# Patient Record
Sex: FEMALE | Race: ASIAN | NOT HISPANIC OR LATINO
[De-identification: names, ages, dates, MRNs, and addresses within clinical notes are randomized per-mention and may not be internally consistent; named-entity substitution may affect disease eponyms.]

---

## 2017-01-04 ENCOUNTER — APPOINTMENT (OUTPATIENT)
Age: 62
End: 2017-01-04

## 2017-01-04 ENCOUNTER — LABORATORY RESULT (OUTPATIENT)
Age: 62
End: 2017-01-04

## 2017-01-04 ENCOUNTER — OUTPATIENT (OUTPATIENT)
Dept: OUTPATIENT SERVICES | Facility: HOSPITAL | Age: 62
LOS: 1 days | End: 2017-01-04

## 2017-01-04 DIAGNOSIS — Z98.89 OTHER SPECIFIED POSTPROCEDURAL STATES: Chronic | ICD-10-CM

## 2017-01-04 DIAGNOSIS — K43.9 VENTRAL HERNIA WITHOUT OBSTRUCTION OR GANGRENE: Chronic | ICD-10-CM

## 2017-01-04 DIAGNOSIS — Z98.49 CATARACT EXTRACTION STATUS, UNSPECIFIED EYE: Chronic | ICD-10-CM

## 2017-01-04 LAB
INR BLD: 2.06 — HIGH (ref 0.87–1.18)
PROTHROM AB SERPL-ACNC: 23.7 SEC — HIGH (ref 10–13.1)

## 2017-01-05 DIAGNOSIS — Z79.01 LONG TERM (CURRENT) USE OF ANTICOAGULANTS: ICD-10-CM

## 2017-01-19 ENCOUNTER — APPOINTMENT (OUTPATIENT)
Dept: INTERNAL MEDICINE | Facility: HOSPITAL | Age: 62
End: 2017-01-19

## 2017-01-19 ENCOUNTER — OUTPATIENT (OUTPATIENT)
Dept: OUTPATIENT SERVICES | Facility: HOSPITAL | Age: 62
LOS: 1 days | End: 2017-01-19

## 2017-01-19 DIAGNOSIS — Z98.89 OTHER SPECIFIED POSTPROCEDURAL STATES: Chronic | ICD-10-CM

## 2017-01-19 DIAGNOSIS — K43.9 VENTRAL HERNIA WITHOUT OBSTRUCTION OR GANGRENE: Chronic | ICD-10-CM

## 2017-01-19 DIAGNOSIS — Z98.49 CATARACT EXTRACTION STATUS, UNSPECIFIED EYE: Chronic | ICD-10-CM

## 2017-01-20 LAB — INR PPP: 2.1 RATIO

## 2017-01-23 DIAGNOSIS — I48.91 UNSPECIFIED ATRIAL FIBRILLATION: ICD-10-CM

## 2017-01-23 DIAGNOSIS — Z00.00 ENCOUNTER FOR GENERAL ADULT MEDICAL EXAMINATION WITHOUT ABNORMAL FINDINGS: ICD-10-CM

## 2017-01-23 DIAGNOSIS — Z79.01 LONG TERM (CURRENT) USE OF ANTICOAGULANTS: ICD-10-CM

## 2017-02-06 ENCOUNTER — OUTPATIENT (OUTPATIENT)
Dept: OUTPATIENT SERVICES | Facility: HOSPITAL | Age: 62
LOS: 1 days | End: 2017-02-06

## 2017-02-06 ENCOUNTER — RESULT CHARGE (OUTPATIENT)
Age: 62
End: 2017-02-06

## 2017-02-06 ENCOUNTER — APPOINTMENT (OUTPATIENT)
Dept: INTERNAL MEDICINE | Facility: HOSPITAL | Age: 62
End: 2017-02-06

## 2017-02-06 DIAGNOSIS — Z98.49 CATARACT EXTRACTION STATUS, UNSPECIFIED EYE: Chronic | ICD-10-CM

## 2017-02-06 DIAGNOSIS — Z98.89 OTHER SPECIFIED POSTPROCEDURAL STATES: Chronic | ICD-10-CM

## 2017-02-06 DIAGNOSIS — I48.91 UNSPECIFIED ATRIAL FIBRILLATION: ICD-10-CM

## 2017-02-06 DIAGNOSIS — K43.9 VENTRAL HERNIA WITHOUT OBSTRUCTION OR GANGRENE: Chronic | ICD-10-CM

## 2017-02-06 LAB — INR PPP: 2.1 RATIO

## 2017-02-16 ENCOUNTER — APPOINTMENT (OUTPATIENT)
Dept: INTERNAL MEDICINE | Facility: HOSPITAL | Age: 62
End: 2017-02-16

## 2017-03-06 ENCOUNTER — OUTPATIENT (OUTPATIENT)
Dept: OUTPATIENT SERVICES | Facility: HOSPITAL | Age: 62
LOS: 1 days | End: 2017-03-06

## 2017-03-06 ENCOUNTER — APPOINTMENT (OUTPATIENT)
Dept: INTERNAL MEDICINE | Facility: HOSPITAL | Age: 62
End: 2017-03-06

## 2017-03-06 ENCOUNTER — RESULT CHARGE (OUTPATIENT)
Age: 62
End: 2017-03-06

## 2017-03-06 DIAGNOSIS — Z98.49 CATARACT EXTRACTION STATUS, UNSPECIFIED EYE: Chronic | ICD-10-CM

## 2017-03-06 DIAGNOSIS — K43.9 VENTRAL HERNIA WITHOUT OBSTRUCTION OR GANGRENE: Chronic | ICD-10-CM

## 2017-03-06 DIAGNOSIS — Z79.01 LONG TERM (CURRENT) USE OF ANTICOAGULANTS: ICD-10-CM

## 2017-03-06 DIAGNOSIS — Z98.89 OTHER SPECIFIED POSTPROCEDURAL STATES: Chronic | ICD-10-CM

## 2017-03-06 DIAGNOSIS — Z00.00 ENCOUNTER FOR GENERAL ADULT MEDICAL EXAMINATION WITHOUT ABNORMAL FINDINGS: ICD-10-CM

## 2017-03-07 LAB — INR PPP: 1.9 RATIO

## 2017-03-13 ENCOUNTER — APPOINTMENT (OUTPATIENT)
Dept: INTERNAL MEDICINE | Facility: HOSPITAL | Age: 62
End: 2017-03-13

## 2017-03-13 ENCOUNTER — OUTPATIENT (OUTPATIENT)
Dept: OUTPATIENT SERVICES | Facility: HOSPITAL | Age: 62
LOS: 1 days | End: 2017-03-13

## 2017-03-13 DIAGNOSIS — Z98.49 CATARACT EXTRACTION STATUS, UNSPECIFIED EYE: Chronic | ICD-10-CM

## 2017-03-13 DIAGNOSIS — K43.9 VENTRAL HERNIA WITHOUT OBSTRUCTION OR GANGRENE: Chronic | ICD-10-CM

## 2017-03-13 DIAGNOSIS — Z98.89 OTHER SPECIFIED POSTPROCEDURAL STATES: Chronic | ICD-10-CM

## 2017-03-15 DIAGNOSIS — I48.91 UNSPECIFIED ATRIAL FIBRILLATION: ICD-10-CM

## 2017-03-15 DIAGNOSIS — Z00.00 ENCOUNTER FOR GENERAL ADULT MEDICAL EXAMINATION WITHOUT ABNORMAL FINDINGS: ICD-10-CM

## 2017-03-15 DIAGNOSIS — Z79.01 LONG TERM (CURRENT) USE OF ANTICOAGULANTS: ICD-10-CM

## 2017-03-16 LAB — INR PPP: 3.7 RATIO

## 2017-03-27 ENCOUNTER — OUTPATIENT (OUTPATIENT)
Dept: OUTPATIENT SERVICES | Facility: HOSPITAL | Age: 62
LOS: 1 days | End: 2017-03-27

## 2017-03-27 ENCOUNTER — APPOINTMENT (OUTPATIENT)
Dept: INTERNAL MEDICINE | Facility: HOSPITAL | Age: 62
End: 2017-03-27

## 2017-03-27 DIAGNOSIS — K43.9 VENTRAL HERNIA WITHOUT OBSTRUCTION OR GANGRENE: Chronic | ICD-10-CM

## 2017-03-27 DIAGNOSIS — Z98.49 CATARACT EXTRACTION STATUS, UNSPECIFIED EYE: Chronic | ICD-10-CM

## 2017-03-27 DIAGNOSIS — Z79.01 LONG TERM (CURRENT) USE OF ANTICOAGULANTS: ICD-10-CM

## 2017-03-27 DIAGNOSIS — Z98.89 OTHER SPECIFIED POSTPROCEDURAL STATES: Chronic | ICD-10-CM

## 2017-03-27 LAB — INR PPP: 2.6 RATIO

## 2017-04-10 ENCOUNTER — APPOINTMENT (OUTPATIENT)
Dept: INTERNAL MEDICINE | Facility: HOSPITAL | Age: 62
End: 2017-04-10

## 2017-04-11 ENCOUNTER — OUTPATIENT (OUTPATIENT)
Dept: OUTPATIENT SERVICES | Facility: HOSPITAL | Age: 62
LOS: 1 days | End: 2017-04-11

## 2017-04-11 ENCOUNTER — APPOINTMENT (OUTPATIENT)
Dept: INTERNAL MEDICINE | Facility: HOSPITAL | Age: 62
End: 2017-04-11

## 2017-04-11 ENCOUNTER — RESULT CHARGE (OUTPATIENT)
Age: 62
End: 2017-04-11

## 2017-04-11 DIAGNOSIS — Z98.49 CATARACT EXTRACTION STATUS, UNSPECIFIED EYE: Chronic | ICD-10-CM

## 2017-04-11 DIAGNOSIS — K43.9 VENTRAL HERNIA WITHOUT OBSTRUCTION OR GANGRENE: Chronic | ICD-10-CM

## 2017-04-11 DIAGNOSIS — Z98.89 OTHER SPECIFIED POSTPROCEDURAL STATES: Chronic | ICD-10-CM

## 2017-04-11 LAB — INR PPP: 1.7 RATIO

## 2017-04-12 DIAGNOSIS — Z79.01 LONG TERM (CURRENT) USE OF ANTICOAGULANTS: ICD-10-CM

## 2017-04-12 DIAGNOSIS — Z00.00 ENCOUNTER FOR GENERAL ADULT MEDICAL EXAMINATION WITHOUT ABNORMAL FINDINGS: ICD-10-CM

## 2017-04-18 ENCOUNTER — OUTPATIENT (OUTPATIENT)
Dept: OUTPATIENT SERVICES | Facility: HOSPITAL | Age: 62
LOS: 1 days | End: 2017-04-18

## 2017-04-18 ENCOUNTER — APPOINTMENT (OUTPATIENT)
Dept: INTERNAL MEDICINE | Facility: HOSPITAL | Age: 62
End: 2017-04-18

## 2017-04-18 DIAGNOSIS — K43.9 VENTRAL HERNIA WITHOUT OBSTRUCTION OR GANGRENE: Chronic | ICD-10-CM

## 2017-04-18 DIAGNOSIS — Z98.89 OTHER SPECIFIED POSTPROCEDURAL STATES: Chronic | ICD-10-CM

## 2017-04-18 DIAGNOSIS — Z98.49 CATARACT EXTRACTION STATUS, UNSPECIFIED EYE: Chronic | ICD-10-CM

## 2017-04-18 LAB
INR PPP: 2.8 RATIO
QUALITY CONTROL: YES

## 2017-04-19 DIAGNOSIS — Z79.01 LONG TERM (CURRENT) USE OF ANTICOAGULANTS: ICD-10-CM

## 2017-04-19 DIAGNOSIS — Z00.00 ENCOUNTER FOR GENERAL ADULT MEDICAL EXAMINATION WITHOUT ABNORMAL FINDINGS: ICD-10-CM

## 2017-04-27 ENCOUNTER — OUTPATIENT (OUTPATIENT)
Dept: OUTPATIENT SERVICES | Facility: HOSPITAL | Age: 62
LOS: 1 days | End: 2017-04-27

## 2017-04-27 ENCOUNTER — APPOINTMENT (OUTPATIENT)
Dept: INTERNAL MEDICINE | Facility: HOSPITAL | Age: 62
End: 2017-04-27

## 2017-04-27 ENCOUNTER — LABORATORY RESULT (OUTPATIENT)
Age: 62
End: 2017-04-27

## 2017-04-27 VITALS — BODY MASS INDEX: 22.7 KG/M2 | HEIGHT: 62 IN | WEIGHT: 123.35 LBS

## 2017-04-27 VITALS — SYSTOLIC BLOOD PRESSURE: 120 MMHG | RESPIRATION RATE: 14 BRPM | HEART RATE: 64 BPM | DIASTOLIC BLOOD PRESSURE: 75 MMHG

## 2017-04-27 DIAGNOSIS — Z98.89 OTHER SPECIFIED POSTPROCEDURAL STATES: Chronic | ICD-10-CM

## 2017-04-27 DIAGNOSIS — K43.9 VENTRAL HERNIA WITHOUT OBSTRUCTION OR GANGRENE: Chronic | ICD-10-CM

## 2017-04-27 DIAGNOSIS — Z98.49 CATARACT EXTRACTION STATUS, UNSPECIFIED EYE: Chronic | ICD-10-CM

## 2017-04-27 LAB
CHOLEST SERPL-MCNC: 124 MG/DL — SIGNIFICANT CHANGE UP (ref 120–199)
HBA1C BLD-MCNC: 6.3 % — HIGH (ref 4–5.6)
HDLC SERPL-MCNC: 56 MG/DL — SIGNIFICANT CHANGE UP (ref 45–65)
LIPID PNL WITH DIRECT LDL SERPL: 61 MG/DL — SIGNIFICANT CHANGE UP
TRIGL SERPL-MCNC: 57 MG/DL — SIGNIFICANT CHANGE UP (ref 10–149)

## 2017-04-28 DIAGNOSIS — M25.562 PAIN IN LEFT KNEE: ICD-10-CM

## 2017-04-28 DIAGNOSIS — E11.9 TYPE 2 DIABETES MELLITUS WITHOUT COMPLICATIONS: ICD-10-CM

## 2017-04-28 DIAGNOSIS — Z79.01 LONG TERM (CURRENT) USE OF ANTICOAGULANTS: ICD-10-CM

## 2017-04-28 DIAGNOSIS — E78.5 HYPERLIPIDEMIA, UNSPECIFIED: ICD-10-CM

## 2017-04-28 DIAGNOSIS — I48.91 UNSPECIFIED ATRIAL FIBRILLATION: ICD-10-CM

## 2017-04-28 DIAGNOSIS — I63.9 CEREBRAL INFARCTION, UNSPECIFIED: ICD-10-CM

## 2017-04-28 DIAGNOSIS — Z00.00 ENCOUNTER FOR GENERAL ADULT MEDICAL EXAMINATION WITHOUT ABNORMAL FINDINGS: ICD-10-CM

## 2017-04-28 LAB
CREAT UR-MCNC: 15 MG/DL — SIGNIFICANT CHANGE UP
GLUCOSE BLDC GLUCOMTR-MCNC: 123
INR PPP: 2.9 RATIO
MICROALBUMIN UR-MCNC: < 0.3 MG/DL — SIGNIFICANT CHANGE UP

## 2017-05-03 ENCOUNTER — RX RENEWAL (OUTPATIENT)
Age: 62
End: 2017-05-03

## 2017-05-03 ENCOUNTER — OTHER (OUTPATIENT)
Age: 62
End: 2017-05-03

## 2017-05-04 ENCOUNTER — RX RENEWAL (OUTPATIENT)
Age: 62
End: 2017-05-04

## 2017-05-11 ENCOUNTER — APPOINTMENT (OUTPATIENT)
Dept: OPHTHALMOLOGY | Facility: CLINIC | Age: 62
End: 2017-05-11

## 2017-05-17 ENCOUNTER — APPOINTMENT (OUTPATIENT)
Dept: INTERNAL MEDICINE | Facility: HOSPITAL | Age: 62
End: 2017-05-17

## 2017-05-17 ENCOUNTER — OUTPATIENT (OUTPATIENT)
Dept: OUTPATIENT SERVICES | Facility: HOSPITAL | Age: 62
LOS: 1 days | End: 2017-05-17

## 2017-05-17 DIAGNOSIS — K43.9 VENTRAL HERNIA WITHOUT OBSTRUCTION OR GANGRENE: Chronic | ICD-10-CM

## 2017-05-17 DIAGNOSIS — Z98.89 OTHER SPECIFIED POSTPROCEDURAL STATES: Chronic | ICD-10-CM

## 2017-05-17 DIAGNOSIS — Z98.49 CATARACT EXTRACTION STATUS, UNSPECIFIED EYE: Chronic | ICD-10-CM

## 2017-05-17 DIAGNOSIS — Z79.01 LONG TERM (CURRENT) USE OF ANTICOAGULANTS: ICD-10-CM

## 2017-05-24 ENCOUNTER — APPOINTMENT (OUTPATIENT)
Dept: INTERNAL MEDICINE | Facility: HOSPITAL | Age: 62
End: 2017-05-24

## 2017-05-24 ENCOUNTER — RX RENEWAL (OUTPATIENT)
Age: 62
End: 2017-05-24

## 2017-05-24 ENCOUNTER — OUTPATIENT (OUTPATIENT)
Dept: OUTPATIENT SERVICES | Facility: HOSPITAL | Age: 62
LOS: 1 days | End: 2017-05-24

## 2017-05-24 ENCOUNTER — NON-APPOINTMENT (OUTPATIENT)
Age: 62
End: 2017-05-24

## 2017-05-24 ENCOUNTER — APPOINTMENT (OUTPATIENT)
Dept: CARDIOLOGY | Facility: CLINIC | Age: 62
End: 2017-05-24

## 2017-05-24 VITALS
WEIGHT: 123 LBS | DIASTOLIC BLOOD PRESSURE: 79 MMHG | SYSTOLIC BLOOD PRESSURE: 146 MMHG | HEART RATE: 68 BPM | BODY MASS INDEX: 22.5 KG/M2 | OXYGEN SATURATION: 99 %

## 2017-05-24 DIAGNOSIS — Z98.49 CATARACT EXTRACTION STATUS, UNSPECIFIED EYE: Chronic | ICD-10-CM

## 2017-05-24 DIAGNOSIS — Z00.00 ENCOUNTER FOR GENERAL ADULT MEDICAL EXAMINATION WITHOUT ABNORMAL FINDINGS: ICD-10-CM

## 2017-05-24 DIAGNOSIS — K43.9 VENTRAL HERNIA WITHOUT OBSTRUCTION OR GANGRENE: Chronic | ICD-10-CM

## 2017-05-24 DIAGNOSIS — Z79.01 LONG TERM (CURRENT) USE OF ANTICOAGULANTS: ICD-10-CM

## 2017-05-24 DIAGNOSIS — Z98.89 OTHER SPECIFIED POSTPROCEDURAL STATES: Chronic | ICD-10-CM

## 2017-05-24 LAB — INR PPP: 3.2 RATIO

## 2017-05-31 ENCOUNTER — OUTPATIENT (OUTPATIENT)
Dept: OUTPATIENT SERVICES | Facility: HOSPITAL | Age: 62
LOS: 1 days | End: 2017-05-31

## 2017-05-31 ENCOUNTER — APPOINTMENT (OUTPATIENT)
Dept: INTERNAL MEDICINE | Facility: HOSPITAL | Age: 62
End: 2017-05-31

## 2017-05-31 DIAGNOSIS — Z98.49 CATARACT EXTRACTION STATUS, UNSPECIFIED EYE: Chronic | ICD-10-CM

## 2017-05-31 DIAGNOSIS — Z98.89 OTHER SPECIFIED POSTPROCEDURAL STATES: Chronic | ICD-10-CM

## 2017-05-31 DIAGNOSIS — Z00.00 ENCOUNTER FOR GENERAL ADULT MEDICAL EXAMINATION WITHOUT ABNORMAL FINDINGS: ICD-10-CM

## 2017-05-31 DIAGNOSIS — Z79.01 LONG TERM (CURRENT) USE OF ANTICOAGULANTS: ICD-10-CM

## 2017-05-31 DIAGNOSIS — K43.9 VENTRAL HERNIA WITHOUT OBSTRUCTION OR GANGRENE: Chronic | ICD-10-CM

## 2017-05-31 LAB — INR PPP: 2.6 RATIO

## 2017-06-07 ENCOUNTER — APPOINTMENT (OUTPATIENT)
Dept: INTERNAL MEDICINE | Facility: HOSPITAL | Age: 62
End: 2017-06-07

## 2017-06-07 ENCOUNTER — RESULT CHARGE (OUTPATIENT)
Age: 62
End: 2017-06-07

## 2017-06-07 ENCOUNTER — OUTPATIENT (OUTPATIENT)
Dept: OUTPATIENT SERVICES | Facility: HOSPITAL | Age: 62
LOS: 1 days | End: 2017-06-07

## 2017-06-07 ENCOUNTER — OTHER (OUTPATIENT)
Age: 62
End: 2017-06-07

## 2017-06-07 DIAGNOSIS — Z98.49 CATARACT EXTRACTION STATUS, UNSPECIFIED EYE: Chronic | ICD-10-CM

## 2017-06-07 DIAGNOSIS — Z98.89 OTHER SPECIFIED POSTPROCEDURAL STATES: Chronic | ICD-10-CM

## 2017-06-07 DIAGNOSIS — K43.9 VENTRAL HERNIA WITHOUT OBSTRUCTION OR GANGRENE: Chronic | ICD-10-CM

## 2017-06-08 ENCOUNTER — OUTPATIENT (OUTPATIENT)
Dept: OUTPATIENT SERVICES | Facility: HOSPITAL | Age: 62
LOS: 1 days | End: 2017-06-08

## 2017-06-08 ENCOUNTER — APPOINTMENT (OUTPATIENT)
Dept: CV DIAGNOSTICS | Facility: HOSPITAL | Age: 62
End: 2017-06-08

## 2017-06-08 DIAGNOSIS — Z98.89 OTHER SPECIFIED POSTPROCEDURAL STATES: Chronic | ICD-10-CM

## 2017-06-08 DIAGNOSIS — I20.9 ANGINA PECTORIS, UNSPECIFIED: ICD-10-CM

## 2017-06-08 DIAGNOSIS — Z98.49 CATARACT EXTRACTION STATUS, UNSPECIFIED EYE: Chronic | ICD-10-CM

## 2017-06-08 DIAGNOSIS — Z79.01 LONG TERM (CURRENT) USE OF ANTICOAGULANTS: ICD-10-CM

## 2017-06-08 DIAGNOSIS — K43.9 VENTRAL HERNIA WITHOUT OBSTRUCTION OR GANGRENE: Chronic | ICD-10-CM

## 2017-06-08 DIAGNOSIS — I48.91 UNSPECIFIED ATRIAL FIBRILLATION: ICD-10-CM

## 2017-06-13 DIAGNOSIS — R94.31 ABNORMAL ELECTROCARDIOGRAM [ECG] [EKG]: ICD-10-CM

## 2017-06-19 ENCOUNTER — OUTPATIENT (OUTPATIENT)
Dept: OUTPATIENT SERVICES | Facility: HOSPITAL | Age: 62
LOS: 1 days | End: 2017-06-19

## 2017-06-19 ENCOUNTER — APPOINTMENT (OUTPATIENT)
Dept: INTERNAL MEDICINE | Facility: HOSPITAL | Age: 62
End: 2017-06-19

## 2017-06-19 DIAGNOSIS — K43.9 VENTRAL HERNIA WITHOUT OBSTRUCTION OR GANGRENE: Chronic | ICD-10-CM

## 2017-06-19 DIAGNOSIS — Z98.89 OTHER SPECIFIED POSTPROCEDURAL STATES: Chronic | ICD-10-CM

## 2017-06-19 DIAGNOSIS — Z98.49 CATARACT EXTRACTION STATUS, UNSPECIFIED EYE: Chronic | ICD-10-CM

## 2017-06-20 ENCOUNTER — RX RENEWAL (OUTPATIENT)
Age: 62
End: 2017-06-20

## 2017-06-20 DIAGNOSIS — Z79.01 LONG TERM (CURRENT) USE OF ANTICOAGULANTS: ICD-10-CM

## 2017-06-21 ENCOUNTER — NON-APPOINTMENT (OUTPATIENT)
Age: 62
End: 2017-06-21

## 2017-06-21 ENCOUNTER — APPOINTMENT (OUTPATIENT)
Dept: CARDIOLOGY | Facility: CLINIC | Age: 62
End: 2017-06-21

## 2017-06-21 VITALS
WEIGHT: 125 LBS | SYSTOLIC BLOOD PRESSURE: 138 MMHG | BODY MASS INDEX: 23 KG/M2 | HEART RATE: 66 BPM | DIASTOLIC BLOOD PRESSURE: 80 MMHG | HEIGHT: 62 IN | RESPIRATION RATE: 98 BRPM

## 2017-06-30 ENCOUNTER — OUTPATIENT (OUTPATIENT)
Dept: OUTPATIENT SERVICES | Facility: HOSPITAL | Age: 62
LOS: 1 days | Discharge: HOME | End: 2017-06-30
Payer: MEDICAID

## 2017-06-30 DIAGNOSIS — K43.9 VENTRAL HERNIA WITHOUT OBSTRUCTION OR GANGRENE: Chronic | ICD-10-CM

## 2017-06-30 DIAGNOSIS — I20.9 ANGINA PECTORIS, UNSPECIFIED: ICD-10-CM

## 2017-06-30 DIAGNOSIS — Z98.49 CATARACT EXTRACTION STATUS, UNSPECIFIED EYE: Chronic | ICD-10-CM

## 2017-06-30 DIAGNOSIS — Z98.89 OTHER SPECIFIED POSTPROCEDURAL STATES: Chronic | ICD-10-CM

## 2017-06-30 LAB
BASOPHILS # BLD AUTO: 0.03 K/UL — SIGNIFICANT CHANGE UP (ref 0–0.2)
BASOPHILS NFR BLD AUTO: 0.3 % — SIGNIFICANT CHANGE UP (ref 0–2)
BUN SERPL-MCNC: 11 MG/DL — SIGNIFICANT CHANGE UP (ref 7–23)
CALCIUM SERPL-MCNC: 9.7 MG/DL — SIGNIFICANT CHANGE UP (ref 8.4–10.5)
CHLORIDE SERPL-SCNC: 103 MMOL/L — SIGNIFICANT CHANGE UP (ref 98–107)
CO2 SERPL-SCNC: 25 MMOL/L — SIGNIFICANT CHANGE UP (ref 22–31)
CREAT SERPL-MCNC: 0.65 MG/DL — SIGNIFICANT CHANGE UP (ref 0.5–1.3)
EOSINOPHIL # BLD AUTO: 0.11 K/UL — SIGNIFICANT CHANGE UP (ref 0–0.5)
EOSINOPHIL NFR BLD AUTO: 1.2 % — SIGNIFICANT CHANGE UP (ref 0–6)
GLUCOSE SERPL-MCNC: 114 MG/DL — HIGH (ref 70–99)
HCT VFR BLD CALC: 32.6 % — LOW (ref 34.5–45)
HGB BLD-MCNC: 10.5 G/DL — LOW (ref 11.5–15.5)
IMM GRANULOCYTES # BLD AUTO: 0.03 # — SIGNIFICANT CHANGE UP
IMM GRANULOCYTES NFR BLD AUTO: 0.3 % — SIGNIFICANT CHANGE UP (ref 0–1.5)
INR BLD: 3.07 — HIGH (ref 0.88–1.17)
LYMPHOCYTES # BLD AUTO: 3.46 K/UL — HIGH (ref 1–3.3)
LYMPHOCYTES # BLD AUTO: 37.1 % — SIGNIFICANT CHANGE UP (ref 13–44)
MAGNESIUM SERPL-MCNC: 1.8 MG/DL — SIGNIFICANT CHANGE UP (ref 1.6–2.6)
MCHC RBC-ENTMCNC: 28.8 PG — SIGNIFICANT CHANGE UP (ref 27–34)
MCHC RBC-ENTMCNC: 32.2 % — SIGNIFICANT CHANGE UP (ref 32–36)
MCV RBC AUTO: 89.6 FL — SIGNIFICANT CHANGE UP (ref 80–100)
MONOCYTES # BLD AUTO: 0.67 K/UL — SIGNIFICANT CHANGE UP (ref 0–0.9)
MONOCYTES NFR BLD AUTO: 7.2 % — SIGNIFICANT CHANGE UP (ref 2–14)
NEUTROPHILS # BLD AUTO: 5.03 K/UL — SIGNIFICANT CHANGE UP (ref 1.8–7.4)
NEUTROPHILS NFR BLD AUTO: 53.9 % — SIGNIFICANT CHANGE UP (ref 43–77)
NRBC # FLD: 0 — SIGNIFICANT CHANGE UP
PLATELET # BLD AUTO: 251 K/UL — SIGNIFICANT CHANGE UP (ref 150–400)
PMV BLD: 10.5 FL — SIGNIFICANT CHANGE UP (ref 7–13)
POTASSIUM SERPL-MCNC: 4.5 MMOL/L — SIGNIFICANT CHANGE UP (ref 3.5–5.3)
POTASSIUM SERPL-SCNC: 4.5 MMOL/L — SIGNIFICANT CHANGE UP (ref 3.5–5.3)
PROTHROM AB SERPL-ACNC: 35.2 SEC — HIGH (ref 9.8–13.1)
RBC # BLD: 3.64 M/UL — LOW (ref 3.8–5.2)
RBC # FLD: 14.4 % — SIGNIFICANT CHANGE UP (ref 10.3–14.5)
SODIUM SERPL-SCNC: 140 MMOL/L — SIGNIFICANT CHANGE UP (ref 135–145)
WBC # BLD: 9.33 K/UL — SIGNIFICANT CHANGE UP (ref 3.8–10.5)
WBC # FLD AUTO: 9.33 K/UL — SIGNIFICANT CHANGE UP (ref 3.8–10.5)

## 2017-06-30 PROCEDURE — 93458 L HRT ARTERY/VENTRICLE ANGIO: CPT | Mod: 26

## 2017-06-30 PROCEDURE — 93010 ELECTROCARDIOGRAM REPORT: CPT

## 2017-06-30 NOTE — H&P CARDIOLOGY - HISTORY OF PRESENT ILLNESS
63 yo F HTN, DM, chol, moderate MS, afib, here for elective cath due to SOB and left jaw pain on exertion. Pt states sx occur on walking > 1 block. NO  SOB at rest PND, orthopnea, palpitations, diaphoresis, lightheadedness, dizziness, syncope, increased lower extremity edema, fever chills, malaise, myalgias, anorexia, weight changes ( loss or gain), night sweats, generalized fatigue abdominal pain, N/V/C/D BRBPR, melena, urinary symptoms, cough, and wheezing.

## 2017-06-30 NOTE — H&P CARDIOLOGY - PMH
Atrial fibrillation  h/o Cardioversion in 2013  DM (diabetes mellitus)    HLD (hyperlipidemia)    HTN (hypertension)    Mitral stenosis    TIA (transient ischemic attack)  2013- Rt facial droop which resolved after 2 hours

## 2017-07-06 ENCOUNTER — OUTPATIENT (OUTPATIENT)
Dept: OUTPATIENT SERVICES | Facility: HOSPITAL | Age: 62
LOS: 1 days | End: 2017-07-06

## 2017-07-06 ENCOUNTER — APPOINTMENT (OUTPATIENT)
Dept: INTERNAL MEDICINE | Facility: HOSPITAL | Age: 62
End: 2017-07-06

## 2017-07-06 DIAGNOSIS — K43.9 VENTRAL HERNIA WITHOUT OBSTRUCTION OR GANGRENE: Chronic | ICD-10-CM

## 2017-07-06 DIAGNOSIS — Z79.01 LONG TERM (CURRENT) USE OF ANTICOAGULANTS: ICD-10-CM

## 2017-07-06 DIAGNOSIS — Z98.49 CATARACT EXTRACTION STATUS, UNSPECIFIED EYE: Chronic | ICD-10-CM

## 2017-07-06 DIAGNOSIS — Z98.89 OTHER SPECIFIED POSTPROCEDURAL STATES: Chronic | ICD-10-CM

## 2017-07-10 ENCOUNTER — APPOINTMENT (OUTPATIENT)
Dept: INTERNAL MEDICINE | Facility: HOSPITAL | Age: 62
End: 2017-07-10

## 2017-07-10 DIAGNOSIS — Z00.00 ENCOUNTER FOR GENERAL ADULT MEDICAL EXAMINATION WITHOUT ABNORMAL FINDINGS: ICD-10-CM

## 2017-07-10 DIAGNOSIS — I48.91 UNSPECIFIED ATRIAL FIBRILLATION: ICD-10-CM

## 2017-07-12 ENCOUNTER — NON-APPOINTMENT (OUTPATIENT)
Age: 62
End: 2017-07-12

## 2017-07-12 ENCOUNTER — APPOINTMENT (OUTPATIENT)
Dept: CARDIOLOGY | Facility: CLINIC | Age: 62
End: 2017-07-12

## 2017-07-12 VITALS — HEIGHT: 62 IN | WEIGHT: 125 LBS | BODY MASS INDEX: 23 KG/M2

## 2017-07-12 VITALS
SYSTOLIC BLOOD PRESSURE: 108 MMHG | HEART RATE: 72 BPM | DIASTOLIC BLOOD PRESSURE: 69 MMHG | RESPIRATION RATE: 16 BRPM | OXYGEN SATURATION: 99 %

## 2017-07-12 RX ORDER — ISOSORBIDE MONONITRATE 30 MG/1
30 TABLET, EXTENDED RELEASE ORAL DAILY
Qty: 30 | Refills: 5 | Status: DISCONTINUED | COMMUNITY
Start: 2017-06-21 | End: 2017-07-12

## 2017-07-25 ENCOUNTER — APPOINTMENT (OUTPATIENT)
Dept: INTERNAL MEDICINE | Facility: HOSPITAL | Age: 62
End: 2017-07-25

## 2017-07-25 ENCOUNTER — OUTPATIENT (OUTPATIENT)
Dept: OUTPATIENT SERVICES | Facility: HOSPITAL | Age: 62
LOS: 1 days | End: 2017-07-25

## 2017-07-25 DIAGNOSIS — Z98.49 CATARACT EXTRACTION STATUS, UNSPECIFIED EYE: Chronic | ICD-10-CM

## 2017-07-25 DIAGNOSIS — Z98.89 OTHER SPECIFIED POSTPROCEDURAL STATES: Chronic | ICD-10-CM

## 2017-07-25 DIAGNOSIS — K43.9 VENTRAL HERNIA WITHOUT OBSTRUCTION OR GANGRENE: Chronic | ICD-10-CM

## 2017-07-26 DIAGNOSIS — Z79.01 LONG TERM (CURRENT) USE OF ANTICOAGULANTS: ICD-10-CM

## 2017-07-26 LAB — INR PPP: 4 RATIO

## 2017-08-01 ENCOUNTER — APPOINTMENT (OUTPATIENT)
Dept: INTERNAL MEDICINE | Facility: HOSPITAL | Age: 62
End: 2017-08-01

## 2017-08-01 ENCOUNTER — OUTPATIENT (OUTPATIENT)
Dept: OUTPATIENT SERVICES | Facility: HOSPITAL | Age: 62
LOS: 1 days | End: 2017-08-01

## 2017-08-01 DIAGNOSIS — K43.9 VENTRAL HERNIA WITHOUT OBSTRUCTION OR GANGRENE: Chronic | ICD-10-CM

## 2017-08-01 DIAGNOSIS — Z98.49 CATARACT EXTRACTION STATUS, UNSPECIFIED EYE: Chronic | ICD-10-CM

## 2017-08-01 DIAGNOSIS — Z98.89 OTHER SPECIFIED POSTPROCEDURAL STATES: Chronic | ICD-10-CM

## 2017-08-02 DIAGNOSIS — Z79.01 LONG TERM (CURRENT) USE OF ANTICOAGULANTS: ICD-10-CM

## 2017-08-02 LAB — INR PPP: 2.6 RATIO

## 2017-08-15 ENCOUNTER — APPOINTMENT (OUTPATIENT)
Dept: INTERNAL MEDICINE | Facility: HOSPITAL | Age: 62
End: 2017-08-15

## 2017-08-15 ENCOUNTER — OUTPATIENT (OUTPATIENT)
Dept: OUTPATIENT SERVICES | Facility: HOSPITAL | Age: 62
LOS: 1 days | End: 2017-08-15

## 2017-08-15 DIAGNOSIS — K43.9 VENTRAL HERNIA WITHOUT OBSTRUCTION OR GANGRENE: Chronic | ICD-10-CM

## 2017-08-15 DIAGNOSIS — Z00.00 ENCOUNTER FOR GENERAL ADULT MEDICAL EXAMINATION WITHOUT ABNORMAL FINDINGS: ICD-10-CM

## 2017-08-15 DIAGNOSIS — Z98.89 OTHER SPECIFIED POSTPROCEDURAL STATES: Chronic | ICD-10-CM

## 2017-08-15 DIAGNOSIS — Z98.49 CATARACT EXTRACTION STATUS, UNSPECIFIED EYE: Chronic | ICD-10-CM

## 2017-08-17 LAB — INR PPP: 2.3 RATIO

## 2017-08-29 ENCOUNTER — APPOINTMENT (OUTPATIENT)
Dept: INTERNAL MEDICINE | Facility: HOSPITAL | Age: 62
End: 2017-08-29

## 2017-08-29 ENCOUNTER — OUTPATIENT (OUTPATIENT)
Dept: OUTPATIENT SERVICES | Facility: HOSPITAL | Age: 62
LOS: 1 days | End: 2017-08-29

## 2017-08-29 ENCOUNTER — RESULT CHARGE (OUTPATIENT)
Age: 62
End: 2017-08-29

## 2017-08-29 ENCOUNTER — OTHER (OUTPATIENT)
Age: 62
End: 2017-08-29

## 2017-08-29 DIAGNOSIS — K43.9 VENTRAL HERNIA WITHOUT OBSTRUCTION OR GANGRENE: Chronic | ICD-10-CM

## 2017-08-29 DIAGNOSIS — Z98.89 OTHER SPECIFIED POSTPROCEDURAL STATES: Chronic | ICD-10-CM

## 2017-08-29 DIAGNOSIS — Z98.49 CATARACT EXTRACTION STATUS, UNSPECIFIED EYE: Chronic | ICD-10-CM

## 2017-08-30 DIAGNOSIS — Z79.01 LONG TERM (CURRENT) USE OF ANTICOAGULANTS: ICD-10-CM

## 2017-08-30 LAB — INR PPP: 2.6 RATIO

## 2017-09-19 ENCOUNTER — APPOINTMENT (OUTPATIENT)
Dept: INTERNAL MEDICINE | Facility: HOSPITAL | Age: 62
End: 2017-09-19

## 2017-09-19 ENCOUNTER — OUTPATIENT (OUTPATIENT)
Dept: OUTPATIENT SERVICES | Facility: HOSPITAL | Age: 62
LOS: 1 days | End: 2017-09-19

## 2017-09-19 DIAGNOSIS — Z98.89 OTHER SPECIFIED POSTPROCEDURAL STATES: Chronic | ICD-10-CM

## 2017-09-19 DIAGNOSIS — K43.9 VENTRAL HERNIA WITHOUT OBSTRUCTION OR GANGRENE: Chronic | ICD-10-CM

## 2017-09-19 DIAGNOSIS — Z79.01 LONG TERM (CURRENT) USE OF ANTICOAGULANTS: ICD-10-CM

## 2017-09-19 DIAGNOSIS — Z98.49 CATARACT EXTRACTION STATUS, UNSPECIFIED EYE: Chronic | ICD-10-CM

## 2017-09-19 LAB — INR PPP: 2.3 RATIO

## 2017-09-29 ENCOUNTER — APPOINTMENT (OUTPATIENT)
Dept: INTERNAL MEDICINE | Facility: HOSPITAL | Age: 62
End: 2017-09-29

## 2017-10-03 ENCOUNTER — OTHER (OUTPATIENT)
Age: 62
End: 2017-10-03

## 2017-10-03 ENCOUNTER — OUTPATIENT (OUTPATIENT)
Dept: OUTPATIENT SERVICES | Facility: HOSPITAL | Age: 62
LOS: 1 days | End: 2017-10-03

## 2017-10-03 ENCOUNTER — APPOINTMENT (OUTPATIENT)
Dept: INTERNAL MEDICINE | Facility: HOSPITAL | Age: 62
End: 2017-10-03

## 2017-10-03 DIAGNOSIS — Z98.89 OTHER SPECIFIED POSTPROCEDURAL STATES: Chronic | ICD-10-CM

## 2017-10-03 DIAGNOSIS — Z98.49 CATARACT EXTRACTION STATUS, UNSPECIFIED EYE: Chronic | ICD-10-CM

## 2017-10-03 DIAGNOSIS — Z00.00 ENCOUNTER FOR GENERAL ADULT MEDICAL EXAMINATION WITHOUT ABNORMAL FINDINGS: ICD-10-CM

## 2017-10-03 DIAGNOSIS — Z79.01 LONG TERM (CURRENT) USE OF ANTICOAGULANTS: ICD-10-CM

## 2017-10-03 DIAGNOSIS — K43.9 VENTRAL HERNIA WITHOUT OBSTRUCTION OR GANGRENE: Chronic | ICD-10-CM

## 2017-10-03 LAB — INR PPP: 2.3 RATIO

## 2017-10-20 ENCOUNTER — LABORATORY RESULT (OUTPATIENT)
Age: 62
End: 2017-10-20

## 2017-10-20 ENCOUNTER — APPOINTMENT (OUTPATIENT)
Dept: INTERNAL MEDICINE | Facility: HOSPITAL | Age: 62
End: 2017-10-20
Payer: MEDICAID

## 2017-10-20 ENCOUNTER — OUTPATIENT (OUTPATIENT)
Dept: OUTPATIENT SERVICES | Facility: HOSPITAL | Age: 62
LOS: 1 days | End: 2017-10-20

## 2017-10-20 VITALS — SYSTOLIC BLOOD PRESSURE: 121 MMHG | HEART RATE: 65 BPM | DIASTOLIC BLOOD PRESSURE: 101 MMHG

## 2017-10-20 VITALS — BODY MASS INDEX: 22.82 KG/M2 | WEIGHT: 124 LBS | HEIGHT: 62 IN

## 2017-10-20 DIAGNOSIS — Z98.49 CATARACT EXTRACTION STATUS, UNSPECIFIED EYE: Chronic | ICD-10-CM

## 2017-10-20 DIAGNOSIS — Z98.89 OTHER SPECIFIED POSTPROCEDURAL STATES: Chronic | ICD-10-CM

## 2017-10-20 DIAGNOSIS — Z23 ENCOUNTER FOR IMMUNIZATION: ICD-10-CM

## 2017-10-20 DIAGNOSIS — Z79.01 LONG TERM (CURRENT) USE OF ANTICOAGULANTS: ICD-10-CM

## 2017-10-20 DIAGNOSIS — Z00.00 ENCOUNTER FOR GENERAL ADULT MEDICAL EXAMINATION WITHOUT ABNORMAL FINDINGS: ICD-10-CM

## 2017-10-20 DIAGNOSIS — I63.9 CEREBRAL INFARCTION, UNSPECIFIED: ICD-10-CM

## 2017-10-20 DIAGNOSIS — K43.9 VENTRAL HERNIA WITHOUT OBSTRUCTION OR GANGRENE: Chronic | ICD-10-CM

## 2017-10-20 DIAGNOSIS — L29.3 ANOGENITAL PRURITUS, UNSPECIFIED: ICD-10-CM

## 2017-10-20 DIAGNOSIS — I48.91 UNSPECIFIED ATRIAL FIBRILLATION: ICD-10-CM

## 2017-10-20 DIAGNOSIS — I05.0 RHEUMATIC MITRAL STENOSIS: ICD-10-CM

## 2017-10-20 LAB
ALBUMIN SERPL ELPH-MCNC: 4.4 G/DL — SIGNIFICANT CHANGE UP (ref 3.3–5)
ALP SERPL-CCNC: 44 U/L — SIGNIFICANT CHANGE UP (ref 40–120)
ALT FLD-CCNC: 19 U/L — SIGNIFICANT CHANGE UP (ref 4–33)
AST SERPL-CCNC: 23 U/L — SIGNIFICANT CHANGE UP (ref 4–32)
BASOPHILS # BLD AUTO: 0.04 K/UL — SIGNIFICANT CHANGE UP (ref 0–0.2)
BASOPHILS NFR BLD AUTO: 0.4 % — SIGNIFICANT CHANGE UP (ref 0–2)
BILIRUB SERPL-MCNC: 0.9 MG/DL — SIGNIFICANT CHANGE UP (ref 0.2–1.2)
BUN SERPL-MCNC: 11 MG/DL — SIGNIFICANT CHANGE UP (ref 7–23)
CALCIUM SERPL-MCNC: 9.9 MG/DL — SIGNIFICANT CHANGE UP (ref 8.4–10.5)
CHLORIDE SERPL-SCNC: 104 MMOL/L — SIGNIFICANT CHANGE UP (ref 98–107)
CHOLEST SERPL-MCNC: 124 MG/DL — SIGNIFICANT CHANGE UP (ref 120–199)
CO2 SERPL-SCNC: 26 MMOL/L — SIGNIFICANT CHANGE UP (ref 22–31)
CREAT SERPL-MCNC: 0.67 MG/DL — SIGNIFICANT CHANGE UP (ref 0.5–1.3)
EOSINOPHIL # BLD AUTO: 0.2 K/UL — SIGNIFICANT CHANGE UP (ref 0–0.5)
EOSINOPHIL NFR BLD AUTO: 2.1 % — SIGNIFICANT CHANGE UP (ref 0–6)
GLUCOSE SERPL-MCNC: 79 MG/DL — SIGNIFICANT CHANGE UP (ref 70–99)
HBA1C BLD-MCNC: 6.3 % — HIGH (ref 4–5.6)
HCT VFR BLD CALC: 32.7 % — LOW (ref 34.5–45)
HDLC SERPL-MCNC: 56 MG/DL — SIGNIFICANT CHANGE UP (ref 45–65)
HGB BLD-MCNC: 10.5 G/DL — LOW (ref 11.5–15.5)
IMM GRANULOCYTES # BLD AUTO: 0.06 # — SIGNIFICANT CHANGE UP
IMM GRANULOCYTES NFR BLD AUTO: 0.6 % — SIGNIFICANT CHANGE UP (ref 0–1.5)
LIPID PNL WITH DIRECT LDL SERPL: 64 MG/DL — SIGNIFICANT CHANGE UP
LYMPHOCYTES # BLD AUTO: 3.38 K/UL — HIGH (ref 1–3.3)
LYMPHOCYTES # BLD AUTO: 34.7 % — SIGNIFICANT CHANGE UP (ref 13–44)
MCHC RBC-ENTMCNC: 28.8 PG — SIGNIFICANT CHANGE UP (ref 27–34)
MCHC RBC-ENTMCNC: 32.1 % — SIGNIFICANT CHANGE UP (ref 32–36)
MCV RBC AUTO: 89.8 FL — SIGNIFICANT CHANGE UP (ref 80–100)
MONOCYTES # BLD AUTO: 0.93 K/UL — HIGH (ref 0–0.9)
MONOCYTES NFR BLD AUTO: 9.5 % — SIGNIFICANT CHANGE UP (ref 2–14)
NEUTROPHILS # BLD AUTO: 5.13 K/UL — SIGNIFICANT CHANGE UP (ref 1.8–7.4)
NEUTROPHILS NFR BLD AUTO: 52.7 % — SIGNIFICANT CHANGE UP (ref 43–77)
NRBC # FLD: 0 — SIGNIFICANT CHANGE UP
PLATELET # BLD AUTO: 236 K/UL — SIGNIFICANT CHANGE UP (ref 150–400)
PMV BLD: 11.6 FL — SIGNIFICANT CHANGE UP (ref 7–13)
POTASSIUM SERPL-MCNC: 5.6 MMOL/L — HIGH (ref 3.5–5.3)
POTASSIUM SERPL-SCNC: 5.6 MMOL/L — HIGH (ref 3.5–5.3)
PROT SERPL-MCNC: 7.9 G/DL — SIGNIFICANT CHANGE UP (ref 6–8.3)
RBC # BLD: 3.64 M/UL — LOW (ref 3.8–5.2)
RBC # FLD: 15.8 % — HIGH (ref 10.3–14.5)
SODIUM SERPL-SCNC: 141 MMOL/L — SIGNIFICANT CHANGE UP (ref 135–145)
TRIGL SERPL-MCNC: 66 MG/DL — SIGNIFICANT CHANGE UP (ref 10–149)
WBC # BLD: 9.74 K/UL — SIGNIFICANT CHANGE UP (ref 3.8–10.5)
WBC # FLD AUTO: 9.74 K/UL — SIGNIFICANT CHANGE UP (ref 3.8–10.5)

## 2017-10-20 PROCEDURE — 99214 OFFICE O/P EST MOD 30 MIN: CPT | Mod: GC

## 2017-10-26 ENCOUNTER — APPOINTMENT (OUTPATIENT)
Dept: INTERNAL MEDICINE | Facility: HOSPITAL | Age: 62
End: 2017-10-26

## 2017-10-26 ENCOUNTER — LABORATORY RESULT (OUTPATIENT)
Age: 62
End: 2017-10-26

## 2017-10-26 ENCOUNTER — OUTPATIENT (OUTPATIENT)
Dept: OUTPATIENT SERVICES | Facility: HOSPITAL | Age: 62
LOS: 1 days | End: 2017-10-26

## 2017-10-26 DIAGNOSIS — K43.9 VENTRAL HERNIA WITHOUT OBSTRUCTION OR GANGRENE: Chronic | ICD-10-CM

## 2017-10-26 DIAGNOSIS — I48.91 UNSPECIFIED ATRIAL FIBRILLATION: ICD-10-CM

## 2017-10-26 DIAGNOSIS — I63.9 CEREBRAL INFARCTION, UNSPECIFIED: ICD-10-CM

## 2017-10-26 DIAGNOSIS — Z98.89 OTHER SPECIFIED POSTPROCEDURAL STATES: Chronic | ICD-10-CM

## 2017-10-26 DIAGNOSIS — Z79.01 LONG TERM (CURRENT) USE OF ANTICOAGULANTS: ICD-10-CM

## 2017-10-26 DIAGNOSIS — I05.0 RHEUMATIC MITRAL STENOSIS: ICD-10-CM

## 2017-10-26 DIAGNOSIS — Z98.49 CATARACT EXTRACTION STATUS, UNSPECIFIED EYE: Chronic | ICD-10-CM

## 2017-10-26 DIAGNOSIS — Z23 ENCOUNTER FOR IMMUNIZATION: ICD-10-CM

## 2017-10-26 DIAGNOSIS — Z29.3 ENCOUNTER FOR PROPHYLACTIC FLUORIDE ADMINISTRATION: ICD-10-CM

## 2017-10-26 DIAGNOSIS — L29.3 ANOGENITAL PRURITUS, UNSPECIFIED: ICD-10-CM

## 2017-10-26 LAB
BUN SERPL-MCNC: 14 MG/DL — SIGNIFICANT CHANGE UP (ref 7–23)
CALCIUM SERPL-MCNC: 9.4 MG/DL — SIGNIFICANT CHANGE UP (ref 8.4–10.5)
CHLORIDE SERPL-SCNC: 102 MMOL/L — SIGNIFICANT CHANGE UP (ref 98–107)
CO2 SERPL-SCNC: 25 MMOL/L — SIGNIFICANT CHANGE UP (ref 22–31)
CREAT SERPL-MCNC: 0.73 MG/DL — SIGNIFICANT CHANGE UP (ref 0.5–1.3)
FERRITIN SERPL-MCNC: 40.42 NG/ML — SIGNIFICANT CHANGE UP (ref 15–150)
GLUCOSE SERPL-MCNC: 105 MG/DL — HIGH (ref 70–99)
INR PPP: 2.2 RATIO
IRON SATN MFR SERPL: 280 UG/DL — SIGNIFICANT CHANGE UP (ref 140–530)
IRON SATN MFR SERPL: 41 UG/DL — SIGNIFICANT CHANGE UP (ref 30–160)
POTASSIUM SERPL-MCNC: 4.9 MMOL/L — SIGNIFICANT CHANGE UP (ref 3.5–5.3)
POTASSIUM SERPL-SCNC: 4.9 MMOL/L — SIGNIFICANT CHANGE UP (ref 3.5–5.3)
SODIUM SERPL-SCNC: 140 MMOL/L — SIGNIFICANT CHANGE UP (ref 135–145)
UIBC SERPL-MCNC: 239 UG/DL — SIGNIFICANT CHANGE UP (ref 110–370)

## 2017-10-27 LAB — TRANSFERRIN SERPL-MCNC: 282 MG/DL — SIGNIFICANT CHANGE UP (ref 200–360)

## 2017-11-06 ENCOUNTER — APPOINTMENT (OUTPATIENT)
Dept: INTERNAL MEDICINE | Facility: HOSPITAL | Age: 62
End: 2017-11-06

## 2017-11-10 ENCOUNTER — OUTPATIENT (OUTPATIENT)
Dept: OUTPATIENT SERVICES | Facility: HOSPITAL | Age: 62
LOS: 1 days | End: 2017-11-10

## 2017-11-10 ENCOUNTER — APPOINTMENT (OUTPATIENT)
Dept: INTERNAL MEDICINE | Facility: HOSPITAL | Age: 62
End: 2017-11-10

## 2017-11-10 DIAGNOSIS — Z98.89 OTHER SPECIFIED POSTPROCEDURAL STATES: Chronic | ICD-10-CM

## 2017-11-10 DIAGNOSIS — Z98.49 CATARACT EXTRACTION STATUS, UNSPECIFIED EYE: Chronic | ICD-10-CM

## 2017-11-10 DIAGNOSIS — K43.9 VENTRAL HERNIA WITHOUT OBSTRUCTION OR GANGRENE: Chronic | ICD-10-CM

## 2017-11-13 DIAGNOSIS — Z00.00 ENCOUNTER FOR GENERAL ADULT MEDICAL EXAMINATION WITHOUT ABNORMAL FINDINGS: ICD-10-CM

## 2017-11-13 DIAGNOSIS — Z79.01 LONG TERM (CURRENT) USE OF ANTICOAGULANTS: ICD-10-CM

## 2017-11-17 LAB — INR PPP: 2.9 RATIO

## 2017-11-24 ENCOUNTER — APPOINTMENT (OUTPATIENT)
Dept: INTERNAL MEDICINE | Facility: HOSPITAL | Age: 62
End: 2017-11-24

## 2017-12-01 ENCOUNTER — OUTPATIENT (OUTPATIENT)
Dept: OUTPATIENT SERVICES | Facility: HOSPITAL | Age: 62
LOS: 1 days | End: 2017-12-01

## 2017-12-01 ENCOUNTER — LABORATORY RESULT (OUTPATIENT)
Age: 62
End: 2017-12-01

## 2017-12-01 ENCOUNTER — APPOINTMENT (OUTPATIENT)
Dept: INTERNAL MEDICINE | Facility: HOSPITAL | Age: 62
End: 2017-12-01

## 2017-12-01 DIAGNOSIS — Z98.89 OTHER SPECIFIED POSTPROCEDURAL STATES: Chronic | ICD-10-CM

## 2017-12-01 DIAGNOSIS — Z98.49 CATARACT EXTRACTION STATUS, UNSPECIFIED EYE: Chronic | ICD-10-CM

## 2017-12-01 DIAGNOSIS — K43.9 VENTRAL HERNIA WITHOUT OBSTRUCTION OR GANGRENE: Chronic | ICD-10-CM

## 2017-12-01 LAB
INR BLD: 2.16 — HIGH (ref 0.88–1.17)
PROTHROM AB SERPL-ACNC: 24.6 SEC — HIGH (ref 9.8–13.1)

## 2017-12-04 ENCOUNTER — RX RENEWAL (OUTPATIENT)
Age: 62
End: 2017-12-04

## 2017-12-07 DIAGNOSIS — Z79.01 LONG TERM (CURRENT) USE OF ANTICOAGULANTS: ICD-10-CM

## 2018-01-18 ENCOUNTER — APPOINTMENT (OUTPATIENT)
Dept: INTERNAL MEDICINE | Facility: HOSPITAL | Age: 63
End: 2018-01-18

## 2018-01-18 ENCOUNTER — OUTPATIENT (OUTPATIENT)
Dept: OUTPATIENT SERVICES | Facility: HOSPITAL | Age: 63
LOS: 1 days | End: 2018-01-18

## 2018-01-18 DIAGNOSIS — K43.9 VENTRAL HERNIA WITHOUT OBSTRUCTION OR GANGRENE: Chronic | ICD-10-CM

## 2018-01-18 DIAGNOSIS — Z98.89 OTHER SPECIFIED POSTPROCEDURAL STATES: Chronic | ICD-10-CM

## 2018-01-18 DIAGNOSIS — Z98.49 CATARACT EXTRACTION STATUS, UNSPECIFIED EYE: Chronic | ICD-10-CM

## 2018-01-18 LAB — INR PPP: 1.8 RATIO

## 2018-01-19 DIAGNOSIS — Z79.01 LONG TERM (CURRENT) USE OF ANTICOAGULANTS: ICD-10-CM

## 2018-01-22 DIAGNOSIS — Z00.00 ENCOUNTER FOR GENERAL ADULT MEDICAL EXAMINATION WITHOUT ABNORMAL FINDINGS: ICD-10-CM

## 2018-01-24 ENCOUNTER — OUTPATIENT (OUTPATIENT)
Dept: OUTPATIENT SERVICES | Facility: HOSPITAL | Age: 63
LOS: 1 days | End: 2018-01-24

## 2018-01-24 ENCOUNTER — APPOINTMENT (OUTPATIENT)
Dept: INTERNAL MEDICINE | Facility: HOSPITAL | Age: 63
End: 2018-01-24

## 2018-01-24 DIAGNOSIS — Z98.89 OTHER SPECIFIED POSTPROCEDURAL STATES: Chronic | ICD-10-CM

## 2018-01-24 DIAGNOSIS — Z98.49 CATARACT EXTRACTION STATUS, UNSPECIFIED EYE: Chronic | ICD-10-CM

## 2018-01-24 DIAGNOSIS — K43.9 VENTRAL HERNIA WITHOUT OBSTRUCTION OR GANGRENE: Chronic | ICD-10-CM

## 2018-01-25 ENCOUNTER — APPOINTMENT (OUTPATIENT)
Dept: INTERNAL MEDICINE | Facility: HOSPITAL | Age: 63
End: 2018-01-25

## 2018-01-25 DIAGNOSIS — Z79.01 LONG TERM (CURRENT) USE OF ANTICOAGULANTS: ICD-10-CM

## 2018-01-30 DIAGNOSIS — Z00.00 ENCOUNTER FOR GENERAL ADULT MEDICAL EXAMINATION WITHOUT ABNORMAL FINDINGS: ICD-10-CM

## 2018-01-30 LAB — INR PPP: 2.9 RATIO

## 2018-02-02 ENCOUNTER — APPOINTMENT (OUTPATIENT)
Dept: INTERNAL MEDICINE | Facility: HOSPITAL | Age: 63
End: 2018-02-02
Payer: COMMERCIAL

## 2018-02-02 ENCOUNTER — LABORATORY RESULT (OUTPATIENT)
Age: 63
End: 2018-02-02

## 2018-02-02 ENCOUNTER — OUTPATIENT (OUTPATIENT)
Dept: OUTPATIENT SERVICES | Facility: HOSPITAL | Age: 63
LOS: 1 days | End: 2018-02-02

## 2018-02-02 VITALS — WEIGHT: 125 LBS | BODY MASS INDEX: 23 KG/M2 | HEIGHT: 62 IN

## 2018-02-02 DIAGNOSIS — K43.9 VENTRAL HERNIA WITHOUT OBSTRUCTION OR GANGRENE: Chronic | ICD-10-CM

## 2018-02-02 DIAGNOSIS — Z98.49 CATARACT EXTRACTION STATUS, UNSPECIFIED EYE: Chronic | ICD-10-CM

## 2018-02-02 DIAGNOSIS — Z98.89 OTHER SPECIFIED POSTPROCEDURAL STATES: Chronic | ICD-10-CM

## 2018-02-02 LAB
BUN SERPL-MCNC: 14 MG/DL — SIGNIFICANT CHANGE UP (ref 7–23)
CALCIUM SERPL-MCNC: 9.4 MG/DL — SIGNIFICANT CHANGE UP (ref 8.4–10.5)
CHLORIDE SERPL-SCNC: 102 MMOL/L — SIGNIFICANT CHANGE UP (ref 98–107)
CO2 SERPL-SCNC: 27 MMOL/L — SIGNIFICANT CHANGE UP (ref 22–31)
CREAT SERPL-MCNC: 0.74 MG/DL — SIGNIFICANT CHANGE UP (ref 0.5–1.3)
GLUCOSE SERPL-MCNC: 74 MG/DL — SIGNIFICANT CHANGE UP (ref 70–99)
HAPTOGLOB SERPL-MCNC: 193 MG/DL — SIGNIFICANT CHANGE UP (ref 34–200)
LDH SERPL L TO P-CCNC: 228 U/L — HIGH (ref 135–225)
POTASSIUM SERPL-MCNC: 4.9 MMOL/L — SIGNIFICANT CHANGE UP (ref 3.5–5.3)
POTASSIUM SERPL-SCNC: 4.9 MMOL/L — SIGNIFICANT CHANGE UP (ref 3.5–5.3)
RETICS #: 38 K/UL — SIGNIFICANT CHANGE UP (ref 25–125)
RETICS/RBC NFR: 1.1 % — SIGNIFICANT CHANGE UP (ref 0.5–2.5)
SODIUM SERPL-SCNC: 140 MMOL/L — SIGNIFICANT CHANGE UP (ref 135–145)

## 2018-02-02 PROCEDURE — 99214 OFFICE O/P EST MOD 30 MIN: CPT | Mod: GC

## 2018-02-02 RX ORDER — NYSTATIN 100000 [USP'U]/G
100000 CREAM TOPICAL TWICE DAILY
Qty: 1 | Refills: 2 | Status: DISCONTINUED | COMMUNITY
Start: 2017-10-20 | End: 2018-02-02

## 2018-02-02 RX ORDER — WARFARIN 2 MG/1
2 TABLET ORAL DAILY
Qty: 60 | Refills: 1 | Status: DISCONTINUED | COMMUNITY
Start: 2017-07-25 | End: 2018-02-02

## 2018-02-06 LAB — GLUCOSE BLDC GLUCOMTR-MCNC: 122

## 2018-02-07 DIAGNOSIS — E11.9 TYPE 2 DIABETES MELLITUS WITHOUT COMPLICATIONS: ICD-10-CM

## 2018-02-07 DIAGNOSIS — Z79.01 LONG TERM (CURRENT) USE OF ANTICOAGULANTS: ICD-10-CM

## 2018-02-07 DIAGNOSIS — I48.91 UNSPECIFIED ATRIAL FIBRILLATION: ICD-10-CM

## 2018-02-14 ENCOUNTER — APPOINTMENT (OUTPATIENT)
Dept: ORTHOPEDIC SURGERY | Facility: HOSPITAL | Age: 63
End: 2018-02-14

## 2018-02-20 ENCOUNTER — OTHER (OUTPATIENT)
Age: 63
End: 2018-02-20

## 2018-02-20 ENCOUNTER — OUTPATIENT (OUTPATIENT)
Dept: OUTPATIENT SERVICES | Facility: HOSPITAL | Age: 63
LOS: 1 days | End: 2018-02-20

## 2018-02-20 ENCOUNTER — APPOINTMENT (OUTPATIENT)
Dept: INTERNAL MEDICINE | Facility: HOSPITAL | Age: 63
End: 2018-02-20

## 2018-02-20 DIAGNOSIS — K43.9 VENTRAL HERNIA WITHOUT OBSTRUCTION OR GANGRENE: Chronic | ICD-10-CM

## 2018-02-20 DIAGNOSIS — Z98.49 CATARACT EXTRACTION STATUS, UNSPECIFIED EYE: Chronic | ICD-10-CM

## 2018-02-20 DIAGNOSIS — Z98.89 OTHER SPECIFIED POSTPROCEDURAL STATES: Chronic | ICD-10-CM

## 2018-02-21 DIAGNOSIS — Z79.01 LONG TERM (CURRENT) USE OF ANTICOAGULANTS: ICD-10-CM

## 2018-02-27 ENCOUNTER — FORM ENCOUNTER (OUTPATIENT)
Age: 63
End: 2018-02-27

## 2018-02-28 ENCOUNTER — APPOINTMENT (OUTPATIENT)
Dept: RADIOLOGY | Facility: HOSPITAL | Age: 63
End: 2018-02-28

## 2018-02-28 ENCOUNTER — APPOINTMENT (OUTPATIENT)
Dept: INTERNAL MEDICINE | Facility: HOSPITAL | Age: 63
End: 2018-02-28

## 2018-02-28 ENCOUNTER — OUTPATIENT (OUTPATIENT)
Dept: OUTPATIENT SERVICES | Facility: HOSPITAL | Age: 63
LOS: 1 days | End: 2018-02-28
Payer: MEDICAID

## 2018-02-28 ENCOUNTER — OUTPATIENT (OUTPATIENT)
Dept: OUTPATIENT SERVICES | Facility: HOSPITAL | Age: 63
LOS: 1 days | End: 2018-02-28

## 2018-02-28 ENCOUNTER — APPOINTMENT (OUTPATIENT)
Dept: ORTHOPEDIC SURGERY | Facility: HOSPITAL | Age: 63
End: 2018-02-28

## 2018-02-28 VITALS
DIASTOLIC BLOOD PRESSURE: 68 MMHG | HEART RATE: 78 BPM | BODY MASS INDEX: 22.76 KG/M2 | WEIGHT: 124.44 LBS | SYSTOLIC BLOOD PRESSURE: 125 MMHG

## 2018-02-28 DIAGNOSIS — Z98.49 CATARACT EXTRACTION STATUS, UNSPECIFIED EYE: Chronic | ICD-10-CM

## 2018-02-28 DIAGNOSIS — Z98.89 OTHER SPECIFIED POSTPROCEDURAL STATES: Chronic | ICD-10-CM

## 2018-02-28 DIAGNOSIS — K43.9 VENTRAL HERNIA WITHOUT OBSTRUCTION OR GANGRENE: Chronic | ICD-10-CM

## 2018-02-28 DIAGNOSIS — Z79.01 LONG TERM (CURRENT) USE OF ANTICOAGULANTS: ICD-10-CM

## 2018-02-28 PROCEDURE — 72110 X-RAY EXAM L-2 SPINE 4/>VWS: CPT | Mod: 26

## 2018-03-01 DIAGNOSIS — M54.5 LOW BACK PAIN: ICD-10-CM

## 2018-03-07 ENCOUNTER — APPOINTMENT (OUTPATIENT)
Dept: INTERNAL MEDICINE | Facility: HOSPITAL | Age: 63
End: 2018-03-07

## 2018-03-08 ENCOUNTER — OUTPATIENT (OUTPATIENT)
Dept: OUTPATIENT SERVICES | Facility: HOSPITAL | Age: 63
LOS: 1 days | End: 2018-03-08

## 2018-03-08 ENCOUNTER — RESULT CHARGE (OUTPATIENT)
Age: 63
End: 2018-03-08

## 2018-03-08 ENCOUNTER — APPOINTMENT (OUTPATIENT)
Dept: INTERNAL MEDICINE | Facility: HOSPITAL | Age: 63
End: 2018-03-08

## 2018-03-08 DIAGNOSIS — K43.9 VENTRAL HERNIA WITHOUT OBSTRUCTION OR GANGRENE: Chronic | ICD-10-CM

## 2018-03-08 DIAGNOSIS — Z98.89 OTHER SPECIFIED POSTPROCEDURAL STATES: Chronic | ICD-10-CM

## 2018-03-08 DIAGNOSIS — Z98.49 CATARACT EXTRACTION STATUS, UNSPECIFIED EYE: Chronic | ICD-10-CM

## 2018-03-10 ENCOUNTER — APPOINTMENT (OUTPATIENT)
Dept: MAMMOGRAPHY | Facility: IMAGING CENTER | Age: 63
End: 2018-03-10
Payer: MEDICAID

## 2018-03-10 ENCOUNTER — OUTPATIENT (OUTPATIENT)
Dept: OUTPATIENT SERVICES | Facility: HOSPITAL | Age: 63
LOS: 1 days | End: 2018-03-10
Payer: MEDICAID

## 2018-03-10 DIAGNOSIS — Z98.89 OTHER SPECIFIED POSTPROCEDURAL STATES: Chronic | ICD-10-CM

## 2018-03-10 DIAGNOSIS — Z00.8 ENCOUNTER FOR OTHER GENERAL EXAMINATION: ICD-10-CM

## 2018-03-10 DIAGNOSIS — Z98.49 CATARACT EXTRACTION STATUS, UNSPECIFIED EYE: Chronic | ICD-10-CM

## 2018-03-10 DIAGNOSIS — K43.9 VENTRAL HERNIA WITHOUT OBSTRUCTION OR GANGRENE: Chronic | ICD-10-CM

## 2018-03-10 PROCEDURE — 77067 SCR MAMMO BI INCL CAD: CPT | Mod: 26

## 2018-03-10 PROCEDURE — 77067 SCR MAMMO BI INCL CAD: CPT

## 2018-03-10 PROCEDURE — 77063 BREAST TOMOSYNTHESIS BI: CPT | Mod: 26

## 2018-03-10 PROCEDURE — 77063 BREAST TOMOSYNTHESIS BI: CPT

## 2018-03-12 DIAGNOSIS — I48.91 UNSPECIFIED ATRIAL FIBRILLATION: ICD-10-CM

## 2018-03-29 ENCOUNTER — OUTPATIENT (OUTPATIENT)
Dept: OUTPATIENT SERVICES | Facility: HOSPITAL | Age: 63
LOS: 1 days | End: 2018-03-29

## 2018-03-29 ENCOUNTER — APPOINTMENT (OUTPATIENT)
Dept: INTERNAL MEDICINE | Facility: HOSPITAL | Age: 63
End: 2018-03-29

## 2018-03-29 DIAGNOSIS — K43.9 VENTRAL HERNIA WITHOUT OBSTRUCTION OR GANGRENE: Chronic | ICD-10-CM

## 2018-03-29 DIAGNOSIS — Z98.49 CATARACT EXTRACTION STATUS, UNSPECIFIED EYE: Chronic | ICD-10-CM

## 2018-03-29 DIAGNOSIS — Z98.89 OTHER SPECIFIED POSTPROCEDURAL STATES: Chronic | ICD-10-CM

## 2018-04-03 DIAGNOSIS — Z79.01 LONG TERM (CURRENT) USE OF ANTICOAGULANTS: ICD-10-CM

## 2018-04-05 ENCOUNTER — OUTPATIENT (OUTPATIENT)
Dept: OUTPATIENT SERVICES | Facility: HOSPITAL | Age: 63
LOS: 1 days | End: 2018-04-05

## 2018-04-05 ENCOUNTER — APPOINTMENT (OUTPATIENT)
Dept: INTERNAL MEDICINE | Facility: HOSPITAL | Age: 63
End: 2018-04-05

## 2018-04-05 DIAGNOSIS — Z98.89 OTHER SPECIFIED POSTPROCEDURAL STATES: Chronic | ICD-10-CM

## 2018-04-05 DIAGNOSIS — Z98.49 CATARACT EXTRACTION STATUS, UNSPECIFIED EYE: Chronic | ICD-10-CM

## 2018-04-05 DIAGNOSIS — K43.9 VENTRAL HERNIA WITHOUT OBSTRUCTION OR GANGRENE: Chronic | ICD-10-CM

## 2018-04-05 LAB
INR PPP: 1.7 RATIO
INR PPP: 2.1 RATIO

## 2018-04-09 DIAGNOSIS — I48.91 UNSPECIFIED ATRIAL FIBRILLATION: ICD-10-CM

## 2018-04-11 ENCOUNTER — OUTPATIENT (OUTPATIENT)
Dept: OUTPATIENT SERVICES | Facility: HOSPITAL | Age: 63
LOS: 1 days | End: 2018-04-11

## 2018-04-11 ENCOUNTER — OTHER (OUTPATIENT)
Age: 63
End: 2018-04-11

## 2018-04-11 ENCOUNTER — APPOINTMENT (OUTPATIENT)
Dept: INTERNAL MEDICINE | Facility: HOSPITAL | Age: 63
End: 2018-04-11

## 2018-04-11 DIAGNOSIS — Z98.89 OTHER SPECIFIED POSTPROCEDURAL STATES: Chronic | ICD-10-CM

## 2018-04-11 DIAGNOSIS — K43.9 VENTRAL HERNIA WITHOUT OBSTRUCTION OR GANGRENE: Chronic | ICD-10-CM

## 2018-04-11 DIAGNOSIS — Z98.49 CATARACT EXTRACTION STATUS, UNSPECIFIED EYE: Chronic | ICD-10-CM

## 2018-04-12 DIAGNOSIS — Z79.01 LONG TERM (CURRENT) USE OF ANTICOAGULANTS: ICD-10-CM

## 2018-05-09 ENCOUNTER — OUTPATIENT (OUTPATIENT)
Dept: OUTPATIENT SERVICES | Facility: HOSPITAL | Age: 63
LOS: 1 days | End: 2018-05-09

## 2018-05-09 ENCOUNTER — APPOINTMENT (OUTPATIENT)
Dept: INTERNAL MEDICINE | Facility: HOSPITAL | Age: 63
End: 2018-05-09

## 2018-05-09 DIAGNOSIS — K43.9 VENTRAL HERNIA WITHOUT OBSTRUCTION OR GANGRENE: Chronic | ICD-10-CM

## 2018-05-09 DIAGNOSIS — Z98.49 CATARACT EXTRACTION STATUS, UNSPECIFIED EYE: Chronic | ICD-10-CM

## 2018-05-09 DIAGNOSIS — Z98.89 OTHER SPECIFIED POSTPROCEDURAL STATES: Chronic | ICD-10-CM

## 2018-05-11 LAB — INR PPP: 3 RATIO

## 2018-05-14 DIAGNOSIS — Z79.01 LONG TERM (CURRENT) USE OF ANTICOAGULANTS: ICD-10-CM

## 2018-05-14 DIAGNOSIS — I48.91 UNSPECIFIED ATRIAL FIBRILLATION: ICD-10-CM

## 2018-05-16 ENCOUNTER — APPOINTMENT (OUTPATIENT)
Dept: CARDIOLOGY | Facility: CLINIC | Age: 63
End: 2018-05-16
Payer: MEDICAID

## 2018-05-16 ENCOUNTER — LABORATORY RESULT (OUTPATIENT)
Age: 63
End: 2018-05-16

## 2018-05-16 ENCOUNTER — APPOINTMENT (OUTPATIENT)
Dept: INTERNAL MEDICINE | Facility: HOSPITAL | Age: 63
End: 2018-05-16

## 2018-05-16 ENCOUNTER — OUTPATIENT (OUTPATIENT)
Dept: OUTPATIENT SERVICES | Facility: HOSPITAL | Age: 63
LOS: 1 days | End: 2018-05-16

## 2018-05-16 VITALS
HEIGHT: 61 IN | TEMPERATURE: 98.3 F | BODY MASS INDEX: 23.03 KG/M2 | SYSTOLIC BLOOD PRESSURE: 123 MMHG | HEART RATE: 66 BPM | OXYGEN SATURATION: 97 % | RESPIRATION RATE: 16 BRPM | DIASTOLIC BLOOD PRESSURE: 77 MMHG | WEIGHT: 122 LBS

## 2018-05-16 DIAGNOSIS — Z98.89 OTHER SPECIFIED POSTPROCEDURAL STATES: Chronic | ICD-10-CM

## 2018-05-16 DIAGNOSIS — K43.9 VENTRAL HERNIA WITHOUT OBSTRUCTION OR GANGRENE: Chronic | ICD-10-CM

## 2018-05-16 DIAGNOSIS — Z98.49 CATARACT EXTRACTION STATUS, UNSPECIFIED EYE: Chronic | ICD-10-CM

## 2018-05-16 LAB
INR BLD: 4.72 — HIGH (ref 0.88–1.17)
PROTHROM AB SERPL-ACNC: 54.1 SEC — HIGH (ref 9.8–13.1)

## 2018-05-16 PROCEDURE — 93000 ELECTROCARDIOGRAM COMPLETE: CPT

## 2018-05-16 PROCEDURE — 99215 OFFICE O/P EST HI 40 MIN: CPT

## 2018-05-17 DIAGNOSIS — Z79.01 LONG TERM (CURRENT) USE OF ANTICOAGULANTS: ICD-10-CM

## 2018-05-17 LAB — INR PPP: 5 RATIO

## 2018-05-21 ENCOUNTER — APPOINTMENT (OUTPATIENT)
Dept: INTERNAL MEDICINE | Facility: HOSPITAL | Age: 63
End: 2018-05-21

## 2018-05-21 ENCOUNTER — OUTPATIENT (OUTPATIENT)
Dept: OUTPATIENT SERVICES | Facility: HOSPITAL | Age: 63
LOS: 1 days | End: 2018-05-21

## 2018-05-21 DIAGNOSIS — Z98.49 CATARACT EXTRACTION STATUS, UNSPECIFIED EYE: Chronic | ICD-10-CM

## 2018-05-21 DIAGNOSIS — Z98.89 OTHER SPECIFIED POSTPROCEDURAL STATES: Chronic | ICD-10-CM

## 2018-05-21 DIAGNOSIS — K43.9 VENTRAL HERNIA WITHOUT OBSTRUCTION OR GANGRENE: Chronic | ICD-10-CM

## 2018-05-22 LAB — INR PPP: 1.9 RATIO

## 2018-05-29 ENCOUNTER — OUTPATIENT (OUTPATIENT)
Dept: OUTPATIENT SERVICES | Facility: HOSPITAL | Age: 63
LOS: 1 days | End: 2018-05-29

## 2018-05-29 ENCOUNTER — RESULT CHARGE (OUTPATIENT)
Age: 63
End: 2018-05-29

## 2018-05-29 ENCOUNTER — OTHER (OUTPATIENT)
Age: 63
End: 2018-05-29

## 2018-05-29 ENCOUNTER — APPOINTMENT (OUTPATIENT)
Dept: INTERNAL MEDICINE | Facility: HOSPITAL | Age: 63
End: 2018-05-29

## 2018-05-29 DIAGNOSIS — Z98.89 OTHER SPECIFIED POSTPROCEDURAL STATES: Chronic | ICD-10-CM

## 2018-05-29 DIAGNOSIS — K43.9 VENTRAL HERNIA WITHOUT OBSTRUCTION OR GANGRENE: Chronic | ICD-10-CM

## 2018-05-29 DIAGNOSIS — Z98.49 CATARACT EXTRACTION STATUS, UNSPECIFIED EYE: Chronic | ICD-10-CM

## 2018-05-30 DIAGNOSIS — Z79.01 LONG TERM (CURRENT) USE OF ANTICOAGULANTS: ICD-10-CM

## 2018-05-31 DIAGNOSIS — Z79.01 LONG TERM (CURRENT) USE OF ANTICOAGULANTS: ICD-10-CM

## 2018-06-05 ENCOUNTER — OUTPATIENT (OUTPATIENT)
Dept: OUTPATIENT SERVICES | Facility: HOSPITAL | Age: 63
LOS: 1 days | End: 2018-06-05

## 2018-06-05 ENCOUNTER — APPOINTMENT (OUTPATIENT)
Dept: INTERNAL MEDICINE | Facility: HOSPITAL | Age: 63
End: 2018-06-05

## 2018-06-05 DIAGNOSIS — Z98.49 CATARACT EXTRACTION STATUS, UNSPECIFIED EYE: Chronic | ICD-10-CM

## 2018-06-05 DIAGNOSIS — Z98.89 OTHER SPECIFIED POSTPROCEDURAL STATES: Chronic | ICD-10-CM

## 2018-06-05 DIAGNOSIS — Z00.00 ENCOUNTER FOR GENERAL ADULT MEDICAL EXAMINATION WITHOUT ABNORMAL FINDINGS: ICD-10-CM

## 2018-06-05 DIAGNOSIS — K43.9 VENTRAL HERNIA WITHOUT OBSTRUCTION OR GANGRENE: Chronic | ICD-10-CM

## 2018-06-05 DIAGNOSIS — Z79.01 LONG TERM (CURRENT) USE OF ANTICOAGULANTS: ICD-10-CM

## 2018-06-05 LAB — INR PPP: 2.2 RATIO

## 2018-07-02 ENCOUNTER — APPOINTMENT (OUTPATIENT)
Dept: INTERNAL MEDICINE | Facility: HOSPITAL | Age: 63
End: 2018-07-02
Payer: MEDICAID

## 2018-07-02 ENCOUNTER — LABORATORY RESULT (OUTPATIENT)
Age: 63
End: 2018-07-02

## 2018-07-02 ENCOUNTER — OUTPATIENT (OUTPATIENT)
Dept: OUTPATIENT SERVICES | Facility: HOSPITAL | Age: 63
LOS: 1 days | End: 2018-07-02

## 2018-07-02 VITALS — BODY MASS INDEX: 24.17 KG/M2 | HEIGHT: 61 IN | WEIGHT: 128 LBS

## 2018-07-02 VITALS — DIASTOLIC BLOOD PRESSURE: 78 MMHG | SYSTOLIC BLOOD PRESSURE: 116 MMHG | HEART RATE: 69 BPM

## 2018-07-02 DIAGNOSIS — M25.562 PAIN IN LEFT KNEE: ICD-10-CM

## 2018-07-02 DIAGNOSIS — H93.11 TINNITUS, RIGHT EAR: ICD-10-CM

## 2018-07-02 DIAGNOSIS — M25.512 PAIN IN LEFT SHOULDER: ICD-10-CM

## 2018-07-02 DIAGNOSIS — M79.672 PAIN IN LEFT FOOT: ICD-10-CM

## 2018-07-02 DIAGNOSIS — H92.09 OTALGIA, UNSPECIFIED EAR: ICD-10-CM

## 2018-07-02 DIAGNOSIS — Z87.39 PERSONAL HISTORY OF OTHER DISEASES OF THE MUSCULOSKELETAL SYSTEM AND CONNECTIVE TISSUE: ICD-10-CM

## 2018-07-02 DIAGNOSIS — Z98.49 CATARACT EXTRACTION STATUS, UNSPECIFIED EYE: Chronic | ICD-10-CM

## 2018-07-02 DIAGNOSIS — Z86.73 PERSONAL HISTORY OF TRANSIENT ISCHEMIC ATTACK (TIA), AND CEREBRAL INFARCTION W/OUT RESIDUAL DEFICITS: ICD-10-CM

## 2018-07-02 DIAGNOSIS — K43.9 VENTRAL HERNIA WITHOUT OBSTRUCTION OR GANGRENE: Chronic | ICD-10-CM

## 2018-07-02 DIAGNOSIS — Z87.898 PERSONAL HISTORY OF OTHER SPECIFIED CONDITIONS: ICD-10-CM

## 2018-07-02 DIAGNOSIS — E11.9 TYPE 2 DIABETES MELLITUS WITHOUT COMPLICATIONS: ICD-10-CM

## 2018-07-02 DIAGNOSIS — Z98.89 OTHER SPECIFIED POSTPROCEDURAL STATES: Chronic | ICD-10-CM

## 2018-07-02 LAB — HBA1C BLD-MCNC: 6.3 % — HIGH (ref 4–5.6)

## 2018-07-02 PROCEDURE — 99213 OFFICE O/P EST LOW 20 MIN: CPT | Mod: GE

## 2018-07-03 LAB
GLUCOSE BLDC GLUCOMTR-MCNC: 142
INR PPP: 2.4 RATIO

## 2018-07-06 NOTE — HISTORY OF PRESENT ILLNESS
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 No complaints of residual deficit from CVA. Currently endorses chronic sharp lower back pain. No issues ambulating. She denies CP, SOB, abd pain, N/V, fevers/chills, or dysuria/increased urinary frequency, no rectal or urinary incontinence.\par \par

## 2018-07-06 NOTE — REVIEW OF SYSTEMS
[Back Pain] : back pain [Fever] : no fever [Chills] : no chills [Vision Problems] : no vision problems [Hearing Loss] : no hearing loss [Sore Throat] : no sore throat [Chest Pain] : no chest pain [Palpitations] : no palpitations [Abdominal Pain] : no abdominal pain [Constipation] : no constipation [Diarrhea] : diarrhea [Dysuria] : no dysuria [Itching] : no itching [Skin Rash] : no skin rash [Headache] : no headache [Suicidal] : not suicidal [Easy Bleeding] : no easy bleeding [Easy Bruising] : no easy bruising

## 2018-07-06 NOTE — PHYSICAL EXAM
[No Acute Distress] : no acute distress [Normal Sclera/Conjunctiva] : normal sclera/conjunctiva [Normal Outer Ear/Nose] : the outer ears and nose were normal in appearance [No JVD] : no jugular venous distention [No Lymphadenopathy] : no lymphadenopathy [No Respiratory Distress] : no respiratory distress  [No Murmur] : no murmur heard [No Edema] : there was no peripheral edema [Soft] : abdomen soft [Non Tender] : non-tender [No Joint Swelling] : no joint swelling [No Rash] : no rash [No Focal Deficits] : no focal deficits [Normal Insight/Judgement] : insight and judgment were intact [de-identified] : irregularly irregular [de-identified] : lower  to palpation

## 2018-07-06 NOTE — ASSESSMENT
[FreeTextEntry1] : 62 yo F w/ AFib 2/2 Mitral Stenosis c/b likely embolic CVA in Jun 2016, T2DM, HLD presenting for routine follow up with sacral back pain without focal neurologic deficits.  Pt does not speak English but is here with daughter and prefers that she translate.\par \par # Lower back pain\par - pt previously seen by ortho, reports that PT and tylenol have not worked to control pain\par - pt started on gabapentin 100 TID, told to start with 100 nightly for 3 days followed by 100 BID for 3 days before going to TID\par \par # AFib 2/2 MS \par - Pt. currently rate controlled, c/w Metoprolol 25 BID \par - pt follows with cardiology\par - pt taking coumadin 4mg daily (goal INR 2-3), will check INR and dose today\par - pt told to come back in 2 weeks to check INR given the initiation of a new med\par \par # CVA hx \par - No residual neurological deficits\par - c/w ASA, statin\par \par # Controlled T2DM \par - recheck A1c\par - c/w Metformin 1000 BID for now \par - pt told to keep FS log and bring to clinic\par \par # Anemia\par - pt previously noted to have anemia, will recheck Hg and if still low will start on Fe supplementation\par - LDH and haptoglobin not significant for homolysis/shearing in 2/18\par \par #HCM \par - CBC, A1C\par - Colonoscopy 2015 negative, next due in 10 years\par - Mammogram next due 3/19\par - All immunizations utd\par - Will need Dexa at age 67\par - RTC in 6 months and will call in for renewal of gabapentin script if it is working to control pain

## 2018-07-09 DIAGNOSIS — M54.42 LUMBAGO WITH SCIATICA, LEFT SIDE: ICD-10-CM

## 2018-07-09 DIAGNOSIS — I48.91 UNSPECIFIED ATRIAL FIBRILLATION: ICD-10-CM

## 2018-07-09 DIAGNOSIS — Z79.01 LONG TERM (CURRENT) USE OF ANTICOAGULANTS: ICD-10-CM

## 2018-07-19 ENCOUNTER — OUTPATIENT (OUTPATIENT)
Dept: OUTPATIENT SERVICES | Facility: HOSPITAL | Age: 63
LOS: 1 days | End: 2018-07-19

## 2018-07-19 ENCOUNTER — APPOINTMENT (OUTPATIENT)
Dept: INTERNAL MEDICINE | Facility: HOSPITAL | Age: 63
End: 2018-07-19

## 2018-07-19 DIAGNOSIS — Z98.49 CATARACT EXTRACTION STATUS, UNSPECIFIED EYE: Chronic | ICD-10-CM

## 2018-07-19 DIAGNOSIS — Z98.89 OTHER SPECIFIED POSTPROCEDURAL STATES: Chronic | ICD-10-CM

## 2018-07-19 DIAGNOSIS — Z79.01 LONG TERM (CURRENT) USE OF ANTICOAGULANTS: ICD-10-CM

## 2018-07-19 DIAGNOSIS — K43.9 VENTRAL HERNIA WITHOUT OBSTRUCTION OR GANGRENE: Chronic | ICD-10-CM

## 2018-07-24 LAB — INR PPP: 1.6 RATIO

## 2018-07-25 ENCOUNTER — APPOINTMENT (OUTPATIENT)
Dept: INTERNAL MEDICINE | Facility: HOSPITAL | Age: 63
End: 2018-07-25

## 2018-07-26 ENCOUNTER — APPOINTMENT (OUTPATIENT)
Dept: OPHTHALMOLOGY | Facility: CLINIC | Age: 63
End: 2018-07-26

## 2018-07-26 ENCOUNTER — OUTPATIENT (OUTPATIENT)
Dept: OUTPATIENT SERVICES | Facility: HOSPITAL | Age: 63
LOS: 1 days | End: 2018-07-26

## 2018-07-26 DIAGNOSIS — Z98.89 OTHER SPECIFIED POSTPROCEDURAL STATES: Chronic | ICD-10-CM

## 2018-07-26 DIAGNOSIS — Z98.49 CATARACT EXTRACTION STATUS, UNSPECIFIED EYE: Chronic | ICD-10-CM

## 2018-07-26 DIAGNOSIS — K43.9 VENTRAL HERNIA WITHOUT OBSTRUCTION OR GANGRENE: Chronic | ICD-10-CM

## 2018-07-27 ENCOUNTER — APPOINTMENT (OUTPATIENT)
Dept: INTERNAL MEDICINE | Facility: HOSPITAL | Age: 63
End: 2018-07-27

## 2018-07-27 ENCOUNTER — OTHER (OUTPATIENT)
Age: 63
End: 2018-07-27

## 2018-07-27 ENCOUNTER — OUTPATIENT (OUTPATIENT)
Dept: OUTPATIENT SERVICES | Facility: HOSPITAL | Age: 63
LOS: 1 days | End: 2018-07-27

## 2018-07-27 DIAGNOSIS — Z98.89 OTHER SPECIFIED POSTPROCEDURAL STATES: Chronic | ICD-10-CM

## 2018-07-27 DIAGNOSIS — K43.9 VENTRAL HERNIA WITHOUT OBSTRUCTION OR GANGRENE: Chronic | ICD-10-CM

## 2018-07-27 DIAGNOSIS — Z98.49 CATARACT EXTRACTION STATUS, UNSPECIFIED EYE: Chronic | ICD-10-CM

## 2018-07-30 DIAGNOSIS — Z79.01 LONG TERM (CURRENT) USE OF ANTICOAGULANTS: ICD-10-CM

## 2018-07-31 LAB — INR PPP: 2.8 RATIO

## 2018-08-16 ENCOUNTER — APPOINTMENT (OUTPATIENT)
Dept: INTERNAL MEDICINE | Facility: HOSPITAL | Age: 63
End: 2018-08-16

## 2018-08-16 ENCOUNTER — OUTPATIENT (OUTPATIENT)
Dept: OUTPATIENT SERVICES | Facility: HOSPITAL | Age: 63
LOS: 1 days | End: 2018-08-16

## 2018-08-16 DIAGNOSIS — Z98.89 OTHER SPECIFIED POSTPROCEDURAL STATES: Chronic | ICD-10-CM

## 2018-08-16 DIAGNOSIS — K43.9 VENTRAL HERNIA WITHOUT OBSTRUCTION OR GANGRENE: Chronic | ICD-10-CM

## 2018-08-16 DIAGNOSIS — Z98.49 CATARACT EXTRACTION STATUS, UNSPECIFIED EYE: Chronic | ICD-10-CM

## 2018-08-17 DIAGNOSIS — Z79.01 LONG TERM (CURRENT) USE OF ANTICOAGULANTS: ICD-10-CM

## 2018-08-17 LAB — INR PPP: 2.3 RATIO

## 2018-09-06 ENCOUNTER — APPOINTMENT (OUTPATIENT)
Dept: INTERNAL MEDICINE | Facility: HOSPITAL | Age: 63
End: 2018-09-06

## 2018-09-06 ENCOUNTER — OUTPATIENT (OUTPATIENT)
Dept: OUTPATIENT SERVICES | Facility: HOSPITAL | Age: 63
LOS: 1 days | End: 2018-09-06

## 2018-09-06 DIAGNOSIS — Z98.89 OTHER SPECIFIED POSTPROCEDURAL STATES: Chronic | ICD-10-CM

## 2018-09-06 DIAGNOSIS — K43.9 VENTRAL HERNIA WITHOUT OBSTRUCTION OR GANGRENE: Chronic | ICD-10-CM

## 2018-09-06 DIAGNOSIS — Z98.49 CATARACT EXTRACTION STATUS, UNSPECIFIED EYE: Chronic | ICD-10-CM

## 2018-09-06 DIAGNOSIS — Z79.01 LONG TERM (CURRENT) USE OF ANTICOAGULANTS: ICD-10-CM

## 2018-09-07 LAB — INR PPP: 2.2 RATIO

## 2018-09-19 ENCOUNTER — OTHER (OUTPATIENT)
Age: 63
End: 2018-09-19

## 2018-09-19 ENCOUNTER — OUTPATIENT (OUTPATIENT)
Dept: OUTPATIENT SERVICES | Facility: HOSPITAL | Age: 63
LOS: 1 days | End: 2018-09-19

## 2018-09-19 ENCOUNTER — APPOINTMENT (OUTPATIENT)
Dept: INTERNAL MEDICINE | Facility: HOSPITAL | Age: 63
End: 2018-09-19

## 2018-09-19 DIAGNOSIS — Z98.49 CATARACT EXTRACTION STATUS, UNSPECIFIED EYE: Chronic | ICD-10-CM

## 2018-09-19 DIAGNOSIS — Z98.89 OTHER SPECIFIED POSTPROCEDURAL STATES: Chronic | ICD-10-CM

## 2018-09-19 DIAGNOSIS — K43.9 VENTRAL HERNIA WITHOUT OBSTRUCTION OR GANGRENE: Chronic | ICD-10-CM

## 2018-09-20 LAB — INR PPP: 2.4 RATIO

## 2018-09-24 DIAGNOSIS — Z79.01 LONG TERM (CURRENT) USE OF ANTICOAGULANTS: ICD-10-CM

## 2018-10-18 ENCOUNTER — APPOINTMENT (OUTPATIENT)
Dept: INTERNAL MEDICINE | Facility: HOSPITAL | Age: 63
End: 2018-10-18

## 2018-10-18 ENCOUNTER — OUTPATIENT (OUTPATIENT)
Dept: OUTPATIENT SERVICES | Facility: HOSPITAL | Age: 63
LOS: 1 days | End: 2018-10-18

## 2018-10-18 ENCOUNTER — LABORATORY RESULT (OUTPATIENT)
Age: 63
End: 2018-10-18

## 2018-10-18 DIAGNOSIS — Z98.49 CATARACT EXTRACTION STATUS, UNSPECIFIED EYE: Chronic | ICD-10-CM

## 2018-10-18 DIAGNOSIS — K43.9 VENTRAL HERNIA WITHOUT OBSTRUCTION OR GANGRENE: Chronic | ICD-10-CM

## 2018-10-18 DIAGNOSIS — Z98.89 OTHER SPECIFIED POSTPROCEDURAL STATES: Chronic | ICD-10-CM

## 2018-10-18 LAB
INR BLD: 2.53 — HIGH (ref 0.88–1.17)
PROTHROM AB SERPL-ACNC: 28.6 SEC — HIGH (ref 9.8–13.1)

## 2018-10-19 DIAGNOSIS — Z79.01 LONG TERM (CURRENT) USE OF ANTICOAGULANTS: ICD-10-CM

## 2018-11-14 ENCOUNTER — APPOINTMENT (OUTPATIENT)
Dept: CARDIOLOGY | Facility: CLINIC | Age: 63
End: 2018-11-14

## 2018-11-16 DIAGNOSIS — E11.9 TYPE 2 DIABETES MELLITUS WITHOUT COMPLICATIONS: ICD-10-CM

## 2018-11-19 ENCOUNTER — LABORATORY RESULT (OUTPATIENT)
Age: 63
End: 2018-11-19

## 2018-11-19 ENCOUNTER — APPOINTMENT (OUTPATIENT)
Dept: INTERNAL MEDICINE | Facility: HOSPITAL | Age: 63
End: 2018-11-19

## 2018-11-19 ENCOUNTER — OUTPATIENT (OUTPATIENT)
Dept: OUTPATIENT SERVICES | Facility: HOSPITAL | Age: 63
LOS: 1 days | End: 2018-11-19

## 2018-11-19 DIAGNOSIS — Z98.89 OTHER SPECIFIED POSTPROCEDURAL STATES: Chronic | ICD-10-CM

## 2018-11-19 DIAGNOSIS — Z98.49 CATARACT EXTRACTION STATUS, UNSPECIFIED EYE: Chronic | ICD-10-CM

## 2018-11-19 DIAGNOSIS — K43.9 VENTRAL HERNIA WITHOUT OBSTRUCTION OR GANGRENE: Chronic | ICD-10-CM

## 2018-11-19 LAB
INR BLD: 2.64 — HIGH (ref 0.88–1.17)
PROTHROM AB SERPL-ACNC: 30.2 SEC — HIGH (ref 9.8–13.1)

## 2018-11-20 DIAGNOSIS — Z79.01 LONG TERM (CURRENT) USE OF ANTICOAGULANTS: ICD-10-CM

## 2018-12-05 ENCOUNTER — NON-APPOINTMENT (OUTPATIENT)
Age: 63
End: 2018-12-05

## 2018-12-05 ENCOUNTER — APPOINTMENT (OUTPATIENT)
Dept: CARDIOLOGY | Facility: CLINIC | Age: 63
End: 2018-12-05
Payer: MEDICAID

## 2018-12-05 VITALS
HEIGHT: 61 IN | BODY MASS INDEX: 23.6 KG/M2 | WEIGHT: 125 LBS | SYSTOLIC BLOOD PRESSURE: 133 MMHG | HEART RATE: 69 BPM | DIASTOLIC BLOOD PRESSURE: 78 MMHG | OXYGEN SATURATION: 99 %

## 2018-12-05 PROCEDURE — 93000 ELECTROCARDIOGRAM COMPLETE: CPT

## 2018-12-05 PROCEDURE — 99215 OFFICE O/P EST HI 40 MIN: CPT

## 2018-12-05 NOTE — HISTORY OF PRESENT ILLNESS
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

## 2018-12-05 NOTE — REVIEW OF SYSTEMS
[Fever] : no fever [Chills] : no chills [Blurry Vision] : no blurred vision [Eyeglasses] : not currently wearing eyeglasses [Earache] : no earache [Mouth Sores] : no mouth sores [Shortness Of Breath] : no shortness of breath [Chest Pain] : no chest pain [Palpitations] : no palpitations [Cough] : no cough [Abdominal Pain] : no abdominal pain [Heartburn] : no heartburn [Dysphagia] : no dysphagia [Dysuria] : no dysuria [Incontinence] : no incontinence [Joint Pain] : no joint pain [Skin: A Rash] : no rash: [Skin Lesions] : no skin lesions [see HPI] : see HPI [Confusion] : no confusion was observed [Excessive Thirst] : no polydipsia [Easy Bleeding] : no tendency for easy bleeding

## 2018-12-05 NOTE — DISCUSSION/SUMMARY
[FreeTextEntry1] : #1 atrial fibrillation: she has A. fib related to mitral stenosis.With prior history of thromboembolic event. Continue with anticoagulation warfarin. Rate control strategy with beta blockers.\par \par #2 mitral stenosis: Asymptomatic, continue with current medical therapy, intervention If she has symptoms\par \par #3: Mild luminal coronary artery disease Will continue with current medical therapy

## 2018-12-05 NOTE — PHYSICAL EXAM
[General Appearance - Well Developed] : well developed [Normal Conjunctiva] : the conjunctiva exhibited no abnormalities [Normal Oral Mucosa] : normal oral mucosa [No Oral Pallor] : no oral pallor [Normal Oropharynx] : normal oropharynx [Normal Jugular Venous A Waves Present] : normal jugular venous A waves present [Normal Jugular Venous V Waves Present] : normal jugular venous V waves present [] : no respiratory distress [Bowel Sounds] : normal bowel sounds [Nail Clubbing] : no clubbing of the fingernails [Cyanosis, Localized] : no localized cyanosis [FreeTextEntry1] : Right radial artery access site looks healthy and no hematoma bleeding. [Oriented To Time, Place, And Person] : oriented to person, place, and time

## 2018-12-26 ENCOUNTER — LABORATORY RESULT (OUTPATIENT)
Age: 63
End: 2018-12-26

## 2018-12-26 ENCOUNTER — OUTPATIENT (OUTPATIENT)
Dept: OUTPATIENT SERVICES | Facility: HOSPITAL | Age: 63
LOS: 1 days | End: 2018-12-26

## 2018-12-26 ENCOUNTER — APPOINTMENT (OUTPATIENT)
Dept: INTERNAL MEDICINE | Facility: HOSPITAL | Age: 63
End: 2018-12-26
Payer: MEDICAID

## 2018-12-26 VITALS — HEART RATE: 67 BPM | DIASTOLIC BLOOD PRESSURE: 75 MMHG | SYSTOLIC BLOOD PRESSURE: 129 MMHG

## 2018-12-26 VITALS — HEIGHT: 63 IN | BODY MASS INDEX: 23.21 KG/M2 | WEIGHT: 131 LBS

## 2018-12-26 DIAGNOSIS — R52 PAIN, UNSPECIFIED: ICD-10-CM

## 2018-12-26 DIAGNOSIS — K43.9 VENTRAL HERNIA WITHOUT OBSTRUCTION OR GANGRENE: Chronic | ICD-10-CM

## 2018-12-26 DIAGNOSIS — Z98.89 OTHER SPECIFIED POSTPROCEDURAL STATES: Chronic | ICD-10-CM

## 2018-12-26 DIAGNOSIS — Z98.49 CATARACT EXTRACTION STATUS, UNSPECIFIED EYE: Chronic | ICD-10-CM

## 2018-12-26 LAB
ALBUMIN SERPL ELPH-MCNC: 4.3 G/DL — SIGNIFICANT CHANGE UP (ref 3.3–5)
ALP SERPL-CCNC: 47 U/L — SIGNIFICANT CHANGE UP (ref 40–120)
ALT FLD-CCNC: 12 U/L — SIGNIFICANT CHANGE UP (ref 4–33)
AST SERPL-CCNC: 19 U/L — SIGNIFICANT CHANGE UP (ref 4–32)
BASOPHILS # BLD AUTO: 0.03 K/UL — SIGNIFICANT CHANGE UP (ref 0–0.2)
BASOPHILS NFR BLD AUTO: 0.4 % — SIGNIFICANT CHANGE UP (ref 0–2)
BILIRUB SERPL-MCNC: 0.5 MG/DL — SIGNIFICANT CHANGE UP (ref 0.2–1.2)
BUN SERPL-MCNC: 15 MG/DL — SIGNIFICANT CHANGE UP (ref 7–23)
CALCIUM SERPL-MCNC: 10.2 MG/DL — SIGNIFICANT CHANGE UP (ref 8.4–10.5)
CHLORIDE SERPL-SCNC: 103 MMOL/L — SIGNIFICANT CHANGE UP (ref 98–107)
CHOLEST SERPL-MCNC: 138 MG/DL — SIGNIFICANT CHANGE UP (ref 120–199)
CO2 SERPL-SCNC: 27 MMOL/L — SIGNIFICANT CHANGE UP (ref 22–31)
CREAT SERPL-MCNC: 0.69 MG/DL — SIGNIFICANT CHANGE UP (ref 0.5–1.3)
EOSINOPHIL # BLD AUTO: 0.22 K/UL — SIGNIFICANT CHANGE UP (ref 0–0.5)
EOSINOPHIL NFR BLD AUTO: 2.7 % — SIGNIFICANT CHANGE UP (ref 0–6)
GLUCOSE BLDC GLUCOMTR-MCNC: 104
GLUCOSE SERPL-MCNC: 80 MG/DL — SIGNIFICANT CHANGE UP (ref 70–99)
HBA1C BLD-MCNC: 6.4 % — HIGH (ref 4–5.6)
HCT VFR BLD CALC: 32.9 % — LOW (ref 34.5–45)
HDLC SERPL-MCNC: 49 MG/DL — SIGNIFICANT CHANGE UP (ref 45–65)
HGB BLD-MCNC: 10.2 G/DL — LOW (ref 11.5–15.5)
IMM GRANULOCYTES # BLD AUTO: 0.02 # — SIGNIFICANT CHANGE UP
IMM GRANULOCYTES NFR BLD AUTO: 0.2 % — SIGNIFICANT CHANGE UP (ref 0–1.5)
INR BLD: 2.37 — HIGH (ref 0.88–1.17)
LIPID PNL WITH DIRECT LDL SERPL: 81 MG/DL — SIGNIFICANT CHANGE UP
LYMPHOCYTES # BLD AUTO: 2.72 K/UL — SIGNIFICANT CHANGE UP (ref 1–3.3)
LYMPHOCYTES # BLD AUTO: 33.2 % — SIGNIFICANT CHANGE UP (ref 13–44)
MCHC RBC-ENTMCNC: 28.2 PG — SIGNIFICANT CHANGE UP (ref 27–34)
MCHC RBC-ENTMCNC: 31 % — LOW (ref 32–36)
MCV RBC AUTO: 90.9 FL — SIGNIFICANT CHANGE UP (ref 80–100)
MONOCYTES # BLD AUTO: 0.72 K/UL — SIGNIFICANT CHANGE UP (ref 0–0.9)
MONOCYTES NFR BLD AUTO: 8.8 % — SIGNIFICANT CHANGE UP (ref 2–14)
NEUTROPHILS # BLD AUTO: 4.49 K/UL — SIGNIFICANT CHANGE UP (ref 1.8–7.4)
NEUTROPHILS NFR BLD AUTO: 54.7 % — SIGNIFICANT CHANGE UP (ref 43–77)
NRBC # FLD: 0 — SIGNIFICANT CHANGE UP
PLATELET # BLD AUTO: 303 K/UL — SIGNIFICANT CHANGE UP (ref 150–400)
PMV BLD: 10.6 FL — SIGNIFICANT CHANGE UP (ref 7–13)
POTASSIUM SERPL-MCNC: 4.9 MMOL/L — SIGNIFICANT CHANGE UP (ref 3.5–5.3)
POTASSIUM SERPL-SCNC: 4.9 MMOL/L — SIGNIFICANT CHANGE UP (ref 3.5–5.3)
PROT SERPL-MCNC: 7.8 G/DL — SIGNIFICANT CHANGE UP (ref 6–8.3)
PROTHROM AB SERPL-ACNC: 27.7 SEC — HIGH (ref 9.8–13.1)
RBC # BLD: 3.62 M/UL — LOW (ref 3.8–5.2)
RBC # FLD: 15.6 % — HIGH (ref 10.3–14.5)
SODIUM SERPL-SCNC: 142 MMOL/L — SIGNIFICANT CHANGE UP (ref 135–145)
TRIGL SERPL-MCNC: 118 MG/DL — SIGNIFICANT CHANGE UP (ref 10–149)
WBC # BLD: 8.2 K/UL — SIGNIFICANT CHANGE UP (ref 3.8–10.5)
WBC # FLD AUTO: 8.2 K/UL — SIGNIFICANT CHANGE UP (ref 3.8–10.5)

## 2018-12-26 PROCEDURE — 99213 OFFICE O/P EST LOW 20 MIN: CPT | Mod: GE

## 2018-12-27 NOTE — REVIEW OF SYSTEMS
[Back Pain] : back pain [Itching] : Itching [Fever] : no fever [Chills] : no chills [Pain] : no pain [Sore Throat] : no sore throat [Chest Pain] : no chest pain [Palpitations] : no palpitations [Shortness Of Breath] : no shortness of breath [Cough] : no cough [Abdominal Pain] : no abdominal pain [Constipation] : no constipation [Diarrhea] : diarrhea [Dysuria] : no dysuria [Hematuria] : no hematuria [Joint Swelling] : no joint swelling [Muscle Pain] : no muscle pain [Headache] : no headache [Anxiety] : no anxiety [Easy Bleeding] : no easy bleeding [FreeTextEntry9] : mild back pain (much improved) [de-identified] : itching of L ear

## 2018-12-27 NOTE — END OF VISIT
[] : Resident [FreeTextEntry3] : 63F w/ AFib 2/2 Mitral Stenosis, T2DM, HLD, back pain who is presenting for routine follow up. Routine labs including INR check today. No medication changes. Follows w/ cards for Afib 2/2 mitral stenosis. Gyn referral today for annual visit.

## 2018-12-27 NOTE — HISTORY OF PRESENT ILLNESS
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 No complaints of residual deficit from CVA. Currently endorses improvement of her chronic lower back pain since gabapentin was started. No issues ambulating. She denies CP, SOB, abd pain, N/V, fevers/chills, or dysuria/increased urinary frequency, no rectal or urinary incontinence.  She checks FS 2-3 times a week and it is never lower than 90s or higher than the 150s.  Only complaint is external L ear pruritus which has recently developed.  \par \par Back pain much improved with gabapentin.

## 2018-12-27 NOTE — PHYSICAL EXAM
[No Acute Distress] : no acute distress [Well Developed] : well developed [Normal Sclera/Conjunctiva] : normal sclera/conjunctiva [EOMI] : extraocular movements intact [Normal Outer Ear/Nose] : the outer ears and nose were normal in appearance [No JVD] : no jugular venous distention [No Respiratory Distress] : no respiratory distress  [Normal Rate] : normal rate  [Normal S1, S2] : normal S1 and S2 [No Murmur] : no murmur heard [No Extremity Clubbing/Cyanosis] : no extremity clubbing/cyanosis [Soft] : abdomen soft [Non Tender] : non-tender [No CVA Tenderness] : no CVA  tenderness [No Joint Swelling] : no joint swelling [No Rash] : no rash [No Focal Deficits] : no focal deficits [Normal Insight/Judgement] : insight and judgment were intact [de-identified] : no signs of infection on ear exam [de-identified] : irreg irreg [de-identified] : mild lower back TTP

## 2018-12-31 DIAGNOSIS — E11.9 TYPE 2 DIABETES MELLITUS WITHOUT COMPLICATIONS: ICD-10-CM

## 2019-01-02 DIAGNOSIS — M54.42 LUMBAGO WITH SCIATICA, LEFT SIDE: ICD-10-CM

## 2019-01-02 DIAGNOSIS — Z79.01 LONG TERM (CURRENT) USE OF ANTICOAGULANTS: ICD-10-CM

## 2019-01-02 DIAGNOSIS — E78.5 HYPERLIPIDEMIA, UNSPECIFIED: ICD-10-CM

## 2019-01-02 DIAGNOSIS — I05.0 RHEUMATIC MITRAL STENOSIS: ICD-10-CM

## 2019-01-02 DIAGNOSIS — Z00.00 ENCOUNTER FOR GENERAL ADULT MEDICAL EXAMINATION WITHOUT ABNORMAL FINDINGS: ICD-10-CM

## 2019-01-02 DIAGNOSIS — I48.91 UNSPECIFIED ATRIAL FIBRILLATION: ICD-10-CM

## 2019-01-28 ENCOUNTER — LABORATORY RESULT (OUTPATIENT)
Age: 64
End: 2019-01-28

## 2019-01-28 ENCOUNTER — OTHER (OUTPATIENT)
Age: 64
End: 2019-01-28

## 2019-01-28 ENCOUNTER — APPOINTMENT (OUTPATIENT)
Dept: INTERNAL MEDICINE | Facility: HOSPITAL | Age: 64
End: 2019-01-28

## 2019-01-28 ENCOUNTER — OUTPATIENT (OUTPATIENT)
Dept: OUTPATIENT SERVICES | Facility: HOSPITAL | Age: 64
LOS: 1 days | End: 2019-01-28

## 2019-01-28 DIAGNOSIS — Z98.89 OTHER SPECIFIED POSTPROCEDURAL STATES: Chronic | ICD-10-CM

## 2019-01-28 DIAGNOSIS — K43.9 VENTRAL HERNIA WITHOUT OBSTRUCTION OR GANGRENE: Chronic | ICD-10-CM

## 2019-01-28 DIAGNOSIS — Z98.49 CATARACT EXTRACTION STATUS, UNSPECIFIED EYE: Chronic | ICD-10-CM

## 2019-01-28 LAB
INR BLD: 2.12 — HIGH (ref 0.88–1.17)
PROTHROM AB SERPL-ACNC: 24.7 SEC — HIGH (ref 9.8–13.1)

## 2019-01-29 DIAGNOSIS — Z79.01 LONG TERM (CURRENT) USE OF ANTICOAGULANTS: ICD-10-CM

## 2019-02-28 ENCOUNTER — OUTPATIENT (OUTPATIENT)
Dept: OUTPATIENT SERVICES | Facility: HOSPITAL | Age: 64
LOS: 1 days | End: 2019-02-28

## 2019-02-28 ENCOUNTER — APPOINTMENT (OUTPATIENT)
Dept: INTERNAL MEDICINE | Facility: HOSPITAL | Age: 64
End: 2019-02-28

## 2019-02-28 ENCOUNTER — LABORATORY RESULT (OUTPATIENT)
Age: 64
End: 2019-02-28

## 2019-02-28 DIAGNOSIS — Z98.89 OTHER SPECIFIED POSTPROCEDURAL STATES: Chronic | ICD-10-CM

## 2019-02-28 DIAGNOSIS — Z98.49 CATARACT EXTRACTION STATUS, UNSPECIFIED EYE: Chronic | ICD-10-CM

## 2019-02-28 DIAGNOSIS — K43.9 VENTRAL HERNIA WITHOUT OBSTRUCTION OR GANGRENE: Chronic | ICD-10-CM

## 2019-02-28 LAB
INR BLD: 2.04 — HIGH (ref 0.88–1.17)
PROTHROM AB SERPL-ACNC: 23.2 SEC — HIGH (ref 9.8–13.1)

## 2019-03-01 DIAGNOSIS — Z79.01 LONG TERM (CURRENT) USE OF ANTICOAGULANTS: ICD-10-CM

## 2019-03-19 ENCOUNTER — APPOINTMENT (OUTPATIENT)
Dept: INTERNAL MEDICINE | Facility: HOSPITAL | Age: 64
End: 2019-03-19

## 2019-03-19 ENCOUNTER — OUTPATIENT (OUTPATIENT)
Dept: OUTPATIENT SERVICES | Facility: HOSPITAL | Age: 64
LOS: 1 days | End: 2019-03-19

## 2019-03-19 ENCOUNTER — LABORATORY RESULT (OUTPATIENT)
Age: 64
End: 2019-03-19

## 2019-03-19 ENCOUNTER — RESULT REVIEW (OUTPATIENT)
Age: 64
End: 2019-03-19

## 2019-03-19 DIAGNOSIS — Z98.49 CATARACT EXTRACTION STATUS, UNSPECIFIED EYE: Chronic | ICD-10-CM

## 2019-03-19 DIAGNOSIS — Z98.89 OTHER SPECIFIED POSTPROCEDURAL STATES: Chronic | ICD-10-CM

## 2019-03-19 DIAGNOSIS — K43.9 VENTRAL HERNIA WITHOUT OBSTRUCTION OR GANGRENE: Chronic | ICD-10-CM

## 2019-03-19 LAB
INR BLD: 2.2 — HIGH (ref 0.88–1.17)
PROTHROM AB SERPL-ACNC: 25 SEC — HIGH (ref 9.8–13.1)

## 2019-03-20 DIAGNOSIS — Z79.01 LONG TERM (CURRENT) USE OF ANTICOAGULANTS: ICD-10-CM

## 2019-03-30 ENCOUNTER — OUTPATIENT (OUTPATIENT)
Dept: OUTPATIENT SERVICES | Facility: HOSPITAL | Age: 64
LOS: 1 days | End: 2019-03-30
Payer: MEDICAID

## 2019-03-30 ENCOUNTER — APPOINTMENT (OUTPATIENT)
Dept: MAMMOGRAPHY | Facility: IMAGING CENTER | Age: 64
End: 2019-03-30
Payer: MEDICAID

## 2019-03-30 DIAGNOSIS — Z00.8 ENCOUNTER FOR OTHER GENERAL EXAMINATION: ICD-10-CM

## 2019-03-30 DIAGNOSIS — Z98.49 CATARACT EXTRACTION STATUS, UNSPECIFIED EYE: Chronic | ICD-10-CM

## 2019-03-30 DIAGNOSIS — Z98.89 OTHER SPECIFIED POSTPROCEDURAL STATES: Chronic | ICD-10-CM

## 2019-03-30 DIAGNOSIS — K43.9 VENTRAL HERNIA WITHOUT OBSTRUCTION OR GANGRENE: Chronic | ICD-10-CM

## 2019-03-30 PROCEDURE — 77067 SCR MAMMO BI INCL CAD: CPT | Mod: 26

## 2019-03-30 PROCEDURE — 77063 BREAST TOMOSYNTHESIS BI: CPT

## 2019-03-30 PROCEDURE — 77063 BREAST TOMOSYNTHESIS BI: CPT | Mod: 26

## 2019-03-30 PROCEDURE — 77067 SCR MAMMO BI INCL CAD: CPT

## 2019-04-02 ENCOUNTER — OUTPATIENT (OUTPATIENT)
Dept: OUTPATIENT SERVICES | Facility: HOSPITAL | Age: 64
LOS: 1 days | End: 2019-04-02

## 2019-04-02 ENCOUNTER — APPOINTMENT (OUTPATIENT)
Dept: INTERNAL MEDICINE | Facility: HOSPITAL | Age: 64
End: 2019-04-02

## 2019-04-02 ENCOUNTER — LABORATORY RESULT (OUTPATIENT)
Age: 64
End: 2019-04-02

## 2019-04-02 DIAGNOSIS — Z98.49 CATARACT EXTRACTION STATUS, UNSPECIFIED EYE: Chronic | ICD-10-CM

## 2019-04-02 DIAGNOSIS — Z98.89 OTHER SPECIFIED POSTPROCEDURAL STATES: Chronic | ICD-10-CM

## 2019-04-02 DIAGNOSIS — K43.9 VENTRAL HERNIA WITHOUT OBSTRUCTION OR GANGRENE: Chronic | ICD-10-CM

## 2019-04-02 LAB
INR BLD: 3.02 — HIGH (ref 0.88–1.17)
PROTHROM AB SERPL-ACNC: 35.6 SEC — HIGH (ref 9.8–13.1)

## 2019-04-03 DIAGNOSIS — Z79.01 LONG TERM (CURRENT) USE OF ANTICOAGULANTS: ICD-10-CM

## 2019-04-09 ENCOUNTER — APPOINTMENT (OUTPATIENT)
Dept: INTERNAL MEDICINE | Facility: HOSPITAL | Age: 64
End: 2019-04-09

## 2019-04-09 ENCOUNTER — LABORATORY RESULT (OUTPATIENT)
Age: 64
End: 2019-04-09

## 2019-04-09 ENCOUNTER — OUTPATIENT (OUTPATIENT)
Dept: OUTPATIENT SERVICES | Facility: HOSPITAL | Age: 64
LOS: 1 days | End: 2019-04-09

## 2019-04-09 ENCOUNTER — OTHER (OUTPATIENT)
Age: 64
End: 2019-04-09

## 2019-04-09 DIAGNOSIS — Z98.89 OTHER SPECIFIED POSTPROCEDURAL STATES: Chronic | ICD-10-CM

## 2019-04-09 DIAGNOSIS — Z79.01 LONG TERM (CURRENT) USE OF ANTICOAGULANTS: ICD-10-CM

## 2019-04-09 DIAGNOSIS — Z98.49 CATARACT EXTRACTION STATUS, UNSPECIFIED EYE: Chronic | ICD-10-CM

## 2019-04-09 DIAGNOSIS — K43.9 VENTRAL HERNIA WITHOUT OBSTRUCTION OR GANGRENE: Chronic | ICD-10-CM

## 2019-04-09 LAB
INR BLD: 3 — HIGH (ref 0.88–1.17)
PROTHROM AB SERPL-ACNC: 34.5 SEC — HIGH (ref 9.8–13.1)

## 2019-04-17 ENCOUNTER — LABORATORY RESULT (OUTPATIENT)
Age: 64
End: 2019-04-17

## 2019-04-17 ENCOUNTER — OUTPATIENT (OUTPATIENT)
Dept: OUTPATIENT SERVICES | Facility: HOSPITAL | Age: 64
LOS: 1 days | End: 2019-04-17

## 2019-04-17 ENCOUNTER — APPOINTMENT (OUTPATIENT)
Dept: INTERNAL MEDICINE | Facility: HOSPITAL | Age: 64
End: 2019-04-17

## 2019-04-17 DIAGNOSIS — Z79.01 LONG TERM (CURRENT) USE OF ANTICOAGULANTS: ICD-10-CM

## 2019-04-17 DIAGNOSIS — K43.9 VENTRAL HERNIA WITHOUT OBSTRUCTION OR GANGRENE: Chronic | ICD-10-CM

## 2019-04-17 DIAGNOSIS — Z98.89 OTHER SPECIFIED POSTPROCEDURAL STATES: Chronic | ICD-10-CM

## 2019-04-17 DIAGNOSIS — Z98.49 CATARACT EXTRACTION STATUS, UNSPECIFIED EYE: Chronic | ICD-10-CM

## 2019-04-17 LAB
INR BLD: 2.22 — HIGH (ref 0.88–1.17)
PROTHROM AB SERPL-ACNC: 25.3 SEC — HIGH (ref 9.8–13.1)

## 2019-04-30 ENCOUNTER — OTHER (OUTPATIENT)
Age: 64
End: 2019-04-30

## 2019-04-30 ENCOUNTER — OUTPATIENT (OUTPATIENT)
Dept: OUTPATIENT SERVICES | Facility: HOSPITAL | Age: 64
LOS: 1 days | End: 2019-04-30

## 2019-04-30 ENCOUNTER — APPOINTMENT (OUTPATIENT)
Dept: INTERNAL MEDICINE | Facility: CLINIC | Age: 64
End: 2019-04-30

## 2019-04-30 DIAGNOSIS — K43.9 VENTRAL HERNIA WITHOUT OBSTRUCTION OR GANGRENE: Chronic | ICD-10-CM

## 2019-04-30 DIAGNOSIS — Z98.89 OTHER SPECIFIED POSTPROCEDURAL STATES: Chronic | ICD-10-CM

## 2019-04-30 DIAGNOSIS — Z98.49 CATARACT EXTRACTION STATUS, UNSPECIFIED EYE: Chronic | ICD-10-CM

## 2019-05-01 DIAGNOSIS — Z79.01 LONG TERM (CURRENT) USE OF ANTICOAGULANTS: ICD-10-CM

## 2019-05-01 LAB
INR PPP: 1.8 RATIO
QUALITY CONTROL: YES

## 2019-05-08 ENCOUNTER — APPOINTMENT (OUTPATIENT)
Dept: INTERNAL MEDICINE | Facility: CLINIC | Age: 64
End: 2019-05-08

## 2019-05-08 ENCOUNTER — RESULT CHARGE (OUTPATIENT)
Age: 64
End: 2019-05-08

## 2019-05-08 ENCOUNTER — OUTPATIENT (OUTPATIENT)
Dept: OUTPATIENT SERVICES | Facility: HOSPITAL | Age: 64
LOS: 1 days | End: 2019-05-08

## 2019-05-08 DIAGNOSIS — Z79.01 LONG TERM (CURRENT) USE OF ANTICOAGULANTS: ICD-10-CM

## 2019-05-08 DIAGNOSIS — K43.9 VENTRAL HERNIA WITHOUT OBSTRUCTION OR GANGRENE: Chronic | ICD-10-CM

## 2019-05-08 DIAGNOSIS — Z98.89 OTHER SPECIFIED POSTPROCEDURAL STATES: Chronic | ICD-10-CM

## 2019-05-08 DIAGNOSIS — Z98.49 CATARACT EXTRACTION STATUS, UNSPECIFIED EYE: Chronic | ICD-10-CM

## 2019-05-08 LAB
INR PPP: 2.8 RATIO
POCT-PROTHROMBIN TIME: 33.1 SECS

## 2019-05-15 ENCOUNTER — OUTPATIENT (OUTPATIENT)
Dept: OUTPATIENT SERVICES | Facility: HOSPITAL | Age: 64
LOS: 1 days | End: 2019-05-15

## 2019-05-15 ENCOUNTER — APPOINTMENT (OUTPATIENT)
Dept: INTERNAL MEDICINE | Facility: CLINIC | Age: 64
End: 2019-05-15

## 2019-05-15 DIAGNOSIS — K43.9 VENTRAL HERNIA WITHOUT OBSTRUCTION OR GANGRENE: Chronic | ICD-10-CM

## 2019-05-15 DIAGNOSIS — Z98.49 CATARACT EXTRACTION STATUS, UNSPECIFIED EYE: Chronic | ICD-10-CM

## 2019-05-15 DIAGNOSIS — Z98.89 OTHER SPECIFIED POSTPROCEDURAL STATES: Chronic | ICD-10-CM

## 2019-05-16 LAB
INR PPP: 3.9 RATIO
POCT-PROTHROMBIN TIME: 47.1 SECS

## 2019-05-17 DIAGNOSIS — Z79.01 LONG TERM (CURRENT) USE OF ANTICOAGULANTS: ICD-10-CM

## 2019-05-22 ENCOUNTER — APPOINTMENT (OUTPATIENT)
Dept: INTERNAL MEDICINE | Facility: CLINIC | Age: 64
End: 2019-05-22

## 2019-05-22 ENCOUNTER — OUTPATIENT (OUTPATIENT)
Dept: OUTPATIENT SERVICES | Facility: HOSPITAL | Age: 64
LOS: 1 days | End: 2019-05-22

## 2019-05-22 DIAGNOSIS — Z98.49 CATARACT EXTRACTION STATUS, UNSPECIFIED EYE: Chronic | ICD-10-CM

## 2019-05-22 DIAGNOSIS — Z79.01 LONG TERM (CURRENT) USE OF ANTICOAGULANTS: ICD-10-CM

## 2019-05-22 DIAGNOSIS — K43.9 VENTRAL HERNIA WITHOUT OBSTRUCTION OR GANGRENE: Chronic | ICD-10-CM

## 2019-05-22 DIAGNOSIS — Z98.89 OTHER SPECIFIED POSTPROCEDURAL STATES: Chronic | ICD-10-CM

## 2019-05-22 LAB
INR PPP: 2.2 RATIO
POCT-PROTHROMBIN TIME: 26.4 SECS
QUALITY CONTROL: YES

## 2019-06-05 ENCOUNTER — NON-APPOINTMENT (OUTPATIENT)
Age: 64
End: 2019-06-05

## 2019-06-05 ENCOUNTER — APPOINTMENT (OUTPATIENT)
Dept: INTERNAL MEDICINE | Facility: CLINIC | Age: 64
End: 2019-06-05

## 2019-06-05 ENCOUNTER — OUTPATIENT (OUTPATIENT)
Dept: OUTPATIENT SERVICES | Facility: HOSPITAL | Age: 64
LOS: 1 days | End: 2019-06-05

## 2019-06-05 ENCOUNTER — APPOINTMENT (OUTPATIENT)
Dept: CARDIOLOGY | Facility: CLINIC | Age: 64
End: 2019-06-05
Payer: MEDICAID

## 2019-06-05 VITALS
TEMPERATURE: 97.8 F | RESPIRATION RATE: 14 BRPM | OXYGEN SATURATION: 97 % | HEART RATE: 67 BPM | SYSTOLIC BLOOD PRESSURE: 120 MMHG | DIASTOLIC BLOOD PRESSURE: 75 MMHG | BODY MASS INDEX: 23.39 KG/M2 | WEIGHT: 132 LBS | HEIGHT: 63 IN

## 2019-06-05 DIAGNOSIS — Z98.89 OTHER SPECIFIED POSTPROCEDURAL STATES: Chronic | ICD-10-CM

## 2019-06-05 DIAGNOSIS — K43.9 VENTRAL HERNIA WITHOUT OBSTRUCTION OR GANGRENE: Chronic | ICD-10-CM

## 2019-06-05 DIAGNOSIS — Z98.49 CATARACT EXTRACTION STATUS, UNSPECIFIED EYE: Chronic | ICD-10-CM

## 2019-06-05 PROCEDURE — 99214 OFFICE O/P EST MOD 30 MIN: CPT

## 2019-06-05 PROCEDURE — 93000 ELECTROCARDIOGRAM COMPLETE: CPT

## 2019-06-05 NOTE — DISCUSSION/SUMMARY
[FreeTextEntry1] : #1 Paroxysmal atrial fibrillation: she has A. fib related to mitral stenosis.With prior history of thromboembolic event. Continue with anticoagulation warfarin. Rate control strategy with beta blockers.\par \par #2 mitral stenosis: Asymptomatic, continue with current medical therapy, intervention If she has symptoms\par \par #3: Mild luminal coronary artery disease Will continue with current medical therapy\par \par Will get TTE to assess for valve ds.

## 2019-06-05 NOTE — HISTORY OF PRESENT ILLNESS
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

## 2019-06-05 NOTE — REVIEW OF SYSTEMS
[Fever] : no fever [Chills] : no chills [Eyeglasses] : not currently wearing eyeglasses [Blurry Vision] : no blurred vision [Mouth Sores] : no mouth sores [Earache] : no earache [Shortness Of Breath] : no shortness of breath [Chest Pain] : no chest pain [Palpitations] : no palpitations [Cough] : no cough [Abdominal Pain] : no abdominal pain [Heartburn] : no heartburn [Dysphagia] : no dysphagia [Dysuria] : no dysuria [Incontinence] : no incontinence [Joint Pain] : no joint pain [see HPI] : see HPI [Skin: A Rash] : no rash: [Skin Lesions] : no skin lesions [Confusion] : no confusion was observed [Excessive Thirst] : no polydipsia [Easy Bleeding] : no tendency for easy bleeding

## 2019-06-05 NOTE — PHYSICAL EXAM
[General Appearance - Well Developed] : well developed [Normal Conjunctiva] : the conjunctiva exhibited no abnormalities [Normal Oral Mucosa] : normal oral mucosa [No Oral Pallor] : no oral pallor [Normal Oropharynx] : normal oropharynx [Normal Jugular Venous V Waves Present] : normal jugular venous V waves present [Normal Jugular Venous A Waves Present] : normal jugular venous A waves present [] : no respiratory distress [Bowel Sounds] : normal bowel sounds [Cyanosis, Localized] : no localized cyanosis [Nail Clubbing] : no clubbing of the fingernails [FreeTextEntry1] : Right radial artery access site looks healthy and no hematoma bleeding. [Oriented To Time, Place, And Person] : oriented to person, place, and time

## 2019-06-06 DIAGNOSIS — Z79.01 LONG TERM (CURRENT) USE OF ANTICOAGULANTS: ICD-10-CM

## 2019-06-06 LAB
INR PPP: 2.2 RATIO
QUALITY CONTROL: YES

## 2019-06-12 ENCOUNTER — APPOINTMENT (OUTPATIENT)
Dept: INTERNAL MEDICINE | Facility: CLINIC | Age: 64
End: 2019-06-12

## 2019-06-12 ENCOUNTER — OUTPATIENT (OUTPATIENT)
Dept: OUTPATIENT SERVICES | Facility: HOSPITAL | Age: 64
LOS: 1 days | End: 2019-06-12

## 2019-06-12 DIAGNOSIS — K43.9 VENTRAL HERNIA WITHOUT OBSTRUCTION OR GANGRENE: Chronic | ICD-10-CM

## 2019-06-12 DIAGNOSIS — Z98.89 OTHER SPECIFIED POSTPROCEDURAL STATES: Chronic | ICD-10-CM

## 2019-06-12 DIAGNOSIS — Z98.49 CATARACT EXTRACTION STATUS, UNSPECIFIED EYE: Chronic | ICD-10-CM

## 2019-06-12 LAB
INR PPP: 2.1 RATIO
QUALITY CONTROL: YES

## 2019-06-13 DIAGNOSIS — Z79.01 LONG TERM (CURRENT) USE OF ANTICOAGULANTS: ICD-10-CM

## 2019-06-16 ENCOUNTER — RESULT CHARGE (OUTPATIENT)
Age: 64
End: 2019-06-16

## 2019-06-17 ENCOUNTER — OUTPATIENT (OUTPATIENT)
Dept: OUTPATIENT SERVICES | Facility: HOSPITAL | Age: 64
LOS: 1 days | End: 2019-06-17

## 2019-06-17 ENCOUNTER — LABORATORY RESULT (OUTPATIENT)
Age: 64
End: 2019-06-17

## 2019-06-17 ENCOUNTER — APPOINTMENT (OUTPATIENT)
Dept: INTERNAL MEDICINE | Facility: CLINIC | Age: 64
End: 2019-06-17
Payer: MEDICAID

## 2019-06-17 VITALS
HEART RATE: 73 BPM | SYSTOLIC BLOOD PRESSURE: 124 MMHG | OXYGEN SATURATION: 98 % | HEIGHT: 63 IN | DIASTOLIC BLOOD PRESSURE: 86 MMHG

## 2019-06-17 DIAGNOSIS — K43.9 VENTRAL HERNIA WITHOUT OBSTRUCTION OR GANGRENE: Chronic | ICD-10-CM

## 2019-06-17 DIAGNOSIS — I48.91 UNSPECIFIED ATRIAL FIBRILLATION: ICD-10-CM

## 2019-06-17 DIAGNOSIS — Z98.89 OTHER SPECIFIED POSTPROCEDURAL STATES: Chronic | ICD-10-CM

## 2019-06-17 DIAGNOSIS — Z86.79 PERSONAL HISTORY OF OTHER DISEASES OF THE CIRCULATORY SYSTEM: ICD-10-CM

## 2019-06-17 DIAGNOSIS — Z79.01 LONG TERM (CURRENT) USE OF ANTICOAGULANTS: ICD-10-CM

## 2019-06-17 DIAGNOSIS — G57.62 LESION OF PLANTAR NERVE, LEFT LOWER LIMB: ICD-10-CM

## 2019-06-17 DIAGNOSIS — Z98.49 CATARACT EXTRACTION STATUS, UNSPECIFIED EYE: Chronic | ICD-10-CM

## 2019-06-17 DIAGNOSIS — E11.9 TYPE 2 DIABETES MELLITUS WITHOUT COMPLICATIONS: ICD-10-CM

## 2019-06-17 LAB — HBA1C BLD-MCNC: 6 % — HIGH (ref 4–5.6)

## 2019-06-17 PROCEDURE — 99214 OFFICE O/P EST MOD 30 MIN: CPT | Mod: GC

## 2019-06-18 LAB
CREAT UR-MCNC: 20 MG/DL — SIGNIFICANT CHANGE UP
GLUCOSE BLDC GLUCOMTR-MCNC: 171
MICROALBUMIN UR-MCNC: < 1.2 MG/DL — SIGNIFICANT CHANGE UP
MICROALBUMIN UR-MCNC: < 1.2 MG/DL — SIGNIFICANT CHANGE UP

## 2019-06-26 NOTE — ASSESSMENT
[FreeTextEntry1] : 64 year old woman with history of atrial fibrillation, CVA (06/2016), DM2, HLD and left-sided sciatica presents for semi-annual visit with persistent sciatica nerve pain. \par \par #Left-sided sciatica: uncontrolled\par - non-debilitating, no sensory or motor deficits\par - will refer to Orthopaedic surgery to discuss options\par - declined PT referral \par \par #Atrial fibrillation\par - currently in NSR\par - INR 2.1 on 06/12/19, repeat INR not performed this visit\par - cont current Coumadin dosing 4 mg daily except 6 mg on Thurs & Sun\par - return for repeat INR in 1 week \par \par #DM2: well controlled\par - A1c 6.0 today with occasional hypoglycemic events  \par - cont metformin 1000 BID unless hypoglycemic events increase, then consider diet-controlled \par - Ophtho referral given \par \par History of CVA (2016)\par - so residual neurological deficits\par - cont ASA, high-intensity statin\par \par #HCM \par - Colonoscopy 2015 negative, next due 2025\par - Mammogram BiRADs 1 in 03/2019; next due 2021 \par - Immunizations UTD\par - DEXA due 2021\par \par D/w Dr. Nicole \par RTC in 6-12 months\par \par P Jones PGY3\par [employer]

## 2019-06-26 NOTE — REVIEW OF SYSTEMS
[Muscle Weakness] : muscle weakness [Fever] : no fever [Chills] : no chills [Chest Pain] : no chest pain [Vision Problems] : no vision problems [Palpitations] : no palpitations [Dysuria] : no dysuria [Abdominal Pain] : no abdominal pain [Shortness Of Breath] : no shortness of breath [Muscle Pain] : no muscle pain [Joint Swelling] : no joint swelling [Skin Rash] : no skin rash [Easy Bleeding] : no easy bleeding [Depression] : no depression [Anxiety] : no anxiety [FreeTextEntry9] : Bilateral lower back pain w/ radiation down left leg  [Easy Bruising] : no easy bruising

## 2019-06-26 NOTE — HISTORY OF PRESENT ILLNESS
[FreeTextEntry1] : c [de-identified] : 64 year old woman with history of atrial fibrillation, CVA (06/2016), DM2, HLD and chronic LBP presents for semi-annual visit.\par \par Back pain is still bothersome to patient. It remains left-sided, radiates to left foot and is associated with subjective left leg weakness. Low dose gabapentin (100 mg TID) initiated in July 2018 no longer effective as was trial of physical therapy. Pain worsens upon standing from seated position - unchanged from the past. No back pain at night or urinary incontinence. Patient is requesting referral to Dr. Matthias Stout - an orthopaedic surgeon for further evaluation of spine pain. \par \par FSG range 80s to low 100s. Patient endorses occasional dizziness and sweats which resolve with eating immediately. No numbness/tingling involving hands and feet. No ophthalmology evaluation in the past. \par

## 2019-06-26 NOTE — PHYSICAL EXAM
[No Acute Distress] : no acute distress [Normal Sclera/Conjunctiva] : normal sclera/conjunctiva [Well Developed] : well developed [Well Nourished] : well nourished [No Respiratory Distress] : no respiratory distress  [PERRL] : pupils equal round and reactive to light [No JVD] : no jugular venous distention [Clear to Auscultation] : lungs were clear to auscultation bilaterally [Normal S1, S2] : normal S1 and S2 [No Murmur] : no murmur heard [No Edema] : there was no peripheral edema [Soft] : abdomen soft [No Masses] : no abdominal mass palpated [Normal Posterior Cervical Nodes] : no posterior cervical lymphadenopathy [Non Tender] : non-tender [Grossly Normal Strength/Tone] : grossly normal strength/tone [Normal Anterior Cervical Nodes] : no anterior cervical lymphadenopathy [No Rash] : no rash [Normal Affect] : the affect was normal [Normal Mood] : the mood was normal [Normal Gait] : normal gait [Normal Insight/Judgement] : insight and judgment were intact [de-identified] : Sinus rhythm  [de-identified] : Positive left straight leg raise test, strength in lower extremities is 5/5 bilaterally [FreeTextEntry1] : Deferred

## 2019-06-28 ENCOUNTER — OUTPATIENT (OUTPATIENT)
Dept: OUTPATIENT SERVICES | Facility: HOSPITAL | Age: 64
LOS: 1 days | End: 2019-06-28

## 2019-06-28 ENCOUNTER — APPOINTMENT (OUTPATIENT)
Dept: INTERNAL MEDICINE | Facility: CLINIC | Age: 64
End: 2019-06-28

## 2019-06-28 DIAGNOSIS — Z79.01 LONG TERM (CURRENT) USE OF ANTICOAGULANTS: ICD-10-CM

## 2019-06-28 DIAGNOSIS — Z98.89 OTHER SPECIFIED POSTPROCEDURAL STATES: Chronic | ICD-10-CM

## 2019-06-28 DIAGNOSIS — K43.9 VENTRAL HERNIA WITHOUT OBSTRUCTION OR GANGRENE: Chronic | ICD-10-CM

## 2019-06-28 DIAGNOSIS — Z98.49 CATARACT EXTRACTION STATUS, UNSPECIFIED EYE: Chronic | ICD-10-CM

## 2019-06-28 LAB
INR PPP: 2.5 RATIO
POCT-PROTHROMBIN TIME: 30 SECS
QUALITY CONTROL: YES

## 2019-07-11 ENCOUNTER — OUTPATIENT (OUTPATIENT)
Dept: OUTPATIENT SERVICES | Facility: HOSPITAL | Age: 64
LOS: 1 days | End: 2019-07-11

## 2019-07-11 ENCOUNTER — RESULT CHARGE (OUTPATIENT)
Age: 64
End: 2019-07-11

## 2019-07-11 ENCOUNTER — APPOINTMENT (OUTPATIENT)
Dept: INTERNAL MEDICINE | Facility: CLINIC | Age: 64
End: 2019-07-11

## 2019-07-11 DIAGNOSIS — Z98.89 OTHER SPECIFIED POSTPROCEDURAL STATES: Chronic | ICD-10-CM

## 2019-07-11 DIAGNOSIS — K43.9 VENTRAL HERNIA WITHOUT OBSTRUCTION OR GANGRENE: Chronic | ICD-10-CM

## 2019-07-11 DIAGNOSIS — Z98.49 CATARACT EXTRACTION STATUS, UNSPECIFIED EYE: Chronic | ICD-10-CM

## 2019-07-11 LAB
INR PPP: 2.4 RATIO
POCT-PROTHROMBIN TIME: 28.8 SECS
QUALITY CONTROL: YES

## 2019-07-12 DIAGNOSIS — Z79.01 LONG TERM (CURRENT) USE OF ANTICOAGULANTS: ICD-10-CM

## 2019-07-29 ENCOUNTER — OUTPATIENT (OUTPATIENT)
Dept: OUTPATIENT SERVICES | Facility: HOSPITAL | Age: 64
LOS: 1 days | End: 2019-07-29

## 2019-07-29 ENCOUNTER — APPOINTMENT (OUTPATIENT)
Dept: INTERNAL MEDICINE | Facility: CLINIC | Age: 64
End: 2019-07-29

## 2019-07-29 DIAGNOSIS — Z98.49 CATARACT EXTRACTION STATUS, UNSPECIFIED EYE: Chronic | ICD-10-CM

## 2019-07-29 DIAGNOSIS — Z98.89 OTHER SPECIFIED POSTPROCEDURAL STATES: Chronic | ICD-10-CM

## 2019-07-29 DIAGNOSIS — K43.9 VENTRAL HERNIA WITHOUT OBSTRUCTION OR GANGRENE: Chronic | ICD-10-CM

## 2019-07-30 LAB
INR PPP: 2.3 RATIO
POCT-PROTHROMBIN TIME: 27.6 SECS

## 2019-08-19 ENCOUNTER — APPOINTMENT (OUTPATIENT)
Dept: INTERNAL MEDICINE | Facility: CLINIC | Age: 64
End: 2019-08-19

## 2019-08-19 ENCOUNTER — OUTPATIENT (OUTPATIENT)
Dept: OUTPATIENT SERVICES | Facility: HOSPITAL | Age: 64
LOS: 1 days | End: 2019-08-19

## 2019-08-19 DIAGNOSIS — K43.9 VENTRAL HERNIA WITHOUT OBSTRUCTION OR GANGRENE: Chronic | ICD-10-CM

## 2019-08-19 DIAGNOSIS — I48.91 UNSPECIFIED ATRIAL FIBRILLATION: ICD-10-CM

## 2019-08-19 DIAGNOSIS — Z79.01 LONG TERM (CURRENT) USE OF ANTICOAGULANTS: ICD-10-CM

## 2019-08-19 DIAGNOSIS — Z98.89 OTHER SPECIFIED POSTPROCEDURAL STATES: Chronic | ICD-10-CM

## 2019-08-19 DIAGNOSIS — Z98.49 CATARACT EXTRACTION STATUS, UNSPECIFIED EYE: Chronic | ICD-10-CM

## 2019-08-19 LAB
INR PPP: 3.2 RATIO
POCT-PROTHROMBIN TIME: 39 SECS
QUALITY CONTROL: YES

## 2019-08-30 ENCOUNTER — OUTPATIENT (OUTPATIENT)
Dept: OUTPATIENT SERVICES | Facility: HOSPITAL | Age: 64
LOS: 1 days | End: 2019-08-30

## 2019-08-30 ENCOUNTER — APPOINTMENT (OUTPATIENT)
Dept: INTERNAL MEDICINE | Facility: CLINIC | Age: 64
End: 2019-08-30

## 2019-08-30 ENCOUNTER — OTHER (OUTPATIENT)
Age: 64
End: 2019-08-30

## 2019-08-30 DIAGNOSIS — K43.9 VENTRAL HERNIA WITHOUT OBSTRUCTION OR GANGRENE: Chronic | ICD-10-CM

## 2019-08-30 DIAGNOSIS — Z98.49 CATARACT EXTRACTION STATUS, UNSPECIFIED EYE: Chronic | ICD-10-CM

## 2019-08-30 DIAGNOSIS — Z98.89 OTHER SPECIFIED POSTPROCEDURAL STATES: Chronic | ICD-10-CM

## 2019-09-03 DIAGNOSIS — Z79.01 LONG TERM (CURRENT) USE OF ANTICOAGULANTS: ICD-10-CM

## 2019-09-06 LAB
INR PPP: 2.8 RATIO
POCT-PROTHROMBIN TIME: 33.4 SECS

## 2019-09-09 ENCOUNTER — APPOINTMENT (OUTPATIENT)
Dept: INTERNAL MEDICINE | Facility: CLINIC | Age: 64
End: 2019-09-09

## 2019-09-09 ENCOUNTER — OUTPATIENT (OUTPATIENT)
Dept: OUTPATIENT SERVICES | Facility: HOSPITAL | Age: 64
LOS: 1 days | End: 2019-09-09

## 2019-09-09 DIAGNOSIS — I48.91 UNSPECIFIED ATRIAL FIBRILLATION: ICD-10-CM

## 2019-09-09 DIAGNOSIS — K43.9 VENTRAL HERNIA WITHOUT OBSTRUCTION OR GANGRENE: Chronic | ICD-10-CM

## 2019-09-09 DIAGNOSIS — Z79.01 LONG TERM (CURRENT) USE OF ANTICOAGULANTS: ICD-10-CM

## 2019-09-09 DIAGNOSIS — Z98.49 CATARACT EXTRACTION STATUS, UNSPECIFIED EYE: Chronic | ICD-10-CM

## 2019-09-09 DIAGNOSIS — Z98.89 OTHER SPECIFIED POSTPROCEDURAL STATES: Chronic | ICD-10-CM

## 2019-09-09 LAB
INR PPP: 2.3 RATIO
POCT-PROTHROMBIN TIME: 27.9 SECS

## 2019-09-11 ENCOUNTER — APPOINTMENT (OUTPATIENT)
Dept: CARDIOLOGY | Facility: CLINIC | Age: 64
End: 2019-09-11
Payer: MEDICAID

## 2019-09-11 ENCOUNTER — NON-APPOINTMENT (OUTPATIENT)
Age: 64
End: 2019-09-11

## 2019-09-11 ENCOUNTER — APPOINTMENT (OUTPATIENT)
Dept: CV DIAGNOSITCS | Facility: HOSPITAL | Age: 64
End: 2019-09-11

## 2019-09-11 VITALS
HEIGHT: 63 IN | WEIGHT: 130 LBS | BODY MASS INDEX: 23.04 KG/M2 | HEART RATE: 104 BPM | OXYGEN SATURATION: 97 % | DIASTOLIC BLOOD PRESSURE: 73 MMHG | SYSTOLIC BLOOD PRESSURE: 123 MMHG

## 2019-09-11 PROCEDURE — 93000 ELECTROCARDIOGRAM COMPLETE: CPT

## 2019-09-11 PROCEDURE — 99215 OFFICE O/P EST HI 40 MIN: CPT

## 2019-09-11 NOTE — HISTORY OF PRESENT ILLNESS
[FreeTextEntry1] : 65 yo F with HTN, HLD, DM II, Moderate mitral stenosis, permanent atrial fibrillation, on anticoagulation with Coumadin She had stroke when her INR was subtherapeutic, recovered well.\par \par Mitral stenosis: over last several months she has been progressively been getting more SOB. 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 Rates 104 today\par \par Type 2DM : Continue with current therapy \par \par Mild luminal ds.: asymptomatic

## 2019-09-11 NOTE — DISCUSSION/SUMMARY
[FreeTextEntry1] : #1 Paroxysmal atrial fibrillation: she has A. fib related to mitral stenosis.With prior history of thromboembolic event. Continue with anticoagulation warfarin. \par \par #2 Severe mitral stenosis:  SHe is becoming more symptomatic, I will increase her Metoprolol to better rate conrol her and then if she is still symptomatic then consider BUBBA to plan for PMBV vs MVR\par \par #3: Mild luminal coronary artery disease Will continue with current medical therapy\par \par FU 2 week.s

## 2019-09-11 NOTE — REVIEW OF SYSTEMS
[Fever] : no fever [Chills] : no chills [Blurry Vision] : no blurred vision [Eyeglasses] : not currently wearing eyeglasses [Earache] : no earache [Mouth Sores] : no mouth sores [Shortness Of Breath] : shortness of breath [Chest Pain] : no chest pain [Palpitations] : no palpitations [Abdominal Pain] : no abdominal pain [Cough] : no cough [Heartburn] : no heartburn [Dysphagia] : no dysphagia [Dysuria] : no dysuria [Joint Pain] : no joint pain [Incontinence] : no incontinence [Skin: A Rash] : no rash: [see HPI] : see HPI [Skin Lesions] : no skin lesions [Excessive Thirst] : no polydipsia [Confusion] : no confusion was observed [Easy Bleeding] : no tendency for easy bleeding

## 2019-09-12 ENCOUNTER — INPATIENT (INPATIENT)
Facility: HOSPITAL | Age: 64
LOS: 5 days | Discharge: HOME | End: 2019-09-18
Attending: HOSPITALIST | Admitting: HOSPITALIST
Payer: MEDICAID

## 2019-09-12 VITALS
DIASTOLIC BLOOD PRESSURE: 78 MMHG | OXYGEN SATURATION: 98 % | HEART RATE: 110 BPM | TEMPERATURE: 99 F | RESPIRATION RATE: 20 BRPM | SYSTOLIC BLOOD PRESSURE: 116 MMHG

## 2019-09-12 DIAGNOSIS — K43.9 VENTRAL HERNIA WITHOUT OBSTRUCTION OR GANGRENE: Chronic | ICD-10-CM

## 2019-09-12 DIAGNOSIS — Z98.49 CATARACT EXTRACTION STATUS, UNSPECIFIED EYE: Chronic | ICD-10-CM

## 2019-09-12 DIAGNOSIS — Z98.89 OTHER SPECIFIED POSTPROCEDURAL STATES: Chronic | ICD-10-CM

## 2019-09-12 LAB
ALBUMIN SERPL ELPH-MCNC: 4.2 G/DL — SIGNIFICANT CHANGE UP (ref 3.3–5)
ALP SERPL-CCNC: 61 U/L — SIGNIFICANT CHANGE UP (ref 40–120)
ALT FLD-CCNC: 18 U/L — SIGNIFICANT CHANGE UP (ref 4–33)
ANION GAP SERPL CALC-SCNC: 13 MMO/L — SIGNIFICANT CHANGE UP (ref 7–14)
APTT BLD: 42.1 SEC — HIGH (ref 27.5–36.3)
AST SERPL-CCNC: 19 U/L — SIGNIFICANT CHANGE UP (ref 4–32)
BASOPHILS # BLD AUTO: 0.04 K/UL — SIGNIFICANT CHANGE UP (ref 0–0.2)
BASOPHILS NFR BLD AUTO: 0.4 % — SIGNIFICANT CHANGE UP (ref 0–2)
BILIRUB SERPL-MCNC: 0.7 MG/DL — SIGNIFICANT CHANGE UP (ref 0.2–1.2)
BUN SERPL-MCNC: 11 MG/DL — SIGNIFICANT CHANGE UP (ref 7–23)
CALCIUM SERPL-MCNC: 9.6 MG/DL — SIGNIFICANT CHANGE UP (ref 8.4–10.5)
CHLORIDE SERPL-SCNC: 105 MMOL/L — SIGNIFICANT CHANGE UP (ref 98–107)
CO2 SERPL-SCNC: 23 MMOL/L — SIGNIFICANT CHANGE UP (ref 22–31)
CREAT SERPL-MCNC: 0.69 MG/DL — SIGNIFICANT CHANGE UP (ref 0.5–1.3)
EOSINOPHIL # BLD AUTO: 0.16 K/UL — SIGNIFICANT CHANGE UP (ref 0–0.5)
EOSINOPHIL NFR BLD AUTO: 1.7 % — SIGNIFICANT CHANGE UP (ref 0–6)
GLUCOSE SERPL-MCNC: 117 MG/DL — HIGH (ref 70–99)
HCT VFR BLD CALC: 30.4 % — LOW (ref 34.5–45)
HGB BLD-MCNC: 9.3 G/DL — LOW (ref 11.5–15.5)
IMM GRANULOCYTES NFR BLD AUTO: 0.3 % — SIGNIFICANT CHANGE UP (ref 0–1.5)
INR BLD: 2.27 — HIGH (ref 0.88–1.17)
LYMPHOCYTES # BLD AUTO: 3.11 K/UL — SIGNIFICANT CHANGE UP (ref 1–3.3)
LYMPHOCYTES # BLD AUTO: 32.9 % — SIGNIFICANT CHANGE UP (ref 13–44)
MCHC RBC-ENTMCNC: 26.3 PG — LOW (ref 27–34)
MCHC RBC-ENTMCNC: 30.6 % — LOW (ref 32–36)
MCV RBC AUTO: 86.1 FL — SIGNIFICANT CHANGE UP (ref 80–100)
MONOCYTES # BLD AUTO: 0.61 K/UL — SIGNIFICANT CHANGE UP (ref 0–0.9)
MONOCYTES NFR BLD AUTO: 6.5 % — SIGNIFICANT CHANGE UP (ref 2–14)
NEUTROPHILS # BLD AUTO: 5.49 K/UL — SIGNIFICANT CHANGE UP (ref 1.8–7.4)
NEUTROPHILS NFR BLD AUTO: 58.2 % — SIGNIFICANT CHANGE UP (ref 43–77)
NRBC # FLD: 0 K/UL — SIGNIFICANT CHANGE UP (ref 0–0)
PLATELET # BLD AUTO: 269 K/UL — SIGNIFICANT CHANGE UP (ref 150–400)
PMV BLD: 10 FL — SIGNIFICANT CHANGE UP (ref 7–13)
POTASSIUM SERPL-MCNC: 4.8 MMOL/L — SIGNIFICANT CHANGE UP (ref 3.5–5.3)
POTASSIUM SERPL-SCNC: 4.8 MMOL/L — SIGNIFICANT CHANGE UP (ref 3.5–5.3)
PROT SERPL-MCNC: 7.7 G/DL — SIGNIFICANT CHANGE UP (ref 6–8.3)
PROTHROM AB SERPL-ACNC: 25.8 SEC — HIGH (ref 9.8–13.1)
RBC # BLD: 3.53 M/UL — LOW (ref 3.8–5.2)
RBC # FLD: 16.6 % — HIGH (ref 10.3–14.5)
SODIUM SERPL-SCNC: 141 MMOL/L — SIGNIFICANT CHANGE UP (ref 135–145)
TROPONIN T, HIGH SENSITIVITY: 15 NG/L — SIGNIFICANT CHANGE UP (ref ?–14)
TROPONIN T, HIGH SENSITIVITY: 16 NG/L — SIGNIFICANT CHANGE UP (ref ?–14)
WBC # BLD: 9.44 K/UL — SIGNIFICANT CHANGE UP (ref 3.8–10.5)
WBC # FLD AUTO: 9.44 K/UL — SIGNIFICANT CHANGE UP (ref 3.8–10.5)

## 2019-09-12 RX ORDER — METOPROLOL TARTRATE 50 MG
5 TABLET ORAL ONCE
Refills: 0 | Status: COMPLETED | OUTPATIENT
Start: 2019-09-12 | End: 2019-09-12

## 2019-09-12 RX ORDER — SODIUM CHLORIDE 9 MG/ML
1000 INJECTION INTRAMUSCULAR; INTRAVENOUS; SUBCUTANEOUS ONCE
Refills: 0 | Status: COMPLETED | OUTPATIENT
Start: 2019-09-12 | End: 2019-09-12

## 2019-09-12 RX ORDER — METOPROLOL TARTRATE 50 MG
50 TABLET ORAL ONCE
Refills: 0 | Status: COMPLETED | OUTPATIENT
Start: 2019-09-12 | End: 2019-09-12

## 2019-09-12 RX ADMIN — Medication 50 MILLIGRAM(S): at 20:21

## 2019-09-12 RX ADMIN — Medication 5 MILLIGRAM(S): at 20:21

## 2019-09-12 RX ADMIN — Medication 5 MILLIGRAM(S): at 21:59

## 2019-09-12 RX ADMIN — SODIUM CHLORIDE 1000 MILLILITER(S): 9 INJECTION INTRAMUSCULAR; INTRAVENOUS; SUBCUTANEOUS at 20:21

## 2019-09-12 NOTE — ED ADULT NURSE NOTE - OBJECTIVE STATEMENT
Pt presenting to room 1 AxOx4 ambulatory at baseline with c/o palpitations x1 day. PMH of cardiac stents, HTN, Afib on Warfarin, ASA. On arrival to ED pt's breathing is even and unlabored, pt satting well on RA. Pt denies CP, SOB, dizziness, lightheadedness, N/V, fevers, chills. Pt appears comfortable, is verbalizing improvement in symptoms since arriving to ED. VSS and as noted. IV established with 20g in RAC, labs drawn and sent, pt on cardiac monitor- Afib between . MD at bedside, will continue to monitor.

## 2019-09-12 NOTE — ED ADULT NURSE NOTE - NSIMPLEMENTINTERV_GEN_ALL_ED
Implemented All Universal Safety Interventions:  [place] to call system. Call bell, personal items and telephone within reach. Instruct patient to call for assistance. Room bathroom lighting operational. Non-slip footwear when patient is off stretcher. Physically safe environment: no spills, clutter or unnecessary equipment. Stretcher in lowest position, wheels locked, appropriate side rails in place.

## 2019-09-12 NOTE — ED ADULT TRIAGE NOTE - CHIEF COMPLAINT QUOTE
states" I am feeling palpitations and was seen by cardiology yesterday and was told that her valve is not working well and changed the medication doses. h/o aortic stenosis, A FIb on Coumadin" denies cP. HR between

## 2019-09-12 NOTE — CONSULT NOTE ADULT - SUBJECTIVE AND OBJECTIVE BOX
HISTORY OF PRESENT ILLNESS:    Outpatient Cardiologist: Dr Claudia Goldman    Patient is a 64y old  Female who presents with a chief complaint of palpitations   HPI: 64 year old female with PMH of HTN, TIA, DM, Moderate mitral stenosis, permanent atrial fibrillation, on anticoagulation with Coumadin presents to ED for evaluation of palpitations x1 day. Per patient, over last several months she has been progressively been getting more SOB. She was seen at cardiology yesterday who increased her Metoprolol dose from 25 mg BID to 50 mg BID as HR in clinic was 100s. She has been compliant since the change. She states she went for her usual walk this morning, but had to stop due to the palpitations. She had an echocardiogram recently and patient said that she may need a mitral valve replacement.   In ER, patient in Afib between . Patient received Metoprolol 5mg IVP x 2 and Metoprolol 50 mg PO x 1.     Allergies  No Known Allergies  	  MEDICATIONS  Aspir-Low 81 MG Oral Tablet Delayed Release; TAKE 1 TABLET DAILY  Atorvastatin Calcium 40 MG Oral Tablet; TAKE 1 TABLET BY MOUTH EVERY DAY AT BEDTIME  Gabapentin 100 MG Oral Capsule; TAKE 1 CAPSULE 3 TIMES DAILY  metformin HCl - 1000 MG Oral Tablet; TAKE 1 TABLET TWICE DAILY WITH MEALS  Metoprolol Tartrate 25 MG Oral Tablet; TAKE 1 TABLET TWICE DAILY  Multivitamin Adults Oral Tablet; TAKE 1 TABLET DAILY  Warfarin Sodium 4 MG Oral Tablet; Take as directed  Warfarin Sodium 6 MG Oral Tablet; PLease take coumadin 6mg Sunday and thursday  PAST MEDICAL & SURGICAL HISTORY:  TIA (transient ischemic attack): 2013- Rt facial droop which resolved after 2 hours  Mitral stenosis  Atrial fibrillation: h/o Cardioversion in 2013  DM (diabetes mellitus)  HLD (hyperlipidemia)  HTN (hypertension)  HTN (hypertension)  S/P laser cataract surgery, unspecified laterality: ???  S/P knee surgery  Ventral hernia    FAMILY HISTORY:  No pertinent family history in first degree relatives    SOCIAL HISTORY  Marital Status:   Occupation:   Lives with:     SUBSTANCE USE  Tobacco Usage:  ( ) never smoked   ( ) former smoker  ( ) current smoker; Packs per day:   Alcohol Usage: ( ) none  ( ) occasional ( ) 2-3 times a week ( ) daily; Last drink:   Recreational drugs ( ) None    REVIEW OF SYSTEMS:  CONSTITUTIONAL: No fevers, No chills, No fatigue, No weight gain  EYES: No vision changes   ENT: No congestion, No ear pain, No sore throat.  NECK: No pain, No stiffness  RESPIRATORY: + shortness of breath, No cough, No wheezing, No hemoptysis  CARDIOVASCULAR: No chest pain. + palpitations, No ANDERSON, No orthopnea, No paroxysmal nocturnal dyspnea, No pleuritic pain  GASTROINTESTINAL: No abdominal pain, No nausea, No vomiting, No hematemesis, No diarrhea No constipation. No melena  GENITOURINARY: No dysuria, No frequency, No incontinence, No hematuria  NEUROLOGICAL: No dizziness, No lightheadedness, No syncope, No LOC, No headache, No numbness or weakness  EXTREMITIES: No Edema, No joint pain, No joint swelling.  PSYCHIATRIC: No anxiety, No depression  SKIN: No diaphoresis. No itching, No rashes, No pressure ulcers  HEME/LYMPH: No easy bruising, or bleeding gums  ALLERY AND IMMUNOLOGIC: No hives or eczema    All other review of systems is negative unless indicated above.  VITAL SIGNS  T(C): 36.7 (09-12-19 @ 20:09), Max: 37.2 (09-12-19 @ 18:47)  HR: 101 (09-12-19 @ 21:08) (99 - 112)  BP: 125/98 (09-12-19 @ 21:08) (116/78 - 138/97)  RR: 18 (09-12-19 @ 21:08) (18 - 20)  SpO2: 97% (09-12-19 @ 21:08) (97% - 98%)  Wt(kg): --    PHYSICAL EXAM:  Appearance: NAD, no distress  HEENT:   Normal oral mucosa, PERRL, EOMI  Cardiovascular: + Tachycardia, + Irregular rate and rhythm, Normal S1 S2, No JVD, No murmurs  Respiratory: Lungs clear to auscultation. No rales, No rhonchi, No wheezing. No tenderness to palpation  Gastrointestinal:  Soft, Non-tender, + BS  Neurologic: Non-focal, A&Ox3  Skin: Warm and dry, No rashes, No ecchymosis, No cyanosis  Extremities: No clubbing, No cyanosis, No edema  Vascular: Peripheral pulses palpable 2+ bilaterally  Psychiatry: Mood & affect appropriate    LABORATORY VALUES	                      9.3    9.44  )-----------( 269      ( 12 Sep 2019 20:10 )             30.4   09-12  141  |  105  |  11  ----------------------------<  117<H>  4.8   |  23  |  0.69    Ca    9.6      12 Sep 2019 20:10  TPro  7.7  /  Alb  4.2  /  TBili  0.7  /  DBili  x   /  AST  19  /  ALT  18  /  AlkPhos  61  09-12  LIVER FUNCTIONS - ( 12 Sep 2019 20:10 )  Alb: 4.2 g/dL / Pro: 7.7 g/dL / ALK PHOS: 61 u/L / ALT: 18 u/L / AST: 19 u/L / GGT: x         Prothrombin Time, Plasma: 25.8 SEC (09-12 @ 20:10)    CARDIAC MARKERS:  Troponin T, High Sensitivity: 15 ng/L (09-12-19 @ 21:50)  Troponin T, High Sensitivity: 16 ng/L (09-12-19 @ 20:10)  Hemoglobin A1C, Whole Blood: 6.0 % (06-17 @ 16:42)  POCT Blood Glucose.: 167 mg/dL (12 Sep 2019 18:50)    TELEMETRY: 	    ECG:  	  RADIOLOGY:  OTHER: 	    PREVIOUS DIAGNOSTIC TESTING:    [ ] Echocardiogram:  [ ] Catheterization:  [ ] Stress Test:  	    ASSESSMENT AND PLAN      PLAN  	      # [id number] HISTORY OF PRESENT ILLNESS:    Outpatient Cardiologist: Dr Claudia Goldman    Patient is a 64y old  Female who presents with a chief complaint of palpitations   HPI: 64 year old female with PMH of HTN, TIA, DM, Moderate mitral stenosis, permanent atrial fibrillation, on anticoagulation with Coumadin presents to ED for evaluation of palpitations x1 day. Per patient, over last several months she has been progressively been getting more SOB. She was seen at cardiology yesterday who increased her Metoprolol dose from 25 mg BID to 50 mg BID as HR in clinic was 100s. She has been compliant since the change. She states she went for her usual walk this morning, but had to stop due to the palpitations. She had an echocardiogram recently and patient said that she may need a mitral valve replacement.   In ER, patient in Afib between . Patient received Metoprolol 5mg IVP x 2 and Metoprolol 50 mg PO x 1.     Allergies  No Known Allergies  	  MEDICATIONS  Aspir-Low 81 MG Oral Tablet Delayed Release; TAKE 1 TABLET DAILY  Atorvastatin Calcium 40 MG Oral Tablet; TAKE 1 TABLET BY MOUTH EVERY DAY AT BEDTIME  Gabapentin 100 MG Oral Capsule; TAKE 1 CAPSULE 3 TIMES DAILY  metformin HCl - 1000 MG Oral Tablet; TAKE 1 TABLET TWICE DAILY WITH MEALS  Metoprolol Tartrate 25 MG Oral Tablet; TAKE 1 TABLET TWICE DAILY  Multivitamin Adults Oral Tablet; TAKE 1 TABLET DAILY  Warfarin Sodium 4 MG Oral Tablet; Take as directed  Warfarin Sodium 6 MG Oral Tablet; PLease take coumadin 6mg Sunday and thursday  PAST MEDICAL & SURGICAL HISTORY:  TIA (transient ischemic attack): 2013- Rt facial droop which resolved after 2 hours  Mitral stenosis  Atrial fibrillation: h/o Cardioversion in 2013  DM (diabetes mellitus)  HLD (hyperlipidemia)  HTN (hypertension)  HTN (hypertension)  S/P laser cataract surgery, unspecified laterality: ???  S/P knee surgery  Ventral hernia    FAMILY HISTORY:  No pertinent family history in first degree relatives    SOCIAL HISTORY  Marital Status:   Occupation:   Lives with:     SUBSTANCE USE  Tobacco Usage:  ( ) never smoked   ( ) former smoker  ( ) current smoker; Packs per day:   Alcohol Usage: ( ) none  ( ) occasional ( ) 2-3 times a week ( ) daily; Last drink:   Recreational drugs ( ) None    REVIEW OF SYSTEMS:  CONSTITUTIONAL: No fevers, No chills, No fatigue, No weight gain  EYES: No vision changes   ENT: No congestion, No ear pain, No sore throat.  NECK: No pain, No stiffness  RESPIRATORY: + shortness of breath, No cough, No wheezing, No hemoptysis  CARDIOVASCULAR: No chest pain. + palpitations, No ANDERSON, No orthopnea, No paroxysmal nocturnal dyspnea, No pleuritic pain  GASTROINTESTINAL: No abdominal pain, No nausea, No vomiting, No hematemesis, No diarrhea No constipation. No melena  GENITOURINARY: No dysuria, No frequency, No incontinence, No hematuria  NEUROLOGICAL: No dizziness, No lightheadedness, No syncope, No LOC, No headache, No numbness or weakness  EXTREMITIES: No Edema, No joint pain, No joint swelling.  PSYCHIATRIC: No anxiety, No depression  SKIN: No diaphoresis. No itching, No rashes, No pressure ulcers  HEME/LYMPH: No easy bruising, or bleeding gums  ALLERY AND IMMUNOLOGIC: No hives or eczema    All other review of systems is negative unless indicated above.  VITAL SIGNS  T(C): 36.7 (09-12-19 @ 20:09), Max: 37.2 (09-12-19 @ 18:47)  HR: 101 (09-12-19 @ 21:08) (99 - 112)  BP: 125/98 (09-12-19 @ 21:08) (116/78 - 138/97)  RR: 18 (09-12-19 @ 21:08) (18 - 20)  SpO2: 97% (09-12-19 @ 21:08) (97% - 98%)  Wt(kg): --    PHYSICAL EXAM:  Appearance: NAD, no distress  HEENT:   Normal oral mucosa, PERRL, EOMI  Cardiovascular: + Tachycardia, + Irregular rate and rhythm, Normal S1 S2, No JVD, No murmurs  Respiratory: Lungs clear to auscultation. No rales, No rhonchi, No wheezing. No tenderness to palpation  Gastrointestinal:  Soft, Non-tender, + BS  Neurologic: Non-focal, A&Ox3  Skin: Warm and dry, No rashes, No ecchymosis, No cyanosis  Extremities: No clubbing, No cyanosis, No edema  Vascular: Peripheral pulses palpable 2+ bilaterally  Psychiatry: Mood & affect appropriate    LABORATORY VALUES	                      9.3    9.44  )-----------( 269      ( 12 Sep 2019 20:10 )             30.4   09-12  141  |  105  |  11  ----------------------------<  117<H>  4.8   |  23  |  0.69    Ca    9.6      12 Sep 2019 20:10  TPro  7.7  /  Alb  4.2  /  TBili  0.7  /  DBili  x   /  AST  19  /  ALT  18  /  AlkPhos  61  09-12  LIVER FUNCTIONS - ( 12 Sep 2019 20:10 )  Alb: 4.2 g/dL / Pro: 7.7 g/dL / ALK PHOS: 61 u/L / ALT: 18 u/L / AST: 19 u/L / GGT: x         Prothrombin Time, Plasma: 25.8 SEC (09-12 @ 20:10)    CARDIAC MARKERS:  Troponin T, High Sensitivity: 15 ng/L (09-12-19 @ 21:50)  Troponin T, High Sensitivity: 16 ng/L (09-12-19 @ 20:10)  Hemoglobin A1C, Whole Blood: 6.0 % (06-17 @ 16:42)  POCT Blood Glucose.: 167 mg/dL (12 Sep 2019 18:50)    TELEMETRY: 	    ECG:  	  RADIOLOGY:  OTHER: 	    PREVIOUS DIAGNOSTIC TESTING:    [ ] Echocardiogram: < from: Transthoracic Echocardiogram (09.11.19 @ 07:55) > Doming and thickening of the tips of the mitral valve consistent with rheumatic mitral valve disease. Mild mitral regurgitation.  Mean transmitral valve gradient equals 14 mm Hg, consistent with severe mitral stenosis. Normal left ventricular internal dimensions and wall thicknesses. Endocardium not well visualized; grossly normal left ventricular systolic function. The right ventricle is not well visualized; grossly normal right ventricular systolic function. Compared with echocardiogram of 6/3/2016, the mean transmitral gradient has increased. Consider BUBBA for further evaluation of the mitral valve, if clinically indicated.    [ ] Catheterization: < from: Cardiac Cath Lab - Adult (06.30.17 @ 15:44) > CORONARY VESSELS: The coronary circulation is rightdominant. LM:   --  LM: Normal. LAD:   --  LAD: The vessel was normal sized. Angiography showed minor luminal irregularities with no flow limiting lesions. CX:   --  Circumflex: The vessel was medium sized. Angiography showed no evidence of disease. --  OM1: Normal. RCA:   --  RCA: Normal. --  RPDA: Normal. --  RPLS: Normal.    [ ] Stress Test:  	    ASSESSMENT AND PLAN      PLAN  	      # [id number] HISTORY OF PRESENT ILLNESS:    Outpatient Cardiologist: Dr Claudia Goldman    Patient is a 64y old  Female who presents with a chief complaint of palpitations   HPI: 64 year old female with PMH of HTN, TIA, DM, Moderate mitral stenosis, permanent atrial fibrillation, on anticoagulation with Coumadin presents to ED for evaluation of palpitations for a while, more pronounced for few days. Per patient, over last several months she has been progressively been getting more SOB. She was seen at cardiology yesterday who increased her Metoprolol dose from 25 mg BID to 50 mg BID as HR in clinic was 100s. She has been compliant since the change. She states she went for her usual walk this morning, but had to stop due to the palpitations. She had an echocardiogram recently and patient said that she may need a mitral valve replacement.   In ER, patient in Afib between . Patient received Metoprolol 5mg IVP x 2 and Metoprolol 50 mg PO x 1.     Allergies  No Known Allergies  	  MEDICATIONS  Aspir-Low 81 MG Oral Tablet Delayed Release; TAKE 1 TABLET DAILY  Atorvastatin Calcium 40 MG Oral Tablet; TAKE 1 TABLET BY MOUTH EVERY DAY AT BEDTIME  Gabapentin 100 MG Oral Capsule; TAKE 1 CAPSULE 3 TIMES DAILY  metformin HCl - 1000 MG Oral Tablet; TAKE 1 TABLET TWICE DAILY WITH MEALS  Metoprolol Tartrate 25 MG Oral Tablet; TAKE 1 TABLET TWICE DAILY  Multivitamin Adults Oral Tablet; TAKE 1 TABLET DAILY  Warfarin Sodium 4 MG Oral Tablet; Take as directed  Warfarin Sodium 6 MG Oral Tablet; PLease take coumadin 6mg Sunday and thursday  PAST MEDICAL & SURGICAL HISTORY:  TIA (transient ischemic attack): 2013- Rt facial droop which resolved after 2 hours  Mitral stenosis  Atrial fibrillation: h/o Cardioversion in 2013  DM (diabetes mellitus)  HLD (hyperlipidemia)  HTN (hypertension)  HTN (hypertension)  S/P laser cataract surgery, unspecified laterality: ???  S/P knee surgery  Ventral hernia    FAMILY HISTORY:  No pertinent family history in first degree relatives    SOCIAL HISTORY  Marital Status:    Occupation: none   Lives with:     SUBSTANCE USE  Tobacco Usage:  (x ) never smoked   ( ) former smoker  ( ) current smoker; Packs per day:   Alcohol Usage: (x ) none  ( ) occasional ( ) 2-3 times a week ( ) daily; Last drink:   Recreational drugs (x ) None    REVIEW OF SYSTEMS:  CONSTITUTIONAL: No fevers, No chills, No fatigue, No weight gain  EYES: No vision changes   ENT: No congestion, No ear pain, No sore throat.  NECK: No pain, No stiffness  RESPIRATORY: + shortness of breath, No cough, No wheezing, No hemoptysis  CARDIOVASCULAR: No chest pain. + palpitations, No ANDERSON, No orthopnea, No paroxysmal nocturnal dyspnea, No pleuritic pain  GASTROINTESTINAL: No abdominal pain, No nausea, No vomiting, No hematemesis, No diarrhea No constipation. No melena  GENITOURINARY: No dysuria, No frequency, No incontinence, No hematuria  NEUROLOGICAL: No dizziness, No lightheadedness, No syncope, No LOC, No headache, No numbness or weakness  EXTREMITIES: No Edema, No joint pain, No joint swelling.  PSYCHIATRIC: No anxiety, No depression  SKIN: No diaphoresis. No itching, No rashes, No pressure ulcers  HEME/LYMPH: No easy bruising, or bleeding gums  ALLERY AND IMMUNOLOGIC: No hives or eczema    All other review of systems is negative unless indicated above.  VITAL SIGNS  T(C): 36.7 (09-12-19 @ 20:09), Max: 37.2 (09-12-19 @ 18:47)  HR: 101 (09-12-19 @ 21:08) (99 - 112)  BP: 125/98 (09-12-19 @ 21:08) (116/78 - 138/97)  RR: 18 (09-12-19 @ 21:08) (18 - 20)  SpO2: 97% (09-12-19 @ 21:08) (97% - 98%)  Wt(kg): --    PHYSICAL EXAM:  Appearance: NAD, no distress  HEENT:   Normal oral mucosa, PERRL, EOMI  Cardiovascular: + Tachycardia, + Irregular rate and rhythm, Normal S1 S2, No JVD, No murmurs  Respiratory: Lungs clear to auscultation. No rales, No rhonchi, No wheezing. No tenderness to palpation  Gastrointestinal:  Soft, Non-tender, + BS  Neurologic: Non-focal, A&Ox3  Skin: Warm and dry, No rashes, No ecchymosis, No cyanosis  Extremities: No clubbing, No cyanosis, No edema  Vascular: Peripheral pulses palpable 2+ bilaterally  Psychiatry: Mood & affect appropriate    LABORATORY VALUES	                      9.3    9.44  )-----------( 269      ( 12 Sep 2019 20:10 )             30.4   09-12  141  |  105  |  11  ----------------------------<  117<H>  4.8   |  23  |  0.69    Ca    9.6      12 Sep 2019 20:10  TPro  7.7  /  Alb  4.2  /  TBili  0.7  /  DBili  x   /  AST  19  /  ALT  18  /  AlkPhos  61  09-12  LIVER FUNCTIONS - ( 12 Sep 2019 20:10 )  Alb: 4.2 g/dL / Pro: 7.7 g/dL / ALK PHOS: 61 u/L / ALT: 18 u/L / AST: 19 u/L / GGT: x         Prothrombin Time, Plasma: 25.8 SEC (09-12 @ 20:10)    CARDIAC MARKERS:  Troponin T, High Sensitivity: 15 ng/L (09-12-19 @ 21:50)  Troponin T, High Sensitivity: 16 ng/L (09-12-19 @ 20:10)  Hemoglobin A1C, Whole Blood: 6.0 % (06-17 @ 16:42)  POCT Blood Glucose.: 167 mg/dL (12 Sep 2019 18:50)    TELEMETRY: 	    ECG:  	  RADIOLOGY:  OTHER: 	    PREVIOUS DIAGNOSTIC TESTING:    [ ] Echocardiogram: < from: Transthoracic Echocardiogram (09.11.19 @ 07:55) > Doming and thickening of the tips of the mitral valve consistent with rheumatic mitral valve disease. Mild mitral regurgitation.  Mean transmitral valve gradient equals 14 mm Hg, consistent with severe mitral stenosis. Normal left ventricular internal dimensions and wall thicknesses. Endocardium not well visualized; grossly normal left ventricular systolic function. The right ventricle is not well visualized; grossly normal right ventricular systolic function. Compared with echocardiogram of 6/3/2016, the mean transmitral gradient has increased. Consider BUBBA for further evaluation of the mitral valve, if clinically indicated.    [ ] Catheterization: < from: Cardiac Cath Lab - Adult (06.30.17 @ 15:44) > CORONARY VESSELS: The coronary circulation is rightdominant. LM:   --  LM: Normal. LAD:   --  LAD: The vessel was normal sized. Angiography showed minor luminal irregularities with no flow limiting lesions. CX:   --  Circumflex: The vessel was medium sized. Angiography showed no evidence of disease. --  OM1: Normal. RCA:   --  RCA: Normal. --  RPDA: Normal. --  RPLS: Normal.    [ ] Stress Test:  	    ASSESSMENT AND PLAN  64 year old female with PMH of HTN, TIA, DM, Moderate mitral stenosis, permanent atrial fibrillation, on anticoagulation with Coumadin presents to ED for evaluation of palpitations, found to be in in Afib between 80-120s.     PLAN  Patient received Metoprolol 5mg IVP x 2 and Metoprolol 50 mg PO x 1. 	  Has h/o Afib and is on coumadin at home.   Admit to Medicine service on telemetry. House cardiology will follow  Continuous tele monitoring  Rate control: C/w home dose of Metoprolol 50 PO BID  Patient on Coumadin at home. Dose Coumadin per INR.  Monitor lytes and replete as needed.  Consider BUBBA for evaluation of mitral valve repair or replacement     # [id number]

## 2019-09-12 NOTE — ED PROVIDER NOTE - ATTENDING CONTRIBUTION TO CARE
I have reviewed and discussed the medical student’s documentation and findings with the student. After personally examining the patient, my findings have been added to this documentation.   64 year old female with a pmhx of  HTN, a-fib, on AC, mitral stenosis, TIA, DM, presents to ED for evaluation of palpitations x1 day. Per patient, she has been feeling increased sob with walking for the past 6 months. She was seen at cardiology yesterday who increased her Metoprolol dose from 25 mg BID to 50 mg BID. She has been compliant since the change. She states she went for her usual walk this morning, but had to stop due to the palpitations. She had an echocardiogram recently and patient said that she may need a valve replacement, but does not remember which one. She denies any headache, dizziness, chest pain, sob, abd pain, or leg swelling. On exam: awake alert in no distress, HR 130s a flutter with variable block, otherwise VSS lungs, heart, pulses, abdomen, neuro, extremities, skin, all normal on exam. IMP: Increased palpitations now with a flutter with RVR. Plan: Labs IVF PO and IV metoprolol, will consult cardiology- known to Dr Claudia Goldman

## 2019-09-12 NOTE — ED PROVIDER NOTE - CLINICAL SUMMARY MEDICAL DECISION MAKING FREE TEXT BOX
64 year old female with a pmhx of HTN, DM, a-fib, TIA, presenting with palpitations x1 day. Patient had Metoprolol dose increased yesterday from 25 mg BID to 50 mg BID by cardiology. Will obtain CBC, CMP, trponin, coags. Pt tachycardic in ED, will treat with Lopressor                  IV. 64 year old female with a pmhx of HTN, DM, a-fib, TIA, presenting with palpitations x1 day. Noted to be in A flutter with variable bloch VR 130s to 150. Patient had Metoprolol dose increased yesterday from 25 mg BID to 50 mg BID by cardiology. Will obtain CBC, CMP, trponin, coags. Pt tachycardic in ED, will treat with Lopressor

## 2019-09-12 NOTE — ED PROVIDER NOTE - OBJECTIVE STATEMENT
64 year old female with a pmhx of  HTN, a-fib, TIA, DM, presents to ED for evaluation of palpitations x1 day. Per patient, she has been feeling increased sob with walking for the past 6 months. She was seen at cardiology yesterday who increased her Metoprolol dose from 25 mg BID to 50 mg BID. She has been compliant since the change. She states she went for her usual walk this morning, but had to stop due to the palpitations. She had an echocardiogram recently and patient said that she may need a valve replacement, but does not remember which one. She denies any headache, dizziness, chest pain, sob, abd pain, or leg swelling. 64 year old female with a pmhx of  HTN, a-fib, on AC, mitral stenosis, TIA, DM, presents to ED for evaluation of palpitations x1 day. Per patient, she has been feeling increased sob with walking for the past 6 months. She was seen at cardiology yesterday who increased her Metoprolol dose from 25 mg BID to 50 mg BID. She has been compliant since the change. She states she went for her usual walk this morning, but had to stop due to the palpitations. She had an echocardiogram recently and patient said that she may need a valve replacement, but does not remember which one. She denies any headache, dizziness, chest pain, sob, abd pain, or leg swelling.

## 2019-09-13 DIAGNOSIS — E78.5 HYPERLIPIDEMIA, UNSPECIFIED: ICD-10-CM

## 2019-09-13 DIAGNOSIS — D64.9 ANEMIA, UNSPECIFIED: ICD-10-CM

## 2019-09-13 DIAGNOSIS — Z29.9 ENCOUNTER FOR PROPHYLACTIC MEASURES, UNSPECIFIED: ICD-10-CM

## 2019-09-13 DIAGNOSIS — E11.65 TYPE 2 DIABETES MELLITUS WITH HYPERGLYCEMIA: ICD-10-CM

## 2019-09-13 DIAGNOSIS — I10 ESSENTIAL (PRIMARY) HYPERTENSION: ICD-10-CM

## 2019-09-13 DIAGNOSIS — I05.0 RHEUMATIC MITRAL STENOSIS: ICD-10-CM

## 2019-09-13 DIAGNOSIS — I48.92 UNSPECIFIED ATRIAL FLUTTER: ICD-10-CM

## 2019-09-13 LAB
ANION GAP SERPL CALC-SCNC: 13 MMO/L — SIGNIFICANT CHANGE UP (ref 7–14)
APPEARANCE UR: CLEAR — SIGNIFICANT CHANGE UP
APTT BLD: 40.1 SEC — HIGH (ref 27.5–36.3)
BILIRUB UR-MCNC: NEGATIVE — SIGNIFICANT CHANGE UP
BLOOD UR QL VISUAL: NEGATIVE — SIGNIFICANT CHANGE UP
BUN SERPL-MCNC: 10 MG/DL — SIGNIFICANT CHANGE UP (ref 7–23)
CALCIUM SERPL-MCNC: 9.2 MG/DL — SIGNIFICANT CHANGE UP (ref 8.4–10.5)
CHLORIDE SERPL-SCNC: 106 MMOL/L — SIGNIFICANT CHANGE UP (ref 98–107)
CHOLEST SERPL-MCNC: 108 MG/DL — LOW (ref 120–199)
CO2 SERPL-SCNC: 22 MMOL/L — SIGNIFICANT CHANGE UP (ref 22–31)
COLOR SPEC: SIGNIFICANT CHANGE UP
CREAT SERPL-MCNC: 0.6 MG/DL — SIGNIFICANT CHANGE UP (ref 0.5–1.3)
GLUCOSE BLDC GLUCOMTR-MCNC: 142 MG/DL — HIGH (ref 70–99)
GLUCOSE BLDC GLUCOMTR-MCNC: 147 MG/DL — HIGH (ref 70–99)
GLUCOSE SERPL-MCNC: 127 MG/DL — HIGH (ref 70–99)
GLUCOSE UR-MCNC: NEGATIVE — SIGNIFICANT CHANGE UP
HBA1C BLD-MCNC: 6.3 % — HIGH (ref 4–5.6)
HCT VFR BLD CALC: 30.4 % — LOW (ref 34.5–45)
HDLC SERPL-MCNC: 42 MG/DL — LOW (ref 45–65)
HGB BLD-MCNC: 9.1 G/DL — LOW (ref 11.5–15.5)
INR BLD: 2.43 — HIGH (ref 0.88–1.17)
KETONES UR-MCNC: NEGATIVE — SIGNIFICANT CHANGE UP
LEUKOCYTE ESTERASE UR-ACNC: NEGATIVE — SIGNIFICANT CHANGE UP
LIPID PNL WITH DIRECT LDL SERPL: 58 MG/DL — SIGNIFICANT CHANGE UP
MAGNESIUM SERPL-MCNC: 1.7 MG/DL — SIGNIFICANT CHANGE UP (ref 1.6–2.6)
MCHC RBC-ENTMCNC: 26.3 PG — LOW (ref 27–34)
MCHC RBC-ENTMCNC: 29.9 % — LOW (ref 32–36)
MCV RBC AUTO: 87.9 FL — SIGNIFICANT CHANGE UP (ref 80–100)
NITRITE UR-MCNC: NEGATIVE — SIGNIFICANT CHANGE UP
NRBC # FLD: 0 K/UL — SIGNIFICANT CHANGE UP (ref 0–0)
NT-PROBNP SERPL-SCNC: 4293 PG/ML — SIGNIFICANT CHANGE UP
PH UR: 6.5 — SIGNIFICANT CHANGE UP (ref 5–8)
PHOSPHATE SERPL-MCNC: 3.3 MG/DL — SIGNIFICANT CHANGE UP (ref 2.5–4.5)
PLATELET # BLD AUTO: 262 K/UL — SIGNIFICANT CHANGE UP (ref 150–400)
PMV BLD: 10.7 FL — SIGNIFICANT CHANGE UP (ref 7–13)
POTASSIUM SERPL-MCNC: 4.2 MMOL/L — SIGNIFICANT CHANGE UP (ref 3.5–5.3)
POTASSIUM SERPL-SCNC: 4.2 MMOL/L — SIGNIFICANT CHANGE UP (ref 3.5–5.3)
PROT UR-MCNC: NEGATIVE — SIGNIFICANT CHANGE UP
PROTHROM AB SERPL-ACNC: 28.5 SEC — HIGH (ref 9.8–13.1)
RBC # BLD: 3.46 M/UL — LOW (ref 3.8–5.2)
RBC # FLD: 16.7 % — HIGH (ref 10.3–14.5)
SODIUM SERPL-SCNC: 141 MMOL/L — SIGNIFICANT CHANGE UP (ref 135–145)
SP GR SPEC: 1.01 — SIGNIFICANT CHANGE UP (ref 1–1.04)
TRIGL SERPL-MCNC: 69 MG/DL — SIGNIFICANT CHANGE UP (ref 10–149)
TSH SERPL-MCNC: 1.11 UIU/ML — SIGNIFICANT CHANGE UP (ref 0.27–4.2)
UROBILINOGEN FLD QL: NORMAL — SIGNIFICANT CHANGE UP
WBC # BLD: 9.91 K/UL — SIGNIFICANT CHANGE UP (ref 3.8–10.5)
WBC # FLD AUTO: 9.91 K/UL — SIGNIFICANT CHANGE UP (ref 3.8–10.5)

## 2019-09-13 PROCEDURE — 71045 X-RAY EXAM CHEST 1 VIEW: CPT | Mod: 26

## 2019-09-13 PROCEDURE — 99223 1ST HOSP IP/OBS HIGH 75: CPT

## 2019-09-13 RX ORDER — DEXTROSE 50 % IN WATER 50 %
12.5 SYRINGE (ML) INTRAVENOUS ONCE
Refills: 0 | Status: DISCONTINUED | OUTPATIENT
Start: 2019-09-13 | End: 2019-09-18

## 2019-09-13 RX ORDER — WARFARIN SODIUM 2.5 MG/1
4 TABLET ORAL ONCE
Refills: 0 | Status: COMPLETED | OUTPATIENT
Start: 2019-09-13 | End: 2019-09-13

## 2019-09-13 RX ORDER — DEXTROSE 50 % IN WATER 50 %
25 SYRINGE (ML) INTRAVENOUS ONCE
Refills: 0 | Status: DISCONTINUED | OUTPATIENT
Start: 2019-09-13 | End: 2019-09-18

## 2019-09-13 RX ORDER — DEXTROSE 50 % IN WATER 50 %
15 SYRINGE (ML) INTRAVENOUS ONCE
Refills: 0 | Status: DISCONTINUED | OUTPATIENT
Start: 2019-09-13 | End: 2019-09-18

## 2019-09-13 RX ORDER — INSULIN LISPRO 100/ML
VIAL (ML) SUBCUTANEOUS AT BEDTIME
Refills: 0 | Status: DISCONTINUED | OUTPATIENT
Start: 2019-09-13 | End: 2019-09-18

## 2019-09-13 RX ORDER — INSULIN LISPRO 100/ML
VIAL (ML) SUBCUTANEOUS
Refills: 0 | Status: DISCONTINUED | OUTPATIENT
Start: 2019-09-13 | End: 2019-09-18

## 2019-09-13 RX ORDER — ATORVASTATIN CALCIUM 80 MG/1
40 TABLET, FILM COATED ORAL AT BEDTIME
Refills: 0 | Status: DISCONTINUED | OUTPATIENT
Start: 2019-09-13 | End: 2019-09-18

## 2019-09-13 RX ORDER — WARFARIN SODIUM 2.5 MG/1
1 TABLET ORAL
Qty: 0 | Refills: 0 | DISCHARGE

## 2019-09-13 RX ORDER — ASPIRIN/CALCIUM CARB/MAGNESIUM 324 MG
81 TABLET ORAL DAILY
Refills: 0 | Status: DISCONTINUED | OUTPATIENT
Start: 2019-09-13 | End: 2019-09-18

## 2019-09-13 RX ORDER — METOPROLOL TARTRATE 50 MG
50 TABLET ORAL
Refills: 0 | Status: DISCONTINUED | OUTPATIENT
Start: 2019-09-13 | End: 2019-09-16

## 2019-09-13 RX ORDER — GLUCAGON INJECTION, SOLUTION 0.5 MG/.1ML
1 INJECTION, SOLUTION SUBCUTANEOUS ONCE
Refills: 0 | Status: DISCONTINUED | OUTPATIENT
Start: 2019-09-13 | End: 2019-09-18

## 2019-09-13 RX ORDER — ROSUVASTATIN CALCIUM 5 MG/1
1 TABLET ORAL
Qty: 0 | Refills: 0 | DISCHARGE

## 2019-09-13 RX ORDER — SODIUM CHLORIDE 9 MG/ML
1000 INJECTION, SOLUTION INTRAVENOUS
Refills: 0 | Status: DISCONTINUED | OUTPATIENT
Start: 2019-09-13 | End: 2019-09-18

## 2019-09-13 RX ADMIN — Medication 50 MILLIGRAM(S): at 07:04

## 2019-09-13 RX ADMIN — Medication 81 MILLIGRAM(S): at 13:11

## 2019-09-13 RX ADMIN — Medication 1 TABLET(S): at 13:11

## 2019-09-13 RX ADMIN — Medication 50 MILLIGRAM(S): at 17:52

## 2019-09-13 RX ADMIN — WARFARIN SODIUM 4 MILLIGRAM(S): 2.5 TABLET ORAL at 17:52

## 2019-09-13 RX ADMIN — ATORVASTATIN CALCIUM 40 MILLIGRAM(S): 80 TABLET, FILM COATED ORAL at 21:59

## 2019-09-13 NOTE — CHART NOTE - NSCHARTNOTEFT_GEN_A_CORE
65 y/o F with Afib(on Coumadin), TIA, DM type II, HLD, HTN presented with the complaint of ANDERSON and palpitations. EKG Aflutter. TTE previously doming and thickening of the tips of the mitral valve consistent with rheumatic mitral valve disease. Mild mitral regurgitation.  Mean transmitral valve gradient equals 14mm Hg, consistent with severe mitral stenosis. case d/w cards fellow EP c/s. NPO for now.

## 2019-09-13 NOTE — H&P ADULT - NEGATIVE OPHTHALMOLOGIC SYMPTOMS
no diplopia/no blurred vision R/no discharge R/no blurred vision L/no discharge L/no lacrimation L/no lacrimation R/no photophobia

## 2019-09-13 NOTE — H&P ADULT - GASTROINTESTINAL DETAILS
bowel sounds normal/normal/no distention/no rebound tenderness/no rigidity/soft/nontender/no guarding

## 2019-09-13 NOTE — H&P ADULT - NSICDXPASTMEDICALHX_GEN_ALL_CORE_FT
PAST MEDICAL HISTORY:  Atrial fibrillation h/o Cardioversion in 2013 and on Coumadin    DM (diabetes mellitus) Type II    HLD (hyperlipidemia)     HTN (hypertension)     Mitral stenosis     TIA (transient ischemic attack) 2013- Rt facial droop which resolved after 2 hours

## 2019-09-13 NOTE — H&P ADULT - NEGATIVE GASTROINTESTINAL SYMPTOMS
no vomiting/no constipation/no hematochezia/no steatorrhea/no diarrhea/no nausea/no abdominal pain/no melena/no change in bowel habits

## 2019-09-13 NOTE — H&P ADULT - PROBLEM SELECTOR PLAN 2
H&H of 9.3/30.4 and baseline H&H of 9.8-10.2. Patient denied any over GI/ bleeding   Will monitor for now

## 2019-09-13 NOTE — CONSULT NOTE ADULT - ASSESSMENT
Patient speaks Nadine.   # [id number]  HPI: 64 year old female with PMH of HTN, CVA x2, diabetes T2, rheumatic heart disease, mitral stenosis, atrial fibrillation, on Coumadin who presents with symptomatic atrial flutter with variable block in the setting of severe mitral stenosis.   Plan:  Continue metoprolol 50mg bid           Continue Coumadin as per INR.  Keep INR < 4,0 for BUBBA on Monday.           BUBBA on Monday.  If no thrombus, will do DCCV and start Amiodarone load to help maintain sinus rhythm.            NPO after midnight on Sunday.

## 2019-09-13 NOTE — H&P ADULT - HISTORY OF PRESENT ILLNESS
63 y/o F with Afib(on Coumadin), TIA, DM type II, HLD, HTN presented with the complaint of ANDERSON and palpitations. As per the patient she has been having intermittent ANDERSON and SOB for the past 1 month but it worsened the past few days. Patient stated that she is able to walk multiple blocks but would have to stop multiple times to catch her breath. Patient stated that the past few days her SOB had worsened which prompted her son to bring her to the ER. Patient also endorsed of intermittent palpitations and was seen by her cardiologist 2 days ago and her Metoprolol dose was increased from 25mg to 50mg BID. Patient denied any CP, fevers, chills, N/V/D/C, abdominal pain, dysuria, melena, hematochezia, recent travel, sick contact, pleuritic or positional chest pain.     On ED admission EKG revealed Atrial flutter with variable AV block with PVC at a rate of 129 with QTc of 480, CE x 1: Trop: 16, CE x 2: Trop: 15, H&H: 9.3/30.4, Gluc: 114. In the ED patient was seen by house cardiology and their recommendations noted. Patient was given IV and PO Metoprolol for better rate control.

## 2019-09-13 NOTE — H&P ADULT - RS GEN PE MLT RESP DETAILS PC
no intercostal retractions/no rales/no wheezes/normal/airway patent/good air movement/respirations non-labored/no chest wall tenderness/clear to auscultation bilaterally/breath sounds equal/no rhonchi

## 2019-09-13 NOTE — H&P ADULT - PROBLEM SELECTOR PLAN 1
CHADS2-VASc Score of 5 and patient on Coumadin for anticoagulation. INR noted to be 2.27 and patient took Coumadin at home prior to coming to the ER. Patient s/p IV and PO Metoprolol in the ED with better rate control. Will check INR with morning labs    Will monitor on telemetry, serial EKG and Codie prn for any episodes of palpitations or SOB  HgbA1C, TSH, lipid profile, CBC, CMP in am   Patient had a TTE done 2 days ago which revealed severe MS. Consider BUBBA in am   House cardiology following and their recommendations noted   Continue with Metoprolol 50mg BID for better rate control  Consider EP consult in am

## 2019-09-13 NOTE — H&P ADULT - PROBLEM SELECTOR PLAN 3
TTE from 9/11 revealed "doming and thickening of the tips of the mitral valve  consistent with rheumatic mitral valve disease. Mild mitral regurgitation.  Mean transmitral valve gradient equals 14 mm Hg, consistent with severe mitral stenosis"  Consider BUBBA in am for better visualization of the MS for possible repair vs replacement

## 2019-09-13 NOTE — H&P ADULT - NSICDXPASTSURGICALHX_GEN_ALL_CORE_FT
PAST SURGICAL HISTORY:  S/P knee surgery     S/P laser cataract surgery, unspecified laterality ???    Ventral hernia

## 2019-09-13 NOTE — H&P ADULT - NEGATIVE NEUROLOGICAL SYMPTOMS
no transient paralysis/no weakness/no generalized seizures/no vertigo/no hemiparesis/no headache/no focal seizures/no confusion/no loss of sensation/no paresthesias/no difficulty walking/no loss of consciousness/no syncope/no tremors

## 2019-09-13 NOTE — H&P ADULT - NEGATIVE MUSCULOSKELETAL SYMPTOMS
no muscle cramps/no muscle weakness/no arthralgia/no arthritis/no joint swelling/no stiffness/no myalgia/no neck pain

## 2019-09-13 NOTE — H&P ADULT - NSHPLABSRESULTS_GEN_ALL_CORE
9.3    9.44  )-----------( 269      ( 12 Sep 2019 20:10 )             30.4     09-12    141  |  105  |  11  ----------------------------<  117<H>  4.8   |  23  |  0.69    Ca    9.6      12 Sep 2019 20:10    TPro  7.7  /  Alb  4.2  /  TBili  0.7  /  DBili  x   /  AST  19  /  ALT  18  /  AlkPhos  61  09-12    EKG: Atrial flutter with variable AV block with PVC at a rate of 129 with QTc of 480

## 2019-09-13 NOTE — H&P ADULT - ASSESSMENT
63 y/o F with Afib(on Coumadin), TIA, DM type II, HLD, HTN presented with the complaint of ANDERSON and palpitations. Admitted to telemetry for Atrial flutter with RVR     +Atrial flutter with RVR-s/p IV and PO Metoprolol in the ED

## 2019-09-13 NOTE — H&P ADULT - NEGATIVE ENMT SYMPTOMS
no vertigo/no nasal congestion/no tinnitus/no sinus symptoms/no nasal discharge/no hearing difficulty/no ear pain

## 2019-09-13 NOTE — CONSULT NOTE ADULT - SUBJECTIVE AND OBJECTIVE BOX
Patient speaks Nadine.   # [id number]  HPI: 64 year old female with PMH of HTN, CVA x2, diabetes T2, rheumatic heart disease,  mitral stenosis,  atrial fibrillation, on Coumadin who presents with worsening dyspnea on exertion and  palpitations for several days.  Patient complains of shortness of breath x several months but recently it has progressed. No chest pain or lightheadedness.  No syncope or near syncope.  She was seen by Dr. Goldman last week and after an echo was told the her mitral valve is severely stenosed and she should be evaluated for valve replacement.  Her metoprolol was increased from 25 mg BID to 50 mg BID for better rate control of the atrial fibrillation. She has been on anticoagulation for many years.  She is compliant with her medication. Her sons says she walks daily but the severity of the palpitations has forced her to quit. They are undecided about the valve replacement.   The EKG in the ED shows atrial flutter w/RVR 129bpm. She has received IV as well as po metoprolol as is currently better rate controlled.  She is less symptomatic.       PAST MEDICAL & SURGICAL HISTORY:  TIA (transient ischemic attack): 2013- Rt facial droop which resolved after 2 hours  Mitral stenosis  Atrial fibrillation: h/o Cardioversion in 2013 and on Coumadin  DM (diabetes mellitus): Type II  HLD (hyperlipidemia)  HTN (hypertension)  S/P laser cataract surgery, unspecified laterality: ???  S/P knee surgery  Ventral hernia      MEDICATIONS  (STANDING):  aspirin enteric coated 81 milliGRAM(s) Oral daily  atorvastatin 40 milliGRAM(s) Oral at bedtime  dextrose 5%. 1000 milliLiter(s) (50 mL/Hr) IV Continuous <Continuous>  dextrose 50% Injectable 12.5 Gram(s) IV Push once  dextrose 50% Injectable 25 Gram(s) IV Push once  dextrose 50% Injectable 25 Gram(s) IV Push once  insulin lispro (HumaLOG) corrective regimen sliding scale   SubCutaneous three times a day before meals  insulin lispro (HumaLOG) corrective regimen sliding scale   SubCutaneous at bedtime  metoprolol tartrate 50 milliGRAM(s) Oral two times a day  multivitamin 1 Tablet(s) Oral daily    MEDICATIONS  (PRN):  dextrose 40% Gel 15 Gram(s) Oral once PRN Blood Glucose LESS THAN 70 milliGRAM(s)/deciliter  glucagon  Injectable 1 milliGRAM(s) IntraMuscular once PRN Glucose LESS THAN 70 milligrams/deciliter      FAMILY HISTORY:  No pertinent family history in first degree relatives      SOCIAL HISTORY:  Lives with her family    CIGARETTES: never  DRUGS:  never  ALCOHOL: never    REVIEW OF SYSTEMS:    CONSTITUTIONAL: No fever, weight loss, chills, shakes, or fatigue  EYES: No eye pain, visual disturbances, or discharge  ENMT:  No difficulty hearing, tinnitus, vertigo; No sinus or throat pain  NECK: No pain or stiffness  BREASTS: No pain, masses, or nipple discharge  RESPIRATORY: No cough, wheezing, hemoptysis  CARDIOVASCULAR: No chest pain, dizziness, syncope, paroxysmal nocturnal dyspnea, orthopnea, or arm or leg swelling  +palpitations and dyspnea on exertion and now at rest  GASTROINTESTINAL: No abdominal  or epigastric pain, nausea, vomiting, hematemesis, diarrhea, constipation, melena or bright red blood.  GENITOURINARY: No dysuria, nocturia, hematuria, or urinary incontinence  NEUROLOGICAL: No headaches, memory loss, slurred speech, limb weakness, loss of strength, numbness, or tremors  SKIN: No itching, burning, rashes, or lesions   LYMPH NODES: No enlarged glands  ENDOCRINE: No heat or cold intolerance, or hair loss  MUSCULOSKELETAL: No joint pain or swelling, muscle, back, or extremity pain  PSYCHIATRIC: No depression, anxiety, or difficulty sleeping  HEME/LYMPH: No easy bruising or bleeding gums  ALLERGY AND IMMUNOLOGIC: No hives or rash.      Vital Signs Last 24 Hrs  T(C): 36.9 (13 Sep 2019 17:59), Max: 36.9 (13 Sep 2019 17:59)  T(F): 98.4 (13 Sep 2019 17:59), Max: 98.4 (13 Sep 2019 17:59)  HR: 87 (13 Sep 2019 17:59) (80 - 112)  BP: 148/98 (13 Sep 2019 17:59) (121/85 - 148/98)  BP(mean): --  RR: 18 (13 Sep 2019 17:59) (18 - 20)  SpO2: 98% (13 Sep 2019 17:59) (97% - 100%)    PHYSICAL EXAM:    GENERAL: In no apparent distress, well nourished, and hydrated.   HEAD:  Atraumatic, Normocephalic  EYES: EOMI, PERRLA, conjunctiva and sclera clear  ENMT: No tonsillar erythema, exudates, or enlargement; Moist mucous membranes, Good dentition,  NECK: Supple and normal thyroid.  No JVD or carotid bruit.   HEART: Irregular rhythm.  Regular rate. + murmur.  No gallops or rubs.  PULMONARY: Clear to auscultation and perfusion.  No rales, wheezing, or rhonchi bilaterally.  ABDOMEN: Soft, Nontender, Nondistended; Bowel sounds present  EXTREMITIES:  2+ Peripheral Pulses, No clubbing, cyanosis, or edema  LYMPH: No lymphadenopathy noted  NEUROLOGICAL: Grossly nonfocal          INTERPRETATION OF TELEMETRY: Atrial flutter rate controlled    ECG:  Atrial flutter with variable block.  Right axis.             LABS:                        9.1    9.91  )-----------( 262      ( 13 Sep 2019 07:00 )             30.4         141  |  106  |  10  ----------------------------<  127<H>  4.2   |  22  |  0.60    Ca    9.2      13 Sep 2019 07:00  Phos  3.3       Mg     1.7         TPro  7.7  /  Alb  4.2  /  TBili  0.7  /  DBili  x   /  AST  19  /  ALT  18  /  AlkPhos  61          PT/INR - ( 13 Sep 2019 07:00 )   PT: 28.5 SEC;   INR: 2.43          PTT - ( 13 Sep 2019 07:00 )  PTT:40.1 SEC  Urinalysis Basic - ( 13 Sep 2019 10:00 )    Color: LIGHT YELLOW / Appearance: CLEAR / S.012 / pH: 6.5  Gluc: NEGATIVE / Ketone: NEGATIVE  / Bili: NEGATIVE / Urobili: NORMAL   Blood: NEGATIVE / Protein: NEGATIVE / Nitrite: NEGATIVE   Leuk Esterase: NEGATIVE / RBC: x / WBC x   Sq Epi: x / Non Sq Epi: x / Bacteria: x      BNPSerum : 4293 pg/mL ( @ 07:00)      RADIOLOGY & ADDITIONAL STUDIES:  PREVIOUS DIAGNOSTIC TESTING:    EXAM:  XR CHEST PORTABLE ROUTINE 1V        PROCEDURE DATE:  Sep 13 2019     INTERPRETATION:  CLINICAL INFORMATION: Atrial flutter..    TECHNIQUE: Frontal radiograph of the chest.    COMPARISON: No comparison available.    FINDINGS:  Bilateral linear opacities with lower lung field predominance. Cardiac   mediastinal silhouette is unchanged. No acute osseous pathology..    IMPRESSION:   Unchanged Pulmonary edema..    PAVLOPAUL MISHYN M.D., RADIOLOGY RESIDENT  This document has been electronically signed.  RAMON DUNCAN M.D. ATTENDING RADIOLOGIST  This document has been electronically signed. Sep 13 2019  4:20PM          ECHO  FINDINGS:  Patient name: Russell Portillo  YOB: 1955   Age: 64 (F)   MR#: [id number]  Study Date: 2019  Location: O/PSonographer: Lauren Finkelstein, ASHTYN  Study quality: Technically Difficult  Referring Physician: Claudia Goldman MD  Blood Pressure: 108/61 mmHg  Height: 157 cm  Weight: 67 kg  BSA: 1.7 m2  ------------------------------------------------------------------------  PROCEDURE: Transthoracic echocardiogram with 2-D, M-Mode  and complete spectral and color flow Doppler.  INDICATION: Rheumatic mitral stenosis (I05.0), Unspecified  atrial fibrillation (I48.91)  ------------------------------------------------------------------------  DIMENSIONS:  Dimensions:     Normal Values:  LA:     3.9 cm    2.0 - 4.0 cm  Ao:     2.7 cm    2.0 - 3.8 cm  SEPTUM: 0.7 cm    0.6 - 1.2 cm  PWT:    0.6 cm    0.6 - 1.1 cm  LVIDd:  4.0 cm    3.0 - 5.6 cm  LVIDs:  2.7 cm    1.8 - 4.0 cm  Derived Variables:  LVMI: 42 g/m2  RWT: 0.30  Fractional short: 33 %  Ejection Fraction (Teicholtz): 61 %  ------------------------------------------------------------------------  OBSERVATIONS:  Mitral Valve: Doming and thickening of the tips of the  mitral valve consistent with rheumatic mitral valve  disease. Mild mitral regurgitation.  Mean transmitral valve  gradient equals 14 mm Hg, consistent with severe mitral  stenosis.  Aortic Root: Normal aortic root.  Aortic Valve: Calcified trileaflet aortic valve with normal  opening.  Left Atrium: Normal left atrium.  LA volume index = 27  cc/m2.  Left Ventricle: Endocardium not well visualized; grossly  normal left ventricular systolic function. Normal left  ventricular internal dimensions and wall thicknesses.  Right Heart: Normal right atrium. The right ventricle is  not well visualized; grossly normal right ventricular  systolic function. Normal tricuspid valve.  Minimal  tricuspid regurgitation. Normal pulmonic valve. Minimal  pulmonic regurgitation.  Pericardium/PleuraNormal pericardium with no pericardial  effusion.  ------------------------------------------------------------------------  CONCLUSIONS:  1. Doming and thickening of the tips of the mitral valve  consistent with rheumatic mitral valve disease. Mild mitral  regurgitation.  Mean transmitral valve gradient equals 14  mm Hg, consistent with severe mitral stenosis.  2. Normal left ventricular internal dimensions and wall  thicknesses.  3. Endocardium not well visualized; grossly normal left  ventricular systolic function.  4. The right ventricle is not well visualized; grossly  normal right ventricular systolic function.  *** Compared with echocardiogram of 6/3/2016, the mean  transmitral gradient has increased.  Consider BUBBA for further evaluation of the mitral valve, if  clinically indicated.  ------------------------------------------------------------------------  Confirmed on  2019 - 11:52:48 by Saeed Tilley M.D.  ------------------------------------------------------------------------          CATHETERIZATION  FINDINGS:  [facility]  [address] [place] [address]  [phone]  Cath Lab Report -- Comprehensive Report  Patient: RUSSELL PORTILLO  Study date: 2017  Account number: [id number]  MR number: [id number]  : 1955  Gender: Female  Race: [place]  Case Physician(s):  Claudia Goldman M.D.  Fellow:  Houston Manjarrez M.D.  Referring Physician:  Claudia Goldman M.D.  INDICATIONS: Stable angina - CCS2.  HISTORY: h/o CVA, AFIB ON COUMADIN. No history of previous myocardial  infarction. The patient has hypertension.  PROCEDURE:  --  Left heart catheterization.  --  Left coronary angiography.  --  Right coronary angiography.  TECHNIQUE: The risks and alternatives of the procedures and conscious sedation  were explained to the patient and informed consent was obtained. Cardiac  catheterization performed electively.  Local anesthetic given. Right radial artery access. Local anesthetic given. A 4  Fr. X 7cm Prelude Radial Kit was inserted in the vessel, utilizing the modified  Seldinger technique. Left heart catheterization. A 4 Fr JR 4.0 catheter was  utilized. After recording ascending aortic pressure, the catheter was advanced  across the aortic valve and left ventricular pressure was recorded. Left  coronary artery angiography. The vessel was injected utilizing a 4 Fr. JL 3.5  catheter and positioned in the vessel ostia under fluoroscopic guidance.  Angiography was performed in multiple projections using hand-injection of  contrast. Right coronary artery angiography. The vessel was injected utilizing  a 4 Fr. JL 3.5 catheter and positioned in the vessel ostia under fluoroscopic  guidance. Angiography was performed in multiple projections using  hand-injection of contrast. RADIATION EXPOSURE: 2 min.  CONTRAST GIVEN: 27 ml Optiray.  MEDICATIONS GIVEN: Fentanyl, 25 mcg, IV. 2% Lidocaine, 3 ml, subcutaneously.  Cardene, 400 mcg. 0.9% Normal saline, 40 ml, IV.  VENTRICLES: No left ventriculogram was performed.  CORONARY VESSELS: The coronary circulation is rightdominant.  LM:   --  LM: Normal.  LAD:   --  LAD: The vessel was normal sized. Angiography showed minor luminal  irregularities with no flow limiting lesions.  CX:   --  Circumflex: The vessel was medium sized. Angiography showed no  evidence of disease.  --  OM1: Normal.  RCA:   --  RCA: Normal.  --  RPDA: Normal.  --  RPLS: Normal.  COMPLICATIONS: No complications occurred during the cath lab visit.  DIAGNOSTIC IMPRESSIONS: There is mild irregularity of the coronary anatomy.  DIAGNOSTIC RECOMMENDATIONS: Aggressive risk factor modification  INTERVENTIONAL RECOMMENDATIONS: Aggressive risk factor modification  Prepared and signed by  Claudia Goldman M.D.  Signed 2017 15:17:59

## 2019-09-14 LAB
ANION GAP SERPL CALC-SCNC: 13 MMO/L — SIGNIFICANT CHANGE UP (ref 7–14)
BUN SERPL-MCNC: 10 MG/DL — SIGNIFICANT CHANGE UP (ref 7–23)
CALCIUM SERPL-MCNC: 9.2 MG/DL — SIGNIFICANT CHANGE UP (ref 8.4–10.5)
CHLORIDE SERPL-SCNC: 103 MMOL/L — SIGNIFICANT CHANGE UP (ref 98–107)
CO2 SERPL-SCNC: 24 MMOL/L — SIGNIFICANT CHANGE UP (ref 22–31)
CREAT SERPL-MCNC: 0.54 MG/DL — SIGNIFICANT CHANGE UP (ref 0.5–1.3)
GLUCOSE SERPL-MCNC: 127 MG/DL — HIGH (ref 70–99)
HCT VFR BLD CALC: 30.2 % — LOW (ref 34.5–45)
HGB BLD-MCNC: 9.5 G/DL — LOW (ref 11.5–15.5)
INR BLD: 3 — HIGH (ref 0.88–1.17)
MAGNESIUM SERPL-MCNC: 1.7 MG/DL — SIGNIFICANT CHANGE UP (ref 1.6–2.6)
MCHC RBC-ENTMCNC: 27 PG — SIGNIFICANT CHANGE UP (ref 27–34)
MCHC RBC-ENTMCNC: 31.5 % — LOW (ref 32–36)
MCV RBC AUTO: 85.8 FL — SIGNIFICANT CHANGE UP (ref 80–100)
NRBC # FLD: 0 K/UL — SIGNIFICANT CHANGE UP (ref 0–0)
PLATELET # BLD AUTO: 266 K/UL — SIGNIFICANT CHANGE UP (ref 150–400)
PMV BLD: 10.2 FL — SIGNIFICANT CHANGE UP (ref 7–13)
POTASSIUM SERPL-MCNC: 3.9 MMOL/L — SIGNIFICANT CHANGE UP (ref 3.5–5.3)
POTASSIUM SERPL-SCNC: 3.9 MMOL/L — SIGNIFICANT CHANGE UP (ref 3.5–5.3)
PROTHROM AB SERPL-ACNC: 35.4 SEC — HIGH (ref 9.8–13.1)
RBC # BLD: 3.52 M/UL — LOW (ref 3.8–5.2)
RBC # FLD: 16.6 % — HIGH (ref 10.3–14.5)
SODIUM SERPL-SCNC: 140 MMOL/L — SIGNIFICANT CHANGE UP (ref 135–145)
WBC # BLD: 8.6 K/UL — SIGNIFICANT CHANGE UP (ref 3.8–10.5)
WBC # FLD AUTO: 8.6 K/UL — SIGNIFICANT CHANGE UP (ref 3.8–10.5)

## 2019-09-14 PROCEDURE — 99233 SBSQ HOSP IP/OBS HIGH 50: CPT

## 2019-09-14 RX ADMIN — Medication 50 MILLIGRAM(S): at 18:16

## 2019-09-14 RX ADMIN — Medication 81 MILLIGRAM(S): at 11:15

## 2019-09-14 RX ADMIN — ATORVASTATIN CALCIUM 40 MILLIGRAM(S): 80 TABLET, FILM COATED ORAL at 21:57

## 2019-09-14 RX ADMIN — Medication 50 MILLIGRAM(S): at 05:06

## 2019-09-14 RX ADMIN — Medication 1: at 09:16

## 2019-09-14 RX ADMIN — Medication 1 TABLET(S): at 11:15

## 2019-09-14 NOTE — PROGRESS NOTE ADULT - ASSESSMENT
63 y/o F with Afib(on Coumadin), TIA, DM type II, HLD, HTN presented with the complaint of ANDERSON and palpitations. Admitted to telemetry for Atrial flutter with RVR

## 2019-09-15 LAB
ANION GAP SERPL CALC-SCNC: 12 MMO/L — SIGNIFICANT CHANGE UP (ref 7–14)
BACTERIA UR CULT: SIGNIFICANT CHANGE UP
BUN SERPL-MCNC: 10 MG/DL — SIGNIFICANT CHANGE UP (ref 7–23)
CALCIUM SERPL-MCNC: 8.9 MG/DL — SIGNIFICANT CHANGE UP (ref 8.4–10.5)
CHLORIDE SERPL-SCNC: 103 MMOL/L — SIGNIFICANT CHANGE UP (ref 98–107)
CO2 SERPL-SCNC: 25 MMOL/L — SIGNIFICANT CHANGE UP (ref 22–31)
CREAT SERPL-MCNC: 0.6 MG/DL — SIGNIFICANT CHANGE UP (ref 0.5–1.3)
GLUCOSE SERPL-MCNC: 121 MG/DL — HIGH (ref 70–99)
HCT VFR BLD CALC: 28.6 % — LOW (ref 34.5–45)
HGB BLD-MCNC: 8.9 G/DL — LOW (ref 11.5–15.5)
INR BLD: 2.62 — HIGH (ref 0.88–1.17)
MAGNESIUM SERPL-MCNC: 1.7 MG/DL — SIGNIFICANT CHANGE UP (ref 1.6–2.6)
MCHC RBC-ENTMCNC: 26.7 PG — LOW (ref 27–34)
MCHC RBC-ENTMCNC: 31.1 % — LOW (ref 32–36)
MCV RBC AUTO: 85.9 FL — SIGNIFICANT CHANGE UP (ref 80–100)
NRBC # FLD: 0 K/UL — SIGNIFICANT CHANGE UP (ref 0–0)
PHOSPHATE SERPL-MCNC: 3.6 MG/DL — SIGNIFICANT CHANGE UP (ref 2.5–4.5)
PLATELET # BLD AUTO: 247 K/UL — SIGNIFICANT CHANGE UP (ref 150–400)
PMV BLD: 10.3 FL — SIGNIFICANT CHANGE UP (ref 7–13)
POTASSIUM SERPL-MCNC: 3.6 MMOL/L — SIGNIFICANT CHANGE UP (ref 3.5–5.3)
POTASSIUM SERPL-SCNC: 3.6 MMOL/L — SIGNIFICANT CHANGE UP (ref 3.5–5.3)
PROTHROM AB SERPL-ACNC: 30.8 SEC — HIGH (ref 9.8–13.1)
RBC # BLD: 3.33 M/UL — LOW (ref 3.8–5.2)
RBC # FLD: 16.6 % — HIGH (ref 10.3–14.5)
SODIUM SERPL-SCNC: 140 MMOL/L — SIGNIFICANT CHANGE UP (ref 135–145)
SPECIMEN SOURCE: SIGNIFICANT CHANGE UP
WBC # BLD: 8.09 K/UL — SIGNIFICANT CHANGE UP (ref 3.8–10.5)
WBC # FLD AUTO: 8.09 K/UL — SIGNIFICANT CHANGE UP (ref 3.8–10.5)

## 2019-09-15 PROCEDURE — 99233 SBSQ HOSP IP/OBS HIGH 50: CPT

## 2019-09-15 RX ORDER — WARFARIN SODIUM 2.5 MG/1
3 TABLET ORAL ONCE
Refills: 0 | Status: COMPLETED | OUTPATIENT
Start: 2019-09-15 | End: 2019-09-15

## 2019-09-15 RX ADMIN — ATORVASTATIN CALCIUM 40 MILLIGRAM(S): 80 TABLET, FILM COATED ORAL at 22:19

## 2019-09-15 RX ADMIN — Medication 1 TABLET(S): at 17:10

## 2019-09-15 RX ADMIN — Medication 50 MILLIGRAM(S): at 17:10

## 2019-09-15 RX ADMIN — WARFARIN SODIUM 3 MILLIGRAM(S): 2.5 TABLET ORAL at 17:54

## 2019-09-15 RX ADMIN — Medication 81 MILLIGRAM(S): at 17:10

## 2019-09-15 RX ADMIN — Medication 50 MILLIGRAM(S): at 06:24

## 2019-09-15 NOTE — CHART NOTE - NSCHARTNOTEFT_GEN_A_CORE
64 year old female with PMH of HTN, CVA x2, diabetes T2, rheumatic heart disease, mitral stenosis, atrial fibrillation, on Coumadin who presents with symptomatic atrial flutter with variable block in the setting of severe mitral stenosis.     Plan:  - please keep NPO after MN, for possible BUBBA/ DCCV tomorrow AM

## 2019-09-15 NOTE — PROGRESS NOTE ADULT - ASSESSMENT
65 y/o F with Afib(on Coumadin), TIA, DM type II, HLD, HTN presented with the complaint of ANDERSON and palpitations. Admitted to telemetry for Atrial flutter with RVR

## 2019-09-16 LAB
ANION GAP SERPL CALC-SCNC: 13 MMO/L — SIGNIFICANT CHANGE UP (ref 7–14)
BUN SERPL-MCNC: 12 MG/DL — SIGNIFICANT CHANGE UP (ref 7–23)
CALCIUM SERPL-MCNC: 9.4 MG/DL — SIGNIFICANT CHANGE UP (ref 8.4–10.5)
CHLORIDE SERPL-SCNC: 103 MMOL/L — SIGNIFICANT CHANGE UP (ref 98–107)
CO2 SERPL-SCNC: 24 MMOL/L — SIGNIFICANT CHANGE UP (ref 22–31)
CREAT SERPL-MCNC: 0.63 MG/DL — SIGNIFICANT CHANGE UP (ref 0.5–1.3)
GLUCOSE SERPL-MCNC: 127 MG/DL — HIGH (ref 70–99)
HCT VFR BLD CALC: 32.1 % — LOW (ref 34.5–45)
HGB BLD-MCNC: 9.7 G/DL — LOW (ref 11.5–15.5)
INR BLD: 1.86 — HIGH (ref 0.88–1.17)
MAGNESIUM SERPL-MCNC: 1.9 MG/DL — SIGNIFICANT CHANGE UP (ref 1.6–2.6)
MCHC RBC-ENTMCNC: 26.4 PG — LOW (ref 27–34)
MCHC RBC-ENTMCNC: 30.2 % — LOW (ref 32–36)
MCV RBC AUTO: 87.2 FL — SIGNIFICANT CHANGE UP (ref 80–100)
NRBC # FLD: 0 K/UL — SIGNIFICANT CHANGE UP (ref 0–0)
PHOSPHATE SERPL-MCNC: 3.7 MG/DL — SIGNIFICANT CHANGE UP (ref 2.5–4.5)
PLATELET # BLD AUTO: 280 K/UL — SIGNIFICANT CHANGE UP (ref 150–400)
PMV BLD: 10.4 FL — SIGNIFICANT CHANGE UP (ref 7–13)
POTASSIUM SERPL-MCNC: 3.8 MMOL/L — SIGNIFICANT CHANGE UP (ref 3.5–5.3)
POTASSIUM SERPL-SCNC: 3.8 MMOL/L — SIGNIFICANT CHANGE UP (ref 3.5–5.3)
PROTHROM AB SERPL-ACNC: 21.1 SEC — HIGH (ref 9.8–13.1)
RBC # BLD: 3.68 M/UL — LOW (ref 3.8–5.2)
RBC # FLD: 16.3 % — HIGH (ref 10.3–14.5)
SODIUM SERPL-SCNC: 140 MMOL/L — SIGNIFICANT CHANGE UP (ref 135–145)
WBC # BLD: 10.75 K/UL — HIGH (ref 3.8–10.5)
WBC # FLD AUTO: 10.75 K/UL — HIGH (ref 3.8–10.5)

## 2019-09-16 PROCEDURE — 93325 DOPPLER ECHO COLOR FLOW MAPG: CPT | Mod: 26,GC

## 2019-09-16 PROCEDURE — 99232 SBSQ HOSP IP/OBS MODERATE 35: CPT

## 2019-09-16 PROCEDURE — 99233 SBSQ HOSP IP/OBS HIGH 50: CPT

## 2019-09-16 PROCEDURE — 93320 DOPPLER ECHO COMPLETE: CPT | Mod: 26,GC

## 2019-09-16 PROCEDURE — 93312 ECHO TRANSESOPHAGEAL: CPT | Mod: 26

## 2019-09-16 PROCEDURE — 76376 3D RENDER W/INTRP POSTPROCES: CPT | Mod: 26

## 2019-09-16 RX ORDER — WARFARIN SODIUM 2.5 MG/1
4 TABLET ORAL ONCE
Refills: 0 | Status: COMPLETED | OUTPATIENT
Start: 2019-09-16 | End: 2019-09-16

## 2019-09-16 RX ORDER — METOPROLOL TARTRATE 50 MG
75 TABLET ORAL
Refills: 0 | Status: DISCONTINUED | OUTPATIENT
Start: 2019-09-16 | End: 2019-09-18

## 2019-09-16 RX ORDER — ENOXAPARIN SODIUM 100 MG/ML
80 INJECTION SUBCUTANEOUS ONCE
Refills: 0 | Status: COMPLETED | OUTPATIENT
Start: 2019-09-16 | End: 2019-09-16

## 2019-09-16 RX ADMIN — Medication 81 MILLIGRAM(S): at 09:52

## 2019-09-16 RX ADMIN — Medication 50 MILLIGRAM(S): at 07:17

## 2019-09-16 RX ADMIN — ENOXAPARIN SODIUM 80 MILLIGRAM(S): 100 INJECTION SUBCUTANEOUS at 09:52

## 2019-09-16 RX ADMIN — WARFARIN SODIUM 4 MILLIGRAM(S): 2.5 TABLET ORAL at 17:28

## 2019-09-16 RX ADMIN — ATORVASTATIN CALCIUM 40 MILLIGRAM(S): 80 TABLET, FILM COATED ORAL at 22:29

## 2019-09-16 RX ADMIN — Medication 1 TABLET(S): at 09:53

## 2019-09-16 RX ADMIN — Medication 75 MILLIGRAM(S): at 17:28

## 2019-09-16 NOTE — PROGRESS NOTE ADULT - ASSESSMENT
64 year old female with PMH of HTN, TIA, DM, Moderate mitral stenosis, permanent atrial fibrillation, on anticoagulation with Coumadin presents to ED for evaluation of palpitations, found to be in in Afib between 80-120s.     PLAN    Rate controlled, now on home dose of Metoprolol 50 PO BID  Patient on Coumadin at home. Dose Coumadin per INR.  Monitor lytes and replete as needed.  BUBBA for evaluation of mitral valve repair or replacement   will not pursue dccv as afib likely 2/2 ms and LAE, and likely will return following dccv  will uptitrate bb as bp, hr allow, would increase lopressor to 75 bid  will cont to follow

## 2019-09-16 NOTE — PROGRESS NOTE ADULT - ASSESSMENT
Patient speaks Nadine. Son at bedside. Patient requested he interpret.  HPI: 64 year old female with PMH of HTN, CVA x2, diabetes T2, rheumatic heart disease, mitral stenosis, atrial fibrillation, on Coumadin who presents with symptomatic atrial flutter with variable block in the setting of severe mitral stenosis.   Plan:  Continue metoprolol 50mg bid           Coumadin for CLL1OC2-ENQv score 5. INR subtherapeutic today 1.86.  Given Therapeutic Lovenox x1 now.            Patient NPO for BUBBA today.  If no thrombus, will do DCCV and start Amiodarone load to help maintain sinus rhythm.   Risks, benefits and alternatives to the cardioversion have been discussed and the patient is consented.

## 2019-09-17 ENCOUNTER — TRANSCRIPTION ENCOUNTER (OUTPATIENT)
Age: 64
End: 2019-09-17

## 2019-09-17 LAB
ANION GAP SERPL CALC-SCNC: 13 MMO/L — SIGNIFICANT CHANGE UP (ref 7–14)
APTT BLD: 34.9 SEC — SIGNIFICANT CHANGE UP (ref 27.5–36.3)
BASOPHILS # BLD AUTO: 0.01 K/UL — SIGNIFICANT CHANGE UP (ref 0–0.2)
BASOPHILS NFR BLD AUTO: 0.1 % — SIGNIFICANT CHANGE UP (ref 0–2)
BUN SERPL-MCNC: 24 MG/DL — HIGH (ref 7–23)
CALCIUM SERPL-MCNC: 9.4 MG/DL — SIGNIFICANT CHANGE UP (ref 8.4–10.5)
CHLORIDE SERPL-SCNC: 101 MMOL/L — SIGNIFICANT CHANGE UP (ref 98–107)
CO2 SERPL-SCNC: 24 MMOL/L — SIGNIFICANT CHANGE UP (ref 22–31)
CREAT SERPL-MCNC: 0.91 MG/DL — SIGNIFICANT CHANGE UP (ref 0.5–1.3)
EOSINOPHIL # BLD AUTO: 0 K/UL — SIGNIFICANT CHANGE UP (ref 0–0.5)
EOSINOPHIL NFR BLD AUTO: 0 % — SIGNIFICANT CHANGE UP (ref 0–6)
GLUCOSE SERPL-MCNC: 316 MG/DL — HIGH (ref 70–99)
HCT VFR BLD CALC: 32.7 % — LOW (ref 34.5–45)
HGB BLD-MCNC: 9.8 G/DL — LOW (ref 11.5–15.5)
IMM GRANULOCYTES NFR BLD AUTO: 0.6 % — SIGNIFICANT CHANGE UP (ref 0–1.5)
INR BLD: 1.78 — HIGH (ref 0.88–1.17)
LYMPHOCYTES # BLD AUTO: 1.02 K/UL — SIGNIFICANT CHANGE UP (ref 1–3.3)
LYMPHOCYTES # BLD AUTO: 10.2 % — LOW (ref 13–44)
MAGNESIUM SERPL-MCNC: 1.9 MG/DL — SIGNIFICANT CHANGE UP (ref 1.6–2.6)
MCHC RBC-ENTMCNC: 26.6 PG — LOW (ref 27–34)
MCHC RBC-ENTMCNC: 30 % — LOW (ref 32–36)
MCV RBC AUTO: 88.6 FL — SIGNIFICANT CHANGE UP (ref 80–100)
MONOCYTES # BLD AUTO: 0.27 K/UL — SIGNIFICANT CHANGE UP (ref 0–0.9)
MONOCYTES NFR BLD AUTO: 2.7 % — SIGNIFICANT CHANGE UP (ref 2–14)
NEUTROPHILS # BLD AUTO: 8.65 K/UL — HIGH (ref 1.8–7.4)
NEUTROPHILS NFR BLD AUTO: 86.4 % — HIGH (ref 43–77)
NRBC # FLD: 0 K/UL — SIGNIFICANT CHANGE UP (ref 0–0)
PLATELET # BLD AUTO: 280 K/UL — SIGNIFICANT CHANGE UP (ref 150–400)
PMV BLD: 10.9 FL — SIGNIFICANT CHANGE UP (ref 7–13)
POTASSIUM SERPL-MCNC: 4.8 MMOL/L — SIGNIFICANT CHANGE UP (ref 3.5–5.3)
POTASSIUM SERPL-SCNC: 4.8 MMOL/L — SIGNIFICANT CHANGE UP (ref 3.5–5.3)
PROTHROM AB SERPL-ACNC: 20.7 SEC — HIGH (ref 9.8–13.1)
RBC # BLD: 3.69 M/UL — LOW (ref 3.8–5.2)
RBC # FLD: 16.6 % — HIGH (ref 10.3–14.5)
SODIUM SERPL-SCNC: 138 MMOL/L — SIGNIFICANT CHANGE UP (ref 135–145)
WBC # BLD: 10.01 K/UL — SIGNIFICANT CHANGE UP (ref 3.8–10.5)
WBC # FLD AUTO: 10.01 K/UL — SIGNIFICANT CHANGE UP (ref 3.8–10.5)

## 2019-09-17 PROCEDURE — 99233 SBSQ HOSP IP/OBS HIGH 50: CPT

## 2019-09-17 PROCEDURE — 99232 SBSQ HOSP IP/OBS MODERATE 35: CPT

## 2019-09-17 RX ORDER — ENOXAPARIN SODIUM 100 MG/ML
70 INJECTION SUBCUTANEOUS EVERY 12 HOURS
Refills: 0 | Status: DISCONTINUED | OUTPATIENT
Start: 2019-09-17 | End: 2019-09-18

## 2019-09-17 RX ORDER — WARFARIN SODIUM 2.5 MG/1
4 TABLET ORAL ONCE
Refills: 0 | Status: DISCONTINUED | OUTPATIENT
Start: 2019-09-17 | End: 2019-09-17

## 2019-09-17 RX ORDER — WARFARIN SODIUM 2.5 MG/1
2.5 TABLET ORAL ONCE
Refills: 0 | Status: COMPLETED | OUTPATIENT
Start: 2019-09-17 | End: 2019-09-17

## 2019-09-17 RX ORDER — ENOXAPARIN SODIUM 100 MG/ML
72 INJECTION SUBCUTANEOUS ONCE
Refills: 0 | Status: COMPLETED | OUTPATIENT
Start: 2019-09-17 | End: 2019-09-17

## 2019-09-17 RX ADMIN — Medication 75 MILLIGRAM(S): at 06:41

## 2019-09-17 RX ADMIN — Medication 2: at 12:54

## 2019-09-17 RX ADMIN — Medication 81 MILLIGRAM(S): at 11:50

## 2019-09-17 RX ADMIN — WARFARIN SODIUM 2.5 MILLIGRAM(S): 2.5 TABLET ORAL at 12:53

## 2019-09-17 RX ADMIN — Medication 1 TABLET(S): at 11:50

## 2019-09-17 RX ADMIN — Medication 75 MILLIGRAM(S): at 17:57

## 2019-09-17 RX ADMIN — ENOXAPARIN SODIUM 72 MILLIGRAM(S): 100 INJECTION SUBCUTANEOUS at 12:54

## 2019-09-17 RX ADMIN — Medication 2: at 17:57

## 2019-09-17 RX ADMIN — Medication 2: at 08:40

## 2019-09-17 RX ADMIN — WARFARIN SODIUM 2.5 MILLIGRAM(S): 2.5 TABLET ORAL at 17:57

## 2019-09-17 NOTE — DISCHARGE NOTE PROVIDER - NSDCCPCAREPLAN_GEN_ALL_CORE_FT
PRINCIPAL DISCHARGE DIAGNOSIS  Diagnosis: Atrial flutter with rapid ventricular response  Assessment and Plan of Treatment: continue metoprolol and coumadin, cannot cardiovert due to LA thrombus and severe mitral stenosis. Followup with EP      SECONDARY DISCHARGE DIAGNOSES  Diagnosis: Left atrial thrombus  Assessment and Plan of Treatment: conttinue coumadin, please followup with your PMD/cardiologist to check INR within 2-3 days. you would need repeat BUBBA in 6 weeks    Diagnosis: Mitral valve stenosis, unspecified etiology  Assessment and Plan of Treatment: severe mitral stenosis, followup with your outpatient cardiologist further treatment    Diagnosis: Type 2 diabetes mellitus with hyperglycemia, without long-term current use of insulin  Assessment and Plan of Treatment: Hypoglycemia management: please check your fingerstick every morning or if you are not feeling well. If your FS is >300mg/dl X3 or more readings please contact your PMD/Endocrinologist. If your FS is low <70mg/dl and/or you have symptoms of very low blood sugar FIRST drink1/2 cup of apple juice (or take 4 glucose tab/tube of glucose gel) and recheck FS in 15mins. Repeat these steps until blood sugar is above 100mg/dl, if NECESSARY. Then call your provider to discuss low blood sugar.   What to expend at followup appointment: please bring a log of your fingerstick and/or your glucometer to you appointment. Your blood sugar tracking will help your diabetes team determine the best plan    Diagnosis: Essential hypertension  Assessment and Plan of Treatment: Low sodium and fat diet, continue anti-hypertensive medications, and follow up with primary care physician.    Diagnosis: Anemia  Assessment and Plan of Treatment: Continue to take current medications as prescribed.  Follow up your routine physician's appointments.  If you notice any bleeding, please notify your doctor immediately or go to the nearest emergency room.    Diagnosis: HLD (hyperlipidemia)  Assessment and Plan of Treatment: Low fat diet, exercise daily and continue current medications. Follow up with primary care physician and cardiologist for management. PRINCIPAL DISCHARGE DIAGNOSIS  Diagnosis: Atrial flutter with rapid ventricular response  Assessment and Plan of Treatment: continue metoprolol and coumadin, cannot cardiovert due to LA thrombus and severe mitral stenosis. Followup with Dr Goldman 9/25 8AM, followup with Dr Puente in 2-3 weeks      SECONDARY DISCHARGE DIAGNOSES  Diagnosis: Left atrial thrombus  Assessment and Plan of Treatment: conttinue coumadin, please followup with your PMD/cardiologist to check INR within1 week. you would need repeat BUBBA in 6 weeks    Diagnosis: Mitral valve stenosis, unspecified etiology  Assessment and Plan of Treatment: severe mitral stenosis, followup with your outpatient cardiologist further treatment    Diagnosis: Type 2 diabetes mellitus with hyperglycemia, without long-term current use of insulin  Assessment and Plan of Treatment: Hypoglycemia management: please check your fingerstick every morning or if you are not feeling well. If your FS is >300mg/dl X3 or more readings please contact your PMD/Endocrinologist. If your FS is low <70mg/dl and/or you have symptoms of very low blood sugar FIRST drink1/2 cup of apple juice (or take 4 glucose tab/tube of glucose gel) and recheck FS in 15mins. Repeat these steps until blood sugar is above 100mg/dl, if NECESSARY. Then call your provider to discuss low blood sugar.   What to expend at followup appointment: please bring a log of your fingerstick and/or your glucometer to you appointment. Your blood sugar tracking will help your diabetes team determine the best plan    Diagnosis: Essential hypertension  Assessment and Plan of Treatment: Low sodium and fat diet, continue anti-hypertensive medications, and follow up with primary care physician.    Diagnosis: Anemia  Assessment and Plan of Treatment: Continue to take current medications as prescribed.  Follow up your routine physician's appointments.  If you notice any bleeding, please notify your doctor immediately or go to the nearest emergency room.    Diagnosis: HLD (hyperlipidemia)  Assessment and Plan of Treatment: Low fat diet, exercise daily and continue current medications. Follow up with primary care physician and cardiologist for management.

## 2019-09-17 NOTE — PROGRESS NOTE ADULT - ASSESSMENT
64 year old female with PMH of HTN, TIA, DM, Moderate mitral stenosis, permanent atrial fibrillation, on anticoagulation with Coumadin presents to ED for evaluation of palpitations, found to be in in Afib between 80-120s.     PLAN    Rate controlled, now on home dose of Metoprolol 75 PO BID  Patient on Coumadin at home. Dose Coumadin per INR. goal INR 3.0 given KRISTAN thombus.  for now on lovenox until INR therapeutic.  Monitor lytes and replete as needed.  BUBBA showing severe MS, outpt follow up with primary cardiology Dr Claudia Goldman for further eval and plan for intervention  would not d/c until INR at goal  will cont to follow

## 2019-09-17 NOTE — DISCHARGE NOTE PROVIDER - HOSPITAL COURSE
63 y/o F with Afib(on Coumadin), TIA, DM type II, HLD, HTN presented with the complaint of ANDERSON and palpitations. Admitted to telemetry for Atrial flutter with RVR         + Atrial flutter with RVR- currently rate controlled, Coumadin and metoprolol     + Severe MS    + LA thrombus lovenox to coumadin bridge         Hospital course:        Atrial flutter with RVR    -  severe mitral stenosis     - CHADS2-VASc Score of 5    - Coumadin for AC     - PO Metoprolol for rate control     - CXR: Unchanged Pulmonary edema    -s/p BUBBA yesterday showing LA thrombus, severe MS, PFO, cardioversion and amiodarone cancelled. BB titrate as needed would need repeat BUBBA in 6 weeks to f/u LA thrombus    -will bridge with Lovenox and give Coumadin 5 (2.5 + 2.5 mg today) as INR still subtherapeutic.     - House cardiology and EP following    - TSH normal         LA thrombus    - lovenox to coumadin bridging     - repeat BUBBA in 6 weeks to f/u LA thrombus        9/16 s/p BUBBA only - LA thrombus, severe MS, PFO    1. Doming and thickening of the tips of the mitral valve consistent with rheumatic mitral valve disease. Mild-moderate mitral regurgitation. Mean transmitral valve gradient equals 9 mm Hg, estimated mitral valve area equals 0.7 sqcm (by planimetry of 3D images), consistent with severe mitral stenosis.    2. Calcified trileaflet aortic valve with normal opening. Minimal aortic regurgitation.    3. Normal aortic root.  Mild non-mobile atherosclerotic plaque in the aortic arch and descending thoracic aorta.     4. Dilated left atrium.  Dense spontaneous echo contrast and thrombus were seen in the left atrial appendage.    5. Normal left ventricular systolic function. No segmental wall motion abnormalities.    6. Normal right ventricular size and function.    7. Color flow Doppler demonstrates evidence of a patent foramen ovale with left-to-right shunting.  No right-to-left interatrial shunting was seen with the intravenous injection of agitated saline contrast.        Mitral valve stenosis, unspecified etiology.       -TTE from 9/11 revealed "doming and thickening of the tips of the mitral valve consistent with rheumatic mitral valve disease. Mild mitral regurgitation.  Mean transmitral valve gradient equals 14 mm Hg, consistent with severe mitral stenosis"    -h/o rheumatic heart dz    -s/p BUBBA 9/16 with redemonstration of severe MS, ? candidate for repair or replacement followup with outpatient cardiologist        Essential hypertension.      - Continue metoprolol     DASH diet recommended.         HLD (hyperlipidemia).    - Continue with Lipitor daily         Type 2 diabetes mellitus with hyperglycemia, without long-term current use of insulin.     - On insulin sliding scale    - FS TID and at bedtime    - HgbA1C 6.3    - holding metformin inpatient. 63 y/o F with Afib(on Coumadin), TIA, DM type II, HLD, HTN presented with the complaint of ANDERSON and palpitations. Admitted to telemetry for Atrial flutter with RVR         + Atrial flutter with RVR- currently rate controlled, Coumadin and metoprolol     + Severe MS    + LA thrombus lovenox to coumadin bridge         Hospital course:        Atrial flutter with RVR    -  severe mitral stenosis     - CHADS2-VASc Score of 5    - Coumadin for AC     - PO Metoprolol for rate control     - CXR: Unchanged Pulmonary edema    -s/p BUBBA yesterday showing LA thrombus, severe MS, PFO, cardioversion and amiodarone cancelled. BB titrate as needed would need repeat BUBBA in 6 weeks to f/u LA thrombus    -Lovenox X1 dose today and discharge with coumadin 4.5mg, pt to followup in cardiology clinic with  Dr Goldman 9/25 8AM    - House cardiology and EP following    - TSH normal         LA thrombus    - lovenox to coumadin bridging     - repeat BUBBA in 6 weeks to f/u LA thrombus        9/16 s/p BUBBA only - LA thrombus, severe MS, PFO    1. Doming and thickening of the tips of the mitral valve consistent with rheumatic mitral valve disease. Mild-moderate mitral regurgitation. Mean transmitral valve gradient equals 9 mm Hg, estimated mitral valve area equals 0.7 sqcm (by planimetry of 3D images), consistent with severe mitral stenosis.    2. Calcified trileaflet aortic valve with normal opening. Minimal aortic regurgitation.    3. Normal aortic root.  Mild non-mobile atherosclerotic plaque in the aortic arch and descending thoracic aorta.     4. Dilated left atrium.  Dense spontaneous echo contrast and thrombus were seen in the left atrial appendage.    5. Normal left ventricular systolic function. No segmental wall motion abnormalities.    6. Normal right ventricular size and function.    7. Color flow Doppler demonstrates evidence of a patent foramen ovale with left-to-right shunting.  No right-to-left interatrial shunting was seen with the intravenous injection of agitated saline contrast.        Mitral valve stenosis, unspecified etiology.       -TTE from 9/11 revealed "doming and thickening of the tips of the mitral valve consistent with rheumatic mitral valve disease. Mild mitral regurgitation.  Mean transmitral valve gradient equals 14 mm Hg, consistent with severe mitral stenosis"    -h/o rheumatic heart dz    -s/p BUBBA 9/16 with redemonstration of severe MS, ? candidate for repair or replacement followup with outpatient cardiologist        Essential hypertension.      - Continue metoprolol     DASH diet recommended.         HLD (hyperlipidemia).    - Continue with Lipitor daily         Type 2 diabetes mellitus with hyperglycemia, without long-term current use of insulin.     - On insulin sliding scale    - FS TID and at bedtime    - HgbA1C 6.3    - holding metformin inpatient.        As per Dr Raman pt cleared for discharge on 9/18. Reviewed discharge medications with patient; All new medications requiring new prescription sent to pharmacy of patients choice. Reviewed need for prescription for previous home medication and new prescriptions sent if requested. Patient in agreement and understands.

## 2019-09-17 NOTE — DISCHARGE NOTE PROVIDER - CARE PROVIDER_API CALL
Fre[facility]ente)  Cardiac Electrophysiology; Cardiovascular Disease; Internal Medicine  [address] [place] [address], [address] [address]  [place] [address]  Phone: [phone]  Fax: [phone]  Follow Up Time:     Claudia Goldman)  Cardiology; Internal Medicine  [address] [place] [address], [address]  [place], [place] [id number]  Phone: [phone]  Fax: [phone]  Follow Up Time: Freedom Puente)  Cardiac Electrophysiology; Cardiovascular Disease; Internal Medicine  [address] [place] [address], [address] [address]  [place] [address]  Phone: [phone]  Fax: [phone]  Follow Up Time:     Claudia Goldman)  Cardiology; Internal Medicine  [address] [place] [address], [address]  [place], [place] [id number]  Phone: [phone]  Fax: [phone]  Follow Up Time:

## 2019-09-17 NOTE — PROGRESS NOTE ADULT - ASSESSMENT
Patient speaks Nadine. Son at bedside. Patient requested he interpret.  HPI: 64 year old female with PMH of HTN, CVA x2, diabetes T2, rheumatic heart disease, mitral stenosis, atrial fibrillation, on Coumadin who presents with symptomatic atrial flutter with variable block in the setting of severe mitral stenosis. Patient had BUBBA yesterday which showed an LA thrombus.  Cardioversion and initiation of Amiodarone cancelled. Heart rate trends 70-100bpmat rest.  Would encourage her to ambulate to get a better idea of overall rate.  Plan:  Continue metoprolol 50mg bid.  Can titrate for better rate control if necessary.            Coumadin for [id number] score 5. INR subtherapeutic today 1.78. Give therapeutic Lovenox until INR therapeutic. Patient speaks Nadine. Son at bedside. Patient requested he interpret.  HPI: 64 year old female with PMH of HTN, CVA x2, diabetes T2, rheumatic heart disease, mitral stenosis, atrial fibrillation, on Coumadin who presents with symptomatic atrial flutter with variable block in the setting of severe mitral stenosis. Patient had BUBBA yesterday which showed an LA thrombus.  Cardioversion and initiation of Amiodarone cancelled. Heart rate trends 70-100bpmat rest.  Would encourage her to ambulate to get a better idea of overall rate.  Plan:  Continue metoprolol 50mg bid.  Can titrate for better rate control if necessary.            Coumadin for [id number] score 5. INR subtherapeutic today 1.78. Give therapeutic Lovenox until INR 2.5-3.0.           Repeat BUBBA in 6 weeks to follow-up on LA thrombus.

## 2019-09-17 NOTE — DISCHARGE NOTE PROVIDER - NSDCFUSCHEDAPPT_GEN_ALL_CORE_FT
RUSSELL FORTUNE ; 09/23/2019 ; [facility] [place] [id number] [place] RUSSELL Bernal ; 09/25/2019 ; [facility] Cardio [id number]  RUSSELL FORTUNE ; 10/04/2019 ; [facility] [phone] RUSSELL FORTUNE ; 09/23/2019 ; NPP Intmed OP [id number] [place] RUSSELL Bernal ; 09/25/2019 ; [facility] Card[facility] [id number]  RUSSELL FORTUNE ; 10/04/2019 ; [facility] [phone]

## 2019-09-17 NOTE — DISCHARGE NOTE PROVIDER - CARE PROVIDERS DIRECT ADDRESSES
[email or web address]

## 2019-09-18 ENCOUNTER — TRANSCRIPTION ENCOUNTER (OUTPATIENT)
Age: 64
End: 2019-09-18

## 2019-09-18 VITALS
HEART RATE: 88 BPM | SYSTOLIC BLOOD PRESSURE: 123 MMHG | OXYGEN SATURATION: 100 % | RESPIRATION RATE: 18 BRPM | TEMPERATURE: 98 F | DIASTOLIC BLOOD PRESSURE: 88 MMHG

## 2019-09-18 LAB
ANION GAP SERPL CALC-SCNC: 9 MMO/L — SIGNIFICANT CHANGE UP (ref 7–14)
BASOPHILS # BLD AUTO: 0.02 K/UL — SIGNIFICANT CHANGE UP (ref 0–0.2)
BASOPHILS NFR BLD AUTO: 0.1 % — SIGNIFICANT CHANGE UP (ref 0–2)
BUN SERPL-MCNC: 20 MG/DL — SIGNIFICANT CHANGE UP (ref 7–23)
CALCIUM SERPL-MCNC: 9.1 MG/DL — SIGNIFICANT CHANGE UP (ref 8.4–10.5)
CHLORIDE SERPL-SCNC: 107 MMOL/L — SIGNIFICANT CHANGE UP (ref 98–107)
CO2 SERPL-SCNC: 24 MMOL/L — SIGNIFICANT CHANGE UP (ref 22–31)
CREAT SERPL-MCNC: 0.8 MG/DL — SIGNIFICANT CHANGE UP (ref 0.5–1.3)
EOSINOPHIL # BLD AUTO: 0.05 K/UL — SIGNIFICANT CHANGE UP (ref 0–0.5)
EOSINOPHIL NFR BLD AUTO: 0.3 % — SIGNIFICANT CHANGE UP (ref 0–6)
GLUCOSE SERPL-MCNC: 132 MG/DL — HIGH (ref 70–99)
HCT VFR BLD CALC: 31.6 % — LOW (ref 34.5–45)
HGB BLD-MCNC: 9.5 G/DL — LOW (ref 11.5–15.5)
IMM GRANULOCYTES NFR BLD AUTO: 0.7 % — SIGNIFICANT CHANGE UP (ref 0–1.5)
INR BLD: 2.58 — HIGH (ref 0.88–1.17)
LYMPHOCYTES # BLD AUTO: 23.8 % — SIGNIFICANT CHANGE UP (ref 13–44)
LYMPHOCYTES # BLD AUTO: 3.45 K/UL — HIGH (ref 1–3.3)
MAGNESIUM SERPL-MCNC: 1.9 MG/DL — SIGNIFICANT CHANGE UP (ref 1.6–2.6)
MCHC RBC-ENTMCNC: 26.5 PG — LOW (ref 27–34)
MCHC RBC-ENTMCNC: 30.1 % — LOW (ref 32–36)
MCV RBC AUTO: 88 FL — SIGNIFICANT CHANGE UP (ref 80–100)
MONOCYTES # BLD AUTO: 1.04 K/UL — HIGH (ref 0–0.9)
MONOCYTES NFR BLD AUTO: 7.2 % — SIGNIFICANT CHANGE UP (ref 2–14)
NEUTROPHILS # BLD AUTO: 9.83 K/UL — HIGH (ref 1.8–7.4)
NEUTROPHILS NFR BLD AUTO: 67.9 % — SIGNIFICANT CHANGE UP (ref 43–77)
NRBC # FLD: 0 K/UL — SIGNIFICANT CHANGE UP (ref 0–0)
PLATELET # BLD AUTO: 260 K/UL — SIGNIFICANT CHANGE UP (ref 150–400)
PMV BLD: 10.4 FL — SIGNIFICANT CHANGE UP (ref 7–13)
POTASSIUM SERPL-MCNC: 4.7 MMOL/L — SIGNIFICANT CHANGE UP (ref 3.5–5.3)
POTASSIUM SERPL-SCNC: 4.7 MMOL/L — SIGNIFICANT CHANGE UP (ref 3.5–5.3)
PROTHROM AB SERPL-ACNC: 29.5 SEC — HIGH (ref 9.8–13.1)
RBC # BLD: 3.59 M/UL — LOW (ref 3.8–5.2)
RBC # FLD: 16.9 % — HIGH (ref 10.3–14.5)
SODIUM SERPL-SCNC: 140 MMOL/L — SIGNIFICANT CHANGE UP (ref 135–145)
WBC # BLD: 14.49 K/UL — HIGH (ref 3.8–10.5)
WBC # FLD AUTO: 14.49 K/UL — HIGH (ref 3.8–10.5)

## 2019-09-18 PROCEDURE — 99239 HOSP IP/OBS DSCHRG MGMT >30: CPT

## 2019-09-18 PROCEDURE — 99232 SBSQ HOSP IP/OBS MODERATE 35: CPT

## 2019-09-18 RX ORDER — ENOXAPARIN SODIUM 100 MG/ML
70 INJECTION SUBCUTANEOUS EVERY 12 HOURS
Refills: 0 | Status: DISCONTINUED | OUTPATIENT
Start: 2019-09-18 | End: 2019-09-18

## 2019-09-18 RX ORDER — WARFARIN SODIUM 2.5 MG/1
1 TABLET ORAL
Qty: 0 | Refills: 0 | DISCHARGE

## 2019-09-18 RX ORDER — WARFARIN SODIUM 2.5 MG/1
0.5 TABLET ORAL
Qty: 7 | Refills: 0
Start: 2019-09-18 | End: 2019-10-01

## 2019-09-18 RX ORDER — WARFARIN SODIUM 2.5 MG/1
1 TABLET ORAL
Qty: 14 | Refills: 0
Start: 2019-09-18 | End: 2019-10-01

## 2019-09-18 RX ORDER — WARFARIN SODIUM 2.5 MG/1
4.5 TABLET ORAL ONCE
Refills: 0 | Status: COMPLETED | OUTPATIENT
Start: 2019-09-18 | End: 2019-09-18

## 2019-09-18 RX ORDER — METOPROLOL TARTRATE 50 MG
1 TABLET ORAL
Qty: 0 | Refills: 0 | DISCHARGE

## 2019-09-18 RX ADMIN — ENOXAPARIN SODIUM 70 MILLIGRAM(S): 100 INJECTION SUBCUTANEOUS at 17:56

## 2019-09-18 RX ADMIN — Medication 75 MILLIGRAM(S): at 17:56

## 2019-09-18 RX ADMIN — Medication 1 TABLET(S): at 11:16

## 2019-09-18 RX ADMIN — ENOXAPARIN SODIUM 70 MILLIGRAM(S): 100 INJECTION SUBCUTANEOUS at 00:00

## 2019-09-18 RX ADMIN — ATORVASTATIN CALCIUM 40 MILLIGRAM(S): 80 TABLET, FILM COATED ORAL at 00:00

## 2019-09-18 RX ADMIN — Medication 75 MILLIGRAM(S): at 05:17

## 2019-09-18 RX ADMIN — Medication 81 MILLIGRAM(S): at 11:16

## 2019-09-18 RX ADMIN — WARFARIN SODIUM 4.5 MILLIGRAM(S): 2.5 TABLET ORAL at 17:57

## 2019-09-18 NOTE — PROGRESS NOTE ADULT - ATTENDING COMMENTS
personally saw and examined patient, labs/vitals reviewed  agree with above assessment and plan  outpt follow up for further tx of severe ms  inr goal 3, if pt leaves please send with lovenox to complete coumadin bridge
D/w Cardiology - pt/family very much want to go home today  Will give PM dose of Lovenox and then d/c on Coumadin w/ f/u Dr. Goldman within 1 week     50 min spent on dc planning

## 2019-09-18 NOTE — PROGRESS NOTE ADULT - PROBLEM SELECTOR PLAN 4
Continue metoprolol   DASH diet recommended
Continue metoprolol   DASH diet recommended
Continue with antihypertensive medications   DASH diet recommended
Continue with antihypertensive medications   DASH diet recommended
Continue metoprolol   DASH diet recommended

## 2019-09-18 NOTE — PROGRESS NOTE ADULT - PROVIDER SPECIALTY LIST ADULT
Cardiology
Electrophysiology
Electrophysiology
Hospitalist
Hospitalist
Internal Medicine
Internal Medicine
Hospitalist

## 2019-09-18 NOTE — DISCHARGE NOTE NURSING/CASE MANAGEMENT/SOCIAL WORK - PATIENT PORTAL LINK FT
You can access the FollowMyHealth Patient Portal offered by [facility] by registering at the following website: [email or web address]. By joining [employer]’s FollowMyHealth portal, you will also be able to view your health information using other applications (apps) compatible with our system.

## 2019-09-18 NOTE — PROGRESS NOTE ADULT - ASSESSMENT
64 year old female with PMH of HTN, TIA, DM, Moderate mitral stenosis, permanent atrial fibrillation, on anticoagulation with Coumadin presents to ED for evaluation of palpitations, found to be in in Afib between 80-120s.     PLAN    Rate controlled, now on home dose of Metoprolol 75 PO BID  Patient on Coumadin at home. Dose Coumadin per INR. goal INR 3.0 given KRISTAN olivera.  INR this AM 2.58  Ideally patient should be at goal prior to discharge however, patient insistent on leaving   In that case would d/c with lovenox for additional bridging tonight.   BUBBA showing severe MS, outpt follow up with primary cardiology Dr Claudia Goldman for further eval and plan for intervention

## 2019-09-18 NOTE — PROGRESS NOTE ADULT - REASON FOR ADMISSION
ANDERSON and palpitations

## 2019-09-18 NOTE — PROGRESS NOTE ADULT - PROBLEM SELECTOR PROBLEM 1
Atrial flutter with rapid ventricular response

## 2019-09-18 NOTE — PROGRESS NOTE ADULT - PROBLEM SELECTOR PLAN 5
Continue with Lipitor daily   Lipid profile reviewed

## 2019-09-18 NOTE — PROGRESS NOTE ADULT - PROBLEM SELECTOR PLAN 6
On insulin sliding scale  FS TID and at bedtime  HgbA1C 6.3  holding metformin inpatient
On insulin sliding scale  FS TID and at bedtime  HgbA1C 6.3
On insulin sliding scale  FS TID and at bedtime  HgbA1C 6.3  holding metformin inpatient
On insulin sliding scale  FS TID and at bedtime  HgbA1C 6.3  holding metformin inpatient
On insulin sliding scale  FS TID at bedtime,   HgbA1C 6.3

## 2019-09-18 NOTE — PROGRESS NOTE ADULT - PROBLEM SELECTOR PLAN 1
-Rate controlled, however, irregular rhythm  -Continue Metoprolol  -EP recs appreciated; Plan for BUBBA on Monday. NPO after midnight Sunday  -CHADS2-VASc Score of 5  -Continue Coumadin for AC
-Rate controlled, however, irregular rhythm  -Continue Metoprolol  -EP recs appreciated; Plan for possible BUBBA/DCCV tomorrow. NPO after midnight  -CHADS2-VASc Score of 5  -Continue Coumadin for AC
-Rate controlled, however, irregular rhythm  -Continue Metoprolol 50 BID   -EP recs: plan for BUBBA/DCCV today   -CHADS2-VASc Score of 5  -Continue Coumadin for AC -> INR 1.86 today, received dose of Lovenox as per EP recs
-Rate controlled, however, irregular rhythm  -metoprolol increased to 75 po BID yesterday as per Cards  -s/p BUBBA yesterday showing LA thrombus, severe MS, PFO   -will bridge with Lovenox and give Coumadin 5 (2.5 + 2.5 mg today) as INR still subtherapeutic
-Rate controlled  -metoprolol increased to 75 po BID as per Cards  -s/p BUBBA 9/16 showing LA thrombus, severe MS, PFO   -bridging with Lovenox->Coumadin for goal INR ~ 3 as per Cards recs

## 2019-09-18 NOTE — PROGRESS NOTE ADULT - SUBJECTIVE AND OBJECTIVE BOX
24H hour events:   no complaints overnight  pt resting, denies palpitations and chest pain  MEDICATIONS:  aspirin enteric coated 81 milliGRAM(s) Oral daily  enoxaparin Injectable 70 milliGRAM(s) SubCutaneous every 12 hours  metoprolol tartrate 75 milliGRAM(s) Oral two times a day            atorvastatin 40 milliGRAM(s) Oral at bedtime  dextrose 40% Gel 15 Gram(s) Oral once PRN  dextrose 50% Injectable 12.5 Gram(s) IV Push once  dextrose 50% Injectable 25 Gram(s) IV Push once  dextrose 50% Injectable 25 Gram(s) IV Push once  glucagon  Injectable 1 milliGRAM(s) IntraMuscular once PRN  insulin lispro (HumaLOG) corrective regimen sliding scale   SubCutaneous three times a day before meals  insulin lispro (HumaLOG) corrective regimen sliding scale   SubCutaneous at bedtime    dextrose 5%. 1000 milliLiter(s) IV Continuous <Continuous>  multivitamin 1 Tablet(s) Oral daily          PHYSICAL EXAM:  T(C): 36.7 (09-17-19 @ 17:56), Max: 36.7 (09-17-19 @ 06:38)  HR: 98 (09-17-19 @ 17:56) (72 - 98)  BP: 114/72 (09-17-19 @ 17:56) (110/86 - 126/80)  RR: 18 (09-17-19 @ 17:56) (17 - 18)  SpO2: 100% (09-17-19 @ 17:56) (99% - 100%)  Wt(kg): --  I&O's Summary      Appearance: Normal	  HEENT:   Normal oral mucosa, PERRL, EOMI	  Lymphatic: No lymphadenopathy  Cardiovascular: Normal S1 S2, No JVD, No murmurs, No edema  Respiratory: Lungs clear to auscultation	  Psychiatry: A & O x 3, Mood & affect appropriate  Gastrointestinal:  Soft, Non-tender, + BS	  Skin: No rashes, No ecchymoses, No cyanosis	  Neurologic: Non-focal  Extremities: Normal range of motion, No clubbing, cyanosis       LABS:	 	    CBC Full  -  ( 17 Sep 2019 06:00 )  WBC Count : 10.01 K/uL  Hemoglobin : 9.8 g/dL  Hematocrit : 32.7 %  Platelet Count - Automated : 280 K/uL  Mean Cell Volume : 88.6 fL  Mean Cell Hemoglobin : 26.6 pg  Mean Cell Hemoglobin Concentration : 30.0 %  Auto Neutrophil # : 8.65 K/uL  Auto Lymphocyte # : 1.02 K/uL  Auto Monocyte # : 0.27 K/uL  Auto Eosinophil # : 0.00 K/uL  Auto Basophil # : 0.01 K/uL  Auto Neutrophil % : 86.4 %  Auto Lymphocyte % : 10.2 %  Auto Monocyte % : 2.7 %  Auto Eosinophil % : 0.0 %  Auto Basophil % : 0.1 %    09-17    138  |  101  |  24<H>  ----------------------------<  316<H>  4.8   |  24  |  0.91  09-16    140  |  103  |  12  ----------------------------<  127<H>  3.8   |  24  |  0.63    Ca    9.4      17 Sep 2019 06:00  Ca    9.4      16 Sep 2019 05:00  Phos  3.7     09-16  Mg     1.9     09-17  Mg     1.9     09-16        proBNP: Serum Pro-Brain Natriuretic Peptide: 4293 pg/mL (09-13-19 @ 07:00)    Lipid Profile:   HgA1c:   TSH:       CARDIAC MARKERS:            TELEMETRY: 	    ECG:  	  RADIOLOGY:  OTHER: 	    PREVIOUS DIAGNOSTIC TESTING:    [ ] Echocardiogram:  [ ]  Catheterization:  [ ] Stress Test:  	  	  ASSESSMENT/PLAN:
24H hour events:   pt resting, no complaints  no events overnight  denies cp, sob, palpitations  MEDICATIONS:  aspirin enteric coated 81 milliGRAM(s) Oral daily  metoprolol tartrate 50 milliGRAM(s) Oral two times a day            atorvastatin 40 milliGRAM(s) Oral at bedtime  dextrose 40% Gel 15 Gram(s) Oral once PRN  dextrose 50% Injectable 12.5 Gram(s) IV Push once  dextrose 50% Injectable 25 Gram(s) IV Push once  dextrose 50% Injectable 25 Gram(s) IV Push once  glucagon  Injectable 1 milliGRAM(s) IntraMuscular once PRN  insulin lispro (HumaLOG) corrective regimen sliding scale   SubCutaneous three times a day before meals  insulin lispro (HumaLOG) corrective regimen sliding scale   SubCutaneous at bedtime    dextrose 5%. 1000 milliLiter(s) IV Continuous <Continuous>  multivitamin 1 Tablet(s) Oral daily          PHYSICAL EXAM:  T(C): 36.8 (09-16-19 @ 06:18), Max: 36.8 (09-15-19 @ 16:58)  HR: 85 (09-15-19 @ 22:18) (81 - 85)  BP: 131/88 (09-16-19 @ 06:18) (127/87 - 131/88)  RR: 18 (09-16-19 @ 06:18) (18 - 18)  SpO2: 99% (09-16-19 @ 06:18) (99% - 100%)  Wt(kg): --  I&O's Summary    15 Sep 2019 07:01  -  16 Sep 2019 07:00  --------------------------------------------------------  IN: 520 mL / OUT: 0 mL / NET: 520 mL        Appearance: Normal	  HEENT:   Normal oral mucosa, PERRL, EOMI	  Lymphatic: No lymphadenopathy  Cardiovascular: Normal S1 S2, No JVD, No murmurs, No edema  Respiratory: Lungs grossly clear to auscultation	  Psychiatry: A & O x 3, Mood & affect appropriate  Gastrointestinal:  Soft, Non-tender, + BS	  Skin: No rashes, No ecchymoses, No cyanosis	  Neurologic: Non-focal  Extremities: Normal range of motion, No clubbing, cyanosis       LABS:	 	    CBC Full  -  ( 16 Sep 2019 05:00 )  WBC Count : 10.75 K/uL  Hemoglobin : 9.7 g/dL  Hematocrit : 32.1 %  Platelet Count - Automated : 280 K/uL  Mean Cell Volume : 87.2 fL  Mean Cell Hemoglobin : 26.4 pg  Mean Cell Hemoglobin Concentration : 30.2 %  Auto Neutrophil # : x  Auto Lymphocyte # : x  Auto Monocyte # : x  Auto Eosinophil # : x  Auto Basophil # : x  Auto Neutrophil % : x  Auto Lymphocyte % : x  Auto Monocyte % : x  Auto Eosinophil % : x  Auto Basophil % : x    09-16    140  |  103  |  12  ----------------------------<  127<H>  3.8   |  24  |  0.63  09-15    140  |  103  |  10  ----------------------------<  121<H>  3.6   |  25  |  0.60    Ca    9.4      16 Sep 2019 05:00  Ca    8.9      15 Sep 2019 07:00  Phos  3.7     09-16  Phos  3.6     09-15  Mg     1.9     09-16  Mg     1.7     09-15        proBNP: Serum Pro-Brain Natriuretic Peptide: 4293 pg/mL (09-13-19 @ 07:00)    Lipid Profile:   HgA1c:   TSH:       CARDIAC MARKERS:            TELEMETRY: 	    ECG:  	  RADIOLOGY:  OTHER: 	    PREVIOUS DIAGNOSTIC TESTING:    [ ] Echocardiogram:  [ ]  Catheterization:  [ ] Stress Test:  	  	  ASSESSMENT/PLAN:
Bertin Lagos M.D. Pager Number [phone]    Patient is a 64y old  Female who presents with a chief complaint of ANDERSON and palpitations (13 Sep 2019 19:44)      SUBJECTIVE / OVERNIGHT EVENTS:  Pt seen and examined at bedside. No acute events overnight.  Pt denies cp, palpitations, sob, abd pain, N/V, fever, chills.     ROS:  All other review of systems negative    Allergies    No Known Allergies    Intolerances        MEDICATIONS  (STANDING):  aspirin enteric coated 81 milliGRAM(s) Oral daily  atorvastatin 40 milliGRAM(s) Oral at bedtime  dextrose 5%. 1000 milliLiter(s) (50 mL/Hr) IV Continuous <Continuous>  dextrose 50% Injectable 12.5 Gram(s) IV Push once  dextrose 50% Injectable 25 Gram(s) IV Push once  dextrose 50% Injectable 25 Gram(s) IV Push once  insulin lispro (HumaLOG) corrective regimen sliding scale   SubCutaneous three times a day before meals  insulin lispro (HumaLOG) corrective regimen sliding scale   SubCutaneous at bedtime  metoprolol tartrate 50 milliGRAM(s) Oral two times a day  multivitamin 1 Tablet(s) Oral daily    MEDICATIONS  (PRN):  dextrose 40% Gel 15 Gram(s) Oral once PRN Blood Glucose LESS THAN 70 milliGRAM(s)/deciliter  glucagon  Injectable 1 milliGRAM(s) IntraMuscular once PRN Glucose LESS THAN 70 milligrams/deciliter      Vital Signs Last 24 Hrs  T(C): 36.7 (14 Sep 2019 13:35), Max: 36.9 (13 Sep 2019 17:59)  T(F): 98.1 (14 Sep 2019 13:35), Max: 98.4 (13 Sep 2019 17:59)  HR: 78 (14 Sep 2019 13:35) (78 - 94)  BP: 131/69 (14 Sep 2019 13:35) (111/71 - 148/98)  BP(mean): --  RR: 18 (14 Sep 2019 13:35) (17 - 18)  SpO2: 99% (14 Sep 2019 13:35) (98% - 100%)  CAPILLARY BLOOD GLUCOSE      POCT Blood Glucose.: 132 mg/dL (14 Sep 2019 12:39)  POCT Blood Glucose.: 182 mg/dL (14 Sep 2019 09:01)  POCT Blood Glucose.: 195 mg/dL (13 Sep 2019 22:06)  POCT Blood Glucose.: 92 mg/dL (13 Sep 2019 17:51)    I&O's Summary      PHYSICAL EXAM:  GENERAL: NAD, well-developed  HEAD:  Atraumatic, Normocephalic  EYES: EOMI, PERRLA, conjunctiva and sclera clear  NECK: Supple, No JVD  CHEST/LUNG: Clear to auscultation bilaterally; No wheeze  HEART: Irregular rate and rhythm; No murmurs, rubs, or gallops  ABDOMEN: Soft, Nontender, Nondistended; Bowel sounds present  EXTREMITIES:  2+ Peripheral Pulses, No clubbing, cyanosis, or edema  NEUROLOGY: AAOx3, non-focal  PSYCH: calm  SKIN: No rashes or lesions    LABS:                        9.5    8.60  )-----------( 266      ( 14 Sep 2019 07:00 )             30.2     09-14    140  |  103  |  10  ----------------------------<  127<H>  3.9   |  24  |  0.54    Ca    9.2      14 Sep 2019 07:00  Phos  3.3     09-13  Mg     1.7     -    TPro  7.7  /  Alb  4.2  /  TBili  0.7  /  DBili  x   /  AST  19  /  ALT  18  /  AlkPhos  61  09-12    PT/INR - ( 14 Sep 2019 07:00 )   PT: 35.4 SEC;   INR: 3.00          PTT - ( 13 Sep 2019 07:00 )  PTT:40.1 SEC      Urinalysis Basic - ( 13 Sep 2019 10:00 )    Color: LIGHT YELLOW / Appearance: CLEAR / S.012 / pH: 6.5  Gluc: NEGATIVE / Ketone: NEGATIVE  / Bili: NEGATIVE / Urobili: NORMAL   Blood: NEGATIVE / Protein: NEGATIVE / Nitrite: NEGATIVE   Leuk Esterase: NEGATIVE / RBC: x / WBC x   Sq Epi: x / Non Sq Epi: x / Bacteria: x        RADIOLOGY & ADDITIONAL TESTS:    Imaging Personally Reviewed:    Consultant(s) Notes Reviewed:      Care Discussed with Consultants/Other Providers:    Case Discussed with Family:    Goals of Care:
Bertin Lagos M.D. Pager Number [phone]    Patient is a 64y old  Female who presents with a chief complaint of ANDERSON and palpitations (14 Sep 2019 15:05)      SUBJECTIVE / OVERNIGHT EVENTS:  Pt seen and examined at bedside. No acute events overnight.  Pt denies cp, palpitations, sob, abd pain, N/V, fever, chills.    ROS:  All other review of systems negative    Allergies    No Known Allergies    Intolerances        MEDICATIONS  (STANDING):  aspirin enteric coated 81 milliGRAM(s) Oral daily  atorvastatin 40 milliGRAM(s) Oral at bedtime  dextrose 5%. 1000 milliLiter(s) (50 mL/Hr) IV Continuous <Continuous>  dextrose 50% Injectable 12.5 Gram(s) IV Push once  dextrose 50% Injectable 25 Gram(s) IV Push once  dextrose 50% Injectable 25 Gram(s) IV Push once  insulin lispro (HumaLOG) corrective regimen sliding scale   SubCutaneous three times a day before meals  insulin lispro (HumaLOG) corrective regimen sliding scale   SubCutaneous at bedtime  metoprolol tartrate 50 milliGRAM(s) Oral two times a day  multivitamin 1 Tablet(s) Oral daily    MEDICATIONS  (PRN):  dextrose 40% Gel 15 Gram(s) Oral once PRN Blood Glucose LESS THAN 70 milliGRAM(s)/deciliter  glucagon  Injectable 1 milliGRAM(s) IntraMuscular once PRN Glucose LESS THAN 70 milligrams/deciliter      Vital Signs Last 24 Hrs  T(C): 36.9 (15 Sep 2019 06:23), Max: 36.9 (15 Sep 2019 06:23)  T(F): 98.4 (15 Sep 2019 06:23), Max: 98.4 (15 Sep 2019 06:23)  HR: 67 (15 Sep 2019 06:23) (67 - 97)  BP: 138/85 (15 Sep 2019 06:23) (125/76 - 138/85)  BP(mean): --  RR: 18 (15 Sep 2019 06:23) (18 - 18)  SpO2: 97% (15 Sep 2019 06:23) (97% - 100%)  CAPILLARY BLOOD GLUCOSE      POCT Blood Glucose.: 142 mg/dL (15 Sep 2019 12:21)  POCT Blood Glucose.: 126 mg/dL (15 Sep 2019 08:37)  POCT Blood Glucose.: 197 mg/dL (14 Sep 2019 21:54)  POCT Blood Glucose.: 137 mg/dL (14 Sep 2019 17:27)    I&O's Summary      PHYSICAL EXAM:  GENERAL: NAD, well-developed  CHEST/LUNG: Clear to auscultation bilaterally; No wheeze  HEART: Irregular rate and rhythm; No murmurs, rubs, or gallops  ABDOMEN: Soft, Nontender, Nondistended; Bowel sounds present  EXTREMITIES:  2+ Peripheral Pulses, No clubbing, cyanosis, or edema  NEUROLOGY: AAOx3, non-focal  PSYCH: calm  SKIN: No rashes or lesions    LABS:                        8.9    8.09  )-----------( 247      ( 15 Sep 2019 07:00 )             28.6     09-15    140  |  103  |  10  ----------------------------<  121<H>  3.6   |  25  |  0.60    Ca    8.9      15 Sep 2019 07:00  Phos  3.6     09-15  Mg     1.7     09-15      PT/INR - ( 15 Sep 2019 07:00 )   PT: 30.8 SEC;   INR: 2.62                    RADIOLOGY & ADDITIONAL TESTS:    Imaging Personally Reviewed:    Consultant(s) Notes Reviewed:      Care Discussed with Consultants/Other Providers:    Case Discussed with Family:    Goals of Care:
Patient comfortable.  Denies palpitations, chest discomfort, shortness of breath or dizziness at rest.  BUBBA yesterday showed an LA thrombus so she did not undergo the cardioversion.     Vital Signs Last 24 Hrs  T(C): 36.7 (17 Sep 2019 06:38), Max: 36.7 (17 Sep 2019 06:38)  T(F): 98 (17 Sep 2019 06:38), Max: 98 (17 Sep 2019 06:38)  HR: 72 (17 Sep 2019 06:38) (72 - 97)  BP: 112/69 (17 Sep 2019 06:38) (110/86 - 126/82)  BP(mean): --  RR: 18 (17 Sep 2019 06:38) (18 - 18)  SpO2: 100% (17 Sep 2019 06:38) (97% - 100%)      EKG  Telemetry:  atrial fibrillation /rate controlled Resting heart rate 60- 100's.   MEDICATIONS  (STANDING):  aspirin enteric coated 81 milliGRAM(s) Oral daily  atorvastatin 40 milliGRAM(s) Oral at bedtime  dextrose 5%. 1000 milliLiter(s) (50 mL/Hr) IV Continuous <Continuous>  dextrose 50% Injectable 12.5 Gram(s) IV Push once  dextrose 50% Injectable 25 Gram(s) IV Push once  dextrose 50% Injectable 25 Gram(s) IV Push once  insulin lispro (HumaLOG) corrective regimen sliding scale   SubCutaneous three times a day before meals  insulin lispro (HumaLOG) corrective regimen sliding scale   SubCutaneous at bedtime  metoprolol tartrate 75 milliGRAM(s) Oral two times a day  multivitamin 1 Tablet(s) Oral daily  warfarin 2.5 milliGRAM(s) Oral once    MEDICATIONS  (PRN):  dextrose 40% Gel 15 Gram(s) Oral once PRN Blood Glucose LESS THAN 70 milliGRAM(s)/deciliter  glucagon  Injectable 1 milliGRAM(s) IntraMuscular once PRN Glucose LESS THAN 70 milligrams/deciliter          Physical exam:   Gen- comfortable in NAD  Resp- clear to auscultation.  No wheezing, rales or rhonchi  CV- S1 and S2 irregular irregular + murmur  ABD- obese, nontender + bowel sounds  EXT- no edema or calf tenderness.  Neuro- grossly nonfocal                            9.8    10.01 )-----------( 280      ( 17 Sep 2019 06:00 )             32.7     PT/INR - ( 17 Sep 2019 06:00 )   PT: 20.7 SEC;   INR: 1.78          PTT - ( 17 Sep 2019 06:00 )  PTT:34.9 SEC  09-17    138  |  101  |  24<H>  ----------------------------<  316<H>  4.8   |  24  |  0.91    Ca    9.4      17 Sep 2019 06:00  Phos  3.7     09-16  Mg     1.9     09-17
Patient is a 64y old  Female who presents with a chief complaint of ANDERSON and palpitations (16 Sep 2019 10:47)        SUBJECTIVE / OVERNIGHT EVENTS:    resting comfortably in bed  son at bedside provided translation  pt denies any cp, palpitations, lightheadedeness, dizziness     MEDICATIONS  (STANDING):  aspirin enteric coated 81 milliGRAM(s) Oral daily  atorvastatin 40 milliGRAM(s) Oral at bedtime  dextrose 5%. 1000 milliLiter(s) (50 mL/Hr) IV Continuous <Continuous>  dextrose 50% Injectable 12.5 Gram(s) IV Push once  dextrose 50% Injectable 25 Gram(s) IV Push once  dextrose 50% Injectable 25 Gram(s) IV Push once  insulin lispro (HumaLOG) corrective regimen sliding scale   SubCutaneous three times a day before meals  insulin lispro (HumaLOG) corrective regimen sliding scale   SubCutaneous at bedtime  metoprolol tartrate 50 milliGRAM(s) Oral two times a day  multivitamin 1 Tablet(s) Oral daily    MEDICATIONS  (PRN):  dextrose 40% Gel 15 Gram(s) Oral once PRN Blood Glucose LESS THAN 70 milliGRAM(s)/deciliter  glucagon  Injectable 1 milliGRAM(s) IntraMuscular once PRN Glucose LESS THAN 70 milligrams/deciliter      Vital Signs Last 24 Hrs  T(C): 36.8 (16 Sep 2019 06:18), Max: 36.8 (15 Sep 2019 14:55)  T(F): 98.2 (16 Sep 2019 06:18), Max: 98.2 (15 Sep 2019 14:55)  HR: 85 (15 Sep 2019 22:18) (78 - 85)  BP: 131/88 (16 Sep 2019 06:18) (127/87 - 135/89)  BP(mean): --  RR: 18 (16 Sep 2019 06:18) (18 - 18)  SpO2: 99% (16 Sep 2019 06:18) (98% - 100%)  CAPILLARY BLOOD GLUCOSE      POCT Blood Glucose.: 131 mg/dL (16 Sep 2019 13:32)  POCT Blood Glucose.: 129 mg/dL (16 Sep 2019 09:52)  POCT Blood Glucose.: 118 mg/dL (16 Sep 2019 03:27)  POCT Blood Glucose.: 148 mg/dL (15 Sep 2019 21:25)  POCT Blood Glucose.: 131 mg/dL (15 Sep 2019 17:31)    I&O's Summary    15 Sep 2019 07:01  -  16 Sep 2019 07:00  --------------------------------------------------------  IN: 520 mL / OUT: 0 mL / NET: 520 mL        PHYSICAL EXAM:  GENERAL: NAD, well-developed  CHEST/LUNG: Clear to auscultation bilaterally; No wheeze  HEART: Irregular rate and rhythm; No murmurs, rubs, or gallops  ABDOMEN: Soft, Nontender, Nondistended; Bowel sounds present  EXTREMITIES:  2+ Peripheral Pulses, No clubbing, cyanosis, or edema  NEUROLOGY: AAOx3, non-focal  PSYCH: calm  SKIN: No rashes or lesions      LABS:                        9.7    10.75 )-----------( 280      ( 16 Sep 2019 05:00 )             32.1     09-16    140  |  103  |  12  ----------------------------<  127<H>  3.8   |  24  |  0.63    Ca    9.4      16 Sep 2019 05:00  Phos  3.7     09-16  Mg     1.9     09-16      PT/INR - ( 16 Sep 2019 05:00 )   PT: 21.1 SEC;   INR: 1.86                    RADIOLOGY & ADDITIONAL TESTS:    Imaging Personally Reviewed:  Consultant(s) Notes Reviewed:    Care Discussed with Consultants/Other Providers:
Patient is a 64y old  Female who presents with a chief complaint of ANDERSON and palpitations (16 Sep 2019 14:58)        SUBJECTIVE / OVERNIGHT EVENTS:  no acute events o/n  pt had BUBBA yesterday afternoon  resting in bed comfortably  denies cp, sob, palpitations    Tele: afib, rate controlled       MEDICATIONS  (STANDING):  aspirin enteric coated 81 milliGRAM(s) Oral daily  atorvastatin 40 milliGRAM(s) Oral at bedtime  dextrose 5%. 1000 milliLiter(s) (50 mL/Hr) IV Continuous <Continuous>  dextrose 50% Injectable 12.5 Gram(s) IV Push once  dextrose 50% Injectable 25 Gram(s) IV Push once  dextrose 50% Injectable 25 Gram(s) IV Push once  insulin lispro (HumaLOG) corrective regimen sliding scale   SubCutaneous three times a day before meals  insulin lispro (HumaLOG) corrective regimen sliding scale   SubCutaneous at bedtime  metoprolol tartrate 75 milliGRAM(s) Oral two times a day  multivitamin 1 Tablet(s) Oral daily  warfarin 2.5 milliGRAM(s) Oral once    MEDICATIONS  (PRN):  dextrose 40% Gel 15 Gram(s) Oral once PRN Blood Glucose LESS THAN 70 milliGRAM(s)/deciliter  glucagon  Injectable 1 milliGRAM(s) IntraMuscular once PRN Glucose LESS THAN 70 milligrams/deciliter      Vital Signs Last 24 Hrs  T(C): 36.7 (17 Sep 2019 06:38), Max: 36.7 (17 Sep 2019 06:38)  T(F): 98 (17 Sep 2019 06:38), Max: 98 (17 Sep 2019 06:38)  HR: 72 (17 Sep 2019 06:38) (72 - 97)  BP: 112/69 (17 Sep 2019 06:38) (110/86 - 126/82)  BP(mean): --  RR: 18 (17 Sep 2019 06:38) (18 - 18)  SpO2: 100% (17 Sep 2019 06:38) (97% - 100%)  CAPILLARY BLOOD GLUCOSE      POCT Blood Glucose.: 207 mg/dL (17 Sep 2019 12:36)  POCT Blood Glucose.: 200 mg/dL (17 Sep 2019 08:53)  POCT Blood Glucose.: 203 mg/dL (17 Sep 2019 08:37)  POCT Blood Glucose.: 222 mg/dL (16 Sep 2019 21:33)  POCT Blood Glucose.: 148 mg/dL (16 Sep 2019 17:10)  POCT Blood Glucose.: 129 mg/dL (16 Sep 2019 15:41)  POCT Blood Glucose.: 131 mg/dL (16 Sep 2019 13:32)    I&O's Summary      PHYSICAL EXAM:  GENERAL: NAD, well-developed  CHEST/LUNG: Clear to auscultation bilaterally; No wheeze  HEART: Irregular rate and rhythm; No murmurs, rubs, or gallops  ABDOMEN: Soft, Nontender, Nondistended; Bowel sounds present  EXTREMITIES:  2+ Peripheral Pulses, No clubbing, cyanosis, or edema  NEUROLOGY: AAOx3, non-focal  PSYCH: calm  SKIN: No rashes or lesions    LABS:                        9.8    10.01 )-----------( 280      ( 17 Sep 2019 06:00 )             32.7     09-17    138  |  101  |  24<H>  ----------------------------<  316<H>  4.8   |  24  |  0.91    Ca    9.4      17 Sep 2019 06:00  Phos  3.7     09-16  Mg     1.9     09-17      PT/INR - ( 17 Sep 2019 06:00 )   PT: 20.7 SEC;   INR: 1.78          PTT - ( 17 Sep 2019 06:00 )  PTT:34.9 SEC          RADIOLOGY & ADDITIONAL TESTS:    Imaging Personally Reviewed:  Consultant(s) Notes Reviewed:    Care Discussed with Consultants/Other Providers:
Patient seen and examined at bedside.    Overnight Events:   No overnight events.     REVIEW OF SYSTEMS:  Constitutional:     [ ] negative [ ] fevers [ ] chills [ ] weight loss [ ] weight gain  HEENT:                  [ ] negative [ ] dry eyes [ ] eye irritation [ ] postnasal drip [ ] nasal congestion  CV:                         [ ] negative  [ ] chest pain [ ] orthopnea [ ] palpitations [ ] murmur  Resp:                     [ ] negative [ ] cough [ ] shortness of breath [ ] dyspnea [ ] wheezing [ ] sputum [ ]hemoptysis  GI:                          [ ] negative [ ] nausea [ ] vomiting [ ] diarrhea [ ] constipation [ ] abd pain [ ] dysphagia   :                        [ ] negative [ ] dysuria [ ] nocturia [ ] hematuria [ ] increased urinary frequency  Musculoskeletal: [ ] negative [ ] back pain [ ] myalgias [ ] arthralgias [ ] fracture  Skin:                       [ ] negative [ ] rash [ ] itch  Neurological:        [ ] negative [ ] headache [ ] dizziness [ ] syncope [ ] weakness [ ] numbness  Psychiatric:           [ ] negative [ ] anxiety [ ] depression  Endocrine:            [ ] negative [ ] diabetes [ ] thyroid problem  Heme/Lymph:      [ ] negative [ ] anemia [ ] bleeding problem  Allergic/Immune: [ ] negative [ ] itchy eyes [ ] nasal discharge [ ] hives [ ] angioedema    [ ] All other systems negative  [ ] Unable to assess ROS due to    Current Meds:  aspirin enteric coated 81 milliGRAM(s) Oral daily  atorvastatin 40 milliGRAM(s) Oral at bedtime  dextrose 40% Gel 15 Gram(s) Oral once PRN  dextrose 5%. 1000 milliLiter(s) IV Continuous <Continuous>  dextrose 50% Injectable 12.5 Gram(s) IV Push once  dextrose 50% Injectable 25 Gram(s) IV Push once  dextrose 50% Injectable 25 Gram(s) IV Push once  glucagon  Injectable 1 milliGRAM(s) IntraMuscular once PRN  insulin lispro (HumaLOG) corrective regimen sliding scale   SubCutaneous three times a day before meals  insulin lispro (HumaLOG) corrective regimen sliding scale   SubCutaneous at bedtime  metoprolol tartrate 75 milliGRAM(s) Oral two times a day  multivitamin 1 Tablet(s) Oral daily      PAST MEDICAL & SURGICAL HISTORY:  TIA (transient ischemic attack): 2013- Rt facial droop which resolved after 2 hours  Mitral stenosis  Atrial fibrillation: h/o Cardioversion in 2013 and on Coumadin  DM (diabetes mellitus): Type II  HLD (hyperlipidemia)  HTN (hypertension)  S/P laser cataract surgery, unspecified laterality: ???  S/P knee surgery  Ventral hernia      Vitals:  T(F): 97.6 (09-18), Max: 98.1 (09-17)  HR: 88 (09-18) (72 - 98)  BP: 128/85 (09-18) (114/72 - 128/85)  RR: 16 (09-18)  SpO2: 100% (09-18)  I&O's Summary      Physical Exam:  Appearance: No acute distress; well appearing  Eyes: PERRL, EOMI, pink conjunctiva  HENT: Normal oral mucosa  Cardiovascular: RRR, S1, S2, no murmurs, rubs, or gallops; no edema; no JVD  Respiratory: Clear to auscultation bilaterally  Gastrointestinal: soft, non-tender, non-distended with normal bowel sounds  Musculoskeletal: No clubbing; no joint deformity   Neurologic: Non-focal  Lymphatic: No lymphadenopathy  Psychiatry: AAOx3, mood & affect appropriate  Skin: No rashes, ecchymoses, or cyanosis                          9.5    14.49 )-----------( 260      ( 18 Sep 2019 06:30 )             31.6     09-18    140  |  107  |  20  ----------------------------<  132<H>  4.7   |  24  |  0.80    Ca    9.1      18 Sep 2019 06:30  Mg     1.9     09-18      PT/INR - ( 18 Sep 2019 06:30 )   PT: 29.5 SEC;   INR: 2.58          PTT - ( 17 Sep 2019 06:00 )  PTT:34.9 SEC      Serum Pro-Brain Natriuretic Peptide: 4293 pg/mL (09-13 @ 07:00)          New ECG(s): Personally reviewed    Echo:  < from: BUBBA w/o TTE (w/3D Echo) (09.16.19 @ 07:39) >  CONCLUSIONS:  1. Doming and thickening of the tips of the mitral valve  consistent with rheumatic mitral valve disease.  Mild-moderate mitral regurgitation. Mean transmitral valve  gradient equals 9 mm Hg, estimated mitral valve area equals  0.7 sqcm (by planimetry of 3D images), consistent with  severe mitral stenosis.  2. Calcified trileaflet aortic valve with normal opening.  Minimal aortic regurgitation.  3. Normal aortic root.  Mild non-mobile atherosclerotic  plaque in the aortic arch and descending thoracic aorta.  4. Dilated left atrium.  Dense spontaneous echo contrast  and thrombus were seen in the left atrial appendage.  5. Normal left ventricular systolic function.No segmental  wall motion abnormalities.  6. Normal right ventricular size and function.  7. Color flow Doppler demonstrates evidence of a patent  foramen ovale with left-to-right shunting.  No  right-to-left interatrial shunting was seen with the  intravenous injection of agitated saline contrast.    < end of copied text >    Stress Testing:     Cath:    Imaging:    Interpretation of Telemetry:
Patient sitting up in chair.  Feels well.  Currently no shortness of breath, chest discomfort, palpitations or lightheadedness.   NPO for BUBBA/DCCV today.    Vital Signs Last 24 Hrs  T(C): 36.8 (16 Sep 2019 06:18), Max: 36.8 (15 Sep 2019 14:55)  T(F): 98.2 (16 Sep 2019 06:18), Max: 98.2 (15 Sep 2019 14:55)  HR: 85 (15 Sep 2019 22:18) (78 - 85)  BP: 131/88 (16 Sep 2019 06:18) (127/87 - 135/89)  BP(mean): --  RR: 18 (16 Sep 2019 06:18) (18 - 18)  SpO2: 99% (16 Sep 2019 06:18) (98% - 100%)      Telemetry:  atrial flutter with variable block /rate controlled 70-80's.  MEDICATIONS  (STANDING):  aspirin enteric coated 81 milliGRAM(s) Oral daily  atorvastatin 40 milliGRAM(s) Oral at bedtime  dextrose 5%. 1000 milliLiter(s) (50 mL/Hr) IV Continuous <Continuous>  dextrose 50% Injectable 12.5 Gram(s) IV Push once  dextrose 50% Injectable 25 Gram(s) IV Push once  dextrose 50% Injectable 25 Gram(s) IV Push once  insulin lispro (HumaLOG) corrective regimen sliding scale   SubCutaneous three times a day before meals  insulin lispro (HumaLOG) corrective regimen sliding scale   SubCutaneous at bedtime  metoprolol tartrate 50 milliGRAM(s) Oral two times a day  multivitamin 1 Tablet(s) Oral daily    MEDICATIONS  (PRN):  dextrose 40% Gel 15 Gram(s) Oral once PRN Blood Glucose LESS THAN 70 milliGRAM(s)/deciliter  glucagon  Injectable 1 milliGRAM(s) IntraMuscular once PRN Glucose LESS THAN 70 milligrams/deciliter          Physical exam:   Gen- well developed well nourished NAD  Resp- clear to auscultation.  Decreased breath sounds at bases   CV- S1 and S2 RRR. + murmur.  No gallops or rubs  ABD- soft nontender + bowel sounds  EXT- no edema or calf tenderness  Neuro- grossly nonfocal                            9.7    10.75 )-----------( 280      ( 16 Sep 2019 05:00 )             32.1     PT/INR - ( 16 Sep 2019 05:00 )   PT: 21.1 SEC;   INR: 1.86            09-16    140  |  103  |  12  ----------------------------<  127<H>  3.8   |  24  |  0.63    Ca    9.4      16 Sep 2019 05:00  Phos  3.7     09-16  Mg     1.9     09-16
Patient is a 64y old  Female who presents with a chief complaint of ANDERSON and palpitations (18 Sep 2019 10:25)        SUBJECTIVE / OVERNIGHT EVENTS:    no acute events o/n  pt feels well, denies complaints  wants to go home       MEDICATIONS  (STANDING):  aspirin enteric coated 81 milliGRAM(s) Oral daily  atorvastatin 40 milliGRAM(s) Oral at bedtime  dextrose 5%. 1000 milliLiter(s) (50 mL/Hr) IV Continuous <Continuous>  dextrose 50% Injectable 12.5 Gram(s) IV Push once  dextrose 50% Injectable 25 Gram(s) IV Push once  dextrose 50% Injectable 25 Gram(s) IV Push once  enoxaparin Injectable 70 milliGRAM(s) SubCutaneous every 12 hours  insulin lispro (HumaLOG) corrective regimen sliding scale   SubCutaneous three times a day before meals  insulin lispro (HumaLOG) corrective regimen sliding scale   SubCutaneous at bedtime  metoprolol tartrate 75 milliGRAM(s) Oral two times a day  multivitamin 1 Tablet(s) Oral daily    MEDICATIONS  (PRN):  dextrose 40% Gel 15 Gram(s) Oral once PRN Blood Glucose LESS THAN 70 milliGRAM(s)/deciliter  glucagon  Injectable 1 milliGRAM(s) IntraMuscular once PRN Glucose LESS THAN 70 milligrams/deciliter      Vital Signs Last 24 Hrs  T(C): 36.8 (18 Sep 2019 13:20), Max: 36.8 (18 Sep 2019 13:20)  T(F): 98.3 (18 Sep 2019 13:20), Max: 98.3 (18 Sep 2019 13:20)  HR: 75 (18 Sep 2019 13:20) (72 - 98)  BP: 122/73 (18 Sep 2019 13:20) (114/72 - 128/85)  BP(mean): --  RR: 18 (18 Sep 2019 13:20) (16 - 18)  SpO2: 98% (18 Sep 2019 13:20) (98% - 100%)  CAPILLARY BLOOD GLUCOSE      POCT Blood Glucose.: 188 mg/dL (18 Sep 2019 12:56)  POCT Blood Glucose.: 120 mg/dL (18 Sep 2019 08:45)  POCT Blood Glucose.: 150 mg/dL (17 Sep 2019 23:28)  POCT Blood Glucose.: 223 mg/dL (17 Sep 2019 17:35)    I&O's Summary      PHYSICAL EXAM:  GENERAL: NAD, well-developed  CHEST/LUNG: Clear to auscultation bilaterally; No wheeze  HEART: Irregular rate and rhythm; No murmurs, rubs, or gallops  ABDOMEN: Soft, Nontender, Nondistended; Bowel sounds present  EXTREMITIES:  2+ Peripheral Pulses, No clubbing, cyanosis, or edema  NEUROLOGY: AAOx3, non-focal  PSYCH: calm  SKIN: No rashes or lesions    LABS:                        9.5    14.49 )-----------( 260      ( 18 Sep 2019 06:30 )             31.6     09-18    140  |  107  |  20  ----------------------------<  132<H>  4.7   |  24  |  0.80    Ca    9.1      18 Sep 2019 06:30  Mg     1.9     09-18      PT/INR - ( 18 Sep 2019 06:30 )   PT: 29.5 SEC;   INR: 2.58          PTT - ( 17 Sep 2019 06:00 )  PTT:34.9 SEC          RADIOLOGY & ADDITIONAL TESTS:    Imaging Personally Reviewed:  Consultant(s) Notes Reviewed:    Care Discussed with Consultants/Other Providers:

## 2019-09-18 NOTE — PROGRESS NOTE ADULT - PROBLEM SELECTOR PLAN 3
-TTE from 9/11 revealed "doming and thickening of the tips of the mitral valve  consistent with rheumatic mitral valve disease. Mild mitral regurgitation.  Mean transmitral valve gradient equals 14 mm Hg, consistent with severe mitral stenosis"  -BUBBA on Monday
-TTE from 9/11 revealed "doming and thickening of the tips of the mitral valve  consistent with rheumatic mitral valve disease. Mild mitral regurgitation.  Mean transmitral valve gradient equals 14 mm Hg, consistent with severe mitral stenosis"  -BUBBA/DCCV tomorrow
-TTE from 9/11 revealed "doming and thickening of the tips of the mitral valve  consistent with rheumatic mitral valve disease. Mild mitral regurgitation.  Mean transmitral valve gradient equals 14 mm Hg, consistent with severe mitral stenosis"  -h/o rheumatic heart dz  -pending BUBBA/DCCV
-TTE from 9/11 revealed "doming and thickening of the tips of the mitral valve  consistent with rheumatic mitral valve disease. Mild mitral regurgitation.  Mean transmitral valve gradient equals 14 mm Hg, consistent with severe mitral stenosis"  -h/o rheumatic heart dz  -s/p BUBBA 9/16 with redemonstration of severe MS, ? candidate for repair or replacement
-TTE from 9/11 revealed "doming and thickening of the tips of the mitral valve  consistent with rheumatic mitral valve disease. Mild mitral regurgitation.  Mean transmitral valve gradient equals 14 mm Hg, consistent with severe mitral stenosis"  -h/o rheumatic heart dz  -s/p BUBBA 9/16 with redemonstration of severe MS, ? candidate for repair or replacement-> will f/u Cards OP

## 2019-09-18 NOTE — CHART NOTE - NSCHARTNOTEFT_GEN_A_CORE
Family wants to take pt home today. Attending discussed with cardioogy, plan to give 1 dose of Lovenox tonight and discharge pt home with coumadin 4.5mg. Followup in clinic. Today INR of 2.5

## 2019-09-18 NOTE — PROGRESS NOTE ADULT - PROBLEM SELECTOR PROBLEM 3
Mitral valve stenosis, unspecified etiology

## 2019-09-20 ENCOUNTER — OUTPATIENT (OUTPATIENT)
Dept: OUTPATIENT SERVICES | Facility: HOSPITAL | Age: 64
LOS: 1 days | End: 2019-09-20

## 2019-09-20 ENCOUNTER — APPOINTMENT (OUTPATIENT)
Dept: INTERNAL MEDICINE | Facility: CLINIC | Age: 64
End: 2019-09-20
Payer: MEDICAID

## 2019-09-20 VITALS
DIASTOLIC BLOOD PRESSURE: 80 MMHG | SYSTOLIC BLOOD PRESSURE: 110 MMHG | OXYGEN SATURATION: 97 % | HEIGHT: 63 IN | WEIGHT: 130 LBS | BODY MASS INDEX: 23.04 KG/M2 | HEART RATE: 56 BPM

## 2019-09-20 DIAGNOSIS — Z98.89 OTHER SPECIFIED POSTPROCEDURAL STATES: Chronic | ICD-10-CM

## 2019-09-20 DIAGNOSIS — Z79.01 LONG TERM (CURRENT) USE OF ANTICOAGULANTS: ICD-10-CM

## 2019-09-20 DIAGNOSIS — I48.91 UNSPECIFIED ATRIAL FIBRILLATION: ICD-10-CM

## 2019-09-20 DIAGNOSIS — I51.3 INTRACARDIAC THROMBOSIS, NOT ELSEWHERE CLASSIFIED: ICD-10-CM

## 2019-09-20 DIAGNOSIS — Z98.49 CATARACT EXTRACTION STATUS, UNSPECIFIED EYE: Chronic | ICD-10-CM

## 2019-09-20 DIAGNOSIS — K43.9 VENTRAL HERNIA WITHOUT OBSTRUCTION OR GANGRENE: Chronic | ICD-10-CM

## 2019-09-20 DIAGNOSIS — E78.5 HYPERLIPIDEMIA, UNSPECIFIED: ICD-10-CM

## 2019-09-20 DIAGNOSIS — I05.0 RHEUMATIC MITRAL STENOSIS: ICD-10-CM

## 2019-09-20 DIAGNOSIS — E11.9 TYPE 2 DIABETES MELLITUS WITHOUT COMPLICATIONS: ICD-10-CM

## 2019-09-20 DIAGNOSIS — I63.319 CEREBRAL INFARCTION DUE TO THROMBOSIS OF UNSPECIFIED MIDDLE CEREBRAL ARTERY: ICD-10-CM

## 2019-09-20 LAB
GLUCOSE BLDC GLUCOMTR-MCNC: 180
INR PPP: 2.4 RATIO
POCT-PROTHROMBIN TIME: 29.2 SECS

## 2019-09-20 PROCEDURE — 99214 OFFICE O/P EST MOD 30 MIN: CPT

## 2019-09-20 RX ORDER — GABAPENTIN 100 MG/1
100 CAPSULE ORAL 3 TIMES DAILY
Qty: 90 | Refills: 3 | Status: DISCONTINUED | COMMUNITY
Start: 2018-07-02 | End: 2019-09-20

## 2019-09-20 RX ORDER — WARFARIN 6 MG/1
6 TABLET ORAL
Qty: 30 | Refills: 3 | Status: DISCONTINUED | COMMUNITY
Start: 2018-07-27 | End: 2019-09-20

## 2019-09-20 NOTE — HISTORY OF PRESENT ILLNESS
[Post-hospitalization from ___ Hospital] : Post-hospitalization from [unfilled] Hospital [Admitted on: ___] : The patient was admitted on [unfilled] [Discharged on ___] : discharged on [unfilled] [Discharge Summary] : discharge summary [Pertinent Labs] : pertinent labs [Discharge Med List] : discharge medication list [Radiology Findings] : radiology findings [Med Reconciliation] : medication reconciliation has been completed [FreeTextEntry2] : [employer] Nadine  # [id number]\par 64 year old woman with history of atrial fibrillation, controlled DM type 2, HLD, CVA who was admitted 9/13-9/18/19 for atrial flutter with RVR, found to have severe mitral stenosis from prior RHD, left atrial thrombus, PFO. She was rate controlled with metoprolol and continued on anticoagulation with increased INR goal 3-3.5. She was discharged on 4.5mg daily coumadin without lovenox; d/c INR was 2.5. \par Today, she is here with her daughter in law who also interprets for her and her son is on the phone in the visit. She reports she is feeling well, denies chest pain, shortness of breath, orthopnea, palpitations, dizziness, syncope. She has been taking all her medications at home without any issues. She has an INR follow up appt scheduled on Monday and cardiology follow up scheduled Weds to discuss the possibility of mitral valve replacement. Denies any bleeding or bruising. ANDERSON is resolved.

## 2019-09-20 NOTE — REVIEW OF SYSTEMS
[Chills] : no chills [Fever] : no fever [Recent Change In Weight] : ~T no recent weight change [Night Sweats] : no night sweats [Vision Problems] : no vision problems [Discharge] : no discharge [Nasal Discharge] : no nasal discharge [Palpitations] : no palpitations [Chest Pain] : no chest pain [Lower Ext Edema] : no lower extremity edema [Orthopnea] : no orthopnea [Leg Claudication] : no leg claudication [Paroysmal Nocturnal Dyspnea] : no paroysmal nocturnal dyspnea [Shortness Of Breath] : no shortness of breath [Cough] : no cough [Dyspnea on Exertion] : no dyspnea on exertion [Melena] : no melena [Abdominal Pain] : no abdominal pain [Dysuria] : no dysuria [Hematuria] : no hematuria [Muscle Pain] : no muscle pain [Joint Pain] : no joint pain [Skin Rash] : no skin rash [Headache] : no headache [Dizziness] : no dizziness [Easy Bleeding] : no easy bleeding [Confusion] : no confusion [Easy Bruising] : no easy bruising

## 2019-09-20 NOTE — PHYSICAL EXAM
[No Acute Distress] : no acute distress [Well Nourished] : well nourished [Well-Appearing] : well-appearing [Well Developed] : well developed [Normal Sclera/Conjunctiva] : normal sclera/conjunctiva [EOMI] : extraocular movements intact [Normal Outer Ear/Nose] : the outer ears and nose were normal in appearance [No Respiratory Distress] : no respiratory distress  [No JVD] : no jugular venous distention [Clear to Auscultation] : lungs were clear to auscultation bilaterally [No Accessory Muscle Use] : no accessory muscle use [No Edema] : there was no peripheral edema [Soft] : abdomen soft [No Extremity Clubbing/Cyanosis] : no extremity clubbing/cyanosis [Non Tender] : non-tender [Non-distended] : non-distended [Coordination Grossly Intact] : coordination grossly intact [No Rash] : no rash [Normal Affect] : the affect was normal [Normal Mood] : the mood was normal [de-identified] : irregularly irregular, no tachycardia [de-identified] : slow gait but normal stance

## 2019-09-22 ENCOUNTER — INPATIENT (INPATIENT)
Facility: HOSPITAL | Age: 64
LOS: 3 days | Discharge: NURSING, REHAB OR LONG-TERM CARE | DRG: 64 | End: 2019-09-26
Attending: SPECIALIST | Admitting: SPECIALIST
Payer: MEDICAID

## 2019-09-22 ENCOUNTER — TRANSCRIPTION ENCOUNTER (OUTPATIENT)
Age: 64
End: 2019-09-22

## 2019-09-22 VITALS
SYSTOLIC BLOOD PRESSURE: 114 MMHG | DIASTOLIC BLOOD PRESSURE: 72 MMHG | OXYGEN SATURATION: 98 % | HEART RATE: 108 BPM | TEMPERATURE: 98 F | RESPIRATION RATE: 18 BRPM

## 2019-09-22 DIAGNOSIS — K43.9 VENTRAL HERNIA WITHOUT OBSTRUCTION OR GANGRENE: Chronic | ICD-10-CM

## 2019-09-22 DIAGNOSIS — Z98.49 CATARACT EXTRACTION STATUS, UNSPECIFIED EYE: Chronic | ICD-10-CM

## 2019-09-22 DIAGNOSIS — I63.9 CEREBRAL INFARCTION, UNSPECIFIED: ICD-10-CM

## 2019-09-22 DIAGNOSIS — Z98.89 OTHER SPECIFIED POSTPROCEDURAL STATES: Chronic | ICD-10-CM

## 2019-09-22 LAB
ALBUMIN SERPL ELPH-MCNC: 4 G/DL — SIGNIFICANT CHANGE UP (ref 3.3–5)
ALP SERPL-CCNC: 55 U/L — SIGNIFICANT CHANGE UP (ref 40–120)
ALT FLD-CCNC: 21 U/L — SIGNIFICANT CHANGE UP (ref 10–45)
ANION GAP SERPL CALC-SCNC: 13 MMOL/L — SIGNIFICANT CHANGE UP (ref 5–17)
APPEARANCE UR: CLEAR — SIGNIFICANT CHANGE UP
APTT BLD: 44.7 SEC — HIGH (ref 27.5–36.3)
AST SERPL-CCNC: 15 U/L — SIGNIFICANT CHANGE UP (ref 10–40)
BASOPHILS # BLD AUTO: 0 K/UL — SIGNIFICANT CHANGE UP (ref 0–0.2)
BASOPHILS NFR BLD AUTO: 0.2 % — SIGNIFICANT CHANGE UP (ref 0–2)
BILIRUB SERPL-MCNC: 1 MG/DL — SIGNIFICANT CHANGE UP (ref 0.2–1.2)
BILIRUB UR-MCNC: NEGATIVE — SIGNIFICANT CHANGE UP
BLD GP AB SCN SERPL QL: NEGATIVE — SIGNIFICANT CHANGE UP
BUN SERPL-MCNC: 17 MG/DL — SIGNIFICANT CHANGE UP (ref 7–23)
CALCIUM SERPL-MCNC: 9.9 MG/DL — SIGNIFICANT CHANGE UP (ref 8.4–10.5)
CHLORIDE SERPL-SCNC: 101 MMOL/L — SIGNIFICANT CHANGE UP (ref 96–108)
CK MB BLD-MCNC: 4.1 % — HIGH (ref 0–3.5)
CK MB CFR SERPL CALC: 4.2 NG/ML — HIGH (ref 0–3.8)
CK SERPL-CCNC: 102 U/L — SIGNIFICANT CHANGE UP (ref 25–170)
CO2 SERPL-SCNC: 22 MMOL/L — SIGNIFICANT CHANGE UP (ref 22–31)
COLOR SPEC: SIGNIFICANT CHANGE UP
CREAT SERPL-MCNC: 0.67 MG/DL — SIGNIFICANT CHANGE UP (ref 0.5–1.3)
DIFF PNL FLD: NEGATIVE — SIGNIFICANT CHANGE UP
EOSINOPHIL # BLD AUTO: 0 K/UL — SIGNIFICANT CHANGE UP (ref 0–0.5)
EOSINOPHIL NFR BLD AUTO: 0.3 % — SIGNIFICANT CHANGE UP (ref 0–6)
GLUCOSE SERPL-MCNC: 158 MG/DL — HIGH (ref 70–99)
GLUCOSE UR QL: NEGATIVE — SIGNIFICANT CHANGE UP
HCT VFR BLD CALC: 35.2 % — SIGNIFICANT CHANGE UP (ref 34.5–45)
HGB BLD-MCNC: 10.7 G/DL — LOW (ref 11.5–15.5)
INR BLD: 3.2 RATIO — HIGH (ref 0.88–1.16)
KETONES UR-MCNC: NEGATIVE — SIGNIFICANT CHANGE UP
LEUKOCYTE ESTERASE UR-ACNC: NEGATIVE — SIGNIFICANT CHANGE UP
LYMPHOCYTES # BLD AUTO: 18.1 % — SIGNIFICANT CHANGE UP (ref 13–44)
LYMPHOCYTES # BLD AUTO: 2.4 K/UL — SIGNIFICANT CHANGE UP (ref 1–3.3)
MCHC RBC-ENTMCNC: 26.6 PG — LOW (ref 27–34)
MCHC RBC-ENTMCNC: 30.4 GM/DL — LOW (ref 32–36)
MCV RBC AUTO: 87.5 FL — SIGNIFICANT CHANGE UP (ref 80–100)
MONOCYTES # BLD AUTO: 0.7 K/UL — SIGNIFICANT CHANGE UP (ref 0–0.9)
MONOCYTES NFR BLD AUTO: 5 % — SIGNIFICANT CHANGE UP (ref 2–14)
NEUTROPHILS # BLD AUTO: 10.1 K/UL — HIGH (ref 1.8–7.4)
NEUTROPHILS NFR BLD AUTO: 76.4 % — SIGNIFICANT CHANGE UP (ref 43–77)
NITRITE UR-MCNC: NEGATIVE — SIGNIFICANT CHANGE UP
PH UR: 6.5 — SIGNIFICANT CHANGE UP (ref 5–8)
PLATELET # BLD AUTO: 319 K/UL — SIGNIFICANT CHANGE UP (ref 150–400)
POTASSIUM SERPL-MCNC: 4.4 MMOL/L — SIGNIFICANT CHANGE UP (ref 3.5–5.3)
POTASSIUM SERPL-SCNC: 4.4 MMOL/L — SIGNIFICANT CHANGE UP (ref 3.5–5.3)
PROT SERPL-MCNC: 7.5 G/DL — SIGNIFICANT CHANGE UP (ref 6–8.3)
PROT UR-MCNC: SIGNIFICANT CHANGE UP
PROTHROM AB SERPL-ACNC: 38.2 SEC — HIGH (ref 10–12.9)
RBC # BLD: 4.02 M/UL — SIGNIFICANT CHANGE UP (ref 3.8–5.2)
RBC # FLD: 15.5 % — HIGH (ref 10.3–14.5)
RH IG SCN BLD-IMP: POSITIVE — SIGNIFICANT CHANGE UP
SODIUM SERPL-SCNC: 136 MMOL/L — SIGNIFICANT CHANGE UP (ref 135–145)
SP GR SPEC: 1.05 — HIGH (ref 1.01–1.02)
TROPONIN T, HIGH SENSITIVITY RESULT: 8 NG/L — SIGNIFICANT CHANGE UP (ref 0–51)
UROBILINOGEN FLD QL: NEGATIVE — SIGNIFICANT CHANGE UP
WBC # BLD: 13.2 K/UL — HIGH (ref 3.8–10.5)
WBC # FLD AUTO: 13.2 K/UL — HIGH (ref 3.8–10.5)

## 2019-09-22 PROCEDURE — 70498 CT ANGIOGRAPHY NECK: CPT | Mod: 26

## 2019-09-22 PROCEDURE — 99291 CRITICAL CARE FIRST HOUR: CPT

## 2019-09-22 PROCEDURE — 93010 ELECTROCARDIOGRAM REPORT: CPT

## 2019-09-22 PROCEDURE — 71045 X-RAY EXAM CHEST 1 VIEW: CPT | Mod: 26

## 2019-09-22 PROCEDURE — 70496 CT ANGIOGRAPHY HEAD: CPT | Mod: 26

## 2019-09-22 PROCEDURE — 70450 CT HEAD/BRAIN W/O DYE: CPT | Mod: 26,59,76

## 2019-09-22 PROCEDURE — 0042T: CPT

## 2019-09-22 RX ORDER — ASPIRIN/CALCIUM CARB/MAGNESIUM 324 MG
300 TABLET ORAL DAILY
Refills: 0 | Status: DISCONTINUED | OUTPATIENT
Start: 2019-09-22 | End: 2019-09-22

## 2019-09-22 RX ORDER — DEXTROSE 50 % IN WATER 50 %
15 SYRINGE (ML) INTRAVENOUS ONCE
Refills: 0 | Status: DISCONTINUED | OUTPATIENT
Start: 2019-09-22 | End: 2019-09-26

## 2019-09-22 RX ORDER — ATORVASTATIN CALCIUM 80 MG/1
40 TABLET, FILM COATED ORAL AT BEDTIME
Refills: 0 | Status: DISCONTINUED | OUTPATIENT
Start: 2019-09-22 | End: 2019-09-26

## 2019-09-22 RX ORDER — DEXTROSE 50 % IN WATER 50 %
12.5 SYRINGE (ML) INTRAVENOUS ONCE
Refills: 0 | Status: DISCONTINUED | OUTPATIENT
Start: 2019-09-22 | End: 2019-09-26

## 2019-09-22 RX ORDER — INSULIN LISPRO 100/ML
VIAL (ML) SUBCUTANEOUS EVERY 6 HOURS
Refills: 0 | Status: DISCONTINUED | OUTPATIENT
Start: 2019-09-22 | End: 2019-09-24

## 2019-09-22 RX ORDER — DEXTROSE 50 % IN WATER 50 %
25 SYRINGE (ML) INTRAVENOUS ONCE
Refills: 0 | Status: DISCONTINUED | OUTPATIENT
Start: 2019-09-22 | End: 2019-09-26

## 2019-09-22 RX ORDER — GLUCAGON INJECTION, SOLUTION 0.5 MG/.1ML
1 INJECTION, SOLUTION SUBCUTANEOUS ONCE
Refills: 0 | Status: DISCONTINUED | OUTPATIENT
Start: 2019-09-22 | End: 2019-09-26

## 2019-09-22 RX ORDER — SODIUM CHLORIDE 9 MG/ML
1000 INJECTION, SOLUTION INTRAVENOUS
Refills: 0 | Status: DISCONTINUED | OUTPATIENT
Start: 2019-09-22 | End: 2019-09-26

## 2019-09-22 RX ORDER — SODIUM CHLORIDE 9 MG/ML
1000 INJECTION INTRAMUSCULAR; INTRAVENOUS; SUBCUTANEOUS
Refills: 0 | Status: DISCONTINUED | OUTPATIENT
Start: 2019-09-22 | End: 2019-09-23

## 2019-09-22 RX ADMIN — SODIUM CHLORIDE 75 MILLILITER(S): 9 INJECTION INTRAMUSCULAR; INTRAVENOUS; SUBCUTANEOUS at 21:00

## 2019-09-22 RX ADMIN — Medication 1 DROP(S): at 23:10

## 2019-09-22 NOTE — ED PROVIDER NOTE - ATTENDING CONTRIBUTION TO CARE
64 yof pmh DM, hld, htn, afib on coumadin, CVA w/o residual deficits brought by son for AMS. Last known normal 1:30a night before then this morning around 10a son found patient in bed not moving her right side and called EMS. Patient is aphasic and not able to provide history. Baseline is aox3 and ambulatory. Son denies any trauma. Reports compliance with all home meds    AP - right sided hemiplegia noted on exam. out of window for TPA. will get CT/CTA and stat neuro consult for possible endovascular intervention. labs. admit

## 2019-09-22 NOTE — ED PROVIDER NOTE - PMH
Atrial fibrillation  h/o Cardioversion in 2013 and on Coumadin  DM (diabetes mellitus)  Type II  HLD (hyperlipidemia)    HTN (hypertension)    Mitral stenosis    TIA (transient ischemic attack)  2013- Rt facial droop which resolved after 2 hours

## 2019-09-22 NOTE — H&P ADULT - ATTENDING COMMENTS
VASCULAR NEUROLOGY ATTENDING  The patient is seen and examined the history and imaging are reviewed. I agree with the resident note unless otherwise noted. In brief this is a 64 year old RH Nadine speaking female (Spoken via family and stroke PA in native language) multiple vascular risk factors, with recent hospitalization at [facility] for AF for potential cardioversion and intervention for severe mitral stenosis found to have KRISTAN thrombus maintained of coumadin with labile INR. Prior R MCA stroke in 2013 and 2016 with no residual deficits. LKW 2:00am 9/22 at a family gathering. Found by family at 9:00am 9/22 unable to speak or move her R side. NIHSS: 19. CT Head showed L MCA acute-subacute infarct. CTA head demonstrated L M2 occlusion. On exam non-verbal following mid line commands only. R side hemiplegia. L gaze preference. Impression: Presumed cardioembolism from AF and KRISTAN thrombus in the setting of labile INR. Will start heparin drip with risks and benefits explained at length to patients family. Repeat CT head. Cardiology eval. MRI brain PT/OT/SS.

## 2019-09-22 NOTE — H&P ADULT - NSHPLABSRESULTS_GEN_ALL_CORE
10.7   13.2  )-----------( 319      ( 22 Sep 2019 11:45 )             35.2     09-22    136  |  101  |  17  ----------------------------<  158<H>  4.4   |  22  |  0.67    Ca    9.9      22 Sep 2019 11:45    TPro  7.5  /  Alb  4.0  /  TBili  1.0  /  DBili  x   /  AST  15  /  ALT  21  /  AlkPhos  55  09-22    CAPILLARY BLOOD GLUCOSE      POCT Blood Glucose.: 140 mg/dL (22 Sep 2019 11:34)    PT/INR - ( 22 Sep 2019 11:45 )   PT: 38.2 sec;   INR: 3.20 ratio         PTT - ( 22 Sep 2019 11:45 )  PTT:44.7 sec    Vital Signs Last 24 Hrs  T(C): 36.7 (22 Sep 2019 11:30), Max: 36.7 (22 Sep 2019 11:18)  T(F): 98 (22 Sep 2019 11:30), Max: 98 (22 Sep 2019 11:18)  HR: 106 (22 Sep 2019 11:50) (97 - 108)  BP: 129/77 (22 Sep 2019 11:50) (109/74 - 129/77)  RR: 17 (22 Sep 2019 11:50) (17 - 20)  SpO2: 97% (22 Sep 2019 11:50) (97% - 100%)    CT Head 9/22/19    Acute to subacute infarct of left MCA territory without hemorrhagic   transformation.  Chronic right MCA infarct.        CTA Head and Neck 9/22/19  A left frontal perisylvian calcification versus cavernous malformation is   unchanged compared to the 2016 head CT study.    A short segment left middle cerebral artery M2 branch occlusion is noted.   The M1 segment is widely patent.    The right M1 M2 thrombus has resolved compared to the 2016 CT angiogram   study. No right MCA stenosis is present on the current study.    In addition to the acute-subacute left middle cerebral artery territory   infarct, an additional acute to subacute infarct involves the distal left   anterior cerebral artery territory at the level of the superior frontal   gyrus, with edema and swelling, without hemorrhagic transformation. 10.7   13.2  )-----------( 319      ( 22 Sep 2019 11:45 )             35.2     09-22    136  |  101  |  17  ----------------------------<  158<H>  4.4   |  22  |  0.67    Ca    9.9      22 Sep 2019 11:45    TPro  7.5  /  Alb  4.0  /  TBili  1.0  /  DBili  x   /  AST  15  /  ALT  21  /  AlkPhos  55  09-22    CAPILLARY BLOOD GLUCOSE      POCT Blood Glucose.: 140 mg/dL (22 Sep 2019 11:34)    PT/INR - ( 22 Sep 2019 11:45 )   PT: 38.2 sec;   INR: 3.20 ratio         PTT - ( 22 Sep 2019 11:45 )  PTT:44.7 sec    Vital Signs Last 24 Hrs  T(C): 36.7 (22 Sep 2019 11:30), Max: 36.7 (22 Sep 2019 11:18)  T(F): 98 (22 Sep 2019 11:30), Max: 98 (22 Sep 2019 11:18)  HR: 106 (22 Sep 2019 11:50) (97 - 108)  BP: 129/77 (22 Sep 2019 11:50) (109/74 - 129/77)  RR: 17 (22 Sep 2019 11:50) (17 - 20)  SpO2: 97% (22 Sep 2019 11:50) (97% - 100%)    CTH: Acute to subacute infarct of left MCA territory without hemorrhagic transformation.Chronic right MCA infarct.  CT angiography neck: No evidence for carotid or vertebral artery stenosis.  CT angiography brain: A short segment left middle cerebral artery M2 branch occlusion is noted. The M1 segment is widely patent.

## 2019-09-22 NOTE — ED ADULT NURSE REASSESSMENT NOTE - NS ED NURSE REASSESS COMMENT FT1
Pt returned from CT scan. Pt. remains on cardiac monitor, pulse ox. Family at bedside. Red socks applied. Bed in lowest position, side rails up. nonverbal indicators of pain not present.

## 2019-09-22 NOTE — ED PROVIDER NOTE - PHYSICAL EXAMINATION
GEN APPEARANCE: WDWN, alert non-toxic appearing and in NAD  HEAD: Atraumatic, normocephalic   EYES: PERRLa, EOMI, vision grossly intact.   EARS: Gross hearing intact.   NOSE: No nasal discharge, no external evidence of epistaxis.   CV: irregular irregular S1S2, no c/r/m/g. No cyanosis or pallor. Extremities warm, well perfused. Cap refill <2 seconds. No bruits.   LUNGS: CTAB. No wheezing. No rales. No rhonchi. No diminished breath sounds.   ABDOMEN: Soft, NTND. No guarding or rebound. No masses.   MSK: Spine appears normal, no spine point tenderness. No CVA ttp. No joint erythema or tenderness. Normal muscular development. Pelvis stable.  EXTREMITIES: No peripheral edema. No obvious joint or bony deformity.  NEURO: Alert, aphasic unable to move/sensation of RUE/RLE, L side moving spontaneously   SKIN: Normal color for race, warm, dry and intact. No evidence of rash.  PSYCH: aphasic

## 2019-09-22 NOTE — ED ADULT NURSE NOTE - INTERVENTIONS DEFINITIONS
Reinforce activity limits and safety measures with patient and family/[place] to call system/Call bell, personal items and telephone within reach/Monitor for mental status changes and reorient to person, place, and time/Stretcher in lowest position, wheels locked, appropriate side rails in place

## 2019-09-22 NOTE — DISCHARGE NOTE NURSING/CASE MANAGEMENT/SOCIAL WORK - NSDCPEPTSTRK_GEN_ALL_CORE
Stroke support groups for patients, families, and friends/Call 911 for stroke/Stroke warning signs and symptoms/Signs and symptoms of stroke/Need for follow up after discharge/Prescribed medications/Risk factors for stroke/Stroke education booklet

## 2019-09-22 NOTE — DISCHARGE NOTE NURSING/CASE MANAGEMENT/SOCIAL WORK - PATIENT PORTAL LINK FT
You can access the FollowMyHealth Patient Portal offered by [facility] by registering at the following website: [email or web address]. By joining [facility]’s FollowMyHealth portal, you will also be able to view your health information using other applications (apps) compatible with our system.

## 2019-09-22 NOTE — ED PROVIDER NOTE - OBJECTIVE STATEMENT
64F hx of afib on coumadin prior cva w/o residual deficits, HTN HLD DM presents with cc of inability to speak and new R side weakness, per family at beside last known normal 1-2am of early morning prior to presentation, pt was found unable to speak or move arm per family this morning. Pt is aphasic, unable to provide additional history.

## 2019-09-22 NOTE — ED ADULT NURSE NOTE - OBJECTIVE STATEMENT
63 yo F presents to ED awake by EMS accompanied by son from home c/o AMS. As per son, patient was last seen normal 1:30 am last night, states patient woke up this morning and was "unable to speak." Pt. arrives with R side hemiparesis and aphasia. L arm and leg weakness is noted but patient is able to move L arm/leg. Pupils equal round and reactive to light. Breathing unlabored on RA. Skin warm dry and of color appropriate for ethnicity. Large area of ecchymosis noted to under R breast area and to R lower abdomen. EKG completed, pt. placed on cardiac monitor. Fingerstick completed. See paper neuro flow sheet for additional vital signs and neuro assessment. Dysphagic screen as documented to flow sheet. Bed in lowest position, side rails up. Comfort and safety measures in place. Call bell within reach, family at bedside and educated on call bell use.

## 2019-09-22 NOTE — H&P ADULT - HISTORY OF PRESENT ILLNESS
64Y RH Nadine speaking female presented with inability to speak and weakness of her R side. LKW 2:00am 9/22 at a family gathering. History obtained from patients children at bedside, who interpreted for the patient. Patients sister heard her get up and ambulate to the bathroom at 5:00am 9/22, however when family members went to wake her up for temple this morning around 9:00am, they found her unable to speak or move her R side. NIHSS: 19. CT Head showed L MCA acute-subacute infarct. CTA head demonstrated L M2 occlusion. Patient is currently being evaluated for endovascular treatment. 64Y RH Nadine speaking female with PMH Afib on coumadin, prior R MCA stroke in 2013 and 2016 with no residual deficits, HTN, HLD, DM2, mitral stenosis due to rheumatic valvular disease who presented with inability to speak and weakness of her R side. LKW 2:00am 9/22 at a family gathering. History obtained from patients children at bedside, who interpreted for the patient. Patients sister heard her get up and ambulate to the bathroom at 5:00am 9/22, however when family members went to wake her up for temple this morning around 9:00am, they found her unable to speak or move her R side. NIHSS: 19. CT Head showed L MCA acute-subacute infarct. CTA head demonstrated L M2 occlusion. Patient was not a candidate for endovascular treatment given core infarct of 0 with penumbra of 7.

## 2019-09-22 NOTE — PATIENT PROFILE ADULT - NSPROMEDSPATCH_GEN_A_NUR
[facility] Progress Note for 2017 10:39 AM    Patient Name:JENIFFER THOMAS   Account #:[id number]  MRN:[id number]  Gender:Female  YOB: 2015 5:31 PM    Demographics    Date:2017 10:39:56 AM  Age:23 days  Post Conceptional Age:34 weeks 6 days  Weight:1.740kg as of 2017    Date/Time of Admission:2017 5:31:00 PM  Birth Date/Time:2015 5:31:00 PM  Gestational Age at Birth:31 weeks 4 days    Primary Care Physician:Dandre Liz MD    Current Medications:Duration:  1. Poly-Vi-Sol with Iron 1 mL Oral q 24h (750 unit-400 unit-10 mg/mL   drops(Oral))  (Until Discontinued)  Day 19  2. Vitamin A and D Diaper Rash 1 inch Top  q 3h PRN diaper changes (1 inch/1   application ointment(Top))  (Until Discontinued)  Day 12    PHYSICAL EXAMINATION    Respiratory Statusroom air    Growth Parameter(s)Weight: 1.740 kg   Length: 40.5 cm   HC: 27.0 cm    General:Bed/Temperature Support  stable in open crib;  Respiratory Support  room   air;  Head:fontanelle  normal, flat;  normocephalic -;  Nose:NG tube  yes;  Neck:general appearance  normal;  range of motion  normal;  Respiratory:respiratory effort  normal;  breath sounds  bilateral, clear;  Cardiac:rhythm  sinus rhythm;  murmur  no;  perfusion  normal;  pulses  normal;  Abdomen:abdomen  soft, nontender, round, bowel sounds present, organomegaly   absent;  Genitourinary:genitalia  , female;  Anus and Rectum:anus  patent;  Spine:sacral dimple  yes- base visible;  Skin:skin appearance  ;  Neuro:mental status  responsive;  muscle tone  normal;    NUTRITION    Actual Enteral:  Breast Milk + Similac HMF HP CL (24 saira): 30ml po q 3hr  Alternate nipple and gavage  Gavage Feeding Duration 30 min  If Breast Milk + Similac HMF HP CL (24 saira) not available, use Enfamil Premature   (20 saira)    Total Actual Enteral:215 vdt299 ml/kg/day98 saira/kg/day    Projected Enteral:  Breast Milk + Similac HMF HP CL (24 saira): 30ml po q 3hr  Alternate  nipple and gavage  Gavage Feeding Duration 30 min  If Breast Milk + Similac HMF HP CL (24 saira) not available, use Enfamil Premature   (20 saira)    Total Projected Enteral:240 lwb142 ml/kg/hhc312 saira/kg/day    Output:  Stool (#):7Stool (g):  Void (#):8    DIAGNOSES  1.  , gestational age 31 completed weeks (P07.34)  Onset:2017  Comments:  Gestational age based on [facility] [facility] examination and EDC.    Plans:  obtain car seat screen prior to discharge   [facility] per protocol     2. Slow feeding of  (P92.2)  Onset:2017  Comments:  Infant requiring gavage feeds due to prematurity.  Completed 3 of 3 feeds with   overall good effort.  Plans:   alternate nipple/gavage   follow with OT/PT     3. Other specified disturbances of temperature regulation of  (P81.8)  Onset:2017  Comments:  Admitted to isolette. Open crib .   Plans:   follow temperature in an open crib     4. Nutritional Support ()  Onset:2017  Medications:  1.Poly-Vi-Sol with Iron 1 mL Oral q 24h (750 unit-400 unit-10 mg/mL drops(Oral))    (Until Discontinued)  Weight: 1.51 kg started on 2017  Comments:  Feeding choice: Breast.  NPO at time of admission. Feeds begun . Tolerating   advancement. Surpassed birth weight by day of life 10.  Weight gain of 21 g/day   over previous week ending .  Plans:   Poly-Vi-Sol with Iron   24 saira/oz feeds     5. Encounter for examination of ears and hearing without abnormal findings   (Z01.10)  Onset:2017  Comments:  [place] hearing screening indicated.  Plans:   obtain a hearing screen before discharge     6. Encounter for immunization (Z23)  Onset:2017  Comments:  Recommended immunizations prior to discharge as indicated.  Not a candidate for   synagis, off of O2 within 24 hours of birth.  Plans:   complete immunizations on schedule     7. Encounter for screening for certain developmental disorders in childhood   (Z13.4)  Onset:2017  Comments:  Infant  at risk for long term neurologic sequelae secondary to low birth weight   and prematurity.  Plans:   follow in Neurodevelopmental Clinic at 4 months corrected age if BW 1000 - 1500   grams and infant develops neurological issues during hospitalization     8. Encounter for screening for other metabolic disorders - [place] Metabolic   Screening (Z13.228)  Onset:2017  Comments:  [place] metabolic screening indicated, obtained .  Abnormal amino acid   profile noted on  screen otherwise WNL. Screen repeated .  Plans:  follow  screens from     9. Encounter for screening for other nervous system disorders (Z13.858)  Onset:2017  Comments:  At risk for intraventricular hemorrhage secondary to prematurity. 10 day cranial   ultrasound normal.   Plans:   repeat cranial ultrasound at 7 weeks of age to evaluate for PVL     10. Encounter for examination of eyes and vision without abnormal findings   (Z01.00)  Onset:2017  Comments:  At risk for ROP since birth weight less than 1500 grams.  Plans:   obtain initial ophthalmologic examination at 4 weeks of chronological age (due   week of )    11. Diaper dermatitis (L22)  Onset:2017  Medications:  1.Vitamin A and D Diaper Rash 1 inch Top  q 3h PRN diaper changes (1 inch/1   application ointment(Top))  (Until Discontinued)  Weight: 1.48 kg started on   2017  Comments:  At risk due to prematurity.  Plans:  diaper cream as needed    CARE PLAN  1. Parental Interaction  Onset: 2017  Comments  [id number]) Mother updated at the bedside regarding plans to continue current   care.    Plans   continue family updates     2. Discharge Plans  Onset: 2017  Comments  The infant will be ready for discharge upon demonstration for at least 48 hours   each of the following: (1) physiologically mature and stable cardiorespiratory   function (2) sustained pattern of weight gain (3) maintenance of normal   thermoregulation in an open crib and  (4) competent feedings without   cardiorespiratory compromise.    Rounds made/plan of care discussed with Angus Rosario MD  .    Preparer:JANIS: FELICITY Omalley, APRN 2017 10:39 AM      Attending: JANIS: Angus Rosario MD 2017 12:21 PM   none

## 2019-09-22 NOTE — H&P ADULT - ASSESSMENT
Impression:    Plan: 64Y RH Nadine speaking female with PMH Afib on coumadin, prior R MCA stroke in 2013 and 2016 with no residual deficits, HTN, HLD, DM2, mitral stenosis due to rheumatic valvular disease who presented with inability to speak and weakness of her R side. LKW 2:00am 9/22 at a family gathering. Found by family at 9:00am 9/22 unable to speak or move her R side. NIHSS: 19. CT Head showed L MCA acute-subacute infarct. CTA head demonstrated L M2 occlusion. Patient was not a candidate for endovascular treatment given core infarct of 0 with penumbra of 7 with significant L MCA hypodensity on CT Head.     Impression:  L MCA territory infarct with L M2 occlusion likely due to cardioembolism in the setting on Atrial fibrillation    Plan:    Stroke acute management:   - Admit to stroke unit   - Frequent neuro-checks q1h   - Permissive HTN up to    - Baseline EKG and CXR   - Intravenous hydration with normal saline at 75cc per hour   - Bedside swallow evaluation   - Head of bed > 30 degrees for aspiration prevention and aspiration precautions ordered.    Stroke workup:  - repeat CT Head in AM 9/23   -MRI brain without contrast    Secondary prevention of stroke:   -INR  in AM, if INR <2 can start heparin if no oral access, eventually transition to coumadin. Consider adding ASA to     regimen.    -Atorvastatin 40 mg daily (long-term goal LDL < 70)   -Tight glucose control (long-term goal HgbA1c < 6%)   -Stroke education and counseling   -Rehabilitation: physical therapy, occupational therapy, speech therapy consults   -Consulted social work and case management for help with discharge    Patient was discussed with Dr. Libman, will be formally seen and evaluated in the AM by Dr. Garcia

## 2019-09-22 NOTE — ED ADULT NURSE REASSESSMENT NOTE - NS ED NURSE REASSESS COMMENT FT1
Pt. noted to have wet sheets. Pt. cleaned, turned and repositioned in stretcher. Comfort and safety measures in place. Family at bedside.

## 2019-09-22 NOTE — H&P ADULT - NSHPPHYSICALEXAM_GEN_ALL_CORE
General Exam:   General appearance: No acute distress    Cardiac:  Pulm:                 Neurological Exam:  Mental Status: Orientated to self, date and place.  Attention intact.  No dysarthria. Speech fluent.  Cranial Nerves:   PERRL, EOMI, VFF, no nystagmus.    CN V1-3 intact to light touch .  No facial asymmetry.  Hearing intact to finger rub bilaterally.  Tongue, uvula and palate midline.  Sternocleidomastoid and Trapezius intact bilaterally.    Motor:   Tone: normal.                  Strength:     [] Upper extremity                      Delt       Bicep    Tricep                                                  R         5/5 5/5 5/5 5/5                                               L          5/5 5/5 5/5 5/5  [] Lower extremity                       HF          KE          KF        DF         PF                                               R        5/5 5/5 5/5 5/5 5/5                                               L         5/5 5/5 5/5 5/5 5/5  Pronator drift: none                 Dysmetria: None to finger-nose-finger or heel-shin-heel  No truncal ataxia.    Tremor: No resting, postural or action tremor.  No myoclonus.    Sensation: intact to light touch, pinprick, vibration and proprioception    Gait: normal. General Exam:   General appearance: No acute distress                Neurological Exam:  Mental Status: awake but minimally alert. Tracks with eyes. Can follow some simple commands such as - close your eyes and squeeze hands but was unable to make a fist or show 2 fingers. She is globally aphasic and not able to formulate any speech sounds.   Cranial Nerves:   PERRL, EOMI, VFF, no nystagmus.    CN V1-3 intact to light touch. Moderate R nasolabial fold flattening. Hearing intact to finger rub bilaterally.  Tongue, uvula and palate midline.     Motor:   Tone: normal.                  Strength: no movement of RUE/RLE. L side moves spontaneously antigravity. No drift in LUE, LLE hit the bed <5 seconds     Dysmetria: unable to assess secondary to mental status    Tremor: No resting, postural or action tremor.  No myoclonus.    Sensation: grimaces to noxious stimuli on the R

## 2019-09-23 ENCOUNTER — APPOINTMENT (OUTPATIENT)
Dept: INTERNAL MEDICINE | Facility: CLINIC | Age: 64
End: 2019-09-23

## 2019-09-23 LAB
ANION GAP SERPL CALC-SCNC: 10 MMOL/L — SIGNIFICANT CHANGE UP (ref 5–17)
APTT BLD: 36.5 SEC — HIGH (ref 27.5–36.3)
APTT BLD: 37.8 SEC — HIGH (ref 27.5–36.3)
APTT BLD: 45.7 SEC — HIGH (ref 27.5–36.3)
APTT BLD: 60.9 SEC — HIGH (ref 27.5–36.3)
BUN SERPL-MCNC: 12 MG/DL — SIGNIFICANT CHANGE UP (ref 7–23)
CALCIUM SERPL-MCNC: 8.8 MG/DL — SIGNIFICANT CHANGE UP (ref 8.4–10.5)
CHLORIDE SERPL-SCNC: 107 MMOL/L — SIGNIFICANT CHANGE UP (ref 96–108)
CHOLEST SERPL-MCNC: 98 MG/DL — SIGNIFICANT CHANGE UP (ref 10–199)
CO2 SERPL-SCNC: 21 MMOL/L — LOW (ref 22–31)
CREAT SERPL-MCNC: 0.7 MG/DL — SIGNIFICANT CHANGE UP (ref 0.5–1.3)
GLUCOSE SERPL-MCNC: 112 MG/DL — HIGH (ref 70–99)
HBA1C BLD-MCNC: 6.7 % — HIGH (ref 4–5.6)
HCT VFR BLD CALC: 31.9 % — LOW (ref 34.5–45)
HDLC SERPL-MCNC: 39 MG/DL — LOW
HGB BLD-MCNC: 9.9 G/DL — LOW (ref 11.5–15.5)
INR BLD: 2.32 RATIO — HIGH (ref 0.88–1.16)
INR BLD: 2.43 RATIO — HIGH (ref 0.88–1.16)
LIPID PNL WITH DIRECT LDL SERPL: 46 MG/DL — SIGNIFICANT CHANGE UP
MCHC RBC-ENTMCNC: 27.3 PG — SIGNIFICANT CHANGE UP (ref 27–34)
MCHC RBC-ENTMCNC: 31 GM/DL — LOW (ref 32–36)
MCV RBC AUTO: 87.9 FL — SIGNIFICANT CHANGE UP (ref 80–100)
PLATELET # BLD AUTO: 252 K/UL — SIGNIFICANT CHANGE UP (ref 150–400)
POTASSIUM SERPL-MCNC: 4 MMOL/L — SIGNIFICANT CHANGE UP (ref 3.5–5.3)
POTASSIUM SERPL-SCNC: 4 MMOL/L — SIGNIFICANT CHANGE UP (ref 3.5–5.3)
PROTHROM AB SERPL-ACNC: 27.2 SEC — HIGH (ref 10–12.9)
PROTHROM AB SERPL-ACNC: 28.5 SEC — HIGH (ref 10–13.1)
RBC # BLD: 3.63 M/UL — LOW (ref 3.8–5.2)
RBC # FLD: 17.4 % — HIGH (ref 10.3–14.5)
SODIUM SERPL-SCNC: 138 MMOL/L — SIGNIFICANT CHANGE UP (ref 135–145)
TOTAL CHOLESTEROL/HDL RATIO MEASUREMENT: 2.5 RATIO — LOW (ref 3.3–7.1)
TRIGL SERPL-MCNC: 67 MG/DL — SIGNIFICANT CHANGE UP (ref 10–149)
WBC # BLD: 12.4 K/UL — HIGH (ref 3.8–10.5)
WBC # FLD AUTO: 12.4 K/UL — HIGH (ref 3.8–10.5)

## 2019-09-23 PROCEDURE — 70551 MRI BRAIN STEM W/O DYE: CPT | Mod: 26

## 2019-09-23 PROCEDURE — 99223 1ST HOSP IP/OBS HIGH 75: CPT

## 2019-09-23 PROCEDURE — 93306 TTE W/DOPPLER COMPLETE: CPT | Mod: 26

## 2019-09-23 PROCEDURE — 70450 CT HEAD/BRAIN W/O DYE: CPT | Mod: 26

## 2019-09-23 PROCEDURE — 99222 1ST HOSP IP/OBS MODERATE 55: CPT | Mod: GC

## 2019-09-23 RX ORDER — SODIUM CHLORIDE 9 MG/ML
1000 INJECTION INTRAMUSCULAR; INTRAVENOUS; SUBCUTANEOUS
Refills: 0 | Status: DISCONTINUED | OUTPATIENT
Start: 2019-09-23 | End: 2019-09-24

## 2019-09-23 RX ORDER — METOPROLOL TARTRATE 50 MG
5 TABLET ORAL ONCE
Refills: 0 | Status: DISCONTINUED | OUTPATIENT
Start: 2019-09-23 | End: 2019-09-26

## 2019-09-23 RX ORDER — SODIUM CHLORIDE 9 MG/ML
1000 INJECTION INTRAMUSCULAR; INTRAVENOUS; SUBCUTANEOUS ONCE
Refills: 0 | Status: COMPLETED | OUTPATIENT
Start: 2019-09-23 | End: 2019-09-23

## 2019-09-23 RX ORDER — SODIUM CHLORIDE 9 MG/ML
500 INJECTION INTRAMUSCULAR; INTRAVENOUS; SUBCUTANEOUS ONCE
Refills: 0 | Status: COMPLETED | OUTPATIENT
Start: 2019-09-23 | End: 2019-09-23

## 2019-09-23 RX ORDER — ACETAMINOPHEN 500 MG
650 TABLET ORAL ONCE
Refills: 0 | Status: DISCONTINUED | OUTPATIENT
Start: 2019-09-23 | End: 2019-09-24

## 2019-09-23 RX ORDER — HEPARIN SODIUM 5000 [USP'U]/ML
800 INJECTION INTRAVENOUS; SUBCUTANEOUS
Qty: 25000 | Refills: 0 | Status: DISCONTINUED | OUTPATIENT
Start: 2019-09-23 | End: 2019-09-24

## 2019-09-23 RX ORDER — ACETAMINOPHEN 500 MG
1000 TABLET ORAL ONCE
Refills: 0 | Status: COMPLETED | OUTPATIENT
Start: 2019-09-23 | End: 2019-09-23

## 2019-09-23 RX ORDER — HEPARIN SODIUM 5000 [USP'U]/ML
700 INJECTION INTRAVENOUS; SUBCUTANEOUS
Qty: 25000 | Refills: 0 | Status: DISCONTINUED | OUTPATIENT
Start: 2019-09-23 | End: 2019-09-23

## 2019-09-23 RX ADMIN — Medication 1 DROP(S): at 17:53

## 2019-09-23 RX ADMIN — Medication 1000 MILLIGRAM(S): at 01:21

## 2019-09-23 RX ADMIN — HEPARIN SODIUM 8 UNIT(S)/HR: 5000 INJECTION INTRAVENOUS; SUBCUTANEOUS at 23:30

## 2019-09-23 RX ADMIN — Medication 1 DROP(S): at 05:35

## 2019-09-23 RX ADMIN — Medication 1 DROP(S): at 23:01

## 2019-09-23 RX ADMIN — SODIUM CHLORIDE 2000 MILLILITER(S): 9 INJECTION INTRAMUSCULAR; INTRAVENOUS; SUBCUTANEOUS at 05:35

## 2019-09-23 RX ADMIN — Medication 400 MILLIGRAM(S): at 00:51

## 2019-09-23 RX ADMIN — SODIUM CHLORIDE 100 MILLILITER(S): 9 INJECTION INTRAMUSCULAR; INTRAVENOUS; SUBCUTANEOUS at 06:30

## 2019-09-23 RX ADMIN — SODIUM CHLORIDE 2000 MILLILITER(S): 9 INJECTION INTRAMUSCULAR; INTRAVENOUS; SUBCUTANEOUS at 04:34

## 2019-09-23 RX ADMIN — Medication 1 DROP(S): at 13:08

## 2019-09-23 RX ADMIN — SODIUM CHLORIDE 2000 MILLILITER(S): 9 INJECTION INTRAMUSCULAR; INTRAVENOUS; SUBCUTANEOUS at 04:08

## 2019-09-23 NOTE — PHYSICAL THERAPY INITIAL EVALUATION ADULT - IMPAIRED TRANSFERS: BED/CHAIR, REHAB EVAL
impaired motor control/impaired postural control/impaired balance/abnormal muscle tone/decreased strength

## 2019-09-23 NOTE — CONSULT NOTE ADULT - SUBJECTIVE AND OBJECTIVE BOX
63 yo RHD Nadine speaking female with PMH of Afib on coumadin, prior R MCA stroke in 2013 and  with no residual deficits, HTN, HLD, DM2, mitral stenosis due to rheumatic valvular disease who presented to [facility] on  with inability to speak and weakness of her R side. LKW 2:00am  at a family gathering. Patients sister heard her get up and ambulate to the bathroom at 5:00am on , however when family members went to wake her up for temple in the morning around 9:00am, they found her unable to speak or move her R side.   On arrival NIHSS was 19. CT Head showed L MCA acute-subacute infarct. CTA head demonstrated L M2 occlusion. Patient was not a candidate for endovascular treatment. Patient was admitted to the stroke unit for further management. Hospital course was complicated by hypotension for which she was started on IV fluids. Patient was started on IV heparin for secondary stroke prevention for plan to transition back to Coumadin.   History also obtained from patient's son at bedside, whom she lives with. Patient remains aphasic but able to follow some single 1 step commands    REVIEW OF SYSTEMS  Limited and unable to obtain due to aphasia and patient cognitive status  Constitutional - + fatigue  Respiratory - No cough,   Neurological - + loss of strength,     PAST MEDICAL & SURGICAL HISTORY  TIA (transient ischemic attack)  Mitral stenosis  Atrial fibrillation  DM (diabetes mellitus)  HLD (hyperlipidemia)  HTN (hypertension)  Mitral stenosis  Personal history of diabetes mellitus  S/P laser cataract surgery, unspecified laterality  S/P knee surgery  Ventral hernia  NO SIGNIFICANT PAST SURGICAL HISTORY      SOCIAL HISTORY  Smoking - Denied  EtOH - Denied   Drugs - Denied    PRIOR FUNCTIONAL HISTORY  Ambulation: independent w/o assistive device  ADLs: indepenent    Previous Home Equipment: denies    HOME ENVIRONMENT:  Type of Home: private house  Stairs: 5 outside  Living With: son and family  Caregiver's availability: yes    Special Needs or Precautions:  Weight bearing status: WBAT    FAMILY HISTORY   No pertinent family history in first degree relatives  No pertinent family history in first degree relatives    RECENT LABS/IMAGING  CBC Full  -  ( 23 Sep 2019 08:45 )  WBC Count : 12.40 K/uL  RBC Count : 3.63 M/uL  Hemoglobin : 9.9 g/dL  Hematocrit : 31.9 %  Platelet Count - Automated : 252 K/uL  Mean Cell Volume : 87.9 fl  Mean Cell Hemoglobin : 27.3 pg  Mean Cell Hemoglobin Concentration : 31.0 gm/dL  Auto Neutrophil # : x  Auto Lymphocyte # : x  Auto Monocyte # : x  Auto Eosinophil # : x  Auto Basophil # : x  Auto Neutrophil % : x  Auto Lymphocyte % : x  Auto Monocyte % : x  Auto Eosinophil % : x  Auto Basophil % : x        138  |  107  |  12  ----------------------------<  112<H>  4.0   |  21<L>  |  0.70    Ca    8.8      23 Sep 2019 06:32    TPro  7.5  /  Alb  4.0  /  TBili  1.0  /  DBili  x   /  AST  15  /  ALT  21  /  AlkPhos  55      Urinalysis Basic - ( 22 Sep 2019 22:06 )    Color: Light Yellow / Appearance: Clear / S.048 / pH: x  Gluc: x / Ketone: Negative  / Bili: Negative / Urobili: Negative   Blood: x / Protein: Trace / Nitrite: Negative   Leuk Esterase: Negative / RBC: x / WBC x   Sq Epi: x / Non Sq Epi: x / Bacteria: x    IMAGING  < from: CT Head No Cont (19 @ 13:44) >  Acute to subacute infarct of left MCA territory without hemorrhagic transformation.  Chronic right MCA infarct.    < from: CT Angio Brain/ Neck w/ IV Cont (19 @ 12:00) >  Head CT:  Acute to subacute infarct of left MCA territory without hemorrhagic transformation. Chronic right MCA infarct.   CT angiography neck:   No evidence for carotid or vertebral artery stenosis.  CT angiography brain: A short segment left middle cerebral artery M2 branch occlusion is noted. The M1 segment is widely patent.   In addition to the acute-subacute left middle cerebral artery territory infarct, an additional acute to subacute infarct involves the distal left anterior cerebral artery territory at the level of the superior frontal gyrus, with edema and swelling, without hemorrhagic transformation.     < from: MR Head No Cont (19 @ 14:29) >  Redemonstration of acute left KUSUM and MCA territory infarcts primarily involving the left frontal lobe. No yola hemorrhagic transformation. No midline shift or hydrocephalus.  Chronic right frontal and right cerebellar hemisphere infarcts are noted.    VITALS  T(C): 36.8 (19 @ 07:17), Max: 37.1 (19 @ 19:20)  HR: 98 (19 @ 12:00) (77 - 98)  BP: 120/69 (19 @ 12:00) (85/61 - 130/94)  RR: 15 (19 @ 12:00) (13 - 19)  SpO2: 100% (19 @ 12:00) (97% - 100%)  Wt(kg): --    ALLERGIES  No Known Allergies      MEDICATIONS   artificial  tears Solution 1 Drop(s) Both EYES four times a day  atorvastatin 40 milliGRAM(s) Oral at bedtime  dextrose 40% Gel 15 Gram(s) Oral once PRN  dextrose 5%. 1000 milliLiter(s) IV Continuous <Continuous>  dextrose 50% Injectable 12.5 Gram(s) IV Push once  dextrose 50% Injectable 25 Gram(s) IV Push once  dextrose 50% Injectable 25 Gram(s) IV Push once  glucagon  Injectable 1 milliGRAM(s) IntraMuscular once PRN  heparin  Infusion 700 Unit(s)/Hr IV Continuous <Continuous>  insulin lispro (HumaLOG) corrective regimen sliding scale   SubCutaneous every 6 hours  sodium chloride 0.9%. 1000 milliLiter(s) IV Continuous <Continuous>      ----------------------------------------------------------------------------------------  PHYSICAL EXAM  Constitutional - NAD, Comfortable, laying in bed, able to follow 1 step single commands, able to track eyes, expressive aphasoa  HEENT - NCAT, EOMI  Neck - Suppl  Chest - CTA bilaterally  Cardiovascular - irregular rate/rhythm, S1S2,   Abdomen - BS+, Soft, ND  Extremities - No C/C/E,   Neurologic Exam -                    Cognitive - awake and alert, expressive aphasia, unable to state name/place, follows simple 1 step commands most of the time     Communication - expressive aphasia     Motor -                     LEFT    UE - 3/5 antigravity, 3/5                     RIGHT UE - 0/5                    LEFT    LE - HF 3/5, KE 3/5,                    RIGHT LE - 0/5     Sensory - unavailable as patient aphasic     Reflexes - DTR Intact,      Coordination - unable to assess due to unable to follow command     OculoVestibular - No saccades, No nystagmus,       Balance - WNL Static  Psychiatric - Mood stable         CURRENT FUNCTIONAL STATUS  PT   Bed Mobility: Supine to Sit:     · Level of [place]	maximum assist (25% patients effort)	  · Physical Assist/Nonphysical Assist	2 person assist; nonverbal cues (demo/gestures)	    Bed Mobility Analysis:     · Bed Mobility Limitations	decreased ability to use arms for pushing/pulling; decreased ability to use legs for bridging/pushing; impaired ability to control trunk for mobility	  · Impairments Contributing to Impaired Bed Mobility	impaired balance; impaired motor control; abnormal muscle tone; impaired postural control; decreased strength	    Transfer: Bed to Chair:     Transfer Skill: Bed to Chair   · Level of [place]	maximum assist (25% patients effort)	  · Physical Assist/Nonphysical Assist	2 person assist; verbal cues; nonverbal cues (demo/gestures)	  · Weight-Bearing Restrictions	full weight-bearing	  · Assistive Device	handheld assist	    Bed/Chair Transfer Safety Analysis:     · Impairments Contributing to Impaired Transfers	impaired balance; impaired motor control; abnormal muscle tone; impaired postural control; decreased strength	  · Transfer Safety Concerns Noted	losing balance; decreased sequencing ability; stand pivot	    Transfer: Sit to Stand:     · Level of [place]	moderate assist (50% patients effort)	  · Physical Assist/Nonphysical Assist	2 person assist; verbal cues; nonverbal cues (demo/gestures)	  · Weight-Bearing Restrictions	full weight-bearing	  · Assistive Device	handheld assist	    Transfer: Stand to Sit:     · Level of [place]	moderate assist (50% patients effort)	  · Physical Assist/Nonphysical Assist	2 person assist; nonverbal cues (demo/gestures); verbal cues	  · Weight-Bearing Restrictions	full weight-bearing	  · Assistive Device	handheld assist	    Sit/Stand Transfer Safety Analysis:     · Transfer Safety Concerns Noted	losing balance	  · Impairments Contributing to Impaired Transfers	impaired balance; impaired motor control; abnormal muscle tone; impaired postural control; decreased strength	    Gait Skills:     · Level of [place]	unable to perform	    Balance Skills Assessment:     · Sitting Balance: Static	fair minus 	  · Sitting Balance: Dynamic	poor balance 	  · Sit-to-Stand Balance	poor balance 	  · Standing Balance: Static	poor balance 	  · Systems Impairment Contributing to Balance Disturbance	neuromuscular	  · Identified Impairments Contributing to Balance Disturbance	decreased strength; impaired postural control; impaired motor control; abnormal muscle tone	    Sensory Examination:    Sensory Examination:    Grossly Intact:   · Gross Sensory Examination	Unable to accurately assess d/t aphasia	    OT   Bed Mobility: Supine to Sit:     · Level of [place]	maximum assist (25% patients effort)	  · Physical Assist/Nonphysical Assist	2 person assist	    Bed Mobility Analysis:     · Impairments Contributing to Impaired Bed Mobility	impaired balance; cognition; abnormal muscle tone; decreased strength	    Transfer: Bed to Chair:    Bed to Chair Transfer:    Transfer Skill: Bed to Chair   · Level of [place]	maximum assist (25% patients effort); SPT, buckling noted.	  · Physical Assist/Nonphysical Assist	2 person assist	    Bed to Chair Safety Analysis:     · Impairments Contributing to Impaired Transfers	impaired balance; cognition; abnormal muscle tone; decreased strength	    Transfer: Sit to Stand:     · Level of [place]	moderate assist (50% patients effort)	  · Physical Assist/Nonphysical Assist	2 person assist	    Transfer: Stand to Sit:     · Level of [place]	moderate assist (50% patients effort)	  · Physical Assist/Nonphysical Assist	2 person assist	    Sit/Stand Transfer Safety Analysis:     · Impairments Contributing to Impaired Transfers	impaired balance; cognition; abnormal muscle tone; decreased strength	    Balance Skills Assessment:     · Sitting Balance: Static	fair minus	  · Sitting Balance: Dynamic	poor balance	  · Sit-to-Stand Balance	poor balance	  · Standing Balance: Static	poor balance	  · Standing Balance: Dynamic	poor minus	    Sensory Examination:     Light Touch Sensation:   · Left UE	unable to formally assess due to communication impairments	  · Right UE	unable to formally assess due to communication impairments	  · Left LE	unable to formally assess due to communication impairments	  · Right LE	unable to formally assess due to communication impairments	      Visual Assessment:    Visual Assessment:    Visual Assessment:   · Visual Acuity	not tested	  · Visual Tracking	normal	  · Visual Neglect	not observed	  · Visual Field Cuts	not observed; continued assessment required due to communication/cognitive impairments	  · Eye Muscle Balance	normal	  · Visual Scanning	WFL	  · Visual Depth Perception	not tested  continued assessment required due to communication/cognitive impairments	    Fine Motor Coordination:     Fine Motor Coordination:   · Right Hand Thumb/Finger Opposition Skills	not appropriate to test  0/5 MMT	  · Left Hand, Manipulation of Objects	normal performance	  · Right Hand, Manipulation of Objects	not appropriate to test  0/5 MMT	    Lower Body Dressing Training:     · Level of [place]	maximum assist (25% patients effort); sock management	    ----------------------------------------------------------------------------------------  ASSESSMENT/PLAN    64y Female with functional deficits after left MCA infarct    Left MCA infarct - permissive HTN, fluids for hypotension, heparin drip  DM2 - insulin  HLD - atorvatatin  Pain - Tylenol  DVT PPX - SCDs  Rehab -   Continue bedside therapy as well as OOB throughout the day with mobilization by staff to maintain cardiopulmonary function and prevention of secondary complications related to debility.     Recommend ACUTE inpatient rehabilitation for the functional deficits consisting of 3 hours of therapy/day & 24 hour RN/daily PMR physician for comorbid medical management. Patient will be able to tolerate 3 hours a day.    Will continue to follow for ongoing rehab needs and recommendations. . Functional progress will determine ongoing rehab dispo recommendations, which may change.

## 2019-09-23 NOTE — PROVIDER CONTACT NOTE (OTHER) - REASON
Chief complaint:   Chief Complaint   Patient presents with   • Diabetes     1 mo f/u       Vitals:  Visit Vitals  /72   Pulse 89   Temp 98.6 °F (37 °C) (Oral)   Resp 16   Ht 5' 10\" (1.778 m)   Wt 79.5 kg   SpO2 97%   BMI 25.15 kg/m²       HISTORY OF PRESENT ILLNESS     ARIANNA Cordero has multiple chronic medical problems including DM2, HTN, HLD, ASHGD, chronic anticoagulation, ischemic cerebrovascular disease -- all are stable and chronic and treated with medications.     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 (CMS/HCC) 11/17/2017     Priority: Low   • Coronary artery disease involving coronary bypass graft of native heart with unstable angina pectoris (CMS/[facility]) 11/17/2017     Priority: Low   • Cognitive impairment 10/26/2017     Priority: Low   • Paradoxical insomnia 10/26/2017     Priority: Low   • Long term (current) use of anticoagulants [Z79.01] 08/01/2017     Priority: Low   • Encounter for therapeutic drug monitoring [Z51.81] 08/01/2017     Priority: Low   • Acute deep vein thrombosis (DVT) of femoral vein of left lower extremity (CMS/[facility]) 07/11/2017     Priority: Low   • S/P  shunt 05/23/2017     Priority: Low   • Lesion of nose 02/23/2017     Priority: Low     2/23/2017 noted today while inpatient. Patient states it's been there for a couple of months, thinks that he scratches it. Need to rule out SSC. Rec outpatient derm bx.      • Vitamin D deficiency 02/23/2017     Priority: Low     Last level 30 on 2/21/17. Vitamin D increased to 5000 units daily. 
blood pressure reassessed, low blood pressure noted
low blood pressure
pt is grimacing, pt is globally aphasic
Needs recheck in 8-12 weeks.      • NPH (normal pressure hydrocephalus) 02/23/2017     Priority: Low   • Serum calcium elevated 02/20/2017     Priority: Low     PTHrp pending on discharge from [facility]. Needs to be followed up out patient. Still without good reason for hypercalcemia.       • Intermittent claudication (CMS/[facility]) 02/20/2017     Priority: Low   • Frequent falls 02/20/2017     Priority: Low   • Cerebral infarction (CMS/[facility])      Priority: Low   • Coronary artery disease      Priority: Low     Class: Chronic     CABG 3/11/15  Cath 8/13/2018: LIMA to LAD patent, SVG to OM1 and SVG to OM 2 patent. Diffuse small vessel CAD. Medical therapy    Earnest Fuller MD     • Essential (primary) hypertension      Priority: Low   • Hyperlipidemia      Priority: Low   • Diastolic dysfunction, left ventricle, Grade II 04/14/2016     Priority: Low   • Depression 09/05/2015     Priority: Low   • Diabetic peripheral neuropathy (CMS/[facility]) 09/05/2015     Priority: Low   • Hypotension 04/17/2015     Priority: Low   • Cholelithiasis      Priority: Low   • Fatty liver      Priority: Low   • Generalized weakness 04/06/2015     Priority: Low   • Respiratory distress following surgery 03/13/2015     Priority: Low   • Hypertension 03/10/2015     Priority: Low   • Type 2 diabetes mellitus with diabetic autonomic neuropathy, with long-term current use of insulin (CMS/[facility]) 03/10/2015     Priority: Low     Latus increased to 44 units nightly. Recommend increasing mealtime in future PRN.     • CAD (coronary artery disease) - s/p CABG 3/2015 (LIMA-LAD, VG-M1, VG-M2) 03/10/2015     Priority: Low   • Obesity 03/10/2015     Priority: Low   • Non-STEMI (non-ST elevated myocardial infarction) (CMS/[facility]) 03/10/2015     Priority: Low       PAST MEDICAL, FAMILY AND SOCIAL HISTORY     Medications:  Current Outpatient Medications   Medication   • atorvastatin (LIPITOR) 20 MG tablet   • Insulin Pen Needle (B-D U/F PEN NEEDLE) 31G X 5 MM Misc   • NOVOLOG 
FLEXPEN 100 UNIT/ML pen-injector   • LYRICA 200 MG capsule   • exenatide ER (BYDUREON) 2 MG pen-injector   • donepezil (ARICEPT) 23 MG Tab   • venlafaxine XR (EFFEXOR XR) 75 MG 24 hr capsule   • warfarin (COUMADIN) 4 MG tablet   • ondansetron (ZOFRAN ODT) 4 MG disintegrating tablet   • Insulin Detemir (LEVEMIR FLEXTOUCH SC)   • Insulin Aspart (NOVOLOG FLEXPEN SC)   • Cholecalciferol (VITAMIN D3) 5000 units capsule   • Multiple Vitamin (ONE-A-DAY MENS PO)   • Melatonin 5 MG Cap   • aspirin 81 MG tablet   • acetaminophen (TYLENOL) 500 MG tablet   • B-Complex Tab   • warfarin (COUMADIN) 4 MG tablet   • carvedilol (COREG) 6.25 MG tablet   • warfarin (COUMADIN) 4 MG tablet     No current facility-administered medications for this visit.        Allergies:  ALLERGIES:   Allergen Reactions   • Exenatide DIARRHEA   • Metformin DIARRHEA       Past Medical  History/Surgeries:  Past Medical History:   Diagnosis Date   • Acute renal failure (ARF) (CMS/[facility]) 04/06/2015   • Anticoagulated on warfarin    • Blood clot associated with vein wall inflammation 07/2017    DVT in legs   • BPH (benign prostatic hypertrophy)    • Cerebral infarction (CMS/[facility])    • Cholelithiasis    • Closed fracture of one rib of left side 01/25/2016   • Coronary artery disease 03/10/2015    CABG 3/11/15   • Dehydration 04/14/2015   • Depression 01/24/2017   • Diabetes mellitus (CMS/[facility])    • Diabetic peripheral neuropathy (CMS/[facility])    • Diverticulosis of colon 04/2018    per colonoscopy report   • Epistaxis 03/28/2015   • Essential (primary) hypertension    • Fatty liver    • Frequent falls    • Generalized weakness    • Hyperlipidemia    • Hypotension 04/17/2015   • Memory loss 06/2017   • Non-STEMI (non-ST elevated myocardial infarction) (CMS/HCC) 03/10/2015   • Normal pressure hydrocephalus 06/2017    s/p  shunt   • Respiratory distress     previously with anesthesia   • Snoring 02/01/2017   • Strain of chest wall 06/05/2015   • Tremor of right hand 
2015   • Wears dentures     upper denture, lower implant       Past Surgical History:   Procedure Laterality Date   • Brain surgery      Pt stated, \"water on the brain\"   • Cardiac catherization  03/10/2015    Cath only - surgical intervention needed. - Dr. Mustafa   • Cardiac surgery  2015    MR with endoscopic vein; Dr. Pedroza - LIMA-->LAD, SVG-->M1, SVG-->M2   • Cardiology - stress test  2018    Abnormal   • Cdl cath poss ptca  2018   • Colonoscopy  2015   • Colonoscopy w/ biopsies  2018    Diverticulosis - Dr. TAIWO Olguin   • Fl guided lumbar puncture  3/24/2017    with pre & post PT evaluation   • Hernia repair     • Ventriculoperitoneal shunt  2017    Right  shunt - Dr. Matute       Family History:  Family History   Problem Relation Age of Onset   • Cancer Mother         uterine, breast   • Heart disease Mother          in late 60's        Social History:  Social History     Tobacco Use   • Smoking status: Former Smoker     Packs/day: 2.00     Years: 3.00     Pack years: 6.00     Types: Cigarettes     Last attempt to quit: 1971     Years since quittin.6   • Smokeless tobacco: Never Used   Substance Use Topics   • Alcohol use: Yes     Alcohol/week: 0.0 - 1.2 oz     Comment: mixed drinks        REVIEW OF SYSTEMS     Review of Systems   Constitutional: Negative for fatigue, fever and unexpected weight change.   Respiratory: Negative for shortness of breath, wheezing and stridor.    Cardiovascular: Negative for chest pain, palpitations and leg swelling.   Neurological: Negative for dizziness, facial asymmetry and headaches.       PHYSICAL EXAM     Physical Exam   Constitutional: He is oriented to person, place, and time. He appears well-developed and well-nourished. No distress.   HENT:   Head: Normocephalic and atraumatic.   Neck: No tracheal deviation present. No thyromegaly present.   Cardiovascular: Normal rate, regular rhythm and normal heart sounds.   No 
low blood pressure
murmur heard.  Pulmonary/Chest: Effort normal and breath sounds normal. No respiratory distress.   Neurological: He is alert and oriented to person, place, and time.   Skin: Skin is warm and dry. He is not diaphoretic.   Psychiatric: He has a normal mood and affect. His behavior is normal.   Vitals reviewed.      ASSESSMENT/PLAN     Gil was seen today for diabetes.    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 (CMS/[facility]) - chronic and stable  -     INR - POINT OF CARE    Acute deep vein thrombosis (DVT) of femoral vein of left lower extremity (CMS/[facility])  -     INR - POINT OF CARE    Chronic anticoagulation   Continue current plan of care    Diabetic peripheral neuropathy (CMS/[facility]) - chronic and stable   Check glycohemoglbin   Continue current plan of care    Essential (primary) hypertension- chronic and stable   Check BMP   Continue current medications    Hypertensive heart disease with heart failure (CMS/[facility])  - chronic and stable   Continue current plan of care      Discussed decreasing simple carbohydrate intake and eating a low fat, low cholesterol diet. Exercise regularly (at least daily -- walking or other low-impact exercise such as swimming). Regular sleep patterns. Avoid second-hand cigarette smoke.    Depression Screening:    PHQ2/9:  Initial depression screening score:: 2          GDS:          Mood:  Normal    Interventions:  No intervention needed at this time.    Body mass index is 25.15 kg/m².    BMI ASSESSMENT/PLAN:  Patient BMI is within normal range.

## 2019-09-23 NOTE — CONSULT NOTE ADULT - SUBJECTIVE AND OBJECTIVE BOX
Cardiology Attending Consult Note      CHIEF COMPLAINT/REASON FOR CONSULT: AFIB, CVA  Date of Admission: 09-22-19    Patient aphasic, story obtained from her son.    HISTORY OF PRESENT ILLNESS:    64y.o. Female with DM2, HTN, Non-obstructive CAD, permanent AFIB on warfarin (s/p DCCV 2013), hx of CVA's (2013, 2016 without residual deficits)  Rheumatic valvular disease with Severe MS, recent admit 09/12/2019 - 09/18/2019 with progressive ANDERSON and found to be in AFL and planned for DCCV, however BUBBA on 09/16/2019 demonstrated a KRISTAN thrombus, and patient was discharged on warfarin, now admitted with new paralysis and aphasia, CT c/w L MCA acute/subacute infarct. Per her son, she's has been consistently taking her warfarin. No recent chest pain/SOB. No palpitations.     ALLERGIES:      MEDICATIONS:  heparin  Infusion 800 Unit(s)/Hr IV Continuous <Continuous>  metoprolol tartrate Injectable 5 milliGRAM(s) IV Push once            atorvastatin 40 milliGRAM(s) Oral at bedtime  dextrose 50% Injectable 12.5 Gram(s) IV Push once  dextrose 50% Injectable 25 Gram(s) IV Push once  dextrose 50% Injectable 25 Gram(s) IV Push once  insulin lispro (HumaLOG) corrective regimen sliding scale   SubCutaneous every 6 hours    artificial  tears Solution 1 Drop(s) Both EYES four times a day  dextrose 5%. 1000 milliLiter(s) IV Continuous <Continuous>  sodium chloride 0.9%. 1000 milliLiter(s) IV Continuous <Continuous>      PAST MEDICAL & SURGICAL HISTORY:  TIA (transient ischemic attack): 2013- Rt facial droop which resolved after 2 hours  Mitral stenosis  Atrial fibrillation: h/o Cardioversion in 2013 and on Coumadin  DM (diabetes mellitus): Type II  HLD (hyperlipidemia)  HTN (hypertension)  S/P laser cataract surgery, unspecified laterality: ???  S/P knee surgery  Ventral hernia      FAMILY HISTORY:  No pertinent family history in first degree relatives      SOCIAL HISTORY:    Never smoked, no ETOH, no IVDU    REVIEW OF SYSTEMS:    CONSTITUTIONAL: +weakness, No fevers or chills  EYES/ENT: No visual changes;  No vertigo or throat pain   NECK: No pain or stiffness  RESPIRATORY: No cough, wheezing, hemoptysis; No shortness of breath  CARDIOVASCULAR: No chest pain or palpitations  GASTROINTESTINAL: No abdominal or epigastric pain. No nausea, vomiting, or hematemesis; No diarrhea or constipation. No melena or hematochezia.  GENITOURINARY: No dysuria, frequency or hematuria  NEUROLOGICAL: +Paralysis, +aphasia   SKIN: No itching, burning, rashes, or lesions   All other review of systems is negative unless indicated above.    PHYSICAL EXAM:  T(C): 36.8 (09-23-19 @ 17:30), Max: 37.1 (09-22-19 @ 19:20)  HR: 110 (09-23-19 @ 16:00) (77 - 110)  BP: 117/79 (09-23-19 @ 16:00) (85/61 - 146/87)  RR: 15 (09-23-19 @ 16:00) (13 - 28)  SpO2: 99% (09-23-19 @ 16:00) (97% - 100%)  Wt(kg): --    Drug Dosing Weight  Height (cm): 160 (22 Sep 2019 19:20)  Weight (kg): 57.3 (22 Sep 2019 19:20)  BMI (kg/m2): 22.4 (22 Sep 2019 19:20)  BSA (m2): 1.59 (22 Sep 2019 19:20)    I&O's Summary    22 Sep 2019 07:01  -  23 Sep 2019 07:00  --------------------------------------------------------  IN: 2225 mL / OUT: 250 mL / NET: 1975 mL        Appearance: Normal	  HEENT:   Normal oral mucosa, PERRL, EOMI	  Lymphatic: No lymphadenopathy  Cardiovascular: Normal S1 S2, No JVD, 3/6 SORIN  Respiratory: Lungs clear to auscultation	  Psychiatry: Responds to voice with head turn, aphasic  Gastrointestinal:  Soft, Non-tender, + BS	  Skin: No rashes, No ecchymoses, No cyanosis	  Neurologic: aphasic, unable to move extremities  Extremities:  No clubbing, cyanosis or edema  Vascular: Peripheral pulses palpable 2+ bilaterally    LABS:	 	    CBC Full  -  ( 23 Sep 2019 08:45 )  WBC Count : 12.40 K/uL  Hemoglobin : 9.9 g/dL  Hematocrit : 31.9 %  Platelet Count - Automated : 252 K/uL  Mean Cell Volume : 87.9 fl  Mean Cell Hemoglobin : 27.3 pg  Mean Cell Hemoglobin Concentration : 31.0 gm/dL  Auto Neutrophil # : x  Auto Lymphocyte # : x  Auto Monocyte # : x  Auto Eosinophil # : x  Auto Basophil # : x  Auto Neutrophil % : x  Auto Lymphocyte % : x  Auto Monocyte % : x  Auto Eosinophil % : x  Auto Basophil % : x    09-23    138  |  107  |  12  ----------------------------<  112<H>  4.0   |  21<L>  |  0.70  09-22    136  |  101  |  17  ----------------------------<  158<H>  4.4   |  22  |  0.67    Ca    8.8      23 Sep 2019 06:32  Ca    9.9      22 Sep 2019 11:45    TPro  7.5  /  Alb  4.0  /  TBili  1.0  /  DBili  x   /  AST  15  /  ALT  21  /  AlkPhos  55  09-22    PT/INR - ( 23 Sep 2019 09:21 )   PT: 27.2 sec;   INR: 2.32 ratio         PTT - ( 23 Sep 2019 15:57 )  PTT:45.7 sec  LIVER FUNCTIONS - ( 22 Sep 2019 11:45 )  Alb: 4.0 g/dL / Pro: 7.5 g/dL / ALK PHOS: 55 U/L / ALT: 21 U/L / AST: 15 U/L / GGT: x           Hemoglobin A1C, Whole Blood: 6.7 % (09-23 @ 08:45)      EKG: AFL with variable block at 93BPM    Telemetry: AFIB/AFL at     CXR: 09/22/2019:  FINDINGS:  Bilateral linear opacities in the lower lung field. Pulmonary edema is   improved from chest x-ray 9/13/2019. The cardiac mediastinal silhouette   is unchanged.  No focal airspace consolidation, pleural effusion or pneumothorax.   Mediastinal and hilar contours are unremarkable.   Visualized bony thorax demonstrates no gross abnormality.     IMPRESSION:   No acute cardiopulmonary process.      BUBBA: 09/16/2019:  OBSERVATIONS:  Mitral Valve: Doming and thickening of the tips of the  mitral valve consistent with rheumatic mitral valve  disease. Mild-moderate mitral regurgitation. Mean  transmitral valve gradient equals 9 mm Hg, estimated mitral  valve area equals 0.7 sqcm (by planimetry of 3D images),  consistent with severe mitral stenosis.  Aortic Root: Normal aortic root.  Mild non-mobile  atherosclerotic plaque in the aortic arch and descending  thoracic aorta.  Aortic Valve: Calcified trileaflet aortic valve with normal  opening. Minimal aortic regurgitation.  Left Atrium: Dilated left atrium.  Dense spontaneous echo  contrast and thrombus were seen in the left atrial  appendage.  Left Ventricle: Normal left ventricular systolic function.  No segmental wall motion abnormalities.  Right Heart: Normal right atrium. Normal right ventricular  size and function. Normal tricuspid valve. Minimal  tricuspid regurgitation. Normal pulmonic valve. Mild  pulmonic regurgitation.  Pericardium/PleuraNormal pericardium with no pericardial  effusion.  Hemodynamic: Color flow Doppler demonstrates evidence of a  patent foramen ovale with left-to-right shunting.  No  right-to-left interatrial shunting was seen with the  intravenous injection of agitated saline contrast.  ------------------------------------------------------------------------  CONCLUSIONS:  1. Doming and thickening of the tips of the mitral valve  consistent with rheumatic mitral valve disease.  Mild-moderate mitral regurgitation. Mean transmitral valve  gradient equals 9 mm Hg, estimated mitral valve area equals  0.7 sqcm (by planimetry of 3D images), consistent with  severe mitral stenosis.  2. Calcified trileaflet aortic valve with normal opening.  Minimal aortic regurgitation.  3. Normal aortic root.  Mild non-mobile atherosclerotic  plaque in the aortic arch and descending thoracic aorta.  4. Dilated left atrium.  Dense spontaneous echo contrast  and thrombus were seen in the left atrial appendage.  5. Normal left ventricular systolic function. No segmental  wall motion abnormalities.  6. Normal right ventricular size and function.  7. Color flow Doppler demonstrates evidence of a patent  foramen ovale with left-to-right shunting.  No  right-to-left interatrial shunting was seen with the  intravenous injection of agitated saline contrast.      MR Brain 09/23/2019:  FINDINGS:      Redemonstration of acute left KUSUM and MCA territory infarcts primarily   involving the left frontal lobe. No yola hemorrhagic transformation.    Chronic right frontal and right cerebellar hemisphere infarcts are noted.    No midline shift, hydrocephalus, or effacement of the basal cisterns.   Intracranial flow voids are grossly preserved.    Paranasal sinuses and mastoid air cells are essentially clear. The   orbits, sellar and suprasellar structures, and craniocervical junction   are unremarkable.    IMPRESSION:    Redemonstration of acute left KUSUM and MCA territory infarcts primarily   involving the left frontal lobe. No yola hemorrhagic transformation. No   midline shift or hydrocephalus.    Chronic right frontal and right cerebellar hemisphere infarcts are noted.      A/P: 64y.o. Female with DM2, HTN, Non-obstructive CAD, persistent AFIB on warfarin (s/p DCCV 2013), hx of CVA's (2013, 2016 without residual deficits)  Rheumatic valvular disease with Severe MS, recent admit 09/12/2019 - 09/18/2019 with progressive ANDERSON and found to be in AFL and planned for DCCV, however BUBBA on 09/16/2019 demonstrated a KRISTAN thrombus, and patient was discharged on warfarin, now admitted with new paralysis and aphasia, CT c/w L MCA acute/subacute infarct.     1. AFIB/AFL with RVR, KRISTAN Thrombus - Patient with persistent AFIB/AFL despite prior cardioversion attempts, recent admit with AFL that was unable to be cardioverted due to the detection of a KRISTAN thrombus.  -No unable to tolerated PO and aphasic  -Cont Metoprolol 5 mg IV Q4-Q6. If patient able to swallow can transition back to PO metoprolol.  -Cont Heparin GTT for now as per neurology, with plan to resume warfarin if able to tolerative PO vs Lovenox if unable to safely swallow.  -Of note, she was therapeutic on warfarin on presentation both this admission and on her prior admission when the KRISTAN thrombus was detected. This stroke may represent an embolic event from the KRISTAN thrombus or plaque noted in aortic arch.  -KRISTAN thrombus on OAC (warfarin) may take up to 2-4 weeks to resolve. Will need repeat BUBBA in 3-4 weeks to evaluate for KRISTAN thrombus. If persistent KRISTAN thrombus despite consistent use of warfarin with a therapeutic INR, she may benefit from an alternative therapy, and at that point would consider a hematology consult.    2. Valvular dysfunction: Severe Mitral Stenosis, mild-mod MR - Plan for possible valve intervention pending recovery from acute CVA if consistent with overall goals of care, and once KRISTAN thrombus resolved.  -Appears well compensated at this time  -Cont rate control as above    3. CAD - Non-obstructive  -Cont Atorvastatin     4. HL - Cont Atorvastatin    Cardiology will sign off at this time. Please re-consult as needed.  Outpatient Cardiologist: Claudia Goldman.  	  Zackary Chavez MD  Cardiology Attending  [facility]  Cell: [phone]  (Cardiology Nocturnist cell number available 7 pm - 7 am every night; available daytime week days for follow-up only; daytime weekends covered by general cardiology consult service)

## 2019-09-23 NOTE — PROVIDER CONTACT NOTE (OTHER) - ASSESSMENT
low blood pressure.
blood pressure reassessed, low blood pressure noted
low blood pressure
pt is grimacing, pt is globally aphasic

## 2019-09-23 NOTE — OCCUPATIONAL THERAPY INITIAL EVALUATION ADULT - MANUAL MUSCLE TESTING RESULTS, REHAB EVAL
0/5 MMT RUE/RLE, able to hold LUE up against gravity without resistance, elbow LUE 4/5, weak gross grasp. 4/5 LLE.

## 2019-09-23 NOTE — OCCUPATIONAL THERAPY INITIAL EVALUATION ADULT - PERTINENT HX OF CURRENT PROBLEM, REHAB EVAL
63yo F prior R MCA stroke in 2013 and 2016 with no residual deficits, who presented with inability to speak and weakness of her R side. CT Head showed L MCA acute-subacute infarct. CTA head demonstrated L M2 occlusion.

## 2019-09-23 NOTE — PHYSICAL THERAPY INITIAL EVALUATION ADULT - GENERAL OBSERVATIONS, REHAB EVAL
Received in bed, +IVL, +cardiac monitoring Received in bed, +IVL, +external female cath, cardiac monitoring, son at bedside

## 2019-09-23 NOTE — OCCUPATIONAL THERAPY INITIAL EVALUATION ADULT - DIAGNOSIS, OT EVAL
pt presents with R sided hemiplegia, impaired balance, impaired communication and cognition - all of which impacts her ability to safely participate in ADLs and functional transfers

## 2019-09-23 NOTE — PHYSICAL THERAPY INITIAL EVALUATION ADULT - IMPAIRED TRANSFERS: SIT/STAND, REHAB EVAL
impaired postural control/decreased strength/abnormal muscle tone/impaired motor control/impaired balance

## 2019-09-23 NOTE — OCCUPATIONAL THERAPY INITIAL EVALUATION ADULT - NS ASR FOLLOW COMMAND OT EVAL
with cueing/repetition, increased time for processing - son translating for basic 1 step commands, continued assessment required./50% of the time

## 2019-09-23 NOTE — PROVIDER CONTACT NOTE (OTHER) - ACTION/TREATMENT ORDERED:
PA ordered, 500ml NS Bolus
PA ordered 500ml bolus NS
PA ordered additional bolus 500ml NS
PA will order pain medication

## 2019-09-23 NOTE — OCCUPATIONAL THERAPY INITIAL EVALUATION ADULT - LIVES WITH, PROFILE
other relative/per son: pt lives [place] house with 3 SANDER, pt was independent prior to admission, lives with a large family (10 people in the house), patient's DIL is primarly with her daily however did not provide any assistance.

## 2019-09-23 NOTE — PROVIDER CONTACT NOTE (OTHER) - BACKGROUND
pt admitted with L MCA infarct
pt admitted with Left MCA infarct

## 2019-09-23 NOTE — PROVIDER CONTACT NOTE (OTHER) - SITUATION
blood pressure reassessed, low blood pressure noted
low blood pressure
pt is grimacing, pt is globally aphasic
low blood pressure

## 2019-09-23 NOTE — PHYSICAL THERAPY INITIAL EVALUATION ADULT - PERTINENT HX OF CURRENT PROBLEM, REHAB EVAL
64F PMH Afib on coumadin, prior R MCA stroke in 2013 & 2016 (no residual deficits), HTN, HLD, DM2, mitral stenosis due to rheumatic valvular disease p/w inability to speak & weakness of her R side. LKW 2:00am 9/22. Pts sister heard her get up and ambulate to the bathroom at 5:00am 9/22, however when family members went to wake her up for temple this morning around 9:00am, they found her unable to speak or move her R side. NIHSS: 19.

## 2019-09-23 NOTE — PHYSICAL THERAPY INITIAL EVALUATION ADULT - BALANCE TRAINING, PT EVAL
GOAL: Pt will demonstrate improved static/dynamic balance to good in order to improve stability, decrease fall risk, and increase independence in ADLs within 4 weeks.

## 2019-09-23 NOTE — OCCUPATIONAL THERAPY INITIAL EVALUATION ADULT - RANGE OF MOTION EXAMINATION, LOWER EXTREMITY
Left LE Active Assistive ROM was WNL (within normal limits)/Right LE Passive ROM was WNL (within normal limits)

## 2019-09-23 NOTE — PHYSICAL THERAPY INITIAL EVALUATION ADULT - MANUAL MUSCLE TESTING RESULTS, REHAB EVAL
muscles of LUE & LLE functionally assessed to be fair to fair+; No active movement noted in RUE & RLE

## 2019-09-23 NOTE — CONSULT NOTE ADULT - ATTENDING COMMENTS
Seen and examined with resident. Agree with note.   Patient with gait dysfunction.  Patient will need acute rehabilitation when stable.  D/W patients son rehab options and importance of inpatient rehabilitation for recovery.

## 2019-09-23 NOTE — PHYSICAL THERAPY INITIAL EVALUATION ADULT - ADDITIONAL COMMENTS
DAVIDSON garcia Per son at bedside, pt lives in a private house with 3 steps to enter, +handrail; main floor set up. R handed Per son at bedside, pt lives in a private house with 3 steps to enter, +handrail; main floor set up. Nadine speaking only & R handed.    CHEST XRAY 9/22: No acute cardiopulmonary process. CT BRAIN 9/22: Acute to subacute infarct of left MCA territory without hemorrhagic  transformation. Chronic right MCA infarct.

## 2019-09-23 NOTE — SWALLOW BEDSIDE ASSESSMENT ADULT - COMMENTS
L MCA territory infarct with L M2 occlusion noted on Head CT. Chronic right MCA infarct. Admit to stroke unit w frequent neuro-checks q1h; Permissive HTN up to ; baseline EKG and CXR;  - Intravenous hydration with normal saline at 75cc per hour;  - Bedside swallow evaluation;  - Head of bed > 30 degrees for aspiration prevention and aspiration precautions ordered.; repeat CT Head in AM 9/23;  -MRI brain without contrast.   Bedside evaluations on previous admissions 2013 and 2016 noted left facial droop; recommended regular texture.

## 2019-09-23 NOTE — PHYSICAL THERAPY INITIAL EVALUATION ADULT - IMPAIRMENTS CONTRIBUTING IMPAIRED BED MOBILITY, REHAB EVAL
abnormal muscle tone/decreased strength/impaired balance/impaired postural control/impaired motor control

## 2019-09-23 NOTE — OCCUPATIONAL THERAPY INITIAL EVALUATION ADULT - PLANNED THERAPY INTERVENTIONS, OT EVAL
orthotic fitting/training/ROM/balance training/ADL retraining/bed mobility training/cognitive, visual perceptual/neuromuscular re-education/parent/caregiver training.../stretching/transfer training

## 2019-09-23 NOTE — OCCUPATIONAL THERAPY INITIAL EVALUATION ADULT - ORIENTATION, REHAB EVAL
person/responds to name, due to aphasia unable to formally assess orientation. Out of a field of 2 pt able to squeeze hand for correct year and month, continued assessment is required.

## 2019-09-23 NOTE — OCCUPATIONAL THERAPY INITIAL EVALUATION ADULT - RANGE OF MOTION EXAMINATION, UPPER EXTREMITY
LUE AROM to 90 degrees/Left UE Active Assistive ROM was WNL (within normal limits)/Right UE Passive ROM was WNL (within normal limits)

## 2019-09-24 LAB
ANION GAP SERPL CALC-SCNC: 15 MMOL/L — SIGNIFICANT CHANGE UP (ref 5–17)
APTT BLD: 73.7 SEC — HIGH (ref 27.5–36.3)
BUN SERPL-MCNC: 8 MG/DL — SIGNIFICANT CHANGE UP (ref 7–23)
CALCIUM SERPL-MCNC: 9.1 MG/DL — SIGNIFICANT CHANGE UP (ref 8.4–10.5)
CHLORIDE SERPL-SCNC: 106 MMOL/L — SIGNIFICANT CHANGE UP (ref 96–108)
CO2 SERPL-SCNC: 17 MMOL/L — LOW (ref 22–31)
CREAT SERPL-MCNC: 0.52 MG/DL — SIGNIFICANT CHANGE UP (ref 0.5–1.3)
GLUCOSE SERPL-MCNC: 119 MG/DL — HIGH (ref 70–99)
HCT VFR BLD CALC: 32.4 % — LOW (ref 34.5–45)
HGB BLD-MCNC: 10.1 G/DL — LOW (ref 11.5–15.5)
LACTATE SERPL-SCNC: 1 MMOL/L — SIGNIFICANT CHANGE UP (ref 0.7–2)
MCHC RBC-ENTMCNC: 27.2 PG — SIGNIFICANT CHANGE UP (ref 27–34)
MCHC RBC-ENTMCNC: 31 GM/DL — LOW (ref 32–36)
MCV RBC AUTO: 87.7 FL — SIGNIFICANT CHANGE UP (ref 80–100)
PLATELET # BLD AUTO: 264 K/UL — SIGNIFICANT CHANGE UP (ref 150–400)
POTASSIUM SERPL-MCNC: 4 MMOL/L — SIGNIFICANT CHANGE UP (ref 3.5–5.3)
POTASSIUM SERPL-SCNC: 4 MMOL/L — SIGNIFICANT CHANGE UP (ref 3.5–5.3)
PT 100%: 32.7 SEC — HIGH (ref 10–13.1)
PT 50/50: 13.5 SEC — SIGNIFICANT CHANGE UP (ref 10–15.2)
RAPID RVP RESULT: SIGNIFICANT CHANGE UP
RBC # BLD: 3.7 M/UL — LOW (ref 3.8–5.2)
RBC # FLD: 15.4 % — HIGH (ref 10.3–14.5)
SODIUM SERPL-SCNC: 138 MMOL/L — SIGNIFICANT CHANGE UP (ref 135–145)
WBC # BLD: 11.8 K/UL — HIGH (ref 3.8–10.5)
WBC # FLD AUTO: 11.8 K/UL — HIGH (ref 3.8–10.5)

## 2019-09-24 PROCEDURE — 71260 CT THORAX DX C+: CPT | Mod: 26

## 2019-09-24 PROCEDURE — 74177 CT ABD & PELVIS W/CONTRAST: CPT | Mod: 26

## 2019-09-24 RX ORDER — ASPIRIN/CALCIUM CARB/MAGNESIUM 324 MG
81 TABLET ORAL DAILY
Refills: 0 | Status: DISCONTINUED | OUTPATIENT
Start: 2019-09-24 | End: 2019-09-24

## 2019-09-24 RX ORDER — ASPIRIN/CALCIUM CARB/MAGNESIUM 324 MG
81 TABLET ORAL DAILY
Refills: 0 | Status: DISCONTINUED | OUTPATIENT
Start: 2019-09-24 | End: 2019-09-26

## 2019-09-24 RX ORDER — INSULIN LISPRO 100/ML
VIAL (ML) SUBCUTANEOUS
Refills: 0 | Status: DISCONTINUED | OUTPATIENT
Start: 2019-09-24 | End: 2019-09-24

## 2019-09-24 RX ORDER — HEPARIN SODIUM 5000 [USP'U]/ML
700 INJECTION INTRAVENOUS; SUBCUTANEOUS
Qty: 25000 | Refills: 0 | Status: DISCONTINUED | OUTPATIENT
Start: 2019-09-24 | End: 2019-09-25

## 2019-09-24 RX ORDER — WARFARIN SODIUM 2.5 MG/1
4 TABLET ORAL ONCE
Refills: 0 | Status: DISCONTINUED | OUTPATIENT
Start: 2019-09-24 | End: 2019-09-24

## 2019-09-24 RX ORDER — ACETAMINOPHEN 500 MG
650 TABLET ORAL EVERY 6 HOURS
Refills: 0 | Status: DISCONTINUED | OUTPATIENT
Start: 2019-09-24 | End: 2019-09-26

## 2019-09-24 RX ORDER — INSULIN LISPRO 100/ML
VIAL (ML) SUBCUTANEOUS
Refills: 0 | Status: DISCONTINUED | OUTPATIENT
Start: 2019-09-24 | End: 2019-09-26

## 2019-09-24 RX ADMIN — Medication 81 MILLIGRAM(S): at 11:58

## 2019-09-24 RX ADMIN — HEPARIN SODIUM 8 UNIT(S)/HR: 5000 INJECTION INTRAVENOUS; SUBCUTANEOUS at 06:18

## 2019-09-24 RX ADMIN — ATORVASTATIN CALCIUM 40 MILLIGRAM(S): 80 TABLET, FILM COATED ORAL at 23:15

## 2019-09-24 RX ADMIN — Medication 1 DROP(S): at 18:18

## 2019-09-24 RX ADMIN — Medication 1 DROP(S): at 06:18

## 2019-09-24 RX ADMIN — Medication 1 DROP(S): at 23:28

## 2019-09-24 RX ADMIN — Medication 1 DROP(S): at 11:57

## 2019-09-24 RX ADMIN — HEPARIN SODIUM 8 UNIT(S)/HR: 5000 INJECTION INTRAVENOUS; SUBCUTANEOUS at 11:56

## 2019-09-24 RX ADMIN — Medication 81 MILLIGRAM(S): at 23:15

## 2019-09-24 NOTE — DIETITIAN INITIAL EVALUATION ADULT. - PERTINENT MEDS FT
MEDICATIONS  (STANDING):  acetaminophen  Suppository .. 650 milliGRAM(s) Rectal once  artificial  tears Solution 1 Drop(s) Both EYES four times a day  atorvastatin 40 milliGRAM(s) Oral at bedtime  dextrose 5%. 1000 milliLiter(s) (50 mL/Hr) IV Continuous <Continuous>  dextrose 50% Injectable 12.5 Gram(s) IV Push once  dextrose 50% Injectable 25 Gram(s) IV Push once  dextrose 50% Injectable 25 Gram(s) IV Push once  heparin  Infusion 800 Unit(s)/Hr (8 mL/Hr) IV Continuous <Continuous>  insulin lispro (HumaLOG) corrective regimen sliding scale   SubCutaneous every 6 hours  metoprolol tartrate Injectable 5 milliGRAM(s) IV Push once  sodium chloride 0.9%. 1000 milliLiter(s) (100 mL/Hr) IV Continuous <Continuous>    MEDICATIONS  (PRN):  dextrose 40% Gel 15 Gram(s) Oral once PRN Blood Glucose LESS THAN 70 milliGRAM(s)/deciliter  glucagon  Injectable 1 milliGRAM(s) IntraMuscular once PRN Glucose LESS THAN 70 milligrams/deciliter

## 2019-09-24 NOTE — CONSULT NOTE ADULT - SUBJECTIVE AND OBJECTIVE BOX
64y.o. Female with DM2, HTN, Non-obstructive CAD, permanent AFIB on warfarin  for the last 5 years (s/p DCCV 2013), hx of CVA's (2013, 2016 without residual deficits)  Rheumatic valvular disease with Severe MS, recent admit 09/12/2019 - 09/18/2019 with progressive ANDERSON and found to be in AFL and planned for DCCV, however BUBBA on 09/16/2019 demonstrated a KRISTAN thrombus, and patient was discharged on warfarin, now admitted with new paralysis and aphasia, CT c/w L MCA acute/subacute infarct. Per her son, she's has been consistently taking her warfarin.  Also has been on ASA 81 mg.  No recent chest pain/SOB. No palpitations.   INR at admission was >2.   Currently on heparin gtt.      PAST MEDICAL & SURGICAL HISTORY:  TIA (transient ischemic attack): 2013- Rt facial droop which resolved after 2 hours  Mitral stenosis  Atrial fibrillation: h/o Cardioversion in 2013 and on Coumadin  DM (diabetes mellitus): Type II  HLD (hyperlipidemia)  HTN (hypertension)  S/P laser cataract surgery, unspecified laterality: ???  S/P knee surgery  Ventral hernia      FAMILY HISTORY:  No pertinent family history in first degree relatives      SOCIAL HISTORY:    Never smoked, no ETOH, no IVDU    FAMILY HISTORY:  no history of clotting  no history of cancer  has 2 sisters, 5 brothers    HealthMaintenance  Mammogram 2019  Colonoscopy 2013    acetaminophen    Suspension .. 650 milliGRAM(s) Oral every 6 hours PRN  artificial  tears Solution 1 Drop(s) Both EYES four times a day  aspirin  chewable 81 milliGRAM(s) Oral daily  atorvastatin 40 milliGRAM(s) Oral at bedtime  dextrose 40% Gel 15 Gram(s) Oral once PRN  dextrose 5%. 1000 milliLiter(s) IV Continuous <Continuous>  dextrose 50% Injectable 12.5 Gram(s) IV Push once  dextrose 50% Injectable 25 Gram(s) IV Push once  dextrose 50% Injectable 25 Gram(s) IV Push once  glucagon  Injectable 1 milliGRAM(s) IntraMuscular once PRN  insulin lispro (HumaLOG) corrective regimen sliding scale   SubCutaneous every 6 hours  metoprolol tartrate Injectable 5 milliGRAM(s) IV Push once  warfarin 4 milliGRAM(s) Oral once      No Known Allergies      ROS otherwise unobtainable    T(C): 37.2 (09-24-19 @ 07:21), Max: 37.2 (09-24-19 @ 07:21)  HR: 106 (09-24-19 @ 12:00) (82 - 117)  BP: 125/81 (09-24-19 @ 12:00) (105/69 - 146/87)  RR: 15 (09-24-19 @ 12:00) (15 - 28)  SpO2: 98% (09-24-19 @ 12:00) (97% - 100%)  PHYSICAL EXAM  Gen:  laying in bed, left gaze;  non-verbal  H:  anicteric, eomi  CV:  RRR, S1, S2  Lungs:  CTA b/l  Ab soft/nt/nd  Ext:  no edema  Neuro:  non-verbal, left gaze, right sided weakness    PT/INR - ( 23 Sep 2019 09:21 )   PT: 27.2 sec;   INR: 2.32 ratio         PTT - ( 24 Sep 2019 05:50 )  PTT:73.7 sec  Prothrombin Time and INR, Plasma in AM (09.12.19 @ 20:10)    Prothrombin Time, Plasma: 25.8 SEC    INR: 2.27:                           10.1   11.8  )-----------( 264      ( 24 Sep 2019 05:50 )             32.4                         9.9    12.40 )-----------( 252      ( 23 Sep 2019 08:45 )             31.9                         10.7   13.2  )-----------( 319      ( 22 Sep 2019 11:45 )             35.2   09-24    138  |  106  |  8   ----------------------------<  119<H>  4.0   |  17<L>  |  0.52    Ca    9.1      24 Sep 2019 05:50

## 2019-09-24 NOTE — SWALLOW BEDSIDE ASSESSMENT ADULT - SLP PERTINENT HISTORY OF CURRENT PROBLEM
64Y RH Nadine speaking female with PMH Afib on coumadin, prior R MCA stroke in 2013 and 2016 with no residual deficits, HTN, HLD, DM2, mitral stenosis due to rheumatic valvular disease who presented with inability to speak and weakness of her R side. LKW 2:00am 9/22 at a family gathering. History obtained from patients children at bedside, who interpreted for the patient. Patients sister heard her get up and ambulate to the bathroom at 5:00am 9/22, however when family members went to wake her up for temple this morning around 9:00am, they found her unable to speak or move her R side. NIHSS: 19. CT Head showed L MCA acute-subacute infarct. CTA head demonstrated L M2 occlusion. Patient was not a candidate for endovascular treatment given core infarct of 0 with penumbra of 7.
64Y RH Nadine speaking female with PMH Afib on coumadin, prior R MCA stroke in 2013 and 2016 with no residual deficits, HTN, HLD, DM2, mitral stenosis due to rheumatic valvular disease who presented with inability to speak and weakness of her R side. LKW 2:00am 9/22 at a family gathering. History obtained from patients children at bedside, who interpreted for the patient. Patients sister heard her get up and ambulate to the bathroom at 5:00am 9/22, however when family members went to wake her up for temple this morning around 9:00am, they found her unable to speak or move her R side. NIHSS: 19. CT Head showed L MCA acute-subacute infarct. CTA head demonstrated L M2 occlusion. Patient was not a candidate for endovascular treatment given core infarct of 0 with penumbra of 7.

## 2019-09-24 NOTE — SWALLOW BEDSIDE ASSESSMENT ADULT - ORAL PREPARATORY PHASE
Within functional limits Mildly prolonged, yet functional mastication Mildly prolonged, yet functional mastication/Within functional limits

## 2019-09-24 NOTE — SWALLOW BEDSIDE ASSESSMENT ADULT - SLP PRECAUTIONS/LIMITATIONS: VISION
impaired/cannot see medication labels or newsprint, but can see obstacles in path, and the surrounding layout; can count fingers at arm's length
Partially impaired: cannot see medication labels or newsprint, but can see obstacles in path, and the surrounding layout; can count fingers at arm's length

## 2019-09-24 NOTE — PROGRESS NOTE ADULT - ASSESSMENT
64 year old RH Nadine speaking female (Spoken via family and stroke PA in native language) with PMHx of PMH Afib on coumadin, prior R MCA stroke in 2013 and 2016 with no residual deficits, HTN, HLD, DM2, mitral stenosis due to rheumatic valvular disease with recent hospitalization at [facility] for AF for potential cardioversion and intervention for severe mitral stenosis found to have KRISTAN thrombus maintained of coumadin with labile INR. Prior R MCA stroke in 2013 and 2016 with no residual deficits. LKW 2:00am 9/22 at a family gathering. Found by family at 9:00am 9/22 unable to speak or move her R side. NIHSS: 19. CT Head showed L MCA acute-subacute infarct. CTA head demonstrated L M2 occlusion. Patient was not a candidate for endovascular treatment given core infarct of 0 with penumbra of 7.    Impression: Cerebral embolism with cerebral infarction due to cardioembolism in setting of atrial fibrillation     NEURO: Continue close monitoring for neurologic deterioration, permissive HTN to gradual normotension, LDL 46- continue on home statin, MRI Brain w/o contrast noted above. Physical therapy/OT recommended acute rehab.    ANTITHROMBOTIC THERAPY: Heparin gtt with goal PTT 60-80    PULMONARY: CXR no acute cardiopulmonary process, protecting airway, saturating well     CARDIOVASCULAR: TTE EF 65-70% consistent with severe mitral stenosis, mild aortic stenosis. mild aortic regurgitation, cardiac monitoring shows afib- rate controlled                             SBP goal: Permissive HTN to gradual normotension    GASTROINTESTINAL:  dysphagia screen failed at bedside. S&S pending.      Diet: NPO    RENAL: BUN/Cr ?, good urine output      Na Goal: Greater than 135     Brunner: No    HEMATOLOGY: H/H without acute change, Platelets 264     DVT ppx: Heparin gtt    ID: afebrile, mild leukocytosis     OTHER:     DISPOSITION: Acute rehab once stable and workup is complete    CORE MEASURES:        Admission NIHSS: 19     TPA: NO      LDL/HDL: 46/39     Depression Screen: unable to assess due to aphasia     Statin Therapy: YES     Dysphagia Screen: FAIL     Smoking NO      Afib YES     Stroke Education YES    Obtain screening lower extremity venous ultrasound in patients who meet 1 or more of the following criteria as patient is high risk for DVT/PE on admission:   [] History of DVT/PE  []Hypercoagulable states (Factor V Leiden, Cancer, OCP, etc. )  []Prolonged immobility (hemiplegia/hemiparesis/post operative or any other extended immobilization)  [] Transferred from outside facility (Rehab or Long term care)  [] Age </= to 50 64 year old RH Nadine speaking female (Spoken via family and stroke PA in native language) with PMHx of PMH Afib on coumadin, prior R MCA stroke in 2013 and 2016 with no residual deficits, HTN, HLD, DM2, mitral stenosis due to rheumatic valvular disease with recent hospitalization at [facility] for AF for potential cardioversion and intervention for severe mitral stenosis found to have KRISTAN thrombus maintained of coumadin with labile INR. Prior R MCA stroke in 2013 and 2016 with no residual deficits. LKW 2:00am 9/22 at a family gathering. Found by family at 9:00am 9/22 unable to speak or move her R side. NIHSS: 19. CT Head showed L MCA acute-subacute infarct. CTA head demonstrated L M2 occlusion. Patient was not a candidate for endovascular treatment given core infarct of 0 with penumbra of 7.    Impression: Cerebral embolism with cerebral infarction due to cardioembolism in setting of atrial fibrillation     NEURO: Continue close monitoring for neurologic deterioration, permissive HTN to gradual normotension, LDL 46- continue on home statin, MRI Brain w/o contrast noted above. Physical therapy/OT recommended acute rehab.    ANTITHROMBOTIC THERAPY: Heparin gtt with goal PTT 60-80    PULMONARY: CXR no acute cardiopulmonary process, protecting airway, saturating well     CARDIOVASCULAR: TTE EF 65-70% consistent with severe mitral stenosis, mild aortic stenosis. mild aortic regurgitation, cardiac monitoring shows afib- rate controlled. KRISTAN thrombus on OAC (warfarin) may take up to 2-4 weeks to resolve. Will need repeat BUBBA in 3-4 weeks to evaluate for KRISTAN thrombus.             SBP goal: Permissive HTN to gradual normotension    GASTROINTESTINAL:  dysphagia screen failed at bedside. S&S pending.      Diet: NPO    RENAL: BUN/Cr no acute changes, good urine output     Na Goal: Greater than 135     Brunner: No    HEMATOLOGY: H/H without acute change, Platelets 264     DVT ppx: Heparin gtt    ID: afebrile, mild leukocytosis     OTHER: Plan of care discussed with patient and son at bedside.     DISPOSITION: Acute rehab once stable and workup is complete    CORE MEASURES:        Admission NIHSS: 19     TPA: NO      LDL/HDL: 46/39     Depression Screen: unable to assess due to aphasia     Statin Therapy: YES     Dysphagia Screen: FAIL     Smoking NO      Afib YES     Stroke Education YES    Obtain screening lower extremity venous ultrasound in patients who meet 1 or more of the following criteria as patient is high risk for DVT/PE on admission:   [] History of DVT/PE  []Hypercoagulable states (Factor V Leiden, Cancer, OCP, etc. )  []Prolonged immobility (hemiplegia/hemiparesis/post operative or any other extended immobilization)  [] Transferred from outside facility (Rehab or Long term care)  [] Age </= to 50 64 year old RH Nadine speaking female (Spoken via family and stroke PA in native language) with PMHx of PMH Afib on coumadin, prior R MCA stroke in 2013 and 2016 with no residual deficits, HTN, HLD, DM2, mitral stenosis due to rheumatic valvular disease with recent hospitalization at [facility] for AF for potential cardioversion and intervention for severe mitral stenosis found to have KRISTAN thrombus maintained of coumadin with labile INR. Prior R MCA stroke in 2013 and 2016 with no residual deficits. LKW 2:00am 9/22 at a family gathering. Found by family at 9:00am 9/22 unable to speak or move her R side. NIHSS: 19. CT Head showed L MCA acute-subacute infarct. CTA head demonstrated L M2 occlusion. Patient was not a candidate for endovascular treatment given core infarct of 0 with penumbra of 7.    Impression: Cerebral embolism with cerebral infarction due to cardioembolism in setting of atrial fibrillation     NEURO: Neurologically without acute change. Continue close monitoring for neurologic deterioration, permissive HTN to gradual normotension, LDL 46- continue on home statin as LDL already at goal, MRI Brain w/o contrast noted above. Physical therapy/OT recommended acute rehab.    ANTITHROMBOTIC THERAPY: Heparin gtt with goal PTT 60-80. Started on Warfarin and aspirin therapy.     PULMONARY: CXR no acute cardiopulmonary process, protecting airway, saturating well     CARDIOVASCULAR: TTE EF 65-70% consistent with severe mitral stenosis, mild aortic stenosis. mild aortic regurgitation, cardiac monitoring shows afib- rate controlled. KRISTAN thrombus on OAC (warfarin) may take up to 2-4 weeks to resolve. Will need repeat BUBBA in 3-4 weeks to evaluate for KRISTAN thrombus.             SBP goal: Permissive HTN to gradual normotension    GASTROINTESTINAL:  dysphagia screen passed. Initially failed at bedside, seen by S&S who recommended soft diet     Diet: Soft     RENAL: BUN/Cr without acute changes, good urine output     Na Goal: Greater than 135     Brunner: No    HEMATOLOGY: H/H without acute change, Platelets 264. Consult Hematology pending for optimization for anticoagulations      DVT ppx: Coumadin     ID: afebrile, mild leukocytosis. CXR negative, UA negative, RVP pending     OTHER: Plan of care discussed with patient and son at bedside and all questions answered.     DISPOSITION: Acute rehab once stable and workup is complete    CORE MEASURES:        Admission NIHSS: 19     TPA: NO      LDL/HDL: 46/39     Depression Screen: unable to assess due to aphasia     Statin Therapy: YES     Dysphagia Screen: FAIL     Smoking NO      Afib YES     Stroke Education YES    Obtain screening lower extremity venous ultrasound in patients who meet 1 or more of the following criteria as patient is high risk for DVT/PE on admission:   [] History of DVT/PE  []Hypercoagulable states (Factor V Leiden, Cancer, OCP, etc. )  []Prolonged immobility (hemiplegia/hemiparesis/post operative or any other extended immobilization)  [] Transferred from outside facility (Rehab or Long term care)  [] Age </= to 50 64 year old RH Nadine speaking female (Spoken via family and stroke PA in native language) with PMHx of PMH Afib on coumadin, prior R MCA stroke in 2013 and 2016 with no residual deficits, HTN, HLD, DM2, mitral stenosis due to rheumatic valvular disease with recent hospitalization at [facility] for AF for potential cardioversion and intervention for severe mitral stenosis found to have KRISTAN thrombus maintained of coumadin with labile INR. Prior R MCA stroke in 2013 and 2016 with no residual deficits. LKW 2:00am 9/22 at a family gathering. Found by family at 9:00am 9/22 unable to speak or move her R side. NIHSS: 19. CT Head showed L MCA acute-subacute infarct. CTA head demonstrated L M2 occlusion. Patient was not a candidate for endovascular treatment given core infarct of 0 with penumbra of 7.    Impression: Cerebral embolism with cerebral infarction due to cardioembolism in setting of atrial fibrillation     NEURO: Neurologically without acute change. Continue close monitoring for neurologic deterioration, permissive HTN to gradual normotension, LDL 46- continue on home statin as LDL already at goal, MRI Brain w/o contrast noted above. Physical therapy/OT recommended acute rehab.    ANTITHROMBOTIC THERAPY: Heparin gtt with goal PTT 60-80 and ASA     PULMONARY: CXR no acute cardiopulmonary process, protecting airway, saturating well     CARDIOVASCULAR: TTE EF 65-70% consistent with severe mitral stenosis, mild aortic stenosis. mild aortic regurgitation, cardiac monitoring shows afib- rate controlled. KRISTAN thrombus on OAC (warfarin) may take up to 2-4 weeks to resolve. Will need repeat BUBBA in 3-4 weeks to evaluate for KRISTAN thrombus.             SBP goal: Permissive HTN to gradual normotension    GASTROINTESTINAL:  dysphagia screen passed. Initially failed at bedside, seen by S&S who recommended soft diet     Diet: Soft     RENAL: BUN/Cr without acute changes, good urine output     Na Goal: Greater than 135     Brunner: No    HEMATOLOGY: H/H without acute change, Platelets 264. Per Hematology consult given recurrent CVA despite ASA 81 and Warfarin at therapeutic level, recommend hypercoagulable work up with APLS mallory (LA, anticardiolipin ab, DRVVT, b2gp1 ab, 50/50 mixing study albeit PTT will not correct as she is on heparin;  can at least r/o possibility of lupus anticoagulant affecting PT), Factor V leiden, APC resistance, Prothrombin mutation.  Unable to accurately check AT3, protein C and protein S activity given current Heparin use and recent Warfarin use. CT Chest/Abd/Pelvis to r/o malignancy given recurrent infarcts. Per Hematology recommendation Warfarin discontinued and will continue with oral ASA and Heparin gtt. Hematology will follow      DVT ppx: Heparin    ID: afebrile, mild leukocytosis. CXR negative, UA negative, RVP pending     OTHER: Plan of care discussed with patient and son at bedside and all questions answered.     DISPOSITION: Acute rehab once stable and workup is complete    CORE MEASURES:        Admission NIHSS: 19     TPA: NO      LDL/HDL: 46/39     Depression Screen: unable to assess due to aphasia     Statin Therapy: YES     Dysphagia Screen: FAIL     Smoking NO      Afib YES     Stroke Education YES    Obtain screening lower extremity venous ultrasound in patients who meet 1 or more of the following criteria as patient is high risk for DVT/PE on admission:   [] History of DVT/PE  []Hypercoagulable states (Factor V Leiden, Cancer, OCP, etc. )  []Prolonged immobility (hemiplegia/hemiparesis/post operative or any other extended immobilization)  [] Transferred from outside facility (Rehab or Long term care)  [] Age </= to 50 64 year old RH Nadine speaking female (Spoken via family and stroke PA in native language) with PMHx of PMH Afib on coumadin, prior R MCA stroke in 2013 and 2016 with no residual deficits, HTN, HLD, DM2, mitral stenosis due to rheumatic valvular disease with recent hospitalization at [facility] for AF for potential cardioversion and intervention for severe mitral stenosis found to have KRISTAN thrombus maintained of coumadin with labile INR. Prior R MCA stroke in 2013 and 2016 with no residual deficits. LKW 2:00am 9/22 at a family gathering. Found by family at 9:00am 9/22 unable to speak or move her R side. NIHSS: 19. CT Head showed L MCA acute-subacute infarct. CTA head demonstrated L M2 occlusion. Patient was not a candidate for endovascular treatment given core infarct of 0 with penumbra of 7.    Impression: Cerebral embolism with cerebral infarction due to cardioembolism in setting of atrial fibrillation and KRISTAN thrombus     NEURO: Neurologically without acute change. Continue close monitoring for neurologic deterioration, permissive HTN to gradual normotension, LDL 46- continue on home statin as LDL already at goal, MRI Brain w/o contrast noted above. Physical therapy/OT recommended acute rehab.    ANTITHROMBOTIC THERAPY: Heparin gtt with goal PTT 60-80 and ASA     PULMONARY: CXR no acute cardiopulmonary process, protecting airway, saturating well     CARDIOVASCULAR: TTE EF 65-70% consistent with severe mitral stenosis, mild aortic stenosis. mild aortic regurgitation, cardiac monitoring shows afib- rate controlled. KRISTAN thrombus on OAC (warfarin) may take up to 2-4 weeks to resolve. Will need repeat BUBBA in 3-4 weeks to evaluate for KRISTAN thrombus.             SBP goal: Permissive HTN to gradual normotension    GASTROINTESTINAL:  dysphagia screen passed. Initially failed at bedside, seen by S&S who recommended soft diet     Diet: Soft     RENAL: BUN/Cr without acute changes, good urine output     Na Goal: Greater than 135     Brunner: No    HEMATOLOGY: H/H without acute change, Platelets 264. Per Hematology consult given recurrent CVA despite ASA 81 and Warfarin at therapeutic level, recommend hypercoagulable work up with APLS mallory (LA, anticardiolipin ab, DRVVT, b2gp1 ab, 50/50 mixing study albeit PTT will not correct as she is on heparin;  can at least r/o possibility of lupus anticoagulant affecting PT), Factor V leiden, APC resistance, Prothrombin mutation.  Unable to accurately check AT3, protein C and protein S activity given current Heparin use and recent Warfarin use. CT Chest/Abd/Pelvis to r/o malignancy given recurrent infarcts. Per Hematology recommendation Warfarin discontinued and will continue with oral ASA and Heparin gtt. Hematology will follow      DVT ppx: Heparin    ID: afebrile, mild leukocytosis. CXR negative, UA negative, RVP pending     OTHER: Plan of care discussed with patient and son at bedside and all questions answered.     DISPOSITION: Acute rehab once stable and workup is complete    CORE MEASURES:        Admission NIHSS: 19     TPA: NO      LDL/HDL: 46/39     Depression Screen: unable to assess due to aphasia     Statin Therapy: YES     Dysphagia Screen: FAIL     Smoking NO      Afib YES     Stroke Education YES    Obtain screening lower extremity venous ultrasound in patients who meet 1 or more of the following criteria as patient is high risk for DVT/PE on admission:   [] History of DVT/PE  []Hypercoagulable states (Factor V Leiden, Cancer, OCP, etc. )  []Prolonged immobility (hemiplegia/hemiparesis/post operative or any other extended immobilization)  [] Transferred from outside facility (Rehab or Long term care)  [] Age </= to 50

## 2019-09-24 NOTE — SWALLOW BEDSIDE ASSESSMENT ADULT - NS SPL SWALLOW CLINIC TRIAL FT
Unable to demonstrate oral motor movements; however, when presented with PO, spontaneous movements were WFL; consistent with oral apraxia.

## 2019-09-24 NOTE — SWALLOW BEDSIDE ASSESSMENT ADULT - SWALLOW EVAL: RECOMMENDED FEEDING/EATING TECHNIQUES
check mouth frequently for oral residue/pocketing/position upright (90 degrees)/crush meds or provide whole in applesauce/crush medication (when feasible)/maintain upright posture during/after eating for 30 mins/oral hygiene/small sips/bites

## 2019-09-24 NOTE — DIETITIAN INITIAL EVALUATION ADULT. - OTHER INFO
Pt seen for: Stroke Unit length of stay   Adm dx: CVA    GI issues: no N/V  Last BM: 9/23     Food allergies/Intolerances: NKFA   Vit/supplement PTA: multivitamin     Diet PTA: no restrictions, eats 3 meals daily, traditional [place] food (A1c 6.7)     Subjective/Objective information: pt aphasic, son at bedside provided information. Reports good appetite PTA, no wt changes over past 1 year. Pt checks BG once daily, usual range 120-140, son doesn't know previous A1c level.    Education: Type 2 DM Nutrition Therapy handout provided and reviewed.

## 2019-09-24 NOTE — SWALLOW BEDSIDE ASSESSMENT ADULT - MODE OF PRESENTATION
fed by clinician cup/spoon/fed by clinician/Cup for nectar straw/spoon/fed by clinician/self fed fed by clinician/spoon spoon/fed by clinician straw/spoon/fed by clinician

## 2019-09-24 NOTE — PROGRESS NOTE ADULT - SUBJECTIVE AND OBJECTIVE BOX
THE PATIENT WAS SEEN AND EXAMINED BY ME WITH THE HOUSESTAFF AND STROKE TEAM DURING MORNING ROUNDS.   HPI:  64 year old RH Nadine speaking female (Spoken via family and stroke PA in native language) with PMHx of PMH Afib on coumadin, prior R MCA stroke in 2013 and 2016 with no residual deficits, HTN, HLD, DM2, mitral stenosis due to rheumatic valvular disease with recent hospitalization at [facility] for AF for potential cardioversion and intervention for severe mitral stenosis found to have KRISTAN thrombus maintained of coumadin with labile INR. Prior R MCA stroke in 2013 and 2016 with no residual deficits. LKW 2:00am 9/22 at a family gathering. Found by family at 9:00am 9/22 unable to speak or move her R side. NIHSS: 19 on admission.    ROS: Otherwise negative.     SUBJECTIVE: No events overnight.  No new neurologic complaints.      acetaminophen  Suppository .. 650 milliGRAM(s) Rectal once  artificial  tears Solution 1 Drop(s) Both EYES four times a day  atorvastatin 40 milliGRAM(s) Oral at bedtime  dextrose 40% Gel 15 Gram(s) Oral once PRN  dextrose 5%. 1000 milliLiter(s) IV Continuous <Continuous>  dextrose 50% Injectable 12.5 Gram(s) IV Push once  dextrose 50% Injectable 25 Gram(s) IV Push once  dextrose 50% Injectable 25 Gram(s) IV Push once  glucagon  Injectable 1 milliGRAM(s) IntraMuscular once PRN  heparin  Infusion 800 Unit(s)/Hr IV Continuous <Continuous>  insulin lispro (HumaLOG) corrective regimen sliding scale   SubCutaneous every 6 hours  metoprolol tartrate Injectable 5 milliGRAM(s) IV Push once  sodium chloride 0.9%. 1000 milliLiter(s) IV Continuous <Continuous>    PHYSICAL EXAM:   Vital Signs Last 24 Hrs  T(C): 36.9 (23 Sep 2019 20:00), Max: 36.9 (23 Sep 2019 20:00)  T(F): 98.4 (23 Sep 2019 20:00), Max: 98.4 (23 Sep 2019 20:00)  HR: 93 (24 Sep 2019 06:00) (77 - 117)  BP: 120/72 (24 Sep 2019 06:00) (105/69 - 146/87)  BP(mean): 87 (24 Sep 2019 06:00) (82 - 105)  RR: 15 (24 Sep 2019 06:00) (13 - 28)  SpO2: 99% (24 Sep 2019 06:00) (97% - 100%)    General: No acute distress  HEENT: L gaze preference.  Abdomen: Soft, nontender, nondistended   Extremities: No edema    NEUROLOGICAL EXAM:  Mental status: Awake, alert, no verbal output, follows midline commands.  Cranial Nerves: No facial asymmetry, L gaze preference. no nystagmus, no dysarthria,  tongue midline  Motor exam: R side hemiplegia RUE/RLE, left side moves well against gravity  Sensation: Intact to light touch   Coordination/ Gait: No dysmetria, gait not tested    LABS:                        10.1   11.8  )-----------( 264      ( 24 Sep 2019 05:50 )             32.4    09-23    138  |  107  |  12  ----------------------------<  112<H>  4.0   |  21<L>  |  0.70    Ca    8.8      23 Sep 2019 06:32    TPro  7.5  /  Alb  4.0  /  TBili  1.0  /  DBili  x   /  AST  15  /  ALT  21  /  AlkPhos  55  09-22  PT/INR - ( 23 Sep 2019 09:21 )   PT: 27.2 sec;   INR: 2.32 ratio      PTT - ( 24 Sep 2019 05:50 )  PTT:73.7 sec  Hemoglobin A1C, Whole Blood: 6.7 % (09-23 @ 08:45)  Hemoglobin A1C, Whole Blood: 6.3 % (09-13 @ 07:00)    IMAGING: Reviewed by me.     MRI Head No Cont (09.23.19)  Redemonstration of acute left KUSUM and MCA territory infarcts primarily   involving the left frontal lobe. No yola hemorrhagic transformation. No   midline shift or hydrocephalus.  Chronic right frontal and right cerebellar hemisphere infarcts.    CT Head No Cont (09.23.19)  Redemonstration of evolving left MCA and KUSUM territory infarct without   hemorrhagic transformation, as well as remote right MCA infarct, volume   loss, and microvascular disease and ex vacuo enlargement of the right   lateral ventricle unchanged from prior.    CT angiography neck w/ IV Cont (09.22.19)  No evidence for carotid or vertebral artery stenosis.    CT angiography brain w/ IV Cont (09.22.19)  A short segment left middle cerebral artery M2 branch occlusion is noted.   The M1 segment is widely patent.     CT Head No Cont (09.22.19)  Acute to subacute infarct of left MCA territory without hemorrhagic   transformation. Chronic right MCA infarct.   Acute to subacute infarct involves the distal left   anterior cerebral artery territory at the level of the superior frontal   gyrus, with edema and swelling, without hemorrhagic transformation. THE PATIENT WAS SEEN AND EXAMINED BY ME WITH THE HOUSESTAFF AND STROKE TEAM DURING MORNING ROUNDS.     HPI:  64 year old RH Nadine speaking female (Spoken via family and stroke PA in native language) with PMHx of PMH Afib on coumadin, prior R MCA stroke in 2013 and 2016 with no residual deficits, HTN, HLD, DM2, mitral stenosis due to rheumatic valvular disease with recent hospitalization at [facility] for AF for potential cardioversion and intervention for severe mitral stenosis found to have KRISTAN thrombus maintained of coumadin with labile INR. Prior R MCA stroke in 2013 and 2016 with no residual deficits. LKW 2:00am 9/22 at a family gathering. Found by family at 9:00am 9/22 unable to speak or move her R side. NIHSS: 19 on admission.    ROS: Otherwise negative.     SUBJECTIVE: No events overnight.  No new neurologic complaints.      acetaminophen  Suppository .. 650 milliGRAM(s) Rectal once  artificial  tears Solution 1 Drop(s) Both EYES four times a day  atorvastatin 40 milliGRAM(s) Oral at bedtime  dextrose 40% Gel 15 Gram(s) Oral once PRN  dextrose 5%. 1000 milliLiter(s) IV Continuous <Continuous>  dextrose 50% Injectable 12.5 Gram(s) IV Push once  dextrose 50% Injectable 25 Gram(s) IV Push once  dextrose 50% Injectable 25 Gram(s) IV Push once  glucagon  Injectable 1 milliGRAM(s) IntraMuscular once PRN  heparin  Infusion 800 Unit(s)/Hr IV Continuous <Continuous>  insulin lispro (HumaLOG) corrective regimen sliding scale   SubCutaneous every 6 hours  metoprolol tartrate Injectable 5 milliGRAM(s) IV Push once  sodium chloride 0.9%. 1000 milliLiter(s) IV Continuous <Continuous>    PHYSICAL EXAM:   Vital Signs Last 24 Hrs  T(C): 36.9 (23 Sep 2019 20:00), Max: 36.9 (23 Sep 2019 20:00)  T(F): 98.4 (23 Sep 2019 20:00), Max: 98.4 (23 Sep 2019 20:00)  HR: 93 (24 Sep 2019 06:00) (77 - 117)  BP: 120/72 (24 Sep 2019 06:00) (105/69 - 146/87)  BP(mean): 87 (24 Sep 2019 06:00) (82 - 105)  RR: 15 (24 Sep 2019 06:00) (13 - 28)  SpO2: 99% (24 Sep 2019 06:00) (97% - 100%)    General: No acute distress  HEENT: L gaze preference, does not cross midline  Abdomen: Soft, nontender, nondistended   Extremities: No edema    NEUROLOGICAL EXAM:  Mental status: Awake, alert, no verbal output, follows midline commands.  Cranial Nerves: No facial asymmetry, L gaze preference. no nystagmus, no dysarthria,  tongue midline  Motor exam: R side hemiplegia RUE/RLE, left side moves well against gravity  Sensation: Intact to light touch   Coordination/ Gait: No dysmetria, gait not tested    LABS:                        10.1   11.8  )-----------( 264      ( 24 Sep 2019 05:50 )             32.4    09-23    138  |  107  |  12  ----------------------------<  112<H>  4.0   |  21<L>  |  0.70    Ca    8.8      23 Sep 2019 06:32    TPro  7.5  /  Alb  4.0  /  TBili  1.0  /  DBili  x   /  AST  15  /  ALT  21  /  AlkPhos  55  09-22  PT/INR - ( 23 Sep 2019 09:21 )   PT: 27.2 sec;   INR: 2.32 ratio      PTT - ( 24 Sep 2019 05:50 )  PTT:73.7 sec  Hemoglobin A1C, Whole Blood: 6.7 % (09-23 @ 08:45)  Hemoglobin A1C, Whole Blood: 6.3 % (09-13 @ 07:00)    IMAGING: Reviewed by me.     MRI Head No Cont (09.23.19)  Redemonstration of acute left KUSUM and MCA territory infarcts primarily   involving the left frontal lobe. No yola hemorrhagic transformation. No   midline shift or hydrocephalus.  Chronic right frontal and right cerebellar hemisphere infarcts.    CT Head No Cont (09.23.19)  Redemonstration of evolving left MCA and KUSUM territory infarct without   hemorrhagic transformation, as well as remote right MCA infarct, volume   loss, and microvascular disease and ex vacuo enlargement of the right   lateral ventricle unchanged from prior.    CT angiography neck w/ IV Cont (09.22.19)  No evidence for carotid or vertebral artery stenosis.    CT angiography brain w/ IV Cont (09.22.19)  A short segment left middle cerebral artery M2 branch occlusion is noted.   The M1 segment is widely patent.     CT Head No Cont (09.22.19)  Acute to subacute infarct of left MCA territory without hemorrhagic   transformation. Chronic right MCA infarct.   Acute to subacute infarct involves the distal left   anterior cerebral artery territory at the level of the superior frontal   gyrus, with edema and swelling, without hemorrhagic transformation. THE PATIENT WAS SEEN AND EXAMINED BY ME WITH THE HOUSESTAFF AND STROKE TEAM DURING MORNING ROUNDS.     HPI:  64 year old RH Nadine speaking female (Spoken via family and stroke PA in native language) with PMHx of PMH Afib on coumadin, prior R MCA stroke in 2013 and 2016 with no residual deficits, HTN, HLD, DM2, mitral stenosis due to rheumatic valvular disease with recent hospitalization at [facility] for AF for potential cardioversion and intervention for severe mitral stenosis found to have KRISTAN thrombus maintained of coumadin with labile INR. Prior R MCA stroke in 2013 and 2016 with no residual deficits. LKW 2:00am 9/22 at a family gathering. Found by family at 9:00am 9/22 unable to speak or move her R side. NIHSS: 19 on admission.    ROS: Otherwise negative.     SUBJECTIVE: No events overnight.  No new neurologic complaints.     acetaminophen Suspension 650 milliGRAM(s) ORAL every 6 hours, PRN  aspirin chewable 81 milliGRAM(s) Oral daily once a day  artificial  tears Solution 1 Drop(s) Both EYES four times a day  atorvastatin 40 milliGRAM(s) Oral at bedtime  dextrose 40% Gel 15 Gram(s) Oral once PRN  dextrose 5%. 1000 milliLiter(s) IV Continuous <Continuous>  dextrose 50% Injectable 12.5 Gram(s) IV Push once  dextrose 50% Injectable 25 Gram(s) IV Push once  dextrose 50% Injectable 25 Gram(s) IV Push once  glucagon  Injectable 1 milliGRAM(s) IntraMuscular once PRN  heparin  Infusion 800 Unit(s)/Hr IV Continuous <Continuous>  insulin lispro (HumaLOG) corrective regimen sliding scale   SubCutaneous every 6 hours  metoprolol tartrate Injectable 5 milliGRAM(s) IV Push once  sodium chloride 0.9%. 1000 milliLiter(s) IV Continuous <Continuous>  warfarin 4 milliGRAM(s) Oral     PHYSICAL EXAM:   Vital Signs Last 24 Hrs  T(C): 37.2 (24 Sep 2019 07:21), Max: 36.9 (23 Sep 2019 20:00)  T(F): 99 (24 Sep 2019 07:21), Max: 98.4 (23 Sep 2019 20:00)  HR: 99 (24 Sep 2019 10:00) (77 - 117)  BP: 132/95 (24 Sep 2019 10:00) (105/69 - 146/87)  BP(mean): 107 (24 Sep 2019 10:00) (82 - 105)  RR: 15 (24 Sep 2019 10:00) (13 - 28)  SpO2: 99% (24 Sep 2019 06:00) (97% - 100%)    General: No acute distress  HEENT: L gaze preference, does not cross midline  Abdomen: Soft, nontender, nondistended   Extremities: No edema    NEUROLOGICAL EXAM:  Mental status: Awake, alert, no verbal output, follows midline commands   Cranial Nerves: No facial asymmetry, L gaze preference. no nystagmus, no dysarthria, unable to assess tongue deviation  Motor exam: R side hemiplegia RUE/RLE 0/5, left side moves well against gravity  Sensation: unable to assess    Coordination/ Gait: unable to assess dysmetria, gait not tested    LABS:                        10.1   11.8  )-----------( 264      ( 24 Sep 2019 05:50 )             32.4    09-23    138  |  107  |  12  ----------------------------<  112<H>  4.0   |  21<L>  |  0.70    Ca    8.8      23 Sep 2019 06:32    TPro  7.5  /  Alb  4.0  /  TBili  1.0  /  DBili  x   /  AST  15  /  ALT  21  /  AlkPhos  55  09-22  PT/INR - ( 23 Sep 2019 09:21 )   PT: 27.2 sec;   INR: 2.32 ratio      PTT - ( 24 Sep 2019 05:50 )  PTT:73.7 sec  Hemoglobin A1C, Whole Blood: 6.7 % (09-23 @ 08:45)  Hemoglobin A1C, Whole Blood: 6.3 % (09-13 @ 07:00)    IMAGING: Reviewed by me.     MRI Head No Cont (09.23.19)  Redemonstration of acute left KUSUM and MCA territory infarcts primarily   involving the left frontal lobe. No yola hemorrhagic transformation. No   midline shift or hydrocephalus.  Chronic right frontal and right cerebellar hemisphere infarcts.    CT Head No Cont (09.23.19)  Redemonstration of evolving left MCA and KUSUM territory infarct without   hemorrhagic transformation, as well as remote right MCA infarct, volume   loss, and microvascular disease and ex vacuo enlargement of the right   lateral ventricle unchanged from prior.    CT angiography neck w/ IV Cont (09.22.19)  No evidence for carotid or vertebral artery stenosis.    CT angiography brain w/ IV Cont (09.22.19)  A short segment left middle cerebral artery M2 branch occlusion is noted.   The M1 segment is widely patent.     CT Head No Cont (09.22.19)  Acute to subacute infarct of left MCA territory without hemorrhagic   transformation. Chronic right MCA infarct.   Acute to subacute infarct involves the distal left   anterior cerebral artery territory at the level of the superior frontal   gyrus, with edema and swelling, without hemorrhagic transformation.

## 2019-09-24 NOTE — SWALLOW BEDSIDE ASSESSMENT ADULT - SLP GENERAL OBSERVATIONS
Patient positioned upright in bed. AA; non-verbal; attentive to clinician and responding 'yes' to simple questions by closing eyes. Son declined  phone. Son present for translation

## 2019-09-24 NOTE — CONSULT NOTE ADULT - ASSESSMENT
64y.o. Female with DM2, HTN, Non-obstructive CAD, permanent AFIB on warfarin  for the last 5 years (s/p DCCV 2013), hx of CVA's (2013, 2016 without residual deficits)  Rheumatic valvular disease with Severe MS, recent admit 09/12/2019 - 09/18/2019 with progressive ANDERSON and found to be in AFL and planned for DCCV, however BUBBA on 09/16/2019 demonstrated a KRISTAN thrombus, and patient was discharged on warfarin, now admitted with new paralysis and aphasia, CT c/w L MCA acute/subacute infarct. Per her son, she's has been consistently taking her warfarin.  Also has been on ASA 81 mg.  INR at admission therapeutic.    Currently on heparin gtt.    Pt with recurrent CVA despite therapeutic INR on Warfarin + ASA.  As per history, previous CVA in 2016 may have occurred while on Warfarin as well which perhaps makes this occurrence her 2nd Warfarin failure.    RECOMMEND:    -Given recurrent CVA despite ASA 81 and Warfarin at therapeutic level, recommend hypercoaguable work up with APLSALVATORE tejada (LA, anticardiolipin ab, DRVVT, b2gp1 ab, 50/50 mixing study albeit PTT will not correct as she is on heparin;  can at least r/o possibility of lupus anticoagulant affecting PT), Factor V leiden, APC resistance, Prothrombin mutation.  Unable to accurately check AT3, protein C and protein S activity given current Heparin use and recent Warfarin use.  -CT cap to r/o malignancy given recurrent infarcts.  -c/w Heparin gtt   -c/w ASA 81 mg PO daily (pt has been on ASA at home as well).  -would favor transition to Lovenox 1.5 mg/kg qday upon or prior to d/c given lack of clarity as to frequency of therapeutic level of INR while on Warfarin in addition to likely 2nd CVA while on AC.  -repeat echo as per cardiology after 2- 4 weeks to check on status of previously noted KRISTAN thrombus      d/w Stroke service  We will follow with you.    Tomás Torrez MD  Hematology/Oncology  Cell:  [phone]  Office Phone: [phone]  Office Fax:  [phone]  [facility] [place] [address]

## 2019-09-24 NOTE — PHARMACOTHERAPY INTERVENTION NOTE - COMMENTS
Patient currently aphasic. Spoke with patient's son who takes care of her pill box regarding warfarin use, dosing, side effects, monitoring, dietary restrictions, herbal supplementation avoidance.

## 2019-09-24 NOTE — SWALLOW BEDSIDE ASSESSMENT ADULT - SWALLOW EVAL: DIAGNOSIS
Attempted to see patient twice today for speech/swallow evaluation. Patient being seen by PT/OT this morning and now currently off unit at MRI. This service will follow up at earliest availability. d/w JACOBY Quintero
Patient presents with 1) mild oral dysphagia 2) oral apraxia 3) aphasia. Oral stage swallow is characterized by mildly prolonged/reduced yet functional bolus management of soft solids. There are no overt signs of laryngeal penetration/aspiration noted across all consistencies trialed. Cannot rule out silent aspiration on bedside exam. Would initiate po diet at this time with close monitoring of signs/symptoms of aspiration.

## 2019-09-24 NOTE — SWALLOW BEDSIDE ASSESSMENT ADULT - ADDITIONAL RECOMMENDATIONS
Patient currently non-verbal along with limited command following; signs of oral/verbal apraxia noted. Good visual attention/tracking; ID of familiar objects via eye gaze. Also able to affirm simple statements via purposeful eye closing. Formal speech/language evaluation to continue.

## 2019-09-24 NOTE — SWALLOW BEDSIDE ASSESSMENT ADULT - ASR SWALLOW ASPIRATION MONITOR
gurgly voice/cough/upper respiratory infection/throat clearing/fever/pneumonia/Monitor for s/s aspiration/laryngeal penetration. If noted:  D/C p.o. intake, provide non-oral nutrition/hydration/meds, and contact this service @ [phone]/change of breathing pattern

## 2019-09-24 NOTE — DIETITIAN INITIAL EVALUATION ADULT. - ENERGY NEEDS
Ht: 63"  Wt: 126  BMI: 22.4  kg/m2   IBW: 115 (+/-10%)     within IBW  Edema: none       Skin: no pressure injuries documented

## 2019-09-24 NOTE — SWALLOW BEDSIDE ASSESSMENT ADULT - COMMENTS
L MCA territory infarct with L M2 occlusion noted on Head CT. Chronic right MCA infarct. Admit to stroke unit w frequent neuro-checks q1h; Permissive HTN up to ; baseline EKG and CXR;  - Intravenous hydration with normal saline at 75cc per hour;  - Bedside swallow evaluation;  - Head of bed > 30 degrees for aspiration prevention and aspiration precautions ordered.; repeat CT Head in AM 9/23;  -MRI brain without contrast.   Bedside evaluations on previous admissions 2013 and 2016 noted left facial droop; recommended regular texture. L MCA territory infarct with L M2 occlusion noted on Head CT. Chronic right MCA infarct. Admit to stroke unit w frequent neuro-checks q1h; Permissive HTN up to ; baseline EKG and CXR;  - Intravenous hydration with normal saline at 75cc per hour;  - Bedside swallow evaluation;  - Head of bed > 30 degrees for aspiration prevention and aspiration precautions ordered.; repeat CT Head in AM 9/23;  -MRI brain without contrast.   Bedside evaluations on previous admissions 2013 and 2016 noted left facial droop; recommended regular texture. MRI 9/23: Redemonstration of acute left KUSUM and MCA territory infarcts primarily involving the left frontal lobe. No yola hemorrhagic transformation. No midline shift or hydrocephalus. Chronic right frontal and right cerebellar hemisphere infarcts are noted.

## 2019-09-24 NOTE — SWALLOW BEDSIDE ASSESSMENT ADULT - SWALLOW EVAL: FUNCTIONAL LEVEL AT TIME OF EVAL
AA+O to person; non-verbal; command following confounded by oral/verbal apraxia, possible limb apraxia however able to close eyes and ID objects with eye gaze in response to verbal directive

## 2019-09-24 NOTE — SWALLOW BEDSIDE ASSESSMENT ADULT - CONSISTENCIES ADMINISTERED
ice chip nectar thick/honey thick thin liquid puree thin puree thick/soft solid mech soft soft solid puree thick/puree thin mech soft/soft solid

## 2019-09-24 NOTE — SWALLOW BEDSIDE ASSESSMENT ADULT - SWALLOW EVAL: MANDIBULAR STRENGTH AND MOBILITY
Pt made appropriate jaw movements to chew soft solid Based on spontaneous movements, adequate strength, ROM, rotary chew pattern noted

## 2019-09-25 ENCOUNTER — APPOINTMENT (OUTPATIENT)
Dept: CARDIOLOGY | Facility: CLINIC | Age: 64
End: 2019-09-25

## 2019-09-25 LAB
ALBUMIN SERPL ELPH-MCNC: 3.4 G/DL — SIGNIFICANT CHANGE UP (ref 3.3–5)
ALP SERPL-CCNC: 52 U/L — SIGNIFICANT CHANGE UP (ref 40–120)
ALT FLD-CCNC: 17 U/L — SIGNIFICANT CHANGE UP (ref 10–45)
ANION GAP SERPL CALC-SCNC: 15 MMOL/L — SIGNIFICANT CHANGE UP (ref 5–17)
APPEARANCE UR: CLEAR — SIGNIFICANT CHANGE UP
APTT BLD: 74.2 SEC — HIGH (ref 27.5–36.3)
AST SERPL-CCNC: 25 U/L — SIGNIFICANT CHANGE UP (ref 10–40)
BILIRUB SERPL-MCNC: 1.2 MG/DL — SIGNIFICANT CHANGE UP (ref 0.2–1.2)
BILIRUB UR-MCNC: NEGATIVE — SIGNIFICANT CHANGE UP
BUN SERPL-MCNC: 10 MG/DL — SIGNIFICANT CHANGE UP (ref 7–23)
CALCIUM SERPL-MCNC: 8.9 MG/DL — SIGNIFICANT CHANGE UP (ref 8.4–10.5)
CHLORIDE SERPL-SCNC: 103 MMOL/L — SIGNIFICANT CHANGE UP (ref 96–108)
CO2 SERPL-SCNC: 21 MMOL/L — LOW (ref 22–31)
COLOR SPEC: COLORLESS — SIGNIFICANT CHANGE UP
CREAT SERPL-MCNC: 0.58 MG/DL — SIGNIFICANT CHANGE UP (ref 0.5–1.3)
DIFF PNL FLD: NEGATIVE — SIGNIFICANT CHANGE UP
DRVVT SCREEN TO CONFIRM RATIO: SIGNIFICANT CHANGE UP
DRVVT SCREEN TO CONFIRM RATIO: SIGNIFICANT CHANGE UP
GLUCOSE SERPL-MCNC: 156 MG/DL — HIGH (ref 70–99)
GLUCOSE UR QL: NEGATIVE — SIGNIFICANT CHANGE UP
HCT VFR BLD CALC: 32.9 % — LOW (ref 34.5–45)
HGB BLD-MCNC: 10.5 G/DL — LOW (ref 11.5–15.5)
KETONES UR-MCNC: NEGATIVE — SIGNIFICANT CHANGE UP
LA NT DPL PPP QL: 39.3 SEC — SIGNIFICANT CHANGE UP
LA NT DPL PPP QL: 42.6 SEC — SIGNIFICANT CHANGE UP
LEUKOCYTE ESTERASE UR-ACNC: NEGATIVE — SIGNIFICANT CHANGE UP
MCHC RBC-ENTMCNC: 27.7 PG — SIGNIFICANT CHANGE UP (ref 27–34)
MCHC RBC-ENTMCNC: 31.8 GM/DL — LOW (ref 32–36)
MCV RBC AUTO: 87.2 FL — SIGNIFICANT CHANGE UP (ref 80–100)
NITRITE UR-MCNC: NEGATIVE — SIGNIFICANT CHANGE UP
NORMALIZED SCT PPP-RTO: 0.84 RATIO — SIGNIFICANT CHANGE UP (ref 0–1.16)
NORMALIZED SCT PPP-RTO: SIGNIFICANT CHANGE UP
PH UR: 6.5 — SIGNIFICANT CHANGE UP (ref 5–8)
PLATELET # BLD AUTO: 304 K/UL — SIGNIFICANT CHANGE UP (ref 150–400)
POTASSIUM SERPL-MCNC: 3.1 MMOL/L — LOW (ref 3.5–5.3)
POTASSIUM SERPL-SCNC: 3.1 MMOL/L — LOW (ref 3.5–5.3)
PROT SERPL-MCNC: 6.8 G/DL — SIGNIFICANT CHANGE UP (ref 6–8.3)
PROT UR-MCNC: NEGATIVE — SIGNIFICANT CHANGE UP
RBC # BLD: 3.78 M/UL — LOW (ref 3.8–5.2)
RBC # FLD: 15.6 % — HIGH (ref 10.3–14.5)
SODIUM SERPL-SCNC: 139 MMOL/L — SIGNIFICANT CHANGE UP (ref 135–145)
SP GR SPEC: 1.01 — SIGNIFICANT CHANGE UP (ref 1.01–1.02)
UROBILINOGEN FLD QL: NEGATIVE — SIGNIFICANT CHANGE UP
WBC # BLD: 12.5 K/UL — HIGH (ref 3.8–10.5)
WBC # FLD AUTO: 12.5 K/UL — HIGH (ref 3.8–10.5)

## 2019-09-25 PROCEDURE — G0452: CPT | Mod: 26

## 2019-09-25 RX ORDER — METOPROLOL TARTRATE 50 MG
12.5 TABLET ORAL DAILY
Refills: 0 | Status: DISCONTINUED | OUTPATIENT
Start: 2019-09-25 | End: 2019-09-26

## 2019-09-25 RX ORDER — POTASSIUM CHLORIDE 20 MEQ
20 PACKET (EA) ORAL
Refills: 0 | Status: COMPLETED | OUTPATIENT
Start: 2019-09-25 | End: 2019-09-25

## 2019-09-25 RX ORDER — DIGOXIN 250 MCG
0.25 TABLET ORAL ONCE
Refills: 0 | Status: COMPLETED | OUTPATIENT
Start: 2019-09-25 | End: 2019-09-26

## 2019-09-25 RX ORDER — ENOXAPARIN SODIUM 100 MG/ML
90 INJECTION SUBCUTANEOUS DAILY
Refills: 0 | Status: DISCONTINUED | OUTPATIENT
Start: 2019-09-25 | End: 2019-09-26

## 2019-09-25 RX ORDER — DIGOXIN 250 MCG
0.25 TABLET ORAL ONCE
Refills: 0 | Status: COMPLETED | OUTPATIENT
Start: 2019-09-25 | End: 2019-09-25

## 2019-09-25 RX ORDER — SODIUM CHLORIDE 9 MG/ML
250 INJECTION INTRAMUSCULAR; INTRAVENOUS; SUBCUTANEOUS ONCE
Refills: 0 | Status: COMPLETED | OUTPATIENT
Start: 2019-09-25 | End: 2019-09-25

## 2019-09-25 RX ORDER — DIGOXIN 250 MCG
0.5 TABLET ORAL ONCE
Refills: 0 | Status: COMPLETED | OUTPATIENT
Start: 2019-09-25 | End: 2019-09-25

## 2019-09-25 RX ADMIN — Medication 20 MILLIEQUIVALENT(S): at 14:45

## 2019-09-25 RX ADMIN — Medication 1 DROP(S): at 17:13

## 2019-09-25 RX ADMIN — Medication 1 DROP(S): at 06:09

## 2019-09-25 RX ADMIN — Medication 20 MILLIEQUIVALENT(S): at 12:28

## 2019-09-25 RX ADMIN — ENOXAPARIN SODIUM 90 MILLIGRAM(S): 100 INJECTION SUBCUTANEOUS at 17:10

## 2019-09-25 RX ADMIN — Medication 1 DROP(S): at 10:52

## 2019-09-25 RX ADMIN — ATORVASTATIN CALCIUM 40 MILLIGRAM(S): 80 TABLET, FILM COATED ORAL at 21:47

## 2019-09-25 RX ADMIN — SODIUM CHLORIDE 500 MILLILITER(S): 9 INJECTION INTRAMUSCULAR; INTRAVENOUS; SUBCUTANEOUS at 02:30

## 2019-09-25 RX ADMIN — Medication 81 MILLIGRAM(S): at 10:52

## 2019-09-25 RX ADMIN — Medication 0.5 MILLIGRAM(S): at 17:54

## 2019-09-25 RX ADMIN — Medication 12.5 MILLIGRAM(S): at 10:51

## 2019-09-25 RX ADMIN — Medication 1: at 12:28

## 2019-09-25 NOTE — PROGRESS NOTE ADULT - ASSESSMENT
64y.o. Female with DM2, HTN, Non-obstructive CAD, permanent AFIB on warfarin  for the last 5 years (s/p DCCV 2013), hx of CVA's (2013, 2016 without residual deficits)  Rheumatic valvular disease with Severe MS, recent admit 09/12/2019 - 09/18/2019 with progressive ANDERSON and found to be in AFL and planned for DCCV, however BUBBA on 09/16/2019 demonstrated a KRISTAN thrombus, and patient was discharged on warfarin, now admitted with new paralysis and aphasia, CT c/w L MCA acute/subacute infarct. Per her son, she's has been consistently taking her warfarin.  Also has been on ASA 81 mg.  INR at admission therapeutic.    Currently on heparin gtt.    Pt with recurrent CVA despite therapeutic INR on Warfarin + ASA.  As per history, previous CVA in 2016 may have occurred while on Warfarin as well which perhaps makes this occurrence her 2nd Warfarin failure.    RECOMMEND:    -Given recurrent CVA despite ASA 81 and Warfarin at therapeutic level, recommend hypercoaguable work up with APLS mallory (LA, anticardiolipin ab, DRVVT, b2gp1 ab), Factor V leiden, APC resistance, Prothrombin mutation.  Unable to accurately check AT3, protein C and protein S activity given current Heparin use and recent Warfarin use.  -LA neg  -CT cap without occult ca but did not b/l wedge shaped renal enhancement, concerning for pyelo or infarction. Suspect infarct, consulted renal. Consider renal u/s, f/u urine studies  -c/w lovenox since pt had CVA while on coumadin  -c/w ASA 81 mg PO daily (pt has been on ASA at home as well).  -repeat echo as per cardiology after 2- 4 weeks to check on status of previously noted KRISTAN thrombus    Anemia -- mild, monitor for now    D/w pt's son in detail, d/w neuro team, total time spent 45 min, >50% spent in discussion, will follow, [phone]

## 2019-09-25 NOTE — CONSULT NOTE ADULT - SUBJECTIVE AND OBJECTIVE BOX
[facility]  [phone]  NEPHROLOGY      INITIAL CONSULT NOTE  --------------------------------------------------------------------------------  HPI:      64Y RH Nadine speaking female with PMH Afib on coumadin, prior R MCA stroke in 2013 and 2016 with no residual deficits, HTN, HLD, DM2, mitral stenosis due to rheumatic valvular disease who presented with inability to speak and weakness of her R side. unable to speak or move her R side. CT Head showed L MCA acute-subacute infarct. CTA head demonstrated L M2 occlusion. had ct abd which revealed renal wedge shaped lesion. renal consult called.       PAST HISTORY  --------------------------------------------------------------------------------  PAST MEDICAL & SURGICAL HISTORY:  TIA (transient ischemic attack): 2013- Rt facial droop which resolved after 2 hours  Mitral stenosis  Atrial fibrillation: h/o Cardioversion in 2013 and on Coumadin  DM (diabetes mellitus): Type II  HLD (hyperlipidemia)  HTN (hypertension)  S/P laser cataract surgery, unspecified laterality: ???  S/P knee surgery  Ventral hernia    FAMILY HISTORY:  pt unable     PAST SOCIAL HISTORY: non tobacco use     ALLERGIES & MEDICATIONS  --------------------------------------------------------------------------------  Allergies    No Known Allergies    Intolerances      Standing Inpatient Medications  aspirin  chewable 81 milliGRAM(s) Oral daily  atorvastatin 40 milliGRAM(s) Oral at bedtime  dextrose 5%. 1000 milliLiter(s) IV Continuous <Continuous>  dextrose 50% Injectable 12.5 Gram(s) IV Push once  dextrose 50% Injectable 25 Gram(s) IV Push once  dextrose 50% Injectable 25 Gram(s) IV Push once  digoxin  Injectable 0.25 milliGRAM(s) IV Push once  digoxin  Injectable 0.25 milliGRAM(s) IV Push once  enoxaparin Injectable 90 milliGRAM(s) SubCutaneous daily  insulin lispro (HumaLOG) corrective regimen sliding scale   SubCutaneous three times a day before meals  metoprolol succinate ER 12.5 milliGRAM(s) Oral daily  metoprolol tartrate Injectable 5 milliGRAM(s) IV Push once    PRN Inpatient Medications  acetaminophen    Suspension .. 650 milliGRAM(s) Oral every 6 hours PRN  dextrose 40% Gel 15 Gram(s) Oral once PRN  glucagon  Injectable 1 milliGRAM(s) IntraMuscular once PRN      REVIEW OF SYSTEMS  --------------------------------------------------------------------------------  non communicative     VITALS/PHYSICAL EXAM  --------------------------------------------------------------------------------  T(C): 37 (09-25-19 @ 19:43), Max: 37.2 (09-25-19 @ 15:21)  HR: 86 (09-25-19 @ 22:00) (86 - 134)  BP: 129/71 (09-25-19 @ 22:00) (85/56 - 130/98)  RR: 17 (09-25-19 @ 22:00) (16 - 23)  SpO2: 99% (09-25-19 @ 22:00) (97% - 100%)  Wt(kg): --        09-24-19 @ 07:01  -  09-25-19 @ 07:00  --------------------------------------------------------  IN: 72 mL / OUT: 590 mL / NET: -518 mL    09-25-19 @ 07:01  -  09-25-19 @ 23:03  --------------------------------------------------------  IN: 500 mL / OUT: 500 mL / NET: 0 mL      Physical Exam:  	Gen: Non toxic  appearing   	no jvd  	Pulm: decrease bs  no rales or ronchi or wheezing  	CV: RRR, S1S2; no rub  	Abd: +BS, soft, nontender/nondistended  	: No suprapubic tenderness  	UE: Warm, no cyanosis  no clubbing,  no edema  	LE: Warm, no cyanosis  no clubbing, no edema  	Neuro: non communicative       LABS/STUDIES  --------------------------------------------------------------------------------              10.5   12.5  >-----------<  304      [09-25-19 @ 06:22]              32.9     139  |  103  |  10  ----------------------------<  156      [09-25-19 @ 06:22]  3.1   |  21  |  0.58        Ca     8.9     [09-25-19 @ 06:22]    TPro  6.8  /  Alb  3.4  /  TBili  1.2  /  DBili  x   /  AST  25  /  ALT  17  /  AlkPhos  52  [09-25-19 @ 06:22]    PT/INR: PT 23.4 , INR 1.99       [09-25-19 @ 06:22]  PTT: 74.2       [09-25-19 @ 06:22]          [09-25-19 @ 06:22]    Creatinine Trend:  SCr 0.58 [09-25 @ 06:22]  SCr 0.52 [09-24 @ 05:50]  SCr 0.70 [09-23 @ 06:32]  SCr 0.67 [09-22 @ 11:45]  SCr 0.80 [09-18 @ 06:30]    Urinalysis - [09-25-19 @ 09:35]      Color Colorless / Appearance Clear / SG 1.012 / pH 6.5      Gluc Negative / Ketone Negative  / Bili Negative / Urobili Negative       Blood Negative / Protein Negative / Leuk Est Negative / Nitrite Negative      RBC  / WBC  / Hyaline  / Gran  / Sq Epi  / Non Sq Epi  / Bacteria       HbA1c 6.7      [09-23-19 @ 08:45]  TSH 1.11      [09-13-19 @ 07:00]  Lipid: chol 98, TG 67, HDL 39, LDL 46      [09-23-19 @ 09:24]    HCV 0.13, Nonreactive Hepatitis C AB  S/CO Ratio                        Interpretation  < 1.0                                     Non-Reactive  1.0 - 4.9                           Weakly-Reactive  > 5.0                                 Reactive  Non-Reactive: [place]erson with a non-reactive HCV antibody  result is considered uninfected.  No further action is  needed unless recent infection is suspected.  In these  cases, consider repeat testing later to detect  seroconversion..  Weakly-Reactive: HCV antibody test is abnormal, HCV RNA  Qualitative test will follow.  Reactive: HCV antibody test is abnormal, HCV RNA  Qualitative test will follow.  Note: HCV antibody testing is performed on the [employer] system.      [06-03-16 @ 07:26]

## 2019-09-25 NOTE — PROGRESS NOTE ADULT - SUBJECTIVE AND OBJECTIVE BOX
THE PATIENT WAS SEEN AND EXAMINED BY ME WITH THE HOUSESTAFF AND STROKE TEAM DURING MORNING ROUNDS.   HPI:  64 year old RH Nadine speaking female (Spoken via family and stroke PA in native language) with PMHx of PMH Afib on coumadin, prior R MCA stroke in 2013 and 2016 with no residual deficits, HTN, HLD, DM2, mitral stenosis due to rheumatic valvular disease with recent hospitalization at [facility] for AF for potential cardioversion and intervention for severe mitral stenosis found to have KRISTAN thrombus maintained of coumadin with labile INR. Prior R MCA stroke in 2013 and 2016 with no residual deficits. LKW 2:00am 9/22 at a family gathering. Found by family at 9:00am 9/22 unable to speak or move her R side. NIHSS: 19 on admission.    SUBJECTIVE: No events overnight.  No new neurologic complaints.    Translation by Stroke service PA.    acetaminophen    Suspension .. 650 milliGRAM(s) Oral every 6 hours PRN  artificial  tears Solution 1 Drop(s) Both EYES four times a day  aspirin  chewable 81 milliGRAM(s) Oral daily  atorvastatin 40 milliGRAM(s) Oral at bedtime  dextrose 40% Gel 15 Gram(s) Oral once PRN  dextrose 5%. 1000 milliLiter(s) IV Continuous <Continuous>  dextrose 50% Injectable 12.5 Gram(s) IV Push once  dextrose 50% Injectable 25 Gram(s) IV Push once  dextrose 50% Injectable 25 Gram(s) IV Push once  glucagon  Injectable 1 milliGRAM(s) IntraMuscular once PRN  heparin  Infusion 700 Unit(s)/Hr IV Continuous <Continuous>  insulin lispro (HumaLOG) corrective regimen sliding scale   SubCutaneous three times a day before meals  metoprolol tartrate Injectable 5 milliGRAM(s) IV Push once      PHYSICAL EXAM:   Vital Signs Last 24 Hrs  T(C): 36.8 (25 Sep 2019 08:13), Max: 37.3 (24 Sep 2019 19:27)  T(F): 98.2 (25 Sep 2019 08:13), Max: 99.1 (24 Sep 2019 19:27)  HR: 106 (25 Sep 2019 06:00) (92 - 126)  BP: 128/80 (25 Sep 2019 06:00) (85/56 - 146/90)  BP(mean): 93 (25 Sep 2019 06:00) (61 - 108)  RR: 20 (25 Sep 2019 06:00) (15 - 27)  SpO2: 99% (25 Sep 2019 06:00) (97% - 100%)    General: No acute distress  HEENT: L gaze preference, does not cross midline  Abdomen: Soft, nontender, nondistended   Extremities: No edema    NEUROLOGICAL EXAM:  Mental status: Awake, alert, no verbal output, follows midline commands   Cranial Nerves: No facial asymmetry, L gaze preference. no nystagmus, no dysarthria, unable to assess tongue deviation  Motor exam: R side hemiplegia RUE/RLE 0/5, left side moves well against gravity  Sensation: unable to assess    Coordination/ Gait: unable to assess dysmetria, gait not tested      LABS:                        10.5   12.5  )-----------( 304      ( 25 Sep 2019 06:22 )             32.9    09-25    139  |  103  |  10  ----------------------------<  156<H>  3.1<L>   |  21<L>  |  0.58    Ca    8.9      25 Sep 2019 06:22    TPro  6.8  /  Alb  3.4  /  TBili  1.2  /  DBili  x   /  AST  25  /  ALT  17  /  AlkPhos  52  09-25  PT/INR - ( 25 Sep 2019 06:22 )   PT: 23.4 sec;   INR: 1.99 ratio         PTT - ( 25 Sep 2019 06:22 )  PTT:74.2 sec  Hemoglobin A1C, Whole Blood: 6.7 % (09-23 @ 08:45)  Hemoglobin A1C, Whole Blood: 6.3 % (09-13 @ 07:00)      IMAGING: Reviewed by me.     MRI Head No Cont (09.23.19)  Redemonstration of acute left KUSUM and MCA territory infarcts primarily   involving the left frontal lobe. No yola hemorrhagic transformation. No   midline shift or hydrocephalus.  Chronic right frontal and right cerebellar hemisphere infarcts.    CT Head No Cont (09.23.19)  Redemonstration of evolving left MCA and KUSUM territory infarct without   hemorrhagic transformation, as well as remote right MCA infarct, volume   loss, and microvascular disease and ex vacuo enlargement of the right   lateral ventricle unchanged from prior.    CT angiography neck w/ IV Cont (09.22.19)  No evidence for carotid or vertebral artery stenosis.    CT angiography brain w/ IV Cont (09.22.19)  A short segment left middle cerebral artery M2 branch occlusion is noted.   The M1 segment is widely patent.     CT Head No Cont (09.22.19)  Acute to subacute infarct of left MCA territory without hemorrhagic   transformation. Chronic right MCA infarct.   Acute to subacute infarct involves the distal left   anterior cerebral artery territory at the level of the superior frontal   gyrus, with edema and swelling, without hemorrhagic transformation. THE PATIENT WAS SEEN AND EXAMINED BY ME WITH THE HOUSESTAFF AND STROKE TEAM DURING MORNING ROUNDS.   HPI:  64 year old RH Nadine speaking female (Spoken via family and stroke PA in native language) with PMHx of PMH Afib on coumadin, prior R MCA stroke in 2013 and 2016 with no residual deficits, HTN, HLD, DM2, mitral stenosis due to rheumatic valvular disease with recent hospitalization at [facility] for AF for potential cardioversion and intervention for severe mitral stenosis found to have KRISTAN thrombus maintained of coumadin with labile INR. Prior R MCA stroke in 2013 and 2016 with no residual deficits. LKW 2:00am 9/22 at a family gathering. Found by family at 9:00am 9/22 unable to speak or move her R side. NIHSS: 19 on admission.    SUBJECTIVE: No events overnight.  No new neurologic complaints.      Translation by Stroke service PA.    acetaminophen    Suspension .. 650 milliGRAM(s) Oral every 6 hours PRN  artificial  tears Solution 1 Drop(s) Both EYES four times a day  aspirin  chewable 81 milliGRAM(s) Oral daily  atorvastatin 40 milliGRAM(s) Oral at bedtime  dextrose 40% Gel 15 Gram(s) Oral once PRN  dextrose 5%. 1000 milliLiter(s) IV Continuous <Continuous>  dextrose 50% Injectable 12.5 Gram(s) IV Push once  dextrose 50% Injectable 25 Gram(s) IV Push once  dextrose 50% Injectable 25 Gram(s) IV Push once  glucagon  Injectable 1 milliGRAM(s) IntraMuscular once PRN  heparin  Infusion 700 Unit(s)/Hr IV Continuous <Continuous>  insulin lispro (HumaLOG) corrective regimen sliding scale   SubCutaneous three times a day before meals  metoprolol tartrate Injectable 5 milliGRAM(s) IV Push once  metoprolol succinate ER 12.5 milliGRAM(s) Oral daily       PHYSICAL EXAM:   Vital Signs Last 24 Hrs  T(C): 36.8 (25 Sep 2019 08:13), Max: 37.3 (24 Sep 2019 19:27)  T(F): 98.2 (25 Sep 2019 08:13), Max: 99.1 (24 Sep 2019 19:27)  HR: 106 (25 Sep 2019 06:00) (92 - 126)  BP: 128/80 (25 Sep 2019 06:00) (85/56 - 146/90)  BP(mean): 93 (25 Sep 2019 06:00) (61 - 108)  RR: 20 (25 Sep 2019 06:00) (15 - 27)  SpO2: 99% (25 Sep 2019 06:00) (97% - 100%)    General: No acute distress  HEENT: L gaze preference, does not cross midline  Abdomen: Soft, nontender, nondistended   Extremities: No edema    NEUROLOGICAL EXAM:  Mental status: Awake, alert, no verbal output, follows midline commands   Cranial Nerves: No facial asymmetry, L gaze preference. no nystagmus, unable to assess tongue deviation, unable to assess dysarthria   Motor exam: R side hemiplegia RUE/RLE 0/5, left side moves well against gravity  Sensation: unable to assess    Coordination/ Gait: unable to assess dysmetria, gait not tested      LABS:                        10.5   12.5  )-----------( 304      ( 25 Sep 2019 06:22 )             32.9    09-25    139  |  103  |  10  ----------------------------<  156<H>  3.1<L>   |  21<L>  |  0.58    Ca    8.9      25 Sep 2019 06:22    TPro  6.8  /  Alb  3.4  /  TBili  1.2  /  DBili  x   /  AST  25  /  ALT  17  /  AlkPhos  52  09-25  PT/INR - ( 25 Sep 2019 06:22 )   PT: 23.4 sec;   INR: 1.99 ratio         PTT - ( 25 Sep 2019 06:22 )  PTT:74.2 sec  Hemoglobin A1C, Whole Blood: 6.7 % (09-23 @ 08:45)  Hemoglobin A1C, Whole Blood: 6.3 % (09-13 @ 07:00)      IMAGING: Reviewed by me.     MRI Head No Cont (09.23.19)  Redemonstration of acute left KUSUM and MCA territory infarcts primarily   involving the left frontal lobe. No yola hemorrhagic transformation. No   midline shift or hydrocephalus.  Chronic right frontal and right cerebellar hemisphere infarcts.    CT Head No Cont (09.23.19)  Redemonstration of evolving left MCA and KUSUM territory infarct without   hemorrhagic transformation, as well as remote right MCA infarct, volume   loss, and microvascular disease and ex vacuo enlargement of the right   lateral ventricle unchanged from prior.    CT angiography neck w/ IV Cont (09.22.19)  No evidence for carotid or vertebral artery stenosis.    CT angiography brain w/ IV Cont (09.22.19)  A short segment left middle cerebral artery M2 branch occlusion is noted.   The M1 segment is widely patent.     CT Head No Cont (09.22.19)  Acute to subacute infarct of left MCA territory without hemorrhagic   transformation. Chronic right MCA infarct.   Acute to subacute infarct involves the distal left   anterior cerebral artery territory at the level of the superior frontal   gyrus, with edema and swelling, without hemorrhagic transformation.

## 2019-09-25 NOTE — PROGRESS NOTE ADULT - ASSESSMENT
64 year old RH Nadine speaking female (Spoken via family and stroke PA in native language) with PMHx of PMH Afib on coumadin, prior R MCA stroke in 2013 and 2016 with no residual deficits, HTN, HLD, DM2, mitral stenosis due to rheumatic valvular disease with recent hospitalization at [facility] for AF for potential cardioversion and intervention for severe mitral stenosis found to have KRISTAN thrombus maintained of coumadin with labile INR. Prior R MCA stroke in 2013 and 2016 with no residual deficits. LKW 2:00am 9/22 at a family gathering. Found by family at 9:00am 9/22 unable to speak or move her R side. NIHSS: 19. CT Head showed L MCA acute-subacute infarct. CTA head demonstrated L M2 occlusion. Patient was not a candidate for endovascular treatment given core infarct of 0 with penumbra of 7.    Impression: Cerebral embolism with cerebral infarction, left KUSUM/MCA likely due to cardioembolism in setting of atrial fibrillation and KRISTAN thrombus     NEURO: Neurologically without acute change. Continue close monitoring for neurologic deterioration, permissive HTN to gradual normotension, LDL 46- continue on home statin as LDL already at goal, MRI Brain w/o contrast noted above. Physical therapy/OT recommended acute rehab.    ANTITHROMBOTIC THERAPY: Heparin gtt with goal PTT 60-80 and ASA with plan to change to Lovenox for d/c. Close monitoring of INR to ensure within appropriate range.    PULMONARY: CXR no acute cardiopulmonary process, protecting airway, saturating well     CARDIOVASCULAR: TTE EF 65-70% consistent with severe mitral stenosis, mild aortic stenosis. mild aortic regurgitation, cardiac monitoring shows afib- rate controlled. KRISTAN thrombus on OAC (warfarin) may take up to 2-4 weeks to resolve. Will need repeat BUBBA in 3-4 weeks to evaluate for KRISTAN thrombus. Cardio consult appreciated possible valve intervention pending recovery and goals of care.             SBP goal: Permissive HTN to gradual normotension    GASTROINTESTINAL:  dysphagia screen passed. Initially failed at bedside, seen by S&S who recommended soft diet, tolerating well     Diet: Soft     RENAL: BUN/Cr without acute changes, good urine output     Na Goal: Greater than 135     Brunner: No    HEMATOLOGY: H/H without acute change, Platelets 304. Per Hematology consult given recurrent CVA despite ASA 81 and Warfarin at therapeutic level, recommend hypercoagulable work up with APLS mallory (LA, anticardiolipin ab, DRVVT, b2gp1 ab, 50/50 mixing study albeit PTT will not correct as she is on heparin;  can at least r/o possibility of lupus anticoagulant affecting PT), Factor V leiden, APC resistance, Prothrombin mutation.  Unable to accurately check AT3, protein C and protein S activity given current Heparin use and recent Warfarin use. CT Chest/Abd/Pelvis to r/o malignancy given recurrent infarcts. Per Hematology recommendation would favor transition to Lovenox 1.5mg/kg daily upon or prior to d/c.       DVT ppx: Heparin    ID: afebrile, mild leukocytosis. CXR negative, UA negative, RVP negative continue close monitoring. Encourage incentive spirometry and early mobility.     OTHER: Plan of care discussed with patient and son at bedside and all questions answered.     DISPOSITION: Acute rehab once stable and workup is complete    CORE MEASURES:        Admission NIHSS: 19     TPA: NO      LDL/HDL: 46/39     Depression Screen: unable to assess due to aphasia     Statin Therapy: YES     Dysphagia Screen: FAIL     Smoking NO      Afib YES     Stroke Education YES    Obtain screening lower extremity venous ultrasound in patients who meet 1 or more of the following criteria as patient is high risk for DVT/PE on admission:   [] History of DVT/PE  []Hypercoagulable states (Factor V Leiden, Cancer, OCP, etc. )  []Prolonged immobility (hemiplegia/hemiparesis/post operative or any other extended immobilization)  [] Transferred from outside facility (Rehab or Long term care)  [] Age </= to 50 64 year old RH Nadine speaking female (Spoken via family and stroke PA in native language) with PMHx of PMH Afib on coumadin, prior R MCA stroke in 2013 and 2016 with no residual deficits, HTN, HLD, DM2, mitral stenosis due to rheumatic valvular disease with recent hospitalization at [facility] for AF for potential cardioversion and intervention for severe mitral stenosis found to have KRISTAN thrombus maintained of coumadin with labile INR. Prior R MCA stroke in 2013 and 2016 with no residual deficits. LKW 2:00am 9/22 at a family gathering. Found by family at 9:00am 9/22 unable to speak or move her R side. NIHSS: 19. CT Head showed L MCA acute-subacute infarct. CTA head demonstrated L M2 occlusion. Patient was not a candidate for endovascular treatment given core infarct of 0 with penumbra of 7.    Impression: Cerebral embolism with cerebral infarction, left KUSUM/MCA likely due to cardioembolism in setting of atrial fibrillation and KRISTAN thrombus     NEURO: Neurologically without acute change. Continue close monitoring for neurologic deterioration, permissive HTN to gradual normotension, LDL 46- continue on home statin as LDL already at goal, MRI Brain w/o contrast noted above. Physical therapy/OT recommended acute rehab.    ANTITHROMBOTIC THERAPY: Heparin gtt with goal PTT 60-80 and ASA with plan to change to Lovenox for d/c. Close monitoring of INR to ensure within appropriate range.    PULMONARY: CXR no acute cardiopulmonary process, protecting airway, saturating well     CARDIOVASCULAR: TTE EF 65-70% consistent with severe mitral stenosis, mild aortic stenosis. mild aortic regurgitation, cardiac monitoring shows afib- rate controlled. KRISTAN thrombus on OAC (warfarin) may take up to 2-4 weeks to resolve. Will need repeat BUBBA in 3-4 weeks to evaluate for KRISTAN thrombus. Cardio consult appreciated possible valve intervention pending recovery and goals of care.             SBP goal: Permissive HTN to gradual normotension    GASTROINTESTINAL:  dysphagia screen passed. Initially failed at bedside, seen by S&S who recommended soft diet, tolerating well     Diet: Soft     RENAL: BUN/Cr without acute changes, good urine output, KCl for hypokalemia     Na Goal: Greater than 135     Brunner: No    HEMATOLOGY: H/H without acute change, Platelets 304. Per Hematology consult given recurrent CVA despite ASA 81 and Warfarin at therapeutic level, recommend hypercoagulable work up with APLS mallory (LA, anticardiolipin ab, DRVVT, b2gp1 ab, 50/50 mixing study albeit PTT will not correct as she is on heparin;  can at least r/o possibility of lupus anticoagulant affecting PT), Factor V leiden, APC resistance, Prothrombin mutation.  Unable to accurately check AT3, protein C and protein S activity given current Heparin use and recent Warfarin use. CT Chest/Abd/Pelvis to r/o malignancy given recurrent infarcts. Per Hematology recommendation would favor transition to Lovenox 1.5mg/kg daily upon or prior to d/c.       DVT ppx: Heparin    ID: afebrile, mild leukocytosis. CXR negative, UA negative, RVP negative continue close monitoring. Encourage incentive spirometry and early mobility.     OTHER: Plan of care discussed with patient and son at bedside and all questions answered.     DISPOSITION: Acute rehab once stable and workup is complete    CORE MEASURES:        Admission NIHSS: 19     TPA: NO      LDL/HDL: 46/39     Depression Screen: unable to assess due to aphasia     Statin Therapy: YES     Dysphagia Screen: FAIL     Smoking NO      Afib YES     Stroke Education YES    Obtain screening lower extremity venous ultrasound in patients who meet 1 or more of the following criteria as patient is high risk for DVT/PE on admission:   [] History of DVT/PE  []Hypercoagulable states (Factor V Leiden, Cancer, OCP, etc. )  []Prolonged immobility (hemiplegia/hemiparesis/post operative or any other extended immobilization)  [] Transferred from outside facility (Rehab or Long term care)  [] Age </= to 50 64 year old RH Nadine speaking female (Spoken via family and stroke PA in native language) with PMHx of PMH Afib on coumadin, prior R MCA stroke in 2013 and 2016 with no residual deficits, HTN, HLD, DM2, mitral stenosis due to rheumatic valvular disease with recent hospitalization at [facility] for AF for potential cardioversion and intervention for severe mitral stenosis found to have KRISTAN thrombus maintained of coumadin with labile INR. Prior R MCA stroke in 2013 and 2016 with no residual deficits. LKW 2:00am 9/22 at a family gathering. Found by family at 9:00am 9/22 unable to speak or move her R side. NIHSS: 19. CT Head showed L MCA acute-subacute infarct. CTA head demonstrated L M2 occlusion. Patient was not a candidate for endovascular treatment given core infarct of 0 with penumbra of 7.    Impression: Cerebral embolism with cerebral infarction, left KUSUM/MCA likely due to cardioembolism in setting of atrial fibrillation and KRISTAN thrombus     NEURO: Neurologically without acute change. Continue close monitoring for neurologic deterioration, permissive HTN to gradual normotension, LDL 46- continue on home statin as LDL already at goal, MRI Brain w/o contrast noted above. Physical therapy/OT recommended acute rehab.    ANTITHROMBOTIC THERAPY: Heparin gtt with goal PTT 60-80 and ASA with plan to change to Lovenox for d/c. Close monitoring of INR to ensure within appropriate range.    PULMONARY: CXR no acute cardiopulmonary process, protecting airway, saturating well     CARDIOVASCULAR: TTE EF 65-70% consistent with severe mitral stenosis, mild aortic stenosis. mild aortic regurgitation, cardiac monitoring shows afib- rate controlled. KRISTAN thrombus on OAC (warfarin) may take up to 2-4 weeks to resolve. Will need repeat BUBBA in 3-4 weeks to evaluate for KRISTAN thrombus. Cardio consult appreciated possible valve intervention pending recovery and goals of care.             SBP goal: Permissive HTN to gradual normotension    GASTROINTESTINAL:  dysphagia screen passed. Initially failed at bedside, seen by S&S who recommended soft diet, tolerating well     Diet: Soft     RENAL: BUN/Cr without acute changes, good urine output, KCl for hypokalemia     Na Goal: Greater than 135     Brunner: No    HEMATOLOGY: H/H without acute change, Platelets 304. Per Hematology consult given recurrent CVA despite ASA 81 and Warfarin at therapeutic level, recommend hypercoagulable work up with APLS mallory (LA, anticardiolipin ab, DRVVT, b2gp1 ab, 50/50 mixing study albeit PTT will not correct as she is on heparin;  can at least r/o possibility of lupus anticoagulant affecting PT), Factor V leiden, APC resistance, Prothrombin mutation.  Unable to accurately check AT3, protein C and protein S activity given current Heparin use and recent Warfarin use. CT Chest/Abd/Pelvis to r/o malignancy given recurrent infarcts. Per Hematology recommendation would favor transition to Lovenox 1.5mg/kg daily upon or prior to d/c.       DVT ppx: Heparin    ID: afebrile, mild leukocytosis. CXR negative, UA negative, RVP negative continue close monitoring. Encourage incentive spirometry and early mobility. CTA/P concerning for pyelonephritis vs. infarct. Will need further clarification asx at this time.    OTHER: Plan of care discussed with patient and son at bedside and all questions answered.     DISPOSITION: Acute rehab once stable and workup is complete    CORE MEASURES:        Admission NIHSS: 19     TPA: NO      LDL/HDL: 46/39     Depression Screen: unable to assess due to aphasia     Statin Therapy: YES     Dysphagia Screen: FAIL     Smoking NO      Afib YES     Stroke Education YES    Obtain screening lower extremity venous ultrasound in patients who meet 1 or more of the following criteria as patient is high risk for DVT/PE on admission:   [] History of DVT/PE  []Hypercoagulable states (Factor V Leiden, Cancer, OCP, etc. )  []Prolonged immobility (hemiplegia/hemiparesis/post operative or any other extended immobilization)  [] Transferred from outside facility (Rehab or Long term care)  [] Age </= to 50 64 year old RH Nadine speaking female (Spoken via family and stroke PA in native language) with PMHx of PMH Afib on coumadin, prior R MCA stroke in 2013 and 2016 with no residual deficits, HTN, HLD, DM2, mitral stenosis due to rheumatic valvular disease with recent hospitalization at [facility] for AF for potential cardioversion and intervention for severe mitral stenosis found to have KRISTAN thrombus maintained of coumadin with labile INR. Prior R MCA stroke in 2013 and 2016 with no residual deficits. LKW 2:00am 9/22 at a family gathering. Found by family at 9:00am 9/22 unable to speak or move her R side. NIHSS: 19. CT Head showed L MCA acute-subacute infarct. CTA head demonstrated L M2 occlusion. Patient was not a candidate for endovascular treatment given core infarct of 0 with penumbra of 7.    Impression: Cerebral embolism with cerebral infarction, left KUSUM/MCA likely due to cardioembolism in setting of atrial fibrillation and KRISTAN thrombus     NEURO: Neurologically without acute change. Continue close monitoring for neurologic deterioration, permissive HTN to gradual normotension, LDL 46- continue on home statin as LDL already at goal, MRI Brain w/o contrast noted above. Physical therapy/OT recommended acute rehab.    ANTITHROMBOTIC THERAPY: Heparin gtt with goal PTT 60-80 and ASA with plan to change to Lovenox for d/c. Close monitoring of INR to ensure within appropriate range.    PULMONARY: CXR no acute cardiopulmonary process, protecting airway, saturating well     CARDIOVASCULAR: TTE EF 65-70% consistent with severe mitral stenosis, mild aortic stenosis. mild aortic regurgitation, cardiac monitoring shows afib- rate controlled. KRISTAN thrombus on OAC (warfarin) may take up to 2-4 weeks to resolve. Will need repeat BUBBA in 3-4 weeks to evaluate for KRISTAN thrombus. Cardio consult appreciated possible valve intervention pending recovery and goals of care.             SBP goal: Permissive HTN to gradual normotension    GASTROINTESTINAL:  dysphagia screen passed. Initially failed at bedside, seen by S&S who recommended soft diet, tolerating well     Diet: Soft     RENAL: BUN/Cr without acute changes, good urine output, KCl for hypokalemia.      Na Goal: Greater than 135     Brunner: No    HEMATOLOGY: H/H without acute change, Platelets 304. Per Hematology consult given recurrent CVA despite ASA 81 and Warfarin at therapeutic level, recommend hypercoagulable work up with APLS mallory (LA, anticardiolipin ab, DRVVT, b2gp1 ab, 50/50 mixing study albeit PTT will not correct as she is on heparin;  can at least r/o possibility of lupus anticoagulant affecting PT), Factor V leiden, APC resistance, Prothrombin mutation.  Unable to accurately check AT3, protein C and protein S activity given current Heparin use and recent Warfarin use. CT Chest/Abd/Pelvis to r/o malignancy given recurrent infarcts. CT Chest/Abd/Pelvis 9/24 No findings to suggest intrathoracic or intra-abdominal malignancy. Per Hematology recommendation would favor transition to Lovenox 1.5mg/kg daily upon or prior to d/c.       DVT ppx: Heparin    ID: afebrile, mild leukocytosis. CXR negative, UA negative, RVP negative continue close monitoring. Encourage incentive spirometry and early mobility. CTA/P concerning for pyelonephritis vs. infarct. Pending UA Culture and LDH for further evaluation. Consulted Nephrology Dr. George.     OTHER: Plan of care discussed with patient and son at bedside and all questions answered.     DISPOSITION: Acute rehab once stable and workup is complete    CORE MEASURES:        Admission NIHSS: 19     TPA: NO      LDL/HDL: 46/39     Depression Screen: unable to assess due to aphasia     Statin Therapy: YES     Dysphagia Screen: FAIL     Smoking NO      Afib YES     Stroke Education YES    Obtain screening lower extremity venous ultrasound in patients who meet 1 or more of the following criteria as patient is high risk for DVT/PE on admission:   [] History of DVT/PE  []Hypercoagulable states (Factor V Leiden, Cancer, OCP, etc. )  []Prolonged immobility (hemiplegia/hemiparesis/post operative or any other extended immobilization)  [] Transferred from outside facility (Rehab or Long term care)  [] Age </= to 50 64 year old RH Nadine speaking female (Spoken via family and stroke PA in native language) with PMHx of PMH Afib on coumadin, prior R MCA stroke in 2013 and 2016 with no residual deficits, HTN, HLD, DM2, mitral stenosis due to rheumatic valvular disease with recent hospitalization at [facility] for AF for potential cardioversion and intervention for severe mitral stenosis found to have KRISTAN thrombus maintained of coumadin with labile INR. Prior R MCA stroke in 2013 and 2016 with no residual deficits. LKW 2:00am 9/22 at a family gathering. Found by family at 9:00am 9/22 unable to speak or move her R side. NIHSS: 19. CT Head showed L MCA acute-subacute infarct. CTA head demonstrated L M2 occlusion. Patient was not a candidate for endovascular treatment given core infarct of 0 with penumbra of 7.    Impression: Cerebral embolism with cerebral infarction, left KUSUM/MCA likely due to cardioembolism in setting of atrial fibrillation and KRISTAN thrombus     NEURO: Neurologically without acute change. Continue close monitoring for neurologic deterioration, permissive HTN to gradual normotension, LDL 46- continue on home statin as LDL already at goal, MRI Brain w/o contrast noted above. Physical therapy/OT recommended acute rehab.    ANTITHROMBOTIC THERAPY: Heparin gtt with goal PTT 60-80 and ASA with plan to change to Lovenox for d/c. Close monitoring of INR to ensure within appropriate range.    PULMONARY: CXR no acute cardiopulmonary process, protecting airway, saturating well     CARDIOVASCULAR: TTE EF 65-70% consistent with severe mitral stenosis, mild aortic stenosis. mild aortic regurgitation, cardiac monitoring shows afib- rate controlled. KRISTAN thrombus on OAC (warfarin) may take up to 2-4 weeks to resolve. Will need repeat BUBBA in 3-4 weeks to evaluate for KRISTAN thrombus. Cardio consult appreciated possible valve intervention pending recovery and goals of care.             SBP goal: Permissive HTN to gradual normotension    GASTROINTESTINAL:  dysphagia screen passed. Initially failed at bedside, seen by S&S who recommended soft diet, tolerating well     Diet: Soft     RENAL: BUN/Cr without acute changes, good urine output, KCl for hypokalemia.      Na Goal: Greater than 135     Brunner: No    HEMATOLOGY: H/H without acute change, Platelets 304. Per Hematology consult given recurrent CVA despite ASA 81 and Warfarin at therapeutic level, recommend hypercoagulable work up with APLS mallory (LA, anticardiolipin ab, DRVVT, b2gp1 ab, 50/50 mixing study albeit PTT will not correct as she is on heparin;  can at least r/o possibility of lupus anticoagulant affecting PT), Factor V leiden, APC resistance, Prothrombin mutation.  Unable to accurately check AT3, protein C and protein S activity given current Heparin use and recent Warfarin use. CT Chest/Abd/Pelvis to r/o malignancy given recurrent infarcts. CT Chest/Abd/Pelvis 9/24 No findings to suggest intrathoracic or intra-abdominal malignancy. Per Hematology recommendation would favor transition to Lovenox 1.5mg/kg daily upon or prior to d/c       DVT ppx: Heparin    ID: afebrile, mild leukocytosis. CXR negative, UA negative, RVP negative continue close monitoring. Encourage incentive spirometry and early mobility. CTA/P concerning for pyelonephritis vs. infarct. . Pending UA Culture and U/S Renal for further evaluation. Consulted Nephrology Dr. George and Infectious Disease team    OTHER: Plan of care discussed with patient and son at bedside and all questions answered.     DISPOSITION: Acute rehab once stable and workup is complete    CORE MEASURES:        Admission NIHSS: 19     TPA: NO      LDL/HDL: 46/39     Depression Screen: unable to assess due to aphasia     Statin Therapy: YES     Dysphagia Screen: FAIL     Smoking NO      Afib YES     Stroke Education YES    Obtain screening lower extremity venous ultrasound in patients who meet 1 or more of the following criteria as patient is high risk for DVT/PE on admission:   [] History of DVT/PE  []Hypercoagulable states (Factor V Leiden, Cancer, OCP, etc. )  []Prolonged immobility (hemiplegia/hemiparesis/post operative or any other extended immobilization)  [] Transferred from outside facility (Rehab or Long term care)  [] Age </= to 50 64 year old RH Nadine speaking female (Spoken via family and stroke PA in native language) with PMHx of PMH Afib on coumadin, prior R MCA stroke in 2013 and 2016 with no residual deficits, HTN, HLD, DM2, mitral stenosis due to rheumatic valvular disease with recent hospitalization at [facility] for AF for potential cardioversion and intervention for severe mitral stenosis found to have KRISTAN thrombus maintained of coumadin with labile INR. Prior R MCA stroke in 2013 and 2016 with no residual deficits. LKW 2:00am 9/22 at a family gathering. Found by family at 9:00am 9/22 unable to speak or move her R side. NIHSS: 19. CT Head showed L MCA acute-subacute infarct. CTA head demonstrated L M2 occlusion. Patient was not a candidate for endovascular treatment given core infarct of 0 with penumbra of 7.    Impression: Cerebral embolism with cerebral infarction, left KUSUM/MCA likely due to cardioembolism in setting of atrial fibrillation and KRISTAN thrombus     NEURO: Neurologically without acute change. Continue close monitoring for neurologic deterioration, permissive HTN to gradual normotension, LDL 46- continue on home statin as LDL already at goal, MRI Brain w/o contrast noted above. Physical therapy/OT recommended acute rehab.    ANTITHROMBOTIC THERAPY: Heparin gtt with goal PTT 60-80 and ASA with plan to change to Lovenox for d/c. Close monitoring of INR to ensure within appropriate range.    PULMONARY: CXR no acute cardiopulmonary process, protecting airway, saturating well     CARDIOVASCULAR: TTE EF 65-70% consistent with severe mitral stenosis, mild aortic stenosis. mild aortic regurgitation, cardiac monitoring shows afib- rate controlled. KRISTAN thrombus on OAC (warfarin) may take up to 2-4 weeks to resolve. Will need repeat BUBBA in 3-4 weeks to evaluate for KRISTAN thrombus. Cardio consult appreciated possible valve intervention pending recovery and goals of care. No response from cardiology, HR elevated, SBP <110     SBP goal: Permissive HTN to gradual normotension    GASTROINTESTINAL:  dysphagia screen passed. Initially failed at bedside, seen by S&S who recommended soft diet, tolerating well     Diet: Soft     RENAL: BUN/Cr without acute changes, good urine output, KCl for hypokalemia.      Na Goal: Greater than 135     Brunner: No    HEMATOLOGY: H/H without acute change, Platelets 304. Per Hematology consult given recurrent CVA despite ASA 81 and Warfarin at therapeutic level, recommend hypercoagulable work up with APLS mallory (LA, anticardiolipin ab, DRVVT, b2gp1 ab, 50/50 mixing study albeit PTT will not correct as she is on heparin;  can at least r/o possibility of lupus anticoagulant affecting PT), Factor V leiden, APC resistance, Prothrombin mutation.  Unable to accurately check AT3, protein C and protein S activity given current Heparin use and recent Warfarin use. CT Chest/Abd/Pelvis to r/o malignancy given recurrent infarcts. CT Chest/Abd/Pelvis 9/24 No findings to suggest intrathoracic or intra-abdominal malignancy. Per Hematology recommendation would favor transition to Lovenox 1.5mg/kg daily upon or prior to d/c       DVT ppx: Heparin    ID: afebrile, mild leukocytosis. CXR negative, UA negative, RVP negative continue close monitoring. Encourage incentive spirometry and early mobility. CTA/P concerning for pyelonephritis vs. infarct. . Pending UA Culture and U/S Renal for further evaluation. Consulted Nephrology Dr. George and Infectious Disease team    OTHER: Plan of care discussed with patient and son at bedside and all questions answered.     DISPOSITION: Acute rehab once stable and workup is complete    CORE MEASURES:        Admission NIHSS: 19     TPA: NO      LDL/HDL: 46/39     Depression Screen: unable to assess due to aphasia     Statin Therapy: YES     Dysphagia Screen: FAIL     Smoking NO      Afib YES     Stroke Education YES    Obtain screening lower extremity venous ultrasound in patients who meet 1 or more of the following criteria as patient is high risk for DVT/PE on admission:   [] History of DVT/PE  []Hypercoagulable states (Factor V Leiden, Cancer, OCP, etc. )  []Prolonged immobility (hemiplegia/hemiparesis/post operative or any other extended immobilization)  [] Transferred from outside facility (Rehab or Long term care)  [] Age </= to 50 64 year old RH Nadine speaking female (Spoken via family and stroke PA in native language) with PMHx of PMH Afib on coumadin, prior R MCA stroke in 2013 and 2016 with no residual deficits, HTN, HLD, DM2, mitral stenosis due to rheumatic valvular disease with recent hospitalization at [facility] for AF for potential cardioversion and intervention for severe mitral stenosis found to have KRISTAN thrombus maintained of coumadin with labile INR. Prior R MCA stroke in 2013 and 2016 with no residual deficits. LKW 2:00am 9/22 at a family gathering. Found by family at 9:00am 9/22 unable to speak or move her R side. NIHSS: 19. CT Head showed L MCA acute-subacute infarct. CTA head demonstrated L M2 occlusion. Patient was not a candidate for endovascular treatment given core infarct of 0 with penumbra of 7.    Impression: Cerebral embolism with cerebral infarction, left KUSUM/MCA likely due to cardioembolism in setting of atrial fibrillation and KRISTAN thrombus     NEURO: Neurologically without acute change. Continue close monitoring for neurologic deterioration, permissive HTN to gradual normotension, LDL 46- continue on home statin as LDL already at goal, MRI Brain w/o contrast noted above. Physical therapy/OT recommended acute rehab.    ANTITHROMBOTIC THERAPY: Heparin gtt with goal PTT 60-80 and ASA with plan to change to Lovenox for d/c. Close monitoring of INR to ensure within appropriate range.    PULMONARY: CXR no acute cardiopulmonary process, protecting airway, saturating well     CARDIOVASCULAR: TTE EF 65-70% consistent with severe mitral stenosis, mild aortic stenosis. mild aortic regurgitation, cardiac monitoring shows afib- rate controlled. KRISTAN thrombus on OAC (warfarin) may take up to 2-4 weeks to resolve. Will need repeat BUBBA in 3-4 weeks to evaluate for KRISTAN thrombus. Cardio consult appreciated possible valve intervention pending recovery and goals of care. Spoke with Cardiologist Dr. Pierre who recommended Digoxin 1 mg IV total titrated for rate control. Cardiology will continue to follow closely     SBP goal: Permissive HTN to gradual normotension    GASTROINTESTINAL:  dysphagia screen passed. Initially failed at bedside, seen by S&S who recommended soft diet, tolerating well     Diet: Soft     RENAL: BUN/Cr without acute changes, good urine output, KCl for hypokalemia.      Na Goal: Greater than 135     Brunner: No    HEMATOLOGY: H/H without acute change, Platelets 304. Per Hematology consult given recurrent CVA despite ASA 81 and Warfarin at therapeutic level, recommend hypercoagulable work up with APLS mallory (LA, anticardiolipin ab, DRVVT, b2gp1 ab, 50/50 mixing study albeit PTT will not correct as she is on heparin;  can at least r/o possibility of lupus anticoagulant affecting PT), Factor V leiden, APC resistance, Prothrombin mutation.  Unable to accurately check AT3, protein C and protein S activity given current Heparin use and recent Warfarin use. CT Chest/Abd/Pelvis to r/o malignancy given recurrent infarcts. CT Chest/Abd/Pelvis 9/24 No findings to suggest intrathoracic or intra-abdominal malignancy. Per Hematology recommendation would favor transition to Lovenox 1.5mg/kg daily upon or prior to d/c       DVT ppx: Heparin    ID: afebrile, mild leukocytosis. CXR negative, UA negative, RVP negative continue close monitoring. Encourage incentive spirometry and early mobility. CTA/P concerning for pyelonephritis vs. infarct. . Pending UA Culture and U/S Renal for further evaluation. Consulted Nephrology Dr. George and Infectious Disease team    OTHER: Plan of care discussed with patient and son at bedside and all questions answered.     DISPOSITION: Acute rehab once stable and workup is complete    CORE MEASURES:        Admission NIHSS: 19     TPA: NO      LDL/HDL: 46/39     Depression Screen: unable to assess due to aphasia     Statin Therapy: YES     Dysphagia Screen: FAIL     Smoking NO      Afib YES     Stroke Education YES    Obtain screening lower extremity venous ultrasound in patients who meet 1 or more of the following criteria as patient is high risk for DVT/PE on admission:   [] History of DVT/PE  []Hypercoagulable states (Factor V Leiden, Cancer, OCP, etc. )  []Prolonged immobility (hemiplegia/hemiparesis/post operative or any other extended immobilization)  [] Transferred from outside facility (Rehab or Long term care)  [] Age </= to 50 64 year old RH Nadine speaking female (Spoken via family and stroke PA in native language) with PMHx of PMH Afib on coumadin, prior R MCA stroke in 2013 and 2016 with no residual deficits, HTN, HLD, DM2, mitral stenosis due to rheumatic valvular disease with recent hospitalization at [facility] for AF for potential cardioversion and intervention for severe mitral stenosis found to have KRISTAN thrombus maintained of coumadin with labile INR. Prior R MCA stroke in 2013 and 2016 with no residual deficits. LKW 2:00am 9/22 at a family gathering. Found by family at 9:00am 9/22 unable to speak or move her R side. NIHSS: 19. CT Head showed L MCA acute-subacute infarct. CTA head demonstrated L M2 occlusion. Patient was not a candidate for endovascular treatment given core infarct of 0 with penumbra of 7.    Impression: Cerebral embolism with cerebral infarction, left KUSUM/MCA likely due to cardioembolism in setting of atrial fibrillation and KRISTAN thrombus     NEURO: Neurologically without acute change. Continue close monitoring for neurologic deterioration, permissive HTN to gradual normotension, LDL 46- continue on home statin as LDL already at goal, MRI Brain w/o contrast noted above. Physical therapy/OT recommended acute rehab.    ANTITHROMBOTIC THERAPY: Heparin gtt with goal PTT 60-80 and ASA with plan to change to Lovenox for d/c. Close monitoring of INR to ensure within appropriate range.    PULMONARY: CXR no acute cardiopulmonary process, protecting airway, saturating well     CARDIOVASCULAR: TTE EF 65-70% consistent with severe mitral stenosis, mild aortic stenosis. mild aortic regurgitation, cardiac monitoring shows afib- rate controlled. KRISTAN thrombus on OAC (warfarin) may take up to 2-4 weeks to resolve. Will need repeat BUBBA in 3-4 weeks to evaluate for KRISTAN thrombus. Cardio consult appreciated possible valve intervention pending recovery and goals of care. Spoke with Cardiologist Dr. Pierre who recommended Digoxin 1 mg IV total titrated for rate control. Cardiology will continue to follow closely     SBP goal: Permissive HTN to gradual normotension    GASTROINTESTINAL:  dysphagia screen passed. Initially failed at bedside, seen by S&S who recommended soft diet, tolerating well     Diet: Soft     RENAL: BUN/Cr without acute changes, good urine output, KCl for hypokalemia.      Na Goal: Greater than 135     Brunner: No    HEMATOLOGY: H/H without acute change, Platelets 304. Per Hematology consult given recurrent CVA despite ASA 81 and Warfarin at therapeutic level, recommend hypercoagulable work up with APLS mallory (LA, anticardiolipin ab, DRVVT, b2gp1 ab, 50/50 mixing study albeit PTT will not correct as she is on heparin;  can at least r/o possibility of lupus anticoagulant affecting PT), Factor V leiden, APC resistance, Prothrombin mutation.  Unable to accurately check AT3, protein C and protein S activity given current Heparin use and recent Warfarin use. CT Chest/Abd/Pelvis to r/o malignancy given recurrent infarcts. CT Chest/Abd/Pelvis 9/24 No findings to suggest intrathoracic or intra-abdominal malignancy. Per Hematology recommendation would favor transition to Lovenox 1.5mg/kg daily upon or prior to d/c       DVT ppx: Heparin    ID: afebrile, mild leukocytosis. CXR negative, UA negative, RVP negative continue close monitoring. Encourage incentive spirometry and early mobility. CTA/P concerning for pyelonephritis vs. infarct. . Pending UA Culture and U/S Renal for further evaluation. Consulted Nephrology Dr. George and Infectious Disease team. Nephrology stated to continue anticoagulants    OTHER: Plan of care discussed with patient and son at bedside and all questions answered.     DISPOSITION: Acute rehab once stable and workup is complete    CORE MEASURES:        Admission NIHSS: 19     TPA: NO      LDL/HDL: 46/39     Depression Screen: unable to assess due to aphasia     Statin Therapy: YES     Dysphagia Screen: FAIL     Smoking NO      Afib YES     Stroke Education YES    Obtain screening lower extremity venous ultrasound in patients who meet 1 or more of the following criteria as patient is high risk for DVT/PE on admission:   [] History of DVT/PE  []Hypercoagulable states (Factor V Leiden, Cancer, OCP, etc. )  []Prolonged immobility (hemiplegia/hemiparesis/post operative or any other extended immobilization)  [] Transferred from outside facility (Rehab or Long term care)  [] Age </= to 50

## 2019-09-25 NOTE — PROGRESS NOTE ADULT - SUBJECTIVE AND OBJECTIVE BOX
Pt seen, family at bedside    MEDICATIONS  (STANDING):  artificial  tears Solution 1 Drop(s) Both EYES four times a day  aspirin  chewable 81 milliGRAM(s) Oral daily  atorvastatin 40 milliGRAM(s) Oral at bedtime  dextrose 5%. 1000 milliLiter(s) (50 mL/Hr) IV Continuous <Continuous>  dextrose 50% Injectable 12.5 Gram(s) IV Push once  dextrose 50% Injectable 25 Gram(s) IV Push once  dextrose 50% Injectable 25 Gram(s) IV Push once  enoxaparin Injectable 90 milliGRAM(s) SubCutaneous daily  insulin lispro (HumaLOG) corrective regimen sliding scale   SubCutaneous three times a day before meals  metoprolol succinate ER 12.5 milliGRAM(s) Oral daily  metoprolol tartrate Injectable 5 milliGRAM(s) IV Push once    MEDICATIONS  (PRN):  acetaminophen    Suspension .. 650 milliGRAM(s) Oral every 6 hours PRN Temp greater or equal to 38C (100.4F), Mild Pain (1 - 3), Moderate Pain (4 - 6), Severe Pain (7 - 10)  dextrose 40% Gel 15 Gram(s) Oral once PRN Blood Glucose LESS THAN 70 milliGRAM(s)/deciliter  glucagon  Injectable 1 milliGRAM(s) IntraMuscular once PRN Glucose LESS THAN 70 milligrams/deciliter      ROS  UTO 2/2 mental status, participation    Vital Signs Last 24 Hrs  T(C): 37.2 (25 Sep 2019 15:21), Max: 37.3 (24 Sep 2019 19:27)  T(F): 99 (25 Sep 2019 15:21), Max: 99.1 (24 Sep 2019 19:27)  HR: 125 (25 Sep 2019 14:00) (92 - 130)  BP: 112/66 (25 Sep 2019 14:00) (85/56 - 146/90)  BP(mean): 78 (25 Sep 2019 14:00) (61 - 108)  RR: 19 (25 Sep 2019 14:00) (15 - 27)  SpO2: 98% (25 Sep 2019 14:00) (97% - 100%)    PE  NAD  awake   RRR  CTAB ant chest, limited effort  abd benign  no edema  remainder of exam limited 2/2 participation                          10.5   12.5  )-----------( 304      ( 25 Sep 2019 06:22 )             32.9       09-25    139  |  103  |  10  ----------------------------<  156<H>  3.1<L>   |  21<L>  |  0.58    Ca    8.9      25 Sep 2019 06:22    TPro  6.8  /  Alb  3.4  /  TBili  1.2  /  DBili  x   /  AST  25  /  ALT  17  /  AlkPhos  52  09-25

## 2019-09-25 NOTE — CONSULT NOTE ADULT - ASSESSMENT
64Y =female with PMH Afib on coumadin, prior R MCA stroke in 2013 and 2016 with no residual deficits, HTN, HLD, DM2, mitral stenosis due to rheumatic valvular disease  with  L MCA acute-subacute infarct. ct abd revealed renal wedge shaped lesion     1- renal infarct   2- a fib     given urinalysis non revealing for UTI with no leuk or urinary wbc and hx of a fib with mca cva as well as elevated LDH level; she likely has renal infarcts   anticoagulation   pt is > 72 hours for renal angiogram intervention etc. and her creatinine remains steady  cont with metoprolol for rate control   d/w son at bedside

## 2019-09-26 ENCOUNTER — INPATIENT (INPATIENT)
Facility: HOSPITAL | Age: 64
LOS: 14 days | Discharge: NURSING, REHAB OR LONG-TERM CARE | DRG: 949 | End: 2019-10-11
Attending: PSYCHIATRY & NEUROLOGY | Admitting: PSYCHIATRY & NEUROLOGY
Payer: MEDICAID

## 2019-09-26 ENCOUNTER — TRANSCRIPTION ENCOUNTER (OUTPATIENT)
Age: 64
End: 2019-09-26

## 2019-09-26 VITALS
SYSTOLIC BLOOD PRESSURE: 128 MMHG | OXYGEN SATURATION: 100 % | HEART RATE: 95 BPM | RESPIRATION RATE: 25 BRPM | DIASTOLIC BLOOD PRESSURE: 76 MMHG

## 2019-09-26 VITALS
DIASTOLIC BLOOD PRESSURE: 83 MMHG | RESPIRATION RATE: 14 BRPM | SYSTOLIC BLOOD PRESSURE: 120 MMHG | OXYGEN SATURATION: 97 % | HEART RATE: 99 BPM | TEMPERATURE: 99 F | WEIGHT: 113.76 LBS | HEIGHT: 62 IN

## 2019-09-26 DIAGNOSIS — K43.9 VENTRAL HERNIA WITHOUT OBSTRUCTION OR GANGRENE: Chronic | ICD-10-CM

## 2019-09-26 DIAGNOSIS — Z98.49 CATARACT EXTRACTION STATUS, UNSPECIFIED EYE: Chronic | ICD-10-CM

## 2019-09-26 DIAGNOSIS — I63.9 CEREBRAL INFARCTION, UNSPECIFIED: ICD-10-CM

## 2019-09-26 DIAGNOSIS — Z98.89 OTHER SPECIFIED POSTPROCEDURAL STATES: Chronic | ICD-10-CM

## 2019-09-26 LAB
ANION GAP SERPL CALC-SCNC: 13 MMOL/L — SIGNIFICANT CHANGE UP (ref 5–17)
BUN SERPL-MCNC: 16 MG/DL — SIGNIFICANT CHANGE UP (ref 7–23)
CALCIUM SERPL-MCNC: 8.9 MG/DL — SIGNIFICANT CHANGE UP (ref 8.4–10.5)
CHLORIDE SERPL-SCNC: 104 MMOL/L — SIGNIFICANT CHANGE UP (ref 96–108)
CO2 SERPL-SCNC: 21 MMOL/L — LOW (ref 22–31)
CREAT SERPL-MCNC: 0.69 MG/DL — SIGNIFICANT CHANGE UP (ref 0.5–1.3)
GLUCOSE BLDC GLUCOMTR-MCNC: 134 MG/DL — HIGH (ref 70–99)
GLUCOSE BLDC GLUCOMTR-MCNC: 197 MG/DL — HIGH (ref 70–99)
GLUCOSE SERPL-MCNC: 140 MG/DL — HIGH (ref 70–99)
HCT VFR BLD CALC: 34.2 % — LOW (ref 34.5–45)
HGB BLD-MCNC: 10.7 G/DL — LOW (ref 11.5–15.5)
INR BLD: 1.44 RATIO — HIGH (ref 0.88–1.16)
MCHC RBC-ENTMCNC: 27.5 PG — SIGNIFICANT CHANGE UP (ref 27–34)
MCHC RBC-ENTMCNC: 31.2 GM/DL — LOW (ref 32–36)
MCV RBC AUTO: 87.9 FL — SIGNIFICANT CHANGE UP (ref 80–100)
PLATELET # BLD AUTO: 292 K/UL — SIGNIFICANT CHANGE UP (ref 150–400)
POTASSIUM SERPL-MCNC: 4.3 MMOL/L — SIGNIFICANT CHANGE UP (ref 3.5–5.3)
POTASSIUM SERPL-SCNC: 4.3 MMOL/L — SIGNIFICANT CHANGE UP (ref 3.5–5.3)
PROTHROM AB SERPL-ACNC: 16.7 SEC — HIGH (ref 10–12.9)
RBC # BLD: 3.89 M/UL — SIGNIFICANT CHANGE UP (ref 3.8–5.2)
RBC # FLD: 15.8 % — HIGH (ref 10.3–14.5)
SODIUM SERPL-SCNC: 138 MMOL/L — SIGNIFICANT CHANGE UP (ref 135–145)
WBC # BLD: 12.2 K/UL — HIGH (ref 3.8–10.5)
WBC # FLD AUTO: 12.2 K/UL — HIGH (ref 3.8–10.5)

## 2019-09-26 PROCEDURE — 85027 COMPLETE CBC AUTOMATED: CPT

## 2019-09-26 PROCEDURE — 80053 COMPREHEN METABOLIC PANEL: CPT

## 2019-09-26 PROCEDURE — 84484 ASSAY OF TROPONIN QUANT: CPT

## 2019-09-26 PROCEDURE — 70450 CT HEAD/BRAIN W/O DYE: CPT

## 2019-09-26 PROCEDURE — 81241 F5 GENE: CPT

## 2019-09-26 PROCEDURE — 83615 LACTATE (LD) (LDH) ENZYME: CPT

## 2019-09-26 PROCEDURE — 85307 ASSAY ACTIVATED PROTEIN C: CPT

## 2019-09-26 PROCEDURE — 82553 CREATINE MB FRACTION: CPT

## 2019-09-26 PROCEDURE — 87798 DETECT AGENT NOS DNA AMP: CPT

## 2019-09-26 PROCEDURE — 70498 CT ANGIOGRAPHY NECK: CPT

## 2019-09-26 PROCEDURE — 87633 RESP VIRUS 12-25 TARGETS: CPT

## 2019-09-26 PROCEDURE — 71045 X-RAY EXAM CHEST 1 VIEW: CPT

## 2019-09-26 PROCEDURE — 83036 HEMOGLOBIN GLYCOSYLATED A1C: CPT

## 2019-09-26 PROCEDURE — 81003 URINALYSIS AUTO W/O SCOPE: CPT

## 2019-09-26 PROCEDURE — 87086 URINE CULTURE/COLONY COUNT: CPT

## 2019-09-26 PROCEDURE — 87186 SC STD MICRODIL/AGAR DIL: CPT

## 2019-09-26 PROCEDURE — 85611 PROTHROMBIN TEST: CPT

## 2019-09-26 PROCEDURE — 76770 US EXAM ABDO BACK WALL COMP: CPT

## 2019-09-26 PROCEDURE — 85613 RUSSELL VIPER VENOM DILUTED: CPT

## 2019-09-26 PROCEDURE — 81240 F2 GENE: CPT

## 2019-09-26 PROCEDURE — 82550 ASSAY OF CK (CPK): CPT

## 2019-09-26 PROCEDURE — 87486 CHLMYD PNEUM DNA AMP PROBE: CPT

## 2019-09-26 PROCEDURE — 93306 TTE W/DOPPLER COMPLETE: CPT

## 2019-09-26 PROCEDURE — 85610 PROTHROMBIN TIME: CPT

## 2019-09-26 PROCEDURE — 70551 MRI BRAIN STEM W/O DYE: CPT

## 2019-09-26 PROCEDURE — 85730 THROMBOPLASTIN TIME PARTIAL: CPT

## 2019-09-26 PROCEDURE — 87581 M.PNEUMON DNA AMP PROBE: CPT

## 2019-09-26 PROCEDURE — 83605 ASSAY OF LACTIC ACID: CPT

## 2019-09-26 PROCEDURE — 97167 OT EVAL HIGH COMPLEX 60 MIN: CPT

## 2019-09-26 PROCEDURE — 99223 1ST HOSP IP/OBS HIGH 75: CPT

## 2019-09-26 PROCEDURE — 86147 CARDIOLIPIN ANTIBODY EA IG: CPT

## 2019-09-26 PROCEDURE — 76770 US EXAM ABDO BACK WALL COMP: CPT | Mod: 26

## 2019-09-26 PROCEDURE — 70496 CT ANGIOGRAPHY HEAD: CPT

## 2019-09-26 PROCEDURE — 0042T: CPT

## 2019-09-26 PROCEDURE — 93005 ELECTROCARDIOGRAM TRACING: CPT

## 2019-09-26 PROCEDURE — 99285 EMERGENCY DEPT VISIT HI MDM: CPT | Mod: 25

## 2019-09-26 PROCEDURE — 86850 RBC ANTIBODY SCREEN: CPT

## 2019-09-26 PROCEDURE — 71260 CT THORAX DX C+: CPT

## 2019-09-26 PROCEDURE — 97162 PT EVAL MOD COMPLEX 30 MIN: CPT

## 2019-09-26 PROCEDURE — 82962 GLUCOSE BLOOD TEST: CPT

## 2019-09-26 PROCEDURE — 92610 EVALUATE SWALLOWING FUNCTION: CPT

## 2019-09-26 PROCEDURE — 86900 BLOOD TYPING SEROLOGIC ABO: CPT

## 2019-09-26 PROCEDURE — 80048 BASIC METABOLIC PNL TOTAL CA: CPT

## 2019-09-26 PROCEDURE — 80061 LIPID PANEL: CPT

## 2019-09-26 PROCEDURE — 86901 BLOOD TYPING SEROLOGIC RH(D): CPT

## 2019-09-26 PROCEDURE — 97760 ORTHOTIC MGMT&TRAING 1ST ENC: CPT

## 2019-09-26 PROCEDURE — 74177 CT ABD & PELVIS W/CONTRAST: CPT

## 2019-09-26 RX ORDER — METOPROLOL TARTRATE 50 MG
12.5 TABLET ORAL DAILY
Refills: 0 | Status: DISCONTINUED | OUTPATIENT
Start: 2019-09-26 | End: 2019-10-05

## 2019-09-26 RX ORDER — DEXTROSE 50 % IN WATER 50 %
25 SYRINGE (ML) INTRAVENOUS ONCE
Refills: 0 | Status: DISCONTINUED | OUTPATIENT
Start: 2019-09-26 | End: 2019-10-11

## 2019-09-26 RX ORDER — ACETAMINOPHEN 500 MG
650 TABLET ORAL ONCE
Refills: 0 | Status: COMPLETED | OUTPATIENT
Start: 2019-09-26 | End: 2019-09-26

## 2019-09-26 RX ORDER — DEXTROSE 50 % IN WATER 50 %
15 SYRINGE (ML) INTRAVENOUS ONCE
Refills: 0 | Status: DISCONTINUED | OUTPATIENT
Start: 2019-09-26 | End: 2019-10-11

## 2019-09-26 RX ORDER — ASPIRIN/CALCIUM CARB/MAGNESIUM 324 MG
81 TABLET ORAL DAILY
Refills: 0 | Status: DISCONTINUED | OUTPATIENT
Start: 2019-09-26 | End: 2019-10-11

## 2019-09-26 RX ORDER — WARFARIN SODIUM 2.5 MG/1
1 TABLET ORAL
Qty: 0 | Refills: 0 | DISCHARGE

## 2019-09-26 RX ORDER — ENOXAPARIN SODIUM 100 MG/ML
90 INJECTION SUBCUTANEOUS DAILY
Refills: 0 | Status: DISCONTINUED | OUTPATIENT
Start: 2019-09-26 | End: 2019-10-02

## 2019-09-26 RX ORDER — ENOXAPARIN SODIUM 100 MG/ML
90 INJECTION SUBCUTANEOUS
Qty: 0 | Refills: 0 | DISCHARGE
Start: 2019-09-26

## 2019-09-26 RX ORDER — DEXTROSE 50 % IN WATER 50 %
12.5 SYRINGE (ML) INTRAVENOUS ONCE
Refills: 0 | Status: DISCONTINUED | OUTPATIENT
Start: 2019-09-26 | End: 2019-10-11

## 2019-09-26 RX ORDER — GABAPENTIN 400 MG/1
1 CAPSULE ORAL
Qty: 0 | Refills: 0 | DISCHARGE

## 2019-09-26 RX ORDER — SODIUM CHLORIDE 9 MG/ML
250 INJECTION INTRAMUSCULAR; INTRAVENOUS; SUBCUTANEOUS ONCE
Refills: 0 | Status: COMPLETED | OUTPATIENT
Start: 2019-09-26 | End: 2019-09-26

## 2019-09-26 RX ORDER — GLUCAGON INJECTION, SOLUTION 0.5 MG/.1ML
1 INJECTION, SOLUTION SUBCUTANEOUS ONCE
Refills: 0 | Status: DISCONTINUED | OUTPATIENT
Start: 2019-09-26 | End: 2019-10-11

## 2019-09-26 RX ORDER — DOCUSATE SODIUM 100 MG
100 CAPSULE ORAL
Refills: 0 | Status: DISCONTINUED | OUTPATIENT
Start: 2019-09-26 | End: 2019-10-07

## 2019-09-26 RX ORDER — INSULIN LISPRO 100/ML
VIAL (ML) SUBCUTANEOUS
Refills: 0 | Status: DISCONTINUED | OUTPATIENT
Start: 2019-09-26 | End: 2019-10-11

## 2019-09-26 RX ORDER — SODIUM CHLORIDE 9 MG/ML
1000 INJECTION, SOLUTION INTRAVENOUS
Refills: 0 | Status: DISCONTINUED | OUTPATIENT
Start: 2019-09-26 | End: 2019-10-11

## 2019-09-26 RX ORDER — ATORVASTATIN CALCIUM 80 MG/1
40 TABLET, FILM COATED ORAL AT BEDTIME
Refills: 0 | Status: DISCONTINUED | OUTPATIENT
Start: 2019-09-26 | End: 2019-10-11

## 2019-09-26 RX ORDER — INSULIN LISPRO 100/ML
VIAL (ML) SUBCUTANEOUS AT BEDTIME
Refills: 0 | Status: DISCONTINUED | OUTPATIENT
Start: 2019-09-26 | End: 2019-10-11

## 2019-09-26 RX ADMIN — ATORVASTATIN CALCIUM 40 MILLIGRAM(S): 80 TABLET, FILM COATED ORAL at 21:10

## 2019-09-26 RX ADMIN — Medication 1: at 18:53

## 2019-09-26 RX ADMIN — Medication 0.25 MILLIGRAM(S): at 00:17

## 2019-09-26 RX ADMIN — Medication 81 MILLIGRAM(S): at 11:15

## 2019-09-26 RX ADMIN — SODIUM CHLORIDE 250 MILLILITER(S): 9 INJECTION INTRAMUSCULAR; INTRAVENOUS; SUBCUTANEOUS at 04:24

## 2019-09-26 RX ADMIN — Medication 650 MILLIGRAM(S): at 02:11

## 2019-09-26 RX ADMIN — Medication 650 MILLIGRAM(S): at 02:41

## 2019-09-26 RX ADMIN — Medication 2: at 12:41

## 2019-09-26 RX ADMIN — ENOXAPARIN SODIUM 90 MILLIGRAM(S): 100 INJECTION SUBCUTANEOUS at 11:14

## 2019-09-26 NOTE — PROGRESS NOTE ADULT - ASSESSMENT
Assessment and Plan:   · Assessment		  64y.o. Female with DM2, HTN, Non-obstructive CAD, permanent AFIB on warfarin  for the last 5 years (s/p DCCV 2013), hx of CVA's (2013, 2016 without residual deficits)  Rheumatic valvular disease with Severe MS, recent admit 09/12/2019 - 09/18/2019 with progressive ANDERSON and found to be in AFL and planned for DCCV, however BUBBA on 09/16/2019 demonstrated a KRISTAN thrombus, and patient was discharged on warfarin, now admitted with new paralysis and aphasia, CT c/w L MCA acute/subacute infarct. Per her son, she's has been consistently taking her warfarin.  Also has been on ASA 81 mg.  INR at admission therapeutic.    Currently on lovenox    Pt with recurrent CVA despite therapeutic INR on Warfarin + ASA.  As per history, previous CVA in 2016 may have occurred while on Warfarin as well which perhaps makes this occurrence her 2nd Warfarin failure.    RECOMMEND:    -Given recurrent CVA despite ASA 81 and Warfarin at therapeutic level, recommended hypercoaguable work up with APLS mallory (LA, anticardiolipin ab, DRVVT, b2gp1 ab), Factor V leiden, APC resistance, Prothrombin mutation.  Unable to accurately check AT3, protein C and protein S activity given current Heparin use and recent Warfarin use.- awaiting final results of testing (preliminary APLS screen negative)  -LA neg  -CT cap without occult ca but did not b/l wedge shaped renal enhancement, concerning for pyelo or infarction. Suspect infarct, consulted renal. Consider renal u/s, f/u urine studies  -c/w lovenox since pt had CVA while on coumadin  -c/w ASA 81 mg PO daily (pt has been on ASA at home as well).  -repeat echo as per cardiology after 2- 4 weeks to check on status of previously noted KRISTAN thrombus    Anemia -- mild, monitor for now    Thank you for the courtesy of this consultation and we will continue to follow.    Hal Mckay MD  [facility]  Cell: [phone]

## 2019-09-26 NOTE — PROGRESS NOTE ADULT - SUBJECTIVE AND OBJECTIVE BOX
THE PATIENT WAS SEEN AND EXAMINED BY ME WITH THE HOUSESTAFF AND STROKE TEAM DURING MORNING ROUNDS.   HPI:  64 year old RH Nadine speaking female (Spoken via family and stroke PA in native language) with PMHx of PMH Afib on coumadin, prior R MCA stroke in 2013 and 2016 with no residual deficits, HTN, HLD, DM2, mitral stenosis due to rheumatic valvular disease with recent hospitalization at [facility] for AF for potential cardioversion and intervention for severe mitral stenosis found to have KRISTAN thrombus maintained of coumadin with labile INR. Prior R MCA stroke in 2013 and 2016 with no residual deficits. LKW 2:00am 9/22 at a family gathering. Found by family at 9:00am 9/22 unable to speak or move her R side. NIHSS: 19 on admission. THE PATIENT WAS SEEN AND EXAMINED BY ME WITH THE HOUSESTAFF AND STROKE TEAM DURING MORNING ROUNDS.   HPI:  64 year old RH Nadine speaking female (Spoken via family and stroke PA in native language) with PMHx of PMH Afib on coumadin, prior R MCA stroke in 2013 and 2016 with no residual deficits, HTN, HLD, DM2, mitral stenosis due to rheumatic valvular disease with recent hospitalization at [facility] for AF for potential cardioversion and intervention for severe mitral stenosis found to have KRISTAN thrombus maintained of coumadin with labile INR. Prior R MCA stroke in 2013 and 2016 with no residual deficits. LKW 2:00am 9/22 at a family gathering. Found by family at 9:00am 9/22 unable to speak or move her R side. NIHSS: 19 on admission.    SUBJECTIVE: No events overnight.  No new neurologic complaints.      acetaminophen    Suspension .. 650 milliGRAM(s) Oral every 6 hours PRN  aspirin  chewable 81 milliGRAM(s) Oral daily  atorvastatin 40 milliGRAM(s) Oral at bedtime  dextrose 40% Gel 15 Gram(s) Oral once PRN  dextrose 5%. 1000 milliLiter(s) IV Continuous <Continuous>  dextrose 50% Injectable 12.5 Gram(s) IV Push once  dextrose 50% Injectable 25 Gram(s) IV Push once  dextrose 50% Injectable 25 Gram(s) IV Push once  enoxaparin Injectable 90 milliGRAM(s) SubCutaneous daily  glucagon  Injectable 1 milliGRAM(s) IntraMuscular once PRN  insulin lispro (HumaLOG) corrective regimen sliding scale   SubCutaneous three times a day before meals  metoprolol succinate ER 12.5 milliGRAM(s) Oral daily    PHYSICAL EXAM:   Vital Signs Last 24 Hrs  T(C): 36.6 (26 Sep 2019 07:33), Max: 37.2 (25 Sep 2019 15:21)  T(F): 97.9 (26 Sep 2019 07:33), Max: 99 (25 Sep 2019 15:21)  HR: 97 (26 Sep 2019 12:00) (62 - 134)  BP: 112/69 (26 Sep 2019 12:00) (88/58 - 137/82)  BP(mean): 84 (26 Sep 2019 12:00) (67 - 108)  RR: 21 (26 Sep 2019 12:00) (13 - 21)  SpO2: 100% (26 Sep 2019 12:00) (97% - 100%)    General: No acute distress  HEENT: L gaze preference, does not cross midline  Abdomen: Soft, nontender, nondistended   Extremities: No edema    NEUROLOGICAL EXAM:  Mental status: Awake, alert, said "both" when asked which arm was touched, follows midline commands   Cranial Nerves: No facial asymmetry, L gaze preference. no nystagmus, unable to assess tongue deviation, unable to assess dysarthria   Motor exam: R side hemiplegia RUE/RLE 0/5, left side moves well against gravity  Sensation: unable to assess    Coordination/ Gait: unable to assess dysmetria, gait not tested    LABS:                        10.7   12.2  )-----------( 292      ( 26 Sep 2019 06:02 )             34.2    09-26    138  |  104  |  16  ----------------------------<  140<H>  4.3   |  21<L>  |  0.69    Ca    8.9      26 Sep 2019 06:02    TPro  6.8  /  Alb  3.4  /  TBili  1.2  /  DBili  x   /  AST  25  /  ALT  17  /  AlkPhos  52  09-25  PT/INR - ( 26 Sep 2019 06:02 )   PT: 16.7 sec;   INR: 1.44 ratio         PTT - ( 25 Sep 2019 06:22 )  PTT:74.2 sec  Hemoglobin A1C, Whole Blood: 6.7 % (09-23 @ 08:45)  Hemoglobin A1C, Whole Blood: 6.3 % (09-13 @ 07:00)      IMAGING: Reviewed by me.     MRI Head No Cont (09.23.19)  Redemonstration of acute left KUSUM and MCA territory infarcts primarily   involving the left frontal lobe. No yola hemorrhagic transformation. No   midline shift or hydrocephalus.  Chronic right frontal and right cerebellar hemisphere infarcts.    CT Head No Cont (09.23.19)  Redemonstration of evolving left MCA and KUSUM territory infarct without   hemorrhagic transformation, as well as remote right MCA infarct, volume   loss, and microvascular disease and ex vacuo enlargement of the right   lateral ventricle unchanged from prior.    CT angiography neck w/ IV Cont (09.22.19)  No evidence for carotid or vertebral artery stenosis.    CT angiography brain w/ IV Cont (09.22.19)  A short segment left middle cerebral artery M2 branch occlusion is noted.   The M1 segment is widely patent.     CT Head No Cont (09.22.19)  Acute to subacute infarct of left MCA territory without hemorrhagic   transformation. Chronic right MCA infarct.   Acute to subacute infarct involves the distal left   anterior cerebral artery territory at the level of the superior frontal   gyrus, with edema and swelling, without hemorrhagic transformation.

## 2019-09-26 NOTE — DISCHARGE NOTE PROVIDER - PROVIDER TOKENS
PROVIDER:[TOKEN:[[id number]]],PROVIDER:[TOKEN:[[id number]],FOLLOWUP:[1 week]],PROVIDER:[TOKEN:[[id number]],FOLLOWUP:[2 weeks]]

## 2019-09-26 NOTE — PROGRESS NOTE ADULT - ASSESSMENT
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 Prior R MCA stroke in 2013 and 2016 with no residual deficits. LKW 2:00am 9/22 at a family gathering. Found by family at 9:00am 9/22 unable to speak or move her R side. NIHSS: 19. CT Head showed L MCA acute-subacute infarct. CTA head demonstrated L M2 occlusion. Patient was not a candidate for endovascular treatment given core infarct of 0 with penumbra of 7.    Impression: Cerebral embolism with cerebral infarction, left KUSUM/MCA likely due to cardioembolism in setting of atrial fibrillation and KRISTAN thrombus     NEURO: Neurologically without acute change. Continue close monitoring for neurologic deterioration, permissive HTN to gradual normotension, LDL 46- continue on home statin as LDL already at goal, MRI Brain w/o contrast noted above. Physical therapy/OT recommended acute rehab.    ANTITHROMBOTIC THERAPY: Full dose lovenox and ASA. Close monitoring of INR to ensure within appropriate range.    PULMONARY: CXR no acute cardiopulmonary process on 9/22, protecting airway, saturating well     CARDIOVASCULAR: TTE EF 65-70% consistent with severe mitral stenosis, mild aortic stenosis. mild aortic regurgitation, cardiac monitoring shows afib- rate controlled. KRISTAN thrombus on OAC (warfarin) may take up to 2-4 weeks to resolve. Will need repeat BUBBA in 3-4 weeks to evaluate for KRISTAN thrombus. Cardio consult appreciated possible valve intervention pending recovery and goals of care. Spoke with Cardiologist Dr. Pierre who recommended Digoxin 1 mg IV total titrated for rate control. Cardiology will continue to follow closely     SBP goal: Permissive HTN to gradual normotension    GASTROINTESTINAL:  dysphagia screen passed. Initially failed at bedside, seen by S&S who recommended soft diet, tolerating well     Diet: Soft     RENAL: BUN/Cr without acute changes, good urine output, KCl for hypokalemia.      Na Goal: Greater than 135     Brunner: No    HEMATOLOGY: H/H without acute change, Platelets 292. Per Hematology consult given recurrent CVA despite ASA 81 and Warfarin at therapeutic level, recommend hypercoagulable work up as an outpatient with hematology with APLS sreen (LA, anticardiolipin ab, DRVVT, b2gp1 ab, 50/50 mixing study albeit PTT will not correct as she is on heparin;  can at least r/o possibility of lupus anticoagulant affecting PT), Factor V leiden, APC resistance, Prothrombin mutation.  Unable to accurately check AT3, protein C and protein S activity given current Heparin use and recent Warfarin use. CT Chest/Abd/Pelvis to r/o malignancy given recurrent infarcts. CT Chest/Abd/Pelvis 9/24 No findings to suggest intrathoracic or intra-abdominal malignancy. Per Hematology recommended transitioning to Lovenox 1.5mg/kg daily for therapeutic anticoagulation.     DVT ppx: Heparin    ID: afebrile, mild leukocytosis. CXR negative, UA negative, RVP negative continue close monitoring. Encourage incentive spirometry and early mobility. CTA/P concerning for pyelonephritis vs. infarct. . Pending UA Culture. U/S Renal showed No hydronephrosis and a distended bladder, volume of 555 mL. Consulted Nephrology Dr. George and Infectious Disease team. Nephrology stated to continue anticoagulants    OTHER: Plan of care discussed with patient and son at bedside and all questions answered.     DISPOSITION: Medically cleared for Acute rehab today     CORE MEASURES:        Admission NIHSS: 19     TPA: NO      LDL/HDL: 46/39     Depression Screen: unable to assess due to aphasia     Statin Therapy: YES     Dysphagia Screen: FAIL     Smoking NO      Afib YES     Stroke Education YES    Obtain screening lower extremity venous ultrasound in patients who meet 1 or more of the following criteria as patient is high risk for DVT/PE on admission:   [] History of DVT/PE  []Hypercoagulable states (Factor V Leiden, Cancer, OCP, etc. )  []Prolonged immobility (hemiplegia/hemiparesis/post operative or any other extended immobilization)  [] Transferred from outside facility (Rehab or Long term care)  [] Age </= to 50 64 year old RH Nadine speaking female (Spoken via family and stroke PA in native language) with PMHx of PMH Afib on coumadin, prior R MCA stroke in 2013 and 2016 with no residual deficits, HTN, HLD, DM2, mitral stenosis due to rheumatic valvular disease with recent hospitalization at [facility] for AF for potential cardioversion and intervention for severe mitral stenosis found to have KRISTAN thrombus maintained of coumadin with labile INR. Prior R MCA stroke in 2013 and 2016 with no residual deficits. LKW 2:00am 9/22 at a family gathering. Found by family at 9:00am 9/22 unable to speak or move her R side. NIHSS: 19. CT Head showed L MCA acute-subacute infarct. CTA head demonstrated L M2 occlusion. Patient was not a candidate for endovascular treatment given core infarct of 0 with penumbra of 7.    Impression: Cerebral embolism with cerebral infarction, left KUSUM/MCA likely due to cardioembolism in setting of atrial fibrillation and KRISTAN thrombus     NEURO: Neurologically without acute change. Continue close monitoring for neurologic deterioration, permissive HTN to gradual normotension, LDL 46- continue on home statin as LDL already at goal, MRI Brain w/o contrast noted above. Physical therapy/OT recommended acute rehab.    ANTITHROMBOTIC THERAPY: Full dose lovenox for anticoagulation and ASA. Close monitoring of INR to ensure within appropriate range.    PULMONARY: CXR no acute cardiopulmonary process on 9/22, protecting airway, saturating well     CARDIOVASCULAR: TTE EF 65-70% consistent with severe mitral stenosis, mild aortic stenosis. mild aortic regurgitation, cardiac monitoring shows afib- rate controlled. KRISTAN thrombus on OAC (warfarin) may take up to 2-4 weeks to resolve. Will need repeat BUBBA in 3-4 weeks to evaluate for KRISTAN thrombus. Cardio consult appreciated possible valve intervention pending recovery and goals of care. Spoke with Cardiologist Dr. Pierre who recommended Digoxin 1 mg IV total titrated for rate control. Cardiology will continue to follow closely     SBP goal: Permissive HTN to gradual normotension    GASTROINTESTINAL:  dysphagia screen passed. Initially failed at bedside, seen by S&S who recommended soft diet, tolerating well     Diet: Soft     RENAL: BUN/Cr without acute changes, good urine output, KCl for hypokalemia.      Na Goal: Greater than 135     Brunner: No    HEMATOLOGY: H/H without acute change, Platelets 292. Per Hematology consult given recurrent CVA despite ASA 81 and Warfarin at therapeutic level, recommend hypercoagulable work up as an outpatient with hematology with APLS sreen (LA, anticardiolipin ab, DRVVT, b2gp1 ab, 50/50 mixing study albeit PTT will not correct as she is on heparin;  can at least r/o possibility of lupus anticoagulant affecting PT), Factor V leiden, APC resistance, Prothrombin mutation.  Unable to accurately check AT3, protein C and protein S activity given current Heparin use and recent Warfarin use. CT Chest/Abd/Pelvis to r/o malignancy given recurrent infarcts. CT Chest/Abd/Pelvis 9/24 No findings to suggest intrathoracic or intra-abdominal malignancy. Per Hematology recommended transitioning to Lovenox 1.5mg/kg daily for therapeutic anticoagulation.     DVT ppx: Heparin    ID: afebrile, mild leukocytosis. CXR negative, UA negative, RVP negative continue close monitoring. Encourage incentive spirometry and early mobility. CTA/P concerning for pyelonephritis vs. infarct. . Pending UA Culture. U/S Renal showed No hydronephrosis and a distended bladder, volume of 555 mL. Consulted Nephrology Dr. George and Infectious Disease team. Nephrology stated to continue anticoagulants    OTHER: Plan of care discussed with patient and son at bedside and all questions answered.     DISPOSITION: Medically cleared for Acute rehab today     CORE MEASURES:        Admission NIHSS: 19     TPA: NO      LDL/HDL: 46/39     Depression Screen: unable to assess due to aphasia     Statin Therapy: YES     Dysphagia Screen: FAIL     Smoking NO      Afib YES     Stroke Education YES    Obtain screening lower extremity venous ultrasound in patients who meet 1 or more of the following criteria as patient is high risk for DVT/PE on admission:   [] History of DVT/PE  []Hypercoagulable states (Factor V Leiden, Cancer, OCP, etc. )  []Prolonged immobility (hemiplegia/hemiparesis/post operative or any other extended immobilization)  [] Transferred from outside facility (Rehab or Long term care)  [] Age </= to 50

## 2019-09-26 NOTE — PHARMACOTHERAPY INTERVENTION NOTE - COMMENTS
Spoke with patient's son regarding enoxaparin use, indication, compliance, avoid miss doses, side effect, monitoring.

## 2019-09-26 NOTE — H&P ADULT - HISTORY OF PRESENT ILLNESS
64 Y RH Nadine speaking female with PMHx of Afib (on coumadin), prior R MCA stroke in 2013 and 2016 with no residual deficits, HTN, HLD, DM2, mitral stenosis due to rheumatic valvular disease with recent hospitalization at [facility] for AF for potential cardioversion and intervention for severe mitral stenosis found to have KRISTAN thrombus maintained of coumadin with labile INR. History was obatined from patients children at bedside. LKW 2:00am 9/22 at a family gathering. Found by family at 9:00am 9/22 unable to speak or move her R side. NIHSS: 19. CT Head showed L MCA acute-subacute infarct. CTA head demonstrated L M2 occlusion. Patient was not a candidate for endovascular treatment given core infarct of 0 with penumbra of 7. 65yo RH female, Nadine speaking, with PMHx of Afib (on coumadin), prior R MCA stroke in 2013 and 2016 with no residual deficits, HTN, HLD, DM2, mitral stenosis/rheumatic valvular disease with recent hospitalization (Sept 2019) at [facility] for AF for potential cardioversion (Hx of labile INR) and mitral valve intervention. At the time patient found to have KRISTAN thrombus on echo.  On 9/22 pt unable to speak or move her R side and brought to ED by family. CT Head 9/22 showed L MCA acute-subacute infarcts. CTA head demonstrated L M2 occlusion. Patient was not a candidate for endovascular treatment. INR therapeutic on Coumadin.  MRI Head follow up 9/23 shows redemonstration of acute left KUSUM and MCA territory infarcts primarily involving the left frontal lobe. No yola hemorrhagic transformation. No midline shift or hydrocephalus. Chronic right frontal and right cerebellar hemisphere infarcts.  TTE EF 65-70%, severe mitral stenosis, mild aortic stenosis. mild aortic regurgitation. Tele with afib- rate controlled. Placed on hep gtt with goal to transition to lovenox on d/c. As per cardiology, KRISTAN thrombus may take up to 2-4 weeks to resolve. Will need repeat BUBBA in 3-4 weeks. Possible valve intervention pending recovery and goals of care. CT Chest/Abd/Pelvis to r/o malignancy on 9/24. No findings to suggest intrathoracic or intra-abdominal malignancy. However, renal eval of possible renal infarct on CT obtained. Hematology in, Lovenox 1.5mg/kg daily and hypercoagulable workup.  Hospitalization additionally complicated by mild leukocytosis. CXR negative, UA negative, RVP negative-continue close monitoring.   Stable for d/c to [facility] on 9/26/19. 65yo RH female, Nadine speaking, with PMHx of Afib (on coumadin), prior R MCA stroke in 2013 and 2016 with no residual deficits, HTN, HLD, DM2, mitral stenosis/rheumatic valvular disease with recent hospitalization (Sept 2019) at [facility] for AF for potential cardioversion (Hx of labile INR) and mitral valve intervention. At the time patient found to have KRISTAN thrombus on echo.   On 9/22 pt unable to speak or move her R side and brought to ED by family. CT Head 9/22 showed L MCA acute-subacute infarcts. CTA head demonstrated L M2 occlusion. Patient was not a candidate for endovascular treatment. INR therapeutic on Coumadin, per family at bedside, patient was compliant with Coumadin.  MRI Head follow up 9/23 shows redemonstration of acute left KUSUM and MCA territory infarcts primarily involving the left frontal lobe. No yola hemorrhagic transformation. No midline shift or hydrocephalus. Chronic right frontal and right cerebellar hemisphere infarcts.  TTE EF 65-70%, severe mitral stenosis, mild aortic stenosis. mild aortic regurgitation. Tele with afib- rate controlled. Placed on hep gtt with goal to transition to lovenox on d/c. As per cardiology, KRISTAN thrombus may take up to 2-4 weeks to resolve. Will need repeat BUBBA in 3-4 weeks. Possible valve intervention pending recovery and goals of care. CT Chest/Abd/Pelvis to r/o malignancy on 9/24. No findings to suggest intrathoracic or intra-abdominal malignancy. However, renal eval of possible renal infarct on CT obtained. Hematology in, Lovenox 1.5mg/kg daily and hypercoagulable workup.  Hospitalization additionally complicated by mild leukocytosis. CXR negative, UA negative, RVP negative-continue close monitoring.   Stable for d/c to [facility] on 9/26/19.

## 2019-09-26 NOTE — SPEECH LANGUAGE PATHOLOGY EVALUATION - COMMENTS
L MCA territory infarct with L M2 occlusion noted on Head CT. Chronic right MCA infarct. Admit to stroke unit w frequent neuro-checks q1h; Permissive HTN up to ; baseline EKG and CXR;  - Intravenous hydration with normal saline at 75cc per hour;  - Bedside swallow evaluation;  - Head of bed > 30 degrees for aspiration prevention and aspiration precautions ordered.; repeat CT Head in AM 9/23;  -MRI brain without contrast.   Bedside evaluations on previous admissions 2013 and 2016 noted left facial droop; recommended regular texture. MRI 9/23: Redemonstration of acute left KUSUM and MCA territory infarcts primarily involving the left frontal lobe. No yola hemorrhagic transformation. No midline shift or hydrocephalus. Chronic right frontal and right cerebellar hemisphere infarcts are noted. Pt noted to laugh appropriately in response to a joke, evidence of comprehension of higher level language in context.

## 2019-09-26 NOTE — SPEECH LANGUAGE PATHOLOGY EVALUATION - YES/NO QUESTIONS: CONCRETE
unable to obtain reliable yes/no response unable to obtain reliable yes/no response via nonverbal means (e.g., head nod/shake, eye blink, hand raise) yes/unable to obtain reliable yes/no response via nonverbal means (e.g., head nod/shake, eye blink, hand raise) 2/5; increasing to 4/5 with repetition

## 2019-09-26 NOTE — PROGRESS NOTE ADULT - SUBJECTIVE AND OBJECTIVE BOX
pt seen/examined- comfortable, sxs slightly improved today      MEDICATIONS  (STANDING):  aspirin  chewable 81 milliGRAM(s) Oral daily  atorvastatin 40 milliGRAM(s) Oral at bedtime  dextrose 5%. 1000 milliLiter(s) (50 mL/Hr) IV Continuous <Continuous>  dextrose 50% Injectable 12.5 Gram(s) IV Push once  dextrose 50% Injectable 25 Gram(s) IV Push once  dextrose 50% Injectable 25 Gram(s) IV Push once  enoxaparin Injectable 90 milliGRAM(s) SubCutaneous daily  insulin lispro (HumaLOG) corrective regimen sliding scale   SubCutaneous three times a day before meals  metoprolol succinate ER 12.5 milliGRAM(s) Oral daily    MEDICATIONS  (PRN):  acetaminophen    Suspension .. 650 milliGRAM(s) Oral every 6 hours PRN Temp greater or equal to 38C (100.4F), Mild Pain (1 - 3), Moderate Pain (4 - 6), Severe Pain (7 - 10)  dextrose 40% Gel 15 Gram(s) Oral once PRN Blood Glucose LESS THAN 70 milliGRAM(s)/deciliter  glucagon  Injectable 1 milliGRAM(s) IntraMuscular once PRN Glucose LESS THAN 70 milligrams/deciliter      ROS  No fever, sweats, chills  No epistaxis, HA, sore throat  No CP, SOB, cough, sputum  No n/v/d, abd pain, melena, hematochezia  No edema  No rash  No anxiety  No back pain, joint pain  No bleeding, bruising  No dysuria, hematuria    Vital Signs Last 24 Hrs  T(C): 36.6 (26 Sep 2019 07:33), Max: 37.2 (25 Sep 2019 15:21)  T(F): 97.9 (26 Sep 2019 07:33), Max: 99 (25 Sep 2019 15:21)  HR: 97 (26 Sep 2019 12:00) (62 - 134)  BP: 112/69 (26 Sep 2019 12:00) (88/58 - 137/82)  BP(mean): 84 (26 Sep 2019 12:00) (67 - 108)  RR: 21 (26 Sep 2019 12:00) (13 - 21)  SpO2: 100% (26 Sep 2019 12:00) (97% - 100%)    PE  NAD  Awake, alert  Anicteric, MMM  RRR  CTAB  Abd soft, NT, ND  No c/c/e  No rash grossly  FROM                          10.7   12.2  )-----------( 292      ( 26 Sep 2019 06:02 )             34.2       09-26    138  |  104  |  16  ----------------------------<  140<H>  4.3   |  21<L>  |  0.69    Ca    8.9      26 Sep 2019 06:02    TPro  6.8  /  Alb  3.4  /  TBili  1.2  /  DBili  x   /  AST  25  /  ALT  17  /  AlkPhos  52  09-25

## 2019-09-26 NOTE — SPEECH LANGUAGE PATHOLOGY EVALUATION - SLP EYE BLINK
Inconsistent responses when primed to respond "no" with an eye blink Inconsistent responses when primed to respond "no" with an eye blink. Pt noted to squeeze SLPs hand in response to biographical yes/no questions. Attempted eye-blink, however, inconsistent

## 2019-09-26 NOTE — H&P ADULT - NSHPPHYSICALEXAM_GEN_ALL_CORE
Vital Signs  T(C): 36.6 (09-26-19 @ 07:33), Max: 37.2 (09-25-19 @ 15:21)  HR: 74 (09-26-19 @ 10:55) (62 - 134)  BP: 121/73 (09-26-19 @ 10:55) (88/58 - 137/82)  RR: 15 (09-26-19 @ 10:55) (13 - 21)  SpO2: 99% (09-26-19 @ 10:55) (97% - 99%)    Gen - NAD, Comfortable  HEENT - NCAT, EOMI, MMM  Neck - Supple, No limited ROM  Pulm - CTAB, No wheeze, No rhonchi, No crackles  Cardiovascular - RRR, S1S2, No m/r/g  Abdomen - Soft, NT/ND, +BS  Extremities - No C/C/E, No calf tenderness  Neuro-     Cognitive - AAOx3     Communication - Fluent, No dysarthria     Attention: Intact      Judgement: Good evidence of judgement     Memory: Recall 3 objects immediate and 3 min later         Cranial Nerves - CN 2-12 intact     Motor - No focal deficits                    LEFT    UE - ShAB 5/5, EF 5/5, EE 5/5, WE 5/5,  5/5                    RIGHT UE - ShAB 5/5, EF 5/5, EE 5/5, WE 5/5,  5/5                    LEFT    LE - HF 5/5, KE 5/5, DF 5/5, PF 5/5                    RIGHT LE - HF 5/5, KE 5/5, DF 5/5, PF 5/5        Sensory - Intact to LT     Reflexes - DTR Intact, No primitive reflexive     Coordination - FTN intact     Tone - normal  Psychiatric - Mood stable, Affect WNL  Skin:  all skin intact    Wounds: None Present Vital Signs  T(C): 36.6 (09-26-19 @ 07:33), Max: 37.2 (09-25-19 @ 15:21)  HR: 74 (09-26-19 @ 10:55) (62 - 134)  BP: 121/73 (09-26-19 @ 10:55) (88/58 - 137/82)  RR: 15 (09-26-19 @ 10:55) (13 - 21)  SpO2: 99% (09-26-19 @ 10:55) (97% - 99%)    Gen - NAD, Comfortable  HEENT - NCAT, EOMI, MMM  Neck - Supple, No limited ROM  Pulm - CTAB, No wheeze, No rhonchi, No crackles  Cardiovascular - irregularly irregular, S1S2, No m/r/g  Abdomen - Soft, NT/ND, +BS  Extremities - No C/C/E, No calf tenderness  Neuro-     Cognitive - alert, unable to assess orientation due to expressive aphasia. Patient responds to name and follows commands 50% of the time. Requires repetition and demonstration.      Communication - aphasia     Attention: Intact, easily distracted     Judgement: unable to assess     Memory: unable to assess        Cranial Nerves - CN 2-12 intact     Motor - No focal deficits                    LEFT    UE - ShAB 4/5, EF 5/5, EE 5/5, WE 5/5,  5/5                    RIGHT UE - ShAB 0/5, EF 0/5, EE 0/5, WE 0/5,  0/5                    LEFT    LE - HF 4-/5, KE 5/5, DF 5/5, PF 5/5                    RIGHT LE - HF 0/5, KE 0/5, DF 0/5, PF 0/5        Sensory - Intact to LT     Reflexes - DTR Intact, No primitive reflexive     Coordination - FTN intact on LUE, unable to assess with RUE     Tone - RUE and RLE flaccid  Psychiatric - Mood stable, Affect WNL  Skin:  all skin intact    Wounds: None Present

## 2019-09-26 NOTE — DISCHARGE NOTE PROVIDER - NSDCFUSCHEDAPPT_GEN_ALL_CORE_FT
RUSSELL FORTUNE ; 10/04/2019 ; [facility] 270-41 76wq RUSSELL FORTUNE ; 10/04/2019 ; [facility] 270-85 76db RUSSELL FORTUNE ; 10/04/2019 ; [facility] 270-42 76il RUSSELL FORTUNE ; 10/04/2019 ; [facility] 270-83 76rv

## 2019-09-26 NOTE — DISCHARGE NOTE PROVIDER - NSDCCPCAREPLAN_GEN_ALL_CORE_FT
PRINCIPAL DISCHARGE DIAGNOSIS  Diagnosis: Cerebrovascular accident (CVA), unspecified mechanism  Assessment and Plan of Treatment: Please follow up with neurologist in 1 week. The office will call you to schedule an appointment, if you do not hear from them please call [phone]. Continue taking medications as prescribed. If you were prescribed a statin for your cholesterol please make sure you have your liver enzymes checked with your primary care physician. Monitor your blood pressure. Reduce fat, cholesterol and salt in your diet. Increase intake of fruits and vegetables. Limit alcohol to minimum and do not smoke. You may be at risk for falling, make changes to your home to help you walk easier. Keep up to date on vaccinations.  If you experience any symptoms of facial drooping, slurred speech, arm or leg weakness, severe headache, vision changes or any worsening symptoms, notify provider immediately and return to ER.

## 2019-09-26 NOTE — SPEECH LANGUAGE PATHOLOGY EVALUATION - SLP PRECAUTIONS/LIMITATIONS: VISION
impaired/cannot see medication labels or newsprint, but can see obstacles in path, and the surrounding layout; can count fingers at arm's length

## 2019-09-26 NOTE — DISCHARGE NOTE PROVIDER - HOSPITAL COURSE
64 year old RH Nadine speaking female (Spoken via family and stroke PA in native language) with PMHx of PMH Afib on coumadin, prior R MCA stroke in 2013 and 2016 with no residual deficits, HTN, HLD, DM2, mitral stenosis due to rheumatic valvular disease with recent hospitalization at [facility] for AF for potential cardioversion and intervention for severe mitral stenosis found to have KRISTAN thrombus maintained of coumadin with labile INR. Prior R MCA stroke in 2013 and 2016 with no residual deficits. LKW 2:00am 9/22 at a family gathering. Found by family at 9:00am 9/22 unable to speak or move her R side. NIHSS: 19. CT Head showed L MCA acute-subacute infarct. CTA head demonstrated L M2 occlusion. Patient was not a candidate for endovascular treatment given core infarct of 0 with penumbra of 7.        MRI Head No Cont (09.23.19)    Redemonstration of acute left KUSUM and MCA territory infarcts primarily     involving the left frontal lobe. No yola hemorrhagic transformation. No     midline shift or hydrocephalus.    Chronic right frontal and right cerebellar hemisphere infarcts.        CT Head No Cont (09.23.19)    Redemonstration of evolving left MCA and KUSUM territory infarct without     hemorrhagic transformation, as well as remote right MCA infarct, volume     loss, and microvascular disease and ex vacuo enlargement of the right     lateral ventricle unchanged from prior.        CT angiography neck w/ IV Cont (09.22.19)    No evidence for carotid or vertebral artery stenosis.        CT angiography brain w/ IV Cont (09.22.19)    A short segment left middle cerebral artery M2 branch occlusion is noted.     The M1 segment is widely patent.         CT Head No Cont (09.22.19)    Acute to subacute infarct of left MCA territory without hemorrhagic     transformation. Chronic right MCA infarct.     Acute to subacute infarct involves the distal left     anterior cerebral artery territory at the level of the superior frontal     gyrus, with edema and swelling, without hemorrhagic transformation.         Impression: Cerebral embolism with cerebral infarction, left KUSUM/MCA likely due to cardioembolism in setting of atrial fibrillation and KRISTAN thrombus         Antithrombotic therapy: On therapeutic Lovenox. ASA added in setting of CAD.         TTE EF 65-70% consistent with severe mitral stenosis, mild aortic stenosis. mild aortic regurgitation. KRISTAN thrombus on OAC (warfarin) may take up to 2-4 weeks to resolve. Will need repeat BUBBA in 3-4 weeks to evaluate for KRISTAN thrombus. 64 year old RH Nadine speaking female (Spoken via family and stroke PA in native language) with PMHx of PMH Afib on coumadin, prior R MCA stroke in 2013 and 2016 with no residual deficits, HTN, HLD, DM2, mitral stenosis due to rheumatic valvular disease with recent hospitalization at [facility] for AF for potential cardioversion and intervention for severe mitral stenosis found to have KRISTAN thrombus maintained of coumadin with labile INR. Prior R MCA stroke in 2013 and 2016 with no residual deficits. LKW 2:00am 9/22 at a family gathering. Found by family at 9:00am 9/22 unable to speak or move her R side. NIHSS: 19. CT Head showed L MCA acute-subacute infarct. CTA head demonstrated L M2 occlusion. Patient was not a candidate for endovascular treatment given core infarct of 0 with penumbra of 7.        MRI Head No Cont (09.23.19)    Redemonstration of acute left KUSUM and MCA territory infarcts primarily     involving the left frontal lobe. No yola hemorrhagic transformation. No     midline shift or hydrocephalus.    Chronic right frontal and right cerebellar hemisphere infarcts.        CT Head No Cont (09.23.19)    Redemonstration of evolving left MCA and KUSUM territory infarct without     hemorrhagic transformation, as well as remote right MCA infarct, volume     loss, and microvascular disease and ex vacuo enlargement of the right     lateral ventricle unchanged from prior.        CT angiography neck w/ IV Cont (09.22.19)    No evidence for carotid or vertebral artery stenosis.        CT angiography brain w/ IV Cont (09.22.19)    A short segment left middle cerebral artery M2 branch occlusion is noted.     The M1 segment is widely patent.         CT Head No Cont (09.22.19)    Acute to subacute infarct of left MCA territory without hemorrhagic     transformation. Chronic right MCA infarct.     Acute to subacute infarct involves the distal left     anterior cerebral artery territory at the level of the superior frontal     gyrus, with edema and swelling, without hemorrhagic transformation.         Impression: Cerebral embolism with cerebral infarction, left KUSUM/MCA likely due to cardioembolism in setting of atrial fibrillation and KRISTAN thrombus         Antithrombotic therapy: On therapeutic Lovenox. ASA added in setting of CAD.         TTE EF 65-70% consistent with severe mitral stenosis, mild aortic stenosis. mild aortic regurgitation. KRISTAN thrombus on OAC (warfarin) may take up to 2-4 weeks to resolve. Will need repeat BUBBA in 3-4 weeks to evaluate for KRISTAN thrombus.         Hematology consulted given recurrent CVA despite ASA 81 and Warfarin at therapeutic level, recommend hypercoagulable work up with APLS sreen (LA, anticardiolipin ab, DRVVT, b2gp1 ab), Factor V leiden, APC resistance, Prothrombin mutation. Will followup as outpatient with hematology to review blood work. CT cap without occult ca but did not b/l wedge shaped renal enhancement deemed to be splenic infarcts. Renal consulted during hospitalization and recommend to continue on AC. 64 year old RH Nadine speaking female (Spoken via family and stroke PA in native language) with PMHx of PMH Afib on coumadin, prior R MCA stroke in 2013 and 2016 with no residual deficits, HTN, HLD, DM2, mitral stenosis due to rheumatic valvular disease with recent hospitalization at [facility] for AF for potential cardioversion and intervention for severe mitral stenosis found to have KRISTAN thrombus maintained of coumadin with labile INR. Prior R MCA stroke in 2013 and 2016 with no residual deficits. LKW 2:00am 9/22 at a family gathering. Found by family at 9:00am 9/22 unable to speak or move her R side. NIHSS: 19. CT Head showed L MCA acute-subacute infarct. CTA head demonstrated L M2 occlusion. Patient was not a candidate for endovascular treatment given core infarct of 0 with penumbra of 7.        MRI Head No Cont (09.23.19)    Redemonstration of acute left KUSUM and MCA territory infarcts primarily     involving the left frontal lobe. No yola hemorrhagic transformation. No     midline shift or hydrocephalus.    Chronic right frontal and right cerebellar hemisphere infarcts.        CT Head No Cont (09.23.19)    Redemonstration of evolving left MCA and KUSUM territory infarct without     hemorrhagic transformation, as well as remote right MCA infarct, volume     loss, and microvascular disease and ex vacuo enlargement of the right     lateral ventricle unchanged from prior.        CT angiography neck w/ IV Cont (09.22.19)    No evidence for carotid or vertebral artery stenosis.        CT angiography brain w/ IV Cont (09.22.19)    A short segment left middle cerebral artery M2 branch occlusion is noted.     The M1 segment is widely patent.         CT Head No Cont (09.22.19)    Acute to subacute infarct of left MCA territory without hemorrhagic     transformation. Chronic right MCA infarct.     Acute to subacute infarct involves the distal left     anterior cerebral artery territory at the level of the superior frontal     gyrus, with edema and swelling, without hemorrhagic transformation.         Impression: Cerebral embolism with cerebral infarction, left KUSUM/MCA likely due to cardioembolism in setting of atrial fibrillation and KRISTAN thrombus         Antithrombotic therapy: On therapeutic Lovenox. ASA added in setting of CAD.         TTE EF 65-70% consistent with severe mitral stenosis, mild aortic stenosis. mild aortic regurgitation. KRISTAN thrombus on OAC (warfarin) may take up to 2-4 weeks to resolve. Will need repeat BUBBA in 3-4 weeks to evaluate for KRISTAN thrombus. Add digoxin for heart rate control.        Hematology consulted given recurrent CVA despite ASA 81 and Warfarin at therapeutic level, recommend hypercoagulable work up with APLS sreen (LA, anticardiolipin ab, DRVVT, b2gp1 ab), Factor V leiden, APC resistance, Prothrombin mutation. Will followup as outpatient with hematology to review blood work. CT cap without occult ca but did not b/l wedge shaped renal enhancement deemed to be splenic infarcts. Renal consulted during hospitalization and recommend to continue on AC.        PT/OT recommended acute rehab. Patient stable for discharge to rehab with outpatient followup.         Translation provided by ANURAG Portillo. Son at bedside agreeable to plan. All questions and concerns addressed. He is aware of importance of close outpaitent followup.

## 2019-09-26 NOTE — H&P ADULT - NSHPREVIEWOFSYSTEMS_GEN_ALL_CORE
REVIEW OF SYSTEMS   Constitutional: No fever, No Chills, No fatigue  HEENT: No eye pain, No visual disturbances, No difficulty hearing, No Dysphagia   Pulm: No cough,  No shortness of breath  Cardio: No chest pain, No palpitations  GI:  No abdominal pain, No nausea, No vomiting, No diarrhea, No constipation, No incontinence, Last Bowel Movement on   : No dysuria, No frequency, No hematuria, No incontinence  Neuro: No headaches, No memory loss, No loss of strength, No numbness, No tremors  Skin: No itching, No rashes, No lesions   Endo: No temperature intolerance  MSK: No joint pain, No joint swelling, No muscle pain, No Neck or back pain  Psych:  No depression, No anxiety    Any Major Surgery within past 100 days? No / Yes     Two or more Falls within past one year?  No / Yes                   One Fall with injury past one year?           No / Yes REVIEW OF SYSTEMS   Constitutional: No fever, No Chills, No fatigue  HEENT: No eye pain, No visual disturbances, No difficulty hearing, No Dysphagia   Pulm: No cough,  No shortness of breath  Cardio: No chest pain, No palpitations  GI:  No abdominal pain, No nausea, No vomiting, No diarrhea, No constipation  : No dysuria, No frequency, No hematuria, No incontinence  Neuro: +headaches, No memory loss, +loss of strength, No numbness, No tremors  Skin: No itching, No rashes, No lesions   Endo: No temperature intolerance  MSK: No joint pain, No joint swelling, No muscle pain, No Neck or back pain  Psych:  No depression, No anxiety    Any Major Surgery within past 100 days? No   Two or more Falls within past one year?  No                    One Fall with injury past one year?           No

## 2019-09-26 NOTE — H&P ADULT - ATTENDING COMMENTS
RUSSELL FORTUNE is 63 yo RH female with multiple vascular tomás factors, including AFib and valvular heart disease , prior AC therapy failures (Coumadin) presents with acute embolic  L MCA / Left KUSUM infarct  with decreased functional mobility, Right neglect, apraxia,  dysphagia, aphasia, hemiplegia, gait instability and ADL impairments.      Patient examined, chart reviewed  Admit to BIU  Resume all medications  Admit labs  Aspiration and fall precautions  Medicine evaluation  Full dose AC/Lovenox

## 2019-09-26 NOTE — H&P ADULT - NSHPSOCIALHISTORY_GEN_ALL_CORE
SOCIAL HISTORY  Smoking - Denied  EtOH - Denied   Drugs - Denied  Occupation - not working     FUNCTIONAL HISTORY  Lives in a  with 2 sons, daughter in law, grandchildren and has 3 Steps to Enter + Hand Rails and main floor setup.  Prior Level of Function: Independent prior with Ambulation and ADLs.     CURRENT FUNCTIONAL STATUS  - Bed Mobility: max a  - Transfers: Bed to chair: maximum assist / Sit to stand: moderate assist / Stand to sit: moderate assist   - Gait: unable to perform  - ADLs: LE dress max a

## 2019-09-26 NOTE — SPEECH LANGUAGE PATHOLOGY EVALUATION - SLP DIAGNOSIS
Oral/verbal apraxia; aphasia Pt presents with evidence of non-fluent aphasia with accompany accompanying Oral/verbal apraxia of speech. Pt able to answer biographical yes/no questions via squeezing SLP's hand with 2/5, increasing to 4/5 given repetition. Attempted to utilize written yes/no AAC board in [facility], however, Pt unable to accurately use via eye-gaze. Difficulty attending to right visual field/crossing midline. Pt able to ID objects in a field of 2 with 100% accuracy and F:3 with 4/6 (67% accuracy) and unable to attend to items in a F:4 due to ?field cut. Pt made attempts to imitate oromotor postures for labial protrusion/retraction as well as lingual protrusion, however, inconsistent. Suggested to son, Guzman, giving patient items in a F:2 choices and placing items in left visual field (i.e. items on lunch tray).

## 2019-09-26 NOTE — SPEECH LANGUAGE PATHOLOGY EVALUATION - SLP GENERAL OBSERVATIONS
Pt awake in bed. Son at bedside. AA+O to person; non-verbal; command following confounded by oral/verbal apraxia, possible limb apraxia however able to close eyes and ID objects with eye gaze in response to verbal directive

## 2019-09-26 NOTE — H&P ADULT - ASSESSMENT
ASSESSMENT/PLAN  RUSSELL FORTUNE is a acute L MCA infarct, now with decreased functional mobility, dysphagia, aphasia, hemiplegia, gait instability and ADL impairments.    Rehab: Gait Instability, ADL impairments and Functional impairments: start Comprehensive Rehab Program of PT/OT/ST    Neuro/Psych: L MCA infarct   - Aspirin   - Lipitor     Pain: Tylenol PRN    Cardio: A. Fib, Heart Failure   - Left atrial thrombus: Continue Coumadin, f/u INR tomorrow     GI/Bowel: Colace, Senna, Miralax PRN    Diet: Diabetic     Renal/Bladder: Voiding independently, PVR    Endo: DM2   - continue sliding scale  - consider restarting home metformin if FS controlled.      Heme:    Precautions / PROPHYLAXIS:   - Falls, Cardiac, Sternal, Spinal, Seizure   - Ortho: Weight bearing status: WBAT   - Lungs: Aspiration, Incentive Spirometer   - Pressure injury/Skin: Turn Q2hrs while in bed, OOB to Chair, PT/OT    - DVT: Lovenox, SCDs, TEDs     MEDICAL PROGNOSIS: GOOD            REHAB POTENTIAL: GOOD             ESTIMATED DISPOSITION: HOME WITH HOME CARE            ELOS: 10-14 Days   EXPECTED THERAPY:     P.T. 1hr/day       O.T. 1hr/day      S.L.P. 1hr/day     P&O Unnecessary     EXP FREQUENCY: 5 days per 7 day period     PRESCREEN COMPARISON:   I have reviewed the prescreen information and I have found no relevant changes between the preadmission screening and my post admission evaluation     RATIONALE FOR INPATIENT ADMISSION - Patient demonstrates the following: (check all that apply)  [X] Medically appropriate for rehabilitation admission  [X] Has attainable rehab goals with an appropriate initial discharge plan  [X] Has rehabilitation potential (expected to make a significant improvement within a reasonable period of time)   [X] Requires close medical management by a rehab physician, rehab nursing care, Hospitalist and comprehensive interdisciplinary team (including PT, OT, & or SLP, Prosthetics and Orthotics) ASSESSMENT/PLAN  RUSSELL FORTUNE is a acute L MCA infarct, now with decreased functional mobility, dysphagia, aphasia, hemiplegia, gait instability and ADL impairments.    Rehab: Gait Instability, ADL impairments and Functional impairments: start Comprehensive Rehab Program of PT/OT/ST    Neuro/Psych: L MCA infarct   - Aspirin   - Lipitor   - f/u hypercoagulable work up per heme/onc at [facility] as pt now with 2nd CVA despite being therapeutic on Coumadin.     Pain: Tylenol PRN    Cardio: A. Fib, Heart Failure   - Left atrial thrombus: continue weight based lovenox 1.5mg/kg daily.   - Repeat echo in 2-4 weeks for KRISTAN thrombus per cardio  - metoprolol    GI/Bowel: Colace, Senna, Miralax PRN    Diet: Diabetic, dysphagia 3 soft, thin liquids    Renal/Bladder: Voiding independently, PVR    Endo: DM2   - continue sliding scale  - consider restarting home metformin if FS controlled.      Precautions / PROPHYLAXIS:   - Falls, Cardiac, Seizure   - Ortho: Weight bearing status: WBAT   - Lungs: Aspiration, Incentive Spirometer   - Pressure injury/Skin: Turn Q2hrs while in bed, OOB to Chair, PT/OT    - DVT: Lovenox, SCDs, TEDs     MEDICAL PROGNOSIS: GOOD            REHAB POTENTIAL: GOOD             ESTIMATED DISPOSITION: HOME WITH HOME CARE            ELOS: 10-14 Days   EXPECTED THERAPY:     P.T. 1hr/day       O.T. 1hr/day      S.L.P. 1hr/day     P&O Unnecessary     EXP FREQUENCY: 5 days per 7 day period     PRESCREEN COMPARISON:   I have reviewed the prescreen information and I have found no relevant changes between the preadmission screening and my post admission evaluation     RATIONALE FOR INPATIENT ADMISSION - Patient demonstrates the following: (check all that apply)  [X] Medically appropriate for rehabilitation admission  [X] Has attainable rehab goals with an appropriate initial discharge plan  [X] Has rehabilitation potential (expected to make a significant improvement within a reasonable period of time)   [X] Requires close medical management by a rehab physician, rehab nursing care, Hospitalist and comprehensive interdisciplinary team (including PT, OT, & or SLP, Prosthetics and Orthotics)

## 2019-09-26 NOTE — DISCHARGE NOTE PROVIDER - CARE PROVIDER_API CALL
Claudia Goldman)  Cardiology; Internal Medicine  [address] [address] [place] [address], [address]  [place], [place] [id number]  Phone: [phone]  Fax: [phone]  Follow Up Time:     Jared Garcia (DO)  Neurology; Vascular Neurology  [address] [facility] [place] [address]  [place] [address]  Phone: [phone]  Fax: [phone]  Follow Up Time: 1 week    Nicole Myrick)  Hematology; Internal Medicine; Medical Oncology  [address], [address]  [place] [address]  Phone: [phone]  Fax: [phone]  Follow Up Time: 2 weeks

## 2019-09-26 NOTE — H&P ADULT - NSHPLABSRESULTS_GEN_ALL_CORE
10.7   12.2  )-----------( 292      ( 26 Sep 2019 06:02 )             34.2   09-26    138  |  104  |  16  ----------------------------<  140<H>  4.3   |  21<L>  |  0.69    Ca    8.9      26 Sep 2019 06:02    TPro  6.8  /  Alb  3.4  /  TBili  1.2  /  DBili  x   /  AST  25  /  ALT  17  /  AlkPhos  52  09-25    US Kidney and Bladder (09.26.19 @ 10:30)  IMPRESSION:   No hydronephrosis.  Distended bladder, volume of 555 mL.    MRI IMPRESSION: (9/23)  Redemonstration of acute left KUSUM and MCA territory infarcts primarily   involving the left frontal lobe. No yola hemorrhagic transformation. No   midline shift or hydrocephalus.    CT head no contrast : (9/23)  Redemonstration of evolving left MCA and KUSUM territory infarct without   hemorrhagic transformation, as well as remote right MCA infarct, volume   loss, and microvascular disease and ex vacuo enlargement of the right   lateral ventricle unchanged from prior. Chronic right frontal and right cerebellar   hemisphere infarcts are noted.    CT angiography neck w/ IV Cont (09.22.19)  No evidence for carotid or vertebral artery stenosis.    CT angiography brain w/ IV Cont (09.22.19)  A short segment left middle cerebral artery M2 branch occlusion is noted.   The M1 segment is widely patent.     TTE EF 65-70% consistent with severe mitral stenosis, mild aortic stenosis.   mild aortic regurgitation. KRISTAN thrombus on OAC (warfarin) may take up to   2-4 weeks to resolve. Will need repeat BUBBA in 3-4 weeks to evaluate for KRISTAN thrombus.

## 2019-09-27 DIAGNOSIS — I63.9 CEREBRAL INFARCTION, UNSPECIFIED: ICD-10-CM

## 2019-09-27 DIAGNOSIS — I10 ESSENTIAL (PRIMARY) HYPERTENSION: ICD-10-CM

## 2019-09-27 DIAGNOSIS — E78.5 HYPERLIPIDEMIA, UNSPECIFIED: ICD-10-CM

## 2019-09-27 DIAGNOSIS — I51.3 INTRACARDIAC THROMBOSIS, NOT ELSEWHERE CLASSIFIED: ICD-10-CM

## 2019-09-27 DIAGNOSIS — E11.9 TYPE 2 DIABETES MELLITUS WITHOUT COMPLICATIONS: ICD-10-CM

## 2019-09-27 DIAGNOSIS — I48.91 UNSPECIFIED ATRIAL FIBRILLATION: ICD-10-CM

## 2019-09-27 DIAGNOSIS — I09.9 RHEUMATIC HEART DISEASE, UNSPECIFIED: ICD-10-CM

## 2019-09-27 DIAGNOSIS — D72.829 ELEVATED WHITE BLOOD CELL COUNT, UNSPECIFIED: ICD-10-CM

## 2019-09-27 LAB
-  AMIKACIN: SIGNIFICANT CHANGE UP
-  AMPICILLIN/SULBACTAM: SIGNIFICANT CHANGE UP
-  AMPICILLIN: SIGNIFICANT CHANGE UP
-  AMPICILLIN: SIGNIFICANT CHANGE UP
-  AZTREONAM: SIGNIFICANT CHANGE UP
-  CEFAZOLIN: SIGNIFICANT CHANGE UP
-  CEFEPIME: SIGNIFICANT CHANGE UP
-  CEFOXITIN: SIGNIFICANT CHANGE UP
-  CEFTRIAXONE: SIGNIFICANT CHANGE UP
-  CIPROFLOXACIN: SIGNIFICANT CHANGE UP
-  CIPROFLOXACIN: SIGNIFICANT CHANGE UP
-  GENTAMICIN: SIGNIFICANT CHANGE UP
-  IMIPENEM: SIGNIFICANT CHANGE UP
-  LEVOFLOXACIN: SIGNIFICANT CHANGE UP
-  LEVOFLOXACIN: SIGNIFICANT CHANGE UP
-  MEROPENEM: SIGNIFICANT CHANGE UP
-  NITROFURANTOIN: SIGNIFICANT CHANGE UP
-  NITROFURANTOIN: SIGNIFICANT CHANGE UP
-  PIPERACILLIN/TAZOBACTAM: SIGNIFICANT CHANGE UP
-  TETRACYCLINE: SIGNIFICANT CHANGE UP
-  TIGECYCLINE: SIGNIFICANT CHANGE UP
-  TOBRAMYCIN: SIGNIFICANT CHANGE UP
-  TRIMETHOPRIM/SULFAMETHOXAZOLE: SIGNIFICANT CHANGE UP
-  VANCOMYCIN: SIGNIFICANT CHANGE UP
ALBUMIN SERPL ELPH-MCNC: 2.8 G/DL — LOW (ref 3.3–5)
ALP SERPL-CCNC: 56 U/L — SIGNIFICANT CHANGE UP (ref 40–120)
ALT FLD-CCNC: 19 U/L — SIGNIFICANT CHANGE UP (ref 10–45)
ANION GAP SERPL CALC-SCNC: 8 MMOL/L — SIGNIFICANT CHANGE UP (ref 5–17)
APCR PPP: 3.1 RATIO — SIGNIFICANT CHANGE UP
AST SERPL-CCNC: 34 U/L — SIGNIFICANT CHANGE UP (ref 10–40)
BASOPHILS # BLD AUTO: 0.03 K/UL — SIGNIFICANT CHANGE UP (ref 0–0.2)
BASOPHILS NFR BLD AUTO: 0.2 % — SIGNIFICANT CHANGE UP (ref 0–2)
BILIRUB SERPL-MCNC: 1.1 MG/DL — SIGNIFICANT CHANGE UP (ref 0.2–1.2)
BUN SERPL-MCNC: 13 MG/DL — SIGNIFICANT CHANGE UP (ref 7–23)
CALCIUM SERPL-MCNC: 9.2 MG/DL — SIGNIFICANT CHANGE UP (ref 8.4–10.5)
CARDIOLIPIN AB SER-ACNC: NEGATIVE — SIGNIFICANT CHANGE UP
CHLORIDE SERPL-SCNC: 106 MMOL/L — SIGNIFICANT CHANGE UP (ref 96–108)
CO2 SERPL-SCNC: 26 MMOL/L — SIGNIFICANT CHANGE UP (ref 22–31)
CREAT SERPL-MCNC: 0.72 MG/DL — SIGNIFICANT CHANGE UP (ref 0.5–1.3)
CULTURE RESULTS: SIGNIFICANT CHANGE UP
EOSINOPHIL # BLD AUTO: 0.29 K/UL — SIGNIFICANT CHANGE UP (ref 0–0.5)
EOSINOPHIL NFR BLD AUTO: 2.4 % — SIGNIFICANT CHANGE UP (ref 0–6)
GLUCOSE BLDC GLUCOMTR-MCNC: 157 MG/DL — HIGH (ref 70–99)
GLUCOSE BLDC GLUCOMTR-MCNC: 159 MG/DL — HIGH (ref 70–99)
GLUCOSE BLDC GLUCOMTR-MCNC: 161 MG/DL — HIGH (ref 70–99)
GLUCOSE BLDC GLUCOMTR-MCNC: 240 MG/DL — HIGH (ref 70–99)
GLUCOSE SERPL-MCNC: 155 MG/DL — HIGH (ref 70–99)
HCT VFR BLD CALC: 33.3 % — LOW (ref 34.5–45)
HGB BLD-MCNC: 10.6 G/DL — LOW (ref 11.5–15.5)
IMM GRANULOCYTES NFR BLD AUTO: 0.6 % — SIGNIFICANT CHANGE UP (ref 0–1.5)
LYMPHOCYTES # BLD AUTO: 2.84 K/UL — SIGNIFICANT CHANGE UP (ref 1–3.3)
LYMPHOCYTES # BLD AUTO: 23.3 % — SIGNIFICANT CHANGE UP (ref 13–44)
MCHC RBC-ENTMCNC: 27.5 PG — SIGNIFICANT CHANGE UP (ref 27–34)
MCHC RBC-ENTMCNC: 31.8 GM/DL — LOW (ref 32–36)
MCV RBC AUTO: 86.5 FL — SIGNIFICANT CHANGE UP (ref 80–100)
METHOD TYPE: SIGNIFICANT CHANGE UP
METHOD TYPE: SIGNIFICANT CHANGE UP
MONOCYTES # BLD AUTO: 1.28 K/UL — HIGH (ref 0–0.9)
MONOCYTES NFR BLD AUTO: 10.5 % — SIGNIFICANT CHANGE UP (ref 2–14)
NEUTROPHILS # BLD AUTO: 7.66 K/UL — HIGH (ref 1.8–7.4)
NEUTROPHILS NFR BLD AUTO: 63 % — SIGNIFICANT CHANGE UP (ref 43–77)
NRBC # BLD: 0 /100 WBCS — SIGNIFICANT CHANGE UP (ref 0–0)
ORGANISM # SPEC MICROSCOPIC CNT: SIGNIFICANT CHANGE UP
PLATELET # BLD AUTO: 301 K/UL — SIGNIFICANT CHANGE UP (ref 150–400)
POTASSIUM SERPL-MCNC: 4.5 MMOL/L — SIGNIFICANT CHANGE UP (ref 3.5–5.3)
POTASSIUM SERPL-SCNC: 4.5 MMOL/L — SIGNIFICANT CHANGE UP (ref 3.5–5.3)
PROT SERPL-MCNC: 7 G/DL — SIGNIFICANT CHANGE UP (ref 6–8.3)
RBC # BLD: 3.85 M/UL — SIGNIFICANT CHANGE UP (ref 3.8–5.2)
RBC # FLD: 17 % — HIGH (ref 10.3–14.5)
SODIUM SERPL-SCNC: 140 MMOL/L — SIGNIFICANT CHANGE UP (ref 135–145)
SPECIMEN SOURCE: SIGNIFICANT CHANGE UP
WBC # BLD: 12.17 K/UL — HIGH (ref 3.8–10.5)
WBC # FLD AUTO: 12.17 K/UL — HIGH (ref 3.8–10.5)

## 2019-09-27 PROCEDURE — 99233 SBSQ HOSP IP/OBS HIGH 50: CPT

## 2019-09-27 PROCEDURE — 99223 1ST HOSP IP/OBS HIGH 75: CPT

## 2019-09-27 RX ORDER — ACETAMINOPHEN 500 MG
650 TABLET ORAL EVERY 6 HOURS
Refills: 0 | Status: DISCONTINUED | OUTPATIENT
Start: 2019-09-27 | End: 2019-10-11

## 2019-09-27 RX ORDER — PANTOPRAZOLE SODIUM 20 MG/1
40 TABLET, DELAYED RELEASE ORAL
Refills: 0 | Status: DISCONTINUED | OUTPATIENT
Start: 2019-09-27 | End: 2019-10-11

## 2019-09-27 RX ORDER — ESCITALOPRAM OXALATE 10 MG/1
5 TABLET, FILM COATED ORAL DAILY
Refills: 0 | Status: DISCONTINUED | OUTPATIENT
Start: 2019-09-27 | End: 2019-10-03

## 2019-09-27 RX ADMIN — Medication 1: at 17:21

## 2019-09-27 RX ADMIN — ENOXAPARIN SODIUM 90 MILLIGRAM(S): 100 INJECTION SUBCUTANEOUS at 12:09

## 2019-09-27 RX ADMIN — Medication 100 MILLIGRAM(S): at 17:19

## 2019-09-27 RX ADMIN — Medication 12.5 MILLIGRAM(S): at 05:36

## 2019-09-27 RX ADMIN — Medication 1: at 08:06

## 2019-09-27 RX ADMIN — Medication 2: at 12:07

## 2019-09-27 RX ADMIN — Medication 81 MILLIGRAM(S): at 12:09

## 2019-09-27 RX ADMIN — Medication 650 MILLIGRAM(S): at 14:31

## 2019-09-27 RX ADMIN — Medication 650 MILLIGRAM(S): at 15:32

## 2019-09-27 RX ADMIN — ATORVASTATIN CALCIUM 40 MILLIGRAM(S): 80 TABLET, FILM COATED ORAL at 22:28

## 2019-09-27 NOTE — CONSULT NOTE ADULT - SUBJECTIVE AND OBJECTIVE BOX
Patient is a 64y old  Female who presents with a chief complaint of s/p b/l CVAs  with deficits (26 Sep 2019 11:38)  HPI:  65yo RH female, Nadine speaking, with PMHx of Afib (on coumadin), prior R MCA stroke in  and  with no residual deficits, HTN, HLD, DM2, mitral stenosis/rheumatic valvular disease with recent hospitalization (2019) at [facility] for AF for potential cardioversion (Hx of labile INR) and mitral valve intervention. At the time patient found to have KRISTAN thrombus on echo.   On  pt unable to speak or move her R side and brought to ED by family. CT Head  showed L MCA acute-subacute infarcts. CTA head demonstrated L M2 occlusion. Patient was not a candidate for endovascular treatment. INR therapeutic on Coumadin, per family at bedside, patient was compliant with Coumadin.  MRI Head follow up  shows redemonstration of acute left KUSUM and MCA territory infarcts primarily involving the left frontal lobe. No yola hemorrhagic transformation. No midline shift or hydrocephalus. Chronic right frontal and right cerebellar hemisphere infarcts.  TTE EF 65-70%, severe mitral stenosis, mild aortic stenosis. mild aortic regurgitation. Tele with afib- rate controlled. Placed on hep gtt with goal to transition to lovenox on d/c. As per cardiology, KRISTAN thrombus may take up to 2-4 weeks to resolve. Will need repeat BUBBA in 3-4 weeks. Possible valve intervention pending recovery and goals of care. CT Chest/Abd/Pelvis to r/o malignancy on . No findings to suggest intrathoracic or intra-abdominal malignancy. However, renal eval of possible renal infarct on CT obtained. Hematology in, Lovenox 1.5mg/kg daily and hypercoagulable workup.  Hospitalization additionally complicated by mild leukocytosis. CXR negative, UA negative, RVP negative-continue close monitoring.   Stable for d/c to AR on 19. (26 Sep 2019 11:38)      Patient seen and examined at bedside.  Unable to assess ROS due to expressive aphasia   ALLERGIES:  No Known Allergies    MEDICATIONS  (STANDING):  aspirin  chewable 81 milliGRAM(s) Oral daily  atorvastatin 40 milliGRAM(s) Oral at bedtime  dextrose 5%. 1000 milliLiter(s) (50 mL/Hr) IV Continuous <Continuous>  dextrose 50% Injectable 12.5 Gram(s) IV Push once  dextrose 50% Injectable 25 Gram(s) IV Push once  dextrose 50% Injectable 25 Gram(s) IV Push once  docusate sodium 100 milliGRAM(s) Oral two times a day  enoxaparin Injectable 90 milliGRAM(s) SubCutaneous daily  insulin lispro (HumaLOG) corrective regimen sliding scale   SubCutaneous three times a day before meals  insulin lispro (HumaLOG) corrective regimen sliding scale   SubCutaneous at bedtime  metoprolol succinate ER 12.5 milliGRAM(s) Oral daily    MEDICATIONS  (PRN):  dextrose 40% Gel 15 Gram(s) Oral once PRN Blood Glucose LESS THAN 70 milliGRAM(s)/deciliter  glucagon  Injectable 1 milliGRAM(s) IntraMuscular once PRN Glucose LESS THAN 70 milligrams/deciliter      Vital Signs Last 24 Hrs  T(F): 98.4 (27 Sep 2019 07:44), Max: 99.1 (26 Sep 2019 15:06)  HR: 89 (27 Sep 2019 07:44) (71 - 99)  BP: 107/71 (27 Sep 2019 07:44) (107/71 - 129/67)  RR: 14 (27 Sep 2019 07:44) (14 - 25)  SpO2: 97% (27 Sep 2019 07:44) (97% - 100%)  I&O's Summary    27 Sep 2019 07:01  -  27 Sep 2019 11:34  --------------------------------------------------------  IN: 240 mL / OUT: 0 mL / NET: 240 mL        PHYSICAL EXAM:  General: NAD  ENT: MMM  Neck: Supple, No JVD  Lungs: Clear to auscultation bilaterally  Cardio: RRR, S1/S2, 2/6 murmur  Abdomen: Soft, Nontender, Nondistended; Bowel sounds present  Extremities: No cyanosis, No edema. right sided flaccid    LABS:                        10.6   12.17 )-----------( 301      ( 27 Sep 2019 06:02 )             33.3     09-    140  |  106  |  13  ----------------------------<  155  4.5   |  26  |  0.72    Ca    9.2      27 Sep 2019 06:02    TPro  7.0  /  Alb  2.8  /  TBili  1.1  /  DBili  x   /  AST  34  /  ALT  19  /  AlkPhos  56      eGFR if Non African American: 91 mL/min/1.73M2 (19 @ 06:02)  eGFR if African American: 105 mL/min/1.73M2 (19 @ 06:02)    PT/INR - ( 26 Sep 2019 06:02 )   PT: 16.7 sec;   INR: 1.44 ratio         PTT - ( 25 Sep 2019 06:22 )  PTT:74.2 sec  Lactate, Blood: 1.0 mmol/L ( @ 17:51)         Chol 98 mg/dL LDL 46 mg/dL HDL 39 mg/dL Trig 67 mg/dL              POCT Blood Glucose.: 161 mg/dL (27 Sep 2019 07:24)  POCT Blood Glucose.: 134 mg/dL (26 Sep 2019 21:07)  POCT Blood Glucose.: 197 mg/dL (26 Sep 2019 18:52)  POCT Blood Glucose.: 202 mg/dL (26 Sep 2019 12:33)     [id number] 6.7   VjqpuzvnfnO3U 6.3    Urinalysis Basic - ( 25 Sep 2019 09:35 )    Color: Colorless / Appearance: Clear / S.012 / pH: x  Gluc: x / Ketone: Negative  / Bili: Negative / Urobili: Negative   Blood: x / Protein: Negative / Nitrite: Negative   Leuk Esterase: Negative / RBC: x / WBC x   Sq Epi: x / Non Sq Epi: x / Bacteria: x        Culture - Urine (collected 25 Sep 2019 20:57)  Source: .Urine  Preliminary Report (26 Sep 2019 19:04):    50,000 - 99,000 CFU/mL Gram Negative Rods    10,000 - 49,000 CFU/mL Gram positive organisms        RADIOLOGY & ADDITIONAL TESTS:    Care Discussed with Consultants/Other Providers: Patient is a 64y old  Female who presents with a chief complaint of s/p b/l CVAs  with deficits (26 Sep 2019 11:38)  HPI:  65yo RH female, Nadine speaking, with PMHx of Afib (on coumadin), prior R MCA stroke in  and  with no residual deficits, HTN, HLD, DM2, mitral stenosis/rheumatic valvular disease with recent hospitalization (2019) at [facility] for AF for potential cardioversion (Hx of labile INR) and mitral valve intervention. At the time patient found to have KRISTAN thrombus on echo.   On  pt unable to speak or move her R side and brought to ED by family. CT Head  showed L MCA acute-subacute infarcts. CTA head demonstrated L M2 occlusion. Patient was not a candidate for endovascular treatment. INR therapeutic on Coumadin, per family at bedside, patient was compliant with Coumadin.  MRI Head follow up  shows redemonstration of acute left KUSUM and MCA territory infarcts primarily involving the left frontal lobe. No yola hemorrhagic transformation. No midline shift or hydrocephalus. Chronic right frontal and right cerebellar hemisphere infarcts.  TTE EF 65-70%, severe mitral stenosis, mild aortic stenosis. mild aortic regurgitation. Tele with afib- rate controlled. Placed on hep gtt with goal to transition to lovenox on d/c. As per cardiology, KRISTAN thrombus may take up to 2-4 weeks to resolve. Will need repeat BUBBA in 3-4 weeks. Possible valve intervention pending recovery and goals of care. CT Chest/Abd/Pelvis to r/o malignancy on . No findings to suggest intrathoracic or intra-abdominal malignancy. However, renal eval of possible renal infarct on CT obtained. Hematology in, Lovenox 1.5mg/kg daily and hypercoagulable workup.  Hospitalization additionally complicated by mild leukocytosis. CXR negative, UA negative, RVP negative-continue close monitoring.   Stable for d/c to AR on 19. (26 Sep 2019 11:38)      Patient seen and examined at bedside.  Unable to assess ROS due to expressive aphasia   ALLERGIES:  No Known Allergies    MEDICATIONS  (STANDING):  aspirin  chewable 81 milliGRAM(s) Oral daily  atorvastatin 40 milliGRAM(s) Oral at bedtime  dextrose 5%. 1000 milliLiter(s) (50 mL/Hr) IV Continuous <Continuous>  dextrose 50% Injectable 12.5 Gram(s) IV Push once  dextrose 50% Injectable 25 Gram(s) IV Push once  dextrose 50% Injectable 25 Gram(s) IV Push once  docusate sodium 100 milliGRAM(s) Oral two times a day  enoxaparin Injectable 90 milliGRAM(s) SubCutaneous daily  insulin lispro (HumaLOG) corrective regimen sliding scale   SubCutaneous three times a day before meals  insulin lispro (HumaLOG) corrective regimen sliding scale   SubCutaneous at bedtime  metoprolol succinate ER 12.5 milliGRAM(s) Oral daily    MEDICATIONS  (PRN):  dextrose 40% Gel 15 Gram(s) Oral once PRN Blood Glucose LESS THAN 70 milliGRAM(s)/deciliter  glucagon  Injectable 1 milliGRAM(s) IntraMuscular once PRN Glucose LESS THAN 70 milligrams/deciliter      Vital Signs Last 24 Hrs  T(F): 98.4 (27 Sep 2019 07:44), Max: 99.1 (26 Sep 2019 15:06)  HR: 89 (27 Sep 2019 07:44) (71 - 99)  BP: 107/71 (27 Sep 2019 07:44) (107/71 - 129/67)  RR: 14 (27 Sep 2019 07:44) (14 - 25)  SpO2: 97% (27 Sep 2019 07:44) (97% - 100%)  I&O's Summary    27 Sep 2019 07:01  -  27 Sep 2019 11:34  --------------------------------------------------------  IN: 240 mL / OUT: 0 mL / NET: 240 mL        PHYSICAL EXAM:  General: NAD  ENT: MMM  Neck: Supple, No JVD  Lungs: Clear to auscultation bilaterally  Cardio: irregular rate, S1/S2, 2/6 murmur  Abdomen: Soft, Nontender, Nondistended; Bowel sounds present  Extremities: No cyanosis, No edema. right sided flaccid    LABS:                        10.6   12.17 )-----------( 301      ( 27 Sep 2019 06:02 )             33.3     09-    140  |  106  |  13  ----------------------------<  155  4.5   |  26  |  0.72    Ca    9.2      27 Sep 2019 06:02    TPro  7.0  /  Alb  2.8  /  TBili  1.1  /  DBili  x   /  AST  34  /  ALT  19  /  AlkPhos  56      eGFR if Non African American: 91 mL/min/1.73M2 (19 @ 06:02)  eGFR if African American: 105 mL/min/1.73M2 (19 @ 06:02)    PT/INR - ( 26 Sep 2019 06:02 )   PT: 16.7 sec;   INR: 1.44 ratio         PTT - ( 25 Sep 2019 06:22 )  PTT:74.2 sec  Lactate, Blood: 1.0 mmol/L ( @ 17:51)         Chol 98 mg/dL LDL 46 mg/dL HDL 39 mg/dL Trig 67 mg/dL              POCT Blood Glucose.: 161 mg/dL (27 Sep 2019 07:24)  POCT Blood Glucose.: 134 mg/dL (26 Sep 2019 21:07)  POCT Blood Glucose.: 197 mg/dL (26 Sep 2019 18:52)  POCT Blood Glucose.: 202 mg/dL (26 Sep 2019 12:33)     FvthwdyhhpD7Y 6.7   ZhvtzumewfE1Y 6.3    Urinalysis Basic - ( 25 Sep 2019 09:35 )    Color: Colorless / Appearance: Clear / S.012 / pH: x  Gluc: x / Ketone: Negative  / Bili: Negative / Urobili: Negative   Blood: x / Protein: Negative / Nitrite: Negative   Leuk Esterase: Negative / RBC: x / WBC x   Sq Epi: x / Non Sq Epi: x / Bacteria: x        Culture - Urine (collected 25 Sep 2019 20:57)  Source: .Urine  Preliminary Report (26 Sep 2019 19:04):    50,000 - 99,000 CFU/mL Gram Negative Rods    10,000 - 49,000 CFU/mL Gram positive organisms        RADIOLOGY & ADDITIONAL TESTS:    Care Discussed with Consultants/Other Providers:

## 2019-09-27 NOTE — DIETITIAN INITIAL EVALUATION ADULT. - FACTORS AFF FOOD INTAKE
difficulty chewing/difficulty feeding self/Scientology/ethnic/cultural/personal food preferences/difficulty swallowing

## 2019-09-27 NOTE — CONSULT NOTE ADULT - PROBLEM SELECTOR RECOMMENDATION 9
L MCA infarct   - Aspirin   - Lipitor   - f/u hypercoagulable work up per heme/onc at [facility] as pt now with 2nd CVA despite being therapeutic on Coumadin.

## 2019-09-27 NOTE — ED PROVIDER NOTE - CLINICAL SUMMARY MEDICAL DECISION MAKING FREE TEXT BOX
20
Ros PGY3: 64F hx of afib on coumadin prior cva w/o residual deficits, HTN HLD DM presents with cc of inability to speak and new R side weakness, per family at beside last known normal 1-2am of early morning prior to presentation exam vss non toxic aphasic w R sided weakness c/f cva however out of window for tpa will get labs ct cta head/neck discuss with neuro, admit

## 2019-09-27 NOTE — PROGRESS NOTE ADULT - SUBJECTIVE AND OBJECTIVE BOX
CHIEF COMPLAINT: s/p CVA with aphasia, hemiplegia and neglect.       HISTORY OF PRESENT ILLNESS  65yo RH female, Nadine speaking, with PMHx of Afib (on coumadin), prior R MCA stroke in 2013 and 2016 with no residual deficits, HTN, HLD, DM2, mitral stenosis/rheumatic valvular disease with recent hospitalization (Sept 2019) at [facility] for AF for potential cardioversion (Hx of labile INR) and mitral valve intervention. At the time patient found to have KRISTAN thrombus on echo.   On 9/22 pt unable to speak or move her R side and brought to ED by family. CT Head 9/22 showed L MCA acute-subacute infarcts. CTA head demonstrated L M2 occlusion. Patient was not a candidate for endovascular treatment. INR therapeutic on Coumadin, per family at bedside, patient was compliant with Coumadin.  MRI Head follow up 9/23 shows re-demonstration of acute left KUSUM and MCA territory infarcts primarily involving the left frontal lobe. No yola hemorrhagic transformation. No midline shift or hydrocephalus. Chronic right frontal and right cerebellar hemisphere infarcts.  TTE EF 65-70%, severe mitral stenosis, mild aortic stenosis. Mild aortic regurgitation. Tele with Afib- rate controlled. Placed on hep gtt with goal to transition to Lovenox on d/c. As per cardiology, KRISTAN thrombus may take up to 2-4 weeks to resolve. Will need repeat BUBBA in 3-4 weeks. Possible valve intervention pending recovery and goals of care. CT Chest/Abd/Pelvis to r/o malignancy on 9/24. No findings to suggest intrathoracic or intra-abdominal malignancy. However, renal eval of possible renal infarct on CT obtained. Hematology in, Lovenox 1.5mg/kg daily and hypercoagulable workup.  Hospitalization additionally complicated by mild leukocytosis. CXR negative, UA negative, RVP negative-continue close monitoring.   Stable for d/c to AR on 9/26/19. (26 Sep 2019 11:38)      PAST MEDICAL & SURGICAL HISTORY:  TIA (transient ischemic attack): 2013- Rt facial droop which resolved after 2 hours  Mitral stenosis  Atrial fibrillation: h/o Cardioversion in 2013 and on Coumadin  DM (diabetes mellitus): Type II  HLD (hyperlipidemia)  HTN (hypertension)  S/P laser cataract surgery, unspecified laterality: ???  S/P knee surgery  Ventral hernia       REVIEW OF SYMPTOMS  [X] Constitutional WNL           [X] Resp WNL           [X] GI WNL                         [X]  WNL                    [X] Endo WNL                     [X] Skin WNL                 [X] MSK WNL                VITALS  Vital Signs Last 24 Hrs  T(C): 36.9 (27 Sep 2019 07:44), Max: 37.3 (26 Sep 2019 15:06)  T(F): 98.4 (27 Sep 2019 07:44), Max: 99.1 (26 Sep 2019 15:06)  HR: 89 (27 Sep 2019 07:44) (71 - 99)  BP: 107/71 (27 Sep 2019 07:44) (107/71 - 129/67)  BP(mean): 93 (26 Sep 2019 16:00) (86 - 93)  RR: 14 (27 Sep 2019 07:44) (14 - 25)  SpO2: 97% (27 Sep 2019 07:44) (97% - 100%)      PHYSICAL EXAM  Constitutional - NAD  HEENT - NCAT, EOMI  Neck - Supple, No limited ROM  Chest - CTA bilaterally, No wheeze, No rhonchi, No crackles  Cardiovascular - irregular, murmur  Abdomen - BS+, Soft, NTND  Extremities - No C/C/E, No calf tenderness   Skin-no rash  Wounds-na      Neurologic Exam -                   HIF  - Awake, Alert, AAO to self, place, date, year, situation      No aphasia, normal attention, normal concentration, no apraxia, agnosia     Cranial Nerves - CN 2-12 intact     Motor - No focal deficits                            Sensory - Intact to LT,PP,Temprature,JPS, vibration                      Cortical sensory modalities intact     Reflexes - DTR Intact     Toes are flexor     Coordination/dysmetria - FTN intact             Balance - WNL Static and dynamic     Psychiatric - Mood stable, Affect WNL         FUNCTIONAL PROGRESS  Gait -   ADLs -   Transfers -  Functional transfer -     RECENT LABS                        10.6   12.17 )-----------( 301      ( 27 Sep 2019 06:02 )             33.3     09-27    140  |  106  |  13  ----------------------------<  155<H>  4.5   |  26  |  0.72    Ca    9.2      27 Sep 2019 06:02    TPro  7.0  /  Alb  2.8<L>  /  TBili  1.1  /  DBili  x   /  AST  34  /  ALT  19  /  AlkPhos  56  09-27    PT/INR - ( 26 Sep 2019 06:02 )   PT: 16.7 sec;   INR: 1.44 ratio           LIVER FUNCTIONS - ( 27 Sep 2019 06:02 )  Alb: 2.8 g/dL / Pro: 7.0 g/dL / ALK PHOS: 56 U/L / ALT: 19 U/L / AST: 34 U/L / GGT: x             Direct LDL: 46 mg/dL (09-23-19 @ 09:24)  Direct LDL: 58 mg/dL (09-13-19 @ 07:00)    Hemoglobin A1C, Whole Blood: 6.7 % (09-23-19 @ 08:45)  Hemoglobin A1C, Whole Blood: 6.3 % (09-13-19 @ 07:00)              RADIOLOGY/OTHER RESULTS      CURRENT MEDICATIONS  MEDICATIONS  (STANDING):  aspirin  chewable 81 milliGRAM(s) Oral daily  atorvastatin 40 milliGRAM(s) Oral at bedtime  dextrose 5%. 1000 milliLiter(s) (50 mL/Hr) IV Continuous <Continuous>  dextrose 50% Injectable 12.5 Gram(s) IV Push once  dextrose 50% Injectable 25 Gram(s) IV Push once  dextrose 50% Injectable 25 Gram(s) IV Push once  docusate sodium 100 milliGRAM(s) Oral two times a day  enoxaparin Injectable 90 milliGRAM(s) SubCutaneous daily  insulin lispro (HumaLOG) corrective regimen sliding scale   SubCutaneous three times a day before meals  insulin lispro (HumaLOG) corrective regimen sliding scale   SubCutaneous at bedtime  metoprolol succinate ER 12.5 milliGRAM(s) Oral daily    MEDICATIONS  (PRN):  dextrose 40% Gel 15 Gram(s) Oral once PRN Blood Glucose LESS THAN 70 milliGRAM(s)/deciliter  glucagon  Injectable 1 milliGRAM(s) IntraMuscular once PRN Glucose LESS THAN 70 milligrams/deciliter      ASSESSMENT & PLAN          GI/Bowel Management - Dulcolax PRN, Fleet PRN   Management - Toilet Q2  Skin - Turn Q2  Pain - Tylenol PRN  DVT PPX - TEDs, SCDs  Diet -     Continue comprehensive acute rehab program consisting of 3hrs/day of OT/PT and SLP. CHIEF COMPLAINT: s/p CVA with aphasia, hemiplegia and neglect.       HISTORY OF PRESENT ILLNESS  65yo RH female, Nadine speaking, with PMHx of Afib (on coumadin), prior R MCA stroke in 2013 and 2016 with no residual deficits, HTN, HLD, DM2, mitral stenosis/rheumatic valvular disease with recent hospitalization (Sept 2019) at [facility] for AF for potential cardioversion (Hx of labile INR) and mitral valve intervention. At the time patient found to have KRISTAN thrombus on echo.   On 9/22 pt unable to speak or move her R side and brought to ED by family. CT Head 9/22 showed L MCA acute-subacute infarcts. CTA head demonstrated L M2 occlusion. Patient was not a candidate for endovascular treatment. INR therapeutic on Coumadin, per family at bedside, patient was compliant with Coumadin.  MRI Head follow up 9/23 shows re-demonstration of acute left UKSUM and MCA territory infarcts primarily involving the left frontal lobe. No yola hemorrhagic transformation. No midline shift or hydrocephalus. Chronic right frontal and right cerebellar hemisphere infarcts.  TTE EF 65-70%, severe mitral stenosis, mild aortic stenosis. Mild aortic regurgitation. Tele with Afib- rate controlled. Placed on hep gtt with goal to transition to Lovenox on d/c. As per cardiology, KRISTAN thrombus may take up to 2-4 weeks to resolve. Will need repeat BUBBA in 3-4 weeks. Possible valve intervention pending recovery and goals of care. CT Chest/Abd/Pelvis to r/o malignancy on 9/24. No findings to suggest intrathoracic or intra-abdominal malignancy. However, renal eval of possible renal infarct on CT obtained. Hematology in, Lovenox 1.5mg/kg daily and hypercoagulable workup.  Hospitalization additionally complicated by mild leukocytosis. CXR negative, UA negative, RVP negative-continue close monitoring.   Stable for d/c to AR on 9/26/19. (26 Sep 2019 11:38)      PAST MEDICAL & SURGICAL HISTORY:  TIA (transient ischemic attack): 2013- Rt facial droop which resolved after 2 hours  Mitral stenosis  Atrial fibrillation: h/o Cardioversion in 2013 and on Coumadin  DM (diabetes mellitus): Type II  HLD (hyperlipidemia)  HTN (hypertension)  S/P laser cataract surgery, unspecified laterality: ???  S/P knee surgery  Ventral hernia       REVIEW OF SYMPTOMS  [X] Constitutional WNL           [X] Resp WNL           [X] GI WNL                         [X]  WNL                    [X] Endo WNL                     [X] Skin WNL                 [X] MSK WNL                VITALS  Vital Signs Last 24 Hrs  T(C): 36.9 (27 Sep 2019 07:44), Max: 37.3 (26 Sep 2019 15:06)  T(F): 98.4 (27 Sep 2019 07:44), Max: 99.1 (26 Sep 2019 15:06)  HR: 89 (27 Sep 2019 07:44) (71 - 99)  BP: 107/71 (27 Sep 2019 07:44) (107/71 - 129/67)  BP(mean): 93 (26 Sep 2019 16:00) (86 - 93)  RR: 14 (27 Sep 2019 07:44) (14 - 25)  SpO2: 97% (27 Sep 2019 07:44) (97% - 100%)      PHYSICAL EXAM  Constitutional - NAD  HEENT - NCAT, EOMI  Neck - Supple, No limited ROM  Chest - CTA bilaterally, No wheeze, No rhonchi, No crackles  Cardiovascular - irregular, murmur  Abdomen - BS+, Soft, NTND  Extremities - No C/C/E, No calf tenderness   Skin-no rash  Wounds-na      Neurologic Exam -Awake, Alert, right neglect, aphasia, right hemiparesis.      Balance - impaired     Psychiatric - appears depressed         FUNCTIONAL PROGRESS  Gait - eval  ADLs - eval  Transfers - eval  Functional transfer - eval    RECENT LABS                        10.6   12.17 )-----------( 301      ( 27 Sep 2019 06:02 )             33.3     09-27    140  |  106  |  13  ----------------------------<  155<H>  4.5   |  26  |  0.72    Ca    9.2      27 Sep 2019 06:02    TPro  7.0  /  Alb  2.8<L>  /  TBili  1.1  /  DBili  x   /  AST  34  /  ALT  19  /  AlkPhos  56  09-27    PT/INR - ( 26 Sep 2019 06:02 )   PT: 16.7 sec;   INR: 1.44 ratio           LIVER FUNCTIONS - ( 27 Sep 2019 06:02 )  Alb: 2.8 g/dL / Pro: 7.0 g/dL / ALK PHOS: 56 U/L / ALT: 19 U/L / AST: 34 U/L / GGT: x             Direct LDL: 46 mg/dL (09-23-19 @ 09:24)  Direct LDL: 58 mg/dL (09-13-19 @ 07:00)    Hemoglobin A1C, Whole Blood: 6.7 % (09-23-19 @ 08:45)  Hemoglobin A1C, Whole Blood: 6.3 % (09-13-19 @ 07:00)              RADIOLOGY/OTHER RESULTS      CURRENT MEDICATIONS  MEDICATIONS  (STANDING):  aspirin  chewable 81 milliGRAM(s) Oral daily  atorvastatin 40 milliGRAM(s) Oral at bedtime  dextrose 5%. 1000 milliLiter(s) (50 mL/Hr) IV Continuous <Continuous>  dextrose 50% Injectable 12.5 Gram(s) IV Push once  dextrose 50% Injectable 25 Gram(s) IV Push once  dextrose 50% Injectable 25 Gram(s) IV Push once  docusate sodium 100 milliGRAM(s) Oral two times a day  enoxaparin Injectable 90 milliGRAM(s) SubCutaneous daily  insulin lispro (HumaLOG) corrective regimen sliding scale   SubCutaneous three times a day before meals  insulin lispro (HumaLOG) corrective regimen sliding scale   SubCutaneous at bedtime  metoprolol succinate ER 12.5 milliGRAM(s) Oral daily    MEDICATIONS  (PRN):  dextrose 40% Gel 15 Gram(s) Oral once PRN Blood Glucose LESS THAN 70 milliGRAM(s)/deciliter  glucagon  Injectable 1 milliGRAM(s) IntraMuscular once PRN Glucose LESS THAN 70 milligrams/deciliter      ASSESSMENT & PLAN          GI/Bowel Management - colace   Management - Toilet Q2  Skin - Turn Q2  Pain - Tylenol PRN  DVT PPX - Lovenox  Diet - reg with thins    Start comprehensive acute rehab program consisting of 3hrs/day of OT/PT and SLP.

## 2019-09-27 NOTE — DIETITIAN INITIAL EVALUATION ADULT. - OTHER INFO
PMH Afib (was on coumadin), prior R MCA stroke in 2013 and 2016 with no residual deficits, HTN, HLD, DM2, mitral stenosis/rheumatic valvular disease  is a acute L MCA infarct, now with decreased functional mobility, dysphagia, aphasia, hemiplegia, gait instability and ADL impairments. Pt currently on dysphagia 3 soft, thin liquids ( no pork, no beef ); noted w/ suboptimal PO intakes per flowsheets. Discussed PO intake w/ pt's family @ bedside- family has been bringing pt food of soft consistency from home. Obtained food preferences to promote improved PO intake. Pt's family reports UBW of 125-130 lbs. Weighed yesterday @ 113.7 lbs. Weighed 126# @ [facility] on 9/22. Reweighed today via bedscale @ 122.9 lbs. NFPE performed; noted w mild muscle loss from calves, quadriceps. Although pt has HTN, HLD, DM, diet restrictions may further limit diet choices/PO intake; therefore recommend continuing w/ current POC & monitoring POCT glucose & BP. PMH Afib (was on coumadin), prior R MCA stroke in 2013 and 2016 with no residual deficits, HTN, HLD, DM2, mitral stenosis/rheumatic valvular disease  is a acute L MCA infarct, now with decreased functional mobility, dysphagia, aphasia, hemiplegia, gait instability and ADL impairments. Pt currently on dysphagia 3 soft, thin liquids ( no pork, no beef ); noted w/ suboptimal PO intakes per flowsheets. Discussed PO intake w/ pt's family @ bedside- family has been bringing pt food of soft consistency from home. Obtained food preferences to promote improved PO intake. Pt's family reports UBW of 125-130 lbs. Weighed yesterday @ 113.7 lbs. Pt appears to weigh more than this; Weighed 126# @ [facility] on 9/22. Reweighed today via bedscale @ 122.9 lbs. NFPE performed; noted w mild muscle loss from calves, quadriceps. Pt was previously on DASH/TLC, cons CHO diet @ [facility]. Per discussion w/ pt's son, pt did not follow carb/salt restricted diet PTA- ate traditional [place] food based on diet recall. Recommend cons CHO diet d/t HbA1c 6.7. BP well controlled at this time; further diet restrictions may further decrease PO intake. Pt may benefit from oral supplement; recommend trialing Glucerna 240 ml PO BID (provides up to 440 kcal, 20 g protein). Skin intact of PU; no edema noted per flowsheets. No c/o n/v/d/c last BM 9/27.

## 2019-09-27 NOTE — CONSULT NOTE ADULT - PROBLEM SELECTOR RECOMMENDATION 2
Coumadin stopped by hematology due to ischemic event on therapeutic level of coumadin  Now on lovenox  Rate controlled on BB

## 2019-09-27 NOTE — CONSULT NOTE ADULT - PROBLEM SELECTOR RECOMMENDATION 8
Noted improvement since 9/18  Patient not symptomatic of UTI  Urine culture 49,000-99,000 gram negative 9/23-will hold off on treatment  Continue to monitor

## 2019-09-27 NOTE — CONSULT NOTE ADULT - PROBLEM SELECTOR RECOMMENDATION 7
KRISTAN thrombus   On therapeutic lovenox per hematology recommendation  Plan for repeat TTE 10/7 to assess thrombus KRISTAN thrombus   On therapeutic lovenox per hematology recommendation  Consider  repeat TTE 10/7 to assess thrombus

## 2019-09-27 NOTE — PROGRESS NOTE ADULT - ASSESSMENT
65 yo female s/p L MCA infarct, now with decreased functional mobility, dysphagia, aphasia, hemiplegia, gait instability and ADL impairments.    Rehab: Gait Instability, ADL impairments and Functional impairments: start Comprehensive Rehab Program of PT/OT/ST    # L MCA infarct   - Aspirin and Lipitor   - f/u hypercoagulable work up per heme/onc at [facility] as pt now with 2nd CVA despite being therapeutic on Coumadin.   - Lovenox Sq continue for CVA ppx  -Start Lexapro for motor recovery    # A. Fib  - Left atrial thrombus: continue weight based lovenox 1.5mg/kg daily.   - Repeat echo in 2-4 weeks for KRISTAN thrombus per cardio  - metoprolol continues    #Leukocytosis  -asymptomatic, follow labs closely. Request medicine evaluation. Will follow rec's.     Diet: Diabetic, dysphagia 3 soft, thin liquids. Advance to regular consistency.     Renal/Bladder: Voiding independently, PVR x1    #DM2   - continue sliding scale c Humalog. FS ACHS.      Precautions / PROPHYLAXIS:   - Falls, Cardiac, Seizure   - Ortho: Weight bearing status: WBAT   - Lungs: Aspiration, Incentive Spirometer   - Pressure injury/Skin: Turn Q2hrs while in bed, OOB to Chair, PT/OT    - DVT: Lovenox, SCDs, TEDs

## 2019-09-27 NOTE — CHART NOTE - NSCHARTNOTEFT_GEN_A_CORE
Upon Nutritional Assessment by the Registered Dietitian your patient was determined to meet criteria / has evidence of the following diagnosis/diagnoses:          [ ]  Mild Protein Calorie Malnutrition        [X]  Moderate Protein Calorie Malnutrition- in context of acute injury/illness (cerebral infarction)        [ ] Severe Protein Calorie Malnutrition        [ ] Unspecified Protein Calorie Malnutrition        [ ] Underweight / BMI <19        [ ] Morbid Obesity / BMI > 40      Findings as based on:  [X] Comprehensive nutrition assessment - noted w/ 6#/4.6% wt loss x 1 week/from reported UBW, suboptimal PO intakes <50% per flowsheets since admission (will continue to monitor)  [X] Nutrition Focused Physical Exam- mild muscle wasting from calves, quadriceps  [ ] Other:     Nutrition Plan/Recommendations:    1. Recommend consistent carbohydrate diet d/t DM, HbA1c 6.7 (H)  2. Encourage PO intake of foods & fluids  3. Trial of Glucerna 240 ml PO BID (provides up to 440 kcal, 20 g protein)  4. Monitor weight, PO intake, labs, skin, bowels and follow up x 5 days       PROVIDER Section:     By signing this assessment you are acknowledging and agree with the diagnosis/diagnoses assigned by the Registered Dietitian    Comments:

## 2019-09-27 NOTE — PROGRESS NOTE ADULT - ATTENDING COMMENTS
Uneventful night  Unchanged neurological exam  Son is at bedside, update given  Continue AC, GI ppx  Monitor WBC and H/H  Will repeat TTE as recommended to monitor LA clot, case discussed with Medicine  Full program Uneventful night  Unchanged neurological exam  Son is at bedside, update given  Continue AC, GI ppx  Monitor WBC and H/H  Appears depressed , will start Lexapro  Advance dist as per ST  Will repeat TTE as recommended to monitor LA clot, case discussed with Medicine  Full program

## 2019-09-27 NOTE — CONSULT NOTE ADULT - ASSESSMENT
ASSESSMENT/PLAN  RUSSELL FORTUNE [facility] Afib (was on coumadin), prior R MCA stroke in 2013 and 2016 with no residual deficits, HTN, HLD, DM2, mitral stenosis/rheumatic valvular disease  is a acute L MCA infarct, now with decreased functional mobility, dysphagia, aphasia, hemiplegia, gait instability and ADL impairments.    Rehab: Gait Instability, ADL impairments and Functional impairments: start Comprehensive Rehab Program of PT/OT/ST        Precautions / PROPHYLAXIS:   - Falls, Cardiac, Seizure   - Ortho: Weight bearing status: WBAT   - Lungs: Aspiration, Incentive Spirometer   - Pressure injury/Skin: Turn Q2hrs while in bed, OOB to Chair, PT/OT    - DVT: Lovenox, SCDs, TEDs

## 2019-09-27 NOTE — CONSULT NOTE ADULT - PROBLEM SELECTOR RECOMMENDATION 3
[id number]  No standing meds  Monitor over the next 24 hours and consider long acting agent if required

## 2019-09-28 LAB
BASOPHILS # BLD AUTO: 0.06 K/UL — SIGNIFICANT CHANGE UP (ref 0–0.2)
BASOPHILS NFR BLD AUTO: 0.4 % — SIGNIFICANT CHANGE UP (ref 0–2)
EOSINOPHIL # BLD AUTO: 0.43 K/UL — SIGNIFICANT CHANGE UP (ref 0–0.5)
EOSINOPHIL NFR BLD AUTO: 3 % — SIGNIFICANT CHANGE UP (ref 0–6)
GLUCOSE BLDC GLUCOMTR-MCNC: 145 MG/DL — HIGH (ref 70–99)
GLUCOSE BLDC GLUCOMTR-MCNC: 170 MG/DL — HIGH (ref 70–99)
GLUCOSE BLDC GLUCOMTR-MCNC: 171 MG/DL — HIGH (ref 70–99)
GLUCOSE BLDC GLUCOMTR-MCNC: 202 MG/DL — HIGH (ref 70–99)
HCT VFR BLD CALC: 33.1 % — LOW (ref 34.5–45)
HGB BLD-MCNC: 10.5 G/DL — LOW (ref 11.5–15.5)
IMM GRANULOCYTES NFR BLD AUTO: 0.5 % — SIGNIFICANT CHANGE UP (ref 0–1.5)
LYMPHOCYTES # BLD AUTO: 27.5 % — SIGNIFICANT CHANGE UP (ref 13–44)
LYMPHOCYTES # BLD AUTO: 3.89 K/UL — HIGH (ref 1–3.3)
MCHC RBC-ENTMCNC: 27.2 PG — SIGNIFICANT CHANGE UP (ref 27–34)
MCHC RBC-ENTMCNC: 31.7 GM/DL — LOW (ref 32–36)
MCV RBC AUTO: 85.8 FL — SIGNIFICANT CHANGE UP (ref 80–100)
MONOCYTES # BLD AUTO: 1.06 K/UL — HIGH (ref 0–0.9)
MONOCYTES NFR BLD AUTO: 7.5 % — SIGNIFICANT CHANGE UP (ref 2–14)
NEUTROPHILS # BLD AUTO: 8.61 K/UL — HIGH (ref 1.8–7.4)
NEUTROPHILS NFR BLD AUTO: 61.1 % — SIGNIFICANT CHANGE UP (ref 43–77)
NRBC # BLD: 0 /100 WBCS — SIGNIFICANT CHANGE UP (ref 0–0)
PLATELET # BLD AUTO: 320 K/UL — SIGNIFICANT CHANGE UP (ref 150–400)
RBC # BLD: 3.86 M/UL — SIGNIFICANT CHANGE UP (ref 3.8–5.2)
RBC # FLD: 17.5 % — HIGH (ref 10.3–14.5)
WBC # BLD: 13.98 K/UL — HIGH (ref 3.8–10.5)
WBC # FLD AUTO: 13.98 K/UL — HIGH (ref 3.8–10.5)

## 2019-09-28 PROCEDURE — 99232 SBSQ HOSP IP/OBS MODERATE 35: CPT

## 2019-09-28 PROCEDURE — 99233 SBSQ HOSP IP/OBS HIGH 50: CPT

## 2019-09-28 RX ADMIN — Medication 2: at 16:49

## 2019-09-28 RX ADMIN — Medication 81 MILLIGRAM(S): at 12:06

## 2019-09-28 RX ADMIN — Medication 100 MILLIGRAM(S): at 18:00

## 2019-09-28 RX ADMIN — ESCITALOPRAM OXALATE 5 MILLIGRAM(S): 10 TABLET, FILM COATED ORAL at 12:06

## 2019-09-28 RX ADMIN — Medication 100 MILLIGRAM(S): at 06:46

## 2019-09-28 RX ADMIN — ENOXAPARIN SODIUM 90 MILLIGRAM(S): 100 INJECTION SUBCUTANEOUS at 12:09

## 2019-09-28 RX ADMIN — Medication 1: at 07:44

## 2019-09-28 RX ADMIN — ATORVASTATIN CALCIUM 40 MILLIGRAM(S): 80 TABLET, FILM COATED ORAL at 21:05

## 2019-09-28 RX ADMIN — Medication 12.5 MILLIGRAM(S): at 06:45

## 2019-09-28 RX ADMIN — PANTOPRAZOLE SODIUM 40 MILLIGRAM(S): 20 TABLET, DELAYED RELEASE ORAL at 06:45

## 2019-09-28 RX ADMIN — Medication 1: at 12:12

## 2019-09-28 NOTE — PROGRESS NOTE ADULT - PROBLEM SELECTOR PLAN 8
Patient not symptomatic of UTI  Urine culture 49,000-99,000 E. coli  9/23-will hold off on treatment  Continue to monitor.  Etiology of leukocytosis is unclear, do not suspect active infection currently

## 2019-09-28 NOTE — PROGRESS NOTE ADULT - ASSESSMENT
ASSESSMENT/PLAN  RUSSELL FORTUNE [facility] Afib (was on coumadin), prior R MCA stroke in 2013 and 2016 with no residual deficits, HTN, HLD, DM2, mitral stenosis/rheumatic valvular disease  is a acute L MCA infarct, now with decreased functional mobility, dysphagia, aphasia, hemiplegia, gait instability and ADL impairments.    Rehab: Gait Instability, ADL impairments and Functional impairments: start Comprehensive Rehab Program of PT/OT/ST        Precautions / PROPHYLAXIS:   - Falls, Cardiac, Seizure   - Ortho: Weight bearing status: WBAT   - Lungs: Aspiration, Incentive Spirometer   - Pressure injury/Skin: Turn Q2hrs while in bed, OOB to Chair, PT/OT    - DVT: Lovenox, SCDs, TEDs

## 2019-09-28 NOTE — PROGRESS NOTE ADULT - SUBJECTIVE AND OBJECTIVE BOX
Patient is a 64y old  Female who presents with a chief complaint of s/p b/l CVAs  with deficits (28 Sep 2019 10:13)    HPI:  63yo RH female, Nadine speaking, with PMHx of Afib (on coumadin), prior R MCA stroke in 2013 and 2016 with no residual deficits, HTN, HLD, DM2, mitral stenosis/rheumatic valvular disease with recent hospitalization (Sept 2019) at [facility] for AF for potential cardioversion (Hx of labile INR) and mitral valve intervention. At the time patient found to have KRISTAN thrombus on echo.   On 9/22 pt unable to speak or move her R side and brought to ED by family. CT Head 9/22 showed L MCA acute-subacute infarcts. CTA head demonstrated L M2 occlusion. Patient was not a candidate for endovascular treatment. INR therapeutic on Coumadin, per family at bedside, patient was compliant with Coumadin.  MRI Head follow up 9/23 shows redemonstration of acute left KUSUM and MCA territory infarcts primarily involving the left frontal lobe. No yola hemorrhagic transformation. No midline shift or hydrocephalus. Chronic right frontal and right cerebellar hemisphere infarcts.  TTE EF 65-70%, severe mitral stenosis, mild aortic stenosis. mild aortic regurgitation. Tele with afib- rate controlled. Placed on hep gtt with goal to transition to lovenox on d/c. As per cardiology, KRISTAN thrombus may take up to 2-4 weeks to resolve. Will need repeat BUBBA in 3-4 weeks. Possible valve intervention pending recovery and goals of care. CT Chest/Abd/Pelvis to r/o malignancy on 9/24. No findings to suggest intrathoracic or intra-abdominal malignancy. However, renal eval of possible renal infarct on CT obtained. Hematology in, Lovenox 1.5mg/kg daily and hypercoagulable workup.  Hospitalization additionally complicated by mild leukocytosis. CXR negative, UA negative, RVP negative-continue close monitoring.   Stable for d/c to [facility] on 9/26/19. (26 Sep 2019 11:38)    Patient seen and examined at bedside.  Son reports his mother had a good night and does not seem to be having any pain.     ALLERGIES:  No Known Allergies    MEDICATIONS  (STANDING):  aspirin  chewable 81 milliGRAM(s) Oral daily  atorvastatin 40 milliGRAM(s) Oral at bedtime  dextrose 5%. 1000 milliLiter(s) (50 mL/Hr) IV Continuous <Continuous>  dextrose 50% Injectable 12.5 Gram(s) IV Push once  dextrose 50% Injectable 25 Gram(s) IV Push once  dextrose 50% Injectable 25 Gram(s) IV Push once  docusate sodium 100 milliGRAM(s) Oral two times a day  enoxaparin Injectable 90 milliGRAM(s) SubCutaneous daily  escitalopram 5 milliGRAM(s) Oral daily  insulin lispro (HumaLOG) corrective regimen sliding scale   SubCutaneous three times a day before meals  insulin lispro (HumaLOG) corrective regimen sliding scale   SubCutaneous at bedtime  metoprolol succinate ER 12.5 milliGRAM(s) Oral daily  pantoprazole    Tablet 40 milliGRAM(s) Oral before breakfast    MEDICATIONS  (PRN):  acetaminophen   Tablet .. 650 milliGRAM(s) Oral every 6 hours PRN Moderate Pain (4 - 6)  dextrose 40% Gel 15 Gram(s) Oral once PRN Blood Glucose LESS THAN 70 milliGRAM(s)/deciliter  glucagon  Injectable 1 milliGRAM(s) IntraMuscular once PRN Glucose LESS THAN 70 milligrams/deciliter      Vital Signs Last 24 Hrs  T(F): 98 (28 Sep 2019 08:43), Max: 98 (28 Sep 2019 08:43)  HR: 96 (28 Sep 2019 08:43) (80 - 96)  BP: 111/78 (28 Sep 2019 08:43) (107/73 - 120/80)  RR: 14 (28 Sep 2019 08:43) (14 - 15)  SpO2: 98% (28 Sep 2019 08:43) (98% - 98%)  I&O's Summary    27 Sep 2019 07:01  -  28 Sep 2019 07:00  --------------------------------------------------------  IN: 340 mL / OUT: 0 mL / NET: 340 mL        PHYSICAL EXAM:  General: NAD,   ENT: MMM  Neck: Supple, No JVD  Lungs: Clear to auscultation bilaterally  Cardio: RRR, S1/S2, 2/6 systolic murmur  Abdomen: Soft, Nontender, Nondistended; Bowel sounds present  Extremities: No cyanosis, No edema    LABS:                        10.5   13.98 )-----------( 320      ( 28 Sep 2019 06:00 )             33.1     09-27    140  |  106  |  13  ----------------------------<  155  4.5   |  26  |  0.72    Ca    9.2      27 Sep 2019 06:02    TPro  7.0  /  Alb  2.8  /  TBili  1.1  /  DBili  x   /  AST  34  /  ALT  19  /  AlkPhos  56  09-27    eGFR if Non African American: 91 mL/min/1.73M2 (09-27-19 @ 06:02)  eGFR if African American: 105 mL/min/1.73M2 (09-27-19 @ 06:02)    PT/INR - ( 26 Sep 2019 06:02 )   PT: 16.7 sec;   INR: 1.44 ratio                 09-23 Chol 98 mg/dL LDL 46 mg/dL HDL 39 mg/dL Trig 67 mg/dL              POCT Blood Glucose.: 171 mg/dL (28 Sep 2019 12:12)  POCT Blood Glucose.: 170 mg/dL (28 Sep 2019 07:43)  POCT Blood Glucose.: 159 mg/dL (27 Sep 2019 22:25)  POCT Blood Glucose.: 157 mg/dL (27 Sep 2019 17:18)    09-23 [id number] 6.7  09-13 [id number] 6.3        Culture - Urine (collected 25 Sep 2019 20:57)  Source: .Urine  Final Report (27 Sep 2019 23:20):    50,000 - 99,000 CFU/mL Escherichia coli    10,000 - 49,000 CFU/mL Enterococcus faecalis  Organism: Escherichia coli  Enterococcus faecalis (27 Sep 2019 23:20)  Organism: Enterococcus faecalis (27 Sep 2019 23:20)      -  Ampicillin: S <=2 Predicts results to ampicillin/sulbactam, amoxacillin-clavulanate and  piperacillin-tazobactam.      -  Ciprofloxacin: I 2      -  Levofloxacin: S 2      -  Nitrofurantoin: S <=32 Should not be used to treat pyelonephritis.      -  Tetra/Doxy: S <=4      -  Vancomycin: S 2      Method Type: CHRISSIE  Organism: Escherichia coli (27 Sep 2019 23:20)      -  Amikacin: S <=16      -  Ampicillin: S <=8 These ampicillin results predict results for amoxicillin      -  Ampicillin/Sulbactam: S <=8/4 Enterobacter, Citrobacter, and Serratia may develop resistance during prolonged therapy (3-4 days)      -  Aztreonam: S <=4      -  Cefazolin: S <=8 (MIC_CL_COM_ENTERIC_CEFAZU) For uncomplicated UTI with K. pneumoniae, E. coli, or P. mirablis: CHRISSIE <=16 is sensitive and CHRISSIE >=32 is resistant. This also predicts results for oral agents cefaclor, cefdinir, cefpodoxime, cefprozil, cefuroxime axetil, cephalexin and locarbef for uncomplicated UTI. Note that some isolates may be susceptible to these agents while testing resistant to cefazolin.      -  Cefepime: S <=4      -  Cefoxitin: S <=8      -  Ceftriaxone: S <=1 Enterobacter, Citrobacter, and Serratia may develop resistance during prolonged therapy      -  Ciprofloxacin: S <=1      -  Gentamicin: S <=4      -  Imipenem: S <=1      -  Levofloxacin: S <=2      -  Meropenem: S <=1      -  Nitrofurantoin: S <=32 Should not be used to treat pyelonephritis      -  Piperacillin/Tazobactam: S <=16      -  Tigecycline: S <=2      -  Tobramycin: S <=4      -  Trimethoprim/Sulfamethoxazole: S <=2/38      Method Type: CHRISSIE        RADIOLOGY & ADDITIONAL TESTS:    Care Discussed with Consultants/Other Providers:

## 2019-09-28 NOTE — PROGRESS NOTE ADULT - SUBJECTIVE AND OBJECTIVE BOX
64 F p/w left KUSUM and MCA infarcts    SUBJECTIVE  patient seen and examined, with family at bedside.  Patient denies complaint,  just returned from speech therapy.  Per family, right sided weakness and aphasia unchanged.  No acute events overnight.        REVIEW OF SYSTEMS  Constitutional - No fever, No weight loss, No fatigue  HEENT - No eye pain, No difficulty hearing, No tinnitus, No vertigo, No neck pain  Neurological - No headaches, No memory loss, + loss of strength, No numbness, No tremors  Skin - No itching, No rashes, No lesions   Musculoskeletal - No joint pain, No joint swelling, No muscle pain  Psychiatric - No depression, No anxiety    RECENT LABS/IMAGING  CBC Full  -  ( 28 Sep 2019 06:00 )  WBC Count : 13.98 K/uL  RBC Count : 3.86 M/uL  Hemoglobin : 10.5 g/dL  Hematocrit : 33.1 %  Platelet Count - Automated : 320 K/uL  Mean Cell Volume : 85.8 fl  Mean Cell Hemoglobin : 27.2 pg  Mean Cell Hemoglobin Concentration : 31.7 gm/dL  Auto Neutrophil # : 8.61 K/uL  Auto Lymphocyte # : 3.89 K/uL  Auto Monocyte # : 1.06 K/uL  Auto Eosinophil # : 0.43 K/uL  Auto Basophil # : 0.06 K/uL  Auto Neutrophil % : 61.1 %  Auto Lymphocyte % : 27.5 %  Auto Monocyte % : 7.5 %  Auto Eosinophil % : 3.0 %  Auto Basophil % : 0.4 %    09-27    140  |  106  |  13  ----------------------------<  155<H>  4.5   |  26  |  0.72    Ca    9.2      27 Sep 2019 06:02    TPro  7.0  /  Alb  2.8<L>  /  TBili  1.1  /  DBili  x   /  AST  34  /  ALT  19  /  AlkPhos  56  09-27        VITALS  T(C): 36.7 (09-28-19 @ 08:43), Max: 36.7 (09-28-19 @ 08:43)  HR: 96 (09-28-19 @ 08:43) (80 - 96)  BP: 111/78 (09-28-19 @ 08:43) (107/73 - 120/80)  RR: 14 (09-28-19 @ 08:43) (14 - 15)  SpO2: 98% (09-28-19 @ 08:43) (98% - 98%)  Wt(kg): --    MEDICATIONS   acetaminophen   Tablet .. 650 milliGRAM(s) every 6 hours PRN  aspirin  chewable 81 milliGRAM(s) daily  atorvastatin 40 milliGRAM(s) at bedtime  dextrose 40% Gel 15 Gram(s) once PRN  dextrose 5%. 1000 milliLiter(s) <Continuous>  dextrose 50% Injectable 12.5 Gram(s) once  dextrose 50% Injectable 25 Gram(s) once  dextrose 50% Injectable 25 Gram(s) once  docusate sodium 100 milliGRAM(s) two times a day  enoxaparin Injectable 90 milliGRAM(s) daily  escitalopram 5 milliGRAM(s) daily  glucagon  Injectable 1 milliGRAM(s) once PRN  insulin lispro (HumaLOG) corrective regimen sliding scale   three times a day before meals  insulin lispro (HumaLOG) corrective regimen sliding scale   at bedtime  metoprolol succinate ER 12.5 milliGRAM(s) daily  pantoprazole    Tablet 40 milliGRAM(s) before breakfast      --------------------------------------------------------------------  PHYSICAL EXAM  Constitutional - NAD  HEENT - NCAT, EOMI  Neck - Supple, No limited ROM  Chest - CTA bilaterally, No wheeze, No rhonchi, No crackles  Cardiovascular - irregular  Abdomen - BS+, Soft, NTND  Extremities - No C/C/E, No calf tenderness   Skin-no rashes  Neurologic Exam -Awake, Alert, right neglect, +aphasia, right hemiparesis, trace movement of right thumb.      Psychiatric - mood stable    ASSESSMENT & PLAN  65 yo female s/p L MCA infarct, now with decreased functional mobility, dysphagia, aphasia, hemiplegia, gait instability and ADL impairments.    continue OT/PT/ SLP  GI/Bowel Management - colace  Skin - Turn Q2  Pain - Tylenol PRN  DVT PPX - Lovenox for L atrial thrombus  Diet - regular solids with thin liquids  on lexapro  afib- on lovenox, metoprolol  precautions: falls, Cardiac, Seizure, aspiration

## 2019-09-29 LAB
ANION GAP SERPL CALC-SCNC: 10 MMOL/L — SIGNIFICANT CHANGE UP (ref 5–17)
BUN SERPL-MCNC: 20 MG/DL — SIGNIFICANT CHANGE UP (ref 7–23)
CALCIUM SERPL-MCNC: 9.3 MG/DL — SIGNIFICANT CHANGE UP (ref 8.4–10.5)
CHLORIDE SERPL-SCNC: 100 MMOL/L — SIGNIFICANT CHANGE UP (ref 96–108)
CO2 SERPL-SCNC: 24 MMOL/L — SIGNIFICANT CHANGE UP (ref 22–31)
CREAT SERPL-MCNC: 0.8 MG/DL — SIGNIFICANT CHANGE UP (ref 0.5–1.3)
GLUCOSE BLDC GLUCOMTR-MCNC: 164 MG/DL — HIGH (ref 70–99)
GLUCOSE BLDC GLUCOMTR-MCNC: 172 MG/DL — HIGH (ref 70–99)
GLUCOSE BLDC GLUCOMTR-MCNC: 173 MG/DL — HIGH (ref 70–99)
GLUCOSE BLDC GLUCOMTR-MCNC: 192 MG/DL — HIGH (ref 70–99)
GLUCOSE SERPL-MCNC: 236 MG/DL — HIGH (ref 70–99)
HCT VFR BLD CALC: 31.4 % — LOW (ref 34.5–45)
HGB BLD-MCNC: 10.2 G/DL — LOW (ref 11.5–15.5)
MCHC RBC-ENTMCNC: 27.9 PG — SIGNIFICANT CHANGE UP (ref 27–34)
MCHC RBC-ENTMCNC: 32.5 GM/DL — SIGNIFICANT CHANGE UP (ref 32–36)
MCV RBC AUTO: 86 FL — SIGNIFICANT CHANGE UP (ref 80–100)
NRBC # BLD: 0 /100 WBCS — SIGNIFICANT CHANGE UP (ref 0–0)
PLATELET # BLD AUTO: 313 K/UL — SIGNIFICANT CHANGE UP (ref 150–400)
POTASSIUM SERPL-MCNC: 4.1 MMOL/L — SIGNIFICANT CHANGE UP (ref 3.5–5.3)
POTASSIUM SERPL-SCNC: 4.1 MMOL/L — SIGNIFICANT CHANGE UP (ref 3.5–5.3)
RBC # BLD: 3.65 M/UL — LOW (ref 3.8–5.2)
RBC # FLD: 17.2 % — HIGH (ref 10.3–14.5)
SODIUM SERPL-SCNC: 134 MMOL/L — LOW (ref 135–145)
WBC # BLD: 12 K/UL — HIGH (ref 3.8–10.5)
WBC # FLD AUTO: 12 K/UL — HIGH (ref 3.8–10.5)

## 2019-09-29 PROCEDURE — 99232 SBSQ HOSP IP/OBS MODERATE 35: CPT

## 2019-09-29 RX ADMIN — ESCITALOPRAM OXALATE 5 MILLIGRAM(S): 10 TABLET, FILM COATED ORAL at 12:31

## 2019-09-29 RX ADMIN — ATORVASTATIN CALCIUM 40 MILLIGRAM(S): 80 TABLET, FILM COATED ORAL at 21:31

## 2019-09-29 RX ADMIN — PANTOPRAZOLE SODIUM 40 MILLIGRAM(S): 20 TABLET, DELAYED RELEASE ORAL at 06:34

## 2019-09-29 RX ADMIN — Medication 1: at 08:16

## 2019-09-29 RX ADMIN — Medication 1: at 12:31

## 2019-09-29 RX ADMIN — Medication 1: at 16:46

## 2019-09-29 RX ADMIN — ENOXAPARIN SODIUM 90 MILLIGRAM(S): 100 INJECTION SUBCUTANEOUS at 12:31

## 2019-09-29 RX ADMIN — Medication 12.5 MILLIGRAM(S): at 06:34

## 2019-09-29 RX ADMIN — Medication 81 MILLIGRAM(S): at 12:31

## 2019-09-29 NOTE — PROGRESS NOTE ADULT - SUBJECTIVE AND OBJECTIVE BOX
Patient is a 64y old  Female who presents with a chief complaint of s/p b/l CVAs  with deficits (29 Sep 2019 09:15)    Patient seen and examined at bedside. Son at bedside states that patient was straining for BM yesterday but did have a BM overnight. He also reports infrequent cough. Patient denies chest pain, shortness of breath.     ALLERGIES:  No Known Allergies    MEDICATIONS  (STANDING):  aspirin  chewable 81 milliGRAM(s) Oral daily  atorvastatin 40 milliGRAM(s) Oral at bedtime  dextrose 5%. 1000 milliLiter(s) (50 mL/Hr) IV Continuous <Continuous>  dextrose 50% Injectable 12.5 Gram(s) IV Push once  dextrose 50% Injectable 25 Gram(s) IV Push once  dextrose 50% Injectable 25 Gram(s) IV Push once  docusate sodium 100 milliGRAM(s) Oral two times a day  enoxaparin Injectable 90 milliGRAM(s) SubCutaneous daily  escitalopram 5 milliGRAM(s) Oral daily  insulin lispro (HumaLOG) corrective regimen sliding scale   SubCutaneous three times a day before meals  insulin lispro (HumaLOG) corrective regimen sliding scale   SubCutaneous at bedtime  metoprolol succinate ER 12.5 milliGRAM(s) Oral daily  pantoprazole    Tablet 40 milliGRAM(s) Oral before breakfast    MEDICATIONS  (PRN):  acetaminophen   Tablet .. 650 milliGRAM(s) Oral every 6 hours PRN Moderate Pain (4 - 6)  dextrose 40% Gel 15 Gram(s) Oral once PRN Blood Glucose LESS THAN 70 milliGRAM(s)/deciliter  glucagon  Injectable 1 milliGRAM(s) IntraMuscular once PRN Glucose LESS THAN 70 milligrams/deciliter    Vital Signs Last 24 Hrs  T(F): 98.1 (29 Sep 2019 08:22), Max: 98.1 (29 Sep 2019 08:22)  HR: 91 (29 Sep 2019 08:22) (80 - 101)  BP: 111/71 (29 Sep 2019 08:22) (111/71 - 125/78)  RR: 14 (29 Sep 2019 08:22) (14 - 14)  SpO2: 97% (29 Sep 2019 08:22) (97% - 98%)    PHYSICAL EXAM:  GENERAL: NAD, well-groomed, well-developed, aphasic   HEAD:  Atraumatic, Normocephalic  EYES: conjunctiva and sclera clear  ENMT: Moist mucous membranes  NECK: Supple, No JVD  CHEST/LUNG: Left lung sounds diminished, right lung clear to auscultation. Non-labored breathing  HEART: Rate irregular; S1/S2, + murmur  ABDOMEN: Soft, Nontender, Nondistended; Bowel sounds present  VASCULAR: Normal pulses, Normal capillary refill  EXTREMITIES: No calf tenderness, No cyanosis, No edema  SKIN: Warm, Intact  NERVOUS SYSTEM: Right hemiparesis, aphasia     LABS:                        10.5   13.98 )-----------( 320      ( 28 Sep 2019 06:00 )             33.1     09-27    140  |  106  |  13  ----------------------------<  155  4.5   |  26  |  0.72    Ca    9.2      27 Sep 2019 06:02    TPro  7.0  /  Alb  2.8  /  TBili  1.1  /  DBili  x   /  AST  34  /  ALT  19  /  AlkPhos  56  09-27    eGFR if Non African American: 91 mL/min/1.73M2 (09-27-19 @ 06:02)  eGFR if African American: 105 mL/min/1.73M2 (09-27-19 @ 06:02)            09-23 Chol 98 mg/dL LDL 46 mg/dL HDL 39 mg/dL Trig 67 mg/dL              POCT Blood Glucose.: 173 mg/dL (29 Sep 2019 08:14)  POCT Blood Glucose.: 145 mg/dL (28 Sep 2019 21:03)  POCT Blood Glucose.: 202 mg/dL (28 Sep 2019 16:46)  POCT Blood Glucose.: 171 mg/dL (28 Sep 2019 12:12)    09-23 [id number] 6.7  09-13 MhjhgvjghjY1V 6.3        Culture - Urine (collected 25 Sep 2019 20:57)  Source: .Urine  Final Report (27 Sep 2019 23:20):    50,000 - 99,000 CFU/mL Escherichia coli    10,000 - 49,000 CFU/mL Enterococcus faecalis  Organism: Escherichia coli  Enterococcus faecalis (27 Sep 2019 23:20)  Organism: Enterococcus faecalis (27 Sep 2019 23:20)      -  Ampicillin: S <=2 Predicts results to ampicillin/sulbactam, amoxacillin-clavulanate and  piperacillin-tazobactam.      -  Ciprofloxacin: I 2      -  Levofloxacin: S 2      -  Nitrofurantoin: S <=32 Should not be used to treat pyelonephritis.      -  Tetra/Doxy: S <=4      -  Vancomycin: S 2      Method Type: CHRISSIE  Organism: Escherichia coli (27 Sep 2019 23:20)      -  Amikacin: S <=16      -  Ampicillin: S <=8 These ampicillin results predict results for amoxicillin      -  Ampicillin/Sulbactam: S <=8/4 Enterobacter, Citrobacter, and Serratia may develop resistance during prolonged therapy (3-4 days)      -  Aztreonam: S <=4      -  Cefazolin: S <=8 ([id number]_[id number]) For uncomplicated UTI with K. pneumoniae, E. coli, or P. mirablis: CHRISSIE <=16 is sensitive and CHRISSIE >=32 is resistant. This also predicts results for oral agents cefaclor, cefdinir, cefpodoxime, cefprozil, cefuroxime axetil, cephalexin and locarbef for uncomplicated UTI. Note that some isolates may be susceptible to these agents while testing resistant to cefazolin.      -  Cefepime: S <=4      -  Cefoxitin: S <=8      -  Ceftriaxone: S <=1 Enterobacter, Citrobacter, and Serratia may develop resistance during prolonged therapy      -  Ciprofloxacin: S <=1      -  Gentamicin: S <=4      -  Imipenem: S <=1      -  Levofloxacin: S <=2      -  Meropenem: S <=1      -  Nitrofurantoin: S <=32 Should not be used to treat pyelonephritis      -  Piperacillin/Tazobactam: S <=16      -  Tigecycline: S <=2      -  Tobramycin: S <=4      -  Trimethoprim/Sulfamethoxazole: S <=2/38      Method Type: CHRISSIE        RADIOLOGY & ADDITIONAL TESTS: CXR pending 9/29    Care Discussed with Consultants/Other Providers: Dr Ortiz rehab team Patient is a 64y old  Female who presents with a chief complaint of s/p b/l CVAs  with deficits (29 Sep 2019 09:15)    Patient seen and examined at bedside. Son at bedside states that patient was straining for BM yesterday but did have a BM overnight. He also reports infrequent cough. Patient denies chest pain, shortness of breath.     ALLERGIES:  No Known Allergies    MEDICATIONS  (STANDING):  aspirin  chewable 81 milliGRAM(s) Oral daily  atorvastatin 40 milliGRAM(s) Oral at bedtime  dextrose 5%. 1000 milliLiter(s) (50 mL/Hr) IV Continuous <Continuous>  dextrose 50% Injectable 12.5 Gram(s) IV Push once  dextrose 50% Injectable 25 Gram(s) IV Push once  dextrose 50% Injectable 25 Gram(s) IV Push once  docusate sodium 100 milliGRAM(s) Oral two times a day  enoxaparin Injectable 90 milliGRAM(s) SubCutaneous daily  escitalopram 5 milliGRAM(s) Oral daily  insulin lispro (HumaLOG) corrective regimen sliding scale   SubCutaneous three times a day before meals  insulin lispro (HumaLOG) corrective regimen sliding scale   SubCutaneous at bedtime  metoprolol succinate ER 12.5 milliGRAM(s) Oral daily  pantoprazole    Tablet 40 milliGRAM(s) Oral before breakfast    MEDICATIONS  (PRN):  acetaminophen   Tablet .. 650 milliGRAM(s) Oral every 6 hours PRN Moderate Pain (4 - 6)  dextrose 40% Gel 15 Gram(s) Oral once PRN Blood Glucose LESS THAN 70 milliGRAM(s)/deciliter  glucagon  Injectable 1 milliGRAM(s) IntraMuscular once PRN Glucose LESS THAN 70 milligrams/deciliter    Vital Signs Last 24 Hrs  T(F): 98.1 (29 Sep 2019 08:22), Max: 98.1 (29 Sep 2019 08:22)  HR: 91 (29 Sep 2019 08:22) (80 - 101)  BP: 111/71 (29 Sep 2019 08:22) (111/71 - 125/78)  RR: 14 (29 Sep 2019 08:22) (14 - 14)  SpO2: 97% (29 Sep 2019 08:22) (97% - 98%)    PHYSICAL EXAM:  GENERAL: NAD, well-groomed, well-developed, aphasic   HEAD:  Atraumatic, Normocephalic  EYES: conjunctiva and sclera clear  ENMT: Moist mucous membranes  NECK: Supple, No JVD  CHEST/LUNG: Left lung sounds diminished, right lung clear to auscultation. Non-labored breathing  HEART: Rate irregular; S1/S2, + murmur  ABDOMEN: Soft, Nontender, Nondistended; Bowel sounds present  VASCULAR: Normal pulses, Normal capillary refill  EXTREMITIES: No calf tenderness, No cyanosis, No edema  SKIN: Warm, Intact  NERVOUS SYSTEM: Right hemiparesis, aphasia     LABS:                        10.5   13.98 )-----------( 320      ( 28 Sep 2019 06:00 )             33.1     09-27    140  |  106  |  13  ----------------------------<  155  4.5   |  26  |  0.72    Ca    9.2      27 Sep 2019 06:02    TPro  7.0  /  Alb  2.8  /  TBili  1.1  /  DBili  x   /  AST  34  /  ALT  19  /  AlkPhos  56  09-27    eGFR if Non African American: 91 mL/min/1.73M2 (09-27-19 @ 06:02)  eGFR if African American: 105 mL/min/1.73M2 (09-27-19 @ 06:02)            09-23 Chol 98 mg/dL LDL 46 mg/dL HDL 39 mg/dL Trig 67 mg/dL              POCT Blood Glucose.: 173 mg/dL (29 Sep 2019 08:14)  POCT Blood Glucose.: 145 mg/dL (28 Sep 2019 21:03)  POCT Blood Glucose.: 202 mg/dL (28 Sep 2019 16:46)  POCT Blood Glucose.: 171 mg/dL (28 Sep 2019 12:12)    09-23 [id number] 6.7  09-13 [id number] 6.3        Culture - Urine (collected 25 Sep 2019 20:57)  Source: .Urine  Final Report (27 Sep 2019 23:20):    50,000 - 99,000 CFU/mL Escherichia coli    10,000 - 49,000 CFU/mL Enterococcus faecalis  Organism: Escherichia coli  Enterococcus faecalis (27 Sep 2019 23:20)  Organism: Enterococcus faecalis (27 Sep 2019 23:20)      -  Ampicillin: S <=2 Predicts results to ampicillin/sulbactam, amoxacillin-clavulanate and  piperacillin-tazobactam.      -  Ciprofloxacin: I 2      -  Levofloxacin: S 2      -  Nitrofurantoin: S <=32 Should not be used to treat pyelonephritis.      -  Tetra/Doxy: S <=4      -  Vancomycin: S 2      Method Type: CHRISSIE  Organism: Escherichia coli (27 Sep 2019 23:20)      -  Amikacin: S <=16      -  Ampicillin: S <=8 These ampicillin results predict results for amoxicillin      -  Ampicillin/Sulbactam: S <=8/4 Enterobacter, Citrobacter, and Serratia may develop resistance during prolonged therapy (3-4 days)      -  Aztreonam: S <=4      -  Cefazolin: S <=8 (MIC_CL_COM_[email or web address]) For uncomplicated UTI with K. pneumoniae, E. coli, or P. mirablis: CHRISSIE <=16 is sensitive and CHRISSIE >=32 is resistant. This also predicts results for oral agents cefaclor, cefdinir, cefpodoxime, cefprozil, cefuroxime axetil, cephalexin and locarbef for uncomplicated UTI. Note that some isolates may be susceptible to these agents while testing resistant to cefazolin.      -  Cefepime: S <=4      -  Cefoxitin: S <=8      -  Ceftriaxone: S <=1 Enterobacter, Citrobacter, and Serratia may develop resistance during prolonged therapy      -  Ciprofloxacin: S <=1      -  Gentamicin: S <=4      -  Imipenem: S <=1      -  Levofloxacin: S <=2      -  Meropenem: S <=1      -  Nitrofurantoin: S <=32 Should not be used to treat pyelonephritis      -  Piperacillin/Tazobactam: S <=16      -  Tigecycline: S <=2      -  Tobramycin: S <=4      -  Trimethoprim/Sulfamethoxazole: S <=2/38      Method Type: CHRISSIE            Care Discussed with Consultants/Other Providers: Dr Diana silverab team

## 2019-09-29 NOTE — PROGRESS NOTE ADULT - PROBLEM SELECTOR PLAN 8
Patient not symptomatic of UTI  Urine culture 49,000-99,000 E. coli  9/23-will hold off on treatment  Continue to monitor.  Etiology of leukocytosis is unclear, do not suspect active infection currently  - WBC improved today 9/29 from yesterday Patient not symptomatic of UTI  Urine culture 49,000-99,000 E. coli  9/23-will hold off on treatment  Continue to monitor.  Etiology of leukocytosis is unclear, do not suspect active infection currently  - WBC improved today 9/29 - WBC is 12

## 2019-09-29 NOTE — PROGRESS NOTE ADULT - SUBJECTIVE AND OBJECTIVE BOX
SUBJECTIVE    No acute events overnight. Patient with son at bedside interpreting.  Patient denies complaint, reports she slept well, eating well. Patient denies urinary complaint.  WBC increased yesterday to 13.98, will recheck today however patient currently asymptomatic.  Denies chest pain or shortness of breath.   + aphasia, + R sided weakness.     REVIEW OF SYSTEMS  Constitutional - No fever, No weight loss, No fatigue  HEENT - No eye pain, No visual disturbances, No difficulty hearing, No tinnitus, No vertigo, No neck pain  Neurological - No headaches, No memory loss, + loss of strength, No numbness, No tremors  Skin - No itching, No rashes, No lesions   Musculoskeletal - No joint pain, No joint swelling, No muscle pain  Psychiatric - No depression, No anxiety    RECENT LABS/IMAGING  CBC Full  -  ( 28 Sep 2019 06:00 )  WBC Count : 13.98 K/uL  RBC Count : 3.86 M/uL  Hemoglobin : 10.5 g/dL  Hematocrit : 33.1 %  Platelet Count - Automated : 320 K/uL  Mean Cell Volume : 85.8 fl  Mean Cell Hemoglobin : 27.2 pg  Mean Cell Hemoglobin Concentration : 31.7 gm/dL  Auto Neutrophil # : 8.61 K/uL  Auto Lymphocyte # : 3.89 K/uL  Auto Monocyte # : 1.06 K/uL  Auto Eosinophil # : 0.43 K/uL  Auto Basophil # : 0.06 K/uL  Auto Neutrophil % : 61.1 %  Auto Lymphocyte % : 27.5 %  Auto Monocyte % : 7.5 %  Auto Eosinophil % : 3.0 %  Auto Basophil % : 0.4 %        VITALS  T(C): 36.7 (09-29-19 @ 08:22), Max: 36.7 (09-29-19 @ 08:22)  HR: 91 (09-29-19 @ 08:22) (80 - 101)  BP: 111/71 (09-29-19 @ 08:22) (111/71 - 125/78)  RR: 14 (09-29-19 @ 08:22) (14 - 14)  SpO2: 97% (09-29-19 @ 08:22) (97% - 98%)  Wt(kg): --    MEDICATIONS   acetaminophen   Tablet .. 650 milliGRAM(s) every 6 hours PRN  aspirin  chewable 81 milliGRAM(s) daily  atorvastatin 40 milliGRAM(s) at bedtime  dextrose 40% Gel 15 Gram(s) once PRN  dextrose 5%. 1000 milliLiter(s) <Continuous>  dextrose 50% Injectable 12.5 Gram(s) once  dextrose 50% Injectable 25 Gram(s) once  dextrose 50% Injectable 25 Gram(s) once  docusate sodium 100 milliGRAM(s) two times a day  enoxaparin Injectable 90 milliGRAM(s) daily  escitalopram 5 milliGRAM(s) daily  glucagon  Injectable 1 milliGRAM(s) once PRN  insulin lispro (HumaLOG) corrective regimen sliding scale   three times a day before meals  insulin lispro (HumaLOG) corrective regimen sliding scale   at bedtime  metoprolol succinate ER 12.5 milliGRAM(s) daily  pantoprazole    Tablet 40 milliGRAM(s) before breakfast      --------------------------------------------------------------------  PHYSICAL EXAM- unchanged from yesterday  Constitutional - NAD, laying in bed, appears comfortable  HEENT - NCAT, EOMI  Neck - Supple, No limited ROM  Chest - CTA bilaterally, No wheeze, No rhonchi, No crackles  Cardiovascular - irregular  Abdomen - BS+, Soft, NTND  Extremities - No C/C/E, No calf tenderness   Skin-no rashes  Neurologic Exam -Awake, Alert, right neglect, +aphasia, right hemiparesis, trace movement of right thumb.      Psychiatric - mood stable    ASSESSMENT & PLAN  63 yo female s/p L MCA infarct, now with decreased functional mobility, dysphagia, aphasia, hemiplegia, gait instability and ADL impairments.    continue OT/PT/ SLP  GI/Bowel Management - colace  Skin - Turn Q2  Pain - Tylenol PRN  DVT PPX - Lovenox for L atrial thrombus  Diet - regular solids with thin liquids  on lexapro  afib- on lovenox, metoprolol  leukocytosis; patient had wbc 13.98 yesterday, discussed with medicine NP,  will recheck labs today and send repeat ua, ucx, cxr however patient currently denies complaint.   Incentive spirometry  precautions: falls, Cardiac, Seizure, aspiration SUBJECTIVE    No acute events overnight. Patient with son at bedside interpreting.  Patient denies complaint, reports she slept well, eating well. Patient denies urinary complaint.  WBC increased yesterday to 13.98, will recheck today however patient currently asymptomatic.  Denies chest pain or shortness of breath.   + aphasia, + R sided weakness.     REVIEW OF SYSTEMS  Constitutional - No fever, No weight loss, No fatigue  HEENT - No eye pain, No visual disturbances, No difficulty hearing, No tinnitus, No vertigo, No neck pain  Neurological - No headaches, No memory loss, + loss of strength, No numbness, No tremors  Skin - No itching, No rashes, No lesions   Musculoskeletal - No joint pain, No joint swelling, No muscle pain  Psychiatric - No depression, No anxiety    RECENT LABS/IMAGING  CBC Full  -  ( 28 Sep 2019 06:00 )  WBC Count : 13.98 K/uL  RBC Count : 3.86 M/uL  Hemoglobin : 10.5 g/dL  Hematocrit : 33.1 %  Platelet Count - Automated : 320 K/uL  Mean Cell Volume : 85.8 fl  Mean Cell Hemoglobin : 27.2 pg  Mean Cell Hemoglobin Concentration : 31.7 gm/dL  Auto Neutrophil # : 8.61 K/uL  Auto Lymphocyte # : 3.89 K/uL  Auto Monocyte # : 1.06 K/uL  Auto Eosinophil # : 0.43 K/uL  Auto Basophil # : 0.06 K/uL  Auto Neutrophil % : 61.1 %  Auto Lymphocyte % : 27.5 %  Auto Monocyte % : 7.5 %  Auto Eosinophil % : 3.0 %  Auto Basophil % : 0.4 %        VITALS  T(C): 36.7 (09-29-19 @ 08:22), Max: 36.7 (09-29-19 @ 08:22)  HR: 91 (09-29-19 @ 08:22) (80 - 101)  BP: 111/71 (09-29-19 @ 08:22) (111/71 - 125/78)  RR: 14 (09-29-19 @ 08:22) (14 - 14)  SpO2: 97% (09-29-19 @ 08:22) (97% - 98%)  Wt(kg): --    MEDICATIONS   acetaminophen   Tablet .. 650 milliGRAM(s) every 6 hours PRN  aspirin  chewable 81 milliGRAM(s) daily  atorvastatin 40 milliGRAM(s) at bedtime  dextrose 40% Gel 15 Gram(s) once PRN  dextrose 5%. 1000 milliLiter(s) <Continuous>  dextrose 50% Injectable 12.5 Gram(s) once  dextrose 50% Injectable 25 Gram(s) once  dextrose 50% Injectable 25 Gram(s) once  docusate sodium 100 milliGRAM(s) two times a day  enoxaparin Injectable 90 milliGRAM(s) daily  escitalopram 5 milliGRAM(s) daily  glucagon  Injectable 1 milliGRAM(s) once PRN  insulin lispro (HumaLOG) corrective regimen sliding scale   three times a day before meals  insulin lispro (HumaLOG) corrective regimen sliding scale   at bedtime  metoprolol succinate ER 12.5 milliGRAM(s) daily  pantoprazole    Tablet 40 milliGRAM(s) before breakfast      --------------------------------------------------------------------  PHYSICAL EXAM- unchanged from yesterday  Constitutional - NAD, laying in bed, appears comfortable  HEENT - NCAT, EOMI  Neck - Supple, No limited ROM  Chest - CTA bilaterally, No wheeze, No rhonchi, No crackles  Cardiovascular - irregular  Abdomen - BS+, Soft, NTND  Extremities - No C/C/E, No calf tenderness   Skin-no rashes  Neurologic Exam -Awake, Alert, right neglect, +aphasia, right hemiparesis, trace movement of right thumb.      Psychiatric - mood stable    ASSESSMENT & PLAN  63 yo female s/p L MCA infarct, now with decreased functional mobility, dysphagia, aphasia, hemiplegia, gait instability and ADL impairments.    continue OT/PT/ SLP  GI/Bowel Management - colace  Skin - Turn Q2  Pain - Tylenol PRN  DVT PPX - Lovenox for L atrial thrombus  Diet - regular solids with thin liquids  on lexapro  afib- on lovenox, metoprolol  leukocytosis; patient had wbc 13.98 yesterday, repeat WBC 12.0 today, continue to monitor, repeat labs in am. patient asymptomatic.  precautions: falls, Cardiac, Seizure, aspiration

## 2019-09-29 NOTE — PROGRESS NOTE ADULT - ASSESSMENT
ASSESSMENT/PLAN  RUSSELL FORTUNE [facility] (was on coumadin), prior R MCA stroke in 2013 and 2016 with no residual deficits, HTN, HLD, DM2, mitral stenosis/rheumatic valvular disease is a acute L MCA infarct, now with decreased functional mobility, dysphagia, aphasia, hemiplegia, gait instability and ADL impairments.

## 2019-09-29 NOTE — PROGRESS NOTE ADULT - PROBLEM SELECTOR PLAN 1
- L MCA infarct   - continue aspirin   - continue atorvastatin  - Pt had CVA while therapeutic on coumadin- being worked up for hypercoagulability- c/w therapeutic dose of lovenox as per hem/onc at [facility] - L MCA infarct   - continue aspirin   - continue atorvastatin  - Pt had CVA while therapeutic on coumadin- being worked up for hypercoagulability- c/w therapeutic dose of lovenox as per hem/onc at [facility]  - incentive spirometer ordered 9/29

## 2019-09-30 LAB
ALBUMIN SERPL ELPH-MCNC: 2.9 G/DL — LOW (ref 3.3–5)
ALP SERPL-CCNC: 66 U/L — SIGNIFICANT CHANGE UP (ref 40–120)
ALT FLD-CCNC: 34 U/L — SIGNIFICANT CHANGE UP (ref 10–45)
ANION GAP SERPL CALC-SCNC: 9 MMOL/L — SIGNIFICANT CHANGE UP (ref 5–17)
AST SERPL-CCNC: 44 U/L — HIGH (ref 10–40)
BILIRUB SERPL-MCNC: 1.1 MG/DL — SIGNIFICANT CHANGE UP (ref 0.2–1.2)
BUN SERPL-MCNC: 20 MG/DL — SIGNIFICANT CHANGE UP (ref 7–23)
CALCIUM SERPL-MCNC: 9.5 MG/DL — SIGNIFICANT CHANGE UP (ref 8.4–10.5)
CHLORIDE SERPL-SCNC: 99 MMOL/L — SIGNIFICANT CHANGE UP (ref 96–108)
CO2 SERPL-SCNC: 25 MMOL/L — SIGNIFICANT CHANGE UP (ref 22–31)
CREAT SERPL-MCNC: 0.7 MG/DL — SIGNIFICANT CHANGE UP (ref 0.5–1.3)
DNA PLOIDY SPEC FC-IMP: SIGNIFICANT CHANGE UP
GLUCOSE BLDC GLUCOMTR-MCNC: 174 MG/DL — HIGH (ref 70–99)
GLUCOSE BLDC GLUCOMTR-MCNC: 179 MG/DL — HIGH (ref 70–99)
GLUCOSE BLDC GLUCOMTR-MCNC: 190 MG/DL — HIGH (ref 70–99)
GLUCOSE BLDC GLUCOMTR-MCNC: 207 MG/DL — HIGH (ref 70–99)
GLUCOSE SERPL-MCNC: 181 MG/DL — HIGH (ref 70–99)
HCT VFR BLD CALC: 34 % — LOW (ref 34.5–45)
HGB BLD-MCNC: 11 G/DL — LOW (ref 11.5–15.5)
MCHC RBC-ENTMCNC: 27.8 PG — SIGNIFICANT CHANGE UP (ref 27–34)
MCHC RBC-ENTMCNC: 32.4 GM/DL — SIGNIFICANT CHANGE UP (ref 32–36)
MCV RBC AUTO: 85.9 FL — SIGNIFICANT CHANGE UP (ref 80–100)
NRBC # BLD: 0 /100 WBCS — SIGNIFICANT CHANGE UP (ref 0–0)
PLATELET # BLD AUTO: 339 K/UL — SIGNIFICANT CHANGE UP (ref 150–400)
POTASSIUM SERPL-MCNC: 4.2 MMOL/L — SIGNIFICANT CHANGE UP (ref 3.5–5.3)
POTASSIUM SERPL-SCNC: 4.2 MMOL/L — SIGNIFICANT CHANGE UP (ref 3.5–5.3)
PROT SERPL-MCNC: 7.6 G/DL — SIGNIFICANT CHANGE UP (ref 6–8.3)
PTR INTERPRETATION: SIGNIFICANT CHANGE UP
RBC # BLD: 3.96 M/UL — SIGNIFICANT CHANGE UP (ref 3.8–5.2)
RBC # FLD: 16.7 % — HIGH (ref 10.3–14.5)
SODIUM SERPL-SCNC: 133 MMOL/L — LOW (ref 135–145)
WBC # BLD: 11.55 K/UL — HIGH (ref 3.8–10.5)
WBC # FLD AUTO: 11.55 K/UL — HIGH (ref 3.8–10.5)

## 2019-09-30 PROCEDURE — 99233 SBSQ HOSP IP/OBS HIGH 50: CPT

## 2019-09-30 RX ADMIN — ATORVASTATIN CALCIUM 40 MILLIGRAM(S): 80 TABLET, FILM COATED ORAL at 21:16

## 2019-09-30 RX ADMIN — ENOXAPARIN SODIUM 90 MILLIGRAM(S): 100 INJECTION SUBCUTANEOUS at 12:02

## 2019-09-30 RX ADMIN — Medication 81 MILLIGRAM(S): at 12:00

## 2019-09-30 RX ADMIN — PANTOPRAZOLE SODIUM 40 MILLIGRAM(S): 20 TABLET, DELAYED RELEASE ORAL at 06:01

## 2019-09-30 RX ADMIN — Medication 100 MILLIGRAM(S): at 06:01

## 2019-09-30 RX ADMIN — Medication 12.5 MILLIGRAM(S): at 06:00

## 2019-09-30 RX ADMIN — Medication 1: at 16:46

## 2019-09-30 RX ADMIN — Medication 2: at 11:58

## 2019-09-30 RX ADMIN — Medication 1: at 08:36

## 2019-09-30 NOTE — PROGRESS NOTE ADULT - PROBLEM SELECTOR PLAN 8
Patient remains asymptomatic  for UTI  Urine culture 49,000-99,000 E. coli  9/23-will hold off on treatment  Continue to monitor.  consider 5 day course if pt becomes febrile   Etiology of leukocytosis is unclear, do not suspect active infection currently  - WBC improved today 9/29 - WBC is 12

## 2019-09-30 NOTE — PROGRESS NOTE ADULT - ASSESSMENT
65 yo female s/p L MCA infarct, now with decreased functional mobility, dysphagia, aphasia, hemiplegia, gait instability and ADL impairments.    Rehab: Gait Instability, ADL impairments and Functional impairments: start Comprehensive Rehab Program of PT/OT/ST    # L MCA infarct   - Aspirin and Lipitor continues.   - f/u hypercoagulable work up per heme/onc at [facility] as pt now with 2nd CVA despite being therapeutic on Coumadin.   - Lovenox Sq continue for CVA ppx  -Start Lexapro for motor recovery    # A. Fib  - Left atrial thrombus: continue weight based lovenox 1.5mg/kg daily.   - Repeat echo for KRISTAN thrombus monitoring.  - metoprolol continues low dose. Rate controlled.     #Leukocytosis  -asymptomatic, follow labs closely. Medicine evaluation.     Diet: Diabetic, dysphagia 3 soft, thin liquids. Advance to regular consistency. Tolerating well.    Renal/Bladder: Voiding independently, PVR x1.     #DM2   - continue sliding scale c Humalog. FS ACHS.      Precautions / PROPHYLAXIS:   - Falls, Cardiac, Seizure   - Ortho: Weight bearing status: WBAT   - Lungs: Aspiration, Incentive Spirometer   - Pressure injury/Skin: Turn Q2hrs while in bed, OOB to Chair, PT/OT    - DVT: Lovenox, SCDs, TEDs

## 2019-09-30 NOTE — PROGRESS NOTE ADULT - SUBJECTIVE AND OBJECTIVE BOX
CHIEF COMPLAINT: Improving aphasia. Persistent neglect and right sided weakness.       HISTORY OF PRESENT ILLNESS  63yo RH female, Nadine speaking, with PMHx of Afib (on coumadin), prior R MCA stroke in 2013 and 2016 with no residual deficits, HTN, HLD, DM2, mitral stenosis/rheumatic valvular disease with recent hospitalization (Sept 2019) at [facility] for AF for potential cardioversion (Hx of labile INR) and mitral valve intervention. At the time patient found to have KRISTAN thrombus on echo.   On 9/22 pt unable to speak or move her R side and brought to ED by family. CT Head 9/22 showed L MCA acute-subacute infarcts. CTA head demonstrated L M2 occlusion. Patient was not a candidate for endovascular treatment. INR therapeutic on Coumadin, per family at bedside, patient was compliant with Coumadin.  MRI Head follow up 9/23 shows redemonstration of acute left KUSUM and MCA territory infarcts primarily involving the left frontal lobe. No yola hemorrhagic transformation. No midline shift or hydrocephalus. Chronic right frontal and right cerebellar hemisphere infarcts.  TTE EF 65-70%, severe mitral stenosis, mild aortic stenosis. mild aortic regurgitation. Tele with afib- rate controlled. Placed on hep gtt with goal to transition to lovenox on d/c. As per cardiology, KRISTAN thrombus may take up to 2-4 weeks to resolve. Will need repeat BUBBA in 3-4 weeks. Possible valve intervention pending recovery and goals of care. CT Chest/Abd/Pelvis to r/o malignancy on 9/24. No findings to suggest intrathoracic or intra-abdominal malignancy. However, renal eval of possible renal infarct on CT obtained. Hematology in, Lovenox 1.5mg/kg daily and hypercoagulable workup.  Hospitalization additionally complicated by mild leukocytosis. CXR negative, UA negative, RVP negative-continue close monitoring.   Stable for d/c to [facility] on 9/26/19. (26 Sep 2019 11:38)      PAST MEDICAL & SURGICAL HISTORY:  TIA (transient ischemic attack): 2013- Rt facial droop which resolved after 2 hours  Mitral stenosis  Atrial fibrillation: h/o Cardioversion in 2013 and on Coumadin  DM (diabetes mellitus): Type II  HLD (hyperlipidemia)  HTN (hypertension)  S/P laser cataract surgery, unspecified laterality: ???  S/P knee surgery  Ventral hernia-        REVIEW OF SYMPTOMS  [X] Constitutional WNL             [X] Resp WNL           [X] GI WNL                          [X]  WNL                   [X] Heme WNL                                [X] Skin WNL                 [X] MSK WNL                  VITALS  Vital Signs Last 24 Hrs  T(C): 36.3 (30 Sep 2019 07:50), Max: 36.4 (29 Sep 2019 21:27)  T(F): 97.4 (30 Sep 2019 07:50), Max: 97.5 (29 Sep 2019 21:27)  HR: 78 (30 Sep 2019 07:50) (78 - 87)  BP: 102/67 (30 Sep 2019 07:50) (102/67 - 121/78)  BP(mean): --  RR: 14 (30 Sep 2019 07:50) (14 - 14)  SpO2: 97% (30 Sep 2019 07:50) (97% - 98%)    PHYSICAL EXAM  Constitutional - NAD  HEENT - NCAT, EOMI  Neck - Supple, No limited ROM  Chest - CTA bilaterally, No wheeze, No rhonchi, No crackles  Cardiovascular - irregular, murmur  Abdomen - BS+, Soft, NTND  Extremities - No C/C/E, No calf tenderness   Skin-no rash  Wounds-na      Neurologic Exam -Awake, Alert, right neglect, aphasia, right hemiparesis.      Balance - impaired     Psychiatric - appears depressed    FUNCTIONAL PROGRESS  Gait - eval  ADLs - eval  Transfers - eval  Functional transfer - eval      RECENT LABS                        11.0   11.55 )-----------( 339      ( 30 Sep 2019 07:54 )             34.0     09-30    133<L>  |  99  |  20  ----------------------------<  181<H>  4.2   |  25  |  0.70    Ca    9.5      30 Sep 2019 07:54    TPro  7.6  /  Alb  2.9<L>  /  TBili  1.1  /  DBili  x   /  AST  44<H>  /  ALT  34  /  AlkPhos  66  09-30      LIVER FUNCTIONS - ( 30 Sep 2019 07:54 )  Alb: 2.9 g/dL / Pro: 7.6 g/dL / ALK PHOS: 66 U/L / ALT: 34 U/L / AST: 44 U/L / GGT: x             Direct LDL: 46 mg/dL (09-23-19 @ 09:24)  Direct LDL: 58 mg/dL (09-13-19 @ 07:00)    Hemoglobin A1C, Whole Blood: 6.7 % (09-23-19 @ 08:45)  Hemoglobin A1C, Whole Blood: 6.3 % (09-13-19 @ 07:00)              RADIOLOGY/OTHER RESULTS      CURRENT MEDICATIONS  MEDICATIONS  (STANDING):  aspirin  chewable 81 milliGRAM(s) Oral daily  atorvastatin 40 milliGRAM(s) Oral at bedtime  dextrose 5%. 1000 milliLiter(s) (50 mL/Hr) IV Continuous <Continuous>  dextrose 50% Injectable 12.5 Gram(s) IV Push once  dextrose 50% Injectable 25 Gram(s) IV Push once  dextrose 50% Injectable 25 Gram(s) IV Push once  docusate sodium 100 milliGRAM(s) Oral two times a day  enoxaparin Injectable 90 milliGRAM(s) SubCutaneous daily  escitalopram 5 milliGRAM(s) Oral daily  insulin lispro (HumaLOG) corrective regimen sliding scale   SubCutaneous three times a day before meals  insulin lispro (HumaLOG) corrective regimen sliding scale   SubCutaneous at bedtime  metoprolol succinate ER 12.5 milliGRAM(s) Oral daily  pantoprazole    Tablet 40 milliGRAM(s) Oral before breakfast    MEDICATIONS  (PRN):  acetaminophen   Tablet .. 650 milliGRAM(s) Oral every 6 hours PRN Moderate Pain (4 - 6)  dextrose 40% Gel 15 Gram(s) Oral once PRN Blood Glucose LESS THAN 70 milliGRAM(s)/deciliter  glucagon  Injectable 1 milliGRAM(s) IntraMuscular once PRN Glucose LESS THAN 70 milligrams/deciliter      ASSESSMENT & PLAN    GI/Bowel Management - colace   Management - Toilet Q2  Skin - Turn Q2  Pain - Tylenol PRN  DVT PPX - Lovenox  Diet - reg with thins    Continue comprehensive acute rehab program consisting of 3hrs/day of OT/PT and SLP. CHIEF COMPLAINT: Improving aphasia. Persistent neglect and right sided weakness.       HISTORY OF PRESENT ILLNESS  65yo RH female, Nadine speaking, with PMHx of Afib (on coumadin), prior R MCA stroke in 2013 and 2016 with no residual deficits, HTN, HLD, DM2, mitral stenosis/rheumatic valvular disease with recent hospitalization (Sept 2019) at [facility] for AF for potential cardioversion (Hx of labile INR) and mitral valve intervention. At the time patient found to have KRISTAN thrombus on echo.   On 9/22 pt unable to speak or move her R side and brought to ED by family. CT Head 9/22 showed L MCA acute-subacute infarcts. CTA head demonstrated L M2 occlusion. Patient was not a candidate for endovascular treatment. INR therapeutic on Coumadin, per family at bedside, patient was compliant with Coumadin.  MRI Head follow up 9/23 shows redemonstration of acute left KUSUM and MCA territory infarcts primarily involving the left frontal lobe. No yola hemorrhagic transformation. No midline shift or hydrocephalus. Chronic right frontal and right cerebellar hemisphere infarcts.  TTE EF 65-70%, severe mitral stenosis, mild aortic stenosis. mild aortic regurgitation. Tele with afib- rate controlled. Placed on hep gtt with goal to transition to lovenox on d/c. As per cardiology, KRISTAN thrombus may take up to 2-4 weeks to resolve. Will need repeat BUBBA in 3-4 weeks. Possible valve intervention pending recovery and goals of care. CT Chest/Abd/Pelvis to r/o malignancy on 9/24. No findings to suggest intrathoracic or intra-abdominal malignancy. However, renal eval of possible renal infarct on CT obtained. Hematology in, Lovenox 1.5mg/kg daily and hypercoagulable workup.  Hospitalization additionally complicated by mild leukocytosis. CXR negative, UA negative, RVP negative-continue close monitoring.   Stable for d/c to [facility] on 9/26/19. (26 Sep 2019 11:38)      PAST MEDICAL & SURGICAL HISTORY:  TIA (transient ischemic attack): 2013- Rt facial droop which resolved after 2 hours  Mitral stenosis  Atrial fibrillation: h/o Cardioversion in 2013 and on Coumadin  DM (diabetes mellitus): Type II  HLD (hyperlipidemia)  HTN (hypertension)  S/P laser cataract surgery, unspecified laterality: ???  S/P knee surgery  Ventral hernia-        REVIEW OF SYMPTOMS  [X] Constitutional WNL             [X] Resp WNL           [X] GI WNL                          [X]  WNL                   [X] Heme WNL                                [X] Skin WNL                 [X] MSK WNL                  VITALS  Vital Signs Last 24 Hrs  T(C): 36.3 (30 Sep 2019 07:50), Max: 36.4 (29 Sep 2019 21:27)  T(F): 97.4 (30 Sep 2019 07:50), Max: 97.5 (29 Sep 2019 21:27)  HR: 78 (30 Sep 2019 07:50) (78 - 87)  BP: 102/67 (30 Sep 2019 07:50) (102/67 - 121/78)  BP(mean): --  RR: 14 (30 Sep 2019 07:50) (14 - 14)  SpO2: 97% (30 Sep 2019 07:50) (97% - 98%)    PHYSICAL EXAM  Constitutional - NAD  HEENT - NCAT, EOMI  Neck - Supple, No limited ROM  Chest - CTA bilaterally, No wheeze, No rhonchi, No crackles  Cardiovascular - irregular, murmur  Abdomen - BS+, Soft, NTND  Extremities - No C/C/E, No calf tenderness   Skin-no rash  Wounds-na      Neurologic Exam -Awake, Alert, right neglect, aphasia, right hemiparesis.      Balance - impaired     Psychiatric - appears depressed    FUNCTIONAL PROGRESS  Gait - max A  ADLs - max A  Transfers - max A  Functional transfer - max A      RECENT LABS                        11.0   11.55 )-----------( 339      ( 30 Sep 2019 07:54 )             34.0     09-30    133<L>  |  99  |  20  ----------------------------<  181<H>  4.2   |  25  |  0.70    Ca    9.5      30 Sep 2019 07:54    TPro  7.6  /  Alb  2.9<L>  /  TBili  1.1  /  DBili  x   /  AST  44<H>  /  ALT  34  /  AlkPhos  66  09-30      LIVER FUNCTIONS - ( 30 Sep 2019 07:54 )  Alb: 2.9 g/dL / Pro: 7.6 g/dL / ALK PHOS: 66 U/L / ALT: 34 U/L / AST: 44 U/L / GGT: x             Direct LDL: 46 mg/dL (09-23-19 @ 09:24)  Direct LDL: 58 mg/dL (09-13-19 @ 07:00)    Hemoglobin A1C, Whole Blood: 6.7 % (09-23-19 @ 08:45)  Hemoglobin A1C, Whole Blood: 6.3 % (09-13-19 @ 07:00)              RADIOLOGY/OTHER RESULTS      CURRENT MEDICATIONS  MEDICATIONS  (STANDING):  aspirin  chewable 81 milliGRAM(s) Oral daily  atorvastatin 40 milliGRAM(s) Oral at bedtime  dextrose 5%. 1000 milliLiter(s) (50 mL/Hr) IV Continuous <Continuous>  dextrose 50% Injectable 12.5 Gram(s) IV Push once  dextrose 50% Injectable 25 Gram(s) IV Push once  dextrose 50% Injectable 25 Gram(s) IV Push once  docusate sodium 100 milliGRAM(s) Oral two times a day  enoxaparin Injectable 90 milliGRAM(s) SubCutaneous daily  escitalopram 5 milliGRAM(s) Oral daily  insulin lispro (HumaLOG) corrective regimen sliding scale   SubCutaneous three times a day before meals  insulin lispro (HumaLOG) corrective regimen sliding scale   SubCutaneous at bedtime  metoprolol succinate ER 12.5 milliGRAM(s) Oral daily  pantoprazole    Tablet 40 milliGRAM(s) Oral before breakfast    MEDICATIONS  (PRN):  acetaminophen   Tablet .. 650 milliGRAM(s) Oral every 6 hours PRN Moderate Pain (4 - 6)  dextrose 40% Gel 15 Gram(s) Oral once PRN Blood Glucose LESS THAN 70 milliGRAM(s)/deciliter  glucagon  Injectable 1 milliGRAM(s) IntraMuscular once PRN Glucose LESS THAN 70 milligrams/deciliter      ASSESSMENT & PLAN    GI/Bowel Management - colace   Management - Toilet Q2  Skin - Turn Q2  Pain - Tylenol PRN  DVT PPX - Lovenox  Diet - reg with thins    Continue comprehensive acute rehab program consisting of 3hrs/day of OT/PT and SLP. CHIEF COMPLAINT: Improving aphasia. Persistent neglect and right sided weakness.       HISTORY OF PRESENT ILLNESS  65yo RH female, Nadine speaking, with PMHx of Afib (on coumadin), prior R MCA stroke in 2013 and 2016 with no residual deficits, HTN, HLD, DM2, mitral stenosis/rheumatic valvular disease with recent hospitalization (Sept 2019) at [facility] for AF for potential cardioversion (Hx of labile INR) and mitral valve intervention. At the time patient found to have KRISTAN thrombus on echo.   On 9/22 pt unable to speak or move her R side and brought to ED by family. CT Head 9/22 showed L MCA acute-subacute infarcts. CTA head demonstrated L M2 occlusion. Patient was not a candidate for endovascular treatment. INR therapeutic on Coumadin, per family at bedside, patient was compliant with Coumadin.  MRI Head follow up 9/23 shows redemonstration of acute left KUSUM and MCA territory infarcts primarily involving the left frontal lobe. No yola hemorrhagic transformation. No midline shift or hydrocephalus. Chronic right frontal and right cerebellar hemisphere infarcts.  TTE EF 65-70%, severe mitral stenosis, mild aortic stenosis. mild aortic regurgitation. Tele with afib- rate controlled. Placed on hep gtt with goal to transition to lovenox on d/c. As per cardiology, KRISTAN thrombus may take up to 2-4 weeks to resolve. Will need repeat BUBBA in 3-4 weeks. Possible valve intervention pending recovery and goals of care. CT Chest/Abd/Pelvis to r/o malignancy on 9/24. No findings to suggest intrathoracic or intra-abdominal malignancy. However, renal eval of possible renal infarct on CT obtained. Hematology in, Lovenox 1.5mg/kg daily and hypercoagulable workup.  Hospitalization additionally complicated by mild leukocytosis. CXR negative, UA negative, RVP negative-continue close monitoring.   Stable for d/c to [facility] on 9/26/19. (26 Sep 2019 11:38)      PAST MEDICAL & SURGICAL HISTORY:  TIA (transient ischemic attack): 2013- Rt facial droop which resolved after 2 hours  Mitral stenosis  Atrial fibrillation: h/o Cardioversion in 2013 and on Coumadin  DM (diabetes mellitus): Type II  HLD (hyperlipidemia)  HTN (hypertension)  S/P laser cataract surgery, unspecified laterality: ???  S/P knee surgery  Ventral hernia-        REVIEW OF SYMPTOMS  [X] Constitutional WNL             [X] Resp WNL           [X] GI WNL                          [X]  WNL                   [X] Heme WNL                                [X] Skin WNL                 [X] MSK WNL                  VITALS  Vital Signs Last 24 Hrs  T(C): 36.3 (30 Sep 2019 07:50), Max: 36.4 (29 Sep 2019 21:27)  T(F): 97.4 (30 Sep 2019 07:50), Max: 97.5 (29 Sep 2019 21:27)  HR: 78 (30 Sep 2019 07:50) (78 - 87)  BP: 102/67 (30 Sep 2019 07:50) (102/67 - 121/78)  BP(mean): --  RR: 14 (30 Sep 2019 07:50) (14 - 14)  SpO2: 97% (30 Sep 2019 07:50) (97% - 98%)    PHYSICAL EXAM  Constitutional - NAD  HEENT - NCAT, EOMI  Neck - Supple, No limited ROM  Chest - CTA bilaterally, No wheeze, No rhonchi, No crackles  Cardiovascular - irregular, murmur  Abdomen - BS+, Soft, NTND  Extremities - No C/C/E, No calf tenderness   Skin-no rash  Wounds-na      Neurologic Exam -Awake, Alert, right neglect, aphasia, right hemiparesis.      Balance - impaired     Psychiatric - appears depressed    FUNCTIONAL PROGRESS  Gait - unable, max A to stand  ADLs - max A  Transfers - max A  Functional transfer - max A      RECENT LABS                        11.0   11.55 )-----------( 339      ( 30 Sep 2019 07:54 )             34.0     09-30    133<L>  |  99  |  20  ----------------------------<  181<H>  4.2   |  25  |  0.70    Ca    9.5      30 Sep 2019 07:54    TPro  7.6  /  Alb  2.9<L>  /  TBili  1.1  /  DBili  x   /  AST  44<H>  /  ALT  34  /  AlkPhos  66  09-30      LIVER FUNCTIONS - ( 30 Sep 2019 07:54 )  Alb: 2.9 g/dL / Pro: 7.6 g/dL / ALK PHOS: 66 U/L / ALT: 34 U/L / AST: 44 U/L / GGT: x             Direct LDL: 46 mg/dL (09-23-19 @ 09:24)  Direct LDL: 58 mg/dL (09-13-19 @ 07:00)    Hemoglobin A1C, Whole Blood: 6.7 % (09-23-19 @ 08:45)  Hemoglobin A1C, Whole Blood: 6.3 % (09-13-19 @ 07:00)              RADIOLOGY/OTHER RESULTS      CURRENT MEDICATIONS  MEDICATIONS  (STANDING):  aspirin  chewable 81 milliGRAM(s) Oral daily  atorvastatin 40 milliGRAM(s) Oral at bedtime  dextrose 5%. 1000 milliLiter(s) (50 mL/Hr) IV Continuous <Continuous>  dextrose 50% Injectable 12.5 Gram(s) IV Push once  dextrose 50% Injectable 25 Gram(s) IV Push once  dextrose 50% Injectable 25 Gram(s) IV Push once  docusate sodium 100 milliGRAM(s) Oral two times a day  enoxaparin Injectable 90 milliGRAM(s) SubCutaneous daily  escitalopram 5 milliGRAM(s) Oral daily  insulin lispro (HumaLOG) corrective regimen sliding scale   SubCutaneous three times a day before meals  insulin lispro (HumaLOG) corrective regimen sliding scale   SubCutaneous at bedtime  metoprolol succinate ER 12.5 milliGRAM(s) Oral daily  pantoprazole    Tablet 40 milliGRAM(s) Oral before breakfast    MEDICATIONS  (PRN):  acetaminophen   Tablet .. 650 milliGRAM(s) Oral every 6 hours PRN Moderate Pain (4 - 6)  dextrose 40% Gel 15 Gram(s) Oral once PRN Blood Glucose LESS THAN 70 milliGRAM(s)/deciliter  glucagon  Injectable 1 milliGRAM(s) IntraMuscular once PRN Glucose LESS THAN 70 milligrams/deciliter      ASSESSMENT & PLAN    GI/Bowel Management - colace   Management - Toilet Q2  Skin - Turn Q2  Pain - Tylenol PRN  DVT PPX - Lovenox  Diet - reg with thins    Continue comprehensive acute rehab program consisting of 3hrs/day of OT/PT and SLP.

## 2019-09-30 NOTE — PROGRESS NOTE ADULT - ATTENDING COMMENTS
No acute events over past weekend.  Appears more alert now with  increasing verbal output and comprehension  Unchanged right hemiparesis  Full dose AC/Lovenx, tolerates well. GI ppx, monitor H/H  Multidisciplinary team meeting today:  patient's functional goals and needs, functional and clinical  progress were discussed, barriers to discharge were identified. Anticipate discharge home with home care, 24/7 supervision for safety vs [facility] facility placement.   [facility] 2-3 weeks

## 2019-09-30 NOTE — PROGRESS NOTE ADULT - SUBJECTIVE AND OBJECTIVE BOX
Patient is a 64y old  Female who presents with a chief complaint of s/p b/l CVAs  with deficits .  Pt without complaints  Son at bedside as interporter , pt does not speak english       Patient seen and examined at bedside.  labs and test reviewed     ALLERGIES:  No Known Allergies    MEDICATIONS  (STANDING):  aspirin  chewable 81 milliGRAM(s) Oral daily  atorvastatin 40 milliGRAM(s) Oral at bedtime  dextrose 5%. 1000 milliLiter(s) (50 mL/Hr) IV Continuous <Continuous>  dextrose 50% Injectable 12.5 Gram(s) IV Push once  dextrose 50% Injectable 25 Gram(s) IV Push once  dextrose 50% Injectable 25 Gram(s) IV Push once  docusate sodium 100 milliGRAM(s) Oral two times a day  enoxaparin Injectable 90 milliGRAM(s) SubCutaneous daily  escitalopram 5 milliGRAM(s) Oral daily  insulin lispro (HumaLOG) corrective regimen sliding scale   SubCutaneous three times a day before meals  insulin lispro (HumaLOG) corrective regimen sliding scale   SubCutaneous at bedtime  metoprolol succinate ER 12.5 milliGRAM(s) Oral daily  pantoprazole    Tablet 40 milliGRAM(s) Oral before breakfast    MEDICATIONS  (PRN):  acetaminophen   Tablet .. 650 milliGRAM(s) Oral every 6 hours PRN Moderate Pain (4 - 6)  dextrose 40% Gel 15 Gram(s) Oral once PRN Blood Glucose LESS THAN 70 milliGRAM(s)/deciliter  glucagon  Injectable 1 milliGRAM(s) IntraMuscular once PRN Glucose LESS THAN 70 milligrams/deciliter    Vital Signs Last 24 Hrs  T(F): 97.4 (30 Sep 2019 07:50), Max: 97.5 (29 Sep 2019 21:27)  HR: 78 (30 Sep 2019 07:50) (78 - 87)  BP: 102/67 (30 Sep 2019 07:50) (102/67 - 121/78)  RR: 14 (30 Sep 2019 07:50) (14 - 14)  SpO2: 97% (30 Sep 2019 07:50) (97% - 98%)  I&O's Summary    PHYSICAL EXAM:  General: NAD, A/O x 3  ENT: MMM  Neck: Supple, No JVD  Lungs: Clear to auscultation bilaterally, Non labored breathing   Cardio: RRR, S1/S2, No murmurs  Abdomen: Soft, Nontender, Nondistended; Bowel sounds present  Extremities: No calf tenderness, No pitting edema    LABS:                        11.0   11.55 )-----------( 339      ( 30 Sep 2019 07:54 )             34.0     09-30    133  |  99  |  20  ----------------------------<  181  4.2   |  25  |  0.70    Ca    9.5      30 Sep 2019 07:54    TPro  7.6  /  Alb  2.9  /  TBili  1.1  /  DBili  x   /  AST  44  /  ALT  34  /  AlkPhos  66  09-30    eGFR if Non : 93 mL/min/1.73M2 (09-30-19 @ 07:54)  eGFR if : 107 mL/min/1.73M2 (09-30-19 @ 07:54)            09-23 Chol 98 mg/dL LDL 46 mg/dL HDL 39 mg/dL Trig 67 mg/dL              POCT Blood Glucose.: 179 mg/dL (30 Sep 2019 07:49)  POCT Blood Glucose.: 192 mg/dL (29 Sep 2019 21:30)  POCT Blood Glucose.: 172 mg/dL (29 Sep 2019 16:44)  POCT Blood Glucose.: 164 mg/dL (29 Sep 2019 12:27)    09-23 [id number] 6.7  09-13 DnothfwidsE4W 6.3        Culture - Urine (collected 25 Sep 2019 20:57)  Source: .Urine  Final Report (27 Sep 2019 23:20):    50,000 - 99,000 CFU/mL Escherichia coli    10,000 - 49,000 CFU/mL Enterococcus faecalis  Organism: Escherichia coli  Enterococcus faecalis (27 Sep 2019 23:20)  Organism: Enterococcus faecalis (27 Sep 2019 23:20)      -  Ampicillin: S <=2 Predicts results to ampicillin/sulbactam, amoxacillin-clavulanate and  piperacillin-tazobactam.      -  Ciprofloxacin: I 2      -  Levofloxacin: S 2      -  Nitrofurantoin: S <=32 Should not be used to treat pyelonephritis.      -  Tetra/Doxy: S <=4      -  Vancomycin: S 2      Method Type: CHRISSIE  Organism: Escherichia coli (27 Sep 2019 23:20)      -  Amikacin: S <=16      -  Ampicillin: S <=8 These ampicillin results predict results for amoxicillin      -  Ampicillin/Sulbactam: S <=8/4 Enterobacter, Citrobacter, and Serratia may develop resistance during prolonged therapy (3-4 days)      -  Aztreonam: S <=4      -  Cefazolin: S <=8 (MIC_CL_COM_ENTERIC_CEFAZU) For uncomplicated UTI with K. pneumoniae, E. coli, or P. mirablis: CHRISSIE <=16 is sensitive and CHRISSIE >=32 is resistant. This also predicts results for oral agents cefaclor, cefdinir, cefpodoxime, cefprozil, cefuroxime axetil, cephalexin and locarbef for uncomplicated UTI. Note that some isolates may be susceptible to these agents while testing resistant to cefazolin.      -  Cefepime: S <=4      -  Cefoxitin: S <=8      -  Ceftriaxone: S <=1 Enterobacter, Citrobacter, and Serratia may develop resistance during prolonged therapy      -  Ciprofloxacin: S <=1      -  Gentamicin: S <=4      -  Imipenem: S <=1      -  Levofloxacin: S <=2      -  Meropenem: S <=1      -  Nitrofurantoin: S <=32 Should not be used to treat pyelonephritis      -  Piperacillin/Tazobactam: S <=16      -  Tigecycline: S <=2      -  Tobramycin: S <=4      -  Trimethoprim/Sulfamethoxazole: S <=2/38      Method Type: CHRISSIE      RADIOLOGY & ADDITIONAL TESTS:    Care Discussed with Consultants/Other Providers: Patient is a 64y old  Female who presents with a chief complaint of s/p b/l CVAs  with deficits .  Pt without complaints  Son at bedside as interporter , pt does not speak english       Patient seen and examined at bedside.  labs and test reviewed     ALLERGIES:  No Known Allergies    MEDICATIONS  (STANDING):  aspirin  chewable 81 milliGRAM(s) Oral daily  atorvastatin 40 milliGRAM(s) Oral at bedtime  dextrose 5%. 1000 milliLiter(s) (50 mL/Hr) IV Continuous <Continuous>  dextrose 50% Injectable 12.5 Gram(s) IV Push once  dextrose 50% Injectable 25 Gram(s) IV Push once  dextrose 50% Injectable 25 Gram(s) IV Push once  docusate sodium 100 milliGRAM(s) Oral two times a day  enoxaparin Injectable 90 milliGRAM(s) SubCutaneous daily  escitalopram 5 milliGRAM(s) Oral daily  insulin lispro (HumaLOG) corrective regimen sliding scale   SubCutaneous three times a day before meals  insulin lispro (HumaLOG) corrective regimen sliding scale   SubCutaneous at bedtime  metoprolol succinate ER 12.5 milliGRAM(s) Oral daily  pantoprazole    Tablet 40 milliGRAM(s) Oral before breakfast    MEDICATIONS  (PRN):  acetaminophen   Tablet .. 650 milliGRAM(s) Oral every 6 hours PRN Moderate Pain (4 - 6)  dextrose 40% Gel 15 Gram(s) Oral once PRN Blood Glucose LESS THAN 70 milliGRAM(s)/deciliter  glucagon  Injectable 1 milliGRAM(s) IntraMuscular once PRN Glucose LESS THAN 70 milligrams/deciliter    Vital Signs Last 24 Hrs  T(F): 97.4 (30 Sep 2019 07:50), Max: 97.5 (29 Sep 2019 21:27)  HR: 78 (30 Sep 2019 07:50) (78 - 87)  BP: 102/67 (30 Sep 2019 07:50) (102/67 - 121/78)  RR: 14 (30 Sep 2019 07:50) (14 - 14)  SpO2: 97% (30 Sep 2019 07:50) (97% - 98%)  I&O's Summary    PHYSICAL EXAM:  General: NAD, A/O x 1  pt does not understand english  Pt son at bedside as interporter   ENT: MMM  Neck: Supple, No JVD  Lungs: Clear to auscultation bilaterally, Non labored breathing   Cardio: RRR, S1/S2, No murmurs  Abdomen: Soft, Nontender, Nondistended; Bowel sounds present  Extremities: No calf tenderness, No pitting edema right sided weakness     LABS:                        11.0   11.55 )-----------( 339      ( 30 Sep 2019 07:54 )             34.0     09-30    133  |  99  |  20  ----------------------------<  181  4.2   |  25  |  0.70    Ca    9.5      30 Sep 2019 07:54    TPro  7.6  /  Alb  2.9  /  TBili  1.1  /  DBili  x   /  AST  44  /  ALT  34  /  AlkPhos  66  09-30    eGFR if Non : 93 mL/min/1.73M2 (09-30-19 @ 07:54)  eGFR if : 107 mL/min/1.73M2 (09-30-19 @ 07:54)            09-23 Chol 98 mg/dL LDL 46 mg/dL HDL 39 mg/dL Trig 67 mg/dL              POCT Blood Glucose.: 179 mg/dL (30 Sep 2019 07:49)  POCT Blood Glucose.: 192 mg/dL (29 Sep 2019 21:30)  POCT Blood Glucose.: 172 mg/dL (29 Sep 2019 16:44)  POCT Blood Glucose.: 164 mg/dL (29 Sep 2019 12:27)    09-23 [id number] 6.7  09-13 CnntokqkihX9S 6.3        Culture - Urine (collected 25 Sep 2019 20:57)  Source: .Urine  Final Report (27 Sep 2019 23:20):    50,000 - 99,000 CFU/mL Escherichia coli    10,000 - 49,000 CFU/mL Enterococcus faecalis  Organism: Escherichia coli  Enterococcus faecalis (27 Sep 2019 23:20)  Organism: Enterococcus faecalis (27 Sep 2019 23:20)      -  Ampicillin: S <=2 Predicts results to ampicillin/sulbactam, amoxacillin-clavulanate and  piperacillin-tazobactam.      -  Ciprofloxacin: I 2      -  Levofloxacin: S 2      -  Nitrofurantoin: S <=32 Should not be used to treat pyelonephritis.      -  Tetra/Doxy: S <=4      -  Vancomycin: S 2      Method Type: CHRISSIE  Organism: Escherichia coli (27 Sep 2019 23:20)      -  Amikacin: S <=16      -  Ampicillin: S <=8 These ampicillin results predict results for amoxicillin      -  Ampicillin/Sulbactam: S <=8/4 Enterobacter, Citrobacter, and Serratia may develop resistance during prolonged therapy (3-4 days)      -  Aztreonam: S <=4      -  Cefazolin: S <=8 (MIC_CL_COM_ENTERIC_CEFAZU) For uncomplicated UTI with K. pneumoniae, E. coli, or P. mirablis: CHRISSIE <=16 is sensitive and CHRISSIE >=32 is resistant. This also predicts results for oral agents cefaclor, cefdinir, cefpodoxime, cefprozil, cefuroxime axetil, cephalexin and locarbef for uncomplicated UTI. Note that some isolates may be susceptible to these agents while testing resistant to cefazolin.      -  Cefepime: S <=4      -  Cefoxitin: S <=8      -  Ceftriaxone: S <=1 Enterobacter, Citrobacter, and Serratia may develop resistance during prolonged therapy      -  Ciprofloxacin: S <=1      -  Gentamicin: S <=4      -  Imipenem: S <=1      -  Levofloxacin: S <=2      -  Meropenem: S <=1      -  Nitrofurantoin: S <=32 Should not be used to treat pyelonephritis      -  Piperacillin/Tazobactam: S <=16      -  Tigecycline: S <=2      -  Tobramycin: S <=4      -  Trimethoprim/Sulfamethoxazole: S <=2/38      Method Type: CHRISSIE      RADIOLOGY & ADDITIONAL TESTS:    Care Discussed with Consultants/Other Providers:

## 2019-09-30 NOTE — PROGRESS NOTE ADULT - ASSESSMENT
63 y/o female PMH Afib (was on coumadin), prior R MCA stroke in 2013 and 2016 with no residual deficits, HTN, HLD, DM2, mitral stenosis/rheumatic valvular disease is a acute L MCA infarct, now with decreased functional mobility, dysphagia, aphasia, hemiplegia, gait instability and ADL impairments.

## 2019-09-30 NOTE — PROGRESS NOTE ADULT - PROBLEM SELECTOR PLAN 1
- L MCA infarct   - continue aspirin   - continue atorvastatin  - Pt had CVA while therapeutic on coumadin- being worked up for hypercoagulability- c/w therapeutic dose of lovenox as per hem/onc at [facility]

## 2019-10-01 LAB
GLUCOSE BLDC GLUCOMTR-MCNC: 156 MG/DL — HIGH (ref 70–99)
GLUCOSE BLDC GLUCOMTR-MCNC: 187 MG/DL — HIGH (ref 70–99)
GLUCOSE BLDC GLUCOMTR-MCNC: 193 MG/DL — HIGH (ref 70–99)
GLUCOSE BLDC GLUCOMTR-MCNC: 240 MG/DL — HIGH (ref 70–99)

## 2019-10-01 PROCEDURE — 99232 SBSQ HOSP IP/OBS MODERATE 35: CPT

## 2019-10-01 RX ADMIN — Medication 1: at 07:53

## 2019-10-01 RX ADMIN — ATORVASTATIN CALCIUM 40 MILLIGRAM(S): 80 TABLET, FILM COATED ORAL at 21:29

## 2019-10-01 RX ADMIN — Medication 81 MILLIGRAM(S): at 11:54

## 2019-10-01 RX ADMIN — PANTOPRAZOLE SODIUM 40 MILLIGRAM(S): 20 TABLET, DELAYED RELEASE ORAL at 05:55

## 2019-10-01 RX ADMIN — Medication 1: at 11:54

## 2019-10-01 RX ADMIN — ENOXAPARIN SODIUM 90 MILLIGRAM(S): 100 INJECTION SUBCUTANEOUS at 11:55

## 2019-10-01 RX ADMIN — ESCITALOPRAM OXALATE 5 MILLIGRAM(S): 10 TABLET, FILM COATED ORAL at 11:54

## 2019-10-01 RX ADMIN — Medication 2: at 16:33

## 2019-10-01 RX ADMIN — Medication 100 MILLIGRAM(S): at 05:55

## 2019-10-01 RX ADMIN — Medication 12.5 MILLIGRAM(S): at 06:56

## 2019-10-01 NOTE — PROGRESS NOTE ADULT - ASSESSMENT
63 yo female s/p L MCA infarct, now with decreased functional mobility, dysphagia, aphasia, hemiplegia, gait instability and ADL impairments.    Rehab: Gait Instability, ADL impairments and Functional impairments: start Comprehensive Rehab Program of PT/OT/ST    # L MCA infarct   - Aspirin and Lipitor continues.   - f/u hypercoagulable work up per heme/onc at [facility] as pt now with 2nd CVA despite being therapeutic on Coumadin.   - Lovenox Sq continue for CVA ppx at 90mg daily.   - Lexapro for motor recovery-indication discussed with son.     # A. Fib  - Left atrial thrombus: continue weight based Lovenox 1.5mg/kg daily.   - Repeat echo for KRISTAN thrombus monitoring.  - metoprolol continues low dose. Rate controlled.     #Leukocytosis  -asymptomatic, follow labs closely. Medicine evaluation.     Diet: Diabetic, dysphagia 3 soft, thin liquids. Advance to regular consistency. Tolerating well.    Renal/Bladder: Voiding independently.     #DM2   - continue sliding scale c Humalog. FS ACHS.      Precautions / PROPHYLAXIS:   - Falls, Cardiac, Seizure   - Ortho: Weight bearing status: WBAT   - Lungs: Aspiration, Incentive Spirometer   - Pressure injury/Skin: Turn Q2hrs while in bed, OOB to Chair, PT/OT    - DVT: Lovenox, SCDs, TEDs

## 2019-10-01 NOTE — CHART NOTE - NSCHARTNOTEFT_GEN_A_CORE
Nutrition Follow Up Note  Hospital Course   (Per Electronic Medical Record):     Source:   Family [X]   Medical Record [X]      Diet:   Consistent Carbohydrate Diet w/ Thin Liquids  Tolerates Diet Well  No Chewing/Swallowing Difficulties  No Recent Nausea, Vomiting, Diarrhea or Constipation   Consumes 100% of Meals (as Per Documentation)   Good PO Intake/Appetite (Per Family Memember)  Education Provided on Blood Glucose Management     Enteral/Parenteral Nutrition: N/A    Current Weight: 113.7lb on 9/26  Obtain New Weight  Obtain Weights Weekly     Pertinent Medications: MEDICATIONS  (STANDING):  aspirin  chewable 81 milliGRAM(s) Oral daily  atorvastatin 40 milliGRAM(s) Oral at bedtime  dextrose 5%. 1000 milliLiter(s) (50 mL/Hr) IV Continuous <Continuous>  dextrose 50% Injectable 12.5 Gram(s) IV Push once  dextrose 50% Injectable 25 Gram(s) IV Push once  dextrose 50% Injectable 25 Gram(s) IV Push once  docusate sodium 100 milliGRAM(s) Oral two times a day  enoxaparin Injectable 90 milliGRAM(s) SubCutaneous daily  escitalopram 5 milliGRAM(s) Oral daily  insulin lispro (HumaLOG) corrective regimen sliding scale   SubCutaneous three times a day before meals  insulin lispro (HumaLOG) corrective regimen sliding scale   SubCutaneous at bedtime  metoprolol succinate ER 12.5 milliGRAM(s) Oral daily  pantoprazole    Tablet 40 milliGRAM(s) Oral before breakfast    MEDICATIONS  (PRN):  acetaminophen   Tablet .. 650 milliGRAM(s) Oral every 6 hours PRN Moderate Pain (4 - 6)  dextrose 40% Gel 15 Gram(s) Oral once PRN Blood Glucose LESS THAN 70 milliGRAM(s)/deciliter  glucagon  Injectable 1 milliGRAM(s) IntraMuscular once PRN Glucose LESS THAN 70 milligrams/deciliter    Pertinent Labs:  09-30 Na133 mmol/L<L> Glu 181 mg/dL<H> K+ 4.2 mmol/L Cr  0.70 mg/dL BUN 20 mg/dL 09-30 Alb 2.9 g/dL<L> 09-23 [id number] 6.7 %<H> 09-23 Chol 98 mg/dL LDL 46 mg/dL HDL 39 mg/dL<L> Trig 67 mg/dL    POCT (over Last 2 Days) - Ranging from 145-207    Skin: No Pressure Ulcers     Edema: None Noted     Last Bowel Movement: on 9/28    Estimated Needs:   [X] No Change Since Previous Assessment    Previous Nutrition Diagnosis:   Moderate Malnutrition    Nutrition Diagnosis is [X] Ongoing - Supplement Order Pending Verification     New Nutrition Diagnosis: [X] Not Applicable    Interventions:   1. Education Provided on Blood Glucose Management   2. Recommend Continue Nutrition Plan of Care     Monitoring & Evaluation:   [X] Weights   [X] PO Intake   [X] Follow Up (Per Protocol)  [X] Tolerance to Diet Prescription   [X] Other: Labs & POCT    Registered Dietitian/Nutritionist Remains Available.  Iker Vasquez RDN    Pager # [phone]  Phone# [phone]

## 2019-10-01 NOTE — PROGRESS NOTE ADULT - SUBJECTIVE AND OBJECTIVE BOX
CHIEF COMPLAINT: Hemiparesis, aphasia, and functional deficits.       HISTORY OF PRESENT ILLNESS  63yo RH female, Nadine speaking, with PMHx of Afib (on coumadin), prior R MCA stroke in 2013 and 2016 with no residual deficits, HTN, HLD, DM2, mitral stenosis/rheumatic valvular disease with recent hospitalization (Sept 2019) at [facility] for AF for potential cardioversion (Hx of labile INR) and mitral valve intervention. At the time patient found to have KRISTAN thrombus on echo.   On 9/22 pt unable to speak or move her R side and brought to ED by family. CT Head 9/22 showed L MCA acute-subacute infarcts. CTA head demonstrated L M2 occlusion. Patient was not a candidate for endovascular treatment. INR therapeutic on Coumadin, per family at bedside, patient was compliant with Coumadin.  MRI Head follow up 9/23 shows redemonstration of acute left KUSUM and MCA territory infarcts primarily involving the left frontal lobe. No yola hemorrhagic transformation. No midline shift or hydrocephalus. Chronic right frontal and right cerebellar hemisphere infarcts.  TTE EF 65-70%, severe mitral stenosis, mild aortic stenosis. mild aortic regurgitation. Tele with afib- rate controlled. Placed on hep gtt with goal to transition to Lovenox on d/c. As per cardiology, KRISTAN thrombus may take up to 2-4 weeks to resolve. Will need repeat BUBBA in 3-4 weeks. Possible valve intervention pending recovery and goals of care. CT Chest/Abd/Pelvis to r/o malignancy on 9/24. No findings to suggest intrathoracic or intra-abdominal malignancy. However, renal eval of possible renal infarct on CT obtained. Hematology in, Lovenox 1.5mg/kg daily and hypercoagulable workup.  Hospitalization additionally complicated by mild leukocytosis. CXR negative, UA negative, RVP negative-continue close monitoring.   Stable for d/c to AR on 9/26/19. (26 Sep 2019 11:38)      PAST MEDICAL & SURGICAL HISTORY:  TIA (transient ischemic attack): 2013- Rt facial droop which resolved after 2 hours  Mitral stenosis  Atrial fibrillation: h/o Cardioversion in 2013 and on Coumadin  DM (diabetes mellitus): Type II  HLD (hyperlipidemia)  HTN (hypertension)  S/P laser cataract surgery, unspecified laterality: ???  S/P knee surgery  Ventral hernia       REVIEW OF SYMPTOMS  [X] Constitutional WNL          [X] Resp WNL           [X] GI WNL                          [X]  WNL                   [X] Heme WNL              [X] Endo WNL                     [X] Skin WNL                 [X] MSK WNL                  VITALS  Vital Signs Last 24 Hrs  T(C): 36.6 (01 Oct 2019 07:40), Max: 36.6 (30 Sep 2019 20:31)  T(F): 97.8 (01 Oct 2019 07:40), Max: 97.9 (30 Sep 2019 20:31)  HR: 84 (01 Oct 2019 07:40) (70 - 84)  BP: 98/67 (01 Oct 2019 07:40) (98/67 - 119/73)  BP(mean): --  RR: 14 (01 Oct 2019 07:40) (14 - 15)  SpO2: 97% (01 Oct 2019 07:40) (97% - 98%)    PHYSICAL EXAM  Constitutional - NAD  HEENT - NCAT, EOMI  Neck - Supple, No limited ROM  Chest - CTA bilaterally, No wheeze, No rhonchi, No crackles  Cardiovascular - irregular, murmur  Abdomen - BS+, Soft, NTND  Extremities - No C/C/E, No calf tenderness   Skin-no rash  Wounds-na      Neurologic Exam -Awake, Alert, right neglect, aphasia, right hemiparesis.      Balance - impaired     Psychiatric - appears depressed    FUNCTIONAL PROGRESS  Gait - unable, max A to stand  ADLs - max A  Transfers - max A  Functional transfer - max A    RECENT LABS                        11.0   11.55 )-----------( 339      ( 30 Sep 2019 07:54 )             34.0     09-30    133<L>  |  99  |  20  ----------------------------<  181<H>  4.2   |  25  |  0.70    Ca    9.5      30 Sep 2019 07:54    TPro  7.6  /  Alb  2.9<L>  /  TBili  1.1  /  DBili  x   /  AST  44<H>  /  ALT  34  /  AlkPhos  66  09-30      LIVER FUNCTIONS - ( 30 Sep 2019 07:54 )  Alb: 2.9 g/dL / Pro: 7.6 g/dL / ALK PHOS: 66 U/L / ALT: 34 U/L / AST: 44 U/L / GGT: x             Direct LDL: 46 mg/dL (09-23-19 @ 09:24)  Direct LDL: 58 mg/dL (09-13-19 @ 07:00)    Hemoglobin A1C, Whole Blood: 6.7 % (09-23-19 @ 08:45)  Hemoglobin A1C, Whole Blood: 6.3 % (09-13-19 @ 07:00)              RADIOLOGY/OTHER RESULTS      CURRENT MEDICATIONS  MEDICATIONS  (STANDING):  aspirin  chewable 81 milliGRAM(s) Oral daily  atorvastatin 40 milliGRAM(s) Oral at bedtime  dextrose 5%. 1000 milliLiter(s) (50 mL/Hr) IV Continuous <Continuous>  dextrose 50% Injectable 12.5 Gram(s) IV Push once  dextrose 50% Injectable 25 Gram(s) IV Push once  dextrose 50% Injectable 25 Gram(s) IV Push once  docusate sodium 100 milliGRAM(s) Oral two times a day  enoxaparin Injectable 90 milliGRAM(s) SubCutaneous daily  escitalopram 5 milliGRAM(s) Oral daily  insulin lispro (HumaLOG) corrective regimen sliding scale   SubCutaneous three times a day before meals  insulin lispro (HumaLOG) corrective regimen sliding scale   SubCutaneous at bedtime  metoprolol succinate ER 12.5 milliGRAM(s) Oral daily  pantoprazole    Tablet 40 milliGRAM(s) Oral before breakfast    MEDICATIONS  (PRN):  acetaminophen   Tablet .. 650 milliGRAM(s) Oral every 6 hours PRN Moderate Pain (4 - 6)  dextrose 40% Gel 15 Gram(s) Oral once PRN Blood Glucose LESS THAN 70 milliGRAM(s)/deciliter  glucagon  Injectable 1 milliGRAM(s) IntraMuscular once PRN Glucose LESS THAN 70 milligrams/deciliter      ASSESSMENT & PLAN      GI/Bowel Management - colace-BM today.    Management - Toilet Q2  Skin - Turn Q2  Pain - Tylenol PRN  DVT PPX - Lovenox 90mg daily.   Diet - reg with thins    Continue comprehensive acute rehab program consisting of 3hrs/day of OT/PT and SLP.

## 2019-10-02 LAB
ALBUMIN SERPL ELPH-MCNC: 2.9 G/DL — LOW (ref 3.3–5)
ALP SERPL-CCNC: 61 U/L — SIGNIFICANT CHANGE UP (ref 40–120)
ALT FLD-CCNC: 39 U/L — SIGNIFICANT CHANGE UP (ref 10–45)
ANION GAP SERPL CALC-SCNC: 10 MMOL/L — SIGNIFICANT CHANGE UP (ref 5–17)
AST SERPL-CCNC: 35 U/L — SIGNIFICANT CHANGE UP (ref 10–40)
BILIRUB SERPL-MCNC: 0.9 MG/DL — SIGNIFICANT CHANGE UP (ref 0.2–1.2)
BUN SERPL-MCNC: 17 MG/DL — SIGNIFICANT CHANGE UP (ref 7–23)
CALCIUM SERPL-MCNC: 9.3 MG/DL — SIGNIFICANT CHANGE UP (ref 8.4–10.5)
CHLORIDE SERPL-SCNC: 98 MMOL/L — SIGNIFICANT CHANGE UP (ref 96–108)
CO2 SERPL-SCNC: 25 MMOL/L — SIGNIFICANT CHANGE UP (ref 22–31)
CREAT SERPL-MCNC: 0.68 MG/DL — SIGNIFICANT CHANGE UP (ref 0.5–1.3)
GLUCOSE BLDC GLUCOMTR-MCNC: 160 MG/DL — HIGH (ref 70–99)
GLUCOSE BLDC GLUCOMTR-MCNC: 163 MG/DL — HIGH (ref 70–99)
GLUCOSE BLDC GLUCOMTR-MCNC: 176 MG/DL — HIGH (ref 70–99)
GLUCOSE BLDC GLUCOMTR-MCNC: 235 MG/DL — HIGH (ref 70–99)
GLUCOSE SERPL-MCNC: 187 MG/DL — HIGH (ref 70–99)
HCT VFR BLD CALC: 32.1 % — LOW (ref 34.5–45)
HGB BLD-MCNC: 10.4 G/DL — LOW (ref 11.5–15.5)
MCHC RBC-ENTMCNC: 27.7 PG — SIGNIFICANT CHANGE UP (ref 27–34)
MCHC RBC-ENTMCNC: 32.4 GM/DL — SIGNIFICANT CHANGE UP (ref 32–36)
MCV RBC AUTO: 85.6 FL — SIGNIFICANT CHANGE UP (ref 80–100)
NRBC # BLD: 0 /100 WBCS — SIGNIFICANT CHANGE UP (ref 0–0)
PLATELET # BLD AUTO: 343 K/UL — SIGNIFICANT CHANGE UP (ref 150–400)
POTASSIUM SERPL-MCNC: 3.8 MMOL/L — SIGNIFICANT CHANGE UP (ref 3.5–5.3)
POTASSIUM SERPL-SCNC: 3.8 MMOL/L — SIGNIFICANT CHANGE UP (ref 3.5–5.3)
PROT SERPL-MCNC: 7.4 G/DL — SIGNIFICANT CHANGE UP (ref 6–8.3)
RBC # BLD: 3.75 M/UL — LOW (ref 3.8–5.2)
RBC # FLD: 17.1 % — HIGH (ref 10.3–14.5)
SODIUM SERPL-SCNC: 133 MMOL/L — LOW (ref 135–145)
WBC # BLD: 10.35 K/UL — SIGNIFICANT CHANGE UP (ref 3.8–10.5)
WBC # FLD AUTO: 10.35 K/UL — SIGNIFICANT CHANGE UP (ref 3.8–10.5)

## 2019-10-02 PROCEDURE — 99232 SBSQ HOSP IP/OBS MODERATE 35: CPT

## 2019-10-02 RX ORDER — ENOXAPARIN SODIUM 100 MG/ML
80 INJECTION SUBCUTANEOUS DAILY
Refills: 0 | Status: DISCONTINUED | OUTPATIENT
Start: 2019-10-03 | End: 2019-10-11

## 2019-10-02 RX ADMIN — Medication 100 MILLIGRAM(S): at 06:18

## 2019-10-02 RX ADMIN — ATORVASTATIN CALCIUM 40 MILLIGRAM(S): 80 TABLET, FILM COATED ORAL at 21:03

## 2019-10-02 RX ADMIN — ESCITALOPRAM OXALATE 5 MILLIGRAM(S): 10 TABLET, FILM COATED ORAL at 11:50

## 2019-10-02 RX ADMIN — ENOXAPARIN SODIUM 90 MILLIGRAM(S): 100 INJECTION SUBCUTANEOUS at 11:49

## 2019-10-02 RX ADMIN — Medication 81 MILLIGRAM(S): at 11:50

## 2019-10-02 RX ADMIN — PANTOPRAZOLE SODIUM 40 MILLIGRAM(S): 20 TABLET, DELAYED RELEASE ORAL at 06:18

## 2019-10-02 RX ADMIN — Medication 12.5 MILLIGRAM(S): at 06:18

## 2019-10-02 RX ADMIN — Medication 1: at 07:59

## 2019-10-02 RX ADMIN — Medication 1: at 16:46

## 2019-10-02 RX ADMIN — Medication 2: at 11:56

## 2019-10-02 NOTE — PROGRESS NOTE ADULT - SUBJECTIVE AND OBJECTIVE BOX
CHIEF COMPLAINT: Improving aphasia. Persistent right sided weakness.       HISTORY OF PRESENT ILLNESS  65yo RH female, Nadine speaking, with PMHx of Afib (on coumadin), prior R MCA stroke in 2013 and 2016 with no residual deficits, HTN, HLD, DM2, mitral stenosis/rheumatic valvular disease with recent hospitalization (Sept 2019) at [facility] for AF for potential cardioversion (Hx of labile INR) and mitral valve intervention. At the time patient found to have KRISTAN thrombus on echo.   On 9/22 pt unable to speak or move her R side and brought to ED by family. CT Head 9/22 showed L MCA acute-subacute infarcts. CTA head demonstrated L M2 occlusion. Patient was not a candidate for endovascular treatment. INR therapeutic on Coumadin, per family at bedside, patient was compliant with Coumadin.  MRI Head follow up 9/23 shows redemonstration of acute left KUSUM and MCA territory infarcts primarily involving the left frontal lobe. No yola hemorrhagic transformation. No midline shift or hydrocephalus. Chronic right frontal and right cerebellar hemisphere infarcts.  TTE EF 65-70%, severe mitral stenosis, mild aortic stenosis. mild aortic regurgitation. Tele with afib- rate controlled. Placed on hep gtt with goal to transition to lovenox on d/c. As per cardiology, KRISTAN thrombus may take up to 2-4 weeks to resolve. Will need repeat BUBBA in 3-4 weeks. Possible valve intervention pending recovery and goals of care. CT Chest/Abd/Pelvis to r/o malignancy on 9/24. No findings to suggest intrathoracic or intra-abdominal malignancy. However, renal eval of possible renal infarct on CT obtained. Hematology in, Lovenox 1.5mg/kg daily and hypercoagulable workup.  Hospitalization additionally complicated by mild leukocytosis. CXR negative, UA negative, RVP negative-continue close monitoring.   Stable for d/c to [facility] on 9/26/19. (26 Sep 2019 11:38)      PAST MEDICAL & SURGICAL HISTORY:  TIA (transient ischemic attack): 2013- Rt facial droop which resolved after 2 hours  Mitral stenosis  Atrial fibrillation: h/o Cardioversion in 2013 and on Coumadin  DM (diabetes mellitus): Type II  HLD (hyperlipidemia)  HTN (hypertension)  S/P laser cataract surgery, unspecified laterality: ???  S/P knee surgery  Ventral hernia  x -        REVIEW OF SYMPTOMS  [X] Constitutional WNL           [X] Resp WNL           [X] GI WNL                          [X]  WNL                   [X] Heme WNL              [X] Endo WNL                     [X] Skin WNL                 [X] MSK WNL                  VITALS  Vital Signs Last 24 Hrs  T(C): 36.6 (02 Oct 2019 08:02), Max: 36.6 (01 Oct 2019 20:37)  T(F): 97.8 (02 Oct 2019 08:02), Max: 97.8 (01 Oct 2019 20:37)  HR: 83 (02 Oct 2019 08:02) (80 - 83)  BP: 107/74 (02 Oct 2019 08:02) (107/69 - 107/74)  BP(mean): --  RR: 14 (02 Oct 2019 08:02) (14 - 14)  SpO2: 98% (02 Oct 2019 08:02) (98% - 99%)     PHYSICAL EXAM  Constitutional - NAD  HEENT - NCAT, EOMI  Neck - Supple, No limited ROM  Chest - CTA bilaterally, No wheeze, No rhonchi, No crackles  Cardiovascular - irregular, murmur  Abdomen - BS+, Soft, NTND  Extremities - No C/C/E, No calf tenderness   Skin-no rash  Wounds-na      Neurologic Exam -Awake, Alert, right neglect, aphasia-but improved verbal output, able to say own and son's name, and greeting, right hemiparesis persists.      Balance - impaired     Psychiatric - appears depressed    FUNCTIONAL PROGRESS  Gait - 3steps max A, max A to stand  ADLs - max A  Transfers - max A  Functional transfer - max A      RECENT LABS                        10.4   10.35 )-----------( 343      ( 02 Oct 2019 07:45 )             32.1     10-02    133<L>  |  98  |  17  ----------------------------<  187<H>  3.8   |  25  |  0.68    Ca    9.3      02 Oct 2019 07:45    TPro  7.4  /  Alb  2.9<L>  /  TBili  0.9  /  DBili  x   /  AST  35  /  ALT  39  /  AlkPhos  61  10-02      LIVER FUNCTIONS - ( 02 Oct 2019 07:45 )  Alb: 2.9 g/dL / Pro: 7.4 g/dL / ALK PHOS: 61 U/L / ALT: 39 U/L / AST: 35 U/L / GGT: x             Direct LDL: 46 mg/dL (09-23-19 @ 09:24)  Direct LDL: 58 mg/dL (09-13-19 @ 07:00)    Hemoglobin A1C, Whole Blood: 6.7 % (09-23-19 @ 08:45)  Hemoglobin A1C, Whole Blood: 6.3 % (09-13-19 @ 07:00)              RADIOLOGY/OTHER RESULTS      CURRENT MEDICATIONS  MEDICATIONS  (STANDING):  aspirin  chewable 81 milliGRAM(s) Oral daily  atorvastatin 40 milliGRAM(s) Oral at bedtime  dextrose 5%. 1000 milliLiter(s) (50 mL/Hr) IV Continuous <Continuous>  dextrose 50% Injectable 12.5 Gram(s) IV Push once  dextrose 50% Injectable 25 Gram(s) IV Push once  dextrose 50% Injectable 25 Gram(s) IV Push once  docusate sodium 100 milliGRAM(s) Oral two times a day  enoxaparin Injectable 90 milliGRAM(s) SubCutaneous daily  escitalopram 5 milliGRAM(s) Oral daily  insulin lispro (HumaLOG) corrective regimen sliding scale   SubCutaneous three times a day before meals  insulin lispro (HumaLOG) corrective regimen sliding scale   SubCutaneous at bedtime  metoprolol succinate ER 12.5 milliGRAM(s) Oral daily  pantoprazole    Tablet 40 milliGRAM(s) Oral before breakfast    MEDICATIONS  (PRN):  acetaminophen   Tablet .. 650 milliGRAM(s) Oral every 6 hours PRN Moderate Pain (4 - 6)  dextrose 40% Gel 15 Gram(s) Oral once PRN Blood Glucose LESS THAN 70 milliGRAM(s)/deciliter  glucagon  Injectable 1 milliGRAM(s) IntraMuscular once PRN Glucose LESS THAN 70 milligrams/deciliter      ASSESSMENT & PLAN    GI/Bowel Management - colace-BM today.    Management - Toilet Q2  Skin - Turn Q2  Pain - Tylenol PRN  DVT PPX - Lovenox 90mg daily.   Diet - reg with thins    Continue comprehensive acute rehab program consisting of 3hrs/day of OT/PT and SLP. CHIEF COMPLAINT: Improving aphasia. Persistent right sided weakness.       HISTORY OF PRESENT ILLNESS  65yo RH female, Nadine speaking, with PMHx of Afib (on coumadin), prior R MCA stroke in 2013 and 2016 with no residual deficits, HTN, HLD, DM2, mitral stenosis/rheumatic valvular disease with recent hospitalization (Sept 2019) at [facility] for AF for potential cardioversion (Hx of labile INR) and mitral valve intervention. At the time patient found to have KRISTAN thrombus on echo.   On 9/22 pt unable to speak or move her R side and brought to ED by family. CT Head 9/22 showed L MCA acute-subacute infarcts. CTA head demonstrated L M2 occlusion. Patient was not a candidate for endovascular treatment. INR therapeutic on Coumadin, per family at bedside, patient was compliant with Coumadin.  MRI Head follow up 9/23 shows redemonstration of acute left KUSUM and MCA territory infarcts primarily involving the left frontal lobe. No yola hemorrhagic transformation. No midline shift or hydrocephalus. Chronic right frontal and right cerebellar hemisphere infarcts.  TTE EF 65-70%, severe mitral stenosis, mild aortic stenosis. mild aortic regurgitation. Tele with afib- rate controlled. Placed on hep gtt with goal to transition to lovenox on d/c. As per cardiology, KRISTAN thrombus may take up to 2-4 weeks to resolve. Will need repeat BUBBA in 3-4 weeks. Possible valve intervention pending recovery and goals of care. CT Chest/Abd/Pelvis to r/o malignancy on 9/24. No findings to suggest intrathoracic or intra-abdominal malignancy. However, renal eval of possible renal infarct on CT obtained. Hematology in, Lovenox 1.5mg/kg daily and hypercoagulable workup.  Hospitalization additionally complicated by mild leukocytosis. CXR negative, UA negative, RVP negative-continue close monitoring.   Stable for d/c to [facility] on 9/26/19. (26 Sep 2019 11:38)      PAST MEDICAL & SURGICAL HISTORY:  TIA (transient ischemic attack): 2013- Rt facial droop which resolved after 2 hours  Mitral stenosis  Atrial fibrillation: h/o Cardioversion in 2013 and on Coumadin  DM (diabetes mellitus): Type II  HLD (hyperlipidemia)  HTN (hypertension)  S/P laser cataract surgery, unspecified laterality: ???  S/P knee surgery  Ventral hernia         REVIEW OF SYMPTOMS  [X] Constitutional WNL           [X] Resp WNL           [X] GI WNL                          [X]  WNL                   [X] Heme WNL              [X] Endo WNL                     [X] Skin WNL                 [X] MSK WNL                  VITALS  Vital Signs Last 24 Hrs  T(C): 36.6 (02 Oct 2019 08:02), Max: 36.6 (01 Oct 2019 20:37)  T(F): 97.8 (02 Oct 2019 08:02), Max: 97.8 (01 Oct 2019 20:37)  HR: 83 (02 Oct 2019 08:02) (80 - 83)  BP: 107/74 (02 Oct 2019 08:02) (107/69 - 107/74)  BP(mean): --  RR: 14 (02 Oct 2019 08:02) (14 - 14)  SpO2: 98% (02 Oct 2019 08:02) (98% - 99%)     PHYSICAL EXAM  Constitutional - NAD  HEENT - NCAT, EOMI  Neck - Supple, No limited ROM  Chest - CTA bilaterally, No wheeze, No rhonchi, No crackles  Cardiovascular - irregular, murmur  Abdomen - BS+, Soft, NTND  Extremities - No C/C/E, No calf tenderness   Skin-no rash  Wounds-na      Neurologic Exam -Awake, Alert, right neglect, aphasia-but improved verbal output, able to say own and son's name, and greeting, right hemiparesis persists.      Balance - impaired     Psychiatric - appears depressed    FUNCTIONAL PROGRESS  Gait - 3steps max A, max A to stand  ADLs - max A  Transfers - max A  Functional transfer - max A      RECENT LABS                        10.4   10.35 )-----------( 343      ( 02 Oct 2019 07:45 )             32.1     10-02    133<L>  |  98  |  17  ----------------------------<  187<H>  3.8   |  25  |  0.68    Ca    9.3      02 Oct 2019 07:45    TPro  7.4  /  Alb  2.9<L>  /  TBili  0.9  /  DBili  x   /  AST  35  /  ALT  39  /  AlkPhos  61  10-02      LIVER FUNCTIONS - ( 02 Oct 2019 07:45 )  Alb: 2.9 g/dL / Pro: 7.4 g/dL / ALK PHOS: 61 U/L / ALT: 39 U/L / AST: 35 U/L / GGT: x             Direct LDL: 46 mg/dL (09-23-19 @ 09:24)  Direct LDL: 58 mg/dL (09-13-19 @ 07:00)    Hemoglobin A1C, Whole Blood: 6.7 % (09-23-19 @ 08:45)  Hemoglobin A1C, Whole Blood: 6.3 % (09-13-19 @ 07:00)              RADIOLOGY/OTHER RESULTS      CURRENT MEDICATIONS  MEDICATIONS  (STANDING):  aspirin  chewable 81 milliGRAM(s) Oral daily  atorvastatin 40 milliGRAM(s) Oral at bedtime  dextrose 5%. 1000 milliLiter(s) (50 mL/Hr) IV Continuous <Continuous>  dextrose 50% Injectable 12.5 Gram(s) IV Push once  dextrose 50% Injectable 25 Gram(s) IV Push once  dextrose 50% Injectable 25 Gram(s) IV Push once  docusate sodium 100 milliGRAM(s) Oral two times a day  enoxaparin Injectable 90 milliGRAM(s) SubCutaneous daily  escitalopram 5 milliGRAM(s) Oral daily  insulin lispro (HumaLOG) corrective regimen sliding scale   SubCutaneous three times a day before meals  insulin lispro (HumaLOG) corrective regimen sliding scale   SubCutaneous at bedtime  metoprolol succinate ER 12.5 milliGRAM(s) Oral daily  pantoprazole    Tablet 40 milliGRAM(s) Oral before breakfast    MEDICATIONS  (PRN):  acetaminophen   Tablet .. 650 milliGRAM(s) Oral every 6 hours PRN Moderate Pain (4 - 6)  dextrose 40% Gel 15 Gram(s) Oral once PRN Blood Glucose LESS THAN 70 milliGRAM(s)/deciliter  glucagon  Injectable 1 milliGRAM(s) IntraMuscular once PRN Glucose LESS THAN 70 milligrams/deciliter      ASSESSMENT & PLAN    GI/Bowel Management - colace-BM today.    Management - Toilet Q2  Skin - Turn Q2  Pain - Tylenol PRN  DVT PPX - Lovenox 90mg daily.   Diet - reg with thins    Continue comprehensive acute rehab program consisting of 3hrs/day of OT/PT and SLP.

## 2019-10-02 NOTE — PROGRESS NOTE ADULT - ASSESSMENT
65 yo female s/p L MCA infarct, now with decreased functional mobility, dysphagia, aphasia, hemiplegia, gait instability and ADL impairments.    Rehab: Gait Instability, ADL impairments and Functional impairments: Comprehensive Rehab Program of PT/OT/ST    # L MCA infarct   - Aspirin and Lipitor continues.   - f/u hypercoagulable work up per heme/onc at [facility] as pt now with 2nd CVA despite being therapeutic on Coumadin.   - Lovenox Sq continue for CVA ppx at 90mg daily.   - Lexapro for motor recovery-indication discussed with son.     # A. Fib  - Left atrial thrombus: continue weight based Lovenox 1.5mg/kg daily.   - Repeat echo for KRISTAN thrombus monitoring.  - metoprolol continues low dose. Rate controlled.     #Leukocytosis  -asymptomatic, follow labs closely. Medicine evaluation.     Diet: Diabetic, dysphagia 3 soft, thin liquids. Advance to regular consistency. Tolerating well.    Renal/Bladder: Voiding independently.     #DM2   - continue sliding scale c Humalog. FS ACHS.      Precautions / PROPHYLAXIS:   - Falls, Cardiac, Seizure   - Ortho: Weight bearing status: WBAT   - Lungs: Aspiration, Incentive Spirometer   - Pressure injury/Skin: Turn Q2hrs while in bed, OOB to Chair, PT/OT    - DVT: Lovenox, SCDs, TEDs 63 yo female s/p L MCA infarct, now with decreased functional mobility, dysphagia, aphasia, hemiplegia, gait instability and ADL impairments.    Rehab: Gait Instability, ADL impairments and Functional impairments: Comprehensive Rehab Program of PT/OT/ST    # L MCA infarct   - Aspirin and Lipitor   - f/u hypercoagulable work up per heme/onc at [facility] as pt now with 2nd CVA despite being therapeutic on Coumadin.   - Lovenox Sq continue for CVA ppx at 90mg daily.   - Lexapro for motor recovery-indication discussed with son.     # A. Fib  - Left atrial thrombus: continue weight based Lovenox 1.5mg/kg daily.   - Repeat echo for KRISTAN thrombus monitoring.  - metoprolol continues low dose. Rate controlled.     #Leukocytosis  -asymptomatic, follow labs closely. Medicine evaluation.     Diet: Diabetic, dysphagia 3 soft, thin liquids. Advance to regular consistency. Tolerating well.    Renal/Bladder: Voiding independently.     #DM2   - continue sliding scale c Humalog. FS ACHS.      Precautions / PROPHYLAXIS:   - Falls, Cardiac, Seizure   - Ortho: Weight bearing status: WBAT   - Lungs: Aspiration, Incentive Spirometer   - Pressure injury/Skin: Turn Q2hrs while in bed, OOB to Chair, PT/OT    - DVT: Lovenox, SCDs, TEDs

## 2019-10-03 DIAGNOSIS — E11.9 TYPE 2 DIABETES MELLITUS WITHOUT COMPLICATIONS: ICD-10-CM

## 2019-10-03 LAB
GLUCOSE BLDC GLUCOMTR-MCNC: 140 MG/DL — HIGH (ref 70–99)
GLUCOSE BLDC GLUCOMTR-MCNC: 142 MG/DL — HIGH (ref 70–99)
GLUCOSE BLDC GLUCOMTR-MCNC: 160 MG/DL — HIGH (ref 70–99)
GLUCOSE BLDC GLUCOMTR-MCNC: 206 MG/DL — HIGH (ref 70–99)

## 2019-10-03 PROCEDURE — 99232 SBSQ HOSP IP/OBS MODERATE 35: CPT

## 2019-10-03 RX ORDER — METFORMIN HYDROCHLORIDE 850 MG/1
500 TABLET ORAL DAILY
Refills: 0 | Status: DISCONTINUED | OUTPATIENT
Start: 2019-10-03 | End: 2019-10-10

## 2019-10-03 RX ADMIN — Medication 100 MILLIGRAM(S): at 18:27

## 2019-10-03 RX ADMIN — Medication 12.5 MILLIGRAM(S): at 06:20

## 2019-10-03 RX ADMIN — Medication 2: at 11:59

## 2019-10-03 RX ADMIN — Medication 100 MILLIGRAM(S): at 06:21

## 2019-10-03 RX ADMIN — Medication 81 MILLIGRAM(S): at 11:58

## 2019-10-03 RX ADMIN — ATORVASTATIN CALCIUM 40 MILLIGRAM(S): 80 TABLET, FILM COATED ORAL at 21:43

## 2019-10-03 RX ADMIN — ENOXAPARIN SODIUM 80 MILLIGRAM(S): 100 INJECTION SUBCUTANEOUS at 11:58

## 2019-10-03 RX ADMIN — PANTOPRAZOLE SODIUM 40 MILLIGRAM(S): 20 TABLET, DELAYED RELEASE ORAL at 06:21

## 2019-10-03 RX ADMIN — Medication 1: at 08:03

## 2019-10-03 NOTE — PROGRESS NOTE ADULT - PROBLEM SELECTOR PLAN 1
- L MCA infarct, on ASA, Statin, Rehab program  - Pt had CVA while therapeutic on coumadin- being worked up for hypercoagulability- c/w therapeutic dose of lovenox as per hem/onc at [facility]

## 2019-10-03 NOTE — PROGRESS NOTE ADULT - ATTENDING COMMENTS
Appears depressed and withdrawn, family is refusing SSRIs.  Neuropsychology evaluation.  Unchanged exam  Full program

## 2019-10-03 NOTE — PROGRESS NOTE ADULT - SUBJECTIVE AND OBJECTIVE BOX
Patient is a 64y old  Female who presents with a chief complaint of s/p b/l CVAs  with deficits (02 Oct 2019 09:54)  Patient seen and examined at bedside.  Pt. slept well; per nurse.  Family at bedside; pt aphasic.  Family requesting stopping Lexapro.    ALLERGIES:  No Known Allergies    MEDICATIONS  (STANDING):  aspirin  chewable 81 milliGRAM(s) Oral daily  atorvastatin 40 milliGRAM(s) Oral at bedtime  dextrose 5%. 1000 milliLiter(s) (50 mL/Hr) IV Continuous <Continuous>  dextrose 50% Injectable 12.5 Gram(s) IV Push once  dextrose 50% Injectable 25 Gram(s) IV Push once  dextrose 50% Injectable 25 Gram(s) IV Push once  docusate sodium 100 milliGRAM(s) Oral two times a day  enoxaparin Injectable 80 milliGRAM(s) SubCutaneous daily  escitalopram 5 milliGRAM(s) Oral daily  insulin lispro (HumaLOG) corrective regimen sliding scale   SubCutaneous three times a day before meals  insulin lispro (HumaLOG) corrective regimen sliding scale   SubCutaneous at bedtime  metoprolol succinate ER 12.5 milliGRAM(s) Oral daily  pantoprazole    Tablet 40 milliGRAM(s) Oral before breakfast    MEDICATIONS  (PRN):  acetaminophen   Tablet .. 650 milliGRAM(s) Oral every 6 hours PRN Moderate Pain (4 - 6)  dextrose 40% Gel 15 Gram(s) Oral once PRN Blood Glucose LESS THAN 70 milliGRAM(s)/deciliter  glucagon  Injectable 1 milliGRAM(s) IntraMuscular once PRN Glucose LESS THAN 70 milligrams/deciliter    Vital Signs Last 24 Hrs  T(F): 97.7 (03 Oct 2019 08:08), Max: 98 (02 Oct 2019 20:59)  HR: 86 (03 Oct 2019 08:08) (86 - 100)  BP: 127/86 (03 Oct 2019 08:08) (121/76 - 132/89)  RR: 14 (03 Oct 2019 08:08) (14 - 14)  SpO2: 95% (03 Oct 2019 08:08) (95% - 95%)  I&O's Summary    PHYSICAL EXAM:  General: NAD, alert and awake, aphasic.  ENT: MMM  Neck: Supple, No JVD  Lungs: breathing non-labored, Clear to auscultation bilaterally  Cardio: irreg, rate controlled  Abdomen: Soft, Nontender, Nondistended; Bowel sounds present  Extremities: No calf tenderness, No pitting edema  Neuro:  +aphasia, right sided weakness    LABS:                        10.4   10.35 )-----------( 343      ( 02 Oct 2019 07:45 )             32.1     10-02    133  |  98  |  17  ----------------------------<  187  3.8   |  25  |  0.68    Ca    9.3      02 Oct 2019 07:45    TPro  7.4  /  Alb  2.9  /  TBili  0.9  /  DBili  x   /  AST  35  /  ALT  39  /  AlkPhos  61  10-02    eGFR if : 108 mL/min/1.73M2 (10-02-19 @ 07:45)  eGFR if Non African American: 94 mL/min/1.73M2 (10-02-19 @ 07:45)            09-23 Chol 98 mg/dL LDL 46 mg/dL HDL 39 mg/dL Trig 67 mg/dL    POCT Blood Glucose.: 160 mg/dL (03 Oct 2019 08:01)  POCT Blood Glucose.: 160 mg/dL (02 Oct 2019 21:01)  POCT Blood Glucose.: 163 mg/dL (02 Oct 2019 16:46)  POCT Blood Glucose.: 235 mg/dL (02 Oct 2019 11:52)    09-23 [id number] 6.7  09-13 [id number] 6.3          RADIOLOGY & ADDITIONAL TESTS:    Care Discussed with Consultants/Other Providers:

## 2019-10-03 NOTE — PROGRESS NOTE ADULT - SUBJECTIVE AND OBJECTIVE BOX
CHIEF COMPLAINT: Dysphagia, aphasia, hemiparesis with improving neglect.       HISTORY OF PRESENT ILLNESS  65yo RH female, Nadine speaking, with PMHx of Afib (on coumadin), prior R MCA stroke in 2013 and 2016 with no residual deficits, HTN, HLD, DM2, mitral stenosis/rheumatic valvular disease with recent hospitalization (Sept 2019) at [facility] for AF for potential cardioversion (Hx of labile INR) and mitral valve intervention. At the time patient found to have KRISTAN thrombus on echo.   On 9/22 pt unable to speak or move her R side and brought to ED by family. CT Head 9/22 showed L MCA acute-subacute infarcts. CTA head demonstrated L M2 occlusion. Patient was not a candidate for endovascular treatment. INR therapeutic on Coumadin, per family at bedside, patient was compliant with Coumadin.  MRI Head follow up 9/23 shows redemonstration of acute left KUSUM and MCA territory infarcts primarily involving the left frontal lobe. No yola hemorrhagic transformation. No midline shift or hydrocephalus. Chronic right frontal and right cerebellar hemisphere infarcts.  TTE EF 65-70%, severe mitral stenosis, mild aortic stenosis. mild aortic regurgitation. Tele with afib- rate controlled. Placed on hep gtt with goal to transition to Lovenox on d/c. As per cardiology, KRISTAN thrombus may take up to 2-4 weeks to resolve. Will need repeat BUBBA in 3-4 weeks. Possible valve intervention pending recovery and goals of care. CT Chest/Abd/Pelvis to r/o malignancy on 9/24. No findings to suggest intrathoracic or intra-abdominal malignancy. However, renal eval of possible renal infarct on CT obtained. Hematology in, Lovenox 1.5mg/kg daily and hypercoagulable workup.  Hospitalization additionally complicated by mild leukocytosis. CXR negative, UA negative, RVP negative-continue close monitoring.   Stable for d/c to [facility] on 9/26/19. (26 Sep 2019 11:38)      PAST MEDICAL & SURGICAL HISTORY:  TIA (transient ischemic attack): 2013- Rt facial droop which resolved after 2 hours  Mitral stenosis  Atrial fibrillation: h/o Cardioversion in 2013 and on Coumadin  DM (diabetes mellitus): Type II  HLD (hyperlipidemia)  HTN (hypertension)  S/P laser cataract surgery, unspecified laterality: ???  S/P knee surgery  Ventral hernia          REVIEW OF SYMPTOMS  [X] Constitutional WNL            [X] Resp WNL           [X] GI WNL                          [X]  WNL                   [X] Heme WNL                                  [X] Skin WNL                 [X] MSK WNL                  VITALS  Vital Signs Last 24 Hrs  T(C): 36.5 (03 Oct 2019 08:08), Max: 36.7 (02 Oct 2019 20:59)  T(F): 97.7 (03 Oct 2019 08:08), Max: 98 (02 Oct 2019 20:59)  HR: 86 (03 Oct 2019 08:08) (86 - 100)  BP: 127/86 (03 Oct 2019 08:08) (121/76 - 132/89)  BP(mean): --  RR: 14 (03 Oct 2019 08:08) (14 - 14)  SpO2: 95% (03 Oct 2019 08:08) (95% - 95%)    PHYSICAL EXAM  Constitutional - NAD  HEENT - NCAT, EOMI  Neck - Supple, No limited ROM  Chest - CTA bilaterally, No wheeze, No rhonchi, No crackles  Cardiovascular - irregular, murmur  Abdomen - BS+, Soft, NTND  Extremities - No C/C/E, No calf tenderness   Skin-no rash  Wounds-na      Neurologic Exam -Awake, Alert, right neglect, aphasia-but improved verbal output, able to say own and son's name, and greeting, right hemiparesis persists.      Balance - impaired     Psychiatric - appears depressed    FUNCTIONAL PROGRESS  Gait - 3steps max A, max A to stand  ADLs - max A  Transfers - max A  Functional transfer - max A      RECENT LABS                        10.4   10.35 )-----------( 343      ( 02 Oct 2019 07:45 )             32.1     10-02    133<L>  |  98  |  17  ----------------------------<  187<H>  3.8   |  25  |  0.68    Ca    9.3      02 Oct 2019 07:45    TPro  7.4  /  Alb  2.9<L>  /  TBili  0.9  /  DBili  x   /  AST  35  /  ALT  39  /  AlkPhos  61  10-02      LIVER FUNCTIONS - ( 02 Oct 2019 07:45 )  Alb: 2.9 g/dL / Pro: 7.4 g/dL / ALK PHOS: 61 U/L / ALT: 39 U/L / AST: 35 U/L / GGT: x             Direct LDL: 46 mg/dL (09-23-19 @ 09:24)  Direct LDL: 58 mg/dL (09-13-19 @ 07:00)    Hemoglobin A1C, Whole Blood: 6.7 % (09-23-19 @ 08:45)  Hemoglobin A1C, Whole Blood: 6.3 % (09-13-19 @ 07:00)              RADIOLOGY/OTHER RESULTS      CURRENT MEDICATIONS  MEDICATIONS  (STANDING):  aspirin  chewable 81 milliGRAM(s) Oral daily  atorvastatin 40 milliGRAM(s) Oral at bedtime  dextrose 5%. 1000 milliLiter(s) (50 mL/Hr) IV Continuous <Continuous>  dextrose 50% Injectable 12.5 Gram(s) IV Push once  dextrose 50% Injectable 25 Gram(s) IV Push once  dextrose 50% Injectable 25 Gram(s) IV Push once  docusate sodium 100 milliGRAM(s) Oral two times a day  enoxaparin Injectable 80 milliGRAM(s) SubCutaneous daily  insulin lispro (HumaLOG) corrective regimen sliding scale   SubCutaneous three times a day before meals  insulin lispro (HumaLOG) corrective regimen sliding scale   SubCutaneous at bedtime  metoprolol succinate ER 12.5 milliGRAM(s) Oral daily  pantoprazole    Tablet 40 milliGRAM(s) Oral before breakfast    MEDICATIONS  (PRN):  acetaminophen   Tablet .. 650 milliGRAM(s) Oral every 6 hours PRN Moderate Pain (4 - 6)  dextrose 40% Gel 15 Gram(s) Oral once PRN Blood Glucose LESS THAN 70 milliGRAM(s)/deciliter  glucagon  Injectable 1 milliGRAM(s) IntraMuscular once PRN Glucose LESS THAN 70 milligrams/deciliter      ASSESSMENT & PLAN      GI/Bowel Management - colace.    Management - Toilet Q2  Skin - Turn Q2  Pain - Tylenol PRN  DVT PPX - Lovenox 90mg daily.   Diet - reg with thins    Continue comprehensive acute rehab program consisting of 3hrs/day of OT/PT and SLP.

## 2019-10-03 NOTE — PROGRESS NOTE ADULT - ASSESSMENT
65 y/o female PMH Afib (was on coumadin), prior R MCA stroke in 2013 and 2016 with no residual deficits, HTN, HLD, DM2, mitral stenosis/rheumatic valvular disease is a acute L MCA infarct, now with decreased functional mobility, dysphagia, aphasia, hemiplegia, gait instability and ADL impairments.

## 2019-10-03 NOTE — PROVIDER CONTACT NOTE (MEDICATION) - ACTION/TREATMENT ORDERED:
Give toprol 12.5mg as per MD order
As per GUSTAVO, Dr. Vazquez spoke to family during day and had agreed to give pt lexapro. Will pass on to day team the families wishes.
NP to followup with patient's son

## 2019-10-03 NOTE — PROGRESS NOTE ADULT - ASSESSMENT
65 yo female s/p L MCA infarct, now with decreased functional mobility, dysphagia, aphasia, hemiplegia, gait instability and ADL impairments.    Rehab: Gait Instability, ADL impairments and Functional impairments: Comprehensive Rehab Program of PT/OT/ST    # L MCA infarct   - Aspirin and Lipitor   - f/u hypercoagulable work up per heme/onc at [facility] as pt now with 2nd CVA despite being therapeutic on Coumadin.   - Lovenox Sq continue for CVA ppx at 90mg daily.   - Lexapro for motor recovery as per FLAME-discontinued as per son's request.     # A. Fib  - Left atrial thrombus: continue weight based Lovenox 1.5mg/kg daily.   - Repeat echo for KRISTAN thrombus monitoring.  - metoprolol continues low dose. Rate controlled.     #Leukocytosis  -asymptomatic, follow labs closely. Medicine evaluation.     Diet: Diabetic, dysphagia 3 soft, thin liquids. Advance to regular consistency. Tolerating well.    Renal/Bladder: Voiding independently.     #DM2   - continue sliding scale c Humalog. FS ACHS.      Precautions / PROPHYLAXIS:   - Falls, Cardiac, Seizure   - Ortho: Weight bearing status: WBAT   - Lungs: Aspiration, Incentive Spirometer   - Pressure injury/Skin: Turn Q2hrs while in bed, OOB to Chair, PT/OT    - DVT: Lovenox, SCDs, TEDs

## 2019-10-04 ENCOUNTER — APPOINTMENT (OUTPATIENT)
Dept: ELECTROPHYSIOLOGY | Facility: CLINIC | Age: 64
End: 2019-10-04

## 2019-10-04 LAB
ALBUMIN SERPL ELPH-MCNC: 2.6 G/DL — LOW (ref 3.3–5)
ALP SERPL-CCNC: 57 U/L — SIGNIFICANT CHANGE UP (ref 40–120)
ALT FLD-CCNC: 32 U/L — SIGNIFICANT CHANGE UP (ref 10–45)
ANION GAP SERPL CALC-SCNC: 10 MMOL/L — SIGNIFICANT CHANGE UP (ref 5–17)
AST SERPL-CCNC: 33 U/L — SIGNIFICANT CHANGE UP (ref 10–40)
BILIRUB SERPL-MCNC: 0.6 MG/DL — SIGNIFICANT CHANGE UP (ref 0.2–1.2)
BUN SERPL-MCNC: 21 MG/DL — SIGNIFICANT CHANGE UP (ref 7–23)
CALCIUM SERPL-MCNC: 9 MG/DL — SIGNIFICANT CHANGE UP (ref 8.4–10.5)
CHLORIDE SERPL-SCNC: 102 MMOL/L — SIGNIFICANT CHANGE UP (ref 96–108)
CO2 SERPL-SCNC: 24 MMOL/L — SIGNIFICANT CHANGE UP (ref 22–31)
CREAT SERPL-MCNC: 0.62 MG/DL — SIGNIFICANT CHANGE UP (ref 0.5–1.3)
GLUCOSE BLDC GLUCOMTR-MCNC: 123 MG/DL — HIGH (ref 70–99)
GLUCOSE BLDC GLUCOMTR-MCNC: 146 MG/DL — HIGH (ref 70–99)
GLUCOSE BLDC GLUCOMTR-MCNC: 196 MG/DL — HIGH (ref 70–99)
GLUCOSE BLDC GLUCOMTR-MCNC: 212 MG/DL — HIGH (ref 70–99)
GLUCOSE SERPL-MCNC: 136 MG/DL — HIGH (ref 70–99)
HCT VFR BLD CALC: 29.5 % — LOW (ref 34.5–45)
HGB BLD-MCNC: 9.3 G/DL — LOW (ref 11.5–15.5)
MCHC RBC-ENTMCNC: 26.7 PG — LOW (ref 27–34)
MCHC RBC-ENTMCNC: 31.5 GM/DL — LOW (ref 32–36)
MCV RBC AUTO: 84.8 FL — SIGNIFICANT CHANGE UP (ref 80–100)
NRBC # BLD: 0 /100 WBCS — SIGNIFICANT CHANGE UP (ref 0–0)
PLATELET # BLD AUTO: 344 K/UL — SIGNIFICANT CHANGE UP (ref 150–400)
POTASSIUM SERPL-MCNC: 3.9 MMOL/L — SIGNIFICANT CHANGE UP (ref 3.5–5.3)
POTASSIUM SERPL-SCNC: 3.9 MMOL/L — SIGNIFICANT CHANGE UP (ref 3.5–5.3)
PROT SERPL-MCNC: 6.7 G/DL — SIGNIFICANT CHANGE UP (ref 6–8.3)
RBC # BLD: 3.48 M/UL — LOW (ref 3.8–5.2)
RBC # FLD: 17 % — HIGH (ref 10.3–14.5)
SODIUM SERPL-SCNC: 136 MMOL/L — SIGNIFICANT CHANGE UP (ref 135–145)
WBC # BLD: 9.45 K/UL — SIGNIFICANT CHANGE UP (ref 3.8–10.5)
WBC # FLD AUTO: 9.45 K/UL — SIGNIFICANT CHANGE UP (ref 3.8–10.5)

## 2019-10-04 PROCEDURE — 99232 SBSQ HOSP IP/OBS MODERATE 35: CPT

## 2019-10-04 RX ADMIN — Medication 0: at 23:01

## 2019-10-04 RX ADMIN — ATORVASTATIN CALCIUM 40 MILLIGRAM(S): 80 TABLET, FILM COATED ORAL at 23:01

## 2019-10-04 RX ADMIN — Medication 100 MILLIGRAM(S): at 17:31

## 2019-10-04 RX ADMIN — METFORMIN HYDROCHLORIDE 500 MILLIGRAM(S): 850 TABLET ORAL at 12:10

## 2019-10-04 RX ADMIN — Medication 81 MILLIGRAM(S): at 12:10

## 2019-10-04 RX ADMIN — Medication 100 MILLIGRAM(S): at 05:47

## 2019-10-04 RX ADMIN — Medication 1: at 12:10

## 2019-10-04 RX ADMIN — Medication 12.5 MILLIGRAM(S): at 05:47

## 2019-10-04 RX ADMIN — ENOXAPARIN SODIUM 80 MILLIGRAM(S): 100 INJECTION SUBCUTANEOUS at 12:10

## 2019-10-04 RX ADMIN — PANTOPRAZOLE SODIUM 40 MILLIGRAM(S): 20 TABLET, DELAYED RELEASE ORAL at 05:47

## 2019-10-04 NOTE — PROGRESS NOTE ADULT - SUBJECTIVE AND OBJECTIVE BOX
CHIEF COMPLAINT: Dysphagia, aphasia and hemiparesis.       HISTORY OF PRESENT ILLNESS  65yo RH female, Nadine speaking, with PMHx of Afib (on coumadin), prior R MCA stroke in 2013 and 2016 with no residual deficits, HTN, HLD, DM2, mitral stenosis/rheumatic valvular disease with recent hospitalization (Sept 2019) at [facility] for AF for potential cardioversion (Hx of labile INR) and mitral valve intervention. At the time patient found to have KRISTAN thrombus on echo.   On 9/22 pt unable to speak or move her R side and brought to ED by family. CT Head 9/22 showed L MCA acute-subacute infarcts. CTA head demonstrated L M2 occlusion. Patient was not a candidate for endovascular treatment. INR therapeutic on Coumadin, per family at bedside, patient was compliant with Coumadin.  MRI Head follow up 9/23 shows redemonstration of acute left KUSUM and MCA territory infarcts primarily involving the left frontal lobe. No yola hemorrhagic transformation. No midline shift or hydrocephalus. Chronic right frontal and right cerebellar hemisphere infarcts.  TTE EF 65-70%, severe mitral stenosis, mild aortic stenosis. mild aortic regurgitation. Tele with afib- rate controlled. Placed on hep gtt with goal to transition to lovenox on d/c. As per cardiology, KRISTAN thrombus may take up to 2-4 weeks to resolve. Will need repeat BUBBA in 3-4 weeks. Possible valve intervention pending recovery and goals of care. CT Chest/Abd/Pelvis to r/o malignancy on 9/24. No findings to suggest intrathoracic or intra-abdominal malignancy. However, renal eval of possible renal infarct on CT obtained. Hematology in, Lovenox 1.5mg/kg daily and hypercoagulable workup.  Hospitalization additionally complicated by mild leukocytosis. CXR negative, UA negative, RVP negative-continue close monitoring.   Stable for d/c to [facility] on 9/26/19. (26 Sep 2019 11:38)      PAST MEDICAL & SURGICAL HISTORY:  TIA (transient ischemic attack): 2013- Rt facial droop which resolved after 2 hours  Mitral stenosis  Atrial fibrillation: h/o Cardioversion in 2013 and on Coumadin  DM (diabetes mellitus): Type II  HLD (hyperlipidemia)  HTN (hypertension)  S/P laser cataract surgery, unspecified laterality: ???  S/P knee surgery  Ventral hernia  x -        REVIEW OF SYMPTOMS  [X] Constitutional WNL           [X] Resp WNL           [X] GI WNL                          [X]  WNL                                       [X] Skin WNL                 [X] MSK WNL                VITALS  Vital Signs Last 24 Hrs  T(C): 36.5 (04 Oct 2019 09:01), Max: 36.9 (03 Oct 2019 21:41)  T(F): 97.7 (04 Oct 2019 09:01), Max: 98.5 (03 Oct 2019 21:41)  HR: 91 (04 Oct 2019 09:01) (82 - 91)  BP: 131/74 (04 Oct 2019 09:01) (111/75 - 131/74)  BP(mean): --  RR: 14 (04 Oct 2019 09:01) (14 - 15)  SpO2: 99% (04 Oct 2019 09:01) (99% - 100%)    PHYSICAL EXAM  Constitutional - NAD  HEENT - NCAT, EOMI  Neck - Supple, No limited ROM  Chest - CTA bilaterally, No wheeze, No rhonchi, No crackles  Cardiovascular - irregular, murmur  Abdomen - BS+, Soft, NTND  Extremities - No C/C/E, No calf tenderness   Skin-no rash  Wounds-na      Neurologic Exam -Awake, Alert, right neglect, aphasia-but improved verbal output, able to say own and son's name, and greeting, right hemiparesis persists.      Balance - impaired     Psychiatric - appears depressed    FUNCTIONAL PROGRESS  Gait - 3steps max A, max A to stand  ADLs - max A  Transfers - max A  Functional transfer - max A    RECENT LABS                        9.3    9.45  )-----------( 344      ( 04 Oct 2019 06:15 )             29.5     10-04    136  |  102  |  21  ----------------------------<  136<H>  3.9   |  24  |  0.62    Ca    9.0      04 Oct 2019 06:15    TPro  6.7  /  Alb  2.6<L>  /  TBili  0.6  /  DBili  x   /  AST  33  /  ALT  32  /  AlkPhos  57  10-04      LIVER FUNCTIONS - ( 04 Oct 2019 06:15 )  Alb: 2.6 g/dL / Pro: 6.7 g/dL / ALK PHOS: 57 U/L / ALT: 32 U/L / AST: 33 U/L / GGT: x             Direct LDL: 46 mg/dL (09-23-19 @ 09:24)  Direct LDL: 58 mg/dL (09-13-19 @ 07:00)    Hemoglobin A1C, Whole Blood: 6.7 % (09-23-19 @ 08:45)  Hemoglobin A1C, Whole Blood: 6.3 % (09-13-19 @ 07:00)              RADIOLOGY/OTHER RESULTS      CURRENT MEDICATIONS  MEDICATIONS  (STANDING):  aspirin  chewable 81 milliGRAM(s) Oral daily  atorvastatin 40 milliGRAM(s) Oral at bedtime  dextrose 5%. 1000 milliLiter(s) (50 mL/Hr) IV Continuous <Continuous>  dextrose 50% Injectable 12.5 Gram(s) IV Push once  dextrose 50% Injectable 25 Gram(s) IV Push once  dextrose 50% Injectable 25 Gram(s) IV Push once  docusate sodium 100 milliGRAM(s) Oral two times a day  enoxaparin Injectable 80 milliGRAM(s) SubCutaneous daily  insulin lispro (HumaLOG) corrective regimen sliding scale   SubCutaneous three times a day before meals  insulin lispro (HumaLOG) corrective regimen sliding scale   SubCutaneous at bedtime  metFORMIN 500 milliGRAM(s) Oral daily  metoprolol succinate ER 12.5 milliGRAM(s) Oral daily  pantoprazole    Tablet 40 milliGRAM(s) Oral before breakfast    MEDICATIONS  (PRN):  acetaminophen   Tablet .. 650 milliGRAM(s) Oral every 6 hours PRN Moderate Pain (4 - 6)  dextrose 40% Gel 15 Gram(s) Oral once PRN Blood Glucose LESS THAN 70 milliGRAM(s)/deciliter  glucagon  Injectable 1 milliGRAM(s) IntraMuscular once PRN Glucose LESS THAN 70 milligrams/deciliter      ASSESSMENT & PLAN    GI/Bowel Management - Dulcolax PRN, Fleet PRN   Management - Toilet Q2  Skin - Turn Q2  Pain - Tylenol PRN  DVT PPX - TEDs, SCDs  Diet -     Continue comprehensive acute rehab program consisting of 3hrs/day of OT/PT and SLP. CHIEF COMPLAINT: aphasic , not on acute distress, right sided weakness unchanged.      HISTORY OF PRESENT ILLNESS  65yo RH female, Nadine speaking, with PMHx of Afib (on coumadin), prior R MCA stroke in 2013 and 2016 with no residual deficits, HTN, HLD, DM2, mitral stenosis/rheumatic valvular disease with recent hospitalization (Sept 2019) at [facility] for AF for potential cardioversion (Hx of labile INR) and mitral valve intervention. At the time patient found to have KRISTAN thrombus on echo.   On 9/22 pt unable to speak or move her R side and brought to ED by family. CT Head 9/22 showed L MCA acute-subacute infarcts. CTA head demonstrated L M2 occlusion. Patient was not a candidate for endovascular treatment. INR therapeutic on Coumadin, per family at bedside, patient was compliant with Coumadin.  MRI Head follow up 9/23 shows redemonstration of acute left KUSUM and MCA territory infarcts primarily involving the left frontal lobe. No yola hemorrhagic transformation. No midline shift or hydrocephalus. Chronic right frontal and right cerebellar hemisphere infarcts.  TTE EF 65-70%, severe mitral stenosis, mild aortic stenosis. mild aortic regurgitation. Tele with afib- rate controlled. Placed on hep gtt with goal to transition to lovenox on d/c. As per cardiology, KRISTAN thrombus may take up to 2-4 weeks to resolve. Will need repeat BUBBA in 3-4 weeks. Possible valve intervention pending recovery and goals of care. CT Chest/Abd/Pelvis to r/o malignancy on 9/24. No findings to suggest intrathoracic or intra-abdominal malignancy. However, renal eval of possible renal infarct on CT obtained. Hematology in, Lovenox 1.5mg/kg daily and hypercoagulable workup.  Hospitalization additionally complicated by mild leukocytosis. CXR negative, UA negative, RVP negative-continue close monitoring.   Stable for d/c to [facility] on 9/26/19. (26 Sep 2019 11:38)      PAST MEDICAL & SURGICAL HISTORY:  TIA (transient ischemic attack): 2013- Rt facial droop which resolved after 2 hours  Mitral stenosis  Atrial fibrillation: h/o Cardioversion in 2013 and on Coumadin  DM (diabetes mellitus): Type II  HLD (hyperlipidemia)  HTN (hypertension)  S/P laser cataract surgery, unspecified laterality: ???  S/P knee surgery  Ventral hernia         REVIEW OF SYMPTOMS  [X] Constitutional WNL           [X] Resp WNL           [X] GI WNL                          [X]  WNL                                       [X] Skin WNL                 [X] MSK WNL                VITALS  Vital Signs Last 24 Hrs  T(C): 36.5 (04 Oct 2019 09:01), Max: 36.9 (03 Oct 2019 21:41)  T(F): 97.7 (04 Oct 2019 09:01), Max: 98.5 (03 Oct 2019 21:41)  HR: 91 (04 Oct 2019 09:01) (82 - 91)  BP: 131/74 (04 Oct 2019 09:01) (111/75 - 131/74)  BP(mean): --  RR: 14 (04 Oct 2019 09:01) (14 - 15)  SpO2: 99% (04 Oct 2019 09:01) (99% - 100%)    PHYSICAL EXAM  Constitutional - NAD  HEENT - NCAT, EOMI  Neck - Supple, No limited ROM  Chest - CTA bilaterally, No wheeze, No rhonchi, No crackles  Cardiovascular - irregular, murmur  Abdomen - BS+, Soft, NTND  Extremities - No C/C/E, No calf tenderness   Skin-no rash  Wounds-na      Neurologic Exam -Awake, Alert, right neglect, aphasia-but improved verbal output, able to say own and son's name, and greeting, right hemiparesis persists.      Balance - impaired     Psychiatric - appears depressed    FUNCTIONAL PROGRESS  Gait - 3steps max A, max A to stand  ADLs - max A  Transfers - max A  Functional transfer - max A    RECENT LABS                        9.3    9.45  )-----------( 344      ( 04 Oct 2019 06:15 )             29.5     10-04    136  |  102  |  21  ----------------------------<  136<H>  3.9   |  24  |  0.62    Ca    9.0      04 Oct 2019 06:15    TPro  6.7  /  Alb  2.6<L>  /  TBili  0.6  /  DBili  x   /  AST  33  /  ALT  32  /  AlkPhos  57  10-04      LIVER FUNCTIONS - ( 04 Oct 2019 06:15 )  Alb: 2.6 g/dL / Pro: 6.7 g/dL / ALK PHOS: 57 U/L / ALT: 32 U/L / AST: 33 U/L / GGT: x             Direct LDL: 46 mg/dL (09-23-19 @ 09:24)  Direct LDL: 58 mg/dL (09-13-19 @ 07:00)    Hemoglobin A1C, Whole Blood: 6.7 % (09-23-19 @ 08:45)  Hemoglobin A1C, Whole Blood: 6.3 % (09-13-19 @ 07:00)              RADIOLOGY/OTHER RESULTS      CURRENT MEDICATIONS  MEDICATIONS  (STANDING):  aspirin  chewable 81 milliGRAM(s) Oral daily  atorvastatin 40 milliGRAM(s) Oral at bedtime  dextrose 5%. 1000 milliLiter(s) (50 mL/Hr) IV Continuous <Continuous>  dextrose 50% Injectable 12.5 Gram(s) IV Push once  dextrose 50% Injectable 25 Gram(s) IV Push once  dextrose 50% Injectable 25 Gram(s) IV Push once  docusate sodium 100 milliGRAM(s) Oral two times a day  enoxaparin Injectable 80 milliGRAM(s) SubCutaneous daily  insulin lispro (HumaLOG) corrective regimen sliding scale   SubCutaneous three times a day before meals  insulin lispro (HumaLOG) corrective regimen sliding scale   SubCutaneous at bedtime  metFORMIN 500 milliGRAM(s) Oral daily  metoprolol succinate ER 12.5 milliGRAM(s) Oral daily  pantoprazole    Tablet 40 milliGRAM(s) Oral before breakfast    MEDICATIONS  (PRN):  acetaminophen   Tablet .. 650 milliGRAM(s) Oral every 6 hours PRN Moderate Pain (4 - 6)  dextrose 40% Gel 15 Gram(s) Oral once PRN Blood Glucose LESS THAN 70 milliGRAM(s)/deciliter  glucagon  Injectable 1 milliGRAM(s) IntraMuscular once PRN Glucose LESS THAN 70 milligrams/deciliter

## 2019-10-04 NOTE — PROGRESS NOTE ADULT - ASSESSMENT
63 yo female s/p L MCA infarct, now with decreased functional mobility, dysphagia, aphasia, hemiplegia, gait instability and ADL impairments.    Rehab: Gait Instability, ADL impairments and Functional impairments: Comprehensive Rehab Program of PT/OT/ST    # L MCA infarct   - Aspirin and Lipitor   - f/u hypercoagulable work up per heme/onc at [facility] as pt now with 2nd CVA despite being therapeutic on Coumadin.   - Lovenox Sq continue for CVA ppx at 80mg daily.   - Lexapro for motor recovery as per FLAME-discontinued as per son's request.     # A. Fib  - Left atrial thrombus: continue weight based Lovenox 1.5mg/kg daily.   - Repeat echo for KRISTAN thrombus monitoring.  - metoprolol continues low dose. Rate controlled.     #Leukocytosis  -asymptomatic, follow labs closely. Medicine evaluation.     Diet: Diabetic, dysphagia 3 soft, thin liquids. Advance to regular consistency. Tolerating well.    Renal/Bladder: Voiding independently.     #DM2   - continue sliding scale c Humalog. Start Metformin.      Precautions / PROPHYLAXIS:   - Falls, Cardiac, Seizure   - Ortho: Weight bearing status: WBAT   - Lungs: Aspiration, Incentive Spirometer   - Pressure injury/Skin: Turn Q2hrs while in bed, OOB to Chair, PT/OT    - DVT: Lovenox, SCDs, TEDs

## 2019-10-04 NOTE — PROGRESS NOTE ADULT - ATTENDING COMMENTS
Leukocytosis resolved  Tolerates full dose of AC/Lovenox well, will continue to monitor for potential toxicity.  Metformin started for DM  Stable exam   Full program

## 2019-10-04 NOTE — PROGRESS NOTE ADULT - PROBLEM SELECTOR PLAN 1
- L MCA infarct, continue ASA, Statin  - continue PT/OT, SLP   - Pt had CVA while therapeutic on coumadin- c/w therapeutic dose of Lovenox as per hem/onc at [facility]

## 2019-10-04 NOTE — PROGRESS NOTE ADULT - SUBJECTIVE AND OBJECTIVE BOX
Patient is a 64y old  Female who presents with a chief complaint of s/p b/l CVAs  with deficits (03 Oct 2019 11:18)  Patient seen and examined at bedside.  No events overnight.    ALLERGIES:  No Known Allergies    MEDICATIONS  (STANDING):  aspirin  chewable 81 milliGRAM(s) Oral daily  atorvastatin 40 milliGRAM(s) Oral at bedtime  dextrose 5%. 1000 milliLiter(s) (50 mL/Hr) IV Continuous <Continuous>  dextrose 50% Injectable 12.5 Gram(s) IV Push once  dextrose 50% Injectable 25 Gram(s) IV Push once  dextrose 50% Injectable 25 Gram(s) IV Push once  docusate sodium 100 milliGRAM(s) Oral two times a day  enoxaparin Injectable 80 milliGRAM(s) SubCutaneous daily  insulin lispro (HumaLOG) corrective regimen sliding scale   SubCutaneous three times a day before meals  insulin lispro (HumaLOG) corrective regimen sliding scale   SubCutaneous at bedtime  metFORMIN 500 milliGRAM(s) Oral daily  metoprolol succinate ER 12.5 milliGRAM(s) Oral daily  pantoprazole    Tablet 40 milliGRAM(s) Oral before breakfast    MEDICATIONS  (PRN):  acetaminophen   Tablet .. 650 milliGRAM(s) Oral every 6 hours PRN Moderate Pain (4 - 6)  dextrose 40% Gel 15 Gram(s) Oral once PRN Blood Glucose LESS THAN 70 milliGRAM(s)/deciliter  glucagon  Injectable 1 milliGRAM(s) IntraMuscular once PRN Glucose LESS THAN 70 milligrams/deciliter    Vital Signs Last 24 Hrs  T(F): 98.5 (03 Oct 2019 21:41), Max: 98.5 (03 Oct 2019 21:41)  HR: 90 (04 Oct 2019 05:49) (82 - 90)  BP: 111/75 (04 Oct 2019 05:49) (111/75 - 119/75)  RR: 15 (04 Oct 2019 05:49) (15 - 15)  SpO2: 100% (04 Oct 2019 05:49) (99% - 100%)  I&O's Summary    03 Oct 2019 07:01  -  04 Oct 2019 07:00  --------------------------------------------------------  IN: 480 mL / OUT: 0 mL / NET: 480 mL      PHYSICAL EXAM:  General: NAD, alert and awake.  ENT: MMM  Neck: Supple, No JVD  Lungs: breathing not labored; Clear to auscultation bilaterally  Cardio: irreg, S1/S2, No murmurs  Abdomen: Soft, Nontender, Nondistended; Bowel sounds present  Extremities: RLE with brace in place, No calf tenderness, No pitting edema  Neuro:  +aphasia, right sided weakness    LABS:                        9.3    9.45  )-----------( 344      ( 04 Oct 2019 06:15 )             29.5     10-04    136  |  102  |  21  ----------------------------<  136  3.9   |  24  |  0.62    Ca    9.0      04 Oct 2019 06:15    TPro  6.7  /  Alb  2.6  /  TBili  0.6  /  DBili  x   /  AST  33  /  ALT  32  /  AlkPhos  57  10-04    eGFR if Non African American: 97 mL/min/1.73M2 (10-04-19 @ 06:15)  eGFR if African American: 112 mL/min/1.73M2 (10-04-19 @ 06:15)            09-23 Chol 98 mg/dL LDL 46 mg/dL HDL 39 mg/dL Trig 67 mg/dL              POCT Blood Glucose.: 146 mg/dL (04 Oct 2019 07:46)  POCT Blood Glucose.: 142 mg/dL (03 Oct 2019 21:44)  POCT Blood Glucose.: 140 mg/dL (03 Oct 2019 17:09)  POCT Blood Glucose.: 206 mg/dL (03 Oct 2019 11:59)    09-23 [id number] 6.7  09-13 [id number] 6.3          RADIOLOGY & ADDITIONAL TESTS:    Care Discussed with Consultants/Other Providers:

## 2019-10-05 LAB
GLUCOSE BLDC GLUCOMTR-MCNC: 146 MG/DL — HIGH (ref 70–99)
GLUCOSE BLDC GLUCOMTR-MCNC: 148 MG/DL — HIGH (ref 70–99)
GLUCOSE BLDC GLUCOMTR-MCNC: 156 MG/DL — HIGH (ref 70–99)
GLUCOSE BLDC GLUCOMTR-MCNC: 157 MG/DL — HIGH (ref 70–99)

## 2019-10-05 PROCEDURE — 99232 SBSQ HOSP IP/OBS MODERATE 35: CPT

## 2019-10-05 RX ORDER — METOPROLOL TARTRATE 50 MG
12.5 TABLET ORAL DAILY
Refills: 0 | Status: DISCONTINUED | OUTPATIENT
Start: 2019-10-05 | End: 2019-10-11

## 2019-10-05 RX ADMIN — Medication 81 MILLIGRAM(S): at 11:43

## 2019-10-05 RX ADMIN — Medication 12.5 MILLIGRAM(S): at 06:00

## 2019-10-05 RX ADMIN — METFORMIN HYDROCHLORIDE 500 MILLIGRAM(S): 850 TABLET ORAL at 11:43

## 2019-10-05 RX ADMIN — Medication 0: at 22:18

## 2019-10-05 RX ADMIN — ENOXAPARIN SODIUM 80 MILLIGRAM(S): 100 INJECTION SUBCUTANEOUS at 11:43

## 2019-10-05 RX ADMIN — Medication 100 MILLIGRAM(S): at 19:38

## 2019-10-05 RX ADMIN — PANTOPRAZOLE SODIUM 40 MILLIGRAM(S): 20 TABLET, DELAYED RELEASE ORAL at 05:57

## 2019-10-05 RX ADMIN — Medication 100 MILLIGRAM(S): at 05:56

## 2019-10-05 RX ADMIN — ATORVASTATIN CALCIUM 40 MILLIGRAM(S): 80 TABLET, FILM COATED ORAL at 22:18

## 2019-10-05 RX ADMIN — Medication 1: at 07:50

## 2019-10-05 NOTE — PROGRESS NOTE ADULT - SUBJECTIVE AND OBJECTIVE BOX
No overnight events.  Reports no pain.   Family at bedside and had no concerns.   Removed right UE splint and encouraged to keep on.       REVIEW OF SYSTEMS  Constitutional - No fever,  No fatigue  Neurological - No headaches, +loss of strength  Musculoskeletal - No joint pain, No joint swelling, No muscle pain    VITALS  T(C): 36.4 (10-04-19 @ 22:30), Max: 36.5 (10-04-19 @ 09:01)  HR: 71 (10-05-19 @ 05:51) (71 - 91)  BP: 110/74 (10-05-19 @ 05:51) (103/71 - 131/74)  RR: 14 (10-04-19 @ 22:30) (14 - 14)  SpO2: 100% (10-04-19 @ 22:30) (99% - 100%)  Wt(kg): --       MEDICATIONS   acetaminophen   Tablet .. 650 milliGRAM(s) every 6 hours PRN  aspirin  chewable 81 milliGRAM(s) daily  atorvastatin 40 milliGRAM(s) at bedtime  dextrose 40% Gel 15 Gram(s) once PRN  dextrose 5%. 1000 milliLiter(s) <Continuous>  dextrose 50% Injectable 12.5 Gram(s) once  dextrose 50% Injectable 25 Gram(s) once  dextrose 50% Injectable 25 Gram(s) once  docusate sodium 100 milliGRAM(s) two times a day  enoxaparin Injectable 80 milliGRAM(s) daily  glucagon  Injectable 1 milliGRAM(s) once PRN  insulin lispro (HumaLOG) corrective regimen sliding scale   three times a day before meals  insulin lispro (HumaLOG) corrective regimen sliding scale   at bedtime  metFORMIN 500 milliGRAM(s) daily  metoprolol succinate ER 12.5 milliGRAM(s) daily  pantoprazole    Tablet 40 milliGRAM(s) before breakfast      RECENT LABS/IMAGING                        9.3    9.45  )-----------( 344      ( 04 Oct 2019 06:15 )             29.5     10-04    136  |  102  |  21  ----------------------------<  136<H>  3.9   |  24  |  0.62    Ca    9.0      04 Oct 2019 06:15    TPro  6.7  /  Alb  2.6<L>  /  TBili  0.6  /  DBili  x   /  AST  33  /  ALT  32  /  AlkPhos  57  10-04                POCT Blood Glucose.: 157 mg/dL (10-05-19 @ 07:50)  POCT Blood Glucose.: 212 mg/dL (10-04-19 @ 22:59)  POCT Blood Glucose.: 123 mg/dL (10-04-19 @ 16:53)  POCT Blood Glucose.: 196 mg/dL (10-04-19 @ 12:06)    ---------  PHYSICAL EXAM  Constitutional - NAD, Comfortable  Pulm - Breathing comfortably, No wheezing  Abd - Soft, NTND  Extremities - No edema, No calf tenderness  Neurologic Exam -                    Cognitive - Awake, Alert     Communication - Expressive deficits     Motor - Right spastic hemiparesis  Psychiatric - Mood WNL, Affect WNL    ASSESSMENT/PLAN  64y Female with functional deficits after acute CVA  CVA - Lovenox, ASA, Lipitor  AFIB/HTN - Metoprolol  DM2 - Metformin  Pain - Tylenol PRN  DVT PPX - Lovenox	    Continue 3hrs a day of comprehensive rehab program.

## 2019-10-06 LAB
GLUCOSE BLDC GLUCOMTR-MCNC: 152 MG/DL — HIGH (ref 70–99)
GLUCOSE BLDC GLUCOMTR-MCNC: 160 MG/DL — HIGH (ref 70–99)
GLUCOSE BLDC GLUCOMTR-MCNC: 169 MG/DL — HIGH (ref 70–99)
GLUCOSE BLDC GLUCOMTR-MCNC: 205 MG/DL — HIGH (ref 70–99)

## 2019-10-06 PROCEDURE — 99232 SBSQ HOSP IP/OBS MODERATE 35: CPT

## 2019-10-06 RX ORDER — SENNA PLUS 8.6 MG/1
2 TABLET ORAL AT BEDTIME
Refills: 0 | Status: DISCONTINUED | OUTPATIENT
Start: 2019-10-06 | End: 2019-10-07

## 2019-10-06 RX ORDER — MULTIVIT WITH MIN/MFOLATE/K2 340-15/3 G
1 POWDER (GRAM) ORAL ONCE
Refills: 0 | Status: COMPLETED | OUTPATIENT
Start: 2019-10-06 | End: 2019-10-06

## 2019-10-06 RX ADMIN — Medication 1: at 16:59

## 2019-10-06 RX ADMIN — Medication 100 MILLIGRAM(S): at 06:06

## 2019-10-06 RX ADMIN — Medication 81 MILLIGRAM(S): at 11:54

## 2019-10-06 RX ADMIN — Medication 1: at 07:43

## 2019-10-06 RX ADMIN — ENOXAPARIN SODIUM 80 MILLIGRAM(S): 100 INJECTION SUBCUTANEOUS at 11:54

## 2019-10-06 RX ADMIN — ATORVASTATIN CALCIUM 40 MILLIGRAM(S): 80 TABLET, FILM COATED ORAL at 22:30

## 2019-10-06 RX ADMIN — PANTOPRAZOLE SODIUM 40 MILLIGRAM(S): 20 TABLET, DELAYED RELEASE ORAL at 06:06

## 2019-10-06 RX ADMIN — METFORMIN HYDROCHLORIDE 500 MILLIGRAM(S): 850 TABLET ORAL at 11:54

## 2019-10-06 RX ADMIN — Medication 0: at 22:30

## 2019-10-06 RX ADMIN — Medication 1: at 11:57

## 2019-10-07 DIAGNOSIS — I48.91 UNSPECIFIED ATRIAL FIBRILLATION: ICD-10-CM

## 2019-10-07 DIAGNOSIS — R19.7 DIARRHEA, UNSPECIFIED: ICD-10-CM

## 2019-10-07 DIAGNOSIS — D72.829 ELEVATED WHITE BLOOD CELL COUNT, UNSPECIFIED: ICD-10-CM

## 2019-10-07 DIAGNOSIS — R47.01 APHASIA: ICD-10-CM

## 2019-10-07 DIAGNOSIS — R13.10 DYSPHAGIA, UNSPECIFIED: ICD-10-CM

## 2019-10-07 DIAGNOSIS — I63.412 CEREBRAL INFARCTION DUE TO EMBOLISM OF LEFT MIDDLE CEREBRAL ARTERY: ICD-10-CM

## 2019-10-07 LAB
ALBUMIN SERPL ELPH-MCNC: 2.6 G/DL — LOW (ref 3.3–5)
ALP SERPL-CCNC: 54 U/L — SIGNIFICANT CHANGE UP (ref 40–120)
ALT FLD-CCNC: 30 U/L — SIGNIFICANT CHANGE UP (ref 10–45)
ANION GAP SERPL CALC-SCNC: 10 MMOL/L — SIGNIFICANT CHANGE UP (ref 5–17)
APPEARANCE UR: CLEAR — SIGNIFICANT CHANGE UP
AST SERPL-CCNC: 24 U/L — SIGNIFICANT CHANGE UP (ref 10–40)
BACTERIA # UR AUTO: NEGATIVE /HPF — SIGNIFICANT CHANGE UP
BILIRUB SERPL-MCNC: 0.6 MG/DL — SIGNIFICANT CHANGE UP (ref 0.2–1.2)
BILIRUB UR-MCNC: NEGATIVE — SIGNIFICANT CHANGE UP
BUN SERPL-MCNC: 27 MG/DL — HIGH (ref 7–23)
CALCIUM SERPL-MCNC: 9.4 MG/DL — SIGNIFICANT CHANGE UP (ref 8.4–10.5)
CHLORIDE SERPL-SCNC: 103 MMOL/L — SIGNIFICANT CHANGE UP (ref 96–108)
CO2 SERPL-SCNC: 25 MMOL/L — SIGNIFICANT CHANGE UP (ref 22–31)
COLOR SPEC: YELLOW — SIGNIFICANT CHANGE UP
CREAT SERPL-MCNC: 0.77 MG/DL — SIGNIFICANT CHANGE UP (ref 0.5–1.3)
DIFF PNL FLD: NEGATIVE — SIGNIFICANT CHANGE UP
EPI CELLS # UR: SIGNIFICANT CHANGE UP
GLUCOSE BLDC GLUCOMTR-MCNC: 125 MG/DL — HIGH (ref 70–99)
GLUCOSE BLDC GLUCOMTR-MCNC: 150 MG/DL — HIGH (ref 70–99)
GLUCOSE BLDC GLUCOMTR-MCNC: 166 MG/DL — HIGH (ref 70–99)
GLUCOSE BLDC GLUCOMTR-MCNC: 259 MG/DL — HIGH (ref 70–99)
GLUCOSE SERPL-MCNC: 154 MG/DL — HIGH (ref 70–99)
GLUCOSE UR QL: NEGATIVE — SIGNIFICANT CHANGE UP
HCT VFR BLD CALC: 30 % — LOW (ref 34.5–45)
HGB BLD-MCNC: 9.6 G/DL — LOW (ref 11.5–15.5)
KETONES UR-MCNC: NEGATIVE — SIGNIFICANT CHANGE UP
LEUKOCYTE ESTERASE UR-ACNC: NEGATIVE — SIGNIFICANT CHANGE UP
MCHC RBC-ENTMCNC: 27 PG — SIGNIFICANT CHANGE UP (ref 27–34)
MCHC RBC-ENTMCNC: 32 GM/DL — SIGNIFICANT CHANGE UP (ref 32–36)
MCV RBC AUTO: 84.5 FL — SIGNIFICANT CHANGE UP (ref 80–100)
NITRITE UR-MCNC: NEGATIVE — SIGNIFICANT CHANGE UP
NRBC # BLD: 0 /100 WBCS — SIGNIFICANT CHANGE UP (ref 0–0)
PH UR: 6 — SIGNIFICANT CHANGE UP (ref 5–8)
PLATELET # BLD AUTO: 346 K/UL — SIGNIFICANT CHANGE UP (ref 150–400)
POTASSIUM SERPL-MCNC: 3.8 MMOL/L — SIGNIFICANT CHANGE UP (ref 3.5–5.3)
POTASSIUM SERPL-SCNC: 3.8 MMOL/L — SIGNIFICANT CHANGE UP (ref 3.5–5.3)
PROT SERPL-MCNC: 6.7 G/DL — SIGNIFICANT CHANGE UP (ref 6–8.3)
PROT UR-MCNC: NEGATIVE — SIGNIFICANT CHANGE UP
RBC # BLD: 3.55 M/UL — LOW (ref 3.8–5.2)
RBC # FLD: 17.2 % — HIGH (ref 10.3–14.5)
RBC CASTS # UR COMP ASSIST: NEGATIVE /HPF — SIGNIFICANT CHANGE UP (ref 0–4)
SODIUM SERPL-SCNC: 138 MMOL/L — SIGNIFICANT CHANGE UP (ref 135–145)
SP GR SPEC: 1.01 — SIGNIFICANT CHANGE UP (ref 1.01–1.02)
UROBILINOGEN FLD QL: NEGATIVE — SIGNIFICANT CHANGE UP
WBC # BLD: 10.98 K/UL — HIGH (ref 3.8–10.5)
WBC # FLD AUTO: 10.98 K/UL — HIGH (ref 3.8–10.5)
WBC UR QL: NEGATIVE /HPF — SIGNIFICANT CHANGE UP (ref 0–5)

## 2019-10-07 PROCEDURE — 99233 SBSQ HOSP IP/OBS HIGH 50: CPT

## 2019-10-07 RX ORDER — PHENAZOPYRIDINE HCL 100 MG
100 TABLET ORAL EVERY 8 HOURS
Refills: 0 | Status: DISCONTINUED | OUTPATIENT
Start: 2019-10-07 | End: 2019-10-07

## 2019-10-07 RX ADMIN — Medication 81 MILLIGRAM(S): at 11:25

## 2019-10-07 RX ADMIN — Medication 1: at 21:17

## 2019-10-07 RX ADMIN — Medication 100 MILLIGRAM(S): at 05:54

## 2019-10-07 RX ADMIN — Medication 12.5 MILLIGRAM(S): at 05:53

## 2019-10-07 RX ADMIN — PANTOPRAZOLE SODIUM 40 MILLIGRAM(S): 20 TABLET, DELAYED RELEASE ORAL at 05:53

## 2019-10-07 RX ADMIN — Medication 1: at 11:25

## 2019-10-07 RX ADMIN — ATORVASTATIN CALCIUM 40 MILLIGRAM(S): 80 TABLET, FILM COATED ORAL at 21:17

## 2019-10-07 RX ADMIN — METFORMIN HYDROCHLORIDE 500 MILLIGRAM(S): 850 TABLET ORAL at 11:25

## 2019-10-07 RX ADMIN — ENOXAPARIN SODIUM 80 MILLIGRAM(S): 100 INJECTION SUBCUTANEOUS at 11:25

## 2019-10-07 NOTE — PROGRESS NOTE ADULT - ASSESSMENT
65 yo female s/p L MCA infarct, now with decreased functional mobility, dysphagia, aphasia, hemiplegia, gait instability and ADL impairments.    Rehab: Gait Instability, ADL impairments and Functional impairments: Comprehensive Rehab Program of PT/OT/ST    # L MCA infarct   - Aspirin and Lipitor   - f/u hypercoagulable work up per heme/onc at [facility] as pt now with 2nd CVA despite being therapeutic on Coumadin.   - Lovenox Sq continue for CVA ppx at 80mg daily.   - Lexapro for motor recovery as per FLAME-discontinued as per son's request.     # A. Fib  - Left atrial thrombus: continue weight based Lovenox 1.5mg/kg daily.   - Repeat echo for KRISTAN thrombus monitoring.  - metoprolol continues low dose. Rate controlled. No hypotension.     #Leukocytosis  -reported dysuria over the weekend, resolved this am, UA neg, afebrile, follow labs closely. Medicine evaluation.     Diet: Diabetic, dysphagia 3 soft, thin liquids. Advance to regular consistency. Tolerating well.    Renal/Bladder: Voiding independently.     #DM2   - continue sliding scale c Humalog. On Metformin.      #Diarrhea  4 BMs yesterday, hold all laxatives today and observe. Denies any diarrhea prior/at home on Metformin.     Precautions / PROPHYLAXIS:   - Falls, Cardiac, Seizure   - Ortho: Weight bearing status: WBAT   - Lungs: Aspiration, Incentive Spirometer   - Pressure injury/Skin: Turn Q2hrs while in bed, OOB to Chair, PT/OT    - DVT: Lovenox, SCDs, TEDs

## 2019-10-07 NOTE — PROGRESS NOTE ADULT - ASSESSMENT
64 female with afib on coumadin, prior right mca stroke in 2013 and 2016 with no residual deficits, htn, hld, dm2, mitral stenosis/rheumatic valvular disease admitted to [facility] s/p left mca infarct now with decreased functional mobility, dysphagia, aphasia, hemiplegia, gait instability and adl impairments.

## 2019-10-07 NOTE — PROGRESS NOTE ADULT - ATTENDING COMMENTS
Diarrhaea this morning, laxatives discontinues, oral fluids enforced.  Stable neurological exam  Multidisciplinary team meeting today:  patient's functional goals and needs, functional and clinical  progress were discussed, barriers to discharge were identified. Anticipate [facility] facility placement.   [facility] 7-10 days  Spoke with patient's son, detailed update given, all questions answered, discharge plan discussed.

## 2019-10-07 NOTE — PROGRESS NOTE ADULT - SUBJECTIVE AND OBJECTIVE BOX
CHIEF COMPLAINT: Loose BMs yesterday.       HISTORY OF PRESENT ILLNESS  63yo RH female, Nadine speaking, with PMHx of Afib (on coumadin), prior R MCA stroke in  and  with no residual deficits, HTN, HLD, DM2, mitral stenosis/rheumatic valvular disease with recent hospitalization (2019) at [facility] for AF for potential cardioversion (Hx of labile INR) and mitral valve intervention. At the time patient found to have KRISTAN thrombus on echo.   On  pt unable to speak or move her R side and brought to ED by family. CT Head  showed L MCA acute-subacute infarcts. CTA head demonstrated L M2 occlusion. Patient was not a candidate for endovascular treatment. INR therapeutic on Coumadin, per family at bedside, patient was compliant with Coumadin.  MRI Head follow up  shows redemonstration of acute left KUSUM and MCA territory infarcts primarily involving the left frontal lobe. No yola hemorrhagic transformation. No midline shift or hydrocephalus. Chronic right frontal and right cerebellar hemisphere infarcts.  TTE EF 65-70%, severe mitral stenosis, mild aortic stenosis. mild aortic regurgitation. Tele with afib- rate controlled. Placed on hep gtt with goal to transition to lovenox on d/c. As per cardiology, KRISTAN thrombus may take up to 2-4 weeks to resolve. Will need repeat BUBBA in 3-4 weeks. Possible valve intervention pending recovery and goals of care. CT Chest/Abd/Pelvis to r/o malignancy on . No findings to suggest intrathoracic or intra-abdominal malignancy. However, renal eval of possible renal infarct on CT obtained. Hematology in, Lovenox 1.5mg/kg daily and hypercoagulable workup.  Hospitalization additionally complicated by mild leukocytosis. CXR negative, UA negative, RVP negative-continue close monitoring.   Stable for d/c to [facility] on 19. (26 Sep 2019 11:38)      PAST MEDICAL & SURGICAL HISTORY:  TIA (transient ischemic attack): 2013- Rt facial droop which resolved after 2 hours  Mitral stenosis  Atrial fibrillation: h/o Cardioversion in  and on Coumadin  DM (diabetes mellitus): Type II  HLD (hyperlipidemia)  HTN (hypertension)  S/P laser cataract surgery, unspecified laterality: ???  S/P knee surgery  Ventral hernia  x -        REVIEW OF SYMPTOMS  [X] Constitutional WNL             [X] Resp WNL              [X]  WNL                         [X] Skin WNL               VITALS  Vital Signs Last 24 Hrs  T(C): 36.5 (06 Oct 2019 22:17), Max: 36.6 (06 Oct 2019 16:36)  T(F): 97.7 (06 Oct 2019 22:17), Max: 97.8 (06 Oct 2019 16:36)  HR: 85 (07 Oct 2019 05:48) (85 - 98)  BP: 117/75 (07 Oct 2019 05:48) (109/73 - 118/81)  BP(mean): --  RR: 14 (06 Oct 2019 22:17) (14 - 14)  SpO2: 97% (06 Oct 2019 22:17) (97% - 98%)      PHYSICAL EXAM  Constitutional - NAD, Comfortable  HEENT - NCAT, EOMI  Neck - Supple, No limited ROM  Chest - CTA bilaterally, No wheeze, No rhonchi, No crackles  Cardiovascular - irregular, No murmurs  Abdomen - BS+, Soft, NTND  Extremities - No C/C/E, No calf tenderness   Skin-no rash  Wounds-na      Neurologic Exam - awake and alert, improved verbal output today, follows commands, some motor return at right hand. Mild tone.                     Balance - impaired     Psychiatric - Mood stable, Affect WNL         FUNCTIONAL PROGRESS  Gait - 25ft max A  ADLs - max A  Transfers - max A  Functional transfer - max A    RECENT LABS                        9.6    10.98 )-----------( 346      ( 07 Oct 2019 05:10 )             30.0     10-07    138  |  103  |  27<H>  ----------------------------<  154<H>  3.8   |  25  |  0.77    Ca    9.4      07 Oct 2019 05:10    TPro  6.7  /  Alb  2.6<L>  /  TBili  0.6  /  DBili  x   /  AST  24  /  ALT  30  /  AlkPhos  54  10-07      LIVER FUNCTIONS - ( 07 Oct 2019 05:10 )  Alb: 2.6 g/dL / Pro: 6.7 g/dL / ALK PHOS: 54 U/L / ALT: 30 U/L / AST: 24 U/L / GGT: x           Urinalysis Basic - ( 07 Oct 2019 00:16 )    Color: Yellow / Appearance: Clear / S.010 / pH: x  Gluc: x / Ketone: Negative  / Bili: Negative / Urobili: Negative   Blood: x / Protein: Negative / Nitrite: Negative   Leuk Esterase: Negative / RBC: Negative /HPF / WBC Negative /HPF   Sq Epi: x / Non Sq Epi: Neg.-Few / Bacteria: Negative /HPF      Direct LDL: 46 mg/dL (19 @ 09:24)  Direct LDL: 58 mg/dL (19 @ 07:00)    Hemoglobin A1C, Whole Blood: 6.7 % (19 @ 08:45)  Hemoglobin A1C, Whole Blood: 6.3 % (19 @ 07:00)              RADIOLOGY/OTHER RESULTS      CURRENT MEDICATIONS  MEDICATIONS  (STANDING):  aspirin  chewable 81 milliGRAM(s) Oral daily  atorvastatin 40 milliGRAM(s) Oral at bedtime  dextrose 5%. 1000 milliLiter(s) (50 mL/Hr) IV Continuous <Continuous>  dextrose 50% Injectable 12.5 Gram(s) IV Push once  dextrose 50% Injectable 25 Gram(s) IV Push once  dextrose 50% Injectable 25 Gram(s) IV Push once  enoxaparin Injectable 80 milliGRAM(s) SubCutaneous daily  insulin lispro (HumaLOG) corrective regimen sliding scale   SubCutaneous three times a day before meals  insulin lispro (HumaLOG) corrective regimen sliding scale   SubCutaneous at bedtime  metFORMIN 500 milliGRAM(s) Oral daily  metoprolol succinate ER 12.5 milliGRAM(s) Oral daily  pantoprazole    Tablet 40 milliGRAM(s) Oral before breakfast    MEDICATIONS  (PRN):  acetaminophen   Tablet .. 650 milliGRAM(s) Oral every 6 hours PRN Moderate Pain (4 - 6)  dextrose 40% Gel 15 Gram(s) Oral once PRN Blood Glucose LESS THAN 70 milliGRAM(s)/deciliter  glucagon  Injectable 1 milliGRAM(s) IntraMuscular once PRN Glucose LESS THAN 70 milligrams/deciliter      ASSESSMENT & PLAN          GI/Bowel Management - hold laxatives for loose BMs yesterday.    Management - Toilet Q2, denies dysuria this am.   Skin - Turn Q2  Pain - Tylenol PRN  DVT PPX - lovenox  Diet - diabetic    Continue comprehensive acute rehab program consisting of 3hrs/day of OT/PT and SLP.

## 2019-10-07 NOTE — PROGRESS NOTE ADULT - SUBJECTIVE AND OBJECTIVE BOX
Patient is a 64y old  Female who presents with a chief complaint of s/p b/l CVAs  with deficits (07 Oct 2019 08:39)  No acute events overnight          Patient seen and examined at bedside.    ALLERGIES:  No Known Allergies        Vital Signs Last 24 Hrs  T(F): 97.5 (07 Oct 2019 08:53), Max: 97.8 (06 Oct 2019 16:36)  HR: 88 (07 Oct 2019 08:53) (85 - 98)  BP: 106/64 (07 Oct 2019 08:53) (106/64 - 118/81)  RR: 14 (07 Oct 2019 08:53) (14 - 14)  SpO2: 99% (07 Oct 2019 08:53) (97% - 99%)  I&O's Summary    07 Oct 2019 07:01  -  07 Oct 2019 10:45  --------------------------------------------------------  IN: 240 mL / OUT: 0 mL / NET: 240 mL      MEDICATIONS:  acetaminophen   Tablet .. 650 milliGRAM(s) Oral every 6 hours PRN  aspirin  chewable 81 milliGRAM(s) Oral daily  atorvastatin 40 milliGRAM(s) Oral at bedtime  dextrose 40% Gel 15 Gram(s) Oral once PRN  dextrose 5%. 1000 milliLiter(s) IV Continuous <Continuous>  dextrose 50% Injectable 12.5 Gram(s) IV Push once  dextrose 50% Injectable 25 Gram(s) IV Push once  dextrose 50% Injectable 25 Gram(s) IV Push once  enoxaparin Injectable 80 milliGRAM(s) SubCutaneous daily  glucagon  Injectable 1 milliGRAM(s) IntraMuscular once PRN  insulin lispro (HumaLOG) corrective regimen sliding scale   SubCutaneous three times a day before meals  insulin lispro (HumaLOG) corrective regimen sliding scale   SubCutaneous at bedtime  metFORMIN 500 milliGRAM(s) Oral daily  metoprolol succinate ER 12.5 milliGRAM(s) Oral daily  pantoprazole    Tablet 40 milliGRAM(s) Oral before breakfast      PHYSICAL EXAM:  General: NAD, Alert  ENT: MMM  Neck: Supple, No JVD  Lungs: Clear to auscultation bilaterally, good air entry  Cardio: S1S2 irregular  Abdomen: Soft, Nontender  Extremities: No cyanosis, No edema    LABS:                        9.6    10.98 )-----------( 346      ( 07 Oct 2019 05:10 )             30.0     10-07    138  |  103  |  27  ----------------------------<  154  3.8   |  25  |  0.77    Ca    9.4      07 Oct 2019 05:10    TPro  6.7  /  Alb  2.6  /  TBili  0.6  /  DBili  x   /  AST  24  /  ALT  30  /  AlkPhos  54  10-07    eGFR if Non African American: 83 mL/min/1.73M2 (10-07-19 @ 05:10)  eGFR if : 96 mL/min/1.73M2 (10-07-19 @ 05:10)             Chol 98 mg/dL LDL 46 mg/dL HDL 39 mg/dL Trig 67 mg/dL              POCT Blood Glucose.: 150 mg/dL (07 Oct 2019 07:49)  POCT Blood Glucose.: 205 mg/dL (06 Oct 2019 22:28)  POCT Blood Glucose.: 169 mg/dL (06 Oct 2019 16:57)  POCT Blood Glucose.: 160 mg/dL (06 Oct 2019 11:56)     [id number] 6.7   [id number] 6.3    Urinalysis Basic - ( 07 Oct 2019 00:16 )    Color: Yellow / Appearance: Clear / S.010 / pH: x  Gluc: x / Ketone: Negative  / Bili: Negative / Urobili: Negative   Blood: x / Protein: Negative / Nitrite: Negative   Leuk Esterase: Negative / RBC: Negative /HPF / WBC Negative /HPF   Sq Epi: x / Non Sq Epi: Neg.-Few / Bacteria: Negative /HPF          RADIOLOGY & ADDITIONAL TESTS:    Care Discussed with Consultants/Other Providers:

## 2019-10-07 NOTE — PROGRESS NOTE ADULT - PROBLEM SELECTOR PLAN 1
continue comprehensive rehab pt/ot/slp  continue asa, lipitor and therapeutic lovenox  heme/onc following for hypercoagulable workup

## 2019-10-08 LAB
GLUCOSE BLDC GLUCOMTR-MCNC: 119 MG/DL — HIGH (ref 70–99)
GLUCOSE BLDC GLUCOMTR-MCNC: 151 MG/DL — HIGH (ref 70–99)
GLUCOSE BLDC GLUCOMTR-MCNC: 155 MG/DL — HIGH (ref 70–99)
GLUCOSE BLDC GLUCOMTR-MCNC: 207 MG/DL — HIGH (ref 70–99)

## 2019-10-08 PROCEDURE — 99232 SBSQ HOSP IP/OBS MODERATE 35: CPT

## 2019-10-08 PROCEDURE — 99233 SBSQ HOSP IP/OBS HIGH 50: CPT

## 2019-10-08 RX ORDER — ENOXAPARIN SODIUM 100 MG/ML
80 INJECTION SUBCUTANEOUS
Qty: 0 | Refills: 0 | DISCHARGE
Start: 2019-10-08

## 2019-10-08 RX ORDER — ASPIRIN/CALCIUM CARB/MAGNESIUM 324 MG
1 TABLET ORAL
Qty: 0 | Refills: 0 | DISCHARGE

## 2019-10-08 RX ORDER — METOPROLOL TARTRATE 50 MG
1 TABLET ORAL
Qty: 60 | Refills: 0

## 2019-10-08 RX ORDER — ATORVASTATIN CALCIUM 80 MG/1
1 TABLET, FILM COATED ORAL
Qty: 0 | Refills: 0 | DISCHARGE
Start: 2019-10-08

## 2019-10-08 RX ORDER — METOPROLOL TARTRATE 50 MG
12.5 TABLET ORAL
Qty: 0 | Refills: 0 | DISCHARGE
Start: 2019-10-08

## 2019-10-08 RX ORDER — DIGOXIN 250 MCG
1 TABLET ORAL
Qty: 0 | Refills: 0 | DISCHARGE

## 2019-10-08 RX ORDER — ATORVASTATIN CALCIUM 80 MG/1
1 TABLET, FILM COATED ORAL
Qty: 0 | Refills: 0 | DISCHARGE

## 2019-10-08 RX ORDER — METOPROLOL TARTRATE 50 MG
25 TABLET ORAL
Qty: 0 | Refills: 0 | DISCHARGE
Start: 2019-10-08

## 2019-10-08 RX ORDER — PANTOPRAZOLE SODIUM 20 MG/1
1 TABLET, DELAYED RELEASE ORAL
Qty: 0 | Refills: 0 | DISCHARGE
Start: 2019-10-08

## 2019-10-08 RX ORDER — ASPIRIN/CALCIUM CARB/MAGNESIUM 324 MG
1 TABLET ORAL
Qty: 0 | Refills: 0 | DISCHARGE
Start: 2019-10-08

## 2019-10-08 RX ORDER — ACETAMINOPHEN 500 MG
2 TABLET ORAL
Qty: 0 | Refills: 0 | DISCHARGE
Start: 2019-10-08

## 2019-10-08 RX ORDER — METFORMIN HYDROCHLORIDE 850 MG/1
1 TABLET ORAL
Qty: 0 | Refills: 0 | DISCHARGE

## 2019-10-08 RX ADMIN — Medication 1: at 07:50

## 2019-10-08 RX ADMIN — Medication 2: at 11:48

## 2019-10-08 RX ADMIN — ENOXAPARIN SODIUM 80 MILLIGRAM(S): 100 INJECTION SUBCUTANEOUS at 11:41

## 2019-10-08 RX ADMIN — Medication 81 MILLIGRAM(S): at 11:41

## 2019-10-08 RX ADMIN — Medication 12.5 MILLIGRAM(S): at 05:49

## 2019-10-08 RX ADMIN — ATORVASTATIN CALCIUM 40 MILLIGRAM(S): 80 TABLET, FILM COATED ORAL at 21:55

## 2019-10-08 RX ADMIN — METFORMIN HYDROCHLORIDE 500 MILLIGRAM(S): 850 TABLET ORAL at 11:41

## 2019-10-08 RX ADMIN — PANTOPRAZOLE SODIUM 40 MILLIGRAM(S): 20 TABLET, DELAYED RELEASE ORAL at 05:49

## 2019-10-08 NOTE — PROGRESS NOTE ADULT - ASSESSMENT
65 yo female s/p L MCA infarct, now with decreased functional mobility, dysphagia, aphasia, hemiplegia, gait instability and ADL impairments.    Rehab: Gait Instability, ADL impairments and Functional impairments: Comprehensive Rehab Program of PT/OT/ST    # L MCA infarct   - Aspirin and Lipitor   - f/u hypercoagulable work up per heme/onc at [facility] as pt now with 2nd CVA despite being therapeutic on Coumadin.   - Lovenox Sq continue for CVA ppx at 80mg daily.   - Lexapro for motor recovery as per FLAME-discontinued as per son's request.     # A. Fib  - Left atrial thrombus: continue weight based Lovenox 1.5mg/kg daily.   - Repeat echo for KRISTAN thrombus monitoring.  - metoprolol continues low dose. Rate controlled. No hypotension.     #Leukocytosis  -reported dysuria over the weekend, resolved now, UA neg, afebrile, follow labs closely. Medicine evaluation.     Diet: Diabetic, dysphagia 3 soft, thin liquids. Advance to regular consistency. Tolerating well.    Renal/Bladder: Voiding independently.     #DM2   - continue sliding scale c Humalog. On Metformin.      #Diarrhea  -no diarrhea overnight, hold laxatives.     Precautions / PROPHYLAXIS:   - Falls, Cardiac, Seizure   - Ortho: Weight bearing status: WBAT   - Lungs: Aspiration, Incentive Spirometer   - Pressure injury/Skin: Turn Q2hrs while in bed, OOB to Chair, PT/OT    - DVT: Lovenox, SCDs, TEDs

## 2019-10-08 NOTE — PROGRESS NOTE ADULT - ATTENDING COMMENTS
Patient medically and neurologically stable. Making progress towards rehab goals.   Continue rehab program. Discharge to [facility] in the morning.

## 2019-10-08 NOTE — PROGRESS NOTE ADULT - PROBLEM SELECTOR PLAN 1
continue comprehensive rehab pt/ot/slp  continue asa, lipitor and therapeutic lovenox ( off coumadin as per heme)  heme/onc following for hypercoagulable workup

## 2019-10-08 NOTE — CHART NOTE - NSCHARTNOTEFT_GEN_A_CORE
Nutrition Follow Up Note  Hospital Course   (Per Electronic Medical Record):     Source:   Family [X]   Medical Record [X]      Diet:   Consistent Carbohydrate Diet w/ Thin Liquids   Tolerates Diet Well  No Chewing/Swallowing Difficulties  No Recent Nausea, Vomiting, Diarrhea or Constipation  Consumes % of Meals (as Per Documentation)   Family States Good PO Intake/Appetite   on Glucerna 8oz PO BID (Provides 440kcal-20grams of Protein)  Declines Nutrition Supplementation (Per Son)  Recommend Discontinue Glucerna 8oz PO BID    Enteral/Parenteral Nutrition: N/A    Current Weight: 113.7lb on 9/26  Obtain New Weight  Obtain Weights Weekly     Pertinent Medications: MEDICATIONS  (STANDING):  aspirin  chewable 81 milliGRAM(s) Oral daily  atorvastatin 40 milliGRAM(s) Oral at bedtime  dextrose 5%. 1000 milliLiter(s) (50 mL/Hr) IV Continuous <Continuous>  dextrose 50% Injectable 12.5 Gram(s) IV Push once  dextrose 50% Injectable 25 Gram(s) IV Push once  dextrose 50% Injectable 25 Gram(s) IV Push once  enoxaparin Injectable 80 milliGRAM(s) SubCutaneous daily  insulin lispro (HumaLOG) corrective regimen sliding scale   SubCutaneous three times a day before meals  insulin lispro (HumaLOG) corrective regimen sliding scale   SubCutaneous at bedtime  metFORMIN 500 milliGRAM(s) Oral daily  metoprolol succinate ER 12.5 milliGRAM(s) Oral daily  pantoprazole    Tablet 40 milliGRAM(s) Oral before breakfast    MEDICATIONS  (PRN):  acetaminophen   Tablet .. 650 milliGRAM(s) Oral every 6 hours PRN Moderate Pain (4 - 6)  dextrose 40% Gel 15 Gram(s) Oral once PRN Blood Glucose LESS THAN 70 milliGRAM(s)/deciliter  glucagon  Injectable 1 milliGRAM(s) IntraMuscular once PRN Glucose LESS THAN 70 milligrams/deciliter      Pertinent Labs:  10-07 Na138 mmol/L Glu 154 mg/dL<H> K+ 3.8 mmol/L Cr  0.77 mg/dL BUN 27 mg/dL<H> 10-07 Alb 2.6 g/dL<L> 09-23 [id number] 6.7 %<H> 09-23 Chol 98 mg/dL LDL 46 mg/dL HDL 39 mg/dL<L> Trig 67 mg/dL    POCT (over Last 2 Days) - Ranging from 125-259    Skin: No Pressure Ulcers     Edema: None Noted     Last Bowel Movement: on 10/7    Estimated Needs:   [X] No Change Since Previous Assessment    Previous Nutrition Diagnosis:   Moderate Malnutrition     Nutrition Diagnosis is [X] Ongoing - Declines Nutrition Supplementation     New Nutrition Diagnosis: [X] Not Applicable    Interventions:   1. Recommend Continue Nutrition Plan of Care     Monitoring & Evaluation:   [X] Weights   [X] PO Intake   [X] Follow Up (Per Protocol)  [X] Tolerance to Diet Prescription   [X] Other: Labs & POCT    Registered Dietitian/Nutritionist Remains Available.  Iker Vasquez RDN    Pager # [phone]  Phone# [phone]

## 2019-10-08 NOTE — PROGRESS NOTE ADULT - SUBJECTIVE AND OBJECTIVE BOX
CHIEF COMPLAINT: Resolved diarrhea. NAD.       HISTORY OF PRESENT ILLNESS  65yo RH female, Nadine speaking, with PMHx of Afib (on coumadin), prior R MCA stroke in  and  with no residual deficits, HTN, HLD, DM2, mitral stenosis/rheumatic valvular disease with recent hospitalization (2019) at [facility] for AF for potential cardioversion (Hx of labile INR) and mitral valve intervention. At the time patient found to have KRISTAN thrombus on echo.   On  pt unable to speak or move her R side and brought to ED by family. CT Head  showed L MCA acute-subacute infarcts. CTA head demonstrated L M2 occlusion. Patient was not a candidate for endovascular treatment. INR therapeutic on Coumadin, per family at bedside, patient was compliant with Coumadin.  MRI Head follow up  shows redemonstration of acute left KUSUM and MCA territory infarcts primarily involving the left frontal lobe. No yola hemorrhagic transformation. No midline shift or hydrocephalus. Chronic right frontal and right cerebellar hemisphere infarcts.  TTE EF 65-70%, severe mitral stenosis, mild aortic stenosis. mild aortic regurgitation. Tele with afib- rate controlled. Placed on hep gtt with goal to transition to lovenox on d/c. As per cardiology, KRISTAN thrombus may take up to 2-4 weeks to resolve. Will need repeat BUBBA in 3-4 weeks. Possible valve intervention pending recovery and goals of care. CT Chest/Abd/Pelvis to r/o malignancy on . No findings to suggest intrathoracic or intra-abdominal malignancy. However, renal eval of possible renal infarct on CT obtained. Hematology in, Lovenox 1.5mg/kg daily and hypercoagulable workup.  Hospitalization additionally complicated by mild leukocytosis. CXR negative, UA negative, RVP negative-continue close monitoring.   Stable for d/c to AR on 19. (26 Sep 2019 11:38)      PAST MEDICAL & SURGICAL HISTORY:  TIA (transient ischemic attack): 2013- Rt facial droop which resolved after 2 hours  Mitral stenosis  Atrial fibrillation: h/o Cardioversion in  and on Coumadin  DM (diabetes mellitus): Type II  HLD (hyperlipidemia)  HTN (hypertension)  S/P laser cataract surgery, unspecified laterality: ???  S/P knee surgery  Ventral hernia-        REVIEW OF SYMPTOMS  [X] Constitutional WNL     [X] Cardio WNL            [X] Resp WNL           [X] GI WNL                          [X]  WNL                   [X] Heme WNL              [X] Endo WNL                     [X] Skin WNL                 [X] MSK WNL                VITALS  Vital Signs Last 24 Hrs  T(C): 36.7 (08 Oct 2019 08:34), Max: 36.7 (07 Oct 2019 20:30)  T(F): 98.1 (08 Oct 2019 08:34), Max: 98.1 (08 Oct 2019 08:34)  HR: 79 (08 Oct 2019 08:34) (79 - 99)  BP: 139/72 (08 Oct 2019 08:34) (116/69 - 139/72)  BP(mean): --  RR: 14 (08 Oct 2019 08:34) (14 - 14)  SpO2: 100% (08 Oct 2019 08:34) (98% - 100%)      PHYSICAL EXAM  Constitutional - NAD, Comfortable  HEENT - NCAT, EOMI  Neck - Supple, No limited ROM  Chest - CTA bilaterally, No wheeze, No rhonchi, No crackles  Cardiovascular - RRR, S1S2, No murmurs  Abdomen - BS+, Soft, NTND  Extremities - No C/C/E, No calf tenderness   Skin-no rash  Woumds-      Neurologic Exam -                   HIF  - Awake, Alert, AAO to self, place, date, year, situation      No aphasia, normal attention, normal concentration, no apraxia, agnosia     Cranial Nerves - CN 2-12 intact     Motor - No focal deficits                            Sensory - Intact to LT,PP,Temprature,JPS, vibration                      Cortical sensory modalities intact     Reflexes - DTR Intact     Toes are flexor     Coordination/dysmetria - FTN intact             Balance - WNL Static and dynamic     Psychiatric - Mood stable, Affect WNL         FUNCTIONAL PROGRESS  Gait -   ADLs -   Transfers -  Functional transfer -     RECENT LABS                        9.6    10.98 )-----------( 346      ( 07 Oct 2019 05:10 )             30.0     10-07    138  |  103  |  27<H>  ----------------------------<  154<H>  3.8   |  25  |  0.77    Ca    9.4      07 Oct 2019 05:10    TPro  6.7  /  Alb  2.6<L>  /  TBili  0.6  /  DBili  x   /  AST  24  /  ALT  30  /  AlkPhos  54  10-07      LIVER FUNCTIONS - ( 07 Oct 2019 05:10 )  Alb: 2.6 g/dL / Pro: 6.7 g/dL / ALK PHOS: 54 U/L / ALT: 30 U/L / AST: 24 U/L / GGT: x           Urinalysis Basic - ( 07 Oct 2019 00:16 )    Color: Yellow / Appearance: Clear / S.010 / pH: x  Gluc: x / Ketone: Negative  / Bili: Negative / Urobili: Negative   Blood: x / Protein: Negative / Nitrite: Negative   Leuk Esterase: Negative / RBC: Negative /HPF / WBC Negative /HPF   Sq Epi: x / Non Sq Epi: Neg.-Few / Bacteria: Negative /HPF      Direct LDL: 46 mg/dL (19 @ 09:24)  Direct LDL: 58 mg/dL (19 @ 07:00)    Hemoglobin A1C, Whole Blood: 6.7 % (19 @ 08:45)  Hemoglobin A1C, Whole Blood: 6.3 % (19 @ 07:00)              RADIOLOGY/OTHER RESULTS      CURRENT MEDICATIONS  MEDICATIONS  (STANDING):  aspirin  chewable 81 milliGRAM(s) Oral daily  atorvastatin 40 milliGRAM(s) Oral at bedtime  dextrose 5%. 1000 milliLiter(s) (50 mL/Hr) IV Continuous <Continuous>  dextrose 50% Injectable 12.5 Gram(s) IV Push once  dextrose 50% Injectable 25 Gram(s) IV Push once  dextrose 50% Injectable 25 Gram(s) IV Push once  enoxaparin Injectable 80 milliGRAM(s) SubCutaneous daily  insulin lispro (HumaLOG) corrective regimen sliding scale   SubCutaneous three times a day before meals  insulin lispro (HumaLOG) corrective regimen sliding scale   SubCutaneous at bedtime  metFORMIN 500 milliGRAM(s) Oral daily  metoprolol succinate ER 12.5 milliGRAM(s) Oral daily  pantoprazole    Tablet 40 milliGRAM(s) Oral before breakfast    MEDICATIONS  (PRN):  acetaminophen   Tablet .. 650 milliGRAM(s) Oral every 6 hours PRN Moderate Pain (4 - 6)  dextrose 40% Gel 15 Gram(s) Oral once PRN Blood Glucose LESS THAN 70 milliGRAM(s)/deciliter  glucagon  Injectable 1 milliGRAM(s) IntraMuscular once PRN Glucose LESS THAN 70 milligrams/deciliter      ASSESSMENT & PLAN          GI/Bowel Management - Dulcolax PRN, Fleet PRN   Management - Toilet Q2  Skin - Turn Q2  Pain - Tylenol PRN  DVT PPX - TEDs, SCDs  Diet -     Continue comprehensive acute rehab program consisting of 3hrs/day of OT/PT and SLP. CHIEF COMPLAINT: Resolved diarrhea. NAD.       HISTORY OF PRESENT ILLNESS  63yo RH female, Nadine speaking, with PMHx of Afib (on coumadin), prior R MCA stroke in  and  with no residual deficits, HTN, HLD, DM2, mitral stenosis/rheumatic valvular disease with recent hospitalization (2019) at [facility] for AF for potential cardioversion (Hx of labile INR) and mitral valve intervention. At the time patient found to have KRISTAN thrombus on echo.   On  pt unable to speak or move her R side and brought to ED by family. CT Head  showed L MCA acute-subacute infarcts. CTA head demonstrated L M2 occlusion. Patient was not a candidate for endovascular treatment. INR therapeutic on Coumadin, per family at bedside, patient was compliant with Coumadin.  MRI Head follow up  shows redemonstration of acute left KUSUM and MCA territory infarcts primarily involving the left frontal lobe. No yola hemorrhagic transformation. No midline shift or hydrocephalus. Chronic right frontal and right cerebellar hemisphere infarcts.  TTE EF 65-70%, severe mitral stenosis, mild aortic stenosis. mild aortic regurgitation. Tele with afib- rate controlled. Placed on hep gtt with goal to transition to lovenox on d/c. As per cardiology, KRISTAN thrombus may take up to 2-4 weeks to resolve. Will need repeat BUBBA in 3-4 weeks. Possible valve intervention pending recovery and goals of care. CT Chest/Abd/Pelvis to r/o malignancy on . No findings to suggest intrathoracic or intra-abdominal malignancy. However, renal eval of possible renal infarct on CT obtained. Hematology in, Lovenox 1.5mg/kg daily and hypercoagulable workup.  Hospitalization additionally complicated by mild leukocytosis. CXR negative, UA negative, RVP negative-continue close monitoring.   Stable for d/c to AR on 19. (26 Sep 2019 11:38)      PAST MEDICAL & SURGICAL HISTORY:  TIA (transient ischemic attack): 2013- Rt facial droop which resolved after 2 hours  Mitral stenosis  Atrial fibrillation: h/o Cardioversion in  and on Coumadin  DM (diabetes mellitus): Type II  HLD (hyperlipidemia)  HTN (hypertension)  S/P laser cataract surgery, unspecified laterality: ???  S/P knee surgery  Ventral hernia-        REVIEW OF SYMPTOMS  [X] Constitutional WNL     [X] Cardio WNL            [X] Resp WNL           [X] GI WNL                          [X]  WNL                   [X] Heme WNL              [X] Endo WNL                     [X] Skin WNL                 [X] MSK WNL                VITALS  Vital Signs Last 24 Hrs  T(C): 36.7 (08 Oct 2019 08:34), Max: 36.7 (07 Oct 2019 20:30)  T(F): 98.1 (08 Oct 2019 08:34), Max: 98.1 (08 Oct 2019 08:34)  HR: 79 (08 Oct 2019 08:34) (79 - 99)  BP: 139/72 (08 Oct 2019 08:34) (116/69 - 139/72)  BP(mean): --  RR: 14 (08 Oct 2019 08:34) (14 - 14)  SpO2: 100% (08 Oct 2019 08:34) (98% - 100%)    PHYSICAL EXAM  Constitutional - NAD, Comfortable  HEENT - NCAT, EOMI  Neck - Supple, No limited ROM  Chest - CTA bilaterally, No wheeze, No rhonchi, No crackles  Cardiovascular - irregular, No murmurs  Abdomen - BS+, Soft, NTND  Extremities - No C/C/E, No calf tenderness   Skin-no rash  Wounds-na      Neurologic Exam - awake and alert, improved verbal output today, follows commands, some motor return at right hand. Mild tone.                     Balance - impaired     Psychiatric - Mood stable, Affect WNL         FUNCTIONAL PROGRESS  Gait - 25ft max A  ADLs - max A  Transfers - max A  Functional transfer - max A      RECENT LABS                        9.6    10.98 )-----------( 346      ( 07 Oct 2019 05:10 )             30.0     10-07    138  |  103  |  27<H>  ----------------------------<  154<H>  3.8   |  25  |  0.77    Ca    9.4      07 Oct 2019 05:10    TPro  6.7  /  Alb  2.6<L>  /  TBili  0.6  /  DBili  x   /  AST  24  /  ALT  30  /  AlkPhos  54  10-07      LIVER FUNCTIONS - ( 07 Oct 2019 05:10 )  Alb: 2.6 g/dL / Pro: 6.7 g/dL / ALK PHOS: 54 U/L / ALT: 30 U/L / AST: 24 U/L / GGT: x           Urinalysis Basic - ( 07 Oct 2019 00:16 )    Color: Yellow / Appearance: Clear / S.010 / pH: x  Gluc: x / Ketone: Negative  / Bili: Negative / Urobili: Negative   Blood: x / Protein: Negative / Nitrite: Negative   Leuk Esterase: Negative / RBC: Negative /HPF / WBC Negative /HPF   Sq Epi: x / Non Sq Epi: Neg.-Few / Bacteria: Negative /HPF      Direct LDL: 46 mg/dL (19 @ 09:24)  Direct LDL: 58 mg/dL (19 @ 07:00)    Hemoglobin A1C, Whole Blood: 6.7 % (19 @ 08:45)  Hemoglobin A1C, Whole Blood: 6.3 % (19 @ 07:00)              RADIOLOGY/OTHER RESULTS      CURRENT MEDICATIONS  MEDICATIONS  (STANDING):  aspirin  chewable 81 milliGRAM(s) Oral daily  atorvastatin 40 milliGRAM(s) Oral at bedtime  dextrose 5%. 1000 milliLiter(s) (50 mL/Hr) IV Continuous <Continuous>  dextrose 50% Injectable 12.5 Gram(s) IV Push once  dextrose 50% Injectable 25 Gram(s) IV Push once  dextrose 50% Injectable 25 Gram(s) IV Push once  enoxaparin Injectable 80 milliGRAM(s) SubCutaneous daily  insulin lispro (HumaLOG) corrective regimen sliding scale   SubCutaneous three times a day before meals  insulin lispro (HumaLOG) corrective regimen sliding scale   SubCutaneous at bedtime  metFORMIN 500 milliGRAM(s) Oral daily  metoprolol succinate ER 12.5 milliGRAM(s) Oral daily  pantoprazole    Tablet 40 milliGRAM(s) Oral before breakfast    MEDICATIONS  (PRN):  acetaminophen   Tablet .. 650 milliGRAM(s) Oral every 6 hours PRN Moderate Pain (4 - 6)  dextrose 40% Gel 15 Gram(s) Oral once PRN Blood Glucose LESS THAN 70 milliGRAM(s)/deciliter  glucagon  Injectable 1 milliGRAM(s) IntraMuscular once PRN Glucose LESS THAN 70 milligrams/deciliter      ASSESSMENT & PLAN        GI/Bowel Management - hold laxatives. no loose BMs reported.     Management - Toilet Q2, denies dysuria this am.   Skin - Turn Q2  Pain - Tylenol PRN  DVT PPX - lovenox  Diet - diabetic    Continue comprehensive acute rehab program consisting of 3hrs/day of OT/PT and SLP.

## 2019-10-08 NOTE — PROGRESS NOTE ADULT - SUBJECTIVE AND OBJECTIVE BOX
Patient is a 64y old  Female who presents with a chief complaint of s/p b/l CVAs  with deficits (07 Oct 2019 10:45)  No acute events overnight          Patient seen and examined at bedside.    ALLERGIES:  No Known Allergies        Vital Signs Last 24 Hrs  T(F): 98.1 (08 Oct 2019 08:34), Max: 98.1 (08 Oct 2019 08:34)  HR: 79 (08 Oct 2019 08:34) (79 - 99)  BP: 139/72 (08 Oct 2019 08:34) (116/69 - 139/72)  RR: 14 (08 Oct 2019 08:34) (14 - 14)  SpO2: 100% (08 Oct 2019 08:34) (98% - 100%)  I&O's Summary    07 Oct 2019 07:01  -  08 Oct 2019 07:00  --------------------------------------------------------  IN: 360 mL / OUT: 0 mL / NET: 360 mL      MEDICATIONS:  acetaminophen   Tablet .. 650 milliGRAM(s) Oral every 6 hours PRN  aspirin  chewable 81 milliGRAM(s) Oral daily  atorvastatin 40 milliGRAM(s) Oral at bedtime  dextrose 40% Gel 15 Gram(s) Oral once PRN  dextrose 5%. 1000 milliLiter(s) IV Continuous <Continuous>  dextrose 50% Injectable 12.5 Gram(s) IV Push once  dextrose 50% Injectable 25 Gram(s) IV Push once  dextrose 50% Injectable 25 Gram(s) IV Push once  enoxaparin Injectable 80 milliGRAM(s) SubCutaneous daily  glucagon  Injectable 1 milliGRAM(s) IntraMuscular once PRN  insulin lispro (HumaLOG) corrective regimen sliding scale   SubCutaneous three times a day before meals  insulin lispro (HumaLOG) corrective regimen sliding scale   SubCutaneous at bedtime  metFORMIN 500 milliGRAM(s) Oral daily  metoprolol succinate ER 12.5 milliGRAM(s) Oral daily  pantoprazole    Tablet 40 milliGRAM(s) Oral before breakfast      PHYSICAL EXAM:  General: NAD, Alert  ENT: MMM  Neck: Supple, No JVD  Lungs: Clear to auscultation bilaterally, good air entry  Cardio: S1S2 irregular  Abdomen: Soft, Nontender  Extremities: No cyanosis, No edema    LABS:                        9.6    10.98 )-----------( 346      ( 07 Oct 2019 05:10 )             30.0     10-07    138  |  103  |  27  ----------------------------<  154  3.8   |  25  |  0.77    Ca    9.4      07 Oct 2019 05:10    TPro  6.7  /  Alb  2.6  /  TBili  0.6  /  DBili  x   /  AST  24  /  ALT  30  /  AlkPhos  54  10-07    eGFR if Non African American: 83 mL/min/1.73M2 (10-07-19 @ 05:10)  eGFR if : 96 mL/min/1.73M2 (10-07-19 @ 05:10)             Chol 98 mg/dL LDL 46 mg/dL HDL 39 mg/dL Trig 67 mg/dL              POCT Blood Glucose.: 155 mg/dL (08 Oct 2019 06:57)  POCT Blood Glucose.: 259 mg/dL (07 Oct 2019 21:15)  POCT Blood Glucose.: 125 mg/dL (07 Oct 2019 16:13)  POCT Blood Glucose.: 166 mg/dL (07 Oct 2019 11:22)     [id number] 6.7   JklacyehguP5N 6.3    Urinalysis Basic - ( 07 Oct 2019 00:16 )    Color: Yellow / Appearance: Clear / S.010 / pH: x  Gluc: x / Ketone: Negative  / Bili: Negative / Urobili: Negative   Blood: x / Protein: Negative / Nitrite: Negative   Leuk Esterase: Negative / RBC: Negative /HPF / WBC Negative /HPF   Sq Epi: x / Non Sq Epi: Neg.-Few / Bacteria: Negative /HPF          RADIOLOGY & ADDITIONAL TESTS:    Care Discussed with Consultants/Other Providers:

## 2019-10-09 LAB
ALBUMIN SERPL ELPH-MCNC: 2.8 G/DL — LOW (ref 3.3–5)
ALP SERPL-CCNC: 58 U/L — SIGNIFICANT CHANGE UP (ref 40–120)
ALT FLD-CCNC: 35 U/L — SIGNIFICANT CHANGE UP (ref 10–45)
ANION GAP SERPL CALC-SCNC: 9 MMOL/L — SIGNIFICANT CHANGE UP (ref 5–17)
AST SERPL-CCNC: 33 U/L — SIGNIFICANT CHANGE UP (ref 10–40)
BILIRUB SERPL-MCNC: 0.6 MG/DL — SIGNIFICANT CHANGE UP (ref 0.2–1.2)
BUN SERPL-MCNC: 19 MG/DL — SIGNIFICANT CHANGE UP (ref 7–23)
CALCIUM SERPL-MCNC: 9.6 MG/DL — SIGNIFICANT CHANGE UP (ref 8.4–10.5)
CHLORIDE SERPL-SCNC: 102 MMOL/L — SIGNIFICANT CHANGE UP (ref 96–108)
CO2 SERPL-SCNC: 25 MMOL/L — SIGNIFICANT CHANGE UP (ref 22–31)
CREAT SERPL-MCNC: 0.76 MG/DL — SIGNIFICANT CHANGE UP (ref 0.5–1.3)
GLUCOSE BLDC GLUCOMTR-MCNC: 123 MG/DL — HIGH (ref 70–99)
GLUCOSE BLDC GLUCOMTR-MCNC: 157 MG/DL — HIGH (ref 70–99)
GLUCOSE BLDC GLUCOMTR-MCNC: 160 MG/DL — HIGH (ref 70–99)
GLUCOSE BLDC GLUCOMTR-MCNC: 209 MG/DL — HIGH (ref 70–99)
GLUCOSE SERPL-MCNC: 130 MG/DL — HIGH (ref 70–99)
HCT VFR BLD CALC: 30.6 % — LOW (ref 34.5–45)
HGB BLD-MCNC: 9.6 G/DL — LOW (ref 11.5–15.5)
MCHC RBC-ENTMCNC: 26.9 PG — LOW (ref 27–34)
MCHC RBC-ENTMCNC: 31.4 GM/DL — LOW (ref 32–36)
MCV RBC AUTO: 85.7 FL — SIGNIFICANT CHANGE UP (ref 80–100)
NRBC # BLD: 0 /100 WBCS — SIGNIFICANT CHANGE UP (ref 0–0)
PLATELET # BLD AUTO: 344 K/UL — SIGNIFICANT CHANGE UP (ref 150–400)
POTASSIUM SERPL-MCNC: 3.8 MMOL/L — SIGNIFICANT CHANGE UP (ref 3.5–5.3)
POTASSIUM SERPL-SCNC: 3.8 MMOL/L — SIGNIFICANT CHANGE UP (ref 3.5–5.3)
PROT SERPL-MCNC: 6.9 G/DL — SIGNIFICANT CHANGE UP (ref 6–8.3)
RBC # BLD: 3.57 M/UL — LOW (ref 3.8–5.2)
RBC # FLD: 16.6 % — HIGH (ref 10.3–14.5)
SODIUM SERPL-SCNC: 136 MMOL/L — SIGNIFICANT CHANGE UP (ref 135–145)
WBC # BLD: 11.12 K/UL — HIGH (ref 3.8–10.5)
WBC # FLD AUTO: 11.12 K/UL — HIGH (ref 3.8–10.5)

## 2019-10-09 PROCEDURE — 99233 SBSQ HOSP IP/OBS HIGH 50: CPT

## 2019-10-09 PROCEDURE — 99232 SBSQ HOSP IP/OBS MODERATE 35: CPT | Mod: GC

## 2019-10-09 RX ADMIN — Medication 1: at 08:32

## 2019-10-09 RX ADMIN — PANTOPRAZOLE SODIUM 40 MILLIGRAM(S): 20 TABLET, DELAYED RELEASE ORAL at 06:00

## 2019-10-09 RX ADMIN — ENOXAPARIN SODIUM 80 MILLIGRAM(S): 100 INJECTION SUBCUTANEOUS at 12:03

## 2019-10-09 RX ADMIN — ATORVASTATIN CALCIUM 40 MILLIGRAM(S): 80 TABLET, FILM COATED ORAL at 21:22

## 2019-10-09 RX ADMIN — Medication 1: at 12:03

## 2019-10-09 RX ADMIN — METFORMIN HYDROCHLORIDE 500 MILLIGRAM(S): 850 TABLET ORAL at 12:03

## 2019-10-09 RX ADMIN — Medication 81 MILLIGRAM(S): at 12:03

## 2019-10-09 NOTE — PROGRESS NOTE ADULT - SUBJECTIVE AND OBJECTIVE BOX
CHIEF COMPLAINT: Aphasia, and right hemiparesis.       HISTORY OF PRESENT ILLNESS  63yo RH female, Nadine speaking, with PMHx of Afib (on coumadin), prior R MCA stroke in 2013 and 2016 with no residual deficits, HTN, HLD, DM2, mitral stenosis/rheumatic valvular disease with recent hospitalization (Sept 2019) at [facility] for AF for potential cardioversion (Hx of labile INR) and mitral valve intervention. At the time patient found to have KRISTAN thrombus on echo.   On 9/22 pt unable to speak or move her R side and brought to ED by family. CT Head 9/22 showed L MCA acute-subacute infarcts. CTA head demonstrated L M2 occlusion. Patient was not a candidate for endovascular treatment. INR therapeutic on Coumadin, per family at bedside, patient was compliant with Coumadin.  MRI Head follow up 9/23 shows redemonstration of acute left KUSUM and MCA territory infarcts primarily involving the left frontal lobe. No yola hemorrhagic transformation. No midline shift or hydrocephalus. Chronic right frontal and right cerebellar hemisphere infarcts.  TTE EF 65-70%, severe mitral stenosis, mild aortic stenosis. mild aortic regurgitation. Tele with afib- rate controlled. Placed on hep gtt with goal to transition to lovenox on d/c. As per cardiology, KRISTAN thrombus may take up to 2-4 weeks to resolve. Will need repeat BUBBA in 3-4 weeks. Possible valve intervention pending recovery and goals of care. CT Chest/Abd/Pelvis to r/o malignancy on 9/24. No findings to suggest intrathoracic or intra-abdominal malignancy. However, renal eval of possible renal infarct on CT obtained. Hematology in, Lovenox 1.5mg/kg daily and hypercoagulable workup.  Hospitalization additionally complicated by mild leukocytosis. CXR negative, UA negative, RVP negative-continue close monitoring.   Stable for d/c to [facility] on 9/26/19. (26 Sep 2019 11:38)      PAST MEDICAL & SURGICAL HISTORY:  TIA (transient ischemic attack): 2013- Rt facial droop which resolved after 2 hours  Mitral stenosis  Atrial fibrillation: h/o Cardioversion in 2013 and on Coumadin  DM (diabetes mellitus): Type II  HLD (hyperlipidemia)  HTN (hypertension)  S/P laser cataract surgery, unspecified laterality: ???  S/P knee surgery  Ventral hernia       REVIEW OF SYMPTOMS  [X] Constitutional WNL        [X] Resp WNL           [X] GI WNL                          [X]  WNL                  [X] Skin WNL                 [X] MSK WNL                  VITALS  Vital Signs Last 24 Hrs  T(C): 36.8 (09 Oct 2019 08:40), Max: 36.8 (09 Oct 2019 08:40)  T(F): 98.2 (09 Oct 2019 08:40), Max: 98.2 (09 Oct 2019 08:40)  HR: 78 (09 Oct 2019 08:40) (73 - 89)  BP: 112/75 (09 Oct 2019 08:40) (105/71 - 118/75)  BP(mean): --  RR: 14 (09 Oct 2019 08:40) (14 - 14)  SpO2: 100% (09 Oct 2019 08:40) (99% - 100%)    PHYSICAL EXAM  Constitutional - NAD, Comfortable  HEENT - NCAT, EOMI  Neck - Supple, No limited ROM  Chest - CTA bilaterally, No wheeze, No rhonchi, No crackles  Cardiovascular - irregular, No murmurs  Abdomen - BS+, Soft, NTND  Extremities - No C/C/E, No calf tenderness   Skin-no rash  Wounds-na      Neurologic Exam - awake and alert, improved verbal output today, follows commands, some motor return at right hand. Mild tone.                     Balance - impaired     Psychiatric - Mood stable, Affect WNL         FUNCTIONAL PROGRESS  Gait - 25ft max A  ADLs - max A  Transfers - max A  Functional transfer - max A    RECENT LABS                        9.6    11.12 )-----------( 344      ( 09 Oct 2019 05:16 )             30.6     10-09    136  |  102  |  19  ----------------------------<  130<H>  3.8   |  25  |  0.76    Ca    9.6      09 Oct 2019 05:16    TPro  6.9  /  Alb  2.8<L>  /  TBili  0.6  /  DBili  x   /  AST  33  /  ALT  35  /  AlkPhos  58  10-09      LIVER FUNCTIONS - ( 09 Oct 2019 05:16 )  Alb: 2.8 g/dL / Pro: 6.9 g/dL / ALK PHOS: 58 U/L / ALT: 35 U/L / AST: 33 U/L / GGT: x             Direct LDL: 46 mg/dL (09-23-19 @ 09:24)  Direct LDL: 58 mg/dL (09-13-19 @ 07:00)    Hemoglobin A1C, Whole Blood: 6.7 % (09-23-19 @ 08:45)  Hemoglobin A1C, Whole Blood: 6.3 % (09-13-19 @ 07:00)              RADIOLOGY/OTHER RESULTS      CURRENT MEDICATIONS  MEDICATIONS  (STANDING):  aspirin  chewable 81 milliGRAM(s) Oral daily  atorvastatin 40 milliGRAM(s) Oral at bedtime  dextrose 5%. 1000 milliLiter(s) (50 mL/Hr) IV Continuous <Continuous>  dextrose 50% Injectable 12.5 Gram(s) IV Push once  dextrose 50% Injectable 25 Gram(s) IV Push once  dextrose 50% Injectable 25 Gram(s) IV Push once  enoxaparin Injectable 80 milliGRAM(s) SubCutaneous daily  insulin lispro (HumaLOG) corrective regimen sliding scale   SubCutaneous three times a day before meals  insulin lispro (HumaLOG) corrective regimen sliding scale   SubCutaneous at bedtime  metFORMIN 500 milliGRAM(s) Oral daily  metoprolol succinate ER 12.5 milliGRAM(s) Oral daily  pantoprazole    Tablet 40 milliGRAM(s) Oral before breakfast    MEDICATIONS  (PRN):  acetaminophen   Tablet .. 650 milliGRAM(s) Oral every 6 hours PRN Moderate Pain (4 - 6)  dextrose 40% Gel 15 Gram(s) Oral once PRN Blood Glucose LESS THAN 70 milliGRAM(s)/deciliter  glucagon  Injectable 1 milliGRAM(s) IntraMuscular once PRN Glucose LESS THAN 70 milligrams/deciliter      ASSESSMENT & PLAN      GI/Bowel Management - hold laxatives. no loose BMs reported.     Management - Toilet Q2, denies dysuria this am.   Skin - Turn Q2  Pain - Tylenol PRN  DVT PPX - lovenox  Diet - diabetic    Continue comprehensive acute rehab program consisting of 3hrs/day of OT/PT and SLP.

## 2019-10-09 NOTE — PROGRESS NOTE ADULT - SUBJECTIVE AND OBJECTIVE BOX
Patient is a 64y old  Female who presents with a chief complaint of s/p b/l CVAs  with deficits (08 Oct 2019 09:56)  No acute events          Patient seen and examined at bedside.    ALLERGIES:  No Known Allergies        Vital Signs Last 24 Hrs  T(F): 98.1 (08 Oct 2019 20:13), Max: 98.1 (08 Oct 2019 20:13)  HR: 89 (09 Oct 2019 05:51) (73 - 89)  BP: 105/71 (09 Oct 2019 05:51) (105/71 - 118/75)  RR: 14 (08 Oct 2019 20:13) (14 - 14)  SpO2: 99% (08 Oct 2019 20:13) (99% - 99%)  I&O's Summary    08 Oct 2019 07:01  -  09 Oct 2019 07:00  --------------------------------------------------------  IN: 480 mL / OUT: 0 mL / NET: 480 mL      MEDICATIONS:  acetaminophen   Tablet .. 650 milliGRAM(s) Oral every 6 hours PRN  aspirin  chewable 81 milliGRAM(s) Oral daily  atorvastatin 40 milliGRAM(s) Oral at bedtime  dextrose 40% Gel 15 Gram(s) Oral once PRN  dextrose 5%. 1000 milliLiter(s) IV Continuous <Continuous>  dextrose 50% Injectable 12.5 Gram(s) IV Push once  dextrose 50% Injectable 25 Gram(s) IV Push once  dextrose 50% Injectable 25 Gram(s) IV Push once  enoxaparin Injectable 80 milliGRAM(s) SubCutaneous daily  glucagon  Injectable 1 milliGRAM(s) IntraMuscular once PRN  insulin lispro (HumaLOG) corrective regimen sliding scale   SubCutaneous three times a day before meals  insulin lispro (HumaLOG) corrective regimen sliding scale   SubCutaneous at bedtime  metFORMIN 500 milliGRAM(s) Oral daily  metoprolol succinate ER 12.5 milliGRAM(s) Oral daily  pantoprazole    Tablet 40 milliGRAM(s) Oral before breakfast      PHYSICAL EXAM:  General: NAD, comfortable  ENT: MMM  Neck: Supple, No JVD  Lungs: Clear to auscultation bilaterally, good air entry  Cardio: S1S2 irregular  Abdomen: Soft, Nontender  Extremities: No cyanosis, No edema    LABS:                        9.6    11.12 )-----------( 344      ( 09 Oct 2019 05:16 )             30.6     10-09    136  |  102  |  19  ----------------------------<  130  3.8   |  25  |  0.76    Ca    9.6      09 Oct 2019 05:16    TPro  6.9  /  Alb  2.8  /  TBili  0.6  /  DBili  x   /  AST  33  /  ALT  35  /  AlkPhos  58  10-09    eGFR if Non African American: 83 mL/min/1.73M2 (10-09-19 @ 05:16)  eGFR if African American: 97 mL/min/1.73M2 (10-09-19 @ 05:16)             Chol 98 mg/dL LDL 46 mg/dL HDL 39 mg/dL Trig 67 mg/dL              POCT Blood Glucose.: 157 mg/dL (09 Oct 2019 07:33)  POCT Blood Glucose.: 151 mg/dL (08 Oct 2019 21:53)  POCT Blood Glucose.: 119 mg/dL (08 Oct 2019 16:35)  POCT Blood Glucose.: 207 mg/dL (08 Oct 2019 11:39)     [id number] 6.7   [id number] 6.3    Urinalysis Basic - ( 07 Oct 2019 00:16 )    Color: Yellow / Appearance: Clear / S.010 / pH: x  Gluc: x / Ketone: Negative  / Bili: Negative / Urobili: Negative   Blood: x / Protein: Negative / Nitrite: Negative   Leuk Esterase: Negative / RBC: Negative /HPF / WBC Negative /HPF   Sq Epi: x / Non Sq Epi: Neg.-Few / Bacteria: Negative /HPF          RADIOLOGY & ADDITIONAL TESTS:    Care Discussed with Consultants/Other Providers:

## 2019-10-09 NOTE — PROGRESS NOTE ADULT - PROBLEM SELECTOR PLAN 1
continue comprehensive rehab pt/ot/slp  continue asa, lipitor and therapeutic lovenox ( off coumadin as per heme)  heme/onc for hypercoagulable workup

## 2019-10-09 NOTE — PROGRESS NOTE ADULT - ASSESSMENT
65 yo female s/p L MCA infarct, now with decreased functional mobility, dysphagia, aphasia, hemiplegia, gait instability and ADL impairments.    Rehab: Gait Instability, ADL impairments and Functional impairments: Comprehensive Rehab Program of PT/OT/ST    # L MCA infarct   - Aspirin and Lipitor   - f/u hypercoagulable work up per heme/onc at [facility] as pt now with 2nd CVA despite being therapeutic on Coumadin.   - Lovenox Sq continue for CVA ppx at 80mg daily.   - Lexapro for motor recovery as per FLAME-discontinued as per son's request.     # A. Fib  - Left atrial thrombus: continue weight based Lovenox 1.5mg/kg daily.   - Repeat echo for KRISTAN thrombus monitoring.  - metoprolol continues low dose. Rate controlled. No hypotension.     #Leukocytosis  -reported dysuria over the weekend, resolved now, UA neg, afebrile, follow labs closely. Medicine evaluation.     Diet: Diabetic, dysphagia 3 soft, thin liquids. Advance to regular consistency. Tolerating well.    Renal/Bladder: Voiding independently.     #DM2   - continue sliding scale c Humalog. On Metformin.      #Diarrhea  -no diarrhea overnight, hold laxatives.     Precautions / PROPHYLAXIS:   - Falls, Cardiac, Seizure   - Ortho: Weight bearing status: WBAT   - Lungs: Aspiration, Incentive Spirometer   - Pressure injury/Skin: Turn Q2hrs while in bed, OOB to Chair, PT/OT    - DVT: Lovenox, SCDs, TEDs

## 2019-10-09 NOTE — PROGRESS NOTE ADULT - ATTENDING COMMENTS
Patient seen along with NP, Maya Nunez.  Attending SLP session  Appears comfortable while seated in chair  Expressive and receptive aphasia, right hemiparesis.   Patient on Lovenox for secondary prophylaxis in setting of left atrial thrombus and CVA while on coumadin    2. D/C planning to [facility] on going

## 2019-10-10 LAB
GLUCOSE BLDC GLUCOMTR-MCNC: 139 MG/DL — HIGH (ref 70–99)
GLUCOSE BLDC GLUCOMTR-MCNC: 164 MG/DL — HIGH (ref 70–99)
GLUCOSE BLDC GLUCOMTR-MCNC: 166 MG/DL — HIGH (ref 70–99)
GLUCOSE BLDC GLUCOMTR-MCNC: 213 MG/DL — HIGH (ref 70–99)

## 2019-10-10 PROCEDURE — 99232 SBSQ HOSP IP/OBS MODERATE 35: CPT

## 2019-10-10 PROCEDURE — 99233 SBSQ HOSP IP/OBS HIGH 50: CPT

## 2019-10-10 RX ORDER — METFORMIN HYDROCHLORIDE 850 MG/1
500 TABLET ORAL
Refills: 0 | Status: DISCONTINUED | OUTPATIENT
Start: 2019-10-10 | End: 2019-10-11

## 2019-10-10 RX ORDER — SODIUM CHLORIDE 9 MG/ML
1000 INJECTION INTRAMUSCULAR; INTRAVENOUS; SUBCUTANEOUS
Refills: 0 | Status: DISCONTINUED | OUTPATIENT
Start: 2019-10-10 | End: 2019-10-10

## 2019-10-10 RX ADMIN — Medication 1: at 08:26

## 2019-10-10 RX ADMIN — ATORVASTATIN CALCIUM 40 MILLIGRAM(S): 80 TABLET, FILM COATED ORAL at 21:42

## 2019-10-10 RX ADMIN — METFORMIN HYDROCHLORIDE 500 MILLIGRAM(S): 850 TABLET ORAL at 11:28

## 2019-10-10 RX ADMIN — Medication 1: at 16:38

## 2019-10-10 RX ADMIN — Medication 81 MILLIGRAM(S): at 11:28

## 2019-10-10 RX ADMIN — Medication 12.5 MILLIGRAM(S): at 06:50

## 2019-10-10 RX ADMIN — METFORMIN HYDROCHLORIDE 500 MILLIGRAM(S): 850 TABLET ORAL at 17:27

## 2019-10-10 RX ADMIN — PANTOPRAZOLE SODIUM 40 MILLIGRAM(S): 20 TABLET, DELAYED RELEASE ORAL at 06:50

## 2019-10-10 RX ADMIN — ENOXAPARIN SODIUM 80 MILLIGRAM(S): 100 INJECTION SUBCUTANEOUS at 11:28

## 2019-10-10 RX ADMIN — Medication 2: at 11:28

## 2019-10-10 NOTE — PROGRESS NOTE ADULT - PROBLEM SELECTOR PLAN 2
- Coumadin stopped by hematology due to ischemic event on therapeutic level of coumadin  - Now on lovenox - continue lovenox  - rate control with toprol XL  - HR:  (78 - 101)
- Coumadin stopped by hematology due to ischemic event on therapeutic level of coumadin; now on lovenox  - rate control with toprol XL
- as above; Coumadin stopped by hematology due to ischemic event on therapeutic level of coumadin; now on lovenox  - rate control with toprol XL
Coumadin stopped by hematology due to ischemic event on therapeutic level of coumadin  Now on lovenox  Rate controlled on BB.
rate controlled,  with 12.5 mg  metoprolol er   Lovenox theraputic dose at 80 SQ daily for  2 weeks   (left atrial thrombus noted on echo)  Hematology following
rate controlled, continue therapeutic lovenox and metoprolol  left atrial thrombus noted on echo
- Coumadin stopped by hematology due to ischemic event on therapeutic level of coumadin  - Now on lovenox - continue lovenox  - continue rate control with toprol XL  - HR: 91 (29 Sep 2019 08:22) (80 - 101)

## 2019-10-10 NOTE — CHART NOTE - NSCHARTNOTEFT_GEN_A_CORE
pt had episode of hypotension to 98 systolic while in therapy today .  Ordered some IVF for suspected orthostatic hypotension and need for IVF.  Sone request not to put in IV if possible and just give her oral fluids.  Will hold off on IVF for now and encourage po .  will recheck labs in the am and follow BPs .  Will reevaluate in the am.

## 2019-10-10 NOTE — PROGRESS NOTE ADULT - PROBLEM SELECTOR PLAN 5
Continue atorvastatin
Continue atorvastatin
Continue atorvastatin  monitor labs
Continue statin
continue current regimen, metformin, correction insulin
continue current regimen, metformin, correction insulin, blood sugars appear acceptable
continue current regimen, metformin, correction insulin, blood sugars appear acceptable
[id number] daily , correction insulin, blood sugars at acceptable
Continue atorvastatin

## 2019-10-10 NOTE — PROGRESS NOTE ADULT - ASSESSMENT
63 yo female s/p L MCA infarct, now with decreased functional mobility, dysphagia, aphasia, hemiplegia, gait instability and ADL impairments.    Rehab: Gait Instability, ADL impairments and Functional impairments: Comprehensive Rehab Program of PT/OT/ST    # L MCA infarct   - Aspirin and Lipitor   - f/u hypercoagulable work up per heme/onc at [facility] as pt now with 2nd CVA despite being therapeutic on Coumadin.   - Lovenox Sq continue for CVA ppx at 80mg daily.   - Lexapro for motor recovery as per FLAME-discontinued as per son's request.     # A. Fib  - Left atrial thrombus: continue weight based Lovenox 1.5mg/kg daily.   - Repeat echo for KRISTAN thrombus monitoring.  - metoprolol continues low dose. Rate controlled.     #Leukocytosis  -reported dysuria over the weekend, resolved now, UA neg, afebrile, follow labs closely. Medicine evaluation.     Diet: Diabetic, dysphagia 3 soft, thin liquids. Advance to regular consistency. Tolerating well.    Renal/Bladder: Voiding independently.     #DM2   - continue sliding scale c Humalog. On Metformin.      #Diarrhea  -no diarrhea overnight, hold laxatives.     #Hypotension  SBP 94 in therapy, lightheadedness, no syncope. IVF as per medicine.     Precautions / PROPHYLAXIS:   - Falls, Cardiac, Seizure   - Ortho: Weight bearing status: WBAT   - Lungs: Aspiration, Incentive Spirometer   - Pressure injury/Skin: Turn Q2hrs while in bed, OOB to Chair, PT/OT    - DVT: Lovenox, SCDs, TEDs

## 2019-10-10 NOTE — PROGRESS NOTE ADULT - PROBLEM SELECTOR PROBLEM 7
Atrial thrombus
Leukocytosis, unspecified type
Atrial thrombus

## 2019-10-10 NOTE — PROGRESS NOTE ADULT - ASSESSMENT
64 female with afib on coumadin, prior right mca stroke in 2013 and 2016 with no residual deficits, htn, hld, dm2, mitral stenosis/rheumatic valvular disease admitted to [facility] s/p left mca infarct now with decreased functional mobility, dysphagia, aphasia, hemiplegia, gait instability and adl impairments. 64 female with afib on coumadin, prior right mca stroke in 2013 and 2016 with no residual deficits, htn, hld, dm2, mitral stenosis/rheumatic valvular disease admitted to [facility] s/p left mca infarct now with decreased functional mobility, dysphagia, aphasia, hemiplegia, gait instability and adl impairments.  Pt with hypotensive episode during therapy  SBP 98  would not recommand d/cing metoprolol as that has be controlling HR  would recommand IV hydration and monitor BP for now

## 2019-10-10 NOTE — PROGRESS NOTE ADULT - PROBLEM SELECTOR PROBLEM 4
Aphasia
Rheumatic heart disease

## 2019-10-10 NOTE — PROGRESS NOTE ADULT - PROBLEM SELECTOR PLAN 7
KRISTAN thrombus   On therapeutic lovenox per hematology recommendation  Consider  repeat TTE 2-3 weeks
KRISTAN thrombus   On therapeutic lovenox per hematology   repeat TTE 2-3 weeks
KRISTAN thrombus   On therapeutic lovenox per hematology recommendation  Consider  repeat TTE 2-3 weeks
no infectious etiology apparent, continue to monitor
persist  pt without current complaints  will and afebrile will  continue to monitor

## 2019-10-10 NOTE — PROGRESS NOTE ADULT - PROBLEM SELECTOR PROBLEM 1
CVA (cerebral vascular accident)
Cerebrovascular accident (CVA) due to embolism of left middle cerebral artery
CVA (cerebral vascular accident)

## 2019-10-10 NOTE — PROGRESS NOTE ADULT - PROBLEM SELECTOR PROBLEM 5
HLD (hyperlipidemia)
Type 2 diabetes mellitus without complication, unspecified whether long term insulin use
HLD (hyperlipidemia)

## 2019-10-10 NOTE — PROGRESS NOTE ADULT - PROBLEM SELECTOR PROBLEM 2
Atrial fibrillation
Atrial fibrillation, unspecified type
Atrial fibrillation

## 2019-10-10 NOTE — PROGRESS NOTE ADULT - PROBLEM SELECTOR PROBLEM 3
DM (diabetes mellitus)
DM (diabetes mellitus)
Dysphagia, unspecified type
Type 2 diabetes mellitus without complication, unspecified whether long term insulin use
Type 2 diabetes mellitus without complication, unspecified whether long term insulin use
DM (diabetes mellitus)

## 2019-10-10 NOTE — PROGRESS NOTE ADULT - PROBLEM SELECTOR PLAN 4
-Severe mitral stenosis and mild aortic stenosis on recent echo  -monitor for now
-Severe mitral stenosis and mild aortic stenosis on recent echo  -monitor for now
Severe mitral stenosis and mild aortic stenosis.
Severe mitral stenosis and mild aortic stenosis.  monitor for now
aphasia due to cva improving, continue working with speech therapy
aphasia secondary to cva, speech language pathology following
Severe mitral stenosis and mild aortic stenosis.

## 2019-10-10 NOTE — PROGRESS NOTE ADULT - PROBLEM SELECTOR PLAN 1
continue comprehensive rehab pt/ot/slp  continue asa, lipitor and therapeutic lovenox ( off coumadin as per heme)  heme/onc for hypercoagulable workup  pt with right sided weakness

## 2019-10-10 NOTE — PROGRESS NOTE ADULT - SUBJECTIVE AND OBJECTIVE BOX
Patient is a 64y old  Female who presents with a chief complaint of s/p b/l CVAs  with deficits.  Pt without complaints today.  Doing well. No events last pm.        Patient seen and examined at bedside.    ALLERGIES:  No Known Allergies    MEDICATIONS  (STANDING):  aspirin  chewable 81 milliGRAM(s) Oral daily  atorvastatin 40 milliGRAM(s) Oral at bedtime  dextrose 5%. 1000 milliLiter(s) (50 mL/Hr) IV Continuous <Continuous>  dextrose 50% Injectable 12.5 Gram(s) IV Push once  dextrose 50% Injectable 25 Gram(s) IV Push once  dextrose 50% Injectable 25 Gram(s) IV Push once  enoxaparin Injectable 80 milliGRAM(s) SubCutaneous daily  insulin lispro (HumaLOG) corrective regimen sliding scale   SubCutaneous three times a day before meals  insulin lispro (HumaLOG) corrective regimen sliding scale   SubCutaneous at bedtime  metFORMIN 500 milliGRAM(s) Oral daily  metoprolol succinate ER 12.5 milliGRAM(s) Oral daily  pantoprazole    Tablet 40 milliGRAM(s) Oral before breakfast    MEDICATIONS  (PRN):  acetaminophen   Tablet .. 650 milliGRAM(s) Oral every 6 hours PRN Moderate Pain (4 - 6)  dextrose 40% Gel 15 Gram(s) Oral once PRN Blood Glucose LESS THAN 70 milliGRAM(s)/deciliter  glucagon  Injectable 1 milliGRAM(s) IntraMuscular once PRN Glucose LESS THAN 70 milligrams/deciliter    Vital Signs Last 24 Hrs  T(F): 97.9 (09 Oct 2019 21:40), Max: 98.2 (09 Oct 2019 08:40)  HR: 88 (10 Oct 2019 06:52) (78 - 88)  BP: 122/88 (10 Oct 2019 06:52) (111/72 - 122/88)  RR: 14 (09 Oct 2019 21:40) (14 - 14)  SpO2: 99% (09 Oct 2019 21:40) (99% - 100%)  I&O's Summary    PHYSICAL EXAM:  General: NAD, A/O x 3  pt does not speak english as per son who is translater   ENT: MMM  Neck: Supple, No JVD  Lungs: Clear to auscultation bilaterally, Non labored breathing   Cardio: RRR, S1/S2, No murmurs  Abdomen: Soft, Nontender, Nondistended; Bowel sounds present  Extremities: No calf tenderness, No pitting edema right sided weakness       LABS:                        9.6    11.12 )-----------( 344      ( 09 Oct 2019 05:16 )             30.6     10-09    136  |  102  |  19  ----------------------------<  130  3.8   |  25  |  0.76    Ca    9.6      09 Oct 2019 05:16    TPro  6.9  /  Alb  2.8  /  TBili  0.6  /  DBili  x   /  AST  33  /  ALT  35  /  AlkPhos  58  10-09    eGFR if Non African American: 83 mL/min/1.73M2 (10-09-19 @ 05:16)  eGFR if African American: 97 mL/min/1.73M2 (10-09-19 @ 05:16)            09-23 Chol 98 mg/dL LDL 46 mg/dL HDL 39 mg/dL Trig 67 mg/dL              POCT Blood Glucose.: 209 mg/dL (09 Oct 2019 21:19)  POCT Blood Glucose.: 123 mg/dL (09 Oct 2019 16:58)  POCT Blood Glucose.: 160 mg/dL (09 Oct 2019 11:59)    09-23 [id number] 6.7  09-13 [id number] 6.3        RADIOLOGY & ADDITIONAL TESTS:    Care Discussed with Consultants/Other Providers:

## 2019-10-10 NOTE — PROGRESS NOTE ADULT - PROBLEM SELECTOR PROBLEM 6
Diarrhea, unspecified type
HTN (hypertension)

## 2019-10-10 NOTE — PROGRESS NOTE ADULT - ATTENDING COMMENTS
Hypotensive in therapy, mild lightheadedness, stable exam.  Medication regimen reviewed with Medicine, will follow recommendations.  Discharge plan discussed with SW, son and team. [facility] placement is recommended.  All questions answered to son's satisfaction.

## 2019-10-10 NOTE — PROGRESS NOTE ADULT - SUBJECTIVE AND OBJECTIVE BOX
CHIEF COMPLAINT: Lightheaded in therapy, low BP.       HISTORY OF PRESENT ILLNESS  65yo RH female, Nadine speaking, with PMHx of Afib (on coumadin), prior R MCA stroke in 2013 and 2016 with no residual deficits, HTN, HLD, DM2, mitral stenosis/rheumatic valvular disease with recent hospitalization (Sept 2019) at [facility] for AF for potential cardioversion (Hx of labile INR) and mitral valve intervention. At the time patient found to have KRISTAN thrombus on echo.   On 9/22 pt unable to speak or move her R side and brought to ED by family. CT Head 9/22 showed L MCA acute-subacute infarcts. CTA head demonstrated L M2 occlusion. Patient was not a candidate for endovascular treatment. INR therapeutic on Coumadin, per family at bedside, patient was compliant with Coumadin.  MRI Head follow up 9/23 shows redemonstration of acute left KUSUM and MCA territory infarcts primarily involving the left frontal lobe. No yola hemorrhagic transformation. No midline shift or hydrocephalus. Chronic right frontal and right cerebellar hemisphere infarcts.  TTE EF 65-70%, severe mitral stenosis, mild aortic stenosis. mild aortic regurgitation. Tele with afib- rate controlled. Placed on hep gtt with goal to transition to lovenox on d/c. As per cardiology, KRISTAN thrombus may take up to 2-4 weeks to resolve. Will need repeat BUBBA in 3-4 weeks. Possible valve intervention pending recovery and goals of care. CT Chest/Abd/Pelvis to r/o malignancy on 9/24. No findings to suggest intrathoracic or intra-abdominal malignancy. However, renal eval of possible renal infarct on CT obtained. Hematology in, Lovenox 1.5mg/kg daily and hypercoagulable workup.  Hospitalization additionally complicated by mild leukocytosis. CXR negative, UA negative, RVP negative-continue close monitoring.   Stable for d/c to [facility] on 9/26/19. (26 Sep 2019 11:38)      PAST MEDICAL & SURGICAL HISTORY:  TIA (transient ischemic attack): 2013- Rt facial droop which resolved after 2 hours  Mitral stenosis  Atrial fibrillation: h/o Cardioversion in 2013 and on Coumadin  DM (diabetes mellitus): Type II  HLD (hyperlipidemia)  HTN (hypertension)  S/P laser cataract surgery, unspecified laterality: ???  S/P knee surgery  Ventral hernia  x -        REVIEW OF SYMPTOMS  [X] Constitutional WNL       [X] Resp WNL           [X] GI WNL                          [X]  WNL                   [X] Heme WNL                             [X] Skin WNL                 [X] MSK WNL                VITALS  Vital Signs Last 24 Hrs  T(C): 36.4 (10 Oct 2019 07:30), Max: 36.6 (09 Oct 2019 21:40)  T(F): 97.5 (10 Oct 2019 07:30), Max: 97.9 (09 Oct 2019 21:40)  HR: 98 (10 Oct 2019 07:30) (80 - 98)  BP: 113/77 (10 Oct 2019 07:30) (111/72 - 122/88)  BP(mean): --  RR: 14 (10 Oct 2019 07:30) (14 - 14)  SpO2: 98% (10 Oct 2019 07:30) (98% - 99%)    PHYSICAL EXAM  Constitutional - NAD, Comfortable  HEENT - NCAT, EOMI  Neck - Supple, No limited ROM  Chest - CTA bilaterally, No wheeze, No rhonchi, No crackles  Cardiovascular - irregular, No murmurs  Abdomen - BS+, Soft, NTND  Extremities - No C/C/E, No calf tenderness   Skin-no rash  Wounds-na      Neurologic Exam - awake and alert, some verbal output, follows commands. Mild tone.                     Balance - impaired     Psychiatric - Mood stable, Affect WNL         FUNCTIONAL PROGRESS  Gait - 25ft max A/mod  ADLs - max A  Transfers - max A  Functional transfer - max A    RECENT LABS                        9.6    11.12 )-----------( 344      ( 09 Oct 2019 05:16 )             30.6     10-09    136  |  102  |  19  ----------------------------<  130<H>  3.8   |  25  |  0.76    Ca    9.6      09 Oct 2019 05:16    TPro  6.9  /  Alb  2.8<L>  /  TBili  0.6  /  DBili  x   /  AST  33  /  ALT  35  /  AlkPhos  58  10-09      LIVER FUNCTIONS - ( 09 Oct 2019 05:16 )  Alb: 2.8 g/dL / Pro: 6.9 g/dL / ALK PHOS: 58 U/L / ALT: 35 U/L / AST: 33 U/L / GGT: x             Direct LDL: 46 mg/dL (09-23-19 @ 09:24)  Direct LDL: 58 mg/dL (09-13-19 @ 07:00)    Hemoglobin A1C, Whole Blood: 6.7 % (09-23-19 @ 08:45)  Hemoglobin A1C, Whole Blood: 6.3 % (09-13-19 @ 07:00)              RADIOLOGY/OTHER RESULTS      CURRENT MEDICATIONS  MEDICATIONS  (STANDING):  aspirin  chewable 81 milliGRAM(s) Oral daily  atorvastatin 40 milliGRAM(s) Oral at bedtime  dextrose 5%. 1000 milliLiter(s) (50 mL/Hr) IV Continuous <Continuous>  dextrose 50% Injectable 12.5 Gram(s) IV Push once  dextrose 50% Injectable 25 Gram(s) IV Push once  dextrose 50% Injectable 25 Gram(s) IV Push once  enoxaparin Injectable 80 milliGRAM(s) SubCutaneous daily  insulin lispro (HumaLOG) corrective regimen sliding scale   SubCutaneous three times a day before meals  insulin lispro (HumaLOG) corrective regimen sliding scale   SubCutaneous at bedtime  metFORMIN 500 milliGRAM(s) Oral two times a day  metoprolol succinate ER 12.5 milliGRAM(s) Oral daily  pantoprazole    Tablet 40 milliGRAM(s) Oral before breakfast  sodium chloride 0.9%. 1000 milliLiter(s) (75 mL/Hr) IV Continuous <Continuous>    MEDICATIONS  (PRN):  acetaminophen   Tablet .. 650 milliGRAM(s) Oral every 6 hours PRN Moderate Pain (4 - 6)  dextrose 40% Gel 15 Gram(s) Oral once PRN Blood Glucose LESS THAN 70 milliGRAM(s)/deciliter  glucagon  Injectable 1 milliGRAM(s) IntraMuscular once PRN Glucose LESS THAN 70 milligrams/deciliter      ASSESSMENT & PLAN      GI/Bowel Management - hold laxatives. no loose BMs reported.     Management - Toilet Q2, denies dysuria this am.   Skin - Turn Q2  Pain - Tylenol PRN  DVT PPX - lovenox  Diet - diabetic    Continue comprehensive acute rehab program consisting of 3hrs/day of OT/PT and SLP.

## 2019-10-10 NOTE — PROGRESS NOTE ADULT - PROBLEM SELECTOR PLAN 3
- continue with ISS pre-meal and at bedtime  - bs 179, 192, 172, 164  continue to monitor BS
[id number], one reading over 180  No standing meds  Recommend not starting a long acting med currently
HgbA1C: 6.7, BS ranging from 160>235; continue ISS  (prior to admission pt was on Metformin only)
HgbA1C: 6.7, BS ranging from 206>146 over last 24 hrs.; continue ISS  (prior to admission pt was on Metformin only)  -Metformin restarted 10/3
dysphagia secondary to cva, tolerating consistant carb diet
dysphagia secondary to cva, tolerating current diet
- continue with ISS pre-meal and at bedtime  - FS range 145-202 over last 24 hrs

## 2019-10-11 ENCOUNTER — TRANSCRIPTION ENCOUNTER (OUTPATIENT)
Age: 64
End: 2019-10-11

## 2019-10-11 VITALS — SYSTOLIC BLOOD PRESSURE: 111 MMHG | HEART RATE: 66 BPM | DIASTOLIC BLOOD PRESSURE: 61 MMHG

## 2019-10-11 LAB
ALBUMIN SERPL ELPH-MCNC: 2.6 G/DL — LOW (ref 3.3–5)
ALP SERPL-CCNC: 58 U/L — SIGNIFICANT CHANGE UP (ref 40–120)
ALT FLD-CCNC: 35 U/L — SIGNIFICANT CHANGE UP (ref 10–45)
ANION GAP SERPL CALC-SCNC: 11 MMOL/L — SIGNIFICANT CHANGE UP (ref 5–17)
AST SERPL-CCNC: 25 U/L — SIGNIFICANT CHANGE UP (ref 10–40)
BILIRUB SERPL-MCNC: 0.5 MG/DL — SIGNIFICANT CHANGE UP (ref 0.2–1.2)
BUN SERPL-MCNC: 20 MG/DL — SIGNIFICANT CHANGE UP (ref 7–23)
CALCIUM SERPL-MCNC: 9.2 MG/DL — SIGNIFICANT CHANGE UP (ref 8.4–10.5)
CHLORIDE SERPL-SCNC: 103 MMOL/L — SIGNIFICANT CHANGE UP (ref 96–108)
CO2 SERPL-SCNC: 25 MMOL/L — SIGNIFICANT CHANGE UP (ref 22–31)
CREAT SERPL-MCNC: 0.81 MG/DL — SIGNIFICANT CHANGE UP (ref 0.5–1.3)
GLUCOSE BLDC GLUCOMTR-MCNC: 149 MG/DL — HIGH (ref 70–99)
GLUCOSE BLDC GLUCOMTR-MCNC: 177 MG/DL — HIGH (ref 70–99)
GLUCOSE BLDC GLUCOMTR-MCNC: 223 MG/DL — HIGH (ref 70–99)
GLUCOSE SERPL-MCNC: 117 MG/DL — HIGH (ref 70–99)
HCT VFR BLD CALC: 29.8 % — LOW (ref 34.5–45)
HGB BLD-MCNC: 9.4 G/DL — LOW (ref 11.5–15.5)
MCHC RBC-ENTMCNC: 27.1 PG — SIGNIFICANT CHANGE UP (ref 27–34)
MCHC RBC-ENTMCNC: 31.5 GM/DL — LOW (ref 32–36)
MCV RBC AUTO: 85.9 FL — SIGNIFICANT CHANGE UP (ref 80–100)
NRBC # BLD: 0 /100 WBCS — SIGNIFICANT CHANGE UP (ref 0–0)
PLATELET # BLD AUTO: 337 K/UL — SIGNIFICANT CHANGE UP (ref 150–400)
POTASSIUM SERPL-MCNC: 4.4 MMOL/L — SIGNIFICANT CHANGE UP (ref 3.5–5.3)
POTASSIUM SERPL-SCNC: 4.4 MMOL/L — SIGNIFICANT CHANGE UP (ref 3.5–5.3)
PROT SERPL-MCNC: 6.9 G/DL — SIGNIFICANT CHANGE UP (ref 6–8.3)
RBC # BLD: 3.47 M/UL — LOW (ref 3.8–5.2)
RBC # FLD: 17 % — HIGH (ref 10.3–14.5)
SODIUM SERPL-SCNC: 139 MMOL/L — SIGNIFICANT CHANGE UP (ref 135–145)
WBC # BLD: 11.8 K/UL — HIGH (ref 3.8–10.5)
WBC # FLD AUTO: 11.8 K/UL — HIGH (ref 3.8–10.5)

## 2019-10-11 PROCEDURE — 85027 COMPLETE CBC AUTOMATED: CPT

## 2019-10-11 PROCEDURE — 36415 COLL VENOUS BLD VENIPUNCTURE: CPT

## 2019-10-11 PROCEDURE — 70450 CT HEAD/BRAIN W/O DYE: CPT | Mod: 26

## 2019-10-11 PROCEDURE — 92507 TX SP LANG VOICE COMM INDIV: CPT

## 2019-10-11 PROCEDURE — 80048 BASIC METABOLIC PNL TOTAL CA: CPT

## 2019-10-11 PROCEDURE — 99239 HOSP IP/OBS DSCHRG MGMT >30: CPT

## 2019-10-11 PROCEDURE — 97167 OT EVAL HIGH COMPLEX 60 MIN: CPT

## 2019-10-11 PROCEDURE — 82962 GLUCOSE BLOOD TEST: CPT

## 2019-10-11 PROCEDURE — 92610 EVALUATE SWALLOWING FUNCTION: CPT

## 2019-10-11 PROCEDURE — 81001 URINALYSIS AUTO W/SCOPE: CPT

## 2019-10-11 PROCEDURE — 97112 NEUROMUSCULAR REEDUCATION: CPT

## 2019-10-11 PROCEDURE — 97163 PT EVAL HIGH COMPLEX 45 MIN: CPT

## 2019-10-11 PROCEDURE — 97530 THERAPEUTIC ACTIVITIES: CPT

## 2019-10-11 PROCEDURE — 80053 COMPREHEN METABOLIC PANEL: CPT

## 2019-10-11 PROCEDURE — 97116 GAIT TRAINING THERAPY: CPT

## 2019-10-11 PROCEDURE — 97110 THERAPEUTIC EXERCISES: CPT

## 2019-10-11 PROCEDURE — 97535 SELF CARE MNGMENT TRAINING: CPT

## 2019-10-11 PROCEDURE — 70450 CT HEAD/BRAIN W/O DYE: CPT

## 2019-10-11 PROCEDURE — 92523 SPEECH SOUND LANG COMPREHEN: CPT

## 2019-10-11 RX ORDER — METFORMIN HYDROCHLORIDE 850 MG/1
1 TABLET ORAL
Qty: 0 | Refills: 0 | DISCHARGE
Start: 2019-10-11

## 2019-10-11 RX ADMIN — Medication 1: at 11:44

## 2019-10-11 RX ADMIN — PANTOPRAZOLE SODIUM 40 MILLIGRAM(S): 20 TABLET, DELAYED RELEASE ORAL at 05:59

## 2019-10-11 RX ADMIN — ENOXAPARIN SODIUM 80 MILLIGRAM(S): 100 INJECTION SUBCUTANEOUS at 11:43

## 2019-10-11 RX ADMIN — Medication 81 MILLIGRAM(S): at 11:43

## 2019-10-11 RX ADMIN — Medication 12.5 MILLIGRAM(S): at 05:59

## 2019-10-11 RX ADMIN — METFORMIN HYDROCHLORIDE 500 MILLIGRAM(S): 850 TABLET ORAL at 05:59

## 2019-10-11 NOTE — DISCHARGE NOTE PROVIDER - NSDCACTIVITY_GEN_ALL_CORE
Do not make important decisions/Stairs allowed/Walking - Outdoors allowed/Showering allowed/Walking - Indoors allowed/No heavy lifting/straining/Sex allowed/Do not drive or operate machinery

## 2019-10-11 NOTE — PROGRESS NOTE ADULT - PROVIDER SPECIALTY LIST ADULT
Hospitalist
Neurology
Rehab Medicine
Rehab Medicine
Neurology
Rehab Medicine
Rehab Medicine
Neurology
Hospitalist

## 2019-10-11 NOTE — PROGRESS NOTE ADULT - REASON FOR ADMISSION
s/p b/l CVAs  with deficits

## 2019-10-11 NOTE — PROGRESS NOTE ADULT - ATTENDING COMMENTS
Short lived episode of blurry vision resolved spontaneously  ( dry eyes? diabetic retinopathy?)  Unchanged neurological exam, no new complaints, good spirits.  Improving findings on stat Head CT of the Head noted without acute changes.  Patient is being discharged to [facility] today.  Discharge instructions were discussed with family, all current medications were reviewed with son. Son was educated on importance of medication compliance,  continued  care with PMD and follow-up care with the specialists in the community. Safety and fall risk precautions  were discussed in detail, counseled on healthy life style modifications.  All questions were answered to their satisfaction.

## 2019-10-11 NOTE — DISCHARGE NOTE PROVIDER - NSDCCPCAREPLAN_GEN_ALL_CORE_FT
PRINCIPAL DISCHARGE DIAGNOSIS  Diagnosis: Cerebrovascular accident (CVA) due to embolism of left middle cerebral artery  Assessment and Plan of Treatment: Cerebrovascular accident (CVA) due to embolism of left middle cerebral artery-follow up with Neurology and Cardiology in 1 week. Continue Lovenox as anticoagulation      SECONDARY DISCHARGE DIAGNOSES  Diagnosis: Atrial fibrillation, unspecified type  Assessment and Plan of Treatment: Atrial fibrillation, unspecified type-continue Toprol and Lovenox, follow up with cardiology    Diagnosis: Atrial thrombus  Assessment and Plan of Treatment: Atrial thrombus-follow up with cardiology and BUBBA for thrombus    Diagnosis: Leukocytosis, unspecified type  Assessment and Plan of Treatment: Leukocytosis, unspecified type    Diagnosis: Type 2 diabetes mellitus without complication, unspecified whether long term insulin use  Assessment and Plan of Treatment: Type 2 diabetes mellitus without complication, unspecified whether long term insulin use

## 2019-10-11 NOTE — DISCHARGE NOTE PROVIDER - CARE PROVIDER_API CALL
Claudia Goldman)  Cardiology; Internal Medicine  [address] [address] [place] [address], [address]  [place], [place] [id number]  Phone: [phone]  Fax: [phone]  Follow Up Time:     Jared Garcia (DO)  Neurology; Vascular Neurology  [address] [facility] [address]  [place] [address]  Phone: [phone]  Fax: [phone]  Follow Up Time:     Nicole Myrick)  Hematology; Internal Medicine; Medical Oncology  [address], [address]  [place] [address]  Phone: [phone]  Fax: [phone]  Follow Up Time:

## 2019-10-11 NOTE — PROGRESS NOTE ADULT - ASSESSMENT
63 yo female s/p L MCA infarct, now with decreased functional mobility, dysphagia, aphasia, hemiplegia, gait instability and ADL impairments.    Rehab: Gait Instability, ADL impairments and Functional impairments: Comprehensive Rehab Program of PT/OT/ST completed, transfer to [facility] today    # L MCA infarct   - Aspirin and Lipitor   - f/u hypercoagulable work up per heme/onc at [facility] as pt now with 2nd CVA despite being therapeutic on Coumadin.   - Lovenox Sq continue for CVA ppx at 80mg daily.   - Lexapro for motor recovery as per FLAME-discontinued as per son's request.     #Blurry vision  -reports blurry vision in left eye/aphasic however. CT head 10/11 stat with no new CVA.     # A. Fib  - Left atrial thrombus: continue weight based Lovenox 1.5mg/kg daily.   - Repeat echo for KRISTAN thrombus monitoring.  - metoprolol continues low dose. Rate controlled.     #Leukocytosis  -reported dysuria over the weekend, resolved now, UA neg, afebrile, follow labs closely. Medicine in.      Diet: Diabetic, dysphagia 3 soft, thin liquids. Advance to regular consistency. Tolerating well.    Renal/Bladder: Voiding independently.     #DM2   - continue sliding scale c Humalog. On Metformin.      #Diarrhea  -no diarrhea overnight, hold laxatives.     #Hypotension  Resolved today.      Precautions / PROPHYLAXIS:   - Falls, Cardiac, Seizure   - Ortho: Weight bearing status: WBAT   - Lungs: Aspiration, Incentive Spirometer   - Pressure injury/Skin: Turn Q2hrs while in bed, OOB to Chair, PT/OT    - DVT: Lovenox, SCDs, TEDs

## 2019-10-11 NOTE — DISCHARGE NOTE PROVIDER - HOSPITAL COURSE
63yo RH female, Nadine speaking, with PMHx of Afib (on coumadin), prior R MCA stroke in 2013 and 2016 with no residual deficits, HTN, HLD, DM2, mitral stenosis/rheumatic valvular disease with recent hospitalization (Sept 2019) at [facility] for AF for potential cardioversion (Hx of labile INR) and mitral valve intervention. At the time patient found to have KRISTAN thrombus on echo.     On 9/22 pt unable to speak or move her R side and brought to ED by family. CT Head 9/22 showed L MCA acute-subacute infarcts. CTA head demonstrated L M2 occlusion. Patient was not a candidate for endovascular treatment. INR therapeutic on Coumadin, per family at bedside, patient was compliant with Coumadin.    MRI Head follow up 9/23 shows redemonstration of acute left KUSUM and MCA territory infarcts primarily involving the left frontal lobe. No yola hemorrhagic transformation. No midline shift or hydrocephalus. Chronic right frontal and right cerebellar hemisphere infarcts.    TTE EF 65-70%, severe mitral stenosis, mild aortic stenosis. mild aortic regurgitation. Tele with afib- rate controlled. Placed on hep gtt with goal to transition to lovenox on d/c. As per cardiology, KRISTAN thrombus may take up to 2-4 weeks to resolve. Will need repeat BUBBA in 3-4 weeks. Possible valve intervention pending recovery and goals of care. CT Chest/Abd/Pelvis to r/o malignancy on 9/24. No findings to suggest intrathoracic or intra-abdominal malignancy. However, renal eval of possible renal infarct on CT obtained. Hematology in, Lovenox 1.5mg/kg daily and hypercoagulable workup.  Hospitalization additionally complicated by mild leukocytosis. CXR negative, UA negative, RVP negative-continue close monitoring.     Stable for d/c to [facility] on 9/26/19.    Patient admitted to [facility] on 9/26/19, completed comprehensive PT/OT/SLP program. Performs activities with maximum assistance. While in rehab patient continued at Lovenox 80mg sq. Stable for d/c to [facility] on 10/11.

## 2019-10-11 NOTE — DISCHARGE NOTE NURSING/CASE MANAGEMENT/SOCIAL WORK - NSDCPEPTSTRK_GEN_ALL_CORE
Call 911 for stroke/Need for follow up after discharge/Stroke education booklet/Risk factors for stroke/Stroke support groups for patients, families, and friends/Stroke warning signs and symptoms/Signs and symptoms of stroke/Prescribed medications

## 2019-10-11 NOTE — PROGRESS NOTE ADULT - SUBJECTIVE AND OBJECTIVE BOX
CHIEF COMPLAINT: Blurry vision.       HISTORY OF PRESENT ILLNESS  63yo RH female, Nadine speaking, with PMHx of Afib (on coumadin), prior R MCA stroke in 2013 and 2016 with no residual deficits, HTN, HLD, DM2, mitral stenosis/rheumatic valvular disease with recent hospitalization (Sept 2019) at [facility] for AF for potential cardioversion (Hx of labile INR) and mitral valve intervention. At the time patient found to have KRISTAN thrombus on echo.   On 9/22 pt unable to speak or move her R side and brought to ED by family. CT Head 9/22 showed L MCA acute-subacute infarcts. CTA head demonstrated L M2 occlusion. Patient was not a candidate for endovascular treatment. INR therapeutic on Coumadin, per family at bedside, patient was compliant with Coumadin.  MRI Head follow up 9/23 shows redemonstration of acute left KUSUM and MCA territory infarcts primarily involving the left frontal lobe. No yola hemorrhagic transformation. No midline shift or hydrocephalus. Chronic right frontal and right cerebellar hemisphere infarcts.  TTE EF 65-70%, severe mitral stenosis, mild aortic stenosis. mild aortic regurgitation. Tele with afib- rate controlled. Placed on hep gtt with goal to transition to lovenox on d/c. As per cardiology, KRISTAN thrombus may take up to 2-4 weeks to resolve. Will need repeat BUBBA in 3-4 weeks. Possible valve intervention pending recovery and goals of care. CT Chest/Abd/Pelvis to r/o malignancy on 9/24. No findings to suggest intrathoracic or intra-abdominal malignancy. However, renal eval of possible renal infarct on CT obtained. Hematology in, Lovenox 1.5mg/kg daily and hypercoagulable workup.  Hospitalization additionally complicated by mild leukocytosis. CXR negative, UA negative, RVP negative-continue close monitoring.   Stable for d/c to [facility] on 9/26/19. (26 Sep 2019 11:38)      PAST MEDICAL & SURGICAL HISTORY:  TIA (transient ischemic attack): 2013- Rt facial droop which resolved after 2 hours  Mitral stenosis  Atrial fibrillation: h/o Cardioversion in 2013 and on Coumadin  DM (diabetes mellitus): Type II  HLD (hyperlipidemia)  HTN (hypertension)  S/P laser cataract surgery, unspecified laterality: ???  S/P knee surgery  Ventral hernia  x -        REVIEW OF SYMPTOMS  [X] Constitutional WNL           [X] Resp WNL           [X] GI WNL                          [X]  WNL                          [X] Skin WNL                 [X] MSK WNL                  VITALS  Vital Signs Last 24 Hrs  T(C): 36.7 (11 Oct 2019 09:00), Max: 36.9 (10 Oct 2019 21:44)  T(F): 98 (11 Oct 2019 09:00), Max: 98.5 (10 Oct 2019 21:44)  HR: 66 (11 Oct 2019 09:53) (66 - 100)  BP: 111/61 (11 Oct 2019 09:53) (101/70 - 117/79)  BP(mean): --  RR: 16 (11 Oct 2019 09:00) (14 - 16)  SpO2: 98% (11 Oct 2019 09:00) (98% - 99%)      PHYSICAL EXAM  Constitutional - NAD  HEENT - NCAT, EOMI  Neck - Supple, No limited ROM  Chest - CTA bilaterally, No wheeze, No rhonchi, No crackles  Cardiovascular - RRR, S1S2, No murmurs  Abdomen - BS+, Soft, NTND  Extremities - No C/C/E, No calf tenderness   Skin-no rash  Wounds-na      Neurologic Exam - no new weakness, no new numbness. Awake and alert.                    Balance - impaired     Psychiatric - appears depressed       FUNCTIONAL PROGRESS  Gait - 25ft max A/mod  ADLs - max A  Transfers - max A  Functional transfer - max A      RECENT LABS                        9.4    11.80 )-----------( 337      ( 11 Oct 2019 05:20 )             29.8     10-11    139  |  103  |  20  ----------------------------<  117<H>  4.4   |  25  |  0.81    Ca    9.2      11 Oct 2019 05:20    TPro  6.9  /  Alb  2.6<L>  /  TBili  0.5  /  DBili  x   /  AST  25  /  ALT  35  /  AlkPhos  58  10-11      LIVER FUNCTIONS - ( 11 Oct 2019 05:20 )  Alb: 2.6 g/dL / Pro: 6.9 g/dL / ALK PHOS: 58 U/L / ALT: 35 U/L / AST: 25 U/L / GGT: x             Direct LDL: 46 mg/dL (09-23-19 @ 09:24)  Direct LDL: 58 mg/dL (09-13-19 @ 07:00)    Hemoglobin A1C, Whole Blood: 6.7 % (09-23-19 @ 08:45)  Hemoglobin A1C, Whole Blood: 6.3 % (09-13-19 @ 07:00)              RADIOLOGY/OTHER RESULTS      CURRENT MEDICATIONS  MEDICATIONS  (STANDING):  aspirin  chewable 81 milliGRAM(s) Oral daily  atorvastatin 40 milliGRAM(s) Oral at bedtime  dextrose 5%. 1000 milliLiter(s) (50 mL/Hr) IV Continuous <Continuous>  dextrose 50% Injectable 12.5 Gram(s) IV Push once  dextrose 50% Injectable 25 Gram(s) IV Push once  dextrose 50% Injectable 25 Gram(s) IV Push once  enoxaparin Injectable 80 milliGRAM(s) SubCutaneous daily  insulin lispro (HumaLOG) corrective regimen sliding scale   SubCutaneous three times a day before meals  insulin lispro (HumaLOG) corrective regimen sliding scale   SubCutaneous at bedtime  metFORMIN 500 milliGRAM(s) Oral two times a day  metoprolol succinate ER 12.5 milliGRAM(s) Oral daily  pantoprazole    Tablet 40 milliGRAM(s) Oral before breakfast    MEDICATIONS  (PRN):  acetaminophen   Tablet .. 650 milliGRAM(s) Oral every 6 hours PRN Moderate Pain (4 - 6)  dextrose 40% Gel 15 Gram(s) Oral once PRN Blood Glucose LESS THAN 70 milliGRAM(s)/deciliter  glucagon  Injectable 1 milliGRAM(s) IntraMuscular once PRN Glucose LESS THAN 70 milligrams/deciliter      ASSESSMENT & PLAN    GI/Bowel Management - hold laxatives. no loose BMs reported.     Management - Toilet Q2, denies dysuria this am.   Skin - Turn Q2  Pain - Tylenol PRN  DVT PPX - lovenox  Diet - diabetic    Continue comprehensive acute rehab program consisting of 3hrs/day of OT/PT and SLP.

## 2019-10-11 NOTE — DISCHARGE NOTE PROVIDER - PROVIDER TOKENS
PROVIDER:[TOKEN:[[id number]]],PROVIDER:[TOKEN:[[id number]]],PROVIDER:[TOKEN:[[id number]]]

## 2019-10-11 NOTE — DISCHARGE NOTE NURSING/CASE MANAGEMENT/SOCIAL WORK - PATIENT PORTAL LINK FT
You can access the FollowMyHealth Patient Portal offered by [facility] by registering at the following website: [email or web address]. By joining [facility]’s FollowMyHealth portal, you will also be able to view your health information using other applications (apps) compatible with our system.

## 2019-10-14 ENCOUNTER — RESULT REVIEW (OUTPATIENT)
Age: 64
End: 2019-10-14

## 2019-10-14 NOTE — DISCUSSION/SUMMARY
[FreeTextEntry1] : 65yo RH female, Nadine speaking, with PMHx of Afib (on coumadin), prior R MCA stroke in 2013 and 2016 with no residual deficits, HTN, HLD, DM2, mitral stenosis/rheumatic valvular disease with recent hospitalization (Sept 2019) at [facility] for AF for potential cardioversion (Hx of labile INR) and mitral valve intervention. At the time patient found to have KRISTAN thrombus on echo. On 9/22 pt unable to speak or move her R side and brought to ED by family. CT Head 9/22 showed L MCA acute-subacute infarcts. CTA head demonstrated L M2\par occlusion. Patient was not a candidate for endovascular treatment. INR therapeutic on Coumadin, per family at bedside, patient was compliant with Coumadin. MRI Head follow up 9/23 shows redemonstration of acute left KUSUM and MCA territory infarcts primarily involving the left frontal lobe. No yola hemorrhagic transformation. No midline shift or hydrocephalus. Chronic right frontal and right cerebellar hemisphere infarcts.TTE EF 65-70%, severe mitral stenosis, mild aortic stenosis. mild aortic regurgitation. Tele with afib- rate controlled. Placed on hep gtt with goal to transition to lovenox on d/c. As per cardiology, KRISTAN thrombus may take up to\par 2-4 weeks to resolve. Will need repeat BUBBA in 3-4 weeks. Possible valve intervention pending recovery and goals of care. CT Chest/Abd/Pelvis to r/o malignancy on 9/24. No findings to suggest intrathoracic or intra-abdominal malignancy. However, renal eval of possible renal infarct on CT obtained. Hematology in, Lovenox 1.5mg/kg daily and hypercoagulable workup. Hospitalization additionally complicated by mild leukocytosis. CXR negative, UA negative, RVP negative-continue close monitoring. Stable for d/c to [facility] on 9/26/19. Patient admitted to [facility] on 9/26/19, completed comprehensive PT/OT/SLP. program. Performs activities with maximum assistance. While in rehab patient. continued at Lovenox 80mg sq. Stable for d/c to [facility] on 10/11.\par \par Patient currently in [facility]. Definitely needs to follow up in clinic. RTC in 1 month (likely will be out of [facility] by that time)\par \par PC, PGY3. \par

## 2019-11-08 ENCOUNTER — RX RENEWAL (OUTPATIENT)
Age: 64
End: 2019-11-08

## 2019-11-08 RX ORDER — METOPROLOL TARTRATE 75 MG/1
75 TABLET, FILM COATED ORAL
Qty: 180 | Refills: 1 | Status: DISCONTINUED | COMMUNITY
Start: 2019-09-20 | End: 2019-11-08

## 2019-11-19 ENCOUNTER — OUTPATIENT (OUTPATIENT)
Dept: OUTPATIENT SERVICES | Facility: HOSPITAL | Age: 64
LOS: 1 days | End: 2019-11-19

## 2019-11-19 ENCOUNTER — APPOINTMENT (OUTPATIENT)
Dept: INTERNAL MEDICINE | Facility: CLINIC | Age: 64
End: 2019-11-19
Payer: MEDICAID

## 2019-11-19 VITALS — DIASTOLIC BLOOD PRESSURE: 60 MMHG | HEART RATE: 120 BPM | SYSTOLIC BLOOD PRESSURE: 110 MMHG

## 2019-11-19 VITALS
RESPIRATION RATE: 15 BRPM | DIASTOLIC BLOOD PRESSURE: 76 MMHG | HEART RATE: 75 BPM | BODY MASS INDEX: 20.38 KG/M2 | SYSTOLIC BLOOD PRESSURE: 108 MMHG | HEIGHT: 63 IN | OXYGEN SATURATION: 97 % | WEIGHT: 115 LBS

## 2019-11-19 DIAGNOSIS — K43.9 VENTRAL HERNIA WITHOUT OBSTRUCTION OR GANGRENE: Chronic | ICD-10-CM

## 2019-11-19 DIAGNOSIS — Z98.49 CATARACT EXTRACTION STATUS, UNSPECIFIED EYE: Chronic | ICD-10-CM

## 2019-11-19 DIAGNOSIS — Z98.89 OTHER SPECIFIED POSTPROCEDURAL STATES: Chronic | ICD-10-CM

## 2019-11-19 LAB — GLUCOSE BLDC GLUCOMTR-MCNC: 279

## 2019-11-19 PROCEDURE — 99214 OFFICE O/P EST MOD 30 MIN: CPT | Mod: GC

## 2019-11-19 RX ORDER — WARFARIN 5 MG/1
5 TABLET ORAL DAILY
Qty: 90 | Refills: 1 | Status: DISCONTINUED | COMMUNITY
Start: 2019-09-20 | End: 2019-11-19

## 2019-11-19 NOTE — ASSESSMENT
[FreeTextEntry1] : 64 RH female, Nadine speaking, with PMHx of Afib (on coumadin), prior R MCA stroke in 2013 and 2016 with no residual deficits, HTN, HLD, DM2, mitral stenosis/rheumatic valvular disease, new LA thrombus (9/2019) with recent hospitalization in 10/2019 found to have acute L KUSUM and MCA territory infarcts with left frontal and right cerebellar hemisphere infarcts. \par \par #Afib with HR 120s\par BP stable, lung exam clear\par Patient did not take BB today\par Discussed with patient to take 1 full pill, instead of 1/2 and check BP later in the evening \par Discussed with patient to talk with cardiology to increase to 25 mg daily (patient's previous dosage prior to hospitalization) \par \par #CVA\par New CVA, repeat CTH after event shows stability, no hemorrhagic conversion\par Continue ASA 81 and statin 40 mg \par Thought to be 2/2 embolic given LA thrombus\par Repeat TTE referral given to patient for hopeful resolution of LA thrombus\par Has appt with cardiology tmrw 11/20 \par \par #Hypercoagulability\par Interestingly, patient was therapeutic on Coumadin with INR goal 2-3 at the time, however with new infarct as well as LA thrombus and possible b/l renal infarcts\par Heme/Onc consulted during admission, partial hypercoagulable sent \par Patient currently on Lovenox (therapeutic dosing) 80 mcg/daily \par Refer to heme/onc \par \par Case discussion with Dr. Avalos \par \par RTC in 5 weeks for CPE \par \par Sara Moreno, PGY3.

## 2019-11-19 NOTE — HISTORY OF PRESENT ILLNESS
[FreeTextEntry2] : 65yo RH female, Nadine speaking, with PMHx of Afib (on coumadin), prior R MCA stroke in 2013 and 2016 with no residual deficits, HTN, HLD, DM2, mitral stenosis/rheumatic valvular disease with recent hospitalization (Sept 2019) at [facility] for AF for potential cardioversion (Hx of labile INR) and mitral valve intervention. At the time patient found to have KRISTAN thrombus on echo. On 9/22 pt unable to speak or move her R side and brought to ED by family. CT Head 9/22 showed L MCA acute-subacute infarcts. CTA head demonstrated L M2\par occlusion. Patient was not a candidate for endovascular treatment. INR therapeutic on Coumadin, per family at bedside, patient was compliant with Coumadin. MRI Head follow up 9/23 shows redemonstration of acute left KUSUM and MCA territory infarcts primarily involving the left frontal lobe. No yola hemorrhagic transformation. No midline shift or hydrocephalus. Chronic right frontal and right cerebellar hemisphere infarcts.TTE EF 65-70%, severe mitral stenosis, mild aortic stenosis. mild aortic regurgitation. Tele with afib- rate controlled. Placed on hep gtt with goal to transition to lovenox on d/c. As per cardiology, KRISTAN thrombus may take up to\par 2-4 weeks to resolve. Will need repeat BUBBA in 3-4 weeks. Possible valve intervention pending recovery and goals of care. CT Chest/Abd/Pelvis to r/o malignancy on 9/24. No findings to suggest intrathoracic or intra-abdominal malignancy. However, renal eval of possible renal infarct on CT obtained. Hematology in, Lovenox 1.5mg/kg daily and hypercoagulable workup. Hospitalization additionally complicated by mild leukocytosis. CXR negative, UA negative, RVP negative-continue close monitoring. Stable for d/c to [facility] on 9/26/19. Patient admitted to [facility] on 9/26/19, completed comprehensive PT/OT/SLP. program. Performs activities with maximum assistance. While in rehab patient. continued at Lovenox 80mg sq. Stable for d/c to [facility] on 10/11.\par \par Interval Events: Patient accompanied by son who is providing translation at patient's request. Reports depressed mood, decreased appetite, inability to sleep, no magan in activities. Patient has lost 15 lbs in 2 months. She was d/paco from  [facility] 3 weeks prior. She has gained some functionality distally. She is hemiparalysis when discharged. Now has 2/5 at fingers and wrist, 0/5 proximally and 0/5 RLE and wheelchair bound. Changes in meds Metformin 500 BID and Metoprolol 12.5 ER. \par \par On exam, patient HR 120s, BP stable at 110/60. \par

## 2019-11-20 ENCOUNTER — NON-APPOINTMENT (OUTPATIENT)
Age: 64
End: 2019-11-20

## 2019-11-20 ENCOUNTER — APPOINTMENT (OUTPATIENT)
Dept: CARDIOLOGY | Facility: CLINIC | Age: 64
End: 2019-11-20
Payer: MEDICAID

## 2019-11-20 VITALS
OXYGEN SATURATION: 97 % | WEIGHT: 115 LBS | BODY MASS INDEX: 20.38 KG/M2 | DIASTOLIC BLOOD PRESSURE: 76 MMHG | SYSTOLIC BLOOD PRESSURE: 109 MMHG | HEART RATE: 98 BPM | HEIGHT: 63 IN | RESPIRATION RATE: 14 BRPM

## 2019-11-20 DIAGNOSIS — I48.91 UNSPECIFIED ATRIAL FIBRILLATION: ICD-10-CM

## 2019-11-20 DIAGNOSIS — Z79.01 LONG TERM (CURRENT) USE OF ANTICOAGULANTS: ICD-10-CM

## 2019-11-20 DIAGNOSIS — E11.9 TYPE 2 DIABETES MELLITUS WITHOUT COMPLICATIONS: ICD-10-CM

## 2019-11-20 DIAGNOSIS — F32.9 MAJOR DEPRESSIVE DISORDER, SINGLE EPISODE, UNSPECIFIED: ICD-10-CM

## 2019-11-20 DIAGNOSIS — F32.4 MAJOR DEPRESSIVE DISORDER, SINGLE EPISODE, IN PARTIAL REMISSION: ICD-10-CM

## 2019-11-20 DIAGNOSIS — I63.319 CEREBRAL INFARCTION DUE TO THROMBOSIS OF UNSPECIFIED MIDDLE CEREBRAL ARTERY: ICD-10-CM

## 2019-11-20 DIAGNOSIS — I51.3 INTRACARDIAC THROMBOSIS, NOT ELSEWHERE CLASSIFIED: ICD-10-CM

## 2019-11-20 PROCEDURE — 93000 ELECTROCARDIOGRAM COMPLETE: CPT

## 2019-11-20 PROCEDURE — 99213 OFFICE O/P EST LOW 20 MIN: CPT

## 2019-11-22 LAB — HBA1C MFR BLD HPLC: 7.1

## 2019-11-27 ENCOUNTER — APPOINTMENT (OUTPATIENT)
Dept: CARDIOLOGY | Facility: CLINIC | Age: 64
End: 2019-11-27
Payer: MEDICAID

## 2019-11-27 ENCOUNTER — NON-APPOINTMENT (OUTPATIENT)
Age: 64
End: 2019-11-27

## 2019-11-27 VITALS
RESPIRATION RATE: 14 BRPM | OXYGEN SATURATION: 97 % | SYSTOLIC BLOOD PRESSURE: 119 MMHG | DIASTOLIC BLOOD PRESSURE: 65 MMHG | BODY MASS INDEX: 20.38 KG/M2 | HEART RATE: 87 BPM | HEIGHT: 63 IN | WEIGHT: 115 LBS

## 2019-11-27 PROCEDURE — 93000 ELECTROCARDIOGRAM COMPLETE: CPT

## 2019-11-27 PROCEDURE — 99214 OFFICE O/P EST MOD 30 MIN: CPT

## 2019-12-05 ENCOUNTER — APPOINTMENT (OUTPATIENT)
Dept: CV DIAGNOSITCS | Facility: HOSPITAL | Age: 64
End: 2019-12-05

## 2019-12-06 NOTE — REASON FOR VISIT
[Follow-Up - From Hospitalization] : follow-up of a recent hospitalization for [FreeTextEntry1] : Mitral stenosis and Afib

## 2019-12-06 NOTE — HISTORY OF PRESENT ILLNESS
[FreeTextEntry1] : 64 yr F with Permanent Afib, Rheumatic MS.Patient had progression of her valvular heart ds and has been getting symptomatic but she CVA from KRISTAN thrombus and since then has been on Lovenox. She had Right side hemiplegia, her arm and speech is better but right leg is still weak. \par \par I had a detailed discussion with patient an family and they had been considering MVR.

## 2019-12-06 NOTE — DISCUSSION/SUMMARY
[FreeTextEntry1] : Considering progression of her MS and symptoms I am referring her to Dr Anguiano for MVR

## 2019-12-06 NOTE — PHYSICAL EXAM
[General Appearance - Well Developed] : well developed [Normal Conjunctiva] : the conjunctiva exhibited no abnormalities [Normal Oral Mucosa] : normal oral mucosa [Normal Jugular Venous V Waves Present] : normal jugular venous V waves present [] : no respiratory distress [Bowel Sounds] : normal bowel sounds [FreeTextEntry1] : irregularly irregular [Nail Clubbing] : no clubbing of the fingernails [Oriented To Time, Place, And Person] : oriented to person, place, and time

## 2019-12-06 NOTE — REVIEW OF SYSTEMS
[Fever] : no fever [Earache] : no earache [Blurry Vision] : no blurred vision [Shortness Of Breath] : shortness of breath [Chest Pain] : no chest pain [Cough] : no cough [Abdominal Pain] : no abdominal pain [Dysuria] : no dysuria [Joint Pain] : no joint pain [Skin: A Rash] : no rash: [see HPI] : see HPI [Confusion] : no confusion was observed [Anxiety] : anxiety [Easy Bleeding] : no tendency for easy bleeding

## 2019-12-08 NOTE — REVIEW OF SYSTEMS
[Blurry Vision] : no blurred vision [Earache] : no earache [Fever] : no fever [Chest Pain] : no chest pain [Cough] : no cough [Shortness Of Breath] : shortness of breath [Abdominal Pain] : no abdominal pain [Dysuria] : no dysuria [Skin: A Rash] : no rash: [see HPI] : see HPI [Joint Pain] : no joint pain [Confusion] : no confusion was observed [Easy Bleeding] : no tendency for easy bleeding [Anxiety] : anxiety

## 2019-12-08 NOTE — DISCUSSION/SUMMARY
[FreeTextEntry1] : Wiil add Digoxin for better rate control and Considering progression of her MS and symptoms I am referring her to Dr Anguiano for MVR

## 2019-12-08 NOTE — PHYSICAL EXAM
[General Appearance - Well Developed] : well developed [Normal Conjunctiva] : the conjunctiva exhibited no abnormalities [Normal Oral Mucosa] : normal oral mucosa [] : no respiratory distress [Normal Jugular Venous V Waves Present] : normal jugular venous V waves present [Bowel Sounds] : normal bowel sounds [FreeTextEntry1] : irregularly irregular [Oriented To Time, Place, And Person] : oriented to person, place, and time [Nail Clubbing] : no clubbing of the fingernails

## 2019-12-10 ENCOUNTER — APPOINTMENT (OUTPATIENT)
Dept: CARDIOTHORACIC SURGERY | Facility: CLINIC | Age: 64
End: 2019-12-10
Payer: SELF-PAY

## 2019-12-11 ENCOUNTER — APPOINTMENT (OUTPATIENT)
Dept: CARDIOTHORACIC SURGERY | Facility: CLINIC | Age: 64
End: 2019-12-11
Payer: SELF-PAY

## 2019-12-12 ENCOUNTER — APPOINTMENT (OUTPATIENT)
Dept: CARDIOTHORACIC SURGERY | Facility: CLINIC | Age: 64
End: 2019-12-12
Payer: SELF-PAY

## 2019-12-12 VITALS
SYSTOLIC BLOOD PRESSURE: 121 MMHG | DIASTOLIC BLOOD PRESSURE: 77 MMHG | WEIGHT: 115 LBS | RESPIRATION RATE: 13 BRPM | HEART RATE: 90 BPM | BODY MASS INDEX: 20.38 KG/M2 | HEIGHT: 63 IN | OXYGEN SATURATION: 98 % | TEMPERATURE: 97.9 F

## 2019-12-12 PROCEDURE — 99205 OFFICE O/P NEW HI 60 MIN: CPT

## 2019-12-12 NOTE — REVIEW OF SYSTEMS
[Feeling Tired] : feeling tired [Joint Pain] : joint pain [Limb Pain] : limb pain [Limb Weakness] : limb weakness [Difficulty Walking] : difficulty walking [Negative] : Endocrine [Chest Pain] : no chest pain [Lower Ext Edema] : no lower extremity edema [Dizziness] : no dizziness

## 2019-12-12 NOTE — HISTORY OF PRESENT ILLNESS
[FreeTextEntry1] : Manny is a 64 year old female with chronic A-fib, rheumatic MS, KRISTAN thrombus (Lovenox), Right (2013 and 2016)and recent left MCA CVA (right side hemiplegia, speech improved, right leg weak) Patient had progression of her valvular heart disease and has been getting symptomatic. She was referred for surgical consultation.

## 2019-12-12 NOTE — PHYSICAL EXAM
[General Appearance - Alert] : alert [Sclera] : the sclera and conjunctiva were normal [PERRL With Normal Accommodation] : pupils were equal in size, round, and reactive to light [General Appearance - In No Acute Distress] : in no acute distress [Outer Ear] : the ears and nose were normal in appearance [Oropharynx] : the oropharynx was normal [Extraocular Movements] : extraocular movements were intact [Respiration, Rhythm And Depth] : normal respiratory rhythm and effort [Jugular Venous Distention Increased] : there was no jugular-venous distention [] : no respiratory distress [Apical Impulse] : the apical impulse was normal [Irregularly Irregular] : the rhythm was irregularly irregular [Right Carotid Bruit] : no bruit heard over the right carotid [Examination Of The Chest] : the chest was normal in appearance [Chest Visual Inspection Thoracic Asymmetry] : no chest asymmetry [Left Carotid Bruit] : no bruit heard over the left carotid [Breast Appearance] : normal in appearance [Abdomen Soft] : soft [Bowel Sounds] : normal bowel sounds [No CVA Tenderness] : no ~M costovertebral angle tenderness [Cervical Lymph Nodes Enlarged Anterior Bilaterally] : anterior cervical [Cervical Lymph Nodes Enlarged Posterior Bilaterally] : posterior cervical [FreeTextEntry1] : Right Upper/Lower extremity weakness [Skin Color & Pigmentation] : normal skin color and pigmentation [Skin Turgor] : normal skin turgor [No Focal Deficits] : no focal deficits

## 2019-12-12 NOTE — CONSULT LETTER
[FreeTextEntry2] : Claudia Goldman MD \par [facility]\par Cardiology Department\par [address] [place] [address]\[place] [address] [FreeTextEntry3] : Kevin Anguiano MD\par  & \par \par Cardiovascular & Thoracic Surgery\par [facility] \par [address]\par [facility] [address]\par

## 2019-12-12 NOTE — ASSESSMENT
[FreeTextEntry1] : Manny is a 64 year old female with chronic A-fib, rheumatic MS, KRISTAN thrombus (Lovenox), CVA (right side hemiplegia, speech improved, right leg weak) Patient had progression of her valvular heart ds and has been getting symptomatic. She was referred for surgical consultation. \par \par Plan:\par 1) Formal Heme/Onc consult\par 2) Does not require a cardiac Catherization (previous 2017 with minimal disease)\par 3) Mitral valve replacement \par 4) She will require [facility] after discharge

## 2019-12-12 NOTE — DATA REVIEWED
[FreeTextEntry1] : Stress Test: 06/08/2017, no Ischemia \par \par Echo: 06/04/2016, 1. Mitral annular calcification and calcified mitral leaflets with doming and thickening of the tips of the mitral valve consistent with rheumatic mitral valve disease. Minimal mitral regurgitation. Mean transmitral valve gradient equals 9 mm Hg, consistent with moderate mitral stenosis. ()2. Normal left ventricular internal dimensions and wall thicknesses.3. Endocardium not well visualized; probable normal left-ventricular systolic function.4. Normal right ventricular size and function\par \par Cardiac Cath: 06/30/2017, Mild luminal coronary artery disease \par  \par

## 2019-12-27 ENCOUNTER — APPOINTMENT (OUTPATIENT)
Dept: INTERNAL MEDICINE | Facility: CLINIC | Age: 64
End: 2019-12-27

## 2020-01-13 NOTE — PLAN
[FreeTextEntry1] : 64 yo F w/ AFib 2/2 Mitral Stenosis c/b likely embolic CVA in Jun 2016, T2DM, HLD, back pain who is presenting for routine follow up.  Pt does not speak English but is here with daughter and prefers that she translate.\par \par # Lower back pain\par - pt previously seen by ortho, reports that PT and tylenol have not worked to control pain\par - pt started on gabapentin 100 TID with significant improvement\par \par # AFib 2/2 MS \par - Pt. currently rate controlled, c/w Metoprolol 25 BID \par - pt follows with cardiology\par - c/w coumadin (goal INR 2-3), will check INR and dose today\par - pt told to come back more frequently for INR checks\par \par # CVA hx \par - No residual neurological deficits\par - c/w ASA, statin\par \par # Controlled T2DM \par - recheck A1c\par - c/w Metformin 1000 BID for now \par - pt told to keep FS log and bring to clinic\par \par # Anemia\par - pt previously noted to have anemia, will recheck Hg and if still low will start on Fe supplementation\par - LDH and haptoglobin not significant for homolysis/shearing in 2/18\par \par #HCM \par - CBC, CMP, INR, lipids, A1C\par - Colonoscopy 2015 negative, next due in 10 years\par - Mammogram next due 3/19\par - All immunizations utd other than flu which pt will get today\par - Will need Dexa at age 67\par - RTC in 6 months Estimated Blood Loss (Cc): minimal

## 2020-01-29 ENCOUNTER — APPOINTMENT (OUTPATIENT)
Dept: CARDIOLOGY | Facility: CLINIC | Age: 65
End: 2020-01-29

## 2020-03-04 ENCOUNTER — EMERGENCY (EMERGENCY)
Facility: HOSPITAL | Age: 65
LOS: 1 days | Discharge: HOME | End: 2020-03-04
Attending: EMERGENCY MEDICINE | Admitting: EMERGENCY MEDICINE
Payer: MEDICAID

## 2020-03-04 VITALS
RESPIRATION RATE: 16 BRPM | SYSTOLIC BLOOD PRESSURE: 134 MMHG | TEMPERATURE: 98 F | OXYGEN SATURATION: 100 % | HEART RATE: 93 BPM | DIASTOLIC BLOOD PRESSURE: 69 MMHG

## 2020-03-04 VITALS
RESPIRATION RATE: 16 BRPM | DIASTOLIC BLOOD PRESSURE: 67 MMHG | HEART RATE: 80 BPM | SYSTOLIC BLOOD PRESSURE: 127 MMHG | OXYGEN SATURATION: 98 % | TEMPERATURE: 98 F

## 2020-03-04 DIAGNOSIS — Z98.49 CATARACT EXTRACTION STATUS, UNSPECIFIED EYE: Chronic | ICD-10-CM

## 2020-03-04 DIAGNOSIS — K43.9 VENTRAL HERNIA WITHOUT OBSTRUCTION OR GANGRENE: Chronic | ICD-10-CM

## 2020-03-04 DIAGNOSIS — Z98.89 OTHER SPECIFIED POSTPROCEDURAL STATES: Chronic | ICD-10-CM

## 2020-03-04 LAB
ALBUMIN SERPL ELPH-MCNC: 3.9 G/DL — SIGNIFICANT CHANGE UP (ref 3.3–5)
ALP SERPL-CCNC: 54 U/L — SIGNIFICANT CHANGE UP (ref 40–120)
ALT FLD-CCNC: 19 U/L — SIGNIFICANT CHANGE UP (ref 4–33)
ANION GAP SERPL CALC-SCNC: 14 MMO/L — SIGNIFICANT CHANGE UP (ref 7–14)
APPEARANCE UR: CLEAR — SIGNIFICANT CHANGE UP
AST SERPL-CCNC: 21 U/L — SIGNIFICANT CHANGE UP (ref 4–32)
BACTERIA # UR AUTO: HIGH
BASOPHILS # BLD AUTO: 0.03 K/UL — SIGNIFICANT CHANGE UP (ref 0–0.2)
BASOPHILS NFR BLD AUTO: 0.3 % — SIGNIFICANT CHANGE UP (ref 0–2)
BILIRUB SERPL-MCNC: 0.4 MG/DL — SIGNIFICANT CHANGE UP (ref 0.2–1.2)
BILIRUB UR-MCNC: NEGATIVE — SIGNIFICANT CHANGE UP
BLOOD UR QL VISUAL: NEGATIVE — SIGNIFICANT CHANGE UP
BUN SERPL-MCNC: 14 MG/DL — SIGNIFICANT CHANGE UP (ref 7–23)
CALCIUM SERPL-MCNC: 9.9 MG/DL — SIGNIFICANT CHANGE UP (ref 8.4–10.5)
CHLORIDE SERPL-SCNC: 102 MMOL/L — SIGNIFICANT CHANGE UP (ref 98–107)
CO2 SERPL-SCNC: 22 MMOL/L — SIGNIFICANT CHANGE UP (ref 22–31)
COLOR SPEC: SIGNIFICANT CHANGE UP
CREAT SERPL-MCNC: 0.68 MG/DL — SIGNIFICANT CHANGE UP (ref 0.5–1.3)
EOSINOPHIL # BLD AUTO: 0.15 K/UL — SIGNIFICANT CHANGE UP (ref 0–0.5)
EOSINOPHIL NFR BLD AUTO: 1.3 % — SIGNIFICANT CHANGE UP (ref 0–6)
GLUCOSE SERPL-MCNC: 139 MG/DL — HIGH (ref 70–99)
GLUCOSE UR-MCNC: NEGATIVE — SIGNIFICANT CHANGE UP
HCT VFR BLD CALC: 33.7 % — LOW (ref 34.5–45)
HGB BLD-MCNC: 10.8 G/DL — LOW (ref 11.5–15.5)
IMM GRANULOCYTES NFR BLD AUTO: 0.4 % — SIGNIFICANT CHANGE UP (ref 0–1.5)
KETONES UR-MCNC: NEGATIVE — SIGNIFICANT CHANGE UP
LEUKOCYTE ESTERASE UR-ACNC: SIGNIFICANT CHANGE UP
LYMPHOCYTES # BLD AUTO: 37.3 % — SIGNIFICANT CHANGE UP (ref 13–44)
LYMPHOCYTES # BLD AUTO: 4.18 K/UL — HIGH (ref 1–3.3)
MCHC RBC-ENTMCNC: 27.6 PG — SIGNIFICANT CHANGE UP (ref 27–34)
MCHC RBC-ENTMCNC: 32 % — SIGNIFICANT CHANGE UP (ref 32–36)
MCV RBC AUTO: 86 FL — SIGNIFICANT CHANGE UP (ref 80–100)
MONOCYTES # BLD AUTO: 0.78 K/UL — SIGNIFICANT CHANGE UP (ref 0–0.9)
MONOCYTES NFR BLD AUTO: 7 % — SIGNIFICANT CHANGE UP (ref 2–14)
NEUTROPHILS # BLD AUTO: 6.03 K/UL — SIGNIFICANT CHANGE UP (ref 1.8–7.4)
NEUTROPHILS NFR BLD AUTO: 53.7 % — SIGNIFICANT CHANGE UP (ref 43–77)
NITRITE UR-MCNC: POSITIVE — HIGH
NRBC # FLD: 0 K/UL — SIGNIFICANT CHANGE UP (ref 0–0)
PH UR: 6.5 — SIGNIFICANT CHANGE UP (ref 5–8)
PLATELET # BLD AUTO: 300 K/UL — SIGNIFICANT CHANGE UP (ref 150–400)
PMV BLD: 10.2 FL — SIGNIFICANT CHANGE UP (ref 7–13)
POTASSIUM SERPL-MCNC: 4.9 MMOL/L — SIGNIFICANT CHANGE UP (ref 3.5–5.3)
POTASSIUM SERPL-SCNC: 4.9 MMOL/L — SIGNIFICANT CHANGE UP (ref 3.5–5.3)
PROT SERPL-MCNC: 7.6 G/DL — SIGNIFICANT CHANGE UP (ref 6–8.3)
PROT UR-MCNC: NEGATIVE — SIGNIFICANT CHANGE UP
RBC # BLD: 3.92 M/UL — SIGNIFICANT CHANGE UP (ref 3.8–5.2)
RBC # FLD: 17.9 % — HIGH (ref 10.3–14.5)
RBC CASTS # UR COMP ASSIST: SIGNIFICANT CHANGE UP (ref 0–?)
SODIUM SERPL-SCNC: 138 MMOL/L — SIGNIFICANT CHANGE UP (ref 135–145)
SP GR SPEC: 1.01 — SIGNIFICANT CHANGE UP (ref 1–1.04)
SQUAMOUS # UR AUTO: SIGNIFICANT CHANGE UP
TROPONIN T, HIGH SENSITIVITY: 22 NG/L — SIGNIFICANT CHANGE UP (ref ?–14)
TROPONIN T, HIGH SENSITIVITY: 23 NG/L — SIGNIFICANT CHANGE UP (ref ?–14)
UROBILINOGEN FLD QL: NORMAL — SIGNIFICANT CHANGE UP
WBC # BLD: 11.21 K/UL — HIGH (ref 3.8–10.5)
WBC # FLD AUTO: 11.21 K/UL — HIGH (ref 3.8–10.5)
WBC UR QL: HIGH (ref 0–?)

## 2020-03-04 PROCEDURE — 71045 X-RAY EXAM CHEST 1 VIEW: CPT | Mod: 26

## 2020-03-04 PROCEDURE — 99284 EMERGENCY DEPT VISIT MOD MDM: CPT

## 2020-03-04 PROCEDURE — 74177 CT ABD & PELVIS W/CONTRAST: CPT | Mod: 26

## 2020-03-04 RX ORDER — CEFPODOXIME PROXETIL 100 MG
1 TABLET ORAL
Qty: 20 | Refills: 0
Start: 2020-03-04 | End: 2020-03-13

## 2020-03-04 RX ORDER — CEPHALEXIN 500 MG
500 CAPSULE ORAL ONCE
Refills: 0 | Status: COMPLETED | OUTPATIENT
Start: 2020-03-04 | End: 2020-03-04

## 2020-03-04 RX ADMIN — Medication 500 MILLIGRAM(S): at 22:02

## 2020-03-04 NOTE — ED PROVIDER NOTE - ATTENDING CONTRIBUTION TO CARE
64F h/o Afib, CVA, HTN, HLD, DM, mitral stenosis/rheumatic valvular disease, LA thrombus on lovenox presents with L flank pain associated w SOB. Symptoms lasted 5min and have now resolved. No CP. No dizziness. No fever. No dysuria or hematuria. No n/v/d. Here with family who report she is at her baseline. On exam well appearing, nad, mmm, rrr, lungs CTA b/l, abd LLQ ttp, no rebound or guarding, 2+ pulses, no edema, no rash, alert, speech clear, R hemiplegia (baseline). UA positive for UTI, given abx. CT abd with question early pyelo. Will d/c home.

## 2020-03-04 NOTE — ED PROVIDER NOTE - CLINICAL SUMMARY MEDICAL DECISION MAKING FREE TEXT BOX
63 y/o female c/o 5 minutes of left sided abdominal pain  -r/o cardiac/intraabdominal pathology  -labs ct ap  -pain control  -ekg cxr

## 2020-03-04 NOTE — ED PROVIDER NOTE - PATIENT PORTAL LINK FT
You can access the FollowMyHealth Patient Portal offered by [facility] by registering at the following website: [email or web address]. By joining [employer]’s FollowMyHealth portal, you will also be able to view your health information using other applications (apps) compatible with our system.

## 2020-03-04 NOTE — ED ADULT NURSE NOTE - NSIMPLEMENTINTERV_GEN_ALL_ED
Implemented All Fall Risk Interventions:  [place] to call system. Call bell, personal items and telephone within reach. Instruct patient to call for assistance. Room bathroom lighting operational. Non-slip footwear when patient is off stretcher. Physically safe environment: no spills, clutter or unnecessary equipment. Stretcher in lowest position, wheels locked, appropriate side rails in place. Provide visual cue, wrist band, yellow gown, etc. Monitor gait and stability. Monitor for mental status changes and reorient to person, place, and time. Review medications for side effects contributing to fall risk. Reinforce activity limits and safety measures with patient and family.

## 2020-03-04 NOTE — ED PROVIDER NOTE - OBJECTIVE STATEMENT
65 y/o female hx DM HTN HLD CVA presents to ER c/o abdominal pain x 1 day. Pt. states at 1030 this morning at rest developed left sided mid abdominal pain that lasted roughly 5-10 min and was asscociated with shortness of breath and then resolved. Pt. called her son and a few hours later he quynh patient to er for evaluation. Pt. has been asymptomatic since the event resolved at 1030 am. denies any pain currently. denies fever chills nausea vomit weakness dizziness.

## 2020-03-04 NOTE — ED PROVIDER NOTE - NSFOLLOWUPINSTRUCTIONS_ED_ALL_ED_FT
Follow-up with your Primary Care Physician within 24-48 hours.  Please return to the Emergency Department immediately for any new, worsening or concerning symptoms.    Copy of your lab work, imaging and/or other testing performed in the ER is attached along with your discharge paperwork.  Please take this to your Primary Care Physician for further discussion, evaluation and comparison with your prior blood work results.     An antibiotic prescription has been sent to your pharmacy.  Please use as per package directions.    Can use 1000mg Tylenol every 6 hours for pain - as needed.  This is an over-the-counter medications - please respect the warnings on the label.

## 2020-03-04 NOTE — ED ADULT NURSE NOTE - OBJECTIVE STATEMENT
Pt a&ox3 non verbal and non ambulatory 2/2 CVA PMH AFib on heparin presenting from home for 1 episode of left sided chest pain with associated sob at 10am lasting for 5 min. Pt states pain has since subsided. Pt placed on monitor irregular HR noted. Pt breathing even and unlabored. Skin intact. Will monitor.

## 2020-05-08 ENCOUNTER — APPOINTMENT (OUTPATIENT)
Dept: INTERNAL MEDICINE | Facility: CLINIC | Age: 65
End: 2020-05-08

## 2020-05-12 RX ORDER — METOPROLOL SUCCINATE 25 MG/1
25 TABLET, EXTENDED RELEASE ORAL DAILY
Qty: 90 | Refills: 2 | Status: COMPLETED | COMMUNITY
Start: 2019-11-08 | End: 2020-05-16

## 2020-07-17 NOTE — PROVIDER CONTACT NOTE (MEDICATION) - SITUATION
oriented to person, place and time , normal sensation , short and long term memory intact
Pt with sbp 107/73, hr , no parameter for toprol 12.5mg
Pts family refusing lexapro (due each day at 1700)
patient on lexapro

## 2020-07-30 ENCOUNTER — RX RENEWAL (OUTPATIENT)
Age: 65
End: 2020-07-30

## 2020-09-29 ENCOUNTER — EMERGENCY (EMERGENCY)
Facility: HOSPITAL | Age: 65
LOS: 1 days | Discharge: HOME | End: 2020-09-29
Attending: EMERGENCY MEDICINE | Admitting: EMERGENCY MEDICINE
Payer: MEDICAID

## 2020-09-29 VITALS
TEMPERATURE: 98 F | DIASTOLIC BLOOD PRESSURE: 67 MMHG | HEART RATE: 70 BPM | OXYGEN SATURATION: 100 % | RESPIRATION RATE: 14 BRPM | SYSTOLIC BLOOD PRESSURE: 113 MMHG

## 2020-09-29 VITALS
DIASTOLIC BLOOD PRESSURE: 67 MMHG | RESPIRATION RATE: 16 BRPM | HEIGHT: 62 IN | TEMPERATURE: 97 F | SYSTOLIC BLOOD PRESSURE: 124 MMHG | OXYGEN SATURATION: 100 % | HEART RATE: 78 BPM

## 2020-09-29 DIAGNOSIS — Z98.89 OTHER SPECIFIED POSTPROCEDURAL STATES: Chronic | ICD-10-CM

## 2020-09-29 DIAGNOSIS — Z98.49 CATARACT EXTRACTION STATUS, UNSPECIFIED EYE: Chronic | ICD-10-CM

## 2020-09-29 DIAGNOSIS — K43.9 VENTRAL HERNIA WITHOUT OBSTRUCTION OR GANGRENE: Chronic | ICD-10-CM

## 2020-09-29 LAB
ALBUMIN SERPL ELPH-MCNC: 4.4 G/DL — SIGNIFICANT CHANGE UP (ref 3.3–5)
ALP SERPL-CCNC: 51 U/L — SIGNIFICANT CHANGE UP (ref 40–120)
ALT FLD-CCNC: 14 U/L — SIGNIFICANT CHANGE UP (ref 4–33)
ANION GAP SERPL CALC-SCNC: 14 MMO/L — SIGNIFICANT CHANGE UP (ref 7–14)
APPEARANCE UR: CLEAR — SIGNIFICANT CHANGE UP
APTT BLD: 41.8 SEC — HIGH (ref 27–36.3)
AST SERPL-CCNC: 12 U/L — SIGNIFICANT CHANGE UP (ref 4–32)
BACTERIA # UR AUTO: HIGH
BASE EXCESS BLDV CALC-SCNC: 2.3 MMOL/L — SIGNIFICANT CHANGE UP
BASOPHILS # BLD AUTO: 0.05 K/UL — SIGNIFICANT CHANGE UP (ref 0–0.2)
BASOPHILS NFR BLD AUTO: 0.4 % — SIGNIFICANT CHANGE UP (ref 0–2)
BILIRUB SERPL-MCNC: 0.4 MG/DL — SIGNIFICANT CHANGE UP (ref 0.2–1.2)
BILIRUB UR-MCNC: NEGATIVE — SIGNIFICANT CHANGE UP
BLD GP AB SCN SERPL QL: NEGATIVE — SIGNIFICANT CHANGE UP
BLOOD GAS VENOUS - CREATININE: 0.73 MG/DL — SIGNIFICANT CHANGE UP (ref 0.5–1.3)
BLOOD GAS VENOUS - FIO2: 21 — SIGNIFICANT CHANGE UP
BLOOD UR QL VISUAL: NEGATIVE — SIGNIFICANT CHANGE UP
BUN SERPL-MCNC: 18 MG/DL — SIGNIFICANT CHANGE UP (ref 7–23)
CALCIUM SERPL-MCNC: 10.3 MG/DL — SIGNIFICANT CHANGE UP (ref 8.4–10.5)
CHLORIDE BLDV-SCNC: 104 MMOL/L — SIGNIFICANT CHANGE UP (ref 96–108)
CHLORIDE SERPL-SCNC: 102 MMOL/L — SIGNIFICANT CHANGE UP (ref 98–107)
CO2 SERPL-SCNC: 24 MMOL/L — SIGNIFICANT CHANGE UP (ref 22–31)
COLOR SPEC: COLORLESS — SIGNIFICANT CHANGE UP
CREAT SERPL-MCNC: 0.63 MG/DL — SIGNIFICANT CHANGE UP (ref 0.5–1.3)
EOSINOPHIL # BLD AUTO: 0.17 K/UL — SIGNIFICANT CHANGE UP (ref 0–0.5)
EOSINOPHIL NFR BLD AUTO: 1.5 % — SIGNIFICANT CHANGE UP (ref 0–6)
GAS PNL BLDV: 143 MMOL/L — SIGNIFICANT CHANGE UP (ref 136–146)
GLUCOSE BLDV-MCNC: 129 MG/DL — HIGH (ref 70–99)
GLUCOSE SERPL-MCNC: 128 MG/DL — HIGH (ref 70–99)
GLUCOSE UR-MCNC: NEGATIVE — SIGNIFICANT CHANGE UP
HCO3 BLDV-SCNC: 25 MMOL/L — SIGNIFICANT CHANGE UP (ref 20–27)
HCT VFR BLD CALC: 35.6 % — SIGNIFICANT CHANGE UP (ref 34.5–45)
HCT VFR BLDV CALC: 34.8 % — SIGNIFICANT CHANGE UP (ref 34.5–45)
HGB BLD-MCNC: 10.7 G/DL — LOW (ref 11.5–15.5)
HGB BLDV-MCNC: 11.3 G/DL — LOW (ref 11.5–15.5)
HYALINE CASTS # UR AUTO: NEGATIVE — SIGNIFICANT CHANGE UP
IMM GRANULOCYTES NFR BLD AUTO: 0.3 % — SIGNIFICANT CHANGE UP (ref 0–1.5)
INR BLD: 1.08 — SIGNIFICANT CHANGE UP (ref 0.88–1.16)
KETONES UR-MCNC: NEGATIVE — SIGNIFICANT CHANGE UP
LACTATE BLDV-MCNC: 2.2 MMOL/L — HIGH (ref 0.5–2)
LEUKOCYTE ESTERASE UR-ACNC: SIGNIFICANT CHANGE UP
LYMPHOCYTES # BLD AUTO: 37 % — SIGNIFICANT CHANGE UP (ref 13–44)
LYMPHOCYTES # BLD AUTO: 4.34 K/UL — HIGH (ref 1–3.3)
MCHC RBC-ENTMCNC: 28.1 PG — SIGNIFICANT CHANGE UP (ref 27–34)
MCHC RBC-ENTMCNC: 30.1 % — LOW (ref 32–36)
MCV RBC AUTO: 93.4 FL — SIGNIFICANT CHANGE UP (ref 80–100)
MONOCYTES # BLD AUTO: 0.71 K/UL — SIGNIFICANT CHANGE UP (ref 0–0.9)
MONOCYTES NFR BLD AUTO: 6.1 % — SIGNIFICANT CHANGE UP (ref 2–14)
NEUTROPHILS # BLD AUTO: 6.42 K/UL — SIGNIFICANT CHANGE UP (ref 1.8–7.4)
NEUTROPHILS NFR BLD AUTO: 54.7 % — SIGNIFICANT CHANGE UP (ref 43–77)
NITRITE UR-MCNC: POSITIVE — HIGH
NRBC # FLD: 0 K/UL — SIGNIFICANT CHANGE UP (ref 0–0)
PCO2 BLDV: 48 MMHG — SIGNIFICANT CHANGE UP (ref 41–51)
PH BLDV: 7.37 PH — SIGNIFICANT CHANGE UP (ref 7.32–7.43)
PH UR: 6.5 — SIGNIFICANT CHANGE UP (ref 5–8)
PLATELET # BLD AUTO: 299 K/UL — SIGNIFICANT CHANGE UP (ref 150–400)
PMV BLD: 10.7 FL — SIGNIFICANT CHANGE UP (ref 7–13)
PO2 BLDV: < 24 MMHG — LOW (ref 35–40)
POTASSIUM BLDV-SCNC: 4.2 MMOL/L — SIGNIFICANT CHANGE UP (ref 3.4–4.5)
POTASSIUM SERPL-MCNC: 4.5 MMOL/L — SIGNIFICANT CHANGE UP (ref 3.5–5.3)
POTASSIUM SERPL-SCNC: 4.5 MMOL/L — SIGNIFICANT CHANGE UP (ref 3.5–5.3)
PROT SERPL-MCNC: 7.7 G/DL — SIGNIFICANT CHANGE UP (ref 6–8.3)
PROT UR-MCNC: NEGATIVE — SIGNIFICANT CHANGE UP
PROTHROM AB SERPL-ACNC: 12.4 SEC — SIGNIFICANT CHANGE UP (ref 10.6–13.6)
RBC # BLD: 3.81 M/UL — SIGNIFICANT CHANGE UP (ref 3.8–5.2)
RBC # FLD: 14.6 % — HIGH (ref 10.3–14.5)
RBC CASTS # UR COMP ASSIST: HIGH (ref 0–?)
RH IG SCN BLD-IMP: POSITIVE — SIGNIFICANT CHANGE UP
SAO2 % BLDV: 31 % — LOW (ref 60–85)
SARS-COV-2 RNA SPEC QL NAA+PROBE: SIGNIFICANT CHANGE UP
SODIUM SERPL-SCNC: 140 MMOL/L — SIGNIFICANT CHANGE UP (ref 135–145)
SP GR SPEC: 1.01 — SIGNIFICANT CHANGE UP (ref 1–1.04)
SQUAMOUS # UR AUTO: SIGNIFICANT CHANGE UP
TROPONIN T, HIGH SENSITIVITY: 19 NG/L — SIGNIFICANT CHANGE UP (ref ?–14)
TROPONIN T, HIGH SENSITIVITY: 21 NG/L — SIGNIFICANT CHANGE UP (ref ?–14)
UROBILINOGEN FLD QL: NORMAL — SIGNIFICANT CHANGE UP
WBC # BLD: 11.72 K/UL — HIGH (ref 3.8–10.5)
WBC # FLD AUTO: 11.72 K/UL — HIGH (ref 3.8–10.5)
WBC UR QL: HIGH (ref 0–?)

## 2020-09-29 PROCEDURE — 71045 X-RAY EXAM CHEST 1 VIEW: CPT | Mod: 26

## 2020-09-29 PROCEDURE — 93010 ELECTROCARDIOGRAM REPORT: CPT

## 2020-09-29 PROCEDURE — 70450 CT HEAD/BRAIN W/O DYE: CPT | Mod: 26

## 2020-09-29 PROCEDURE — 99284 EMERGENCY DEPT VISIT MOD MDM: CPT | Mod: 25

## 2020-09-29 RX ORDER — CEFPODOXIME PROXETIL 100 MG
1 TABLET ORAL
Qty: 20 | Refills: 0
Start: 2020-09-29 | End: 2020-10-08

## 2020-09-29 RX ORDER — CEFTRIAXONE 500 MG/1
1000 INJECTION, POWDER, FOR SOLUTION INTRAMUSCULAR; INTRAVENOUS ONCE
Refills: 0 | Status: COMPLETED | OUTPATIENT
Start: 2020-09-29 | End: 2020-09-29

## 2020-09-29 RX ADMIN — CEFTRIAXONE 100 MILLIGRAM(S): 500 INJECTION, POWDER, FOR SOLUTION INTRAMUSCULAR; INTRAVENOUS at 03:06

## 2020-09-29 NOTE — ED PROVIDER NOTE - PATIENT PORTAL LINK FT
You can access the FollowMyHealth Patient Portal offered by [facility] by registering at the following website: [email or web address]. By joining [employer]’s FollowMyHealth portal, you will also be able to view your health information using other applications (apps) compatible with our system.

## 2020-09-29 NOTE — ED ADULT TRIAGE NOTE - CHIEF COMPLAINT QUOTE
Pt arrives as notification for r/o stroke.  Pt family states pt is altered from baseline which is normally A&Ox3.  Caregiver reports right side facial droop and slurring of speech.  Pt has pre-existing right leg weakness from 2 prior strokes.  EARNESTINE called to triage for Stroke eval.  NO Code Stroke as per EARNESTINE..   fs= 127 in triage.  Pt taken to TR A following stroke eval.

## 2020-09-29 NOTE — ED PROVIDER NOTE - CARE PLAN
Principal Discharge DX:	Urinary tract infection without hematuria, site unspecified  Secondary Diagnosis:	Confusion state

## 2020-09-29 NOTE — ED PROVIDER NOTE - PMH
5 Atrial fibrillation  h/o Cardioversion in 2013 and on Coumadin  DM (diabetes mellitus)  Type II  HLD (hyperlipidemia)    HTN (hypertension)    Mitral stenosis    TIA (transient ischemic attack)  2013- Rt facial droop which resolved after 2 hours

## 2020-09-29 NOTE — ED PROVIDER NOTE - PHYSICAL EXAMINATION
Physical Exam:  Gen: NAD, AOx3, non-toxic appearing  Head: normal appearing  HEENT: normal conjunctiva, oral mucosa moist, right facial droop chronic as per son   Lung:  no respiratory distress, speaking in full sentences, clear to ascultation bilaterally     CV: regular rate and rhythm   Abd: soft, ND, NT  MSK: no visible deformities,   Neuro: No new focal deficits. ue 5/5 bilaterally, le 5/5 lle, 0/5 rle chronic and unchanged   Skin: Warm  Psych: normal affect  ~Vince Martin D.O. -Resident

## 2020-09-29 NOTE — ED PROVIDER NOTE - PROGRESS NOTE DETAILS
patient remains asymptomatic, normal ms, discussed with son, will drive patient home and Follow up pmd, abx sent to pharmacy for uti. culture pending. Patient feels well, tolerating PO. Discussed lab and radiology findings with patient. Patient feels comfortable going home. Gave home care and follow up instructions. Discussed which symptoms to look out for and when to return to the ED for further evaluation. Patient given opportunity to ask questions about their medical condition and had all questions answered.

## 2020-09-29 NOTE — ED ADULT NURSE NOTE - OBJECTIVE STATEMENT
Break Coverage - patient received in Trauma A by EMS with son for "AMS".  Son translating for patient as preferred, primary patient language is Nadine.  Per son - patient around 11:30pm tonight was acutely altered, weak, confused - not speaking appropriately to him.  At baseline patient is aaox3, ambulatory with assistance, history prior CVA and RLE weakness, TIA, AFib, HTN, HLD, DM on metformin, lovenox injections. FS in Triage 127.  Patient is awake, alert, per son at present returned to baseline mental status, aaox3, answering questions appropriately and without facial droop, slurred speech or new weakness.  IV placed x 2 to L & R arm #20g, labs collected/sent as ordered.  ED MD Cannon at bedside for evaluation, plan of care ongoing.  Fall & Safety measures in place. Break Coverage - patient received in Trauma A by EMS with son for "AMS".  Son translating for patient as preferred, primary patient language is Nadine.  Per son - patient around 11:30pm tonight was acutely altered, weak, confused - not speaking appropriately to him, grabbing head and c/o headache.  At baseline patient is aaox3, bedbound/wheelchair bound, history prior CVA and R sided weakness, TIA, AFib, HTN, HLD, DM on metformin, lovenox injections. FS in Triage 127.  Patient is awake, alert, per son returned to baseline mental status, aaox3, answering questions appropriately and without facial droop, slurred speech or new weakness. Reports mild headache but states "I'm ok" IV placed x 2 to L & R arm #20g, labs collected/sent as ordered.  ED MD Cannon at bedside for evaluation, plan of care ongoing.  Fall & Safety measures in place.    Son at bedside: Yasir Goldman [phone]  other Son: Nura Goldman [phone]

## 2020-09-29 NOTE — ED PROVIDER NOTE - NSFOLLOWUPINSTRUCTIONS_ED_ALL_ED_FT
Urinary Tract Infection    A urinary tract infection (UTI) is an infection of any part of the urinary tract, which includes the kidneys, ureters, bladder, and urethra. Risk factors include ignoring your need to urinate, wiping back to front if female, being an uncircumcised male, and having diabetes or a weak immune system. Symptoms include frequent urination, pain or burning with urination, foul smelling urine, cloudy urine, pain in the lower abdomen, blood in the urine, and fever. If you were prescribed an antibiotic medicine, take it as told by your health care provider. Do not stop taking the antibiotic even if you start to feel better.    SEEK IMMEDIATE MEDICAL CARE IF YOU HAVE ANY OF THE FOLLOWING SYMPTOMS: severe back or abdominal pain, fever, inability to keep fluids or medicine down, dizziness/lightheadedness, or a change in mental status.    1. TAKE ALL MEDICATIONS AS DIRECTED.    2. FOR PAIN OR FEVER YOU CAN TAKE IBUPROFEN (MOTRIN, ADVIL) OR ACETAMINOPHEN (TYLENOL) AS NEEDED, AS DIRECTED ON PACKAGING.  3. FOLLOW UP WITH YOUR PRIMARY DOCTOR WITHIN 5 DAYS AS DIRECTED.  4. IF YOU HAD LABS OR IMAGING DONE, YOU WERE GIVEN COPIES OF ALL LABS AND/OR IMAGING RESULTS FROM YOUR ER VISIT--PLEASE TAKE THEM WITH YOU TO YOUR FOLLOW UP APPOINTMENTS.  5. RETURN TO THE ER FOR ANY WORSENING SYMPTOMS OR CONCERNS.

## 2020-09-29 NOTE — ED ADULT NURSE REASSESSMENT NOTE - NS ED NURSE REASSESS COMMENT FT1
Received report from Ramila DOZIER covering break. Patient appears comfortable at this time, son at bedside for collateral and translation. Breathing equal and unlabored, assisted to bedpan, labs drawn and sent. Awaiting CT scan, will continue to monitor.

## 2020-09-29 NOTE — ED ADULT NURSE NOTE - INTERVENTIONS DEFINITIONS
[place] to call system/Instruct patient to call for assistance/Stretcher in lowest position, wheels locked, appropriate side rails in place/Monitor gait and stability/Review medications for side effects contributing to fall risk/Reinforce activity limits and safety measures with patient and family/Non-slip footwear when patient is off stretcher/Physically safe environment: no spills, clutter or unnecessary equipment/Monitor for mental status changes and reorient to person, place, and time

## 2020-09-29 NOTE — ED PROVIDER NOTE - ATTENDING CONTRIBUTION TO CARE
81 y/o F with h/o HTN, DM, previous stroke with residual RLE weakness, AF on lovenox here with confusion.  Per son providing hx at bedside, pt was in her usual state of health today.  Around 11:30pm she called from her bed and was noted to be holding her head in apparent pain and confused.  Per son. pt was speaking, but they were unable to understand what she was saying.  Normally pt is A&Ox3.  Pt is now back to baseline.  No recent illness, fever, fall or injury, cp, sob, abd pain, n/v/d.  Well appearing elderly female, lying comfortably in stretcher, awake and alert, nontoxic.  AF/VSS.  NCAT EOMI PERRL.  Neck supple.  Lungs cta bl.  Cards nl S1/S2, RRR, no MRG.  Abd soft ntnd.  A&Ox3, responding to questions appropriately in primary language, follows commands.  CN II-XII intact, sensation grossly intact, no pronator drift.  Strength 0/5 in RLE, but otherwise full in bilateral UE and LLE.  Gait exam deferred.  Plan for labs, ekg, ua, cxr, ct head - consider tia vs metabolic vs infectious vs cardiac etiology.

## 2020-09-29 NOTE — ED PROVIDER NOTE - CLINICAL SUMMARY MEDICAL DECISION MAKING FREE TEXT BOX
66yo f kimberly speaking, preferred to use son at bedside for xlation, pmhx  DM, HTN, HLD, CVA w/ residual r sided deficits, pw son for confusion for 30 min, now baseline ms, not confused. will eval for possible metabolic vs less likely infectious etiology of ams, unlikely major stroke or neurologic process, no longer co headache. will get ct head, labs, urine, likely dc home with opntnt Follow up pending w/u

## 2020-09-29 NOTE — ED PROVIDER NOTE - NS ED ROS FT

## 2020-09-29 NOTE — ED PROVIDER NOTE - OBJECTIVE STATEMENT
66yo f kimberly speaking, preferred to use son at bedside for xlation, pmhx  DM, HTN, HLD, CVA w/ residual r sided deficits, pw son for confusion for 30 min. son reports tonight lnk at 11pm at 1130 had brief period of confusion, disorientation and complaining of ha. son called ems to bring to ed for further eval. denies worsening functional status or weakness. Denies recent trauma, fevers, chills, headache, dizziness, nausea, vomiting, dysuria, freq, hematuria, diarrhea, constipation, chest pain, shortness of breath, cough. now at baseline mental status aox3 with regular strength as per son.

## 2020-09-30 RX ORDER — CEPHALEXIN 500 MG
1 CAPSULE ORAL
Qty: 14 | Refills: 0
Start: 2020-09-30 | End: 2020-10-06

## 2020-09-30 NOTE — ED POST DISCHARGE NOTE - REASON FOR FOLLOW-UP
Other Patient's daughter called Cefpodoxime not covered by patient's insurance. Switched Abx to Keflex 500mg PO bid X 7 days explained to patient's daughter S/R profile not published UCX >100.000 CFU/ml no org listed yet prelim results. Discussed with daughter might need switch Abx when final results come out.

## 2020-10-02 ENCOUNTER — INPATIENT (INPATIENT)
Facility: HOSPITAL | Age: 65
LOS: 2 days | Discharge: HOME | End: 2020-10-05
Attending: INTERNAL MEDICINE | Admitting: INTERNAL MEDICINE
Payer: MEDICAID

## 2020-10-02 VITALS
TEMPERATURE: 97 F | HEIGHT: 62 IN | SYSTOLIC BLOOD PRESSURE: 120 MMHG | HEART RATE: 78 BPM | OXYGEN SATURATION: 100 % | RESPIRATION RATE: 16 BRPM | DIASTOLIC BLOOD PRESSURE: 68 MMHG

## 2020-10-02 DIAGNOSIS — K43.9 VENTRAL HERNIA WITHOUT OBSTRUCTION OR GANGRENE: Chronic | ICD-10-CM

## 2020-10-02 DIAGNOSIS — Z98.49 CATARACT EXTRACTION STATUS, UNSPECIFIED EYE: Chronic | ICD-10-CM

## 2020-10-02 DIAGNOSIS — Z98.89 OTHER SPECIFIED POSTPROCEDURAL STATES: Chronic | ICD-10-CM

## 2020-10-02 DIAGNOSIS — N39.0 URINARY TRACT INFECTION, SITE NOT SPECIFIED: ICD-10-CM

## 2020-10-02 LAB
ALBUMIN SERPL ELPH-MCNC: 4.7 G/DL — SIGNIFICANT CHANGE UP (ref 3.3–5)
ALP SERPL-CCNC: 58 U/L — SIGNIFICANT CHANGE UP (ref 40–120)
ALT FLD-CCNC: 18 U/L — SIGNIFICANT CHANGE UP (ref 4–33)
ANION GAP SERPL CALC-SCNC: 14 MMO/L — SIGNIFICANT CHANGE UP (ref 7–14)
AST SERPL-CCNC: 22 U/L — SIGNIFICANT CHANGE UP (ref 4–32)
BASOPHILS # BLD AUTO: 0.04 K/UL — SIGNIFICANT CHANGE UP (ref 0–0.2)
BASOPHILS NFR BLD AUTO: 0.3 % — SIGNIFICANT CHANGE UP (ref 0–2)
BILIRUB SERPL-MCNC: 0.8 MG/DL — SIGNIFICANT CHANGE UP (ref 0.2–1.2)
BUN SERPL-MCNC: 16 MG/DL — SIGNIFICANT CHANGE UP (ref 7–23)
CALCIUM SERPL-MCNC: 10.5 MG/DL — SIGNIFICANT CHANGE UP (ref 8.4–10.5)
CHLORIDE SERPL-SCNC: 100 MMOL/L — SIGNIFICANT CHANGE UP (ref 98–107)
CO2 SERPL-SCNC: 23 MMOL/L — SIGNIFICANT CHANGE UP (ref 22–31)
CREAT SERPL-MCNC: 0.64 MG/DL — SIGNIFICANT CHANGE UP (ref 0.5–1.3)
EOSINOPHIL # BLD AUTO: 0.12 K/UL — SIGNIFICANT CHANGE UP (ref 0–0.5)
EOSINOPHIL NFR BLD AUTO: 1 % — SIGNIFICANT CHANGE UP (ref 0–6)
GLUCOSE SERPL-MCNC: 111 MG/DL — HIGH (ref 70–99)
HCT VFR BLD CALC: 34.9 % — SIGNIFICANT CHANGE UP (ref 34.5–45)
HGB BLD-MCNC: 10.8 G/DL — LOW (ref 11.5–15.5)
IMM GRANULOCYTES NFR BLD AUTO: 0.5 % — SIGNIFICANT CHANGE UP (ref 0–1.5)
LYMPHOCYTES # BLD AUTO: 36.1 % — SIGNIFICANT CHANGE UP (ref 13–44)
LYMPHOCYTES # BLD AUTO: 4.36 K/UL — HIGH (ref 1–3.3)
MCHC RBC-ENTMCNC: 28.5 PG — SIGNIFICANT CHANGE UP (ref 27–34)
MCHC RBC-ENTMCNC: 30.9 % — LOW (ref 32–36)
MCV RBC AUTO: 92.1 FL — SIGNIFICANT CHANGE UP (ref 80–100)
MONOCYTES # BLD AUTO: 0.76 K/UL — SIGNIFICANT CHANGE UP (ref 0–0.9)
MONOCYTES NFR BLD AUTO: 6.3 % — SIGNIFICANT CHANGE UP (ref 2–14)
NEUTROPHILS # BLD AUTO: 6.73 K/UL — SIGNIFICANT CHANGE UP (ref 1.8–7.4)
NEUTROPHILS NFR BLD AUTO: 55.8 % — SIGNIFICANT CHANGE UP (ref 43–77)
NRBC # FLD: 0 K/UL — SIGNIFICANT CHANGE UP (ref 0–0)
PLATELET # BLD AUTO: 315 K/UL — SIGNIFICANT CHANGE UP (ref 150–400)
PMV BLD: 10.7 FL — SIGNIFICANT CHANGE UP (ref 7–13)
POTASSIUM SERPL-MCNC: 4.4 MMOL/L — SIGNIFICANT CHANGE UP (ref 3.5–5.3)
POTASSIUM SERPL-SCNC: 4.4 MMOL/L — SIGNIFICANT CHANGE UP (ref 3.5–5.3)
PROT SERPL-MCNC: 7.9 G/DL — SIGNIFICANT CHANGE UP (ref 6–8.3)
RBC # BLD: 3.79 M/UL — LOW (ref 3.8–5.2)
RBC # FLD: 14.6 % — HIGH (ref 10.3–14.5)
SODIUM SERPL-SCNC: 137 MMOL/L — SIGNIFICANT CHANGE UP (ref 135–145)
WBC # BLD: 12.07 K/UL — HIGH (ref 3.8–10.5)
WBC # FLD AUTO: 12.07 K/UL — HIGH (ref 3.8–10.5)

## 2020-10-02 PROCEDURE — 99285 EMERGENCY DEPT VISIT HI MDM: CPT

## 2020-10-02 RX ORDER — MEROPENEM 1 G/30ML
1000 INJECTION INTRAVENOUS ONCE
Refills: 0 | Status: COMPLETED | OUTPATIENT
Start: 2020-10-02 | End: 2020-10-02

## 2020-10-02 RX ADMIN — MEROPENEM 100 MILLIGRAM(S): 1 INJECTION INTRAVENOUS at 23:21

## 2020-10-02 NOTE — ED ADULT NURSE NOTE - OBJECTIVE STATEMENT
66yo female received in intake. pt Nadine speaking, A&OX3, states using cane/walker at home, as per family, pt received a call back today to return to [facility] for antibiotic due to positive urine culture. pt denies pain and discomfort at present. hx of cva residual weakness noted to right upper and lower extremities, no slurred speech or facial droopiness noted. respiration even and non-labored. in nad. 20g iv placed in lac, lab drawn and sent. covid swab sent. md goff in process.

## 2020-10-02 NOTE — ED PROVIDER NOTE - OBJECTIVE STATEMENT
64 yo F with PMH of DM, HTN, HLD, A-fib on Coumadin, CVA w/ right side residual deficits, presents to ER for callback +UTI w/ ESBL. Pt reports still having burning on urination. As per daughter-in-law on the phone, pt has foul smelling urine and has been vomiting from eating too much at night time. Denies any fever, chills, dizziness, chest pain, sob, abdominal pain, or any other complaints.     Nadine translation: [id number]

## 2020-10-02 NOTE — ED ADULT NURSE NOTE - NSIMPLEMENTINTERV_GEN_ALL_ED
Implemented All Fall Risk Interventions:  [place] to call system. Call bell, personal items and telephone within reach. Instruct patient to call for assistance. Room bathroom lighting operational. Non-slip footwear when patient is off stretcher. Physically safe environment: no spills, clutter or unnecessary equipment. Stretcher in lowest position, wheels locked, appropriate side rails in place. Provide visual cue, wrist band, yellow gown, etc. Monitor gait and stability. Monitor for mental status changes and reorient to person, place, and time. Review medications for side effects contributing to fall risk. Reinforce activity limits and safety measures with patient and family. Implemented All Fall with Harm Risk Interventions:  [place] to call system. Call bell, personal items and telephone within reach. Instruct patient to call for assistance. Room bathroom lighting operational. Non-slip footwear when patient is off stretcher. Physically safe environment: no spills, clutter or unnecessary equipment. Stretcher in lowest position, wheels locked, appropriate side rails in place. Provide visual cue, wrist band, yellow gown, etc. Monitor gait and stability. Monitor for mental status changes and reorient to person, place, and time. Review medications for side effects contributing to fall risk. Reinforce activity limits and safety measures with patient and family. Provide visual clues: red socks.

## 2020-10-02 NOTE — ED ADULT TRIAGE NOTE - CHIEF COMPLAINT QUOTE
As per son, a call was received this morning from [facility] to have pt return for IV antibiotics for her UTI dx on 9/29/20. Pt denies any complaints. As per son, a call was received this morning from [facility] to have pt return for IV antibiotics for her UTI dx on 9/29/20. Pt denies any complaints. Hx old CVA with right sided residual weakness,

## 2020-10-02 NOTE — ED PROVIDER NOTE - ATTENDING CONTRIBUTION TO CARE
I performed a face to face evaluation of this patient and obtained a history and performed a full exam.  I agree with the history, physical exam and plan of the PA.    Brief HPI:  66 yo F with PMH of DM, HTN, HLD, A-fib on Coumadin, CVA w/ right side residual deficits, presents to ER for callback +UTI w/ ESBL.    Vitals:   Reviewed    Exam:    GEN:  Non-toxic appearing, non-distressed, speaking full sentences, non-diaphoretic, AAOx3  HEENT:  NCAT, neck supple, EOMI, PERRLA, sclera anicteric, no conjunctival pallor or injection, no stridor, normal voice, no tonsillar exudate, uvula midline, tympanic membranes and external auditory canals normal appearing bilaterally   CV:  regular rhythm and rate, s1/s2 audible, no murmurs, rubs or gallops, peripheral pulses 2+ and symmetric  PULM:  non-labored respirations, lungs clear to auscultation bilaterally, no wheezes, crackles or rales  ABD:  non distended, non-tender, no rebound, no guarding, negative Bui's sign, bowel sounds normal, no cvat  MSK:  no gross deformity, non-tender extremities and joints, range of motion grossly normal appearing, no extremity edema, extremities warm and well perfused   NEURO:  AAOx3, CN II-XII intact, motor 5/5 in upper and lower extremities bilaterally, sensation grossly intact in extremities and trunk, finger to nose testing wnl, no nystagmus, negative Romberg, no pronator drift, no gait deficit  SKIN:  warm, dry, no rash or vesicles     A/P:  66 yo F with PMH of DM, HTN, HLD, A-fib on Coumadin, CVA w/ right side residual deficits, presents to ER for callback +UTI w/ ESBL.  VSS.  Will admit for iv abx.

## 2020-10-02 NOTE — ED ADULT NURSE NOTE - CHIEF COMPLAINT QUOTE
As per son, a call was received this morning from [facility] to have pt return for IV antibiotics for her UTI dx on 9/29/20. Pt denies any complaints. Hx old CVA with right sided residual weakness,

## 2020-10-02 NOTE — ED PROVIDER NOTE - CARE PLAN
no
Principal Discharge DX:	UTI due to extended-spectrum beta lactamase (ESBL) producing Escherichia coli

## 2020-10-02 NOTE — ED PROVIDER NOTE - CLINICAL SUMMARY MEDICAL DECISION MAKING FREE TEXT BOX
66 yo F with PMH of DM, HTN, HLD, A-fib on Coumadin, CVA w/ right side residual deficits, presents to ER for callback +UTI w/ ESBL. well appearing female p/w dysuria, +UTI on 9/29, culture shows Ecoli w/ ESBL. Plan: labs, Ua/Uc, IV abx for UTI, and admit.

## 2020-10-02 NOTE — ED PROVIDER NOTE - PROGRESS NOTE DETAILS
ANURAG DIEGO: spoke with hospitalist, accepted pt for admission. Pt admited for UTI due to ESBL. MAR text paged.

## 2020-10-03 DIAGNOSIS — I48.91 UNSPECIFIED ATRIAL FIBRILLATION: ICD-10-CM

## 2020-10-03 DIAGNOSIS — I10 ESSENTIAL (PRIMARY) HYPERTENSION: ICD-10-CM

## 2020-10-03 DIAGNOSIS — E11.9 TYPE 2 DIABETES MELLITUS WITHOUT COMPLICATIONS: ICD-10-CM

## 2020-10-03 DIAGNOSIS — E78.5 HYPERLIPIDEMIA, UNSPECIFIED: ICD-10-CM

## 2020-10-03 DIAGNOSIS — A49.9 BACTERIAL INFECTION, UNSPECIFIED: ICD-10-CM

## 2020-10-03 DIAGNOSIS — Z29.9 ENCOUNTER FOR PROPHYLACTIC MEASURES, UNSPECIFIED: ICD-10-CM

## 2020-10-03 LAB
ANION GAP SERPL CALC-SCNC: 15 MMO/L — HIGH (ref 7–14)
APPEARANCE UR: CLEAR — SIGNIFICANT CHANGE UP
BACTERIA # UR AUTO: NEGATIVE — SIGNIFICANT CHANGE UP
BASOPHILS # BLD AUTO: 0.03 K/UL — SIGNIFICANT CHANGE UP (ref 0–0.2)
BASOPHILS NFR BLD AUTO: 0.3 % — SIGNIFICANT CHANGE UP (ref 0–2)
BILIRUB UR-MCNC: NEGATIVE — SIGNIFICANT CHANGE UP
BLOOD UR QL VISUAL: NEGATIVE — SIGNIFICANT CHANGE UP
BUN SERPL-MCNC: 14 MG/DL — SIGNIFICANT CHANGE UP (ref 7–23)
CALCIUM SERPL-MCNC: 10.3 MG/DL — SIGNIFICANT CHANGE UP (ref 8.4–10.5)
CHLORIDE SERPL-SCNC: 101 MMOL/L — SIGNIFICANT CHANGE UP (ref 98–107)
CO2 SERPL-SCNC: 24 MMOL/L — SIGNIFICANT CHANGE UP (ref 22–31)
COLOR SPEC: COLORLESS — SIGNIFICANT CHANGE UP
CREAT SERPL-MCNC: 0.61 MG/DL — SIGNIFICANT CHANGE UP (ref 0.5–1.3)
CULTURE RESULTS: SIGNIFICANT CHANGE UP
DIGOXIN SERPL-MCNC: 1.1 NG/ML — SIGNIFICANT CHANGE UP (ref 0.8–2)
EOSINOPHIL # BLD AUTO: 0.14 K/UL — SIGNIFICANT CHANGE UP (ref 0–0.5)
EOSINOPHIL NFR BLD AUTO: 1.3 % — SIGNIFICANT CHANGE UP (ref 0–6)
GLUCOSE BLDC GLUCOMTR-MCNC: 124 MG/DL — HIGH (ref 70–99)
GLUCOSE BLDC GLUCOMTR-MCNC: 129 MG/DL — HIGH (ref 70–99)
GLUCOSE BLDC GLUCOMTR-MCNC: 182 MG/DL — HIGH (ref 70–99)
GLUCOSE BLDC GLUCOMTR-MCNC: 228 MG/DL — HIGH (ref 70–99)
GLUCOSE SERPL-MCNC: 118 MG/DL — HIGH (ref 70–99)
GLUCOSE UR-MCNC: NEGATIVE — SIGNIFICANT CHANGE UP
HCT VFR BLD CALC: 35.9 % — SIGNIFICANT CHANGE UP (ref 34.5–45)
HGB BLD-MCNC: 11.2 G/DL — LOW (ref 11.5–15.5)
HYALINE CASTS # UR AUTO: NEGATIVE — SIGNIFICANT CHANGE UP
IMM GRANULOCYTES NFR BLD AUTO: 0.3 % — SIGNIFICANT CHANGE UP (ref 0–1.5)
KETONES UR-MCNC: NEGATIVE — SIGNIFICANT CHANGE UP
LEUKOCYTE ESTERASE UR-ACNC: SIGNIFICANT CHANGE UP
LYMPHOCYTES # BLD AUTO: 39.1 % — SIGNIFICANT CHANGE UP (ref 13–44)
LYMPHOCYTES # BLD AUTO: 4.28 K/UL — HIGH (ref 1–3.3)
MAGNESIUM SERPL-MCNC: 1.9 MG/DL — SIGNIFICANT CHANGE UP (ref 1.6–2.6)
MCHC RBC-ENTMCNC: 28.7 PG — SIGNIFICANT CHANGE UP (ref 27–34)
MCHC RBC-ENTMCNC: 31.2 % — LOW (ref 32–36)
MCV RBC AUTO: 92.1 FL — SIGNIFICANT CHANGE UP (ref 80–100)
MONOCYTES # BLD AUTO: 0.75 K/UL — SIGNIFICANT CHANGE UP (ref 0–0.9)
MONOCYTES NFR BLD AUTO: 6.9 % — SIGNIFICANT CHANGE UP (ref 2–14)
NEUTROPHILS # BLD AUTO: 5.71 K/UL — SIGNIFICANT CHANGE UP (ref 1.8–7.4)
NEUTROPHILS NFR BLD AUTO: 52.1 % — SIGNIFICANT CHANGE UP (ref 43–77)
NITRITE UR-MCNC: NEGATIVE — SIGNIFICANT CHANGE UP
NRBC # FLD: 0 K/UL — SIGNIFICANT CHANGE UP (ref 0–0)
PH UR: 6.5 — SIGNIFICANT CHANGE UP (ref 5–8)
PHOSPHATE SERPL-MCNC: 4 MG/DL — SIGNIFICANT CHANGE UP (ref 2.5–4.5)
PLATELET # BLD AUTO: 318 K/UL — SIGNIFICANT CHANGE UP (ref 150–400)
PMV BLD: 10.8 FL — SIGNIFICANT CHANGE UP (ref 7–13)
POTASSIUM SERPL-MCNC: 4.1 MMOL/L — SIGNIFICANT CHANGE UP (ref 3.5–5.3)
POTASSIUM SERPL-SCNC: 4.1 MMOL/L — SIGNIFICANT CHANGE UP (ref 3.5–5.3)
PROT UR-MCNC: NEGATIVE — SIGNIFICANT CHANGE UP
RBC # BLD: 3.9 M/UL — SIGNIFICANT CHANGE UP (ref 3.8–5.2)
RBC # FLD: 14.7 % — HIGH (ref 10.3–14.5)
RBC CASTS # UR COMP ASSIST: SIGNIFICANT CHANGE UP (ref 0–?)
SARS-COV-2 RNA SPEC QL NAA+PROBE: SIGNIFICANT CHANGE UP
SODIUM SERPL-SCNC: 140 MMOL/L — SIGNIFICANT CHANGE UP (ref 135–145)
SP GR SPEC: 1.01 — SIGNIFICANT CHANGE UP (ref 1–1.04)
SPECIMEN SOURCE: SIGNIFICANT CHANGE UP
SQUAMOUS # UR AUTO: SIGNIFICANT CHANGE UP
UROBILINOGEN FLD QL: NORMAL — SIGNIFICANT CHANGE UP
WBC # BLD: 10.94 K/UL — HIGH (ref 3.8–10.5)
WBC # FLD AUTO: 10.94 K/UL — HIGH (ref 3.8–10.5)
WBC UR QL: SIGNIFICANT CHANGE UP (ref 0–?)

## 2020-10-03 PROCEDURE — 12345: CPT | Mod: NC

## 2020-10-03 PROCEDURE — 99223 1ST HOSP IP/OBS HIGH 75: CPT | Mod: GC

## 2020-10-03 RX ORDER — INSULIN LISPRO 100/ML
VIAL (ML) SUBCUTANEOUS AT BEDTIME
Refills: 0 | Status: DISCONTINUED | OUTPATIENT
Start: 2020-10-03 | End: 2020-10-05

## 2020-10-03 RX ORDER — INSULIN LISPRO 100/ML
VIAL (ML) SUBCUTANEOUS
Refills: 0 | Status: DISCONTINUED | OUTPATIENT
Start: 2020-10-03 | End: 2020-10-05

## 2020-10-03 RX ORDER — DEXTROSE 50 % IN WATER 50 %
12.5 SYRINGE (ML) INTRAVENOUS ONCE
Refills: 0 | Status: DISCONTINUED | OUTPATIENT
Start: 2020-10-03 | End: 2020-10-05

## 2020-10-03 RX ORDER — MEROPENEM 1 G/30ML
500 INJECTION INTRAVENOUS EVERY 8 HOURS
Refills: 0 | Status: DISCONTINUED | OUTPATIENT
Start: 2020-10-03 | End: 2020-10-03

## 2020-10-03 RX ORDER — GABAPENTIN 400 MG/1
100 CAPSULE ORAL THREE TIMES A DAY
Refills: 0 | Status: DISCONTINUED | OUTPATIENT
Start: 2020-10-03 | End: 2020-10-03

## 2020-10-03 RX ORDER — DEXTROSE 50 % IN WATER 50 %
25 SYRINGE (ML) INTRAVENOUS ONCE
Refills: 0 | Status: DISCONTINUED | OUTPATIENT
Start: 2020-10-03 | End: 2020-10-05

## 2020-10-03 RX ORDER — INFLUENZA VIRUS VACCINE 15; 15; 15; 15 UG/.5ML; UG/.5ML; UG/.5ML; UG/.5ML
0.5 SUSPENSION INTRAMUSCULAR ONCE
Refills: 0 | Status: DISCONTINUED | OUTPATIENT
Start: 2020-10-03 | End: 2020-10-05

## 2020-10-03 RX ORDER — DEXTROSE 50 % IN WATER 50 %
15 SYRINGE (ML) INTRAVENOUS ONCE
Refills: 0 | Status: DISCONTINUED | OUTPATIENT
Start: 2020-10-03 | End: 2020-10-05

## 2020-10-03 RX ORDER — METOPROLOL TARTRATE 50 MG
25 TABLET ORAL DAILY
Refills: 0 | Status: DISCONTINUED | OUTPATIENT
Start: 2020-10-03 | End: 2020-10-05

## 2020-10-03 RX ORDER — MIRTAZAPINE 45 MG/1
7.5 TABLET, ORALLY DISINTEGRATING ORAL AT BEDTIME
Refills: 0 | Status: DISCONTINUED | OUTPATIENT
Start: 2020-10-03 | End: 2020-10-05

## 2020-10-03 RX ORDER — ATORVASTATIN CALCIUM 80 MG/1
40 TABLET, FILM COATED ORAL AT BEDTIME
Refills: 0 | Status: DISCONTINUED | OUTPATIENT
Start: 2020-10-03 | End: 2020-10-05

## 2020-10-03 RX ORDER — ERTAPENEM SODIUM 1 G/1
1000 INJECTION, POWDER, LYOPHILIZED, FOR SOLUTION INTRAMUSCULAR; INTRAVENOUS EVERY 24 HOURS
Refills: 0 | Status: DISCONTINUED | OUTPATIENT
Start: 2020-10-03 | End: 2020-10-05

## 2020-10-03 RX ORDER — DIGOXIN 250 MCG
0.12 TABLET ORAL DAILY
Refills: 0 | Status: DISCONTINUED | OUTPATIENT
Start: 2020-10-03 | End: 2020-10-05

## 2020-10-03 RX ORDER — ENOXAPARIN SODIUM 100 MG/ML
80 INJECTION SUBCUTANEOUS DAILY
Refills: 0 | Status: DISCONTINUED | OUTPATIENT
Start: 2020-10-03 | End: 2020-10-05

## 2020-10-03 RX ORDER — ASPIRIN/CALCIUM CARB/MAGNESIUM 324 MG
81 TABLET ORAL DAILY
Refills: 0 | Status: DISCONTINUED | OUTPATIENT
Start: 2020-10-03 | End: 2020-10-05

## 2020-10-03 RX ORDER — PANTOPRAZOLE SODIUM 20 MG/1
40 TABLET, DELAYED RELEASE ORAL
Refills: 0 | Status: DISCONTINUED | OUTPATIENT
Start: 2020-10-03 | End: 2020-10-05

## 2020-10-03 RX ORDER — GABAPENTIN 400 MG/1
100 CAPSULE ORAL
Refills: 0 | Status: DISCONTINUED | OUTPATIENT
Start: 2020-10-03 | End: 2020-10-05

## 2020-10-03 RX ORDER — SODIUM CHLORIDE 9 MG/ML
1000 INJECTION, SOLUTION INTRAVENOUS
Refills: 0 | Status: DISCONTINUED | OUTPATIENT
Start: 2020-10-03 | End: 2020-10-05

## 2020-10-03 RX ORDER — GLUCAGON INJECTION, SOLUTION 0.5 MG/.1ML
1 INJECTION, SOLUTION SUBCUTANEOUS ONCE
Refills: 0 | Status: DISCONTINUED | OUTPATIENT
Start: 2020-10-03 | End: 2020-10-05

## 2020-10-03 RX ADMIN — ATORVASTATIN CALCIUM 40 MILLIGRAM(S): 80 TABLET, FILM COATED ORAL at 21:37

## 2020-10-03 RX ADMIN — MIRTAZAPINE 7.5 MILLIGRAM(S): 45 TABLET, ORALLY DISINTEGRATING ORAL at 21:37

## 2020-10-03 RX ADMIN — Medication 1: at 09:20

## 2020-10-03 RX ADMIN — Medication 25 MILLIGRAM(S): at 09:20

## 2020-10-03 RX ADMIN — GABAPENTIN 100 MILLIGRAM(S): 400 CAPSULE ORAL at 09:20

## 2020-10-03 RX ADMIN — Medication 0.12 MILLIGRAM(S): at 09:20

## 2020-10-03 RX ADMIN — PANTOPRAZOLE SODIUM 40 MILLIGRAM(S): 20 TABLET, DELAYED RELEASE ORAL at 09:20

## 2020-10-03 RX ADMIN — Medication 81 MILLIGRAM(S): at 12:38

## 2020-10-03 RX ADMIN — ENOXAPARIN SODIUM 80 MILLIGRAM(S): 100 INJECTION SUBCUTANEOUS at 12:38

## 2020-10-03 RX ADMIN — ERTAPENEM SODIUM 120 MILLIGRAM(S): 1 INJECTION, POWDER, LYOPHILIZED, FOR SOLUTION INTRAMUSCULAR; INTRAVENOUS at 17:54

## 2020-10-03 NOTE — H&P ADULT - PROBLEM SELECTOR PLAN 6
lovenox lovenox    Diet: dysphagia 3 soft w/ think liquids last admission, will continue for now until can clarify diet in AM with family

## 2020-10-03 NOTE — PATIENT PROFILE ADULT - NSPROGENBLOODRESTRICT_GEN_A_NUR
Pharmacy requesting 90 day supply for amlodipine. Patient last seen 10/2018. Approved 90 day fill.    Thank you,  Celeste Stoll, PharmD  Pharmacy Resident  11/13/2018  8:16 AM    
none

## 2020-10-03 NOTE — PROGRESS NOTE ADULT - SUBJECTIVE AND OBJECTIVE BOX
PROGRESS NOTE:   Authored by Narda Lagos MD  Pager: [facility] [phone]; [facility] [phone]    Patient is a 65y old  Female who presents with a chief complaint of burning sensation with urination (03 Oct 2020 01:19)      SUBJECTIVE / OVERNIGHT EVENTS:  This AM, states she is doing well. Denies any dysuria or hematuria. States she has some increased urinary frequency. Denies CP, SOB, subjective f/c, n/v.    MEDICATIONS  (STANDING):  aspirin  chewable 81 milliGRAM(s) Oral daily  atorvastatin 40 milliGRAM(s) Oral at bedtime  dextrose 5%. 1000 milliLiter(s) (50 mL/Hr) IV Continuous <Continuous>  dextrose 50% Injectable 12.5 Gram(s) IV Push once  dextrose 50% Injectable 25 Gram(s) IV Push once  dextrose 50% Injectable 25 Gram(s) IV Push once  digoxin     Tablet 0.125 milliGRAM(s) Oral daily  enoxaparin Injectable 80 milliGRAM(s) SubCutaneous daily  ertapenem  IVPB 1000 milliGRAM(s) IV Intermittent every 24 hours  gabapentin 100 milliGRAM(s) Oral three times a day  insulin lispro (HumaLOG) corrective regimen sliding scale   SubCutaneous three times a day before meals  insulin lispro (HumaLOG) corrective regimen sliding scale   SubCutaneous at bedtime  metoprolol succinate ER 25 milliGRAM(s) Oral daily  mirtazapine 7.5 milliGRAM(s) Oral at bedtime  pantoprazole    Tablet 40 milliGRAM(s) Oral before breakfast    MEDICATIONS  (PRN):  dextrose 40% Gel 15 Gram(s) Oral once PRN Blood Glucose LESS THAN 70 milliGRAM(s)/deciliter  glucagon  Injectable 1 milliGRAM(s) IntraMuscular once PRN Glucose LESS THAN 70 milligrams/deciliter      CAPILLARY BLOOD GLUCOSE        I&O's Summary      PHYSICAL EXAM:  Vital Signs Last 24 Hrs  T(C): 36.7 (03 Oct 2020 06:52), Max: 36.9 (02 Oct 2020 23:21)  T(F): 98.1 (03 Oct 2020 06:52), Max: 98.4 (02 Oct 2020 23:21)  HR: 80 (03 Oct 2020 06:52) (78 - 87)  BP: 137/69 (03 Oct 2020 06:52) (116/68 - 139/78)  BP(mean): --  RR: 19 (03 Oct 2020 06:52) (15 - 19)  SpO2: 99% (03 Oct 2020 06:52) (99% - 100%)    GENERAL: No acute distress, well-developed  HEAD:  Atraumatic, Normocephalic  EYES: EOMI, PERRLA, conjunctiva and sclera clear  NECK: Supple  CHEST/LUNG: CTAB; No wheezes, rales, or rhonchi  HEART: Regular rate and rhythm; No murmurs, rubs, or gallops  ABDOMEN: Soft, non-tender, non-distended; normal bowel sounds, no organomegaly. No suprapubic tenderness or CVA tenderness.   EXTREMITIES:  2+ peripheral pulses b/l, No clubbing, cyanosis, or edema  NEUROLOGY: A&O x 3, no focal deficits  SKIN: No rashes or lesions    LABS:                        11.2   10.94 )-----------( 318      ( 03 Oct 2020 05:30 )             35.9     10-03    140  |  101  |  14  ----------------------------<  118<H>  4.1   |  24  |  0.61    Ca    10.3      03 Oct 2020 05:30  Phos  4.0     10-03  Mg     1.9     10-03    TPro  7.9  /  Alb  4.7  /  TBili  0.8  /  DBili  x   /  AST  22  /  ALT  18  /  AlkPhos  58  10-02          Urinalysis Basic - ( 02 Oct 2020 23:59 )    Color: COLORLESS / Appearance: CLEAR / S.006 / pH: 6.5  Gluc: NEGATIVE / Ketone: NEGATIVE  / Bili: NEGATIVE / Urobili: NORMAL   Blood: NEGATIVE / Protein: NEGATIVE / Nitrite: NEGATIVE   Leuk Esterase: TRACE / RBC: 0-2 / WBC 3-5   Sq Epi: OCC / Non Sq Epi: x / Bacteria: NEGATIVE          RADIOLOGY & ADDITIONAL TESTS:  Results Reviewed:   Imaging Personally Reviewed:  Electrocardiogram Personally Reviewed:    COORDINATION OF CARE:  Care Discussed with Consultants/Other Providers [Y/N]:  Prior or Outpatient Records Reviewed [Y/N]:   PROGRESS NOTE:   Authored by Narda Lagos MD  Pager: [facility] [phone]; [facility] [phone]    Patient is a 65y old  Female who presents with a chief complaint of burning sensation with urination (03 Oct 2020 01:19)      SUBJECTIVE / OVERNIGHT EVENTS:  This AM, states she is doing well. Denies any dysuria or hematuria. States she has some increased urinary frequency. Denies CP, SOB, subjective f/c, n/v.    MEDICATIONS  (STANDING):  aspirin  chewable 81 milliGRAM(s) Oral daily  atorvastatin 40 milliGRAM(s) Oral at bedtime  dextrose 5%. 1000 milliLiter(s) (50 mL/Hr) IV Continuous <Continuous>  dextrose 50% Injectable 12.5 Gram(s) IV Push once  dextrose 50% Injectable 25 Gram(s) IV Push once  dextrose 50% Injectable 25 Gram(s) IV Push once  digoxin     Tablet 0.125 milliGRAM(s) Oral daily  enoxaparin Injectable 80 milliGRAM(s) SubCutaneous daily  ertapenem  IVPB 1000 milliGRAM(s) IV Intermittent every 24 hours  gabapentin 100 milliGRAM(s) Oral three times a day  insulin lispro (HumaLOG) corrective regimen sliding scale   SubCutaneous three times a day before meals  insulin lispro (HumaLOG) corrective regimen sliding scale   SubCutaneous at bedtime  metoprolol succinate ER 25 milliGRAM(s) Oral daily  mirtazapine 7.5 milliGRAM(s) Oral at bedtime  pantoprazole    Tablet 40 milliGRAM(s) Oral before breakfast    MEDICATIONS  (PRN):  dextrose 40% Gel 15 Gram(s) Oral once PRN Blood Glucose LESS THAN 70 milliGRAM(s)/deciliter  glucagon  Injectable 1 milliGRAM(s) IntraMuscular once PRN Glucose LESS THAN 70 milligrams/deciliter      CAPILLARY BLOOD GLUCOSE        I&O's Summary      PHYSICAL EXAM:  Vital Signs Last 24 Hrs  T(C): 36.7 (03 Oct 2020 06:52), Max: 36.9 (02 Oct 2020 23:21)  T(F): 98.1 (03 Oct 2020 06:52), Max: 98.4 (02 Oct 2020 23:21)  HR: 80 (03 Oct 2020 06:52) (78 - 87)  BP: 137/69 (03 Oct 2020 06:52) (116/68 - 139/78)  BP(mean): --  RR: 19 (03 Oct 2020 06:52) (15 - 19)  SpO2: 99% (03 Oct 2020 06:52) (99% - 100%)    GENERAL: No acute distress, well-developed  HEAD:  Atraumatic, Normocephalic  EYES: EOMI, PERRLA, conjunctiva and sclera clear  NECK: Supple  CHEST/LUNG: CTAB; No wheezes, rales, or rhonchi  HEART: Regular rate and rhythm; No murmurs, rubs, or gallops  ABDOMEN: Soft, non-tender, non-distended; normal bowel sounds, no organomegaly. No suprapubic tenderness or CVA tenderness.   EXTREMITIES:  2+ peripheral pulses b/l, No clubbing, cyanosis, or edema  NEUROLOGY: A&O x 3, right sided weakness.   SKIN: No rashes or lesions    LABS:                        11.2   10.94 )-----------( 318      ( 03 Oct 2020 05:30 )             35.9     10-03    140  |  101  |  14  ----------------------------<  118<H>  4.1   |  24  |  0.61    Ca    10.3      03 Oct 2020 05:30  Phos  4.0     10-03  Mg     1.9     10-03    TPro  7.9  /  Alb  4.7  /  TBili  0.8  /  DBili  x   /  AST  22  /  ALT  18  /  AlkPhos  58  10-02          Urinalysis Basic - ( 02 Oct 2020 23:59 )    Color: COLORLESS / Appearance: CLEAR / S.006 / pH: 6.5  Gluc: NEGATIVE / Ketone: NEGATIVE  / Bili: NEGATIVE / Urobili: NORMAL   Blood: NEGATIVE / Protein: NEGATIVE / Nitrite: NEGATIVE   Leuk Esterase: TRACE / RBC: 0-2 / WBC 3-5   Sq Epi: OCC / Non Sq Epi: x / Bacteria: NEGATIVE           PROGRESS NOTE:   Authored by Narda Lagos MD  Pager: [facility] [phone]; [facility] [phone]    Patient is a 65y old  Female who presents with a chief complaint of burning sensation with urination (03 Oct 2020 01:19)      SUBJECTIVE / OVERNIGHT EVENTS:  This AM, states she is doing well. Denies any dysuria or hematuria. States she has some increased urinary frequency. Denies CP, SOB, subjective f/c, n/v.    MEDICATIONS  (STANDING):  aspirin  chewable 81 milliGRAM(s) Oral daily  atorvastatin 40 milliGRAM(s) Oral at bedtime  dextrose 5%. 1000 milliLiter(s) (50 mL/Hr) IV Continuous <Continuous>  dextrose 50% Injectable 12.5 Gram(s) IV Push once  dextrose 50% Injectable 25 Gram(s) IV Push once  dextrose 50% Injectable 25 Gram(s) IV Push once  digoxin     Tablet 0.125 milliGRAM(s) Oral daily  enoxaparin Injectable 80 milliGRAM(s) SubCutaneous daily  ertapenem  IVPB 1000 milliGRAM(s) IV Intermittent every 24 hours  gabapentin 100 milliGRAM(s) Oral three times a day  insulin lispro (HumaLOG) corrective regimen sliding scale   SubCutaneous three times a day before meals  insulin lispro (HumaLOG) corrective regimen sliding scale   SubCutaneous at bedtime  metoprolol succinate ER 25 milliGRAM(s) Oral daily  mirtazapine 7.5 milliGRAM(s) Oral at bedtime  pantoprazole    Tablet 40 milliGRAM(s) Oral before breakfast    MEDICATIONS  (PRN):  dextrose 40% Gel 15 Gram(s) Oral once PRN Blood Glucose LESS THAN 70 milliGRAM(s)/deciliter  glucagon  Injectable 1 milliGRAM(s) IntraMuscular once PRN Glucose LESS THAN 70 milligrams/deciliter      CAPILLARY BLOOD GLUCOSE        I&O's Summary      PHYSICAL EXAM:  Vital Signs Last 24 Hrs  T(C): 36.7 (03 Oct 2020 06:52), Max: 36.9 (02 Oct 2020 23:21)  T(F): 98.1 (03 Oct 2020 06:52), Max: 98.4 (02 Oct 2020 23:21)  HR: 80 (03 Oct 2020 06:52) (78 - 87)  BP: 137/69 (03 Oct 2020 06:52) (116/68 - 139/78)  BP(mean): --  RR: 19 (03 Oct 2020 06:52) (15 - 19)  SpO2: 99% (03 Oct 2020 06:52) (99% - 100%)    GENERAL: No acute distress, well-developed  HEAD:  Atraumatic, Normocephalic  EYES: EOMI, PERRLA, conjunctiva and sclera clear  NECK: Supple  CHEST/LUNG: CTAB; No wheezes, rales, or rhonchi  HEART: Regular rate and irregular rhythm; No murmurs, rubs, or gallops  ABDOMEN: Soft, non-tender, non-distended; normal bowel sounds, no organomegaly. No suprapubic tenderness or CVA tenderness.   EXTREMITIES:  2+ peripheral pulses b/l, No clubbing, cyanosis, or edema  NEUROLOGY: A&O x 3, right sided weakness.   SKIN: No rashes or lesions    LABS:                        11.2   10.94 )-----------( 318      ( 03 Oct 2020 05:30 )             35.9     10-03    140  |  101  |  14  ----------------------------<  118<H>  4.1   |  24  |  0.61    Ca    10.3      03 Oct 2020 05:30  Phos  4.0     10-03  Mg     1.9     10-03    TPro  7.9  /  Alb  4.7  /  TBili  0.8  /  DBili  x   /  AST  22  /  ALT  18  /  AlkPhos  58  10-02          Urinalysis Basic - ( 02 Oct 2020 23:59 )    Color: COLORLESS / Appearance: CLEAR / S.006 / pH: 6.5  Gluc: NEGATIVE / Ketone: NEGATIVE  / Bili: NEGATIVE / Urobili: NORMAL   Blood: NEGATIVE / Protein: NEGATIVE / Nitrite: NEGATIVE   Leuk Esterase: TRACE / RBC: 0-2 / WBC 3-5   Sq Epi: OCC / Non Sq Epi: x / Bacteria: NEGATIVE

## 2020-10-03 NOTE — ED ADULT NURSE REASSESSMENT NOTE - GENERAL PATIENT STATE
comfortable appearance/cooperative
Respirations even and unlabored/comfortable appearance
comfortable appearance/respirations even and unlabored

## 2020-10-03 NOTE — H&P ADULT - HISTORY OF PRESENT ILLNESS
64 yo F with pmh of DM, HTN, HLD, Afib on Coumadin, CVA with R sided residual deficits presented to ER for UTI with ESBL.   Patient started having burning sensation with urination and increased urinary frequency 15 days ago. She went to ER couple days ago and was diagnosed with UTI and discharged with antibiotics. The culture came back positive for ESBL so patient was asked to come back to hospital. She still having dysuria with increased frequency. Patient denied nausea, vomiting, fever, chills, diarrhea, cough, SOB, abdominal pain, chest pain, hematuria, melena, recent travel and sick contacts. 66 yo F with pmh of DM, HTN, HLD, Afib on Lovenox, CVA with R sided residual deficits presented to ER for UTI with ESBL.   Patient started having burning sensation with urination and increased urinary frequency 15 days ago. She went to ER couple days ago and was diagnosed with UTI and discharged with antibiotics. The culture came back positive for ESBL so patient was asked to come back to hospital. She still having dysuria with increased frequency. Patient denied nausea, vomiting, fever, chills, diarrhea, cough, SOB, abdominal pain, chest pain, hematuria, melena, recent travel and sick contacts. 64 yo F with pmh of DM, HTN, HLD, Afib on Lovenox, Left atrial thrombus, CVA with R sided residual deficits presented to ER for UTI with ESBL.   Patient started having burning sensation with urination and increased urinary frequency 15 days ago. She went to ER couple days ago and was diagnosed with UTI and discharged with antibiotics. The culture came back positive for ESBL so patient was asked to come back to hospital. She still having dysuria with increased frequency. Patient denied nausea, vomiting, fever, chills, diarrhea, cough, SOB, abdominal pain, chest pain, hematuria, melena, recent travel and sick contacts.

## 2020-10-03 NOTE — H&P ADULT - NSHPLABSRESULTS_GEN_ALL_CORE
(10-02 @ 21:00)                      10.8  12.07 )-----------( 315                 34.9    Neutrophils = 6.73 (55.8%)  Lymphocytes = 4.36 (36.1%)  Eosinophils = 0.12 (1.0%)  Basophils = 0.04 (0.3%)  Monocytes = 0.76 (6.3%)  Bands = --%    10-02    137  |  100  |  16  ----------------------------<  111<H>  4.4   |  23  |  0.64    Ca    10.5      02 Oct 2020 21:00    TPro  7.9  /  Alb  4.7  /  TBili  0.8  /  DBili  x   /  AST  22  /  ALT  18  /  AlkPhos  58  1002          RVP:          Tox:         Urinalysis Basic - ( 02 Oct 2020 23:59 )    Color: COLORLESS / Appearance: CLEAR / S.006 / pH: 6.5  Gluc: NEGATIVE / Ketone: NEGATIVE  / Bili: NEGATIVE / Urobili: NORMAL   Blood: NEGATIVE / Protein: NEGATIVE / Nitrite: NEGATIVE   Leuk Esterase: TRACE / RBC: 0-2 / WBC 3-5   Sq Epi: OCC / Non Sq Epi: x / Bacteria: NEGATIVE      CT head:    No CT evidence of acute intracranial hemorrhage, mass effect or acute territorial infarct.  Chronic infarcts in multiple vascularterritories as discussed above.      COVID neg

## 2020-10-03 NOTE — PROGRESS NOTE ADULT - PROBLEM SELECTOR PLAN 1
Urine culture grew ESBL. Patient with urinary frequency  -Ertepenem 1g daily Urine culture grew ESBL. Patient with urinary frequency  -Ertapenem 1g daily for 7 day course

## 2020-10-03 NOTE — H&P ADULT - NSHPREVIEWOFSYSTEMS_GEN_ALL_CORE
REVIEW OF SYSTEMS:    CONSTITUTIONAL: No weakness, fevers or chills  EYES/ENT: No visual changes;  No vertigo or throat pain   NECK: No pain or stiffness  RESPIRATORY: No cough, wheezing, hemoptysis; No shortness of breath  CARDIOVASCULAR: No chest pain or palpitations  GASTROINTESTINAL: No abdominal or epigastric pain. No nausea, vomiting, or hematemesis; No diarrhea or constipation. No melena or hematochezia.  GENITOURINARY: + dysuria, frequency. No hematuria  NEUROLOGICAL: No numbness or weakness  SKIN: No itching, burning, rashes, or lesions   PSYCH: No Depression, no anxiety  All other review of systems is negative unless indicated above.

## 2020-10-03 NOTE — PROGRESS NOTE ADULT - ASSESSMENT
64 yo F with pmh of DM, HTN, HLD, Afib on Lovenox, CVA with R sided residual deficits p/w ESBL ecoli UTI

## 2020-10-03 NOTE — H&P ADULT - ATTENDING COMMENTS
Pt seen and examined at bedside. [facility]  [phone]. Pt denies acute complaints including fevers, chills, abdominal pain, flank pain, n/v, dysuria or increased frequency. Urine cultures growing ESBL ecoli UTI. Treat with ertapenem. ID c/s in AM. Continue with lovenox for left atrial thrombus and afib

## 2020-10-03 NOTE — PROGRESS NOTE ADULT - ATTENDING COMMENTS
pt seen and examined at 145p- felt okay at that time  unclear why pt on Lovenox for anticoagulation- will have to review allscripts notes

## 2020-10-03 NOTE — H&P ADULT - NSHPPHYSICALEXAM_GEN_ALL_CORE
T(C): 36.7 (10-03-20 @ 01:13), Max: 36.9 (10-02-20 @ 23:21)  HR: 87 (10-03-20 @ 01:13) (78 - 87)  BP: 116/68 (10-03-20 @ 01:13) (116/68 - 139/78)  RR: 18 (10-03-20 @ 01:13) (15 - 18)  SpO2: 100% (10-03-20 @ 01:13) (100% - 100%)  Wt(kg): --  GENERAL: NAD, well-developed  HEAD:  Atraumatic, Normocephalic  EYES: EOMI, PERRLA, conjunctiva and sclera clear  NECK: Supple, No JVD  CHEST/LUNG: Clear to auscultation bilaterally; No wheeze  HEART: Regular rate and rhythm; No murmurs, rubs, or gallops  ABDOMEN: Soft, Nontender, Nondistended; Bowel sounds present  EXTREMITIES:  2+ Peripheral Pulses, No clubbing, cyanosis, or edema  PSYCH: AAOx3  NEUROLOGY: non-focal  SKIN: No rashes or lesions T(C): 36.7 (10-03-20 @ 01:13), Max: 36.9 (10-02-20 @ 23:21)  HR: 87 (10-03-20 @ 01:13) (78 - 87)  BP: 116/68 (10-03-20 @ 01:13) (116/68 - 139/78)  RR: 18 (10-03-20 @ 01:13) (15 - 18)  SpO2: 100% (10-03-20 @ 01:13) (100% - 100%)  Wt(kg): --  GENERAL: NAD, well-developed  HEAD:  Atraumatic, Normocephalic  EYES: EOMI, PERRLA, conjunctiva and sclera clear  NECK: Supple, No JVD  CHEST/LUNG: Clear to auscultation bilaterally; No wheeze  HEART: Regular rate and rhythm; No murmurs, rubs, or gallops  ABDOMEN: Soft, Nontender, Nondistended; Bowel sounds present  EXTREMITIES:  2+ Peripheral Pulses, No clubbing, cyanosis, or edema  PSYCH: AAOx3  NEUROLOGY: right sided weakness, at baseline otherwise non focal. Pill rolling tremor  SKIN: No rashes or lesions

## 2020-10-03 NOTE — H&P ADULT - ASSESSMENT
66 yo F with pmh of DM, HTN, HLD, Afib on Coumadin, CVA with R sided residual deficits is being monitored for ESBL. 64 yo F with pmh of DM, HTN, HLD, Afib on Lovenox, CVA with R sided residual deficits is being monitored for ESBL. 66 yo F with pmh of DM, HTN, HLD, Afib on Lovenox, CVA with R sided residual deficits p/w ESBL ecoli UTI

## 2020-10-03 NOTE — ED ADULT NURSE REASSESSMENT NOTE - COMFORT CARE
warm blanket provided/assisted to bedpan/patient unable to void at this time. stretcher in lowest locked position, side rails up as per protocol
patient unable to void at this time. stretcher in lowest position, wheels locked, side rails up as per protocol/assisted to bedpan
assisted to bedpan/Urine sample obtained and sent to lab

## 2020-10-03 NOTE — H&P ADULT - PROBLEM SELECTOR PLAN 1
Urine culture grew ESBL. Symptoms have not resolved.   -Meropenem 1g q8hr Urine culture grew ESBL. Symptoms have not resolved.   -Ertepenem 1g daily Urine culture grew ESBL. Symptoms have not resolved.   -Ertepenem 1g daily  -ID consult in AM

## 2020-10-03 NOTE — PROGRESS NOTE ADULT - PROBLEM SELECTOR PLAN 3
-continue [id number] qd  -rate control with digoxin and metoprolol (home medications)  - dig level wnl - continue Lovenox 80 qd for anticoagulation  -rate control with digoxin and metoprolol (home medications)  - dig level wnl

## 2020-10-03 NOTE — PROGRESS NOTE ADULT - PROBLEM SELECTOR PLAN 6
DVT ppx: lovenox  Diet: dysphagia 3 soft w/ think liquids last admission, will continue for now until can clarify diet in AM with family DVT ppx: lovenox  Diet: DASH/CC

## 2020-10-04 ENCOUNTER — TRANSCRIPTION ENCOUNTER (OUTPATIENT)
Age: 65
End: 2020-10-04

## 2020-10-04 LAB
ANION GAP SERPL CALC-SCNC: 11 MMO/L — SIGNIFICANT CHANGE UP (ref 7–14)
BUN SERPL-MCNC: 17 MG/DL — SIGNIFICANT CHANGE UP (ref 7–23)
CALCIUM SERPL-MCNC: 9.6 MG/DL — SIGNIFICANT CHANGE UP (ref 8.4–10.5)
CHLORIDE SERPL-SCNC: 104 MMOL/L — SIGNIFICANT CHANGE UP (ref 98–107)
CO2 SERPL-SCNC: 24 MMOL/L — SIGNIFICANT CHANGE UP (ref 22–31)
CREAT SERPL-MCNC: 0.66 MG/DL — SIGNIFICANT CHANGE UP (ref 0.5–1.3)
GLUCOSE BLDC GLUCOMTR-MCNC: 120 MG/DL — HIGH (ref 70–99)
GLUCOSE BLDC GLUCOMTR-MCNC: 130 MG/DL — HIGH (ref 70–99)
GLUCOSE BLDC GLUCOMTR-MCNC: 144 MG/DL — HIGH (ref 70–99)
GLUCOSE BLDC GLUCOMTR-MCNC: 153 MG/DL — HIGH (ref 70–99)
GLUCOSE SERPL-MCNC: 120 MG/DL — HIGH (ref 70–99)
HBA1C BLD-MCNC: 6.6 % — HIGH (ref 4–5.6)
HCT VFR BLD CALC: 33.4 % — LOW (ref 34.5–45)
HGB BLD-MCNC: 10.6 G/DL — LOW (ref 11.5–15.5)
MAGNESIUM SERPL-MCNC: 1.8 MG/DL — SIGNIFICANT CHANGE UP (ref 1.6–2.6)
MCHC RBC-ENTMCNC: 28.3 PG — SIGNIFICANT CHANGE UP (ref 27–34)
MCHC RBC-ENTMCNC: 31.7 % — LOW (ref 32–36)
MCV RBC AUTO: 89.3 FL — SIGNIFICANT CHANGE UP (ref 80–100)
NRBC # FLD: 0 K/UL — SIGNIFICANT CHANGE UP (ref 0–0)
PHOSPHATE SERPL-MCNC: 4 MG/DL — SIGNIFICANT CHANGE UP (ref 2.5–4.5)
PLATELET # BLD AUTO: 288 K/UL — SIGNIFICANT CHANGE UP (ref 150–400)
PMV BLD: 10.2 FL — SIGNIFICANT CHANGE UP (ref 7–13)
POTASSIUM SERPL-MCNC: 4 MMOL/L — SIGNIFICANT CHANGE UP (ref 3.5–5.3)
POTASSIUM SERPL-SCNC: 4 MMOL/L — SIGNIFICANT CHANGE UP (ref 3.5–5.3)
RBC # BLD: 3.74 M/UL — LOW (ref 3.8–5.2)
RBC # FLD: 14.6 % — HIGH (ref 10.3–14.5)
SARS-COV-2 IGG SERPL QL IA: NEGATIVE — SIGNIFICANT CHANGE UP
SARS-COV-2 IGM SERPL IA-ACNC: <0.1 INDEX — SIGNIFICANT CHANGE UP
SODIUM SERPL-SCNC: 139 MMOL/L — SIGNIFICANT CHANGE UP (ref 135–145)
WBC # BLD: 8.51 K/UL — SIGNIFICANT CHANGE UP (ref 3.8–10.5)
WBC # FLD AUTO: 8.51 K/UL — SIGNIFICANT CHANGE UP (ref 3.8–10.5)

## 2020-10-04 PROCEDURE — 99233 SBSQ HOSP IP/OBS HIGH 50: CPT

## 2020-10-04 RX ADMIN — Medication 0.12 MILLIGRAM(S): at 05:56

## 2020-10-04 RX ADMIN — GABAPENTIN 100 MILLIGRAM(S): 400 CAPSULE ORAL at 06:37

## 2020-10-04 RX ADMIN — Medication 1: at 12:52

## 2020-10-04 RX ADMIN — ENOXAPARIN SODIUM 80 MILLIGRAM(S): 100 INJECTION SUBCUTANEOUS at 11:43

## 2020-10-04 RX ADMIN — ATORVASTATIN CALCIUM 40 MILLIGRAM(S): 80 TABLET, FILM COATED ORAL at 21:21

## 2020-10-04 RX ADMIN — Medication 25 MILLIGRAM(S): at 05:57

## 2020-10-04 RX ADMIN — ERTAPENEM SODIUM 120 MILLIGRAM(S): 1 INJECTION, POWDER, LYOPHILIZED, FOR SOLUTION INTRAMUSCULAR; INTRAVENOUS at 17:28

## 2020-10-04 RX ADMIN — PANTOPRAZOLE SODIUM 40 MILLIGRAM(S): 20 TABLET, DELAYED RELEASE ORAL at 06:38

## 2020-10-04 RX ADMIN — MIRTAZAPINE 7.5 MILLIGRAM(S): 45 TABLET, ORALLY DISINTEGRATING ORAL at 21:23

## 2020-10-04 RX ADMIN — Medication 81 MILLIGRAM(S): at 11:42

## 2020-10-04 NOTE — DISCHARGE NOTE PROVIDER - NSDCMRMEDTOKEN_GEN_ALL_CORE_FT
aspirin 81 mg oral tablet, chewable: 1 tab(s) orally once a day  atorvastatin 40 mg oral tablet: 1 tab(s) orally once a day (at bedtime)  cefpodoxime 100 mg oral tablet: 1 tab(s) orally every 12 hours   cephalexin 500 mg oral tablet: 1 tab(s) orally 2 times a day   digoxin 125 mcg (0.125 mg) oral tablet: 1 tab(s) orally once a day  enoxaparin: 80 milligram(s) subcutaneous once a day  gabapentin 100 mg oral capsule: 1 cap(s) orally 3 times a day  metFORMIN 500 mg oral tablet: 1 tab(s) orally 2 times a day  metoprolol: 25 milligram(s) orally once a day  mirtazapine 7.5 mg oral tablet: 1 tab(s) orally once a day (at bedtime)  pantoprazole 40 mg oral delayed release tablet: 1 tab(s) orally once a day (before a meal)   aspirin 81 mg oral tablet, chewable: 1 tab(s) orally once a day  atorvastatin 40 mg oral tablet: 1 tab(s) orally once a day (at bedtime)  digoxin 125 mcg (0.125 mg) oral tablet: 1 tab(s) orally once a day  enoxaparin 80 mg/0.8 mL injectable solution: 80 milligram(s) subcutaneously once a day   gabapentin 100 mg oral capsule: 1 cap(s) orally 3 times a day  metFORMIN 500 mg oral tablet: 1 tab(s) orally 2 times a day  metoprolol: 25 milligram(s) orally once a day  mirtazapine 7.5 mg oral tablet: 1 tab(s) orally once a day (at bedtime)  pantoprazole 40 mg oral delayed release tablet: 1 tab(s) orally once a day (before a meal)

## 2020-10-04 NOTE — PROGRESS NOTE ADULT - ASSESSMENT
64 yo F with pmh of DM, HTN, HLD, Afib on Lovenox, CVA with R sided residual deficits p/w ESBL ecoli UTI 64 yo F with pmh of DM, HTN, HLD, Afib on Lovenox, CVA with R sided residual deficits p/w ESBL ecoli UTI- asked to come back after recent ER visit for iv abx but now cultures are negative

## 2020-10-04 NOTE — DISCHARGE NOTE PROVIDER - NSDCCPCAREPLAN_GEN_ALL_CORE_FT
PRINCIPAL DISCHARGE DIAGNOSIS  Diagnosis: UTI due to extended-spectrum beta lactamase (ESBL) producing Escherichia coli  Assessment and Plan of Treatment: You wer found to have a urinary tract infection. You were treated with a course of antibiotics. If you feel any pain when urinating or see any blood in your urine, please return to the emergency department immediately.

## 2020-10-04 NOTE — PROGRESS NOTE ADULT - PROBLEM SELECTOR PLAN 1
Urine culture 9/29  grew ESBL. Patient with urinary frequency.  -Repeat urine culture resulted 10/3 showed normal urogenital manav.  -Ertapenem 1g daily for 7 day course Urine culture 9/29  grew ESBL. Patient with urinary frequency.  -Repeat urine culture resulted 10/3 showed normal urogenital manav.  -Ertapenem 1g daily initially planned for 7 day course but  now will do 3 day course (curbsided ID)

## 2020-10-04 NOTE — DISCHARGE NOTE PROVIDER - HOSPITAL COURSE
66 yo F with pmh of DM, HTN, HLD, Afib on Lovenox, Left atrial thrombus, CVA with R sided residual deficits presented to ER for UTI with ESBL.   Patient started having burning sensation with urination and increased urinary frequency 15 days ago. She went to ER 9/29 and was diagnosed with UTI and discharged with antibiotics. The culture came back positive for ESBL and  patient was asked to come back to hospital. Patient was completed a 3-day course of ertapenem during her stay.     Note is incomplete and will be updated throughout the hospital course. 66 yo F with pmh of DM, HTN, HLD, Afib on Lovenox, Left atrial thrombus, CVA with R sided residual deficits presented to ER for UTI with ESBL.   Patient started having burning sensation with urination and increased urinary frequency 15 days ago. She went to ER 9/29 and was diagnosed with UTI and discharged with antibiotics. The culture came back positive for ESBL and patient was asked to come back to hospital. Urine Cx grows ESBL E. COli, and patient has completed a 3-day course of ertapenem during her stay.     Patient is now afebrile, and hemodynamically stable. 66 yo F with pmh of DM, HTN, HLD, Afib on Lovenox, Left atrial thrombus, CVA with R sided residual deficits presented to ER for UTI with ESBL.   Patient started having burning sensation with urination and increased urinary frequency 15 days ago. She went to ER 9/29 and was diagnosed with UTI and discharged with antibiotics. The culture came back positive for ESBL and patient was asked to come back to hospital. Urine Cx grew ESBL E. COli, and patient has completed a 3-day course of ertapenem during her stay.     Patient's atrial fibrillation managed with Lovenox 80mg qd AC as well as rate control with digoxin and metoprolol.    Patient is now afebrile, and hemodynamically stable. 64 yo F with pmh of DM, HTN, HLD, Afib on Lovenox, Left atrial thrombus, CVA with R sided residual deficits presented to ER for UTI with ESBL.   Patient started having burning sensation with urination and increased urinary frequency 15 days ago. She went to ER 9/29 and was diagnosed with UTI and discharged with antibiotics. The culture came back positive for ESBL and patient was asked to come back to hospital. Urine Cx grew ESBL E. COli, and patient has completed a 3-day course of ertapenem during her stay.     Patient's atrial fibrillation managed with Lovenox 80mg qd AC as well as rate control with digoxin and metoprolol.    Patient is now afebrile, and hemodynamically stable.    Attending Addendum:  This is a 65F with PMH of DM, HTN, HLD, AFib on lovenox, CVA, left atrial thrombus who was called back to hospital w/ ESBL UTI. She was admitted for tx w/ IV ertapenem. She has completed 3 day course. She is clinically well. LUTS improved. No new fever, BP stable. At this point, ok for DC home w/ further OP f/u. 36 minutes spent preparing discharge. PCP office notified of disposition. Pt will be contacted for post-hospital discharge follow up. Her Nov appt will be pushed to an earlier date as per office.

## 2020-10-04 NOTE — PROGRESS NOTE ADULT - SUBJECTIVE AND OBJECTIVE BOX
PROGRESS NOTE:   Authored by Narda Lagos MD  Pager: [facility] [phone]; [phone]    Patient is a 65y old  Female who presents with a chief complaint of burning sensation with urination (03 Oct 2020 08:44)      SUBJECTIVE / OVERNIGHT EVENTS:  No acute events overnight. This AM, patient states she is feeling well. Denies CP, SOB, subjective f/c, n/v.    MEDICATIONS  (STANDING):  aspirin  chewable 81 milliGRAM(s) Oral daily  atorvastatin 40 milliGRAM(s) Oral at bedtime  dextrose 5%. 1000 milliLiter(s) (50 mL/Hr) IV Continuous <Continuous>  dextrose 50% Injectable 12.5 Gram(s) IV Push once  dextrose 50% Injectable 25 Gram(s) IV Push once  dextrose 50% Injectable 25 Gram(s) IV Push once  digoxin     Tablet 0.125 milliGRAM(s) Oral daily  enoxaparin Injectable 80 milliGRAM(s) SubCutaneous daily  ertapenem  IVPB 1000 milliGRAM(s) IV Intermittent every 24 hours  gabapentin 100 milliGRAM(s) Oral <User Schedule>  influenza   Vaccine 0.5 milliLiter(s) IntraMuscular once  insulin lispro (HumaLOG) corrective regimen sliding scale   SubCutaneous three times a day before meals  insulin lispro (HumaLOG) corrective regimen sliding scale   SubCutaneous at bedtime  metoprolol succinate ER 25 milliGRAM(s) Oral daily  mirtazapine 7.5 milliGRAM(s) Oral at bedtime  pantoprazole    Tablet 40 milliGRAM(s) Oral before breakfast    MEDICATIONS  (PRN):  dextrose 40% Gel 15 Gram(s) Oral once PRN Blood Glucose LESS THAN 70 milliGRAM(s)/deciliter  glucagon  Injectable 1 milliGRAM(s) IntraMuscular once PRN Glucose LESS THAN 70 milligrams/deciliter      CAPILLARY BLOOD GLUCOSE      POCT Blood Glucose.: 228 mg/dL (03 Oct 2020 22:15)  POCT Blood Glucose.: 129 mg/dL (03 Oct 2020 17:30)  POCT Blood Glucose.: 124 mg/dL (03 Oct 2020 12:16)  POCT Blood Glucose.: 182 mg/dL (03 Oct 2020 08:51)    I&O's Summary      PHYSICAL EXAM:  Vital Signs Last 24 Hrs  T(C): 36.4 (03 Oct 2020 21:35), Max: 36.7 (03 Oct 2020 06:52)  T(F): 97.6 (03 Oct 2020 21:35), Max: 98.1 (03 Oct 2020 06:52)  HR: 65 (03 Oct 2020 21:35) (65 - 80)  BP: 112/66 (03 Oct 2020 21:35) (111/67 - 137/69)  BP(mean): --  RR: 16 (03 Oct 2020 21:35) (16 - 19)  SpO2: 100% (03 Oct 2020 21:35) (99% - 100%)    GENERAL: No acute distress, well-developed  HEAD:  Atraumatic, Normocephalic  EYES: EOMI, PERRLA, conjunctiva and sclera clear  NECK: Supple  CHEST/LUNG: CTAB; No wheezes, rales, or rhonchi  HEART: Regular rate and irregular rhythm; No murmurs, rubs, or gallops  ABDOMEN: Soft, non-tender, non-distended; normal bowel sounds, no organomegaly. No suprapubic tenderness or CVA tenderness.   EXTREMITIES:  2+ peripheral pulses b/l, No clubbing, cyanosis, or edema  NEUROLOGY: A&O x 3, right sided weakness.   SKIN: No rashes or lesions    LABS:                        11.2   10.94 )-----------( 318      ( 03 Oct 2020 05:30 )             35.9     10-03    140  |  101  |  14  ----------------------------<  118<H>  4.1   |  24  |  0.61    Ca    10.3      03 Oct 2020 05:30  Phos  4.0     10-03  Mg     1.9     10-03    TPro  7.9  /  Alb  4.7  /  TBili  0.8  /  DBili  x   /  AST  22  /  ALT  18  /  AlkPhos  58  10-02          Urinalysis Basic - ( 02 Oct 2020 23:59 )    Color: COLORLESS / Appearance: CLEAR / S.006 / pH: 6.5  Gluc: NEGATIVE / Ketone: NEGATIVE  / Bili: NEGATIVE / Urobili: NORMAL   Blood: NEGATIVE / Protein: NEGATIVE / Nitrite: NEGATIVE   Leuk Esterase: TRACE / RBC: 0-2 / WBC 3-5   Sq Epi: OCC / Non Sq Epi: x / Bacteria: NEGATIVE        Culture - Urine (collected 02 Oct 2020 23:59)  Source: .Urine Clean Catch (Midstream)  Final Report (03 Oct 2020 22:56):    <10,000 CFU/mL Normal Urogenital Floresita        RADIOLOGY & ADDITIONAL TESTS:  Results Reviewed:   Imaging Personally Reviewed:  Electrocardiogram Personally Reviewed:    COORDINATION OF CARE:  Care Discussed with Consultants/Other Providers [Y/N]:  Prior or Outpatient Records Reviewed [Y/N]:

## 2020-10-04 NOTE — PROGRESS NOTE ADULT - ATTENDING COMMENTS
edited the plan as above  need to clarify w/ family choice of lovenox for long term anticoagulation edited the plan as above  need to clarify w/ family choice of lovenox for long term anticoagulation  writer spoke to patient in [place]

## 2020-10-04 NOTE — DISCHARGE NOTE PROVIDER - CARE PROVIDER_API CALL
[facility] [place],   [address] [place] [address]  Phone: [phone]  Fax: (   )    -  Follow Up Time:    [facility] [place],   [address] [place] [address]  Phone: [phone]  Fax: (   )    -  Follow Up Time: 1 week   [facility] [place],   [address] [place] [address]  Phone: [phone]  Fax: (   )    -  Established Patient  Scheduled Appointment: 11/02/2020 03:00 PM   [facility] [place],   [address] [place] [address]  Phone: [phone]  Fax: (   )    -  Established Patient  Scheduled Appointment: 10/13/2020 02:30 PM

## 2020-10-04 NOTE — DISCHARGE NOTE PROVIDER - NSDCFUADDAPPT_GEN_ALL_CORE_FT
You are scheduled for a follow up appointment in [facility] [place] on Monday 11/2 at 3PM. The office number is [phone].

## 2020-10-04 NOTE — DISCHARGE NOTE PROVIDER - PROVIDER TOKENS
FREE:[LAST:[[facility] [place]],PHONE:[phone]],FAX:[(   )    -],ADDRESS:[[address] [place] [address]]] FREE:[LAST:[[facility] [place]],PHONE:[phone]],FAX:[(   )    -],ADDRESS:[[address] [place] [address]],FOLLOWUP:[1 week]] FREE:[LAST:[[facility] [place]],PHONE:[phone]],FAX:[(   )    -],ADDRESS:[[address] [place] [address]],SCHEDULEDAPPT:[11/02/2020],SCHEDULEDAPPTTIME:[03:00 PM],ESTABLISHEDPATIENT:[T]] FREE:[LAST:[[facility]],PHONE:[phone]],FAX:[(   )    -],ADDRESS:[[address] [place] [address]],SCHEDULEDAPPT:[10/13/2020],SCHEDULEDAPPTTIME:[02:30 PM],ESTABLISHEDPATIENT:[T]]

## 2020-10-04 NOTE — DISCHARGE NOTE PROVIDER - NSDCFUSCHEDAPPT_GEN_ALL_CORE_FT
RUSSELL FORTUNE ; 11/02/2020 ; [place] [id number] [facility] RUSSELL FORTUNE ; 10/13/2020 ; [place] [id number] [place] RUSSELL Bernal ; 11/02/2020 ; N[place] Intmed [place] [id number] [place]

## 2020-10-04 NOTE — PROGRESS NOTE ADULT - PROBLEM SELECTOR PLAN 3
- with hx of left atrial thrombus.   - continue Lovenox 80 qd for anticoagulation for now. Per [facility] chart review, patient start on lovenox 80 in 10/2019 after hospitalization for Afib and left atrial thrombus.  -rate control with digoxin and metoprolol (home medications)  - dig level wnl

## 2020-10-05 ENCOUNTER — TRANSCRIPTION ENCOUNTER (OUTPATIENT)
Age: 65
End: 2020-10-05

## 2020-10-05 VITALS
HEART RATE: 76 BPM | DIASTOLIC BLOOD PRESSURE: 71 MMHG | RESPIRATION RATE: 18 BRPM | SYSTOLIC BLOOD PRESSURE: 141 MMHG | TEMPERATURE: 97 F | OXYGEN SATURATION: 100 %

## 2020-10-05 LAB
GLUCOSE BLDC GLUCOMTR-MCNC: 123 MG/DL — HIGH (ref 70–99)
GLUCOSE BLDC GLUCOMTR-MCNC: 174 MG/DL — HIGH (ref 70–99)
GLUCOSE BLDC GLUCOMTR-MCNC: 197 MG/DL — HIGH (ref 70–99)

## 2020-10-05 PROCEDURE — 99239 HOSP IP/OBS DSCHRG MGMT >30: CPT | Mod: GC

## 2020-10-05 RX ORDER — ENOXAPARIN SODIUM 100 MG/ML
80 INJECTION SUBCUTANEOUS
Qty: 2400 | Refills: 0
Start: 2020-10-05 | End: 2020-11-03

## 2020-10-05 RX ADMIN — Medication 0.12 MILLIGRAM(S): at 05:16

## 2020-10-05 RX ADMIN — ERTAPENEM SODIUM 120 MILLIGRAM(S): 1 INJECTION, POWDER, LYOPHILIZED, FOR SOLUTION INTRAMUSCULAR; INTRAVENOUS at 17:28

## 2020-10-05 RX ADMIN — PANTOPRAZOLE SODIUM 40 MILLIGRAM(S): 20 TABLET, DELAYED RELEASE ORAL at 08:05

## 2020-10-05 RX ADMIN — Medication 1: at 17:28

## 2020-10-05 RX ADMIN — ENOXAPARIN SODIUM 80 MILLIGRAM(S): 100 INJECTION SUBCUTANEOUS at 12:11

## 2020-10-05 RX ADMIN — Medication 25 MILLIGRAM(S): at 05:16

## 2020-10-05 RX ADMIN — Medication 81 MILLIGRAM(S): at 12:11

## 2020-10-05 RX ADMIN — Medication 1: at 12:11

## 2020-10-05 NOTE — DISCHARGE NOTE NURSING/CASE MANAGEMENT/SOCIAL WORK - PATIENT PORTAL LINK FT
You can access the FollowMyHealth Patient Portal offered by [facility] by registering at the following website: [email or web address]. By joining [employer]’s FollowMyHealth portal, you will also be able to view your health information using other applications (apps) compatible with our system.

## 2020-10-05 NOTE — DISCHARGE NOTE NURSING/CASE MANAGEMENT/SOCIAL WORK - NSDCFUADDAPPT_GEN_ALL_CORE_FT
You are scheduled for a follow up appointment in [facility] on Monday 11/2 at 3PM. The office number is [phone].

## 2020-10-05 NOTE — PROGRESS NOTE ADULT - PROBLEM SELECTOR PROBLEM 1
ESBL (extended spectrum beta-lactamase) producing bacteria infection

## 2020-10-05 NOTE — PROGRESS NOTE ADULT - ASSESSMENT
64 yo F with pmh of DM, HTN, HLD, Afib on Lovenox, CVA with R sided residual deficits p/w ESBL ecoli UTI- asked to come back after recent ER visit for iv abx but now cultures are negative 64 yo F with pmh of DM, HTN, HLD, Afib on Lovenox, CVA with R sided residual deficits p/w ESBL ecoli UTI- asked to come back after recent ER visit for IV abx but now cultures are negative. Completing 3 day course of Ertapenem.

## 2020-10-05 NOTE — PROGRESS NOTE ADULT - ATTENDING COMMENTS
ID: [id number]    This is a 65F with PMH of HTN, DM, HLD, AF on lovenox, CVA who presented with ESBL E Coli UTI. She was called back by ED for tx. Clinically was well. Plan was for 3 total days of Ertapenem. Pt tolerated therapy well. Sx have improved. Denied any fevers,chills, SOB, CP, abd pain, urinary issues. Will plan for DC back home with services. Rest of plan as outlined above.

## 2020-10-05 NOTE — PROGRESS NOTE ADULT - PROBLEM SELECTOR PLAN 1
Urine culture 9/29  grew ESBL. Patient with urinary frequency.  -Repeat urine culture resulted 10/3 showed normal urogenital manav.  -Ertapenem 1g daily initially planned for 7 day course but  now will do 3 day course (curbsided ID) Urine culture 9/29  grew ESBL. Patient with urinary frequency.  -Repeat urine culture resulted 10/3 showed normal urogenital manav.  -Completing 3 day course of Ertapenem 1g daily (curbsided ID)

## 2020-10-05 NOTE — PROGRESS NOTE ADULT - SUBJECTIVE AND OBJECTIVE BOX
PROGRESS NOTE:   Written by Kulwant Simmons. [facility] page number: [id number]. [place] page number [phone]    Patient is a 65y old  Female who presents with a chief complaint of burning sensation with urination (04 Oct 2020 15:20)      SUBJECTIVE / OVERNIGHT EVENTS:    ADDITIONAL REVIEW OF SYSTEMS:    MEDICATIONS  (STANDING):  aspirin  chewable 81 milliGRAM(s) Oral daily  atorvastatin 40 milliGRAM(s) Oral at bedtime  dextrose 5%. 1000 milliLiter(s) (50 mL/Hr) IV Continuous <Continuous>  dextrose 50% Injectable 12.5 Gram(s) IV Push once  dextrose 50% Injectable 25 Gram(s) IV Push once  dextrose 50% Injectable 25 Gram(s) IV Push once  digoxin     Tablet 0.125 milliGRAM(s) Oral daily  enoxaparin Injectable 80 milliGRAM(s) SubCutaneous daily  ertapenem  IVPB 1000 milliGRAM(s) IV Intermittent every 24 hours  gabapentin 100 milliGRAM(s) Oral <User Schedule>  influenza   Vaccine 0.5 milliLiter(s) IntraMuscular once  insulin lispro (HumaLOG) corrective regimen sliding scale   SubCutaneous three times a day before meals  insulin lispro (HumaLOG) corrective regimen sliding scale   SubCutaneous at bedtime  metoprolol succinate ER 25 milliGRAM(s) Oral daily  mirtazapine 7.5 milliGRAM(s) Oral at bedtime  pantoprazole    Tablet 40 milliGRAM(s) Oral before breakfast    MEDICATIONS  (PRN):  dextrose 40% Gel 15 Gram(s) Oral once PRN Blood Glucose LESS THAN 70 milliGRAM(s)/deciliter  glucagon  Injectable 1 milliGRAM(s) IntraMuscular once PRN Glucose LESS THAN 70 milligrams/deciliter      CAPILLARY BLOOD GLUCOSE      POCT Blood Glucose.: 144 mg/dL (04 Oct 2020 21:58)  POCT Blood Glucose.: 130 mg/dL (04 Oct 2020 17:21)  POCT Blood Glucose.: 153 mg/dL (04 Oct 2020 12:43)  POCT Blood Glucose.: 120 mg/dL (04 Oct 2020 08:29)    I&O's Summary      PHYSICAL EXAM:  Vital Signs Last 24 Hrs  T(C): 36.3 (05 Oct 2020 05:03), Max: 36.6 (04 Oct 2020 20:52)  T(F): 97.4 (05 Oct 2020 05:03), Max: 97.8 (04 Oct 2020 20:52)  HR: 60 (05 Oct 2020 05:03) (57 - 60)  BP: 124/61 (05 Oct 2020 05:03) (103/60 - 124/61)  BP(mean): --  RR: 17 (05 Oct 2020 05:03) (17 - 17)  SpO2: 100% (05 Oct 2020 05:03) (100% - 100%)    GENERAL: No acute distress, well-developed  HEAD:  Atraumatic, Normocephalic  EYES: EOMI, PERRLA, conjunctiva and sclera clear  NECK: Supple, no lymphadenopathy, no JVD  CHEST/LUNG: CTAB; No wheezes, rales, or rhonchi  HEART: Regular rate and rhythm; No murmurs, rubs, or gallops  ABDOMEN: Soft, non-tender, non-distended; normal bowel sounds, no organomegaly  EXTREMITIES:  2+ peripheral pulses b/l, No clubbing, cyanosis, or edema  NEUROLOGY: A&O x 3, no focal deficits  SKIN: No rashes or lesions    LABS:                        10.6   8.51  )-----------( 288      ( 04 Oct 2020 06:55 )             33.4     10-04    139  |  104  |  17  ----------------------------<  120<H>  4.0   |  24  |  0.66    Ca    9.6      04 Oct 2020 06:55  Phos  4.0     10-04  Mg     1.8     10-04                Culture - Urine (collected 02 Oct 2020 23:59)  Source: .Urine Clean Catch (Midstream)  Final Report (03 Oct 2020 22:56):    <10,000 CFU/mL Normal Urogenital Floresita        RADIOLOGY & ADDITIONAL TESTS:  Results Reviewed:   Imaging Personally Reviewed:  Electrocardiogram Personally Reviewed:    COORDINATION OF CARE:  Care Discussed with Consultants/Other Providers [Y/N]:  Prior or Outpatient Records Reviewed [Y/N]:   PROGRESS NOTE:   Written by Kulwant Simmons. [facility] page number: [id number]. [place] page number [phone]    Patient is a 65y old  Female who presents with a chief complaint of burning sensation with urination (04 Oct 2020 15:20)      SUBJECTIVE / OVERNIGHT EVENTS: No acute events overnight. Patient reports she feels well.      MEDICATIONS  (STANDING):  aspirin  chewable 81 milliGRAM(s) Oral daily  atorvastatin 40 milliGRAM(s) Oral at bedtime  dextrose 5%. 1000 milliLiter(s) (50 mL/Hr) IV Continuous <Continuous>  dextrose 50% Injectable 12.5 Gram(s) IV Push once  dextrose 50% Injectable 25 Gram(s) IV Push once  dextrose 50% Injectable 25 Gram(s) IV Push once  digoxin     Tablet 0.125 milliGRAM(s) Oral daily  enoxaparin Injectable 80 milliGRAM(s) SubCutaneous daily  ertapenem  IVPB 1000 milliGRAM(s) IV Intermittent every 24 hours  gabapentin 100 milliGRAM(s) Oral <User Schedule>  influenza   Vaccine 0.5 milliLiter(s) IntraMuscular once  insulin lispro (HumaLOG) corrective regimen sliding scale   SubCutaneous three times a day before meals  insulin lispro (HumaLOG) corrective regimen sliding scale   SubCutaneous at bedtime  metoprolol succinate ER 25 milliGRAM(s) Oral daily  mirtazapine 7.5 milliGRAM(s) Oral at bedtime  pantoprazole    Tablet 40 milliGRAM(s) Oral before breakfast    MEDICATIONS  (PRN):  dextrose 40% Gel 15 Gram(s) Oral once PRN Blood Glucose LESS THAN 70 milliGRAM(s)/deciliter  glucagon  Injectable 1 milliGRAM(s) IntraMuscular once PRN Glucose LESS THAN 70 milligrams/deciliter      CAPILLARY BLOOD GLUCOSE      POCT Blood Glucose.: 144 mg/dL (04 Oct 2020 21:58)  POCT Blood Glucose.: 130 mg/dL (04 Oct 2020 17:21)  POCT Blood Glucose.: 153 mg/dL (04 Oct 2020 12:43)  POCT Blood Glucose.: 120 mg/dL (04 Oct 2020 08:29)    I&O's Summary      PHYSICAL EXAM:  Vital Signs Last 24 Hrs  T(C): 36.3 (05 Oct 2020 05:03), Max: 36.6 (04 Oct 2020 20:52)  T(F): 97.4 (05 Oct 2020 05:03), Max: 97.8 (04 Oct 2020 20:52)  HR: 60 (05 Oct 2020 05:03) (57 - 60)  BP: 124/61 (05 Oct 2020 05:03) (103/60 - 124/61)  BP(mean): --  RR: 17 (05 Oct 2020 05:03) (17 - 17)  SpO2: 100% (05 Oct 2020 05:03) (100% - 100%)    GENERAL: No acute distress  HEAD:  Atraumatic, Normocephalic  EYES: EOMI, PERRLA  NECK: Supple  CHEST/LUNG: CTAB; No wheezes, rales, or rhonchi  HEART: Regular rate and irregular rhythm; No murmurs  ABDOMEN: Soft, non-tender, non-distended; normal bowel sounds. No suprapubic tenderness or CVA tenderness b/l.   EXTREMITIES:  2+ peripheral pulses b/l, No edema  NEUROLOGY: A&O x 3, right sided weakness (baseline since CVA)   SKIN: No rashes or lesions  LABS:                        10.6   8.51  )-----------( 288      ( 04 Oct 2020 06:55 )             33.4     10-04    139  |  104  |  17  ----------------------------<  120<H>  4.0   |  24  |  0.66    Ca    9.6      04 Oct 2020 06:55  Phos  4.0     10-04  Mg     1.8     10-04                Culture - Urine (collected 02 Oct 2020 23:59)  Source: .Urine Clean Catch (Midstream)  Final Report (03 Oct 2020 22:56):    <10,000 CFU/mL Normal Urogenital Floresita        RADIOLOGY & ADDITIONAL TESTS:  Results Reviewed:   Imaging Personally Reviewed:  Electrocardiogram Personally Reviewed:    COORDINATION OF CARE:  Care Discussed with Consultants/Other Providers [Y/N]:  Prior or Outpatient Records Reviewed [Y/N]:

## 2020-10-06 NOTE — DISCUSSION/SUMMARY
[FreeTextEntry1] : Pt was called and spoke with son. Pt feeling better as per son. No more antibiotics as per son. Pt has [facility]se on 10/13/20 for medical.. Note sent to residents.

## 2020-10-13 ENCOUNTER — LABORATORY RESULT (OUTPATIENT)
Age: 65
End: 2020-10-13

## 2020-10-13 ENCOUNTER — APPOINTMENT (OUTPATIENT)
Dept: INTERNAL MEDICINE | Facility: CLINIC | Age: 65
End: 2020-10-13
Payer: MEDICAID

## 2020-10-13 ENCOUNTER — OUTPATIENT (OUTPATIENT)
Dept: OUTPATIENT SERVICES | Facility: HOSPITAL | Age: 65
LOS: 1 days | End: 2020-10-13

## 2020-10-13 VITALS
HEIGHT: 63 IN | SYSTOLIC BLOOD PRESSURE: 125 MMHG | BODY MASS INDEX: 18.78 KG/M2 | WEIGHT: 106 LBS | DIASTOLIC BLOOD PRESSURE: 76 MMHG | HEART RATE: 60 BPM | OXYGEN SATURATION: 97 % | RESPIRATION RATE: 15 BRPM

## 2020-10-13 VITALS — TEMPERATURE: 96.6 F

## 2020-10-13 DIAGNOSIS — Z98.89 OTHER SPECIFIED POSTPROCEDURAL STATES: Chronic | ICD-10-CM

## 2020-10-13 DIAGNOSIS — Z98.49 CATARACT EXTRACTION STATUS, UNSPECIFIED EYE: Chronic | ICD-10-CM

## 2020-10-13 DIAGNOSIS — K43.9 VENTRAL HERNIA WITHOUT OBSTRUCTION OR GANGRENE: Chronic | ICD-10-CM

## 2020-10-13 LAB
ALBUMIN SERPL ELPH-MCNC: 4.6 G/DL — SIGNIFICANT CHANGE UP (ref 3.3–5)
ALP SERPL-CCNC: 65 U/L — SIGNIFICANT CHANGE UP (ref 40–120)
ALT FLD-CCNC: 16 U/L — SIGNIFICANT CHANGE UP (ref 4–33)
ANION GAP SERPL CALC-SCNC: 13 MMO/L — SIGNIFICANT CHANGE UP (ref 7–14)
AST SERPL-CCNC: 14 U/L — SIGNIFICANT CHANGE UP (ref 4–32)
BASOPHILS # BLD AUTO: 0.03 K/UL — SIGNIFICANT CHANGE UP (ref 0–0.2)
BASOPHILS NFR BLD AUTO: 0.3 % — SIGNIFICANT CHANGE UP (ref 0–2)
BILIRUB SERPL-MCNC: 0.7 MG/DL — SIGNIFICANT CHANGE UP (ref 0.2–1.2)
BUN SERPL-MCNC: 13 MG/DL — SIGNIFICANT CHANGE UP (ref 7–23)
CALCIUM SERPL-MCNC: 9.8 MG/DL — SIGNIFICANT CHANGE UP (ref 8.4–10.5)
CHLORIDE SERPL-SCNC: 104 MMOL/L — SIGNIFICANT CHANGE UP (ref 98–107)
CHOLEST SERPL-MCNC: 117 MG/DL — LOW (ref 120–199)
CO2 SERPL-SCNC: 24 MMOL/L — SIGNIFICANT CHANGE UP (ref 22–31)
CREAT SERPL-MCNC: 0.62 MG/DL — SIGNIFICANT CHANGE UP (ref 0.5–1.3)
EOSINOPHIL # BLD AUTO: 0.09 K/UL — SIGNIFICANT CHANGE UP (ref 0–0.5)
EOSINOPHIL NFR BLD AUTO: 0.8 % — SIGNIFICANT CHANGE UP (ref 0–6)
GLUCOSE SERPL-MCNC: 146 MG/DL — HIGH (ref 70–99)
HCT VFR BLD CALC: 35.2 % — SIGNIFICANT CHANGE UP (ref 34.5–45)
HDLC SERPL-MCNC: 49 MG/DL — SIGNIFICANT CHANGE UP (ref 45–65)
HGB BLD-MCNC: 10.6 G/DL — LOW (ref 11.5–15.5)
IMM GRANULOCYTES NFR BLD AUTO: 0.3 % — SIGNIFICANT CHANGE UP (ref 0–1.5)
LIPID PNL WITH DIRECT LDL SERPL: 62 MG/DL — SIGNIFICANT CHANGE UP
LYMPHOCYTES # BLD AUTO: 29.2 % — SIGNIFICANT CHANGE UP (ref 13–44)
LYMPHOCYTES # BLD AUTO: 3.35 K/UL — HIGH (ref 1–3.3)
MCHC RBC-ENTMCNC: 28.2 PG — SIGNIFICANT CHANGE UP (ref 27–34)
MCHC RBC-ENTMCNC: 30.1 % — LOW (ref 32–36)
MCV RBC AUTO: 93.6 FL — SIGNIFICANT CHANGE UP (ref 80–100)
MONOCYTES # BLD AUTO: 0.58 K/UL — SIGNIFICANT CHANGE UP (ref 0–0.9)
MONOCYTES NFR BLD AUTO: 5.1 % — SIGNIFICANT CHANGE UP (ref 2–14)
NEUTROPHILS # BLD AUTO: 7.38 K/UL — SIGNIFICANT CHANGE UP (ref 1.8–7.4)
NEUTROPHILS NFR BLD AUTO: 64.3 % — SIGNIFICANT CHANGE UP (ref 43–77)
NRBC # FLD: 0 K/UL — SIGNIFICANT CHANGE UP (ref 0–0)
PLATELET # BLD AUTO: 312 K/UL — SIGNIFICANT CHANGE UP (ref 150–400)
PMV BLD: 12.3 FL — SIGNIFICANT CHANGE UP (ref 7–13)
POTASSIUM SERPL-MCNC: 4.3 MMOL/L — SIGNIFICANT CHANGE UP (ref 3.5–5.3)
POTASSIUM SERPL-SCNC: 4.3 MMOL/L — SIGNIFICANT CHANGE UP (ref 3.5–5.3)
PROT SERPL-MCNC: 7.9 G/DL — SIGNIFICANT CHANGE UP (ref 6–8.3)
RBC # BLD: 3.76 M/UL — LOW (ref 3.8–5.2)
RBC # FLD: 14.5 % — SIGNIFICANT CHANGE UP (ref 10.3–14.5)
SODIUM SERPL-SCNC: 141 MMOL/L — SIGNIFICANT CHANGE UP (ref 135–145)
TRIGL SERPL-MCNC: 95 MG/DL — SIGNIFICANT CHANGE UP (ref 10–149)
WBC # BLD: 11.46 K/UL — HIGH (ref 3.8–10.5)
WBC # FLD AUTO: 11.46 K/UL — HIGH (ref 3.8–10.5)

## 2020-10-13 PROCEDURE — 99214 OFFICE O/P EST MOD 30 MIN: CPT | Mod: GC

## 2020-10-14 LAB
CREAT UR-MCNC: 45 MG/DL — SIGNIFICANT CHANGE UP
GLUCOSE BLDC GLUCOMTR-MCNC: 194
HCV AB S/CO SERPL IA: 0.09 S/CO — SIGNIFICANT CHANGE UP (ref 0–0.99)
HCV AB SERPL-IMP: SIGNIFICANT CHANGE UP
HIV COMBO RESULT: SIGNIFICANT CHANGE UP
HIV1+2 AB SPEC QL: SIGNIFICANT CHANGE UP
MICROALBUMIN UR-MCNC: 3.6 MG/DL — SIGNIFICANT CHANGE UP
MICROALBUMIN/CREAT UR-RTO: 81 MG/G — HIGH (ref 0–30)

## 2020-10-15 DIAGNOSIS — I05.0 RHEUMATIC MITRAL STENOSIS: ICD-10-CM

## 2020-10-15 DIAGNOSIS — I63.319 CEREBRAL INFARCTION DUE TO THROMBOSIS OF UNSPECIFIED MIDDLE CEREBRAL ARTERY: ICD-10-CM

## 2020-10-15 DIAGNOSIS — Z79.01 LONG TERM (CURRENT) USE OF ANTICOAGULANTS: ICD-10-CM

## 2020-10-15 DIAGNOSIS — I51.3 INTRACARDIAC THROMBOSIS, NOT ELSEWHERE CLASSIFIED: ICD-10-CM

## 2020-10-15 DIAGNOSIS — I48.91 UNSPECIFIED ATRIAL FIBRILLATION: ICD-10-CM

## 2020-10-15 DIAGNOSIS — E11.9 TYPE 2 DIABETES MELLITUS WITHOUT COMPLICATIONS: ICD-10-CM

## 2020-10-15 NOTE — HEALTH RISK ASSESSMENT
[No] : No [One fall no injury in past year] : Patient reported one fall in the past year without injury [Assistive Device] : Patient uses an assistive device [0] : 2) Feeling down, depressed, or hopeless: Not at all (0) [Cultural] : cultural [Health Literacy] : health literacy [With Family] : lives with family [Unemployed] : unemployed [] : No [de-identified] : walker/cane [MammogramDate] : 03/19 [ColonoscopyDate] : 2015

## 2020-10-15 NOTE — HISTORY OF PRESENT ILLNESS
[Post-hospitalization from ___ Hospital] : Post-hospitalization from [unfilled] Hospital [Discharge Summary] : discharge summary [FreeTextEntry3] : r [FreeTextEntry2] : 66 yo F with pmh of DM, HTN, HLD, Afib on Lovenox, Left atrial thrombus, CVA with R sided residual deficits presented to ER for UTI with ESBL. \par Patient started having burning sensation with urination and increased urinary frequency 15 days ago. She went to ER 9/29 and was diagnosed with UTI and discharged with antibiotics. The culture came back positive for ESBL and patient was asked to come back to hospital. Urine Cx grew ESBL E. COli, and patient has completed a 3-day course of ertapenem during her stay. \par \par \par HPI: Pt is doing well since d/c, denies any new fevers, chills, chest pain, abdominal pain, palpitaitons, lightheadedness, dizziness, dysuria, hematuria. Son says MS is back to baseline. Patient is concerned that her f/u appointments recommended last year after her CVA hospitalization were lost 2/2 COVID. \par Patient's atrial fibrillation managed with Lovenox 80mg qd AC as well as rate control with digoxin and metoprolol.\par \par Patient is now afebrile, and hemodynamically stable.\par

## 2020-10-15 NOTE — ASSESSMENT
[FreeTextEntry1] : #Recent Hospitalization\par   -Patient feels much improved after hospitalization for ESBL UTI. She has been home for one week and denies any new or continued urinary symptoms, fevers or chills and son reports that her MS is back to baseline.\par \par #HCM\par   -Flu shot was given in hospital\par   -Other immunizations UTD\par   -Colonoscopy not due until 2025\par   -Mammogram not due for another year\par   -PAP per GYN, however patient\par   -Patient has multiple specialist f/u upcoming and would benefit from JUSTINA. Will contact for care management.

## 2020-10-19 ENCOUNTER — NON-APPOINTMENT (OUTPATIENT)
Age: 65
End: 2020-10-19

## 2020-10-20 ENCOUNTER — NON-APPOINTMENT (OUTPATIENT)
Age: 65
End: 2020-10-20

## 2020-10-22 ENCOUNTER — APPOINTMENT (OUTPATIENT)
Dept: CARDIOLOGY | Facility: HOSPITAL | Age: 65
End: 2020-10-22

## 2020-10-27 ENCOUNTER — APPOINTMENT (OUTPATIENT)
Dept: CARDIOLOGY | Facility: HOSPITAL | Age: 65
End: 2020-10-27

## 2020-11-02 ENCOUNTER — APPOINTMENT (OUTPATIENT)
Dept: INTERNAL MEDICINE | Facility: CLINIC | Age: 65
End: 2020-11-02

## 2020-11-03 ENCOUNTER — NON-APPOINTMENT (OUTPATIENT)
Age: 65
End: 2020-11-03

## 2020-11-04 ENCOUNTER — APPOINTMENT (OUTPATIENT)
Dept: CARDIOTHORACIC SURGERY | Facility: CLINIC | Age: 65
End: 2020-11-04
Payer: MEDICAID

## 2020-11-04 ENCOUNTER — NON-APPOINTMENT (OUTPATIENT)
Age: 65
End: 2020-11-04

## 2020-11-04 VITALS
OXYGEN SATURATION: 98 % | RESPIRATION RATE: 14 BRPM | HEART RATE: 61 BPM | BODY MASS INDEX: 19.49 KG/M2 | HEIGHT: 63 IN | WEIGHT: 110 LBS | SYSTOLIC BLOOD PRESSURE: 126 MMHG | DIASTOLIC BLOOD PRESSURE: 68 MMHG

## 2020-11-04 PROCEDURE — 99214 OFFICE O/P EST MOD 30 MIN: CPT

## 2020-11-05 NOTE — CONSULT LETTER
[Dear  ___] : Dear ~SAIDA, [Courtesy Letter:] : I had the pleasure of seeing your patient, [unfilled], in my office today. [Please see my note below.] : Please see my note below. [Consult Closing:] : Thank you very much for allowing me to participate in the care of this patient.  If you have any questions, please do not hesitate to contact me. [Sincerely,] : Sincerely, [FreeTextEntry2] : Claudia Goldman MD \par [facility]\par Cardiology Department\par [address] [place] [address]\[place] [address] [FreeTextEntry3] : Kevin Anguiano MD\par  &  \par  \par Cardiovascular & Thoracic Surgery\par [facility] \par [address] \par [facility] [address]\par \par

## 2020-11-05 NOTE — ASSESSMENT
[FreeTextEntry1] : Manny is a 65 year old female with chronic A-fib, rheumatic MS, KRISTAN thrombus (Lovenox), Right (2013 and 2016)and recent left MCA CVA (right side hemiplegia, speech improved, right leg weak) Patient had progression of her valvular heart disease and has been getting symptomatic. She was referred for surgical consultation. \par \par I have reviewed the patient's medical records, diagnostic images during the time of this office visit and have made the following recommendation. Review of the imaging shows severe MS with mean transmitral valve gradient equals 10 mmHg, EF 65-70%. At this time her valvular heart disease does not require surgical intervention. I would recommend a repeat transthoracic echocardiogram to re evaluate the degree of mitral valve stenosis which I suspect was over estimated on echocardiogram in January 2019. Patient today denies shortness of breath, ANDERSON, angina or syncope. Her son is with her and reports that she is functionally at her optimal level. \par \par Plan\par 1. Repeat echocardiogram \par 2. Follow up with cardiologist regarding Lovenox daily switching to PO anticoagulation \par 3. Follow up with cardiologist and PCP.\par \par \par

## 2020-11-05 NOTE — REVIEW OF SYSTEMS
[Feeling Tired] : feeling tired [Joint Swelling] : joint swelling [Joint Stiffness] : joint stiffness [Limb Weakness] : limb weakness [Difficulty Walking] : difficulty walking [Negative] : Heme/Lymph [Chest Pain] : no chest pain [Palpitations] : no palpitations [SOB on Exertion] : no shortness of breath during exertion [Dizziness] : no dizziness [Fainting] : no fainting [de-identified] : Right sided paralysis

## 2020-11-05 NOTE — HISTORY OF PRESENT ILLNESS
[FreeTextEntry1] : Manny is a 65 year old female with chronic A-fib, rheumatic MS, KRISTAN thrombus (Lovenox), Right (2013 and 2016)and recent left MCA CVA (right side hemiplegia, speech improved, right leg weak) Patient had progression of her valvular heart disease and has been getting symptomatic. She returns today for follow up visit. Her last visit was in December 2019 and at that time she was still recovering from her recent MCA stroke. A formal Heme consultation was recommended for further management of anticoagulation. \par

## 2020-11-10 ENCOUNTER — NON-APPOINTMENT (OUTPATIENT)
Age: 65
End: 2020-11-10

## 2020-11-10 ENCOUNTER — APPOINTMENT (OUTPATIENT)
Dept: CARDIOLOGY | Facility: HOSPITAL | Age: 65
End: 2020-11-10

## 2020-11-10 VITALS
OXYGEN SATURATION: 99 % | HEIGHT: 63 IN | RESPIRATION RATE: 14 BRPM | DIASTOLIC BLOOD PRESSURE: 75 MMHG | HEART RATE: 58 BPM | SYSTOLIC BLOOD PRESSURE: 125 MMHG

## 2020-11-10 NOTE — ASSESSMENT
[FreeTextEntry1] : 65 year old female with permanent AFib, Rheumatic MS, KRISTAN Thrombus (Lovenox), Right (2013, 2016) and recent Left MCA (right side hemiplegia, speech improved, right leg weak) with progression of her valvular disease. \par \par 1. Asymptomatic Severe Rheumatic MS: Limited functional status. However asymptomatic at this time. Last TTE 1/2019 with mean transmitral gradient 10mmHg, EF 65%. Repeat TTE to assess MS severity/progression.\par \par 2. Permanent Atrial Fibrillation with KRISTAN Thrombus: Rate Controlled. Continue Lovenox. Patient had difficulty with Warfarin dosing and achieve therapeutic INR. \par \par RTC in 3 months

## 2020-11-10 NOTE — HISTORY OF PRESENT ILLNESS
[FreeTextEntry1] : 65 year old female with permanent AFib, Rheumatic MS, KRISTAN Thrombus (Lovenox), Right (2013, 2016) and recent Left MCA (right side hemiplegia, speech improved, right leg weak) with progression of her valvular disease. Echo 1/2019 shows severe MS with mean transmitral gradient 10mmHg, EF 65%. At this time, patient functional status is limited due to recent CVA. Denies SOB, ANDERSON, angina, and syncope. Evaluated by CTS Dr. Anguiano on 11/4 for MVR and advised to repeat Echo and continue monitor.

## 2020-11-10 NOTE — PHYSICAL EXAM
[General Appearance - Well Developed] : well developed [Normal Conjunctiva] : the conjunctiva exhibited no abnormalities [Normal Oral Mucosa] : normal oral mucosa [Normal Jugular Venous V Waves Present] : normal jugular venous V waves present [Nail Clubbing] : no clubbing of the fingernails [Oriented To Time, Place, And Person] : oriented to person, place, and time [Normal Appearance] : normal appearance [Auscultation Breath Sounds / Voice Sounds] : lungs were clear to auscultation bilaterally [Edema] : no peripheral edema present [Abdomen Soft] : soft [Skin Color & Pigmentation] : normal skin color and pigmentation [FreeTextEntry1] : irregularly irregular

## 2020-11-10 NOTE — REVIEW OF SYSTEMS
[Shortness Of Breath] : shortness of breath [see HPI] : see HPI [Anxiety] : anxiety [Fever] : no fever [Blurry Vision] : no blurred vision [Earache] : no earache [Chest Pain] : no chest pain [Cough] : no cough [Abdominal Pain] : no abdominal pain [Dysuria] : no dysuria [Joint Pain] : no joint pain [Skin: A Rash] : no rash: [Confusion] : no confusion was observed [Easy Bleeding] : no tendency for easy bleeding

## 2020-11-19 ENCOUNTER — NON-APPOINTMENT (OUTPATIENT)
Age: 65
End: 2020-11-19

## 2020-11-24 ENCOUNTER — NON-APPOINTMENT (OUTPATIENT)
Age: 65
End: 2020-11-24

## 2020-12-01 ENCOUNTER — NON-APPOINTMENT (OUTPATIENT)
Age: 65
End: 2020-12-01

## 2020-12-01 NOTE — ED ADULT NURSE NOTE - RELATIONSHIP TO PATIENT
Problem: Physical Therapy Goal  Goal: Physical Therapy Goal  Description: Goals to be met by: 2020    Patient will increase functional independence with mobility by performin. Supine to sit with supervision.   2. Sit to supine with supervision.   3. Sit<>stand transfer with supervision using rolling walker.   4. Gait > 150 feet with SBA using rolling walker.   5. Verbalize UNIQUE precautions without verbal cues.      2020 1303 by KELLI Montero  Outcome: Ongoing, Progressing  2020 0904 by KELLI Montero  Outcome: Ongoing, Progressing    Pt tolerated treatment well with no adverse reactions. Pt performed transfers and ambulation x 100 ft with rolling walker and CGA-Min A. Demonstrated improved endurance and ambulation. Pt continues to be limited by pain. Pt performed HEP with verbal and tactile cues for exercises/technique. Educated patient on UNIQUE precautions, safe use of rolling walker, home exercise program, importance of mobility, and PT plan of care. Pt verbalized understanding. Pt will benefit from skilled PT services to address impairments and functional limitations. Recommend discharge to SNF.        son

## 2020-12-11 ENCOUNTER — APPOINTMENT (OUTPATIENT)
Dept: INTERNAL MEDICINE | Facility: CLINIC | Age: 65
End: 2020-12-11
Payer: MEDICAID

## 2020-12-11 ENCOUNTER — OUTPATIENT (OUTPATIENT)
Dept: OUTPATIENT SERVICES | Facility: HOSPITAL | Age: 65
LOS: 1 days | End: 2020-12-11

## 2020-12-11 VITALS
HEIGHT: 64 IN | DIASTOLIC BLOOD PRESSURE: 70 MMHG | OXYGEN SATURATION: 97 % | SYSTOLIC BLOOD PRESSURE: 100 MMHG | HEART RATE: 66 BPM

## 2020-12-11 VITALS — TEMPERATURE: 98 F

## 2020-12-11 DIAGNOSIS — Z98.89 OTHER SPECIFIED POSTPROCEDURAL STATES: Chronic | ICD-10-CM

## 2020-12-11 DIAGNOSIS — I05.0 RHEUMATIC MITRAL STENOSIS: ICD-10-CM

## 2020-12-11 DIAGNOSIS — K43.9 VENTRAL HERNIA WITHOUT OBSTRUCTION OR GANGRENE: Chronic | ICD-10-CM

## 2020-12-11 DIAGNOSIS — Z98.49 CATARACT EXTRACTION STATUS, UNSPECIFIED EYE: Chronic | ICD-10-CM

## 2020-12-11 PROCEDURE — 99213 OFFICE O/P EST LOW 20 MIN: CPT | Mod: GE

## 2020-12-20 NOTE — PLAN
[FreeTextEntry1] : 64yo F with PMHx of afib on lovenox, rheumatic MS, KRISTAN thrombus and recent left MCA stroke presenting for follow up.\par \par 1) Left MCA CVA\par - Currently medically optimized with aspirin and statin\par - Continues to have severe right sided deficits. Pt and family refusing PT services at this time. Emphasized the need to continue exercises of the right side at least with family as it is likely that COVID is not stopping anytime soon.\par \par 2) Chronic atrial fibrillation\par - No surgical intervention for rheumatic MS as per CT surgery at this time.\par - Will continue with Lovenox 80ug daily for full dose anticoagulation.\par - Will f/u with hematology regarding changing medications. Will keep lovenox at this time.\par \par 3) DM\par - Last A1C of 6.6\par - Currently on metformin 1g BID\par - Will place for an ophthalmology referral.\par \par 4) HCM\par - Explained benefits of zoster vaccine and family will discuss it among each other and possibly obtain it from the pharmacy\par - Received flu vaccine in the hospital\par - Otherwise UTD with vaccines\par - Colonoscopy due in 2025\par - Mammogram due next year\par \par Héctor Cruz\par EM/IM\par PGY-3\par \par Case discussed with Dr. Kaplan\par \par RTC in 6 months

## 2020-12-20 NOTE — PHYSICAL EXAM
[Normal Rate] : normal rate  [Regular Rhythm] : with a regular rhythm [Normal] : soft, non-tender, non-distended, no masses palpated, no HSM and normal bowel sounds [de-identified] : 3/6 systolic murmur heard throughout the left sternal border [de-identified] : Right facial droop is present. Strength is 3/5 in the RUE and 2/5 in the RLE. 5/5 in the LLE and LUE.

## 2020-12-20 NOTE — HISTORY OF PRESENT ILLNESS
[FreeTextEntry1] : Follow up [de-identified] : 66yo F with chronic afib (on lovenox), rheumatic MS (no surgical intervention at this time), KRISTAN thrombus and recent left MCA stroke this year with resultant right sided weakness presenting for follow up. Patient is accompanied by her son who is translated and refusing translation services at this time.\par \par 1) Left MCA stroke:\par - Pt with current right sided weakness including face, right arm and right leg. Pt is now in a wheelchair and is able to move around with a walker at home, but not for long distances. Family and pt currently refusing PT as they are concerned for COVID and do not want her to be leaving the home at this time. She is otherwise taking her aspirin and statin as prescribed.\par \par 2) UTI\par - Pt was recently hospitalized for AMS and urosepsis from a UTI. She was treated and improved. Currently she denies any burning with urination, frequency, or other changes. Her son says that her urine does smell off, but otherwise has no complaints.\par \par 3) Rheumatic MS and KRISTAN thrombus\par - Was on coumadin in the past, but developed the stroke and so was switched to lovenox instead. They have an appointment with hematology in 2 weeks; so although they would prefer to be on warfarin; we will continue with lovenox at this time.\par \par 4) DM\par - Last A1C of 6.6 in October. They have been using the metformin 1g BID without any difficulties. Deny any episodes of low blood sugar or other changes at home.\par

## 2020-12-21 ENCOUNTER — OUTPATIENT (OUTPATIENT)
Dept: OUTPATIENT SERVICES | Facility: HOSPITAL | Age: 65
LOS: 1 days | Discharge: HOME | End: 2020-12-21

## 2020-12-21 DIAGNOSIS — Z98.49 CATARACT EXTRACTION STATUS, UNSPECIFIED EYE: Chronic | ICD-10-CM

## 2020-12-21 DIAGNOSIS — I05.0 RHEUMATIC MITRAL STENOSIS: ICD-10-CM

## 2020-12-21 DIAGNOSIS — I51.3 INTRACARDIAC THROMBOSIS, NOT ELSEWHERE CLASSIFIED: ICD-10-CM

## 2020-12-21 DIAGNOSIS — K43.9 VENTRAL HERNIA WITHOUT OBSTRUCTION OR GANGRENE: Chronic | ICD-10-CM

## 2020-12-21 DIAGNOSIS — I63.319 CEREBRAL INFARCTION DUE TO THROMBOSIS OF UNSPECIFIED MIDDLE CEREBRAL ARTERY: ICD-10-CM

## 2020-12-21 DIAGNOSIS — D50.9 IRON DEFICIENCY ANEMIA, UNSPECIFIED: ICD-10-CM

## 2020-12-21 DIAGNOSIS — Z98.89 OTHER SPECIFIED POSTPROCEDURAL STATES: Chronic | ICD-10-CM

## 2020-12-21 DIAGNOSIS — E11.9 TYPE 2 DIABETES MELLITUS WITHOUT COMPLICATIONS: ICD-10-CM

## 2020-12-21 DIAGNOSIS — Z00.00 ENCOUNTER FOR GENERAL ADULT MEDICAL EXAMINATION WITHOUT ABNORMAL FINDINGS: ICD-10-CM

## 2020-12-21 DIAGNOSIS — Z79.01 LONG TERM (CURRENT) USE OF ANTICOAGULANTS: ICD-10-CM

## 2020-12-21 DIAGNOSIS — I48.91 UNSPECIFIED ATRIAL FIBRILLATION: ICD-10-CM

## 2021-01-13 ENCOUNTER — RX RENEWAL (OUTPATIENT)
Age: 66
End: 2021-01-13

## 2021-01-27 ENCOUNTER — OUTPATIENT (OUTPATIENT)
Dept: OUTPATIENT SERVICES | Facility: HOSPITAL | Age: 66
LOS: 1 days | Discharge: HOME | End: 2021-01-27

## 2021-01-27 DIAGNOSIS — Z98.49 CATARACT EXTRACTION STATUS, UNSPECIFIED EYE: Chronic | ICD-10-CM

## 2021-01-27 DIAGNOSIS — K43.9 VENTRAL HERNIA WITHOUT OBSTRUCTION OR GANGRENE: Chronic | ICD-10-CM

## 2021-01-27 DIAGNOSIS — Z98.89 OTHER SPECIFIED POSTPROCEDURAL STATES: Chronic | ICD-10-CM

## 2021-01-27 DIAGNOSIS — D50.9 IRON DEFICIENCY ANEMIA, UNSPECIFIED: ICD-10-CM

## 2021-01-29 ENCOUNTER — APPOINTMENT (OUTPATIENT)
Dept: HEMATOLOGY ONCOLOGY | Facility: CLINIC | Age: 66
End: 2021-01-29

## 2021-02-16 ENCOUNTER — APPOINTMENT (OUTPATIENT)
Dept: CARDIOLOGY | Facility: HOSPITAL | Age: 66
End: 2021-02-16

## 2021-03-08 NOTE — CONSULT NOTE ADULT - NSPROBSELRECBLANK_5_GEN
--------------  DISCHARGE REVIEW    Payor: Lottie Steiner BY [employer]  Subscriber #:  [id number]  Authorization Number: [id number]    Admit date: 3/3/21  Admit time:   8:02 PM  Discharge Date: 3/6/2021  6:16 PM     Admitting Physician: Jovani Peck MD  At dose of Zometa.   The patient was eventually for discharge home with plans to follow-up close with his primary care doctor, oncology and his pulmonologist.    Lace+ Score: 65  59-90 High Risk  29-58 Medium Risk  0-28   Low Risk         TCM Follow-Up Recomme by mouth nightly. Quantity: 90 tablet  Refills: 3     QUEtiapine Fumarate 100 MG Tabs  Commonly known as: SEROQUEL      Take 1 tablet (100 mg total) by mouth nightly.    Quantity: 90 tablet  Refills: 3     Senna-Docusate Sodium 8.6-50 MG Tabs  Commonly kn visit      Alana Chowdary MD  [address] [place] [address] Road [address] [phone]      SEE Dr Topher Carlson [address] PATIENT FOR TIME OF NEXT APPT    Appointments for Next 30 Days 3/7/2021 - 4/6/2021 Comment      Date A need to bring your doctor's order unless your physician's office submitted the order electronically or faxed the order. Without the order, your test may be delayed or postponed.     Children: Children under the age of 15 must have another adult caregiver wi If you are taking long-acting insulin, take only half the normal dose at your normal time.   -Please avoid carbohydrates the entire day before the scan.  -Do not take oral diabetes medication 4 hours prior to your test.        Location Instructions:      [facility] MUST CALL TO SCHEDULE.**  Your appointment is on the [address] in the [facility]. The address is [address] [place] [address], [place]. Please register at the [address] on the second floor.   **Important Info for [address] DISPLAY PLAN FREE TEXT

## 2021-03-16 ENCOUNTER — NON-APPOINTMENT (OUTPATIENT)
Age: 66
End: 2021-03-16

## 2021-04-07 ENCOUNTER — OUTPATIENT (OUTPATIENT)
Dept: OUTPATIENT SERVICES | Facility: HOSPITAL | Age: 66
LOS: 1 days | Discharge: HOME | End: 2021-04-07

## 2021-04-07 DIAGNOSIS — Z98.49 CATARACT EXTRACTION STATUS, UNSPECIFIED EYE: Chronic | ICD-10-CM

## 2021-04-07 DIAGNOSIS — D50.9 IRON DEFICIENCY ANEMIA, UNSPECIFIED: ICD-10-CM

## 2021-04-07 DIAGNOSIS — K43.9 VENTRAL HERNIA WITHOUT OBSTRUCTION OR GANGRENE: Chronic | ICD-10-CM

## 2021-04-07 DIAGNOSIS — Z98.89 OTHER SPECIFIED POSTPROCEDURAL STATES: Chronic | ICD-10-CM

## 2021-04-12 ENCOUNTER — RESULT REVIEW (OUTPATIENT)
Age: 66
End: 2021-04-12

## 2021-04-12 ENCOUNTER — APPOINTMENT (OUTPATIENT)
Dept: HEMATOLOGY ONCOLOGY | Facility: CLINIC | Age: 66
End: 2021-04-12
Payer: MEDICAID

## 2021-04-12 VITALS
TEMPERATURE: 97.2 F | DIASTOLIC BLOOD PRESSURE: 70 MMHG | RESPIRATION RATE: 14 BRPM | SYSTOLIC BLOOD PRESSURE: 120 MMHG | OXYGEN SATURATION: 100 % | HEART RATE: 91 BPM

## 2021-04-12 LAB
BASOPHILS # BLD AUTO: 0.06 K/UL — SIGNIFICANT CHANGE UP (ref 0–0.2)
BASOPHILS NFR BLD AUTO: 0.6 % — SIGNIFICANT CHANGE UP (ref 0–2)
EOSINOPHIL # BLD AUTO: 0.57 K/UL — HIGH (ref 0–0.5)
EOSINOPHIL NFR BLD AUTO: 5.5 % — SIGNIFICANT CHANGE UP (ref 0–6)
HCT VFR BLD CALC: 34.2 % — LOW (ref 34.5–45)
HGB BLD-MCNC: 11 G/DL — LOW (ref 11.5–15.5)
IMM GRANULOCYTES NFR BLD AUTO: 0.4 % — SIGNIFICANT CHANGE UP (ref 0–1.5)
LYMPHOCYTES # BLD AUTO: 3.4 K/UL — HIGH (ref 1–3.3)
LYMPHOCYTES # BLD AUTO: 32.9 % — SIGNIFICANT CHANGE UP (ref 13–44)
MCHC RBC-ENTMCNC: 30 PG — SIGNIFICANT CHANGE UP (ref 27–34)
MCHC RBC-ENTMCNC: 32.2 G/DL — SIGNIFICANT CHANGE UP (ref 32–36)
MCV RBC AUTO: 93.2 FL — SIGNIFICANT CHANGE UP (ref 80–100)
MONOCYTES # BLD AUTO: 0.66 K/UL — SIGNIFICANT CHANGE UP (ref 0–0.9)
MONOCYTES NFR BLD AUTO: 6.4 % — SIGNIFICANT CHANGE UP (ref 2–14)
NEUTROPHILS # BLD AUTO: 5.62 K/UL — SIGNIFICANT CHANGE UP (ref 1.8–7.4)
NEUTROPHILS NFR BLD AUTO: 54.2 % — SIGNIFICANT CHANGE UP (ref 43–77)
NRBC # BLD: 0 /100 WBCS — SIGNIFICANT CHANGE UP (ref 0–0)
PLATELET # BLD AUTO: 261 K/UL — SIGNIFICANT CHANGE UP (ref 150–400)
RBC # BLD: 3.67 M/UL — LOW (ref 3.8–5.2)
RBC # FLD: 13.7 % — SIGNIFICANT CHANGE UP (ref 10.3–14.5)
RETICS #: 40.4 K/UL — SIGNIFICANT CHANGE UP (ref 25–125)
RETICS/RBC NFR: 1.1 % — SIGNIFICANT CHANGE UP (ref 0.5–2.5)
WBC # BLD: 10.35 K/UL — SIGNIFICANT CHANGE UP (ref 3.8–10.5)
WBC # FLD AUTO: 10.35 K/UL — SIGNIFICANT CHANGE UP (ref 3.8–10.5)

## 2021-04-12 PROCEDURE — 99204 OFFICE O/P NEW MOD 45 MIN: CPT

## 2021-04-19 LAB
ALBUMIN MFR SERPL ELPH: 52.7 %
ALBUMIN SERPL ELPH-MCNC: 4.4 G/DL
ALBUMIN SERPL-MCNC: 3.8 G/DL
ALBUMIN/GLOB SERPL: 1.1 RATIO
ALP BLD-CCNC: 48 U/L
ALPHA1 GLOB MFR SERPL ELPH: 4.3 %
ALPHA1 GLOB SERPL ELPH-MCNC: 0.3 G/DL
ALPHA2 GLOB MFR SERPL ELPH: 14.8 %
ALPHA2 GLOB SERPL ELPH-MCNC: 1.1 G/DL
ALT SERPL-CCNC: 15 U/L
ANION GAP SERPL CALC-SCNC: 15 MMOL/L
AST SERPL-CCNC: 18 U/L
B-GLOBULIN MFR SERPL ELPH: 13.5 %
B-GLOBULIN SERPL ELPH-MCNC: 1 G/DL
B2 MICROGLOB SERPL-MCNC: 2.7 MG/L
BILIRUB SERPL-MCNC: 0.4 MG/DL
BUN SERPL-MCNC: 16 MG/DL
CALCIUM SERPL-MCNC: 10.2 MG/DL
CHLORIDE SERPL-SCNC: 104 MMOL/L
CO2 SERPL-SCNC: 24 MMOL/L
CREAT SERPL-MCNC: 0.8 MG/DL
DEPRECATED KAPPA LC FREE/LAMBDA SER: 0.99 RATIO
DEPRECATED KAPPA LC FREE/LAMBDA SER: 0.99 RATIO
FERRITIN SERPL-MCNC: 51 NG/ML
FOLATE SERPL-MCNC: 15 NG/ML
GAMMA GLOB FLD ELPH-MCNC: 1.1 G/DL
GAMMA GLOB MFR SERPL ELPH: 14.7 %
GLUCOSE SERPL-MCNC: 143 MG/DL
HAPTOGLOB SERPL-MCNC: 232 MG/DL
IGA SER QL IEP: 268 MG/DL
IGG SER QL IEP: 1097 MG/DL
IGM SER QL IEP: 70 MG/DL
INTERPRETATION SERPL IEP-IMP: NORMAL
IRON SATN MFR SERPL: 21 %
IRON SERPL-MCNC: 71 UG/DL
KAPPA LC CSF-MCNC: 2.79 MG/DL
KAPPA LC CSF-MCNC: 2.79 MG/DL
KAPPA LC SERPL-MCNC: 2.76 MG/DL
KAPPA LC SERPL-MCNC: 2.76 MG/DL
LDH SERPL-CCNC: 176 U/L
M PROTEIN SPEC IFE-MCNC: NORMAL
POTASSIUM SERPL-SCNC: 5.3 MMOL/L
PROT SERPL-MCNC: 7.3 G/DL
SODIUM SERPL-SCNC: 143 MMOL/L
TIBC SERPL-MCNC: 341 UG/DL
UIBC SERPL-MCNC: 270 UG/DL
VIT B12 SERPL-MCNC: 641 PG/ML

## 2021-04-19 NOTE — REVIEW OF SYSTEMS
[Negative] : Heme/Lymph [Skin Rash] : no skin rash [de-identified] : right arm and leg weakness.

## 2021-04-19 NOTE — ASSESSMENT
[FreeTextEntry1] : This is a 65 year old woman with a history of right MCA stroke, left sided hemiparesis, patient presents for the evaluation of a severe anemia.  Patient was unable to come for a while   Most recent hemoglobin from November 2020 10.0g/dl.  Patient remains weak, requiring assistance to get out of bed.  On evaluation today, Hg seems to be improving, now 11.0g/dl above the previous  values of 10.6g/dl from this past October 13th.  Plasma cell dyscrasia screening normal, no abnormality on SPEP, JONEL, free light chains normal ratio.  \par \par Addendum 4/19/21 \par Ferritin in normal rage at 51,  Clinically seems to be anemia of chronic disease and inflammation.  Hg improved to 11g/dl as of 4/12/1.  Free light chain normal at 0.99, SPEP and immunofixation normal.  This mildly suppressed Hg is sufficient for the upcoming ablation . Patient was on lovenox 60mg BID for the purposes of anticoagulation.  We have just prescribe Eliquis 5mg PO BID with typical 10mg BID loading dose for  week . They have already started this.  This will need to be discontinued 48 hours prior to the cardiac ablation procedure to optimize hemostasis for the procedure.  Can restart Eliquis the night of the procedure.  Following this recommendation patient this hematologically cleared and optimized for upcoming cardiac ablation procedure.

## 2021-04-19 NOTE — PHYSICAL EXAM
[Normal] : normoactive bowel sounds, soft and nontender, no hepatosplenomegaly or masses appreciated [de-identified] : Right upepr and lower extremity weakness.

## 2021-04-19 NOTE — HISTORY OF PRESENT ILLNESS
[de-identified] : This is a 65 year old woman with a history of CVA. Left sided MCA infarct with residual right sided weakness.    She presents for the evaluation of a mild anemia.  Anemia, last Hg in our records was 10.6g/dl in October 12th but patient had problems with insurances and had not seen much in the way of healthcare providers since then. Missed an appointment with us in January 19th 2021.  \par \par They more recently found a new neurology clinic which they are following up with.  \par \par Patient has dr. Claudia Goldman as a cardiologist, has not been by that office.  \par \par On some days she walks with a cane, on other days needs maximal assistance to get out of bed.  Behavior has been ok. Has some days where she is angry, but does cry often.  \par October 3rd patient was admitted for 3 days for ESBL organism infection was treated with 3 days  of Ertapenem, repeat Urine culture at the hospital was found to be normal.    Bloodwork was drawn a week following that hospital stay.  10/13/21.

## 2021-04-30 ENCOUNTER — NON-APPOINTMENT (OUTPATIENT)
Age: 66
End: 2021-04-30

## 2021-05-19 ENCOUNTER — RESULT REVIEW (OUTPATIENT)
Age: 66
End: 2021-05-19

## 2021-05-19 ENCOUNTER — APPOINTMENT (OUTPATIENT)
Dept: INTERNAL MEDICINE | Facility: CLINIC | Age: 66
End: 2021-05-19
Payer: MEDICAID

## 2021-05-19 ENCOUNTER — OUTPATIENT (OUTPATIENT)
Dept: OUTPATIENT SERVICES | Facility: HOSPITAL | Age: 66
LOS: 1 days | End: 2021-05-19

## 2021-05-19 VITALS
SYSTOLIC BLOOD PRESSURE: 98 MMHG | OXYGEN SATURATION: 97 % | DIASTOLIC BLOOD PRESSURE: 60 MMHG | HEIGHT: 64 IN | BODY MASS INDEX: 18.78 KG/M2 | HEART RATE: 45 BPM | WEIGHT: 110 LBS

## 2021-05-19 VITALS — TEMPERATURE: 98.2 F

## 2021-05-19 DIAGNOSIS — K43.9 VENTRAL HERNIA WITHOUT OBSTRUCTION OR GANGRENE: Chronic | ICD-10-CM

## 2021-05-19 DIAGNOSIS — Z98.89 OTHER SPECIFIED POSTPROCEDURAL STATES: Chronic | ICD-10-CM

## 2021-05-19 DIAGNOSIS — Z00.00 ENCOUNTER FOR GENERAL ADULT MEDICAL EXAMINATION W/OUT ABNORMAL FINDINGS: ICD-10-CM

## 2021-05-19 DIAGNOSIS — Z98.49 CATARACT EXTRACTION STATUS, UNSPECIFIED EYE: Chronic | ICD-10-CM

## 2021-05-19 LAB
A1C WITH ESTIMATED AVERAGE GLUCOSE RESULT: 6.7 % — HIGH (ref 4–5.6)
ESTIMATED AVERAGE GLUCOSE: 146 MG/DL — HIGH (ref 68–114)

## 2021-05-19 PROCEDURE — 99214 OFFICE O/P EST MOD 30 MIN: CPT | Mod: GC

## 2021-05-19 RX ORDER — ENOXAPARIN SODIUM 100 MG/ML
80 INJECTION SUBCUTANEOUS DAILY
Qty: 72 | Refills: 4 | Status: COMPLETED | COMMUNITY
Start: 2019-11-08 | End: 2021-04-12

## 2021-05-20 LAB
CREAT ?TM UR-MCNC: 46 MG/DL — SIGNIFICANT CHANGE UP
MICROALBUMIN UR-MCNC: 2.4 MG/DL — SIGNIFICANT CHANGE UP
MICROALBUMIN/CREAT UR-RTO: 52 MG/G — HIGH (ref 0–30)

## 2021-05-24 DIAGNOSIS — E11.9 TYPE 2 DIABETES MELLITUS WITHOUT COMPLICATIONS: ICD-10-CM

## 2021-05-24 DIAGNOSIS — I63.319 CEREBRAL INFARCTION DUE TO THROMBOSIS OF UNSPECIFIED MIDDLE CEREBRAL ARTERY: ICD-10-CM

## 2021-05-24 DIAGNOSIS — I48.91 UNSPECIFIED ATRIAL FIBRILLATION: ICD-10-CM

## 2021-05-26 NOTE — HEALTH RISK ASSESSMENT
[1 or 2 (0 pts)] : 1 or 2 (0 points) [Never (0 pts)] : Never (0 points) [No] : In the past 12 months have you used drugs other than those required for medical reasons? No [No falls in past year] : Patient reported no falls in the past year [Assistive Device] : Patient uses an assistive device [1] : 2) Feeling down, depressed, or hopeless for several days (1) [] : No [de-identified] : heme [Audit-CScore] : 0 [de-identified] : wheelchair or cane [[id number]] : 2

## 2021-05-26 NOTE — REVIEW OF SYSTEMS
[Fatigue] : fatigue [Muscle Weakness] : muscle weakness [Depression] : depression [Negative] : Heme/Lymph [Fever] : no fever [Chills] : no chills [Recent Change In Weight] : ~T no recent weight change [Chest Pain] : no chest pain [Headache] : no headache [Dizziness] : no dizziness [Confusion] : no confusion [Memory Loss] : no memory loss [FreeTextEntry5] : mild LE edema, relieved by foot elevation [FreeTextEntry7] : decreased appetite

## 2021-05-26 NOTE — PHYSICAL EXAM
[No Acute Distress] : no acute distress [Well Nourished] : well nourished [Well Developed] : well developed [Well-Appearing] : well-appearing [No Lymphadenopathy] : no lymphadenopathy [Supple] : supple [Normal] : no respiratory distress, lungs were clear to auscultation bilaterally and no accessory muscle use [Normal S1, S2] : normal S1 and S2 [No Carotid Bruits] : no carotid bruits [No Varicosities] : no varicosities [Pedal Pulses Present] : the pedal pulses are present [No Extremity Clubbing/Cyanosis] : no extremity clubbing/cyanosis [Soft] : abdomen soft [Non Tender] : non-tender [Non-distended] : non-distended [No Masses] : no abdominal mass palpated [Normal Bowel Sounds] : normal bowel sounds [Normal Posterior Cervical Nodes] : no posterior cervical lymphadenopathy [Normal Anterior Cervical Nodes] : no anterior cervical lymphadenopathy [No Spinal Tenderness] : no spinal tenderness [No Joint Swelling] : no joint swelling [No Rash] : no rash [Speech Grossly Normal] : speech grossly normal [Normal Insight/Judgement] : insight and judgment were intact [de-identified] : in wheelchair [de-identified] : irregular rhythm, bradycardia [de-identified] : mild edema b/l to mid-shin [de-identified] : 0/5 strength in R leg, 4/5 strength in R arm and handgrip strength, R facial droop

## 2021-05-26 NOTE — END OF VISIT
[] : Resident [FreeTextEntry3] : 66yo Nadine-speaking female, with chronic Afib (on Eliquis, switched from Apr 2021 per heme), rheumatic MS (no surgical intervention at this time), KRISTAN thrombus, R MCA strokes (2013, 2016) and recent L MCA stroke in 2020 with resultant right sided weakness, anemia of chronic disease, and DM2, presenting for follow up of chronic conditions. No new neurologic or cardiac symptoms. Will follow up with cardiology in the next few months for repeat imaging. Evaluation with PT ongoing. Mammogram ordered for HCM. No other acute issues. Follow up in 3 months.

## 2021-05-26 NOTE — ASSESSMENT
[FreeTextEntry1] : 64yo Nadine-speaking female, with chronic Afib (on Eliquis, switched from Apr 2021 per heme), rheumatic MS (no surgical intervention at this time), KRISTAN thrombus, R MCA strokes (2013, 2016) and recent L MCA stroke in 2020 with resultant right sided weakness, anemia of chronic disease, and DM2, presenting for follow up of chronic conditions. \par \par #HCM\par - mammogram ordered today\par - received Moderna COVID vaccine (3/7/21, 4/4/21)\par - received 2020 flu vaccine in hospital\par - colonoscopy due 2025\par \par Chronic Afib/Rheumatic MS/KRISTAN Thrombus\par - follow up with cardiology needed for yearly echo, no acute changes or symptoms at this time\par - continue eliquis for AC \par \par Stroke with residual weakness\par - PT evaluation ongoing \par - no new symptoms \par \par RTC 3 months\par \par Case discussed with Dr. Perez\par Elise Noriega, PGY-1

## 2021-05-26 NOTE — HISTORY OF PRESENT ILLNESS
[Family Member] : family member [FreeTextEntry1] : routine f/u [de-identified] : 66yo Nadine-speaking female, with chronic Afib (on Eliquis, switched from lovenox last month), rheumatic MS (no surgical intervention at this time), KRISTAN thrombus, R MCA strokes (2013, 2016) and recent L MCA stroke in 2020 with resultant right sided weakness, anemia of chronic disease, and DM2, presenting for follow up of chronic conditions. Patient is accompanied by her son who translated, patient refused phone translation services. Son provided most of history below. \par \par Left MCA stroke - Continues to have R-sided weakness of right arm and right leg. Pt mostly uses wheelchair, can walk about 10-12 meters slowly with cane at home, drags her feet per son. Patient was evaluated for PT several weeks ago, family is waiting to hear back about appointment and insurance coverage. Family declined PT initially after stroke due to COVID. Compliant with ASA and statin. Son controls her medications. \par \par Rheumatic MS and KRISTAN thrombus - Seen by cards in office 11/10/20. Most recent echo 05/2020 with severe mitral stenosis, trace mitral regurg, moderate dilation of LA. EF 60-65%. Afib treated with rate control, Eliquis for AC. Previously on Lovenox, switched to Eliquis last month per heme recs due to pain/bruising at injection sites. Patient initially trialed on warfarin but had trouble achieving therapeutic INR. Denies palpitations, lightheadedness, dizziness, LOC, new neurologic deficits.\par \par Anemia - Seen by hematology 4/12/21 for w/u of anemia. Likely anemia of chronic disease and inflammation. Hgb 11, Plasma cell dyscrasia screening normal, no abnormality on SPEP, JONEL, free light chains normal ratio. Heme switched patient from Lovenox to Eliquis 5mg BID during this visit.\par \par DM - Checks finger stick once once or twice a week in the morning, sugars range 100s-120s. Denies symptomatic hypoglycemia at home. Compliant with metformin. Most recent A1c 6.6% 10/4/20 (decreased from prior). Has not seen eye doctor in several years, requests referral. Does not follow with podiatry. Son has not noticed any wounds on feet. \par \par Poor appetite and general weakness x 1month. Sleeps well overnight, sleeps 11pm - 6:30am, sometimes naps during the day, denies nighttime awakenings. Denies weight loss, constipation, diarrhea, melena, nausea, vomiting, heartburn, bloating, fevers, chills, AMS, dysuria, urinary frequency, and hematuria. Son reports chronic foul-smelling urine, no recent changes. \par

## 2021-05-27 DIAGNOSIS — I05.9 RHEUMATIC MITRAL VALVE DISEASE, UNSPECIFIED: ICD-10-CM

## 2021-06-01 ENCOUNTER — APPOINTMENT (OUTPATIENT)
Dept: INTERNAL MEDICINE | Facility: CLINIC | Age: 66
End: 2021-06-01

## 2021-06-02 ENCOUNTER — NON-APPOINTMENT (OUTPATIENT)
Age: 66
End: 2021-06-02

## 2021-06-15 ENCOUNTER — APPOINTMENT (OUTPATIENT)
Dept: NEUROLOGY | Facility: HOSPITAL | Age: 66
End: 2021-06-15

## 2021-06-15 ENCOUNTER — OUTPATIENT (OUTPATIENT)
Dept: OUTPATIENT SERVICES | Facility: HOSPITAL | Age: 66
LOS: 1 days | End: 2021-06-15
Payer: MEDICAID

## 2021-06-15 VITALS
BODY MASS INDEX: 19.63 KG/M2 | SYSTOLIC BLOOD PRESSURE: 158 MMHG | WEIGHT: 115 LBS | DIASTOLIC BLOOD PRESSURE: 73 MMHG | HEART RATE: 45 BPM | HEIGHT: 64 IN | TEMPERATURE: 96.1 F

## 2021-06-15 DIAGNOSIS — K43.9 VENTRAL HERNIA WITHOUT OBSTRUCTION OR GANGRENE: Chronic | ICD-10-CM

## 2021-06-15 DIAGNOSIS — Z98.49 CATARACT EXTRACTION STATUS, UNSPECIFIED EYE: Chronic | ICD-10-CM

## 2021-06-15 DIAGNOSIS — I63.9 CEREBRAL INFARCTION, UNSPECIFIED: ICD-10-CM

## 2021-06-15 DIAGNOSIS — Z98.89 OTHER SPECIFIED POSTPROCEDURAL STATES: Chronic | ICD-10-CM

## 2021-06-15 DIAGNOSIS — R51.9 HEADACHE, UNSPECIFIED: ICD-10-CM

## 2021-06-15 PROCEDURE — G0463: CPT

## 2021-06-15 NOTE — ASSESSMENT
[FreeTextEntry1] : Hx of Left MCA stroke ( Rt residual weakness) with Rt MCA stroke due to embolic etiology in the setting of known Afib. Was in Coumadin and lovenox on Prior CVAs now recently switched to Eliquis by Heme/Onc.

## 2021-06-15 NOTE — REVIEW OF SYSTEMS
[Negative] : Integumentary [Seizures] : no convulsions [Dizziness] : no dizziness [Fainting] : no fainting [Lightheadedness] : no lightheadedness [Vertigo] : no vertigo

## 2021-06-15 NOTE — PHYSICAL EXAM
[General Appearance - Alert] : alert [General Appearance - In No Acute Distress] : in no acute distress [Oriented To Time, Place, And Person] : oriented to person, place, and time [Person] : oriented to person [Place] : oriented to place [Time] : oriented to time [Naming Objects] : no difficulty naming common objects [Fluency] : fluency intact [I: Normal Smell] : smell intact bilaterally [Cranial Nerves Optic (II)] : visual acuity intact bilaterally,  visual fields full to confrontation, pupils equal round and reactive to light [Cranial Nerves Vestibulocochlear (VIII)] : hearing was intact bilaterally [Cranial Nerves Oculomotor (III)] : extraocular motion intact [Cranial Nerves Accessory (XI - Cranial And Spinal)] : head turning and shoulder shrug symmetric [Cranial Nerves Hypoglossal (XII)] : there was no tongue deviation with protrusion [Mouth Droop On The Right] : right-sided mouth droop [Motor Tone] : muscle tone was normal in all four extremities [Motor Handedness Right-Handed] : the patient is right hand dominant [Motor Strength Lower Extremities Right] : there was weakness of the right lower extremity [Sensation Tactile Decrease] : light touch was intact [2+] : Brachioradialis left 2+ [1+] : Patella left 1+ [Sclera] : the sclera and conjunctiva were normal [PERRL With Normal Accommodation] : pupils were equal in size, round, reactive to light, with normal accommodation [Extraocular Movements] : extraocular movements were intact [Full Visual Field] : full visual field [Neck Appearance] : the appearance of the neck was normal [] : no respiratory distress [Edema] : there was no peripheral edema [Musculoskeletal - Swelling] : no joint swelling seen [Skin Color & Pigmentation] : normal skin color and pigmentation [Paresis Pronator Drift Right-Sided] : no pronator drift on the right [Paresis Pronator Drift Left-Sided] : no pronator drift on the left [Motor Strength Lower Extremities Left] : strength was normal in the left lower extremity [Tremor] : no tremor present [Coordination - Dysmetria Impaired Finger-to-Nose Bilateral] : not present [Plantar Reflex Right Only] : normal on the right [Plantar Reflex Left Only] : normal on the left [___] : absent on the right [___] : absent on the left [FreeTextEntry6] : RUE 4/5 and RLE 0/5. \par LUE and LLE 5/5.  [FreeTextEntry8] : Patient uses wheel chair and walking cane at [facility].

## 2021-06-15 NOTE — DISCUSSION/SUMMARY
[Antithrombotic therapy with ___] : antithrombotic therapy with  [unfilled] [Intensive Blood Pressure Control] : intensive blood pressure control [Lipid Lowering Therapy] : lipid lowering therapy [Patient encouraged to discuss with Primary MD] : I encouraged the patient to discuss these important issues with ~his/her~ primary care doctor [Goals and Counseling] : I have reviewed the goals of stroke risk factor modification. I counseled the patient on measures to reduce stroke risk, including the importance of medication compliance, risk factor control, exercise, healthy diet and avoidance of smoking. I reviewed stroke warning signs and symptoms and appropriate actions to take if such occur. [FreeTextEntry1] : Patient is a 67yo Nadine-speaking female, with chronic Afib (on Eliquis,), rheumatic MS (no surgical intervention at this time), KRISTAN thrombus, R MCA strokes (2013, 2016) and recent L MCA stroke in 2020 with resultant right sided weakness, anemia of chronic disease, and DM2, presenting to Neurology Clinic as a new patient\par \par Plan to continue Eliquis for stroke prevention in setting of Afib.\par C/w Home PT.

## 2021-06-15 NOTE — HISTORY OF PRESENT ILLNESS
[FreeTextEntry1] : Patient is a 65yo Nadine-speaking female, with chronic Afib (on Eliquis,), rheumatic MS (no surgical intervention at this time), KRISTAN thrombus, R MCA strokes (2013, 2016) and recent L MCA stroke in 2020 with resultant right sided weakness, anemia of chronic disease, and DM2, presenting to Neurology Clinic as a new patient.Patient was accompanied by her son who translated. History was obtained by chart review and speaking with son. Patient was noted to be on coumadin and lovenox in the past which did not work her, thus was switched to Eliquis by Heme/onc back in 4/2021. Patient's son states she has been taking all of her medications as prescribed now and has been working with PT 2x a week. Patient's son states she has regained function and strength in the Rt upper extremity however, still has RLE weakness. Patient's son mentions patient having decreased appetite for the past couple of months. Patient denies headaches, numbness, weakness. Patient mentions chronic RLE weakness and decreased appetite.

## 2021-06-15 NOTE — DATA REVIEWED
[de-identified] : CT head w/o contrast on 9/292020: \par A chronic infarct in the right middle cerebral artery vascular territory, involving the inferior frontal gyrus and anterior aspect of the right insula, is stable from 10/11/2019. A chronic appearing infarct in the left anterior cerebral artery vascular territory, involving the posterior medial left frontal lobe is new from 10/11/2019. A chronic appearing infarct in the left middle cerebral artery vascular territory, involving the ventral left frontal convexity, left inferior frontal gyrus and anterior left insula is new from 10/11/2019. A small chronic infarct in the right cerebellar hemisphere is stable from 10/11/2019.

## 2021-07-06 ENCOUNTER — RX RENEWAL (OUTPATIENT)
Age: 66
End: 2021-07-06

## 2021-07-08 ENCOUNTER — RX RENEWAL (OUTPATIENT)
Age: 66
End: 2021-07-08

## 2021-07-11 ENCOUNTER — OUTPATIENT (OUTPATIENT)
Dept: OUTPATIENT SERVICES | Facility: HOSPITAL | Age: 66
LOS: 1 days | Discharge: HOME | End: 2021-07-11

## 2021-07-11 DIAGNOSIS — Z98.89 OTHER SPECIFIED POSTPROCEDURAL STATES: Chronic | ICD-10-CM

## 2021-07-11 DIAGNOSIS — Z98.49 CATARACT EXTRACTION STATUS, UNSPECIFIED EYE: Chronic | ICD-10-CM

## 2021-07-11 DIAGNOSIS — K43.9 VENTRAL HERNIA WITHOUT OBSTRUCTION OR GANGRENE: Chronic | ICD-10-CM

## 2021-07-11 DIAGNOSIS — D50.9 IRON DEFICIENCY ANEMIA, UNSPECIFIED: ICD-10-CM

## 2021-07-14 ENCOUNTER — APPOINTMENT (OUTPATIENT)
Dept: HEMATOLOGY ONCOLOGY | Facility: CLINIC | Age: 66
End: 2021-07-14

## 2021-07-16 ENCOUNTER — EMERGENCY (EMERGENCY)
Facility: HOSPITAL | Age: 66
LOS: 1 days | Discharge: HOME | End: 2021-07-16
Attending: EMERGENCY MEDICINE | Admitting: EMERGENCY MEDICINE
Payer: MEDICAID

## 2021-07-16 VITALS
DIASTOLIC BLOOD PRESSURE: 77 MMHG | TEMPERATURE: 98 F | HEART RATE: 58 BPM | SYSTOLIC BLOOD PRESSURE: 142 MMHG | RESPIRATION RATE: 16 BRPM | OXYGEN SATURATION: 100 %

## 2021-07-16 VITALS
HEIGHT: 61 IN | RESPIRATION RATE: 18 BRPM | DIASTOLIC BLOOD PRESSURE: 52 MMHG | SYSTOLIC BLOOD PRESSURE: 134 MMHG | TEMPERATURE: 98 F | HEART RATE: 61 BPM | OXYGEN SATURATION: 100 %

## 2021-07-16 DIAGNOSIS — K43.9 VENTRAL HERNIA WITHOUT OBSTRUCTION OR GANGRENE: Chronic | ICD-10-CM

## 2021-07-16 DIAGNOSIS — Z98.49 CATARACT EXTRACTION STATUS, UNSPECIFIED EYE: Chronic | ICD-10-CM

## 2021-07-16 DIAGNOSIS — Z98.89 OTHER SPECIFIED POSTPROCEDURAL STATES: Chronic | ICD-10-CM

## 2021-07-16 LAB
ALBUMIN SERPL ELPH-MCNC: 4.2 G/DL — SIGNIFICANT CHANGE UP (ref 3.3–5)
ALP SERPL-CCNC: 56 U/L — SIGNIFICANT CHANGE UP (ref 40–120)
ALT FLD-CCNC: 13 U/L — SIGNIFICANT CHANGE UP (ref 4–33)
ANION GAP SERPL CALC-SCNC: 13 MMOL/L — SIGNIFICANT CHANGE UP (ref 7–14)
APPEARANCE UR: CLEAR — SIGNIFICANT CHANGE UP
AST SERPL-CCNC: 14 U/L — SIGNIFICANT CHANGE UP (ref 4–32)
BACTERIA # UR AUTO: ABNORMAL
BASOPHILS # BLD AUTO: 0.05 K/UL — SIGNIFICANT CHANGE UP (ref 0–0.2)
BASOPHILS NFR BLD AUTO: 0.4 % — SIGNIFICANT CHANGE UP (ref 0–2)
BILIRUB SERPL-MCNC: 0.6 MG/DL — SIGNIFICANT CHANGE UP (ref 0.2–1.2)
BILIRUB UR-MCNC: NEGATIVE — SIGNIFICANT CHANGE UP
BUN SERPL-MCNC: 13 MG/DL — SIGNIFICANT CHANGE UP (ref 7–23)
CALCIUM SERPL-MCNC: 10 MG/DL — SIGNIFICANT CHANGE UP (ref 8.4–10.5)
CHLORIDE SERPL-SCNC: 104 MMOL/L — SIGNIFICANT CHANGE UP (ref 98–107)
CO2 SERPL-SCNC: 25 MMOL/L — SIGNIFICANT CHANGE UP (ref 22–31)
COLOR SPEC: COLORLESS — SIGNIFICANT CHANGE UP
CREAT SERPL-MCNC: 0.69 MG/DL — SIGNIFICANT CHANGE UP (ref 0.5–1.3)
DIFF PNL FLD: NEGATIVE — SIGNIFICANT CHANGE UP
EOSINOPHIL # BLD AUTO: 0.18 K/UL — SIGNIFICANT CHANGE UP (ref 0–0.5)
EOSINOPHIL NFR BLD AUTO: 1.4 % — SIGNIFICANT CHANGE UP (ref 0–6)
EPI CELLS # UR: 0 /HPF — SIGNIFICANT CHANGE UP (ref 0–5)
GLUCOSE SERPL-MCNC: 119 MG/DL — HIGH (ref 70–99)
GLUCOSE UR QL: NEGATIVE — SIGNIFICANT CHANGE UP
HCT VFR BLD CALC: 33.2 % — LOW (ref 34.5–45)
HGB BLD-MCNC: 10.4 G/DL — LOW (ref 11.5–15.5)
IANC: 8.59 K/UL — HIGH (ref 1.5–8.5)
IMM GRANULOCYTES NFR BLD AUTO: 0.3 % — SIGNIFICANT CHANGE UP (ref 0–1.5)
KETONES UR-MCNC: NEGATIVE — SIGNIFICANT CHANGE UP
LEUKOCYTE ESTERASE UR-ACNC: ABNORMAL
LYMPHOCYTES # BLD AUTO: 25.9 % — SIGNIFICANT CHANGE UP (ref 13–44)
LYMPHOCYTES # BLD AUTO: 3.32 K/UL — HIGH (ref 1–3.3)
MCHC RBC-ENTMCNC: 27.8 PG — SIGNIFICANT CHANGE UP (ref 27–34)
MCHC RBC-ENTMCNC: 31.3 GM/DL — LOW (ref 32–36)
MCV RBC AUTO: 88.8 FL — SIGNIFICANT CHANGE UP (ref 80–100)
MONOCYTES # BLD AUTO: 0.62 K/UL — SIGNIFICANT CHANGE UP (ref 0–0.9)
MONOCYTES NFR BLD AUTO: 4.8 % — SIGNIFICANT CHANGE UP (ref 2–14)
NEUTROPHILS # BLD AUTO: 8.59 K/UL — HIGH (ref 1.8–7.4)
NEUTROPHILS NFR BLD AUTO: 67.2 % — SIGNIFICANT CHANGE UP (ref 43–77)
NITRITE UR-MCNC: NEGATIVE — SIGNIFICANT CHANGE UP
NRBC # BLD: 0 /100 WBCS — SIGNIFICANT CHANGE UP
NRBC # FLD: 0 K/UL — SIGNIFICANT CHANGE UP
PH UR: 6.5 — SIGNIFICANT CHANGE UP (ref 5–8)
PLATELET # BLD AUTO: 318 K/UL — SIGNIFICANT CHANGE UP (ref 150–400)
POTASSIUM SERPL-MCNC: 4.3 MMOL/L — SIGNIFICANT CHANGE UP (ref 3.5–5.3)
POTASSIUM SERPL-SCNC: 4.3 MMOL/L — SIGNIFICANT CHANGE UP (ref 3.5–5.3)
PROT SERPL-MCNC: 7.7 G/DL — SIGNIFICANT CHANGE UP (ref 6–8.3)
PROT UR-MCNC: NEGATIVE — SIGNIFICANT CHANGE UP
RBC # BLD: 3.74 M/UL — LOW (ref 3.8–5.2)
RBC # FLD: 13.2 % — SIGNIFICANT CHANGE UP (ref 10.3–14.5)
RBC CASTS # UR COMP ASSIST: 0 /HPF — SIGNIFICANT CHANGE UP (ref 0–4)
SODIUM SERPL-SCNC: 142 MMOL/L — SIGNIFICANT CHANGE UP (ref 135–145)
SP GR SPEC: 1.01 — LOW (ref 1.01–1.02)
UROBILINOGEN FLD QL: SIGNIFICANT CHANGE UP
WBC # BLD: 12.8 K/UL — HIGH (ref 3.8–10.5)
WBC # FLD AUTO: 12.8 K/UL — HIGH (ref 3.8–10.5)
WBC UR QL: 4 /HPF — SIGNIFICANT CHANGE UP (ref 0–5)

## 2021-07-16 PROCEDURE — 71045 X-RAY EXAM CHEST 1 VIEW: CPT | Mod: 26

## 2021-07-16 PROCEDURE — 99285 EMERGENCY DEPT VISIT HI MDM: CPT

## 2021-07-16 PROCEDURE — 70450 CT HEAD/BRAIN W/O DYE: CPT | Mod: 26

## 2021-07-16 RX ORDER — SODIUM CHLORIDE 9 MG/ML
1000 INJECTION INTRAMUSCULAR; INTRAVENOUS; SUBCUTANEOUS ONCE
Refills: 0 | Status: COMPLETED | OUTPATIENT
Start: 2021-07-16 | End: 2021-07-16

## 2021-07-16 RX ORDER — CEPHALEXIN 500 MG
1 CAPSULE ORAL
Qty: 20 | Refills: 0
Start: 2021-07-16 | End: 2021-07-20

## 2021-07-16 RX ADMIN — SODIUM CHLORIDE 1000 MILLILITER(S): 9 INJECTION INTRAMUSCULAR; INTRAVENOUS; SUBCUTANEOUS at 17:03

## 2021-07-16 NOTE — ED PROVIDER NOTE - NSFOLLOWUPINSTRUCTIONS_ED_ALL_ED_FT
Urinary Tract Infection in Older Adults    WHAT YOU NEED TO KNOW:    A urinary tract infection (UTI) is caused by bacteria that get inside your urinary tract. Your urinary tract includes your kidneys, ureters, bladder, and urethra. Urine is made in your kidneys, and it flows from the ureters to the bladder. Urine leaves the bladder through the urethra. A UTI is more common in your lower urinary tract, which includes your bladder and urethra.    Kidney, Ureters, Bladder         DISCHARGE INSTRUCTIONS:    Seek care immediately if:   •You are urinating very little or not at all.      •You are vomiting.      •You have a high fever with shaking chills.      •You have side or back pain that gets worse.      Call your doctor if:   •You have a fever.      •You are a woman and you have increased white or yellow discharge from your vagina.      •You do not feel better after 2 days of taking antibiotics.      •You have questions or concerns about your condition or care.      Medicines:   •Medicines help treat the bacterial infection or decrease pain and burning when you urinate. You may also need medicines to decrease the urge to urinate often. If you have UTIs often (called recurrent UTIs), you may be given antibiotics to take regularly. You will be given directions for when and how to use antibiotics. The goal is to prevent UTIs but not cause antibiotic resistance by using antibiotics too often.      •Take your medicine as directed. Contact your healthcare provider if you think your medicine is not helping or if you have side effects. Tell him or her if you are allergic to any medicine. Keep a list of the medicines, vitamins, and herbs you take. Include the amounts, and when and why you take them. Bring the list or the pill bottles to follow-up visits. Carry your medicine list with you in case of an emergency.      Self-care:   •Drink liquids as directed. Liquids can help flush bacteria from your urinary tract. Ask how much liquid to drink each day and which liquids are best for you. You may need to drink more liquids than usual to help flush out the bacteria. Do not drink alcohol, caffeine, and citrus juices. These can irritate your bladder and increase your symptoms.      •Apply heat on your abdomen for 20 to 30 minutes every 2 hours for as many days as directed. Heat helps decrease discomfort and pressure in your bladder.      Prevent a UTI:   •Urinate when you feel the urge. Do not hold your urine. Bacteria can grow if urine stays in the bladder too long. It may be helpful to urinate at least every 3 to 4 hours.      •Urinate after you have sex to flush away bacteria that can enter your urinary tract during sex.      •Wear cotton underwear and clothes that are loose. Tight pants and nylon underwear can trap moisture and cause bacteria to grow.      •Cranberry juice or cranberry supplements may help prevent UTIs. Your healthcare provider can recommend the right juice or supplement for you.      •Women should wipe front to back after urinating or having a bowel movement. This may prevent germs from getting into the urinary tract. Do not douche or use feminine deodorants. These can change the chemical balance in your vagina. You may also be given vaginal estrogen medicine. This medicine helps prevent recurrent UTIs in women who have gone through menopause or are in clarisa-menopause.      Follow up with your healthcare provider as directed: Write down your questions so you remember to ask them during your visits

## 2021-07-16 NOTE — ED PROVIDER NOTE - PHYSICAL EXAMINATION
Attending/Kingsley: NAD; PERRL/EOMI, no nystagmus, supple, no carotid bruit, no JVD, RRR, CTAB; Abd-soft, NT/ND, no HSM; no LE edema, A&Ox3, RUE/RLE 5-/5; Skin-warm/dry Attending/Kingsley: NAD; PERRL/EOMI, no nystagmus, supple, no carotid bruit, no JVD, RRR, CTAB; Abd-soft, NT/ND, no HSM; no LE edema, A&Ox3, RUE/RLE 5-/5; Skin-warm/dry    Vital signs reviewed  GENERAL: Patient nontoxic appearing, NAD  HEAD: NCAT  EYES: Anicteric  ENT: MMM  NECK: Supple, non tender  RESPIRATORY: Normal respiratory effort. CTA B/L. No wheezing, rales, rhonchi  CARDIOVASCULAR: Regular rate and rhythm  ABDOMEN: Soft. Nondistended. Nontender. No guarding or rebound. No CVA tenderness.  MUSCULOSKELETAL/EXTREMITIES: Brisk cap refill. 2+ radial pulses. No leg edema.  SKIN:  Warm and dry  NEURO: AAOx3. No gross FND.  PSYCHIATRIC: Cooperative. Affect appropriate.

## 2021-07-16 NOTE — ED PROVIDER NOTE - NS ED ROS FT
Constitutional: No fever, chills.  Eyes:  No visual changes  ENMT:  No neck pain  Cardiac:  No chest pain  Respiratory:  No cough, SOB  GI:  No nausea, vomiting, diarrhea, abdominal pain.  :  No dysuria, hematuria  MS:  No back pain.  Neuro: +dizziness  Skin:  No skin rash  Endocrine: No history of thyroid disease or diabetes.  Except as documented in the HPI,  all other systems are negative.

## 2021-07-16 NOTE — ED ADULT NURSE NOTE - OBJECTIVE STATEMENT
67 yo female, kimberly speaking, son at bedside translating, hx of aFIB, DM, HTN, HLD, CVA (R SIDED DEFICIT), oriented to person,place (unable to state date) brought in by son for 2 days of generalized weakness, confusion and as per son, the pt stated this morning "I have burning in my chest". upon assessment, pt breathing even, non labored, abdomen soft, non tender, no swelling noted to LE's. pt placed on CM, VS as noted. pt awaiting MD goff. pt with no physical complaints at this time.

## 2021-07-16 NOTE — ED PROVIDER NOTE - CLINICAL SUMMARY MEDICAL DECISION MAKING FREE TEXT BOX
A/P 65 yo F with h/o CVA, aifb, pw/ vertiginous symptoms, exam not noted for new focal abnormalities  -HCT, EKG, labs, UA, reassess

## 2021-07-16 NOTE — ED PROVIDER NOTE - PATIENT PORTAL LINK FT
You can access the FollowMyHealth Patient Portal offered by [facility] by registering at the following website: [email or web address]. By joining [employer]’s FollowMyHealth portal, you will also be able to view your health information using other applications (apps) compatible with our system.

## 2021-07-16 NOTE — ED PROVIDER NOTE - OBJECTIVE STATEMENT
Attending/Kingsley: 67 yo F h/o HTN, Afib, CVA w/ Rt residual weakness, p/w one day of intermittent vertiginous symptoms. Denies HA, n/v, fever/chills, CP, SOB, or change in urinary/bowel changes. Pt also further states no recent changes in meds.    [facility]  #[id number] Attending/Kingsley: 65 yo F h/o HTN, Afib, CVA w/ Rt residual weakness, p/w one day of intermittent vertiginous symptoms. Denies HA, n/v, fever/chills, CP, SOB, or change in urinary/bowel changes. Pt also further states no recent changes in meds.    Nadine  #[id number]      67 y/o F hx of Afib, HTN, DM, stroke right side residual weakness p/w weakness x 2 days. patient is Nadine speaking, spoke to patient in native language. states that she is having on and off dizziness. described as room spinning. son states patient has been more weak than usual, less energy, having a hard time holding onto her tea.  pt denies any fever, chills, cp, palpitations, cough, sob, wheezing, abdominal pain, n/v/d, dysuria, hematuria, or any weight loss.

## 2021-07-16 NOTE — ED PROVIDER NOTE - PROGRESS NOTE DETAILS
Tyson Reid PGY-2 well appearing, resting. vitals wnl. will dispo with PCP follow up. given strict return precautions.

## 2021-07-16 NOTE — ED ADULT TRIAGE NOTE - CHIEF COMPLAINT QUOTE
son reports pt  with increased weakness since wed. not walking,talking as usual. hx of stroke with r sided weakness. son reports pt has more difficulty holding cup,eating since wed. takes eliquis. denies falls pt awake orinted  x 3 fs 231 kimberly speaking

## 2021-08-23 ENCOUNTER — APPOINTMENT (OUTPATIENT)
Dept: INTERNAL MEDICINE | Facility: CLINIC | Age: 66
End: 2021-08-23

## 2021-12-17 ENCOUNTER — RESULT REVIEW (OUTPATIENT)
Age: 66
End: 2021-12-17

## 2021-12-17 ENCOUNTER — OUTPATIENT (OUTPATIENT)
Dept: OUTPATIENT SERVICES | Facility: HOSPITAL | Age: 66
LOS: 1 days | End: 2021-12-17

## 2021-12-17 ENCOUNTER — APPOINTMENT (OUTPATIENT)
Dept: INTERNAL MEDICINE | Facility: CLINIC | Age: 66
End: 2021-12-17
Payer: MEDICAID

## 2021-12-17 VITALS
HEIGHT: 65 IN | OXYGEN SATURATION: 97 % | HEART RATE: 61 BPM | SYSTOLIC BLOOD PRESSURE: 140 MMHG | DIASTOLIC BLOOD PRESSURE: 70 MMHG

## 2021-12-17 VITALS — TEMPERATURE: 97.7 F

## 2021-12-17 DIAGNOSIS — Z12.39 ENCOUNTER FOR OTHER SCREENING FOR MALIGNANT NEOPLASM OF BREAST: ICD-10-CM

## 2021-12-17 DIAGNOSIS — Z98.89 OTHER SPECIFIED POSTPROCEDURAL STATES: Chronic | ICD-10-CM

## 2021-12-17 DIAGNOSIS — D64.9 ANEMIA, UNSPECIFIED: ICD-10-CM

## 2021-12-17 DIAGNOSIS — R55 SYNCOPE AND COLLAPSE: ICD-10-CM

## 2021-12-17 DIAGNOSIS — I51.3 INTRACARDIAC THROMBOSIS, NOT ELSEWHERE CLASSIFIED: ICD-10-CM

## 2021-12-17 DIAGNOSIS — Z87.39 PERSONAL HISTORY OF OTHER DISEASES OF THE MUSCULOSKELETAL SYSTEM AND CONNECTIVE TISSUE: ICD-10-CM

## 2021-12-17 DIAGNOSIS — G89.29 LUMBAGO WITH SCIATICA, LEFT SIDE: ICD-10-CM

## 2021-12-17 DIAGNOSIS — M54.42 LUMBAGO WITH SCIATICA, LEFT SIDE: ICD-10-CM

## 2021-12-17 DIAGNOSIS — K43.9 VENTRAL HERNIA WITHOUT OBSTRUCTION OR GANGRENE: Chronic | ICD-10-CM

## 2021-12-17 DIAGNOSIS — Z98.49 CATARACT EXTRACTION STATUS, UNSPECIFIED EYE: Chronic | ICD-10-CM

## 2021-12-17 DIAGNOSIS — Z79.01 LONG TERM (CURRENT) USE OF ANTICOAGULANTS: ICD-10-CM

## 2021-12-17 DIAGNOSIS — F32.4 MAJOR DEPRESSIVE DISORDER, SINGLE EPISODE, IN PARTIAL REMISSION: ICD-10-CM

## 2021-12-17 DIAGNOSIS — Z87.19 PERSONAL HISTORY OF OTHER DISEASES OF THE DIGESTIVE SYSTEM: ICD-10-CM

## 2021-12-17 LAB
A1C WITH ESTIMATED AVERAGE GLUCOSE RESULT: 6.6 % — HIGH (ref 4–5.6)
ALBUMIN SERPL ELPH-MCNC: 4.6 G/DL — SIGNIFICANT CHANGE UP (ref 3.3–5)
ALP SERPL-CCNC: 48 U/L — SIGNIFICANT CHANGE UP (ref 40–120)
ALT FLD-CCNC: 18 U/L — SIGNIFICANT CHANGE UP (ref 4–33)
ANION GAP SERPL CALC-SCNC: 13 MMOL/L — SIGNIFICANT CHANGE UP (ref 7–14)
AST SERPL-CCNC: 22 U/L — SIGNIFICANT CHANGE UP (ref 4–32)
BASOPHILS # BLD AUTO: 0.03 K/UL — SIGNIFICANT CHANGE UP (ref 0–0.2)
BASOPHILS NFR BLD AUTO: 0.3 % — SIGNIFICANT CHANGE UP (ref 0–2)
BILIRUB SERPL-MCNC: 0.7 MG/DL — SIGNIFICANT CHANGE UP (ref 0.2–1.2)
BUN SERPL-MCNC: 17 MG/DL — SIGNIFICANT CHANGE UP (ref 7–23)
CALCIUM SERPL-MCNC: 9.9 MG/DL — SIGNIFICANT CHANGE UP (ref 8.4–10.5)
CHLORIDE SERPL-SCNC: 102 MMOL/L — SIGNIFICANT CHANGE UP (ref 98–107)
CHOLEST SERPL-MCNC: 125 MG/DL — SIGNIFICANT CHANGE UP
CO2 SERPL-SCNC: 26 MMOL/L — SIGNIFICANT CHANGE UP (ref 22–31)
CREAT SERPL-MCNC: 0.67 MG/DL — SIGNIFICANT CHANGE UP (ref 0.5–1.3)
EOSINOPHIL # BLD AUTO: 0.18 K/UL — SIGNIFICANT CHANGE UP (ref 0–0.5)
EOSINOPHIL NFR BLD AUTO: 1.8 % — SIGNIFICANT CHANGE UP (ref 0–6)
ESTIMATED AVERAGE GLUCOSE: 143 — SIGNIFICANT CHANGE UP
GLUCOSE SERPL-MCNC: 226 MG/DL — HIGH (ref 70–99)
HCT VFR BLD CALC: 33.5 % — LOW (ref 34.5–45)
HDLC SERPL-MCNC: 50 MG/DL — LOW
HGB BLD-MCNC: 10.7 G/DL — LOW (ref 11.5–15.5)
IANC: 6.05 K/UL — SIGNIFICANT CHANGE UP (ref 1.5–8.5)
IMM GRANULOCYTES NFR BLD AUTO: 0.3 % — SIGNIFICANT CHANGE UP (ref 0–1.5)
LIPID PNL WITH DIRECT LDL SERPL: 56 MG/DL — SIGNIFICANT CHANGE UP
LYMPHOCYTES # BLD AUTO: 3.3 K/UL — SIGNIFICANT CHANGE UP (ref 1–3.3)
LYMPHOCYTES # BLD AUTO: 32.3 % — SIGNIFICANT CHANGE UP (ref 13–44)
MCHC RBC-ENTMCNC: 28.8 PG — SIGNIFICANT CHANGE UP (ref 27–34)
MCHC RBC-ENTMCNC: 31.9 GM/DL — LOW (ref 32–36)
MCV RBC AUTO: 90.1 FL — SIGNIFICANT CHANGE UP (ref 80–100)
MONOCYTES # BLD AUTO: 0.62 K/UL — SIGNIFICANT CHANGE UP (ref 0–0.9)
MONOCYTES NFR BLD AUTO: 6.1 % — SIGNIFICANT CHANGE UP (ref 2–14)
NEUTROPHILS # BLD AUTO: 6.05 K/UL — SIGNIFICANT CHANGE UP (ref 1.8–7.4)
NEUTROPHILS NFR BLD AUTO: 59.2 % — SIGNIFICANT CHANGE UP (ref 43–77)
NON HDL CHOLESTEROL: 75 MG/DL — SIGNIFICANT CHANGE UP
NRBC # BLD: 0 /100 WBCS — SIGNIFICANT CHANGE UP
NRBC # FLD: 0 K/UL — SIGNIFICANT CHANGE UP
PLATELET # BLD AUTO: 277 K/UL — SIGNIFICANT CHANGE UP (ref 150–400)
POTASSIUM SERPL-MCNC: 4.1 MMOL/L — SIGNIFICANT CHANGE UP (ref 3.5–5.3)
POTASSIUM SERPL-SCNC: 4.1 MMOL/L — SIGNIFICANT CHANGE UP (ref 3.5–5.3)
PROT SERPL-MCNC: 7.7 G/DL — SIGNIFICANT CHANGE UP (ref 6–8.3)
RBC # BLD: 3.72 M/UL — LOW (ref 3.8–5.2)
RBC # FLD: 15.5 % — HIGH (ref 10.3–14.5)
SODIUM SERPL-SCNC: 141 MMOL/L — SIGNIFICANT CHANGE UP (ref 135–145)
TRIGL SERPL-MCNC: 97 MG/DL — SIGNIFICANT CHANGE UP
VIT B12 SERPL-MCNC: 726 PG/ML — SIGNIFICANT CHANGE UP (ref 200–900)
WBC # BLD: 10.21 K/UL — SIGNIFICANT CHANGE UP (ref 3.8–10.5)
WBC # FLD AUTO: 10.21 K/UL — SIGNIFICANT CHANGE UP (ref 3.8–10.5)

## 2021-12-17 PROCEDURE — 99214 OFFICE O/P EST MOD 30 MIN: CPT

## 2021-12-17 RX ORDER — MIRTAZAPINE 7.5 MG/1
7.5 TABLET, FILM COATED ORAL
Qty: 90 | Refills: 1 | Status: DISCONTINUED | COMMUNITY
Start: 2019-11-19 | End: 2021-12-17

## 2021-12-17 RX ORDER — PANTOPRAZOLE 40 MG/1
40 TABLET, DELAYED RELEASE ORAL
Qty: 90 | Refills: 1 | Status: DISCONTINUED | COMMUNITY
Start: 2019-11-08 | End: 2021-12-17

## 2021-12-18 LAB
24R-OH-CALCIDIOL SERPL-MCNC: 34.1 NG/ML — SIGNIFICANT CHANGE UP (ref 30–80)
CREAT ?TM UR-MCNC: 17 MG/DL — SIGNIFICANT CHANGE UP
MICROALBUMIN UR-MCNC: 1.6 MG/DL — SIGNIFICANT CHANGE UP
MICROALBUMIN/CREAT UR-RTO: 95 MG/G — HIGH (ref 0–30)

## 2021-12-20 DIAGNOSIS — H53.8 OTHER VISUAL DISTURBANCES: ICD-10-CM

## 2021-12-20 DIAGNOSIS — Z12.39 ENCOUNTER FOR OTHER SCREENING FOR MALIGNANT NEOPLASM OF BREAST: ICD-10-CM

## 2021-12-20 DIAGNOSIS — I51.3 INTRACARDIAC THROMBOSIS, NOT ELSEWHERE CLASSIFIED: ICD-10-CM

## 2021-12-20 DIAGNOSIS — E78.5 HYPERLIPIDEMIA, UNSPECIFIED: ICD-10-CM

## 2021-12-20 DIAGNOSIS — R30.0 DYSURIA: ICD-10-CM

## 2021-12-20 DIAGNOSIS — D63.8 ANEMIA IN OTHER CHRONIC DISEASES CLASSIFIED ELSEWHERE: ICD-10-CM

## 2021-12-20 DIAGNOSIS — I48.91 UNSPECIFIED ATRIAL FIBRILLATION: ICD-10-CM

## 2021-12-20 DIAGNOSIS — F32.A DEPRESSION, UNSPECIFIED: ICD-10-CM

## 2021-12-20 DIAGNOSIS — E11.9 TYPE 2 DIABETES MELLITUS WITHOUT COMPLICATIONS: ICD-10-CM

## 2021-12-20 DIAGNOSIS — I63.319 CEREBRAL INFARCTION DUE TO THROMBOSIS OF UNSPECIFIED MIDDLE CEREBRAL ARTERY: ICD-10-CM

## 2021-12-20 DIAGNOSIS — Z79.01 LONG TERM (CURRENT) USE OF ANTICOAGULANTS: ICD-10-CM

## 2021-12-20 DIAGNOSIS — S09.90XA UNSPECIFIED INJURY OF HEAD, INITIAL ENCOUNTER: ICD-10-CM

## 2021-12-20 PROBLEM — M54.42 CHRONIC LEFT-SIDED LOW BACK PAIN WITH LEFT-SIDED SCIATICA: Status: RESOLVED | Noted: 2018-02-08 | Resolved: 2021-12-20

## 2021-12-20 PROBLEM — F32.4 MAJOR DEPRESSIVE DISORDER IN PARTIAL REMISSION, UNSPECIFIED WHETHER RECURRENT: Noted: 2019-11-19

## 2021-12-20 NOTE — HISTORY OF PRESENT ILLNESS
[Patient Declined  Services] : - None: Patient declined  services [FreeTextEntry1] : blood work and difficulty urination  [FreeTextEntry3] : pt would like son bud to interpret who is present in the room  [TWNoteComboBox1] : Nadine [de-identified] : RUSSELL FORTUNE is a 66 year old female with afib, DM2, CVA with R sided deficits, HLD, anemia of chronic disease, depression here for routein follow up with labs. Ms. FORTUNE feels well today. She has stopped taking mirtazapine and PPI with no recurrence of symptoms. PHQ 9 score is 0. She does not feel depressed.  \par Ms Fortune is now on eliqus for anticoagulation. She was switched from lovenox to elliqus by hematology in 4/2021 and echo from 5/2020 does not show the L atrial thrombus. continues to have severe Mitral stenosis based on echo. She has also been receiving little PT due to covid. Pt reports compliance with rest of medications without any new side effects. \par \par She fell 10-15 days ago while walking with walker at home due to slipping. Fell on R head. no bleeding, no LOC. She also c/o morning blurry vision upon standing from sitting position, that lasted for 1 min, drinking water improves the symptoms. symptoms do not occur the rest of the day. She has not checked her BS or BP during these times. She does take her BP med right before going to bed at night. Son also mentions that she does drink much water all day. \par

## 2021-12-20 NOTE — REVIEW OF SYSTEMS
[Fever] : no fever [Chills] : no chills [Fatigue] : no fatigue [Discharge] : no discharge [Pain] : no pain [Vision Problems] : no vision problems [Earache] : no earache [Hearing Loss] : no hearing loss [Nosebleed] : no nosebleeds [Chest Pain] : no chest pain [Palpitations] : no palpitations [Leg Claudication] : no leg claudication [Lower Ext Edema] : no lower extremity edema [Shortness Of Breath] : no shortness of breath [Wheezing] : no wheezing [Cough] : no cough [Dyspnea on Exertion] : no dyspnea on exertion [Abdominal Pain] : no abdominal pain [Nausea] : no nausea [Constipation] : no constipation [Diarrhea] : diarrhea [Dysuria] : dysuria [Incontinence] : no incontinence [Hematuria] : no hematuria [Frequency] : no frequency [Joint Pain] : no joint pain [Joint Stiffness] : no joint stiffness [Headache] : no headache [Dizziness] : dizziness [Fainting] : no fainting [Confusion] : no confusion [Memory Loss] : no memory loss [Unsteady Walking] : ataxia [Suicidal] : not suicidal [Anxiety] : no anxiety [Depression] : no depression

## 2021-12-20 NOTE — PHYSICAL EXAM
[Normal] : normal rate, regular rhythm, normal S1 and S2 and no murmur heard [No Carotid Bruits] : no carotid bruits [Pedal Pulses Present] : the pedal pulses are present [No Edema] : there was no peripheral edema [Right Foot Was Examined] : Right foot ~C was examined [Left Foot Was Examined] : left foot ~C was examined [None] : no ulcers in either foot were found [] : both feet [de-identified] : soft, non-tender, non-distended, normal bowel sounds.  [TWNoteComboBox3] : +2 [TWNoteComboBox4] : +2

## 2021-12-20 NOTE — HEALTH RISK ASSESSMENT
[0] : 2) Feeling down, depressed, or hopeless: Not at all (0) [Not at All (0)] : 9.) Thoughts that you would be off dead or of hurting yourself in some way? Not at all [PHQ-9 Negative - No further assessment needed] : PHQ-9 Negative - No further assessment needed [[id number]] : 0

## 2022-02-15 ENCOUNTER — RX RENEWAL (OUTPATIENT)
Age: 67
End: 2022-02-15

## 2022-02-16 ENCOUNTER — RX RENEWAL (OUTPATIENT)
Age: 67
End: 2022-02-16

## 2022-02-16 NOTE — REASON FOR VISIT
Patient transferred to specialty bed [Follow-Up: _____] : a [unfilled] follow-up visit [Family Member] : family member [FreeTextEntry1] : mitral valve stenosis

## 2022-03-11 ENCOUNTER — APPOINTMENT (OUTPATIENT)
Dept: INTERNAL MEDICINE | Facility: CLINIC | Age: 67
End: 2022-03-11

## 2022-06-06 ENCOUNTER — OUTPATIENT (OUTPATIENT)
Dept: OUTPATIENT SERVICES | Facility: HOSPITAL | Age: 67
LOS: 1 days | End: 2022-06-06

## 2022-06-06 ENCOUNTER — APPOINTMENT (OUTPATIENT)
Dept: INTERNAL MEDICINE | Facility: CLINIC | Age: 67
End: 2022-06-06
Payer: MEDICAID

## 2022-06-06 VITALS
HEART RATE: 71 BPM | RESPIRATION RATE: 14 BRPM | DIASTOLIC BLOOD PRESSURE: 68 MMHG | HEIGHT: 65 IN | OXYGEN SATURATION: 97 % | SYSTOLIC BLOOD PRESSURE: 128 MMHG | TEMPERATURE: 98.3 F

## 2022-06-06 DIAGNOSIS — Z23 ENCOUNTER FOR IMMUNIZATION: ICD-10-CM

## 2022-06-06 DIAGNOSIS — D63.8 ANEMIA IN OTHER CHRONIC DISEASES CLASSIFIED ELSEWHERE: ICD-10-CM

## 2022-06-06 DIAGNOSIS — Z87.898 PERSONAL HISTORY OF OTHER SPECIFIED CONDITIONS: ICD-10-CM

## 2022-06-06 DIAGNOSIS — K43.9 VENTRAL HERNIA WITHOUT OBSTRUCTION OR GANGRENE: Chronic | ICD-10-CM

## 2022-06-06 DIAGNOSIS — Z00.00 ENCOUNTER FOR GENERAL ADULT MEDICAL EXAMINATION W/OUT ABNORMAL FINDINGS: ICD-10-CM

## 2022-06-06 DIAGNOSIS — H26.9 UNSPECIFIED CATARACT: ICD-10-CM

## 2022-06-06 DIAGNOSIS — Z98.49 CATARACT EXTRACTION STATUS, UNSPECIFIED EYE: Chronic | ICD-10-CM

## 2022-06-06 DIAGNOSIS — Z98.89 OTHER SPECIFIED POSTPROCEDURAL STATES: Chronic | ICD-10-CM

## 2022-06-06 DIAGNOSIS — Z13.820 ENCOUNTER FOR SCREENING FOR OSTEOPOROSIS: ICD-10-CM

## 2022-06-06 DIAGNOSIS — F32.A DEPRESSION, UNSPECIFIED: ICD-10-CM

## 2022-06-06 DIAGNOSIS — Q21.1 ATRIAL SEPTAL DEFECT: ICD-10-CM

## 2022-06-06 DIAGNOSIS — H53.8 OTHER VISUAL DISTURBANCES: ICD-10-CM

## 2022-06-06 PROCEDURE — 99397 PER PM REEVAL EST PAT 65+ YR: CPT | Mod: 25

## 2022-06-10 DIAGNOSIS — E78.5 HYPERLIPIDEMIA, UNSPECIFIED: ICD-10-CM

## 2022-06-10 DIAGNOSIS — F32.A DEPRESSION, UNSPECIFIED: ICD-10-CM

## 2022-06-10 DIAGNOSIS — I63.319 CEREBRAL INFARCTION DUE TO THROMBOSIS OF UNSPECIFIED MIDDLE CEREBRAL ARTERY: ICD-10-CM

## 2022-06-10 DIAGNOSIS — D63.8 ANEMIA IN OTHER CHRONIC DISEASES CLASSIFIED ELSEWHERE: ICD-10-CM

## 2022-06-10 DIAGNOSIS — Z00.00 ENCOUNTER FOR GENERAL ADULT MEDICAL EXAMINATION WITHOUT ABNORMAL FINDINGS: ICD-10-CM

## 2022-06-10 DIAGNOSIS — S09.90XA UNSPECIFIED INJURY OF HEAD, INITIAL ENCOUNTER: ICD-10-CM

## 2022-06-10 DIAGNOSIS — E11.9 TYPE 2 DIABETES MELLITUS WITHOUT COMPLICATIONS: ICD-10-CM

## 2022-06-10 DIAGNOSIS — Z23 ENCOUNTER FOR IMMUNIZATION: ICD-10-CM

## 2022-06-10 DIAGNOSIS — Q21.1 ATRIAL SEPTAL DEFECT: ICD-10-CM

## 2022-06-10 DIAGNOSIS — I48.91 UNSPECIFIED ATRIAL FIBRILLATION: ICD-10-CM

## 2022-06-10 DIAGNOSIS — H26.9 UNSPECIFIED CATARACT: ICD-10-CM

## 2022-06-10 DIAGNOSIS — Z13.820 ENCOUNTER FOR SCREENING FOR OSTEOPOROSIS: ICD-10-CM

## 2022-06-10 PROBLEM — Z87.898 HISTORY OF DYSURIA: Status: RESOLVED | Noted: 2021-12-17 | Resolved: 2022-06-10

## 2022-06-10 PROBLEM — H53.8 BLURRY VISION: Noted: 2021-12-20

## 2022-06-10 LAB
25(OH)D3 SERPL-MCNC: 37.7 NG/ML
ALBUMIN SERPL ELPH-MCNC: 4.6 G/DL
ALP BLD-CCNC: 56 U/L
ALT SERPL-CCNC: 14 U/L
ANION GAP SERPL CALC-SCNC: 14 MMOL/L
AST SERPL-CCNC: 16 U/L
BASOPHILS # BLD AUTO: 0.04 K/UL
BASOPHILS NFR BLD AUTO: 0.4 %
BILIRUB SERPL-MCNC: 0.6 MG/DL
BUN SERPL-MCNC: 15 MG/DL
CALCIUM SERPL-MCNC: 10 MG/DL
CHLORIDE SERPL-SCNC: 102 MMOL/L
CHOLEST SERPL-MCNC: 118 MG/DL
CO2 SERPL-SCNC: 25 MMOL/L
CREAT SERPL-MCNC: 0.7 MG/DL
EGFR: 96 ML/MIN/1.73M2
EOSINOPHIL # BLD AUTO: 0.15 K/UL
EOSINOPHIL NFR BLD AUTO: 1.6 %
ESTIMATED AVERAGE GLUCOSE: 151 MG/DL
GLUCOSE SERPL-MCNC: 169 MG/DL
HBA1C MFR BLD HPLC: 6.9 %
HCT VFR BLD CALC: 34.9 %
HDLC SERPL-MCNC: 48 MG/DL
HGB BLD-MCNC: 11 G/DL
IMM GRANULOCYTES NFR BLD AUTO: 0.2 %
LDLC SERPL CALC-MCNC: 46 MG/DL
LYMPHOCYTES # BLD AUTO: 3.08 K/UL
LYMPHOCYTES NFR BLD AUTO: 32.4 %
MAN DIFF?: NORMAL
MCHC RBC-ENTMCNC: 28.4 PG
MCHC RBC-ENTMCNC: 31.5 GM/DL
MCV RBC AUTO: 89.9 FL
MONOCYTES # BLD AUTO: 0.58 K/UL
MONOCYTES NFR BLD AUTO: 6.1 %
NEUTROPHILS # BLD AUTO: 5.63 K/UL
NEUTROPHILS NFR BLD AUTO: 59.3 %
NONHDLC SERPL-MCNC: 70 MG/DL
PLATELET # BLD AUTO: 213 K/UL
POTASSIUM SERPL-SCNC: 4.4 MMOL/L
PROT SERPL-MCNC: 7.4 G/DL
RBC # BLD: 3.88 M/UL
RBC # FLD: 14.4 %
SODIUM SERPL-SCNC: 141 MMOL/L
TRIGL SERPL-MCNC: 122 MG/DL
TSH SERPL-ACNC: 1.53 UIU/ML
WBC # FLD AUTO: 9.5 K/UL

## 2022-06-10 NOTE — HISTORY OF PRESENT ILLNESS
[FreeTextEntry1] : annual wellness  [de-identified] : RUSSELL FORTUNE is a 66 year old female with afib, DM2, CVA with R sided deficits, HLD, anemia of chronic disease, depression here for CPE. \par Ms. FORTUNE feels well and has no new concerns.\par need med refills\par woud like forms filled out to excuse her from citizenship test because she can not speak or learn English

## 2022-06-10 NOTE — HEALTH RISK ASSESSMENT
[Never] : Never [No] : In the past 12 months have you used drugs other than those required for medical reasons? No [One fall no injury in past year] : Patient reported one fall in the past year without injury [Several Days (1)] : 7.) Trouble concentrating on things, such as reading a newspaper or watching television? Several days [Not at All (0)] : 8.) Moving or speaking so slowly that other people could have noticed, or the opposite, moving or speaking faster than usual? Not at all [Mild] : severity of depression is mild [[id number]] : 3 [Independent] : using telephone [Some assistance needed] : managing finances [Full assistance needed] : using transportation [With Patient/Caregiver] : , with patient/caregiver

## 2022-06-10 NOTE — PHYSICAL EXAM
[No Carotid Bruits] : no carotid bruits [Pedal Pulses Present] : the pedal pulses are present [No Edema] : there was no peripheral edema [Normal] : affect was normal and insight and judgment were intact [Right Foot Was Examined] : Right foot ~C was examined [Left Foot Was Examined] : left foot ~C was examined [None] : no ulcers in either foot were found [] : both feet [de-identified] : wheelchair dep [de-identified] : soft, non-tender, non-distended, normal bowel sounds.  [de-identified] : R sided weakn [de-identified] : RUE and RLE weakness, in wheelchair  [TWNoteComboBox3] : +2 [TWNoteComboBox4] : +2

## 2022-07-28 NOTE — H&P ADULT - MS EXT PE MLT D E PC
----- Message from Taco Oden sent at 7/28/2022  3:09 PM EDT -----  Subject: Message to Provider    QUESTIONS  Information for Provider? Patient mom name Yves Prasad call on behalf of   daughter who is the patient need vaccination records for patient for   school , the vaccination records need to be submitted by the 8/25/22   please call Mom at [phone] , need records for patient need instruction   how to retrieve vaccination records whether send directly to school or can    a copy   ---------------------------------------------------------------------------  --------------  [address]  [phone]; OK to leave message on voicemail  ---------------------------------------------------------------------------  --------------  SCRIPT ANSWERS  Relationship to Patient? Parent  Representative Name? Cooper Gruber  Patient is under 25 and the Parent has custody? No  Is the Representative on the appropriate HIPAA document in Epic?  Yes
Mailed to patient
no pedal edema/normal/no cyanosis/no clubbing

## 2022-08-10 NOTE — PATIENT PROFILE ADULT - NSPROGENSOURCEINFO_GEN_A_NUR
family Itraconazole Counseling:  I discussed with the patient the risks of itraconazole including but not limited to liver damage, nausea/vomiting, neuropathy, and severe allergy.  The patient understands that this medication is best absorbed when taken with acidic beverages such as non-diet cola or ginger ale.  The patient understands that monitoring is required including baseline LFTs and repeat LFTs at intervals.  The patient understands that they are to contact us or the primary physician if concerning signs are noted.

## 2022-08-27 NOTE — ED ADULT NURSE NOTE - DISCHARGE DATE/TIME
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 Pt will be placed in the main ED for complete evaluation by another provider.
29-Sep-2020 05:14

## 2022-11-29 ENCOUNTER — NON-APPOINTMENT (OUTPATIENT)
Age: 67
End: 2022-11-29

## 2022-12-05 ENCOUNTER — APPOINTMENT (OUTPATIENT)
Dept: INTERNAL MEDICINE | Facility: CLINIC | Age: 67
End: 2022-12-05

## 2022-12-05 ENCOUNTER — OUTPATIENT (OUTPATIENT)
Dept: OUTPATIENT SERVICES | Facility: HOSPITAL | Age: 67
LOS: 1 days | End: 2022-12-05

## 2022-12-05 ENCOUNTER — MED ADMIN CHARGE (OUTPATIENT)
Age: 67
End: 2022-12-05

## 2022-12-05 VITALS
HEIGHT: 65 IN | SYSTOLIC BLOOD PRESSURE: 130 MMHG | HEART RATE: 51 BPM | TEMPERATURE: 98.4 F | BODY MASS INDEX: 19.49 KG/M2 | WEIGHT: 117 LBS | DIASTOLIC BLOOD PRESSURE: 66 MMHG | OXYGEN SATURATION: 99 %

## 2022-12-05 DIAGNOSIS — K59.09 OTHER CONSTIPATION: ICD-10-CM

## 2022-12-05 DIAGNOSIS — E78.5 HYPERLIPIDEMIA, UNSPECIFIED: ICD-10-CM

## 2022-12-05 DIAGNOSIS — E11.9 TYPE 2 DIABETES MELLITUS W/OUT COMPLICATIONS: ICD-10-CM

## 2022-12-05 DIAGNOSIS — S09.90XA UNSPECIFIED INJURY OF HEAD, INITIAL ENCOUNTER: ICD-10-CM

## 2022-12-05 DIAGNOSIS — I48.91 UNSPECIFIED ATRIAL FIBRILLATION: ICD-10-CM

## 2022-12-05 DIAGNOSIS — J31.0 CHRONIC RHINITIS: ICD-10-CM

## 2022-12-05 PROCEDURE — 99214 OFFICE O/P EST MOD 30 MIN: CPT

## 2022-12-05 RX ORDER — APIXABAN 5 MG/1
5 TABLET, FILM COATED ORAL
Qty: 180 | Refills: 3 | Status: DISCONTINUED | COMMUNITY
Start: 2021-08-23 | End: 2022-12-05

## 2022-12-07 LAB
ALBUMIN SERPL ELPH-MCNC: 4.3 G/DL
ALP BLD-CCNC: 67 U/L
ALT SERPL-CCNC: 12 U/L
ANION GAP SERPL CALC-SCNC: 16 MMOL/L
AST SERPL-CCNC: 18 U/L
BILIRUB SERPL-MCNC: 0.6 MG/DL
BUN SERPL-MCNC: 18 MG/DL
CALCIUM SERPL-MCNC: 10.2 MG/DL
CHLORIDE SERPL-SCNC: 100 MMOL/L
CHOLEST SERPL-MCNC: 133 MG/DL
CO2 SERPL-SCNC: 24 MMOL/L
CREAT SERPL-MCNC: 0.7 MG/DL
CREAT SPEC-SCNC: 34 MG/DL
EGFR: 96 ML/MIN/1.73M2
ESTIMATED AVERAGE GLUCOSE: 203 MG/DL
GLUCOSE SERPL-MCNC: 243 MG/DL
HBA1C MFR BLD HPLC: 8.7 %
HDLC SERPL-MCNC: 47 MG/DL
LDLC SERPL CALC-MCNC: 49 MG/DL
MICROALBUMIN 24H UR DL<=1MG/L-MCNC: 3.7 MG/DL
MICROALBUMIN/CREAT 24H UR-RTO: 107 MG/G
NONHDLC SERPL-MCNC: 86 MG/DL
POTASSIUM SERPL-SCNC: 5.2 MMOL/L
PROT SERPL-MCNC: 7.1 G/DL
SODIUM SERPL-SCNC: 140 MMOL/L
TRIGL SERPL-MCNC: 181 MG/DL
VIT B12 SERPL-MCNC: 713 PG/ML

## 2022-12-09 DIAGNOSIS — I48.91 UNSPECIFIED ATRIAL FIBRILLATION: ICD-10-CM

## 2022-12-09 DIAGNOSIS — E11.9 TYPE 2 DIABETES MELLITUS WITHOUT COMPLICATIONS: ICD-10-CM

## 2022-12-09 DIAGNOSIS — K59.09 OTHER CONSTIPATION: ICD-10-CM

## 2022-12-09 DIAGNOSIS — I63.319 CEREBRAL INFARCTION DUE TO THROMBOSIS OF UNSPECIFIED MIDDLE CEREBRAL ARTERY: ICD-10-CM

## 2022-12-09 DIAGNOSIS — J31.0 CHRONIC RHINITIS: ICD-10-CM

## 2022-12-09 DIAGNOSIS — E78.5 HYPERLIPIDEMIA, UNSPECIFIED: ICD-10-CM

## 2022-12-09 NOTE — HISTORY OF PRESENT ILLNESS
[FreeTextEntry1] : follow up on chronic medication conditions.  [de-identified] : RUSSELL FORTUNE is a 66 year old female with afib, DM2, CVA with R sided deficits, HLD, anemia of chronic disease, depression here for follow up on chronic medication conditions. \par Ms. FORTUNE feels well and has no new concerns.\par need med refills\par due for labs

## 2023-01-01 ENCOUNTER — TRANSCRIPTION ENCOUNTER (OUTPATIENT)
Age: 68
End: 2023-01-01

## 2023-01-01 ENCOUNTER — APPOINTMENT (OUTPATIENT)
Dept: INTERNAL MEDICINE | Facility: CLINIC | Age: 68
End: 2023-01-01

## 2023-01-01 ENCOUNTER — APPOINTMENT (OUTPATIENT)
Dept: PULMONOLOGY | Facility: CLINIC | Age: 68
End: 2023-01-01
Payer: MEDICAID

## 2023-01-01 ENCOUNTER — APPOINTMENT (OUTPATIENT)
Dept: RADIOLOGY | Facility: CLINIC | Age: 68
End: 2023-01-01

## 2023-01-01 ENCOUNTER — APPOINTMENT (OUTPATIENT)
Dept: INTERNAL MEDICINE | Facility: CLINIC | Age: 68
End: 2023-01-01
Payer: MEDICAID

## 2023-01-01 ENCOUNTER — OUTPATIENT (OUTPATIENT)
Dept: OUTPATIENT SERVICES | Facility: HOSPITAL | Age: 68
LOS: 1 days | End: 2023-01-01

## 2023-01-01 ENCOUNTER — INPATIENT (INPATIENT)
Facility: HOSPITAL | Age: 68
LOS: 12 days | Discharge: HOME HEALTH | End: 2023-12-08
Attending: INTERNAL MEDICINE | Admitting: INTERNAL MEDICINE
Payer: MEDICARE

## 2023-01-01 VITALS
SYSTOLIC BLOOD PRESSURE: 152 MMHG | HEART RATE: 78 BPM | OXYGEN SATURATION: 98 % | DIASTOLIC BLOOD PRESSURE: 83 MMHG | TEMPERATURE: 98.4 F | HEIGHT: 65 IN | RESPIRATION RATE: 16 BRPM

## 2023-01-01 VITALS
RESPIRATION RATE: 18 BRPM | HEART RATE: 81 BPM | TEMPERATURE: 99 F | OXYGEN SATURATION: 100 % | SYSTOLIC BLOOD PRESSURE: 152 MMHG | DIASTOLIC BLOOD PRESSURE: 80 MMHG

## 2023-01-01 VITALS
DIASTOLIC BLOOD PRESSURE: 77 MMHG | SYSTOLIC BLOOD PRESSURE: 129 MMHG | HEART RATE: 71 BPM | RESPIRATION RATE: 18 BRPM | OXYGEN SATURATION: 99 % | TEMPERATURE: 98 F

## 2023-01-01 DIAGNOSIS — J02.8 ACUTE PHARYNGITIS DUE TO OTHER SPECIFIED ORGANISMS: ICD-10-CM

## 2023-01-01 DIAGNOSIS — I10 ESSENTIAL (PRIMARY) HYPERTENSION: ICD-10-CM

## 2023-01-01 DIAGNOSIS — Z98.89 OTHER SPECIFIED POSTPROCEDURAL STATES: Chronic | ICD-10-CM

## 2023-01-01 DIAGNOSIS — Z98.49 CATARACT EXTRACTION STATUS, UNSPECIFIED EYE: Chronic | ICD-10-CM

## 2023-01-01 DIAGNOSIS — E11.9 TYPE 2 DIABETES MELLITUS WITHOUT COMPLICATIONS: ICD-10-CM

## 2023-01-01 DIAGNOSIS — R91.8 OTHER NONSPECIFIC ABNORMAL FINDING OF LUNG FIELD: ICD-10-CM

## 2023-01-01 DIAGNOSIS — J98.11 ATELECTASIS: ICD-10-CM

## 2023-01-01 DIAGNOSIS — R56.9 UNSPECIFIED CONVULSIONS: ICD-10-CM
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
## 2023-01-01 DIAGNOSIS — Z74.09 OTHER REDUCED MOBILITY: ICD-10-CM

## 2023-01-01 DIAGNOSIS — D72.829 ELEVATED WHITE BLOOD CELL COUNT, UNSPECIFIED: ICD-10-CM

## 2023-01-01 DIAGNOSIS — I48.20 CHRONIC ATRIAL FIBRILLATION, UNSPECIFIED: ICD-10-CM

## 2023-01-01 DIAGNOSIS — I63.9 CEREBRAL INFARCTION, UNSPECIFIED: ICD-10-CM

## 2023-01-01 DIAGNOSIS — K43.9 VENTRAL HERNIA WITHOUT OBSTRUCTION OR GANGRENE: Chronic | ICD-10-CM

## 2023-01-01 DIAGNOSIS — B97.89 ACUTE PHARYNGITIS DUE TO OTHER SPECIFIED ORGANISMS: ICD-10-CM

## 2023-01-01 DIAGNOSIS — I63.319 CEREBRAL INFARCTION DUE TO THROMBOSIS OF UNSPECIFIED MIDDLE CEREBRAL ARTERY: ICD-10-CM

## 2023-01-01 DIAGNOSIS — I05.9 RHEUMATIC MITRAL VALVE DISEASE, UNSPECIFIED: ICD-10-CM

## 2023-01-01 DIAGNOSIS — Z98.890 OTHER SPECIFIED POSTPROCEDURAL STATES: Chronic | ICD-10-CM

## 2023-01-01 DIAGNOSIS — D64.9 ANEMIA, UNSPECIFIED: ICD-10-CM

## 2023-01-01 DIAGNOSIS — Z29.9 ENCOUNTER FOR PROPHYLACTIC MEASURES, UNSPECIFIED: ICD-10-CM

## 2023-01-01 DIAGNOSIS — R59.1 GENERALIZED ENLARGED LYMPH NODES: ICD-10-CM

## 2023-01-01 DIAGNOSIS — I82.90 ACUTE EMBOLISM AND THROMBOSIS OF UNSPECIFIED VEIN: ICD-10-CM

## 2023-01-01 DIAGNOSIS — G93.41 METABOLIC ENCEPHALOPATHY: ICD-10-CM

## 2023-01-01 LAB
A1C WITH ESTIMATED AVERAGE GLUCOSE RESULT: 7.9 % — HIGH (ref 4–5.6)
A1C WITH ESTIMATED AVERAGE GLUCOSE RESULT: 7.9 % — HIGH (ref 4–5.6)
ALBUMIN SERPL ELPH-MCNC: 3.2 G/DL — LOW (ref 3.3–5)
ALBUMIN SERPL ELPH-MCNC: 3.5 G/DL — SIGNIFICANT CHANGE UP (ref 3.3–5)
ALBUMIN SERPL ELPH-MCNC: 3.8 G/DL — SIGNIFICANT CHANGE UP (ref 3.3–5)
ALBUMIN SERPL ELPH-MCNC: 3.8 G/DL — SIGNIFICANT CHANGE UP (ref 3.3–5)
ALP SERPL-CCNC: 56 U/L — SIGNIFICANT CHANGE UP (ref 40–120)
ALP SERPL-CCNC: 57 U/L — SIGNIFICANT CHANGE UP (ref 40–120)
ALP SERPL-CCNC: 57 U/L — SIGNIFICANT CHANGE UP (ref 40–120)
ALP SERPL-CCNC: 58 U/L — SIGNIFICANT CHANGE UP (ref 40–120)
ALP SERPL-CCNC: 58 U/L — SIGNIFICANT CHANGE UP (ref 40–120)
ALP SERPL-CCNC: 61 U/L — SIGNIFICANT CHANGE UP (ref 40–120)
ALP SERPL-CCNC: 61 U/L — SIGNIFICANT CHANGE UP (ref 40–120)
ALP SERPL-CCNC: 64 U/L — SIGNIFICANT CHANGE UP (ref 40–120)
ALP SERPL-CCNC: 64 U/L — SIGNIFICANT CHANGE UP (ref 40–120)
ALT FLD-CCNC: 16 U/L — SIGNIFICANT CHANGE UP (ref 4–33)
ALT FLD-CCNC: 16 U/L — SIGNIFICANT CHANGE UP (ref 4–33)
ALT FLD-CCNC: 18 U/L — SIGNIFICANT CHANGE UP (ref 4–33)
ALT FLD-CCNC: 18 U/L — SIGNIFICANT CHANGE UP (ref 4–33)
ALT FLD-CCNC: 23 U/L — SIGNIFICANT CHANGE UP (ref 4–33)
ALT FLD-CCNC: 23 U/L — SIGNIFICANT CHANGE UP (ref 4–33)
ALT FLD-CCNC: 30 U/L — SIGNIFICANT CHANGE UP (ref 4–33)
ALT FLD-CCNC: 32 U/L — SIGNIFICANT CHANGE UP (ref 4–33)
ALT FLD-CCNC: 32 U/L — SIGNIFICANT CHANGE UP (ref 4–33)
ANION GAP SERPL CALC-SCNC: 10 MMOL/L — SIGNIFICANT CHANGE UP (ref 7–14)
ANION GAP SERPL CALC-SCNC: 11 MMOL/L — SIGNIFICANT CHANGE UP (ref 7–14)
ANION GAP SERPL CALC-SCNC: 12 MMOL/L — SIGNIFICANT CHANGE UP (ref 7–14)
ANION GAP SERPL CALC-SCNC: 12 MMOL/L — SIGNIFICANT CHANGE UP (ref 7–14)
ANION GAP SERPL CALC-SCNC: 13 MMOL/L — SIGNIFICANT CHANGE UP (ref 7–14)
ANION GAP SERPL CALC-SCNC: 13 MMOL/L — SIGNIFICANT CHANGE UP (ref 7–14)
ANION GAP SERPL CALC-SCNC: 8 MMOL/L — SIGNIFICANT CHANGE UP (ref 7–14)
ANION GAP SERPL CALC-SCNC: 8 MMOL/L — SIGNIFICANT CHANGE UP (ref 7–14)
ANION GAP SERPL CALC-SCNC: 9 MMOL/L — SIGNIFICANT CHANGE UP (ref 7–14)
APTT BLD: 40.7 SEC — HIGH (ref 24.5–35.6)
APTT BLD: 40.7 SEC — HIGH (ref 24.5–35.6)
AST SERPL-CCNC: 14 U/L — SIGNIFICANT CHANGE UP (ref 4–32)
AST SERPL-CCNC: 14 U/L — SIGNIFICANT CHANGE UP (ref 4–32)
AST SERPL-CCNC: 16 U/L — SIGNIFICANT CHANGE UP (ref 4–32)
AST SERPL-CCNC: 16 U/L — SIGNIFICANT CHANGE UP (ref 4–32)
AST SERPL-CCNC: 18 U/L — SIGNIFICANT CHANGE UP (ref 4–32)
AST SERPL-CCNC: 18 U/L — SIGNIFICANT CHANGE UP (ref 4–32)
AST SERPL-CCNC: 21 U/L — SIGNIFICANT CHANGE UP (ref 4–32)
AST SERPL-CCNC: 21 U/L — SIGNIFICANT CHANGE UP (ref 4–32)
AST SERPL-CCNC: 24 U/L — SIGNIFICANT CHANGE UP (ref 4–32)
AST SERPL-CCNC: 24 U/L — SIGNIFICANT CHANGE UP (ref 4–32)
AST SERPL-CCNC: 29 U/L — SIGNIFICANT CHANGE UP (ref 4–32)
AST SERPL-CCNC: 29 U/L — SIGNIFICANT CHANGE UP (ref 4–32)
BASOPHILS # BLD AUTO: 0.04 K/UL — SIGNIFICANT CHANGE UP (ref 0–0.2)
BASOPHILS # BLD AUTO: 0.04 K/UL — SIGNIFICANT CHANGE UP (ref 0–0.2)
BASOPHILS # BLD AUTO: 0.05 K/UL — SIGNIFICANT CHANGE UP (ref 0–0.2)
BASOPHILS # BLD AUTO: 0.06 K/UL — SIGNIFICANT CHANGE UP (ref 0–0.2)
BASOPHILS NFR BLD AUTO: 0.3 % — SIGNIFICANT CHANGE UP (ref 0–2)
BASOPHILS NFR BLD AUTO: 0.3 % — SIGNIFICANT CHANGE UP (ref 0–2)
BASOPHILS NFR BLD AUTO: 0.4 % — SIGNIFICANT CHANGE UP (ref 0–2)
BASOPHILS NFR BLD AUTO: 0.4 % — SIGNIFICANT CHANGE UP (ref 0–2)
BASOPHILS NFR BLD AUTO: 0.5 % — SIGNIFICANT CHANGE UP (ref 0–2)
BASOPHILS NFR BLD AUTO: 0.6 % — SIGNIFICANT CHANGE UP (ref 0–2)
BILIRUB SERPL-MCNC: 0.4 MG/DL — SIGNIFICANT CHANGE UP (ref 0.2–1.2)
BILIRUB SERPL-MCNC: 0.5 MG/DL — SIGNIFICANT CHANGE UP (ref 0.2–1.2)
BILIRUB SERPL-MCNC: 0.5 MG/DL — SIGNIFICANT CHANGE UP (ref 0.2–1.2)
BUN SERPL-MCNC: 11 MG/DL — SIGNIFICANT CHANGE UP (ref 7–23)
BUN SERPL-MCNC: 11 MG/DL — SIGNIFICANT CHANGE UP (ref 7–23)
BUN SERPL-MCNC: 12 MG/DL — SIGNIFICANT CHANGE UP (ref 7–23)
BUN SERPL-MCNC: 12 MG/DL — SIGNIFICANT CHANGE UP (ref 7–23)
BUN SERPL-MCNC: 14 MG/DL — SIGNIFICANT CHANGE UP (ref 7–23)
BUN SERPL-MCNC: 14 MG/DL — SIGNIFICANT CHANGE UP (ref 7–23)
BUN SERPL-MCNC: 15 MG/DL — SIGNIFICANT CHANGE UP (ref 7–23)
BUN SERPL-MCNC: 15 MG/DL — SIGNIFICANT CHANGE UP (ref 7–23)
BUN SERPL-MCNC: 17 MG/DL — SIGNIFICANT CHANGE UP (ref 7–23)
BUN SERPL-MCNC: 17 MG/DL — SIGNIFICANT CHANGE UP (ref 7–23)
BUN SERPL-MCNC: 18 MG/DL — SIGNIFICANT CHANGE UP (ref 7–23)
BUN SERPL-MCNC: 19 MG/DL — SIGNIFICANT CHANGE UP (ref 7–23)
BUN SERPL-MCNC: 22 MG/DL — SIGNIFICANT CHANGE UP (ref 7–23)
BUN SERPL-MCNC: 22 MG/DL — SIGNIFICANT CHANGE UP (ref 7–23)
BUN SERPL-MCNC: 24 MG/DL — HIGH (ref 7–23)
BUN SERPL-MCNC: 24 MG/DL — HIGH (ref 7–23)
CALCIUM SERPL-MCNC: 8.9 MG/DL — SIGNIFICANT CHANGE UP (ref 8.4–10.5)
CALCIUM SERPL-MCNC: 9.1 MG/DL — SIGNIFICANT CHANGE UP (ref 8.4–10.5)
CALCIUM SERPL-MCNC: 9.2 MG/DL — SIGNIFICANT CHANGE UP (ref 8.4–10.5)
CALCIUM SERPL-MCNC: 9.2 MG/DL — SIGNIFICANT CHANGE UP (ref 8.4–10.5)
CALCIUM SERPL-MCNC: 9.3 MG/DL — SIGNIFICANT CHANGE UP (ref 8.4–10.5)
CALCIUM SERPL-MCNC: 9.4 MG/DL — SIGNIFICANT CHANGE UP (ref 8.4–10.5)
CALCIUM SERPL-MCNC: 9.5 MG/DL — SIGNIFICANT CHANGE UP (ref 8.4–10.5)
CALCIUM SERPL-MCNC: 9.5 MG/DL — SIGNIFICANT CHANGE UP (ref 8.4–10.5)
CALCIUM SERPL-MCNC: 9.9 MG/DL — SIGNIFICANT CHANGE UP (ref 8.4–10.5)
CALCIUM SERPL-MCNC: 9.9 MG/DL — SIGNIFICANT CHANGE UP (ref 8.4–10.5)
CHLORIDE SERPL-SCNC: 100 MMOL/L — SIGNIFICANT CHANGE UP (ref 98–107)
CHLORIDE SERPL-SCNC: 100 MMOL/L — SIGNIFICANT CHANGE UP (ref 98–107)
CHLORIDE SERPL-SCNC: 101 MMOL/L — SIGNIFICANT CHANGE UP (ref 98–107)
CHLORIDE SERPL-SCNC: 102 MMOL/L — SIGNIFICANT CHANGE UP (ref 98–107)
CHLORIDE SERPL-SCNC: 103 MMOL/L — SIGNIFICANT CHANGE UP (ref 98–107)
CHLORIDE SERPL-SCNC: 95 MMOL/L — LOW (ref 98–107)
CHLORIDE SERPL-SCNC: 95 MMOL/L — LOW (ref 98–107)
CHLORIDE SERPL-SCNC: 99 MMOL/L — SIGNIFICANT CHANGE UP (ref 98–107)
CHLORIDE SERPL-SCNC: 99 MMOL/L — SIGNIFICANT CHANGE UP (ref 98–107)
CHOLEST SERPL-MCNC: 102 MG/DL — SIGNIFICANT CHANGE UP
CHOLEST SERPL-MCNC: 102 MG/DL — SIGNIFICANT CHANGE UP
CO2 SERPL-SCNC: 24 MMOL/L — SIGNIFICANT CHANGE UP (ref 22–31)
CO2 SERPL-SCNC: 25 MMOL/L — SIGNIFICANT CHANGE UP (ref 22–31)
CO2 SERPL-SCNC: 25 MMOL/L — SIGNIFICANT CHANGE UP (ref 22–31)
CO2 SERPL-SCNC: 26 MMOL/L — SIGNIFICANT CHANGE UP (ref 22–31)
CO2 SERPL-SCNC: 27 MMOL/L — SIGNIFICANT CHANGE UP (ref 22–31)
CO2 SERPL-SCNC: 29 MMOL/L — SIGNIFICANT CHANGE UP (ref 22–31)
CREAT SERPL-MCNC: 0.6 MG/DL — SIGNIFICANT CHANGE UP (ref 0.5–1.3)
CREAT SERPL-MCNC: 0.6 MG/DL — SIGNIFICANT CHANGE UP (ref 0.5–1.3)
CREAT SERPL-MCNC: 0.7 MG/DL — SIGNIFICANT CHANGE UP (ref 0.5–1.3)
CREAT SERPL-MCNC: 0.7 MG/DL — SIGNIFICANT CHANGE UP (ref 0.5–1.3)
CREAT SERPL-MCNC: 0.72 MG/DL — SIGNIFICANT CHANGE UP (ref 0.5–1.3)
CREAT SERPL-MCNC: 0.72 MG/DL — SIGNIFICANT CHANGE UP (ref 0.5–1.3)
CREAT SERPL-MCNC: 0.73 MG/DL — SIGNIFICANT CHANGE UP (ref 0.5–1.3)
CREAT SERPL-MCNC: 0.74 MG/DL — SIGNIFICANT CHANGE UP (ref 0.5–1.3)
CREAT SERPL-MCNC: 0.78 MG/DL — SIGNIFICANT CHANGE UP (ref 0.5–1.3)
CREAT SERPL-MCNC: 0.78 MG/DL — SIGNIFICANT CHANGE UP (ref 0.5–1.3)
CREAT SERPL-MCNC: 0.79 MG/DL — SIGNIFICANT CHANGE UP (ref 0.5–1.3)
CREAT SERPL-MCNC: 0.79 MG/DL — SIGNIFICANT CHANGE UP (ref 0.5–1.3)
CREAT SERPL-MCNC: 0.8 MG/DL — SIGNIFICANT CHANGE UP (ref 0.5–1.3)
CREAT SERPL-MCNC: 0.8 MG/DL — SIGNIFICANT CHANGE UP (ref 0.5–1.3)
CREAT SERPL-MCNC: 0.83 MG/DL — SIGNIFICANT CHANGE UP (ref 0.5–1.3)
CREAT SERPL-MCNC: 0.83 MG/DL — SIGNIFICANT CHANGE UP (ref 0.5–1.3)
CREAT SERPL-MCNC: 0.85 MG/DL — SIGNIFICANT CHANGE UP (ref 0.5–1.3)
CREAT SERPL-MCNC: 0.85 MG/DL — SIGNIFICANT CHANGE UP (ref 0.5–1.3)
EGFR: 76 ML/MIN/1.73M2 — SIGNIFICANT CHANGE UP
EGFR: 76 ML/MIN/1.73M2 — SIGNIFICANT CHANGE UP
EGFR: 78 ML/MIN/1.73M2 — SIGNIFICANT CHANGE UP
EGFR: 78 ML/MIN/1.73M2 — SIGNIFICANT CHANGE UP
EGFR: 81 ML/MIN/1.73M2 — SIGNIFICANT CHANGE UP
EGFR: 81 ML/MIN/1.73M2 — SIGNIFICANT CHANGE UP
EGFR: 82 ML/MIN/1.73M2 — SIGNIFICANT CHANGE UP
EGFR: 82 ML/MIN/1.73M2 — SIGNIFICANT CHANGE UP
EGFR: 84 ML/MIN/1.73M2 — SIGNIFICANT CHANGE UP
EGFR: 84 ML/MIN/1.73M2 — SIGNIFICANT CHANGE UP
EGFR: 89 ML/MIN/1.73M2 — SIGNIFICANT CHANGE UP
EGFR: 91 ML/MIN/1.73M2 — SIGNIFICANT CHANGE UP
EGFR: 92 ML/MIN/1.73M2 — SIGNIFICANT CHANGE UP
EGFR: 92 ML/MIN/1.73M2 — SIGNIFICANT CHANGE UP
EGFR: 95 ML/MIN/1.73M2 — SIGNIFICANT CHANGE UP
EGFR: 95 ML/MIN/1.73M2 — SIGNIFICANT CHANGE UP
EGFR: 99 ML/MIN/1.73M2 — SIGNIFICANT CHANGE UP
EGFR: 99 ML/MIN/1.73M2 — SIGNIFICANT CHANGE UP
EOSINOPHIL # BLD AUTO: 0.24 K/UL — SIGNIFICANT CHANGE UP (ref 0–0.5)
EOSINOPHIL # BLD AUTO: 0.24 K/UL — SIGNIFICANT CHANGE UP (ref 0–0.5)
EOSINOPHIL # BLD AUTO: 0.29 K/UL — SIGNIFICANT CHANGE UP (ref 0–0.5)
EOSINOPHIL # BLD AUTO: 0.3 K/UL — SIGNIFICANT CHANGE UP (ref 0–0.5)
EOSINOPHIL # BLD AUTO: 0.3 K/UL — SIGNIFICANT CHANGE UP (ref 0–0.5)
EOSINOPHIL # BLD AUTO: 0.35 K/UL — SIGNIFICANT CHANGE UP (ref 0–0.5)
EOSINOPHIL # BLD AUTO: 0.35 K/UL — SIGNIFICANT CHANGE UP (ref 0–0.5)
EOSINOPHIL # BLD AUTO: 0.4 K/UL — SIGNIFICANT CHANGE UP (ref 0–0.5)
EOSINOPHIL # BLD AUTO: 0.4 K/UL — SIGNIFICANT CHANGE UP (ref 0–0.5)
EOSINOPHIL NFR BLD AUTO: 1.6 % — SIGNIFICANT CHANGE UP (ref 0–6)
EOSINOPHIL NFR BLD AUTO: 1.6 % — SIGNIFICANT CHANGE UP (ref 0–6)
EOSINOPHIL NFR BLD AUTO: 2.6 % — SIGNIFICANT CHANGE UP (ref 0–6)
EOSINOPHIL NFR BLD AUTO: 2.6 % — SIGNIFICANT CHANGE UP (ref 0–6)
EOSINOPHIL NFR BLD AUTO: 2.9 % — SIGNIFICANT CHANGE UP (ref 0–6)
EOSINOPHIL NFR BLD AUTO: 3.4 % — SIGNIFICANT CHANGE UP (ref 0–6)
EOSINOPHIL NFR BLD AUTO: 3.4 % — SIGNIFICANT CHANGE UP (ref 0–6)
EOSINOPHIL NFR BLD AUTO: 3.7 % — SIGNIFICANT CHANGE UP (ref 0–6)
EOSINOPHIL NFR BLD AUTO: 3.7 % — SIGNIFICANT CHANGE UP (ref 0–6)
ESTIMATED AVERAGE GLUCOSE: 180 — SIGNIFICANT CHANGE UP
ESTIMATED AVERAGE GLUCOSE: 180 — SIGNIFICANT CHANGE UP
FERRITIN SERPL-MCNC: 85 NG/ML — SIGNIFICANT CHANGE UP (ref 13–330)
FERRITIN SERPL-MCNC: 85 NG/ML — SIGNIFICANT CHANGE UP (ref 13–330)
GAMMA INTERFERON BACKGROUND BLD IA-ACNC: 0.06 IU/ML — SIGNIFICANT CHANGE UP
GAMMA INTERFERON BACKGROUND BLD IA-ACNC: 0.06 IU/ML — SIGNIFICANT CHANGE UP
GLUCOSE BLDC GLUCOMTR-MCNC: 109 MG/DL — HIGH (ref 70–99)
GLUCOSE BLDC GLUCOMTR-MCNC: 109 MG/DL — HIGH (ref 70–99)
GLUCOSE BLDC GLUCOMTR-MCNC: 110 MG/DL — HIGH (ref 70–99)
GLUCOSE BLDC GLUCOMTR-MCNC: 110 MG/DL — HIGH (ref 70–99)
GLUCOSE BLDC GLUCOMTR-MCNC: 118 MG/DL — HIGH (ref 70–99)
GLUCOSE BLDC GLUCOMTR-MCNC: 118 MG/DL — HIGH (ref 70–99)
GLUCOSE BLDC GLUCOMTR-MCNC: 124 MG/DL — HIGH (ref 70–99)
GLUCOSE BLDC GLUCOMTR-MCNC: 124 MG/DL — HIGH (ref 70–99)
GLUCOSE BLDC GLUCOMTR-MCNC: 129 MG/DL — HIGH (ref 70–99)
GLUCOSE BLDC GLUCOMTR-MCNC: 135 MG/DL — HIGH (ref 70–99)
GLUCOSE BLDC GLUCOMTR-MCNC: 135 MG/DL — HIGH (ref 70–99)
GLUCOSE BLDC GLUCOMTR-MCNC: 138 MG/DL — HIGH (ref 70–99)
GLUCOSE BLDC GLUCOMTR-MCNC: 138 MG/DL — HIGH (ref 70–99)
GLUCOSE BLDC GLUCOMTR-MCNC: 139 MG/DL — HIGH (ref 70–99)
GLUCOSE BLDC GLUCOMTR-MCNC: 140 MG/DL — HIGH (ref 70–99)
GLUCOSE BLDC GLUCOMTR-MCNC: 140 MG/DL — HIGH (ref 70–99)
GLUCOSE BLDC GLUCOMTR-MCNC: 142 MG/DL — HIGH (ref 70–99)
GLUCOSE BLDC GLUCOMTR-MCNC: 142 MG/DL — HIGH (ref 70–99)
GLUCOSE BLDC GLUCOMTR-MCNC: 143 MG/DL — HIGH (ref 70–99)
GLUCOSE BLDC GLUCOMTR-MCNC: 143 MG/DL — HIGH (ref 70–99)
GLUCOSE BLDC GLUCOMTR-MCNC: 146 MG/DL — HIGH (ref 70–99)
GLUCOSE BLDC GLUCOMTR-MCNC: 146 MG/DL — HIGH (ref 70–99)
GLUCOSE BLDC GLUCOMTR-MCNC: 147 MG/DL — HIGH (ref 70–99)
GLUCOSE BLDC GLUCOMTR-MCNC: 147 MG/DL — HIGH (ref 70–99)
GLUCOSE BLDC GLUCOMTR-MCNC: 148 MG/DL — HIGH (ref 70–99)
GLUCOSE BLDC GLUCOMTR-MCNC: 148 MG/DL — HIGH (ref 70–99)
GLUCOSE BLDC GLUCOMTR-MCNC: 150 MG/DL — HIGH (ref 70–99)
GLUCOSE BLDC GLUCOMTR-MCNC: 150 MG/DL — HIGH (ref 70–99)
GLUCOSE BLDC GLUCOMTR-MCNC: 152 MG/DL — HIGH (ref 70–99)
GLUCOSE BLDC GLUCOMTR-MCNC: 152 MG/DL — HIGH (ref 70–99)
GLUCOSE BLDC GLUCOMTR-MCNC: 155 MG/DL — HIGH (ref 70–99)
GLUCOSE BLDC GLUCOMTR-MCNC: 158 MG/DL — HIGH (ref 70–99)
GLUCOSE BLDC GLUCOMTR-MCNC: 158 MG/DL — HIGH (ref 70–99)
GLUCOSE BLDC GLUCOMTR-MCNC: 159 MG/DL — HIGH (ref 70–99)
GLUCOSE BLDC GLUCOMTR-MCNC: 159 MG/DL — HIGH (ref 70–99)
GLUCOSE BLDC GLUCOMTR-MCNC: 160 MG/DL — HIGH (ref 70–99)
GLUCOSE BLDC GLUCOMTR-MCNC: 160 MG/DL — HIGH (ref 70–99)
GLUCOSE BLDC GLUCOMTR-MCNC: 162 MG/DL — HIGH (ref 70–99)
GLUCOSE BLDC GLUCOMTR-MCNC: 162 MG/DL — HIGH (ref 70–99)
GLUCOSE BLDC GLUCOMTR-MCNC: 171 MG/DL — HIGH (ref 70–99)
GLUCOSE BLDC GLUCOMTR-MCNC: 171 MG/DL — HIGH (ref 70–99)
GLUCOSE BLDC GLUCOMTR-MCNC: 172 MG/DL — HIGH (ref 70–99)
GLUCOSE BLDC GLUCOMTR-MCNC: 172 MG/DL — HIGH (ref 70–99)
GLUCOSE BLDC GLUCOMTR-MCNC: 173 MG/DL — HIGH (ref 70–99)
GLUCOSE BLDC GLUCOMTR-MCNC: 173 MG/DL — HIGH (ref 70–99)
GLUCOSE BLDC GLUCOMTR-MCNC: 174 MG/DL — HIGH (ref 70–99)
GLUCOSE BLDC GLUCOMTR-MCNC: 178 MG/DL — HIGH (ref 70–99)
GLUCOSE BLDC GLUCOMTR-MCNC: 178 MG/DL — HIGH (ref 70–99)
GLUCOSE BLDC GLUCOMTR-MCNC: 181 MG/DL — HIGH (ref 70–99)
GLUCOSE BLDC GLUCOMTR-MCNC: 182 MG/DL — HIGH (ref 70–99)
GLUCOSE BLDC GLUCOMTR-MCNC: 182 MG/DL — HIGH (ref 70–99)
GLUCOSE BLDC GLUCOMTR-MCNC: 184 MG/DL — HIGH (ref 70–99)
GLUCOSE BLDC GLUCOMTR-MCNC: 184 MG/DL — HIGH (ref 70–99)
GLUCOSE BLDC GLUCOMTR-MCNC: 188 MG/DL — HIGH (ref 70–99)
GLUCOSE BLDC GLUCOMTR-MCNC: 188 MG/DL — HIGH (ref 70–99)
GLUCOSE BLDC GLUCOMTR-MCNC: 189 MG/DL — HIGH (ref 70–99)
GLUCOSE BLDC GLUCOMTR-MCNC: 189 MG/DL — HIGH (ref 70–99)
GLUCOSE BLDC GLUCOMTR-MCNC: 190 MG/DL — HIGH (ref 70–99)
GLUCOSE BLDC GLUCOMTR-MCNC: 190 MG/DL — HIGH (ref 70–99)
GLUCOSE BLDC GLUCOMTR-MCNC: 203 MG/DL — HIGH (ref 70–99)
GLUCOSE BLDC GLUCOMTR-MCNC: 207 MG/DL — HIGH (ref 70–99)
GLUCOSE BLDC GLUCOMTR-MCNC: 207 MG/DL — HIGH (ref 70–99)
GLUCOSE BLDC GLUCOMTR-MCNC: 211 MG/DL — HIGH (ref 70–99)
GLUCOSE BLDC GLUCOMTR-MCNC: 211 MG/DL — HIGH (ref 70–99)
GLUCOSE BLDC GLUCOMTR-MCNC: 212 MG/DL — HIGH (ref 70–99)
GLUCOSE BLDC GLUCOMTR-MCNC: 212 MG/DL — HIGH (ref 70–99)
GLUCOSE BLDC GLUCOMTR-MCNC: 213 MG/DL — HIGH (ref 70–99)
GLUCOSE BLDC GLUCOMTR-MCNC: 213 MG/DL — HIGH (ref 70–99)
GLUCOSE BLDC GLUCOMTR-MCNC: 225 MG/DL — HIGH (ref 70–99)
GLUCOSE BLDC GLUCOMTR-MCNC: 226 MG/DL — HIGH (ref 70–99)
GLUCOSE BLDC GLUCOMTR-MCNC: 226 MG/DL — HIGH (ref 70–99)
GLUCOSE BLDC GLUCOMTR-MCNC: 233 MG/DL — HIGH (ref 70–99)
GLUCOSE BLDC GLUCOMTR-MCNC: 233 MG/DL — HIGH (ref 70–99)
GLUCOSE BLDC GLUCOMTR-MCNC: 237 MG/DL — HIGH (ref 70–99)
GLUCOSE BLDC GLUCOMTR-MCNC: 237 MG/DL — HIGH (ref 70–99)
GLUCOSE BLDC GLUCOMTR-MCNC: 242 MG/DL — HIGH (ref 70–99)
GLUCOSE BLDC GLUCOMTR-MCNC: 242 MG/DL — HIGH (ref 70–99)
GLUCOSE BLDC GLUCOMTR-MCNC: 252 MG/DL — HIGH (ref 70–99)
GLUCOSE BLDC GLUCOMTR-MCNC: 252 MG/DL — HIGH (ref 70–99)
GLUCOSE BLDC GLUCOMTR-MCNC: 269 MG/DL — HIGH (ref 70–99)
GLUCOSE BLDC GLUCOMTR-MCNC: 270 MG/DL — HIGH (ref 70–99)
GLUCOSE BLDC GLUCOMTR-MCNC: 270 MG/DL — HIGH (ref 70–99)
GLUCOSE BLDC GLUCOMTR-MCNC: 273 MG/DL — HIGH (ref 70–99)
GLUCOSE BLDC GLUCOMTR-MCNC: 273 MG/DL — HIGH (ref 70–99)
GLUCOSE BLDC GLUCOMTR-MCNC: 281 MG/DL — HIGH (ref 70–99)
GLUCOSE BLDC GLUCOMTR-MCNC: 281 MG/DL — HIGH (ref 70–99)
GLUCOSE BLDC GLUCOMTR-MCNC: 290 MG/DL — HIGH (ref 70–99)
GLUCOSE BLDC GLUCOMTR-MCNC: 290 MG/DL — HIGH (ref 70–99)
GLUCOSE BLDC GLUCOMTR-MCNC: 292 MG/DL — HIGH (ref 70–99)
GLUCOSE BLDC GLUCOMTR-MCNC: 292 MG/DL — HIGH (ref 70–99)
GLUCOSE BLDC GLUCOMTR-MCNC: 299 MG/DL — HIGH (ref 70–99)
GLUCOSE BLDC GLUCOMTR-MCNC: 299 MG/DL — HIGH (ref 70–99)
GLUCOSE BLDC GLUCOMTR-MCNC: 98 MG/DL — SIGNIFICANT CHANGE UP (ref 70–99)
GLUCOSE BLDC GLUCOMTR-MCNC: 98 MG/DL — SIGNIFICANT CHANGE UP (ref 70–99)
GLUCOSE SERPL-MCNC: 133 MG/DL — HIGH (ref 70–99)
GLUCOSE SERPL-MCNC: 133 MG/DL — HIGH (ref 70–99)
GLUCOSE SERPL-MCNC: 137 MG/DL — HIGH (ref 70–99)
GLUCOSE SERPL-MCNC: 137 MG/DL — HIGH (ref 70–99)
GLUCOSE SERPL-MCNC: 154 MG/DL — HIGH (ref 70–99)
GLUCOSE SERPL-MCNC: 154 MG/DL — HIGH (ref 70–99)
GLUCOSE SERPL-MCNC: 164 MG/DL — HIGH (ref 70–99)
GLUCOSE SERPL-MCNC: 164 MG/DL — HIGH (ref 70–99)
GLUCOSE SERPL-MCNC: 170 MG/DL — HIGH (ref 70–99)
GLUCOSE SERPL-MCNC: 170 MG/DL — HIGH (ref 70–99)
GLUCOSE SERPL-MCNC: 172 MG/DL — HIGH (ref 70–99)
GLUCOSE SERPL-MCNC: 172 MG/DL — HIGH (ref 70–99)
GLUCOSE SERPL-MCNC: 176 MG/DL — HIGH (ref 70–99)
GLUCOSE SERPL-MCNC: 184 MG/DL — HIGH (ref 70–99)
GLUCOSE SERPL-MCNC: 184 MG/DL — HIGH (ref 70–99)
GLUCOSE SERPL-MCNC: 190 MG/DL — HIGH (ref 70–99)
GLUCOSE SERPL-MCNC: 190 MG/DL — HIGH (ref 70–99)
GLUCOSE SERPL-MCNC: 209 MG/DL — HIGH (ref 70–99)
GLUCOSE SERPL-MCNC: 209 MG/DL — HIGH (ref 70–99)
GLUCOSE SERPL-MCNC: 214 MG/DL — HIGH (ref 70–99)
GLUCOSE SERPL-MCNC: 214 MG/DL — HIGH (ref 70–99)
GLUCOSE SERPL-MCNC: 218 MG/DL — HIGH (ref 70–99)
GLUCOSE SERPL-MCNC: 218 MG/DL — HIGH (ref 70–99)
HCT VFR BLD CALC: 30.5 % — LOW (ref 34.5–45)
HCT VFR BLD CALC: 30.5 % — LOW (ref 34.5–45)
HCT VFR BLD CALC: 31.5 % — LOW (ref 34.5–45)
HCT VFR BLD CALC: 31.8 % — LOW (ref 34.5–45)
HCT VFR BLD CALC: 31.8 % — LOW (ref 34.5–45)
HCT VFR BLD CALC: 32.3 % — LOW (ref 34.5–45)
HCT VFR BLD CALC: 32.3 % — LOW (ref 34.5–45)
HCT VFR BLD CALC: 32.5 % — LOW (ref 34.5–45)
HCT VFR BLD CALC: 32.5 % — LOW (ref 34.5–45)
HCT VFR BLD CALC: 32.6 % — LOW (ref 34.5–45)
HCT VFR BLD CALC: 32.6 % — LOW (ref 34.5–45)
HCT VFR BLD CALC: 32.7 % — LOW (ref 34.5–45)
HCT VFR BLD CALC: 32.7 % — LOW (ref 34.5–45)
HCT VFR BLD CALC: 32.8 % — LOW (ref 34.5–45)
HCT VFR BLD CALC: 32.8 % — LOW (ref 34.5–45)
HCT VFR BLD CALC: 33 % — LOW (ref 34.5–45)
HCT VFR BLD CALC: 33 % — LOW (ref 34.5–45)
HCT VFR BLD CALC: 33.3 % — LOW (ref 34.5–45)
HCT VFR BLD CALC: 33.3 % — LOW (ref 34.5–45)
HCT VFR BLD CALC: 33.6 % — LOW (ref 34.5–45)
HCT VFR BLD CALC: 33.6 % — LOW (ref 34.5–45)
HCV AB S/CO SERPL IA: 0.1 S/CO — SIGNIFICANT CHANGE UP (ref 0–0.99)
HCV AB S/CO SERPL IA: 0.1 S/CO — SIGNIFICANT CHANGE UP (ref 0–0.99)
HCV AB SERPL-IMP: SIGNIFICANT CHANGE UP
HCV AB SERPL-IMP: SIGNIFICANT CHANGE UP
HDLC SERPL-MCNC: 39 MG/DL — LOW
HDLC SERPL-MCNC: 39 MG/DL — LOW
HGB BLD-MCNC: 10 G/DL — LOW (ref 11.5–15.5)
HGB BLD-MCNC: 10 G/DL — LOW (ref 11.5–15.5)
HGB BLD-MCNC: 10.1 G/DL — LOW (ref 11.5–15.5)
HGB BLD-MCNC: 10.2 G/DL — LOW (ref 11.5–15.5)
HGB BLD-MCNC: 10.2 G/DL — LOW (ref 11.5–15.5)
HGB BLD-MCNC: 10.3 G/DL — LOW (ref 11.5–15.5)
HGB BLD-MCNC: 10.5 G/DL — LOW (ref 11.5–15.5)
HGB BLD-MCNC: 10.6 G/DL — LOW (ref 11.5–15.5)
HGB BLD-MCNC: 9.7 G/DL — LOW (ref 11.5–15.5)
HGB BLD-MCNC: 9.7 G/DL — LOW (ref 11.5–15.5)
IANC: 10.14 K/UL — HIGH (ref 1.8–7.4)
IANC: 10.14 K/UL — HIGH (ref 1.8–7.4)
IANC: 4.43 K/UL — SIGNIFICANT CHANGE UP (ref 1.8–7.4)
IANC: 4.43 K/UL — SIGNIFICANT CHANGE UP (ref 1.8–7.4)
IANC: 4.97 K/UL — SIGNIFICANT CHANGE UP (ref 1.8–7.4)
IANC: 4.97 K/UL — SIGNIFICANT CHANGE UP (ref 1.8–7.4)
IANC: 5.27 K/UL — SIGNIFICANT CHANGE UP (ref 1.8–7.4)
IANC: 5.27 K/UL — SIGNIFICANT CHANGE UP (ref 1.8–7.4)
IANC: 5.8 K/UL — SIGNIFICANT CHANGE UP (ref 1.8–7.4)
IANC: 5.8 K/UL — SIGNIFICANT CHANGE UP (ref 1.8–7.4)
IANC: 6.15 K/UL — SIGNIFICANT CHANGE UP (ref 1.8–7.4)
IANC: 6.15 K/UL — SIGNIFICANT CHANGE UP (ref 1.8–7.4)
IMM GRANULOCYTES NFR BLD AUTO: 0.2 % — SIGNIFICANT CHANGE UP (ref 0–0.9)
IMM GRANULOCYTES NFR BLD AUTO: 0.2 % — SIGNIFICANT CHANGE UP (ref 0–0.9)
IMM GRANULOCYTES NFR BLD AUTO: 0.3 % — SIGNIFICANT CHANGE UP (ref 0–0.9)
IMM GRANULOCYTES NFR BLD AUTO: 0.3 % — SIGNIFICANT CHANGE UP (ref 0–0.9)
IMM GRANULOCYTES NFR BLD AUTO: 0.4 % — SIGNIFICANT CHANGE UP (ref 0–0.9)
IMM GRANULOCYTES NFR BLD AUTO: 0.4 % — SIGNIFICANT CHANGE UP (ref 0–0.9)
IMM GRANULOCYTES NFR BLD AUTO: 0.5 % — SIGNIFICANT CHANGE UP (ref 0–0.9)
IMM GRANULOCYTES NFR BLD AUTO: 0.6 % — SIGNIFICANT CHANGE UP (ref 0–0.9)
IMM GRANULOCYTES NFR BLD AUTO: 0.6 % — SIGNIFICANT CHANGE UP (ref 0–0.9)
INR BLD: 1.57 RATIO — HIGH (ref 0.85–1.18)
INR BLD: 1.57 RATIO — HIGH (ref 0.85–1.18)
IRON SATN MFR SERPL: 13 % — LOW (ref 14–50)
IRON SATN MFR SERPL: 13 % — LOW (ref 14–50)
IRON SATN MFR SERPL: 34 UG/DL — SIGNIFICANT CHANGE UP (ref 30–160)
IRON SATN MFR SERPL: 34 UG/DL — SIGNIFICANT CHANGE UP (ref 30–160)
LIPID PNL WITH DIRECT LDL SERPL: 44 MG/DL — SIGNIFICANT CHANGE UP
LIPID PNL WITH DIRECT LDL SERPL: 44 MG/DL — SIGNIFICANT CHANGE UP
LYMPHOCYTES # BLD AUTO: 26.4 % — SIGNIFICANT CHANGE UP (ref 13–44)
LYMPHOCYTES # BLD AUTO: 26.4 % — SIGNIFICANT CHANGE UP (ref 13–44)
LYMPHOCYTES # BLD AUTO: 3.67 K/UL — HIGH (ref 1–3.3)
LYMPHOCYTES # BLD AUTO: 3.67 K/UL — HIGH (ref 1–3.3)
LYMPHOCYTES # BLD AUTO: 3.72 K/UL — HIGH (ref 1–3.3)
LYMPHOCYTES # BLD AUTO: 3.72 K/UL — HIGH (ref 1–3.3)
LYMPHOCYTES # BLD AUTO: 3.98 K/UL — HIGH (ref 1–3.3)
LYMPHOCYTES # BLD AUTO: 3.98 K/UL — HIGH (ref 1–3.3)
LYMPHOCYTES # BLD AUTO: 3.99 K/UL — HIGH (ref 1–3.3)
LYMPHOCYTES # BLD AUTO: 3.99 K/UL — HIGH (ref 1–3.3)
LYMPHOCYTES # BLD AUTO: 33.4 % — SIGNIFICANT CHANGE UP (ref 13–44)
LYMPHOCYTES # BLD AUTO: 33.4 % — SIGNIFICANT CHANGE UP (ref 13–44)
LYMPHOCYTES # BLD AUTO: 34.5 % — SIGNIFICANT CHANGE UP (ref 13–44)
LYMPHOCYTES # BLD AUTO: 34.5 % — SIGNIFICANT CHANGE UP (ref 13–44)
LYMPHOCYTES # BLD AUTO: 38 % — SIGNIFICANT CHANGE UP (ref 13–44)
LYMPHOCYTES # BLD AUTO: 38 % — SIGNIFICANT CHANGE UP (ref 13–44)
LYMPHOCYTES # BLD AUTO: 39 % — SIGNIFICANT CHANGE UP (ref 13–44)
LYMPHOCYTES # BLD AUTO: 39 % — SIGNIFICANT CHANGE UP (ref 13–44)
LYMPHOCYTES # BLD AUTO: 4.05 K/UL — HIGH (ref 1–3.3)
LYMPHOCYTES # BLD AUTO: 4.05 K/UL — HIGH (ref 1–3.3)
LYMPHOCYTES # BLD AUTO: 4.21 K/UL — HIGH (ref 1–3.3)
LYMPHOCYTES # BLD AUTO: 4.21 K/UL — HIGH (ref 1–3.3)
LYMPHOCYTES # BLD AUTO: 42.6 % — SIGNIFICANT CHANGE UP (ref 13–44)
LYMPHOCYTES # BLD AUTO: 42.6 % — SIGNIFICANT CHANGE UP (ref 13–44)
M TB IFN-G BLD-IMP: NEGATIVE — SIGNIFICANT CHANGE UP
M TB IFN-G BLD-IMP: NEGATIVE — SIGNIFICANT CHANGE UP
M TB IFN-G CD4+ BCKGRND COR BLD-ACNC: 0 IU/ML — SIGNIFICANT CHANGE UP
M TB IFN-G CD4+ BCKGRND COR BLD-ACNC: 0 IU/ML — SIGNIFICANT CHANGE UP
M TB IFN-G CD4+CD8+ BCKGRND COR BLD-ACNC: 0 IU/ML — SIGNIFICANT CHANGE UP
M TB IFN-G CD4+CD8+ BCKGRND COR BLD-ACNC: 0 IU/ML — SIGNIFICANT CHANGE UP
MAGNESIUM SERPL-MCNC: 1.8 MG/DL — SIGNIFICANT CHANGE UP (ref 1.6–2.6)
MAGNESIUM SERPL-MCNC: 1.9 MG/DL — SIGNIFICANT CHANGE UP (ref 1.6–2.6)
MAGNESIUM SERPL-MCNC: 2 MG/DL — SIGNIFICANT CHANGE UP (ref 1.6–2.6)
MCHC RBC-ENTMCNC: 26.7 PG — LOW (ref 27–34)
MCHC RBC-ENTMCNC: 26.8 PG — LOW (ref 27–34)
MCHC RBC-ENTMCNC: 26.8 PG — LOW (ref 27–34)
MCHC RBC-ENTMCNC: 27 PG — SIGNIFICANT CHANGE UP (ref 27–34)
MCHC RBC-ENTMCNC: 27 PG — SIGNIFICANT CHANGE UP (ref 27–34)
MCHC RBC-ENTMCNC: 27.1 PG — SIGNIFICANT CHANGE UP (ref 27–34)
MCHC RBC-ENTMCNC: 27.2 PG — SIGNIFICANT CHANGE UP (ref 27–34)
MCHC RBC-ENTMCNC: 27.4 PG — SIGNIFICANT CHANGE UP (ref 27–34)
MCHC RBC-ENTMCNC: 27.5 PG — SIGNIFICANT CHANGE UP (ref 27–34)
MCHC RBC-ENTMCNC: 27.5 PG — SIGNIFICANT CHANGE UP (ref 27–34)
MCHC RBC-ENTMCNC: 30.7 GM/DL — LOW (ref 32–36)
MCHC RBC-ENTMCNC: 30.7 GM/DL — LOW (ref 32–36)
MCHC RBC-ENTMCNC: 31.3 GM/DL — LOW (ref 32–36)
MCHC RBC-ENTMCNC: 31.7 GM/DL — LOW (ref 32–36)
MCHC RBC-ENTMCNC: 31.8 GM/DL — LOW (ref 32–36)
MCHC RBC-ENTMCNC: 32.1 GM/DL — SIGNIFICANT CHANGE UP (ref 32–36)
MCHC RBC-ENTMCNC: 32.3 GM/DL — SIGNIFICANT CHANGE UP (ref 32–36)
MCHC RBC-ENTMCNC: 32.3 GM/DL — SIGNIFICANT CHANGE UP (ref 32–36)
MCV RBC AUTO: 84 FL — SIGNIFICANT CHANGE UP (ref 80–100)
MCV RBC AUTO: 84 FL — SIGNIFICANT CHANGE UP (ref 80–100)
MCV RBC AUTO: 84.2 FL — SIGNIFICANT CHANGE UP (ref 80–100)
MCV RBC AUTO: 84.2 FL — SIGNIFICANT CHANGE UP (ref 80–100)
MCV RBC AUTO: 84.9 FL — SIGNIFICANT CHANGE UP (ref 80–100)
MCV RBC AUTO: 84.9 FL — SIGNIFICANT CHANGE UP (ref 80–100)
MCV RBC AUTO: 85.2 FL — SIGNIFICANT CHANGE UP (ref 80–100)
MCV RBC AUTO: 85.3 FL — SIGNIFICANT CHANGE UP (ref 80–100)
MCV RBC AUTO: 85.3 FL — SIGNIFICANT CHANGE UP (ref 80–100)
MCV RBC AUTO: 85.4 FL — SIGNIFICANT CHANGE UP (ref 80–100)
MCV RBC AUTO: 85.4 FL — SIGNIFICANT CHANGE UP (ref 80–100)
MCV RBC AUTO: 85.7 FL — SIGNIFICANT CHANGE UP (ref 80–100)
MCV RBC AUTO: 86 FL — SIGNIFICANT CHANGE UP (ref 80–100)
MCV RBC AUTO: 86 FL — SIGNIFICANT CHANGE UP (ref 80–100)
MCV RBC AUTO: 86.3 FL — SIGNIFICANT CHANGE UP (ref 80–100)
MCV RBC AUTO: 86.3 FL — SIGNIFICANT CHANGE UP (ref 80–100)
MCV RBC AUTO: 86.5 FL — SIGNIFICANT CHANGE UP (ref 80–100)
MCV RBC AUTO: 86.5 FL — SIGNIFICANT CHANGE UP (ref 80–100)
MCV RBC AUTO: 87 FL — SIGNIFICANT CHANGE UP (ref 80–100)
MCV RBC AUTO: 87 FL — SIGNIFICANT CHANGE UP (ref 80–100)
MONOCYTES # BLD AUTO: 0.75 K/UL — SIGNIFICANT CHANGE UP (ref 0–0.9)
MONOCYTES # BLD AUTO: 0.75 K/UL — SIGNIFICANT CHANGE UP (ref 0–0.9)
MONOCYTES # BLD AUTO: 0.76 K/UL — SIGNIFICANT CHANGE UP (ref 0–0.9)
MONOCYTES # BLD AUTO: 0.76 K/UL — SIGNIFICANT CHANGE UP (ref 0–0.9)
MONOCYTES # BLD AUTO: 0.77 K/UL — SIGNIFICANT CHANGE UP (ref 0–0.9)
MONOCYTES # BLD AUTO: 0.77 K/UL — SIGNIFICANT CHANGE UP (ref 0–0.9)
MONOCYTES # BLD AUTO: 0.83 K/UL — SIGNIFICANT CHANGE UP (ref 0–0.9)
MONOCYTES # BLD AUTO: 0.87 K/UL — SIGNIFICANT CHANGE UP (ref 0–0.9)
MONOCYTES # BLD AUTO: 0.87 K/UL — SIGNIFICANT CHANGE UP (ref 0–0.9)
MONOCYTES NFR BLD AUTO: 5 % — SIGNIFICANT CHANGE UP (ref 2–14)
MONOCYTES NFR BLD AUTO: 5 % — SIGNIFICANT CHANGE UP (ref 2–14)
MONOCYTES NFR BLD AUTO: 7 % — SIGNIFICANT CHANGE UP (ref 2–14)
MONOCYTES NFR BLD AUTO: 7.9 % — SIGNIFICANT CHANGE UP (ref 2–14)
MONOCYTES NFR BLD AUTO: 7.9 % — SIGNIFICANT CHANGE UP (ref 2–14)
MONOCYTES NFR BLD AUTO: 8.1 % — SIGNIFICANT CHANGE UP (ref 2–14)
MONOCYTES NFR BLD AUTO: 8.1 % — SIGNIFICANT CHANGE UP (ref 2–14)
MONOCYTES NFR BLD AUTO: 8.8 % — SIGNIFICANT CHANGE UP (ref 2–14)
MONOCYTES NFR BLD AUTO: 8.8 % — SIGNIFICANT CHANGE UP (ref 2–14)
NEUTROPHILS # BLD AUTO: 10.14 K/UL — HIGH (ref 1.8–7.4)
NEUTROPHILS # BLD AUTO: 10.14 K/UL — HIGH (ref 1.8–7.4)
NEUTROPHILS # BLD AUTO: 4.43 K/UL — SIGNIFICANT CHANGE UP (ref 1.8–7.4)
NEUTROPHILS # BLD AUTO: 4.43 K/UL — SIGNIFICANT CHANGE UP (ref 1.8–7.4)
NEUTROPHILS # BLD AUTO: 4.97 K/UL — SIGNIFICANT CHANGE UP (ref 1.8–7.4)
NEUTROPHILS # BLD AUTO: 4.97 K/UL — SIGNIFICANT CHANGE UP (ref 1.8–7.4)
NEUTROPHILS # BLD AUTO: 5.27 K/UL — SIGNIFICANT CHANGE UP (ref 1.8–7.4)
NEUTROPHILS # BLD AUTO: 5.27 K/UL — SIGNIFICANT CHANGE UP (ref 1.8–7.4)
NEUTROPHILS # BLD AUTO: 5.8 K/UL — SIGNIFICANT CHANGE UP (ref 1.8–7.4)
NEUTROPHILS # BLD AUTO: 5.8 K/UL — SIGNIFICANT CHANGE UP (ref 1.8–7.4)
NEUTROPHILS # BLD AUTO: 6.15 K/UL — SIGNIFICANT CHANGE UP (ref 1.8–7.4)
NEUTROPHILS # BLD AUTO: 6.15 K/UL — SIGNIFICANT CHANGE UP (ref 1.8–7.4)
NEUTROPHILS NFR BLD AUTO: 44.9 % — SIGNIFICANT CHANGE UP (ref 43–77)
NEUTROPHILS NFR BLD AUTO: 44.9 % — SIGNIFICANT CHANGE UP (ref 43–77)
NEUTROPHILS NFR BLD AUTO: 48.6 % — SIGNIFICANT CHANGE UP (ref 43–77)
NEUTROPHILS NFR BLD AUTO: 48.6 % — SIGNIFICANT CHANGE UP (ref 43–77)
NEUTROPHILS NFR BLD AUTO: 50.4 % — SIGNIFICANT CHANGE UP (ref 43–77)
NEUTROPHILS NFR BLD AUTO: 50.4 % — SIGNIFICANT CHANGE UP (ref 43–77)
NEUTROPHILS NFR BLD AUTO: 53.7 % — SIGNIFICANT CHANGE UP (ref 43–77)
NEUTROPHILS NFR BLD AUTO: 53.7 % — SIGNIFICANT CHANGE UP (ref 43–77)
NEUTROPHILS NFR BLD AUTO: 56.1 % — SIGNIFICANT CHANGE UP (ref 43–77)
NEUTROPHILS NFR BLD AUTO: 56.1 % — SIGNIFICANT CHANGE UP (ref 43–77)
NEUTROPHILS NFR BLD AUTO: 66.1 % — SIGNIFICANT CHANGE UP (ref 43–77)
NEUTROPHILS NFR BLD AUTO: 66.1 % — SIGNIFICANT CHANGE UP (ref 43–77)
NIGHT BLUE STAIN TISS: SIGNIFICANT CHANGE UP
NON HDL CHOLESTEROL: 63 MG/DL — SIGNIFICANT CHANGE UP
NON HDL CHOLESTEROL: 63 MG/DL — SIGNIFICANT CHANGE UP
NRBC # BLD: 0 /100 WBCS — SIGNIFICANT CHANGE UP (ref 0–0)
NRBC # FLD: 0 K/UL — SIGNIFICANT CHANGE UP (ref 0–0)
PHOSPHATE SERPL-MCNC: 3.2 MG/DL — SIGNIFICANT CHANGE UP (ref 2.5–4.5)
PHOSPHATE SERPL-MCNC: 3.2 MG/DL — SIGNIFICANT CHANGE UP (ref 2.5–4.5)
PHOSPHATE SERPL-MCNC: 3.3 MG/DL — SIGNIFICANT CHANGE UP (ref 2.5–4.5)
PHOSPHATE SERPL-MCNC: 3.3 MG/DL — SIGNIFICANT CHANGE UP (ref 2.5–4.5)
PHOSPHATE SERPL-MCNC: 3.4 MG/DL — SIGNIFICANT CHANGE UP (ref 2.5–4.5)
PHOSPHATE SERPL-MCNC: 3.4 MG/DL — SIGNIFICANT CHANGE UP (ref 2.5–4.5)
PHOSPHATE SERPL-MCNC: 3.5 MG/DL — SIGNIFICANT CHANGE UP (ref 2.5–4.5)
PHOSPHATE SERPL-MCNC: 3.7 MG/DL — SIGNIFICANT CHANGE UP (ref 2.5–4.5)
PHOSPHATE SERPL-MCNC: 3.7 MG/DL — SIGNIFICANT CHANGE UP (ref 2.5–4.5)
PHOSPHATE SERPL-MCNC: 3.8 MG/DL — SIGNIFICANT CHANGE UP (ref 2.5–4.5)
PHOSPHATE SERPL-MCNC: 3.8 MG/DL — SIGNIFICANT CHANGE UP (ref 2.5–4.5)
PHOSPHATE SERPL-MCNC: 4.1 MG/DL — SIGNIFICANT CHANGE UP (ref 2.5–4.5)
PLATELET # BLD AUTO: 294 K/UL — SIGNIFICANT CHANGE UP (ref 150–400)
PLATELET # BLD AUTO: 294 K/UL — SIGNIFICANT CHANGE UP (ref 150–400)
PLATELET # BLD AUTO: 297 K/UL — SIGNIFICANT CHANGE UP (ref 150–400)
PLATELET # BLD AUTO: 297 K/UL — SIGNIFICANT CHANGE UP (ref 150–400)
PLATELET # BLD AUTO: 357 K/UL — SIGNIFICANT CHANGE UP (ref 150–400)
PLATELET # BLD AUTO: 357 K/UL — SIGNIFICANT CHANGE UP (ref 150–400)
PLATELET # BLD AUTO: 364 K/UL — SIGNIFICANT CHANGE UP (ref 150–400)
PLATELET # BLD AUTO: 364 K/UL — SIGNIFICANT CHANGE UP (ref 150–400)
PLATELET # BLD AUTO: 390 K/UL — SIGNIFICANT CHANGE UP (ref 150–400)
PLATELET # BLD AUTO: 390 K/UL — SIGNIFICANT CHANGE UP (ref 150–400)
PLATELET # BLD AUTO: 411 K/UL — HIGH (ref 150–400)
PLATELET # BLD AUTO: 411 K/UL — HIGH (ref 150–400)
PLATELET # BLD AUTO: 415 K/UL — HIGH (ref 150–400)
PLATELET # BLD AUTO: 415 K/UL — HIGH (ref 150–400)
PLATELET # BLD AUTO: 433 K/UL — HIGH (ref 150–400)
PLATELET # BLD AUTO: 433 K/UL — HIGH (ref 150–400)
PLATELET # BLD AUTO: 458 K/UL — HIGH (ref 150–400)
PLATELET # BLD AUTO: 458 K/UL — HIGH (ref 150–400)
PLATELET # BLD AUTO: 459 K/UL — HIGH (ref 150–400)
PLATELET # BLD AUTO: 459 K/UL — HIGH (ref 150–400)
PLATELET # BLD AUTO: 468 K/UL — HIGH (ref 150–400)
PLATELET # BLD AUTO: 468 K/UL — HIGH (ref 150–400)
PLATELET # BLD AUTO: 485 K/UL — HIGH (ref 150–400)
PLATELET # BLD AUTO: 485 K/UL — HIGH (ref 150–400)
PLATELET # BLD AUTO: 502 K/UL — HIGH (ref 150–400)
PLATELET # BLD AUTO: 502 K/UL — HIGH (ref 150–400)
POTASSIUM SERPL-MCNC: 3.9 MMOL/L — SIGNIFICANT CHANGE UP (ref 3.5–5.3)
POTASSIUM SERPL-MCNC: 4 MMOL/L — SIGNIFICANT CHANGE UP (ref 3.5–5.3)
POTASSIUM SERPL-MCNC: 4.1 MMOL/L — SIGNIFICANT CHANGE UP (ref 3.5–5.3)
POTASSIUM SERPL-MCNC: 4.3 MMOL/L — SIGNIFICANT CHANGE UP (ref 3.5–5.3)
POTASSIUM SERPL-MCNC: 4.4 MMOL/L — SIGNIFICANT CHANGE UP (ref 3.5–5.3)
POTASSIUM SERPL-MCNC: 4.4 MMOL/L — SIGNIFICANT CHANGE UP (ref 3.5–5.3)
POTASSIUM SERPL-MCNC: 4.6 MMOL/L — SIGNIFICANT CHANGE UP (ref 3.5–5.3)
POTASSIUM SERPL-SCNC: 3.9 MMOL/L — SIGNIFICANT CHANGE UP (ref 3.5–5.3)
POTASSIUM SERPL-SCNC: 4 MMOL/L — SIGNIFICANT CHANGE UP (ref 3.5–5.3)
POTASSIUM SERPL-SCNC: 4.1 MMOL/L — SIGNIFICANT CHANGE UP (ref 3.5–5.3)
POTASSIUM SERPL-SCNC: 4.3 MMOL/L — SIGNIFICANT CHANGE UP (ref 3.5–5.3)
POTASSIUM SERPL-SCNC: 4.4 MMOL/L — SIGNIFICANT CHANGE UP (ref 3.5–5.3)
POTASSIUM SERPL-SCNC: 4.4 MMOL/L — SIGNIFICANT CHANGE UP (ref 3.5–5.3)
POTASSIUM SERPL-SCNC: 4.6 MMOL/L — SIGNIFICANT CHANGE UP (ref 3.5–5.3)
PROCALCITONIN SERPL-MCNC: 0.03 NG/ML — SIGNIFICANT CHANGE UP (ref 0.02–0.1)
PROCALCITONIN SERPL-MCNC: 0.03 NG/ML — SIGNIFICANT CHANGE UP (ref 0.02–0.1)
PROT SERPL-MCNC: 6.6 G/DL — SIGNIFICANT CHANGE UP (ref 6–8.3)
PROT SERPL-MCNC: 6.8 G/DL — SIGNIFICANT CHANGE UP (ref 6–8.3)
PROT SERPL-MCNC: 6.8 G/DL — SIGNIFICANT CHANGE UP (ref 6–8.3)
PROT SERPL-MCNC: 6.9 G/DL — SIGNIFICANT CHANGE UP (ref 6–8.3)
PROT SERPL-MCNC: 6.9 G/DL — SIGNIFICANT CHANGE UP (ref 6–8.3)
PROT SERPL-MCNC: 7 G/DL — SIGNIFICANT CHANGE UP (ref 6–8.3)
PROT SERPL-MCNC: 7 G/DL — SIGNIFICANT CHANGE UP (ref 6–8.3)
PROT SERPL-MCNC: 7.2 G/DL — SIGNIFICANT CHANGE UP (ref 6–8.3)
PROT SERPL-MCNC: 7.2 G/DL — SIGNIFICANT CHANGE UP (ref 6–8.3)
PROTHROM AB SERPL-ACNC: 17.5 SEC — HIGH (ref 9.5–13)
PROTHROM AB SERPL-ACNC: 17.5 SEC — HIGH (ref 9.5–13)
QUANT TB PLUS MITOGEN MINUS NIL: 4.92 IU/ML — SIGNIFICANT CHANGE UP
QUANT TB PLUS MITOGEN MINUS NIL: 4.92 IU/ML — SIGNIFICANT CHANGE UP
RBC # BLD: 3.56 M/UL — LOW (ref 3.8–5.2)
RBC # BLD: 3.56 M/UL — LOW (ref 3.8–5.2)
RBC # BLD: 3.65 M/UL — LOW (ref 3.8–5.2)
RBC # BLD: 3.65 M/UL — LOW (ref 3.8–5.2)
RBC # BLD: 3.71 M/UL — LOW (ref 3.8–5.2)
RBC # BLD: 3.71 M/UL — LOW (ref 3.8–5.2)
RBC # BLD: 3.73 M/UL — LOW (ref 3.8–5.2)
RBC # BLD: 3.73 M/UL — LOW (ref 3.8–5.2)
RBC # BLD: 3.74 M/UL — LOW (ref 3.8–5.2)
RBC # BLD: 3.74 M/UL — LOW (ref 3.8–5.2)
RBC # BLD: 3.77 M/UL — LOW (ref 3.8–5.2)
RBC # BLD: 3.78 M/UL — LOW (ref 3.8–5.2)
RBC # BLD: 3.78 M/UL — LOW (ref 3.8–5.2)
RBC # BLD: 3.83 M/UL — SIGNIFICANT CHANGE UP (ref 3.8–5.2)
RBC # BLD: 3.83 M/UL — SIGNIFICANT CHANGE UP (ref 3.8–5.2)
RBC # BLD: 3.85 M/UL — SIGNIFICANT CHANGE UP (ref 3.8–5.2)
RBC # BLD: 3.85 M/UL — SIGNIFICANT CHANGE UP (ref 3.8–5.2)
RBC # BLD: 3.86 M/UL — SIGNIFICANT CHANGE UP (ref 3.8–5.2)
RBC # BLD: 3.86 M/UL — SIGNIFICANT CHANGE UP (ref 3.8–5.2)
RBC # BLD: 3.91 M/UL — SIGNIFICANT CHANGE UP (ref 3.8–5.2)
RBC # BLD: 3.91 M/UL — SIGNIFICANT CHANGE UP (ref 3.8–5.2)
RBC # BLD: 3.93 M/UL — SIGNIFICANT CHANGE UP (ref 3.8–5.2)
RBC # BLD: 3.93 M/UL — SIGNIFICANT CHANGE UP (ref 3.8–5.2)
RBC # FLD: 15.1 % — HIGH (ref 10.3–14.5)
RBC # FLD: 15.5 % — HIGH (ref 10.3–14.5)
RBC # FLD: 15.6 % — HIGH (ref 10.3–14.5)
RBC # FLD: 15.8 % — HIGH (ref 10.3–14.5)
RBC # FLD: 15.8 % — HIGH (ref 10.3–14.5)
RBC # FLD: 15.9 % — HIGH (ref 10.3–14.5)
RBC # FLD: 16.2 % — HIGH (ref 10.3–14.5)
RBC # FLD: 16.2 % — HIGH (ref 10.3–14.5)
RBC # FLD: 16.3 % — HIGH (ref 10.3–14.5)
RBC # FLD: 16.3 % — HIGH (ref 10.3–14.5)
SODIUM SERPL-SCNC: 136 MMOL/L — SIGNIFICANT CHANGE UP (ref 135–145)
SODIUM SERPL-SCNC: 137 MMOL/L — SIGNIFICANT CHANGE UP (ref 135–145)
SODIUM SERPL-SCNC: 138 MMOL/L — SIGNIFICANT CHANGE UP (ref 135–145)
SODIUM SERPL-SCNC: 139 MMOL/L — SIGNIFICANT CHANGE UP (ref 135–145)
SODIUM SERPL-SCNC: 140 MMOL/L — SIGNIFICANT CHANGE UP (ref 135–145)
SODIUM SERPL-SCNC: 140 MMOL/L — SIGNIFICANT CHANGE UP (ref 135–145)
SPECIMEN SOURCE: SIGNIFICANT CHANGE UP
TIBC SERPL-MCNC: 261 UG/DL — SIGNIFICANT CHANGE UP (ref 220–430)
TIBC SERPL-MCNC: 261 UG/DL — SIGNIFICANT CHANGE UP (ref 220–430)
TRIGL SERPL-MCNC: 94 MG/DL — SIGNIFICANT CHANGE UP
TRIGL SERPL-MCNC: 94 MG/DL — SIGNIFICANT CHANGE UP
TROPONIN T, HIGH SENSITIVITY RESULT: 26 NG/L — SIGNIFICANT CHANGE UP
TROPONIN T, HIGH SENSITIVITY RESULT: 26 NG/L — SIGNIFICANT CHANGE UP
TROPONIN T, HIGH SENSITIVITY RESULT: 27 NG/L — SIGNIFICANT CHANGE UP
TROPONIN T, HIGH SENSITIVITY RESULT: 27 NG/L — SIGNIFICANT CHANGE UP
TSH SERPL-MCNC: 1.58 UIU/ML — SIGNIFICANT CHANGE UP (ref 0.27–4.2)
TSH SERPL-MCNC: 1.58 UIU/ML — SIGNIFICANT CHANGE UP (ref 0.27–4.2)
UIBC SERPL-MCNC: 227 UG/DL — SIGNIFICANT CHANGE UP (ref 110–370)
UIBC SERPL-MCNC: 227 UG/DL — SIGNIFICANT CHANGE UP (ref 110–370)
WBC # BLD: 10.23 K/UL — SIGNIFICANT CHANGE UP (ref 3.8–10.5)
WBC # BLD: 10.23 K/UL — SIGNIFICANT CHANGE UP (ref 3.8–10.5)
WBC # BLD: 10.46 K/UL — SIGNIFICANT CHANGE UP (ref 3.8–10.5)
WBC # BLD: 10.46 K/UL — SIGNIFICANT CHANGE UP (ref 3.8–10.5)
WBC # BLD: 10.78 K/UL — HIGH (ref 3.8–10.5)
WBC # BLD: 10.78 K/UL — HIGH (ref 3.8–10.5)
WBC # BLD: 10.98 K/UL — HIGH (ref 3.8–10.5)
WBC # BLD: 10.98 K/UL — HIGH (ref 3.8–10.5)
WBC # BLD: 11.19 K/UL — HIGH (ref 3.8–10.5)
WBC # BLD: 11.19 K/UL — HIGH (ref 3.8–10.5)
WBC # BLD: 11.21 K/UL — HIGH (ref 3.8–10.5)
WBC # BLD: 11.21 K/UL — HIGH (ref 3.8–10.5)
WBC # BLD: 11.24 K/UL — HIGH (ref 3.8–10.5)
WBC # BLD: 11.24 K/UL — HIGH (ref 3.8–10.5)
WBC # BLD: 12.4 K/UL — HIGH (ref 3.8–10.5)
WBC # BLD: 12.4 K/UL — HIGH (ref 3.8–10.5)
WBC # BLD: 12.51 K/UL — HIGH (ref 3.8–10.5)
WBC # BLD: 12.51 K/UL — HIGH (ref 3.8–10.5)
WBC # BLD: 12.69 K/UL — HIGH (ref 3.8–10.5)
WBC # BLD: 12.69 K/UL — HIGH (ref 3.8–10.5)
WBC # BLD: 14.39 K/UL — HIGH (ref 3.8–10.5)
WBC # BLD: 14.39 K/UL — HIGH (ref 3.8–10.5)
WBC # BLD: 15.33 K/UL — HIGH (ref 3.8–10.5)
WBC # BLD: 15.33 K/UL — HIGH (ref 3.8–10.5)
WBC # BLD: 9.88 K/UL — SIGNIFICANT CHANGE UP (ref 3.8–10.5)
WBC # BLD: 9.88 K/UL — SIGNIFICANT CHANGE UP (ref 3.8–10.5)
WBC # FLD AUTO: 10.23 K/UL — SIGNIFICANT CHANGE UP (ref 3.8–10.5)
WBC # FLD AUTO: 10.23 K/UL — SIGNIFICANT CHANGE UP (ref 3.8–10.5)
WBC # FLD AUTO: 10.46 K/UL — SIGNIFICANT CHANGE UP (ref 3.8–10.5)
WBC # FLD AUTO: 10.46 K/UL — SIGNIFICANT CHANGE UP (ref 3.8–10.5)
WBC # FLD AUTO: 10.78 K/UL — HIGH (ref 3.8–10.5)
WBC # FLD AUTO: 10.78 K/UL — HIGH (ref 3.8–10.5)
WBC # FLD AUTO: 10.98 K/UL — HIGH (ref 3.8–10.5)
WBC # FLD AUTO: 10.98 K/UL — HIGH (ref 3.8–10.5)
WBC # FLD AUTO: 11.19 K/UL — HIGH (ref 3.8–10.5)
WBC # FLD AUTO: 11.19 K/UL — HIGH (ref 3.8–10.5)
WBC # FLD AUTO: 11.21 K/UL — HIGH (ref 3.8–10.5)
WBC # FLD AUTO: 11.21 K/UL — HIGH (ref 3.8–10.5)
WBC # FLD AUTO: 11.24 K/UL — HIGH (ref 3.8–10.5)
WBC # FLD AUTO: 11.24 K/UL — HIGH (ref 3.8–10.5)
WBC # FLD AUTO: 12.4 K/UL — HIGH (ref 3.8–10.5)
WBC # FLD AUTO: 12.4 K/UL — HIGH (ref 3.8–10.5)
WBC # FLD AUTO: 12.51 K/UL — HIGH (ref 3.8–10.5)
WBC # FLD AUTO: 12.51 K/UL — HIGH (ref 3.8–10.5)
WBC # FLD AUTO: 12.69 K/UL — HIGH (ref 3.8–10.5)
WBC # FLD AUTO: 12.69 K/UL — HIGH (ref 3.8–10.5)
WBC # FLD AUTO: 14.39 K/UL — HIGH (ref 3.8–10.5)
WBC # FLD AUTO: 14.39 K/UL — HIGH (ref 3.8–10.5)
WBC # FLD AUTO: 15.33 K/UL — HIGH (ref 3.8–10.5)
WBC # FLD AUTO: 15.33 K/UL — HIGH (ref 3.8–10.5)
WBC # FLD AUTO: 9.88 K/UL — SIGNIFICANT CHANGE UP (ref 3.8–10.5)
WBC # FLD AUTO: 9.88 K/UL — SIGNIFICANT CHANGE UP (ref 3.8–10.5)

## 2023-01-01 PROCEDURE — 99233 SBSQ HOSP IP/OBS HIGH 50: CPT

## 2023-01-01 PROCEDURE — 70496 CT ANGIOGRAPHY HEAD: CPT | Mod: 26,MA

## 2023-01-01 PROCEDURE — 70450 CT HEAD/BRAIN W/O DYE: CPT | Mod: 26,MA,59

## 2023-01-01 PROCEDURE — 99214 OFFICE O/P EST MOD 30 MIN: CPT

## 2023-01-01 PROCEDURE — 99254 IP/OBS CNSLTJ NEW/EST MOD 60: CPT | Mod: 57

## 2023-01-01 PROCEDURE — 93325 DOPPLER ECHO COLOR FLOW MAPG: CPT | Mod: 26,GC

## 2023-01-01 PROCEDURE — 70498 CT ANGIOGRAPHY NECK: CPT | Mod: 26,MA

## 2023-01-01 PROCEDURE — 99232 SBSQ HOSP IP/OBS MODERATE 35: CPT

## 2023-01-01 PROCEDURE — 70551 MRI BRAIN STEM W/O DYE: CPT | Mod: 26

## 2023-01-01 PROCEDURE — 99223 1ST HOSP IP/OBS HIGH 75: CPT | Mod: GC

## 2023-01-01 PROCEDURE — 93306 TTE W/DOPPLER COMPLETE: CPT | Mod: 26

## 2023-01-01 PROCEDURE — 71045 X-RAY EXAM CHEST 1 VIEW: CPT | Mod: 26

## 2023-01-01 PROCEDURE — 99285 EMERGENCY DEPT VISIT HI MDM: CPT

## 2023-01-01 PROCEDURE — 71260 CT THORAX DX C+: CPT | Mod: 26

## 2023-01-01 PROCEDURE — 76376 3D RENDER W/INTRP POSTPROCES: CPT | Mod: 26

## 2023-01-01 PROCEDURE — 0042T: CPT | Mod: MA

## 2023-01-01 PROCEDURE — 93312 ECHO TRANSESOPHAGEAL: CPT | Mod: 26

## 2023-01-01 PROCEDURE — 99496 TRANSJ CARE MGMT HIGH F2F 7D: CPT | Mod: 95

## 2023-01-01 PROCEDURE — 99223 1ST HOSP IP/OBS HIGH 75: CPT

## 2023-01-01 PROCEDURE — 70450 CT HEAD/BRAIN W/O DYE: CPT | Mod: 26,77,59

## 2023-01-01 PROCEDURE — 95816 EEG AWAKE AND DROWSY: CPT | Mod: 26

## 2023-01-01 PROCEDURE — 93320 DOPPLER ECHO COMPLETE: CPT | Mod: 26,GC

## 2023-01-01 PROCEDURE — 99239 HOSP IP/OBS DSCHRG MGMT >30: CPT

## 2023-01-01 RX ORDER — SODIUM CHLORIDE 9 MG/ML
1000 INJECTION, SOLUTION INTRAVENOUS
Refills: 0 | Status: DISCONTINUED | OUTPATIENT
Start: 2023-01-01 | End: 2023-01-01

## 2023-01-01 RX ORDER — SODIUM CHLORIDE 9 MG/ML
4 INJECTION INTRAMUSCULAR; INTRAVENOUS; SUBCUTANEOUS EVERY 12 HOURS
Refills: 0 | Status: DISCONTINUED | OUTPATIENT
Start: 2023-01-01 | End: 2023-01-01

## 2023-01-01 RX ORDER — GABAPENTIN 100 MG/1
100 CAPSULE ORAL
Qty: 90 | Refills: 1 | Status: ACTIVE | COMMUNITY
Start: 2019-11-19 | End: 1900-01-01

## 2023-01-01 RX ORDER — LEVETIRACETAM 250 MG/1
750 TABLET, FILM COATED ORAL EVERY 12 HOURS
Refills: 0 | Status: DISCONTINUED | OUTPATIENT
Start: 2023-01-01 | End: 2023-01-01

## 2023-01-01 RX ORDER — MIRTAZAPINE 45 MG/1
1 TABLET, ORALLY DISINTEGRATING ORAL
Refills: 0 | DISCHARGE

## 2023-01-01 RX ORDER — INSULIN LISPRO 100/ML
VIAL (ML) SUBCUTANEOUS AT BEDTIME
Refills: 0 | Status: DISCONTINUED | OUTPATIENT
Start: 2023-01-01 | End: 2023-01-01

## 2023-01-01 RX ORDER — MIRTAZAPINE 45 MG/1
15 TABLET, ORALLY DISINTEGRATING ORAL AT BEDTIME
Refills: 0 | Status: DISCONTINUED | OUTPATIENT
Start: 2023-01-01 | End: 2023-01-01

## 2023-01-01 RX ORDER — APIXABAN 2.5 MG/1
5 TABLET, FILM COATED ORAL EVERY 12 HOURS
Refills: 0 | Status: DISCONTINUED | OUTPATIENT
Start: 2023-01-01 | End: 2023-01-01

## 2023-01-01 RX ORDER — APIXABAN 5 MG/1
5 TABLET, FILM COATED ORAL
Qty: 180 | Refills: 3 | Status: ACTIVE | COMMUNITY
Start: 2021-04-12 | End: 1900-01-01

## 2023-01-01 RX ORDER — POLYETHYLENE GLYCOL 3350 17 G/17G
17 POWDER, FOR SOLUTION ORAL DAILY
Refills: 0 | Status: DISCONTINUED | OUTPATIENT
Start: 2023-01-01 | End: 2023-01-01

## 2023-01-01 RX ORDER — GLUCAGON INJECTION, SOLUTION 0.5 MG/.1ML
1 INJECTION, SOLUTION SUBCUTANEOUS ONCE
Refills: 0 | Status: DISCONTINUED | OUTPATIENT
Start: 2023-01-01 | End: 2023-01-01

## 2023-01-01 RX ORDER — CEFTRIAXONE 500 MG/1
1000 INJECTION, POWDER, FOR SOLUTION INTRAMUSCULAR; INTRAVENOUS ONCE
Refills: 0 | Status: COMPLETED | OUTPATIENT
Start: 2023-01-01 | End: 2023-01-01

## 2023-01-01 RX ORDER — ENOXAPARIN SODIUM 100 MG/ML
60 INJECTION SUBCUTANEOUS EVERY 12 HOURS
Refills: 0 | Status: DISCONTINUED | OUTPATIENT
Start: 2023-01-01 | End: 2023-01-01

## 2023-01-01 RX ORDER — ACETAMINOPHEN 500 MG
650 TABLET ORAL ONCE
Refills: 0 | Status: COMPLETED | OUTPATIENT
Start: 2023-01-01 | End: 2023-01-01

## 2023-01-01 RX ORDER — WHEELCHAIR
EACH MISCELLANEOUS
Qty: 1 | Refills: 0 | Status: ACTIVE | COMMUNITY
Start: 2023-01-01 | End: 1900-01-01

## 2023-01-01 RX ORDER — DEXTROSE 50 % IN WATER 50 %
25 SYRINGE (ML) INTRAVENOUS ONCE
Refills: 0 | Status: DISCONTINUED | OUTPATIENT
Start: 2023-01-01 | End: 2023-01-01

## 2023-01-01 RX ORDER — DOCUSATE SODIUM 100 MG/1
100 TABLET ORAL DAILY
Qty: 90 | Refills: 1 | Status: ACTIVE | COMMUNITY
Start: 2022-12-05 | End: 1900-01-01

## 2023-01-01 RX ORDER — DEXTROSE 50 % IN WATER 50 %
15 SYRINGE (ML) INTRAVENOUS ONCE
Refills: 0 | Status: DISCONTINUED | OUTPATIENT
Start: 2023-01-01 | End: 2023-01-01

## 2023-01-01 RX ORDER — ENOXAPARIN SODIUM 100 MG/ML
60 INJECTION SUBCUTANEOUS
Qty: 1 | Refills: 0
Start: 2023-01-01 | End: 2024-01-01

## 2023-01-01 RX ORDER — INSULIN LISPRO 100/ML
VIAL (ML) SUBCUTANEOUS
Refills: 0 | Status: DISCONTINUED | OUTPATIENT
Start: 2023-01-01 | End: 2023-01-01

## 2023-01-01 RX ORDER — APIXABAN 2.5 MG/1
1 TABLET, FILM COATED ORAL
Refills: 0 | DISCHARGE

## 2023-01-01 RX ORDER — AZITHROMYCIN 500 MG/1
500 TABLET, FILM COATED ORAL ONCE
Refills: 0 | Status: COMPLETED | OUTPATIENT
Start: 2023-01-01 | End: 2023-01-01

## 2023-01-01 RX ORDER — DIGOXIN 125 UG/1
125 TABLET ORAL DAILY
Qty: 90 | Refills: 1 | Status: ACTIVE | COMMUNITY
Start: 2019-11-20 | End: 1900-01-01

## 2023-01-01 RX ORDER — MIRTAZAPINE 45 MG/1
1 TABLET, ORALLY DISINTEGRATING ORAL
Qty: 30 | Refills: 0
Start: 2023-01-01 | End: 2024-01-01

## 2023-01-01 RX ORDER — ATORVASTATIN CALCIUM 80 MG/1
80 TABLET, FILM COATED ORAL AT BEDTIME
Refills: 0 | Status: DISCONTINUED | OUTPATIENT
Start: 2023-01-01 | End: 2023-01-01

## 2023-01-01 RX ORDER — INSULIN LISPRO 100/ML
VIAL (ML) SUBCUTANEOUS EVERY 6 HOURS
Refills: 0 | Status: DISCONTINUED | OUTPATIENT
Start: 2023-01-01 | End: 2023-01-01

## 2023-01-01 RX ORDER — ATORVASTATIN CALCIUM 80 MG/1
1 TABLET, FILM COATED ORAL
Qty: 30 | Refills: 0
Start: 2023-01-01 | End: 2024-01-01

## 2023-01-01 RX ORDER — FLUTICASONE PROPIONATE 50 UG/1
50 SPRAY, METERED NASAL DAILY
Qty: 1 | Refills: 3 | Status: ACTIVE | COMMUNITY
Start: 2022-12-05 | End: 1900-01-01

## 2023-01-01 RX ORDER — SODIUM CHLORIDE 9 MG/ML
4 INJECTION INTRAMUSCULAR; INTRAVENOUS; SUBCUTANEOUS EVERY 8 HOURS
Refills: 0 | Status: DISCONTINUED | OUTPATIENT
Start: 2023-01-01 | End: 2023-01-01

## 2023-01-01 RX ORDER — METOPROLOL SUCCINATE 25 MG/1
25 TABLET, EXTENDED RELEASE ORAL DAILY
Qty: 90 | Refills: 3 | Status: ACTIVE | COMMUNITY
Start: 2020-05-12 | End: 1900-01-01

## 2023-01-01 RX ORDER — METOPROLOL TARTRATE 50 MG
25 TABLET ORAL
Refills: 0 | Status: DISCONTINUED | OUTPATIENT
Start: 2023-01-01 | End: 2023-01-01

## 2023-01-01 RX ORDER — POLYETHYLENE GLYCOL 3350 17 G/17G
17 POWDER, FOR SOLUTION ORAL
Qty: 0 | Refills: 0 | DISCHARGE
Start: 2023-01-01

## 2023-01-01 RX ORDER — INSULIN GLARGINE 100 [IU]/ML
8 INJECTION, SOLUTION SUBCUTANEOUS EVERY MORNING
Refills: 0 | Status: DISCONTINUED | OUTPATIENT
Start: 2023-01-01 | End: 2023-01-01

## 2023-01-01 RX ORDER — METFORMIN HYDROCHLORIDE 500 MG/1
500 TABLET, COATED ORAL
Qty: 180 | Refills: 1 | Status: ACTIVE | COMMUNITY
Start: 2019-11-08 | End: 1900-01-01

## 2023-01-01 RX ORDER — DEXTROSE 50 % IN WATER 50 %
12.5 SYRINGE (ML) INTRAVENOUS ONCE
Refills: 0 | Status: DISCONTINUED | OUTPATIENT
Start: 2023-01-01 | End: 2023-01-01

## 2023-01-01 RX ORDER — ACETAMINOPHEN 500 MG
1000 TABLET ORAL ONCE
Refills: 0 | Status: DISCONTINUED | OUTPATIENT
Start: 2023-01-01 | End: 2023-01-01

## 2023-01-01 RX ORDER — LEVETIRACETAM 250 MG/1
2000 TABLET, FILM COATED ORAL ONCE
Refills: 0 | Status: COMPLETED | OUTPATIENT
Start: 2023-01-01 | End: 2023-01-01

## 2023-01-01 RX ADMIN — Medication 1: at 06:31

## 2023-01-01 RX ADMIN — MIRTAZAPINE 15 MILLIGRAM(S): 45 TABLET, ORALLY DISINTEGRATING ORAL at 21:39

## 2023-01-01 RX ADMIN — Medication 25 MILLIGRAM(S): at 17:25

## 2023-01-01 RX ADMIN — Medication 2: at 18:30

## 2023-01-01 RX ADMIN — MIRTAZAPINE 15 MILLIGRAM(S): 45 TABLET, ORALLY DISINTEGRATING ORAL at 22:03

## 2023-01-01 RX ADMIN — Medication 25 MILLIGRAM(S): at 18:13

## 2023-01-01 RX ADMIN — Medication 1: at 08:49

## 2023-01-01 RX ADMIN — MIRTAZAPINE 15 MILLIGRAM(S): 45 TABLET, ORALLY DISINTEGRATING ORAL at 22:16

## 2023-01-01 RX ADMIN — INSULIN GLARGINE 8 UNIT(S): 100 INJECTION, SOLUTION SUBCUTANEOUS at 08:35

## 2023-01-01 RX ADMIN — Medication 25 MILLIGRAM(S): at 06:14

## 2023-01-01 RX ADMIN — LEVETIRACETAM 400 MILLIGRAM(S): 250 TABLET, FILM COATED ORAL at 17:22

## 2023-01-01 RX ADMIN — ATORVASTATIN CALCIUM 80 MILLIGRAM(S): 80 TABLET, FILM COATED ORAL at 21:22

## 2023-01-01 RX ADMIN — APIXABAN 5 MILLIGRAM(S): 2.5 TABLET, FILM COATED ORAL at 17:28

## 2023-01-01 RX ADMIN — ENOXAPARIN SODIUM 60 MILLIGRAM(S): 100 INJECTION SUBCUTANEOUS at 17:25

## 2023-01-01 RX ADMIN — ENOXAPARIN SODIUM 60 MILLIGRAM(S): 100 INJECTION SUBCUTANEOUS at 17:37

## 2023-01-01 RX ADMIN — MIRTAZAPINE 15 MILLIGRAM(S): 45 TABLET, ORALLY DISINTEGRATING ORAL at 22:07

## 2023-01-01 RX ADMIN — Medication 2: at 12:40

## 2023-01-01 RX ADMIN — Medication 25 MILLIGRAM(S): at 05:22

## 2023-01-01 RX ADMIN — ATORVASTATIN CALCIUM 80 MILLIGRAM(S): 80 TABLET, FILM COATED ORAL at 22:28

## 2023-01-01 RX ADMIN — SODIUM CHLORIDE 4 MILLILITER(S): 9 INJECTION INTRAMUSCULAR; INTRAVENOUS; SUBCUTANEOUS at 08:18

## 2023-01-01 RX ADMIN — Medication 3: at 16:42

## 2023-01-01 RX ADMIN — Medication 1: at 08:21

## 2023-01-01 RX ADMIN — Medication 1: at 17:23

## 2023-01-01 RX ADMIN — INSULIN GLARGINE 8 UNIT(S): 100 INJECTION, SOLUTION SUBCUTANEOUS at 08:13

## 2023-01-01 RX ADMIN — Medication 3: at 17:21

## 2023-01-01 RX ADMIN — MIRTAZAPINE 15 MILLIGRAM(S): 45 TABLET, ORALLY DISINTEGRATING ORAL at 21:35

## 2023-01-01 RX ADMIN — MIRTAZAPINE 15 MILLIGRAM(S): 45 TABLET, ORALLY DISINTEGRATING ORAL at 22:17

## 2023-01-01 RX ADMIN — SODIUM CHLORIDE 4 MILLILITER(S): 9 INJECTION INTRAMUSCULAR; INTRAVENOUS; SUBCUTANEOUS at 08:01

## 2023-01-01 RX ADMIN — LEVETIRACETAM 400 MILLIGRAM(S): 250 TABLET, FILM COATED ORAL at 17:26

## 2023-01-01 RX ADMIN — Medication 3: at 17:41

## 2023-01-01 RX ADMIN — MIRTAZAPINE 15 MILLIGRAM(S): 45 TABLET, ORALLY DISINTEGRATING ORAL at 00:14

## 2023-01-01 RX ADMIN — Medication 1: at 08:34

## 2023-01-01 RX ADMIN — AZITHROMYCIN 255 MILLIGRAM(S): 500 TABLET, FILM COATED ORAL at 04:56

## 2023-01-01 RX ADMIN — ENOXAPARIN SODIUM 60 MILLIGRAM(S): 100 INJECTION SUBCUTANEOUS at 05:19

## 2023-01-01 RX ADMIN — Medication 25 MILLIGRAM(S): at 17:29

## 2023-01-01 RX ADMIN — ENOXAPARIN SODIUM 60 MILLIGRAM(S): 100 INJECTION SUBCUTANEOUS at 17:16

## 2023-01-01 RX ADMIN — SODIUM CHLORIDE 4 MILLILITER(S): 9 INJECTION INTRAMUSCULAR; INTRAVENOUS; SUBCUTANEOUS at 23:22

## 2023-01-01 RX ADMIN — Medication 260 MILLIGRAM(S): at 01:20

## 2023-01-01 RX ADMIN — CEFTRIAXONE 100 MILLIGRAM(S): 500 INJECTION, POWDER, FOR SOLUTION INTRAMUSCULAR; INTRAVENOUS at 03:07

## 2023-01-01 RX ADMIN — POLYETHYLENE GLYCOL 3350 17 GRAM(S): 17 POWDER, FOR SOLUTION ORAL at 11:40

## 2023-01-01 RX ADMIN — Medication 25 MILLIGRAM(S): at 05:19

## 2023-01-01 RX ADMIN — Medication 1 MILLIGRAM(S): at 02:24

## 2023-01-01 RX ADMIN — Medication 1: at 07:16

## 2023-01-01 RX ADMIN — Medication 1: at 07:57

## 2023-01-01 RX ADMIN — ENOXAPARIN SODIUM 60 MILLIGRAM(S): 100 INJECTION SUBCUTANEOUS at 06:31

## 2023-01-01 RX ADMIN — APIXABAN 5 MILLIGRAM(S): 2.5 TABLET, FILM COATED ORAL at 17:20

## 2023-01-01 RX ADMIN — Medication 25 MILLIGRAM(S): at 06:12

## 2023-01-01 RX ADMIN — Medication 25 MILLIGRAM(S): at 18:18

## 2023-01-01 RX ADMIN — MIRTAZAPINE 15 MILLIGRAM(S): 45 TABLET, ORALLY DISINTEGRATING ORAL at 21:53

## 2023-01-01 RX ADMIN — Medication 3: at 12:47

## 2023-01-01 RX ADMIN — Medication 25 MILLIGRAM(S): at 05:49

## 2023-01-01 RX ADMIN — Medication 25 MILLIGRAM(S): at 06:30

## 2023-01-01 RX ADMIN — Medication 3: at 16:37

## 2023-01-01 RX ADMIN — LEVETIRACETAM 400 MILLIGRAM(S): 250 TABLET, FILM COATED ORAL at 05:29

## 2023-01-01 RX ADMIN — ATORVASTATIN CALCIUM 80 MILLIGRAM(S): 80 TABLET, FILM COATED ORAL at 22:41

## 2023-01-01 RX ADMIN — Medication 25 MILLIGRAM(S): at 17:43

## 2023-01-01 RX ADMIN — ATORVASTATIN CALCIUM 80 MILLIGRAM(S): 80 TABLET, FILM COATED ORAL at 22:03

## 2023-01-01 RX ADMIN — ENOXAPARIN SODIUM 60 MILLIGRAM(S): 100 INJECTION SUBCUTANEOUS at 17:47

## 2023-01-01 RX ADMIN — ENOXAPARIN SODIUM 60 MILLIGRAM(S): 100 INJECTION SUBCUTANEOUS at 05:36

## 2023-01-01 RX ADMIN — ENOXAPARIN SODIUM 60 MILLIGRAM(S): 100 INJECTION SUBCUTANEOUS at 05:15

## 2023-01-01 RX ADMIN — Medication 2: at 16:38

## 2023-01-01 RX ADMIN — ATORVASTATIN CALCIUM 80 MILLIGRAM(S): 80 TABLET, FILM COATED ORAL at 22:17

## 2023-01-01 RX ADMIN — Medication 1: at 17:27

## 2023-01-01 RX ADMIN — Medication 3: at 17:37

## 2023-01-01 RX ADMIN — Medication 25 MILLIGRAM(S): at 17:36

## 2023-01-01 RX ADMIN — Medication 2: at 08:21

## 2023-01-01 RX ADMIN — Medication 25 MILLIGRAM(S): at 17:16

## 2023-01-01 RX ADMIN — Medication 25 MILLIGRAM(S): at 17:23

## 2023-01-01 RX ADMIN — ENOXAPARIN SODIUM 60 MILLIGRAM(S): 100 INJECTION SUBCUTANEOUS at 17:24

## 2023-01-01 RX ADMIN — ENOXAPARIN SODIUM 60 MILLIGRAM(S): 100 INJECTION SUBCUTANEOUS at 17:14

## 2023-01-01 RX ADMIN — SODIUM CHLORIDE 4 MILLILITER(S): 9 INJECTION INTRAMUSCULAR; INTRAVENOUS; SUBCUTANEOUS at 00:43

## 2023-01-01 RX ADMIN — Medication 2: at 01:20

## 2023-01-01 RX ADMIN — ENOXAPARIN SODIUM 60 MILLIGRAM(S): 100 INJECTION SUBCUTANEOUS at 18:13

## 2023-01-01 RX ADMIN — Medication 1: at 11:23

## 2023-01-01 RX ADMIN — Medication 25 MILLIGRAM(S): at 17:14

## 2023-01-01 RX ADMIN — SODIUM CHLORIDE 4 MILLILITER(S): 9 INJECTION INTRAMUSCULAR; INTRAVENOUS; SUBCUTANEOUS at 20:11

## 2023-01-01 RX ADMIN — LEVETIRACETAM 400 MILLIGRAM(S): 250 TABLET, FILM COATED ORAL at 07:38

## 2023-01-01 RX ADMIN — ENOXAPARIN SODIUM 60 MILLIGRAM(S): 100 INJECTION SUBCUTANEOUS at 06:30

## 2023-01-01 RX ADMIN — Medication 25 MILLIGRAM(S): at 05:53

## 2023-01-01 RX ADMIN — SODIUM CHLORIDE 4 MILLILITER(S): 9 INJECTION INTRAMUSCULAR; INTRAVENOUS; SUBCUTANEOUS at 15:51

## 2023-01-01 RX ADMIN — APIXABAN 5 MILLIGRAM(S): 2.5 TABLET, FILM COATED ORAL at 05:32

## 2023-01-01 RX ADMIN — Medication 25 MILLIGRAM(S): at 06:31

## 2023-01-01 RX ADMIN — Medication 2: at 12:36

## 2023-01-01 RX ADMIN — ENOXAPARIN SODIUM 60 MILLIGRAM(S): 100 INJECTION SUBCUTANEOUS at 06:45

## 2023-01-01 RX ADMIN — Medication 1: at 11:57

## 2023-01-01 RX ADMIN — ATORVASTATIN CALCIUM 80 MILLIGRAM(S): 80 TABLET, FILM COATED ORAL at 00:14

## 2023-01-01 RX ADMIN — Medication 1: at 08:48

## 2023-01-01 RX ADMIN — ENOXAPARIN SODIUM 60 MILLIGRAM(S): 100 INJECTION SUBCUTANEOUS at 17:42

## 2023-01-01 RX ADMIN — ATORVASTATIN CALCIUM 80 MILLIGRAM(S): 80 TABLET, FILM COATED ORAL at 21:53

## 2023-01-01 RX ADMIN — MIRTAZAPINE 15 MILLIGRAM(S): 45 TABLET, ORALLY DISINTEGRATING ORAL at 21:11

## 2023-01-01 RX ADMIN — ENOXAPARIN SODIUM 60 MILLIGRAM(S): 100 INJECTION SUBCUTANEOUS at 05:49

## 2023-01-01 RX ADMIN — Medication 1: at 11:58

## 2023-01-01 RX ADMIN — Medication 25 MILLIGRAM(S): at 05:36

## 2023-01-01 RX ADMIN — ATORVASTATIN CALCIUM 80 MILLIGRAM(S): 80 TABLET, FILM COATED ORAL at 21:35

## 2023-01-01 RX ADMIN — Medication 25 MILLIGRAM(S): at 17:47

## 2023-01-01 RX ADMIN — MIRTAZAPINE 15 MILLIGRAM(S): 45 TABLET, ORALLY DISINTEGRATING ORAL at 22:31

## 2023-01-01 RX ADMIN — Medication 2: at 17:24

## 2023-01-01 RX ADMIN — INSULIN GLARGINE 8 UNIT(S): 100 INJECTION, SOLUTION SUBCUTANEOUS at 07:53

## 2023-01-01 RX ADMIN — ATORVASTATIN CALCIUM 80 MILLIGRAM(S): 80 TABLET, FILM COATED ORAL at 22:07

## 2023-01-01 RX ADMIN — Medication 1: at 09:04

## 2023-01-01 RX ADMIN — INSULIN GLARGINE 8 UNIT(S): 100 INJECTION, SOLUTION SUBCUTANEOUS at 08:15

## 2023-01-01 RX ADMIN — MIRTAZAPINE 15 MILLIGRAM(S): 45 TABLET, ORALLY DISINTEGRATING ORAL at 22:41

## 2023-01-01 RX ADMIN — SODIUM CHLORIDE 4 MILLILITER(S): 9 INJECTION INTRAMUSCULAR; INTRAVENOUS; SUBCUTANEOUS at 01:30

## 2023-01-01 RX ADMIN — ATORVASTATIN CALCIUM 80 MILLIGRAM(S): 80 TABLET, FILM COATED ORAL at 21:13

## 2023-01-01 RX ADMIN — Medication 1: at 12:34

## 2023-01-01 RX ADMIN — SODIUM CHLORIDE 4 MILLILITER(S): 9 INJECTION INTRAMUSCULAR; INTRAVENOUS; SUBCUTANEOUS at 10:01

## 2023-01-01 RX ADMIN — MIRTAZAPINE 15 MILLIGRAM(S): 45 TABLET, ORALLY DISINTEGRATING ORAL at 21:23

## 2023-01-01 RX ADMIN — Medication 3: at 12:06

## 2023-01-01 RX ADMIN — ENOXAPARIN SODIUM 60 MILLIGRAM(S): 100 INJECTION SUBCUTANEOUS at 05:48

## 2023-01-01 RX ADMIN — ENOXAPARIN SODIUM 60 MILLIGRAM(S): 100 INJECTION SUBCUTANEOUS at 18:18

## 2023-01-01 RX ADMIN — LEVETIRACETAM 400 MILLIGRAM(S): 250 TABLET, FILM COATED ORAL at 06:13

## 2023-01-01 RX ADMIN — ENOXAPARIN SODIUM 60 MILLIGRAM(S): 100 INJECTION SUBCUTANEOUS at 06:14

## 2023-01-01 RX ADMIN — ENOXAPARIN SODIUM 60 MILLIGRAM(S): 100 INJECTION SUBCUTANEOUS at 05:53

## 2023-01-01 RX ADMIN — MIRTAZAPINE 15 MILLIGRAM(S): 45 TABLET, ORALLY DISINTEGRATING ORAL at 22:14

## 2023-01-01 RX ADMIN — ENOXAPARIN SODIUM 60 MILLIGRAM(S): 100 INJECTION SUBCUTANEOUS at 06:13

## 2023-01-01 RX ADMIN — ATORVASTATIN CALCIUM 80 MILLIGRAM(S): 80 TABLET, FILM COATED ORAL at 22:14

## 2023-01-01 RX ADMIN — Medication 1: at 12:14

## 2023-01-01 RX ADMIN — ATORVASTATIN CALCIUM 80 MILLIGRAM(S): 80 TABLET, FILM COATED ORAL at 22:16

## 2023-01-01 RX ADMIN — INSULIN GLARGINE 8 UNIT(S): 100 INJECTION, SOLUTION SUBCUTANEOUS at 07:56

## 2023-01-01 RX ADMIN — Medication 1: at 08:05

## 2023-01-01 RX ADMIN — Medication 25 MILLIGRAM(S): at 17:59

## 2023-01-01 RX ADMIN — ATORVASTATIN CALCIUM 80 MILLIGRAM(S): 80 TABLET, FILM COATED ORAL at 21:11

## 2023-01-01 RX ADMIN — Medication 25 MILLIGRAM(S): at 05:14

## 2023-01-01 RX ADMIN — ENOXAPARIN SODIUM 60 MILLIGRAM(S): 100 INJECTION SUBCUTANEOUS at 17:30

## 2023-01-01 RX ADMIN — LEVETIRACETAM 600 MILLIGRAM(S): 250 TABLET, FILM COATED ORAL at 02:25

## 2023-01-01 RX ADMIN — Medication 1: at 08:13

## 2023-01-01 RX ADMIN — Medication 3: at 12:15

## 2023-01-01 RX ADMIN — LEVETIRACETAM 400 MILLIGRAM(S): 250 TABLET, FILM COATED ORAL at 15:01

## 2023-02-23 ENCOUNTER — NON-APPOINTMENT (OUTPATIENT)
Age: 68
End: 2023-02-23

## 2023-02-23 ENCOUNTER — APPOINTMENT (OUTPATIENT)
Dept: OPHTHALMOLOGY | Facility: CLINIC | Age: 68
End: 2023-02-23
Payer: MEDICAID

## 2023-02-23 PROCEDURE — 92004 COMPRE OPH EXAM NEW PT 1/>: CPT

## 2023-02-23 PROCEDURE — 92134 CPTRZ OPH DX IMG PST SGM RTA: CPT

## 2023-03-14 NOTE — CONSULT NOTE ADULT - NSPROBSELRECBLANK_6_GEN
Steroid Injection Discharge Instructions     After you go home:    You may resume your normal diet.    Care of Puncture Site:    If you have a bandaid on your puncture site, you may remove it the next morning  You may shower tomorrow  No bath tubs, whirlpools or swimming pool for at least 48 hours  Use ice packs as needed for discomfort     Activity:    Minimize your activity today. You may gradually resume your normal activity as tolerated  Avoid vigorous or strenuous activity until your symptoms improve or as directed by your doctor  Do NOT drive a vehicle for a few hours after the injection - or longer if you develop numbness in your arm or leg    Medicines:    You may resume all medications, including blood thinners  Resume your Warfarin/Coumadin at your regular dose today. Follow up with your provider to have your INR rechecked  Resume your Platelet Inhibitors and Aspirin tomorrow at your regular dose  For minor discomfort, you may take Acetaminophen (Tylenol) or Ibuprofen (Advil)    Pain:     You may experience increased or different pain over the next 24-48 hours  For the next 48 hrs - you may use ice packs for discomfort     Call your primary care doctor if:    You have severe pain that does not improve with pain medication  You have chills or a fever greater than 101 F (38 C)  The site is red, swollen, hot or tender  Increase in pain, weakness or numbness  New problems with your bowel or bladder  Any questions or concerns    What to watch for:    It can be normal to have some bruising or slight swelling at the puncture site.   After the procedure, you may have some new weakness or numbness down your arm/leg from the numbing medicine. This should resolve in a few hours.   You may feel some temporary relief from the numbing medicine, but that will wear off within a few hours.  Your symptoms may return to pre procedure level, or can even be worse for the first 1-2 days.  For many people, the steroid begins to  provide some relief within 2-3 days, but it can take up to 2 weeks to obtain the full results.  Some people will get lasting relief from a single injection. Others may require up to 3 injections to get results. If you have more than one steroid injection, they should be given 2 weeks apart.  If you have no improvement in your symptoms after two weeks, please contact the doctor who ordered this procedure to discuss the next steps.  Side effects of your steroid injection are mild and will go away in 2-3 days  Insomnia  Irritability  Flushed face  Water retention  Restlessness  Difficulty sleeping  Increased appetite  Increased blood sugar  If you are diabetic, monitor your blood sugar closely. Contact the provider who manages your diabetes to help you control your blood sugar if needed.    If you have questions or concerns call:                  [facility] Radiology Dept @ [phone]                                    between 8am-4:30pm Mon-Fri    If you have urgent questions outside of these normal business hours, please contact the [facility] on call doctor @ [phone]      The provider who performed your procedure was _________________.      DISPLAY PLAN FREE TEXT

## 2023-07-05 NOTE — DIETITIAN INITIAL EVALUATION ADULT. - PERTINENT MEDS FT
Your wound care is being completed by: Facility    Wound Care Instructions   Cleanse wound with gentle non-scented soap and water, pat dry.       Please provide patient with a protein shake for breakfast and dinner (2 per day).      Monitor wounds for signs and symptoms of infection:   Increased redness, swelling or warmth around wound   Foul odor or increased drainage   Fever/chills/body aches  Nausea/vomiting     Please contact wound clinic with any questions or concerns.   We are available Monday through Friday 7 am to 5 pm.   Our phone number is [phone]    Central Scheduling       [phone]       Want to say \"thank you\" to your nurse?   Scan the QR code below to nominate an extraordinary nurse for The [employer]EV[employer] Award.       [email or web address]    
Enoxaparin, Humalog, Lipitor, Colace, Toprol xl

## 2023-07-24 NOTE — PROGRESS NOTE ADULT - PROBLEM SELECTOR PLAN 3
- with hx of left atrial thrombus.   - continue Lovenox 80 qd for anticoagulation for now. Per [facility] chart review, patient start on lovenox 80 in 10/2019 after hospitalization for Afib and left atrial thrombus.  -rate control with digoxin and metoprolol (home medications)  - dig level wnl [Initial Visit] : an initial visit for [FreeTextEntry2] : his right hip.

## 2023-08-09 NOTE — PROGRESS NOTE ADULT - SUBJECTIVE AND OBJECTIVE BOX
MEDICARE WELLNESS VISIT + NOTE    CHIEF COMPLAINT:  Leyla Arenas presents for her Subsequent Annual Medicare Wellness Visit.   Her additional complaints or concerns are addressed below.     Patient Care Team:  Danyelle Gutiérrez MD as PCP - General        Patient Active Problem List   Diagnosis   • Annual physical exam   • Chronic back pain   • Major depressive disorder with current active episode   • Benign essential hypertension   • Primary osteoarthritis involving multiple joints   • Gastro-esophageal reflux disease without esophagitis   • Seasonal allergic rhinitis due to pollen   • Neurogenic bladder   • Chronic kidney disease (CKD), stage III (moderate) (CMD)   • Iron deficiency anemia   • Lower extremity venous stasis   • Sensorineural hearing loss   • Rheumatoid arthritis, unspecified (CMD)   • Intervertebral disc disorder with radiculopathy of lumbar region   • Chronic constipation         Past Medical History:   Diagnosis Date   • Acute non-recurrent maxillary sinusitis 02/28/2020   • Anemia    • Chronic back pain    • CKD (chronic kidney disease), stage III (CMD)    • Depression    • Essential (primary) hypertension    • GERD (gastroesophageal reflux disease)    • Neurogenic bladder    • Osteoarthritis    • Right-sided congestive heart failure (CMD)          History reviewed. No pertinent surgical history.      Social History     Tobacco Use   • Smoking status: Never   • Smokeless tobacco: Never   Vaping Use   • Vaping Use: never used   Substance Use Topics   • Alcohol use: No   • Drug use: No     Family History   Problem Relation Age of Onset   • Patient is unaware of any medical problems Mother    • Patient is unaware of any medical problems Father          Current Outpatient Medications   Medication Sig Dispense Refill   • oxybutynin (DITROPAN-XL) 5 MG 24 hr tablet Take 1 tablet by mouth at bedtime. 90 tablet 0   • losartan (COZAAR) 25 MG tablet Take 1 tablet by mouth daily. 90 tablet 0   • meloxicam  (MOBIC) 7.5 MG tablet Take 1 tablet by mouth daily. 90 tablet 0   • gabapentin (NEURONTIN) 100 MG capsule TAKE 1 CAPSULE BY MOUTH TWICE DAILY 180 capsule 0   • ferrous sulfate 325 (65 FE) MG tablet Take 1 tablet by mouth in the morning and 1 tablet in the evening. 180 tablet 0   • escitalopram (LEXAPRO) 20 MG tablet Take 1 tablet by mouth daily. 90 tablet 0   • triamterene-hydrochlorothiazide (DYAZIDE) 37.5-25 MG per capsule TAKE 1 CAPSULE BY MOUTH EVERY MORNING 90 capsule 0   • bumetanide (BUMEX) 1 MG tablet TAKE 1 TABLET BY MOUTH 3 DAYS A WEEK. 270 tablet 0   • lactulose (CHRONULAC) 10 GM/15ML solution Take 15 mLs by mouth nightly. 236 mL 3   • aspirin 81 MG chewable tablet Chew 1 tablet by mouth daily. 90 tablet 0   • ipratropium (ATROVENT) 0.03 % nasal spray Spray 2 sprays in each nostril in the morning and 2 sprays in the evening. 60 mL 0     No current facility-administered medications for this visit.        The following items on the Medicare Health Risk Assessment were found to be positive  4.) During the past 4 weeks, what was the hardest physical activity you could do for at least 2 minutes?: Very Light     5.) Do you do moderate to strenuous exercise (brisk walk) for about 20 minutes for 3 or more days per week?: No, I usually do not exercise this much     6 a.) How many servings of Fruits and Vegetables do you have each day ( 1 serving = 1 piece of fruit, 1/2 cup fruits or vegetables): 1 per day     6 b.) How many servings of High Fiber / Whole Grain Foods to you have each day ( 1 serving = 1 cup cold cereal, 1/2 cup cooked cereal, 1 slice bread): 1 per day     7b.) Do you feel unsteady when standing or walking?: Yes     7c.) Do you worry about falling?: Yes     13.) Do you need help with any of the following activities?: Get to places outside of walking distance (can't drive alone, or take a bus/taxi alone), Go shopping for groceries or clothes     15.) How confident are you that you can control and  No overnight events.  Reports no pain.   Family at bedside.   Limited verbalizations, but shakes head to no pain.     REVIEW OF SYSTEMS  Constitutional - No fever,  No fatigue  Neurological - No headaches, +loss of strength  Musculoskeletal - No joint pain, No joint swelling, No muscle pain        VITALS  T(C): 36.9 (10-05-19 @ 20:56), Max: 36.9 (10-05-19 @ 20:56)  HR: 82 (10-06-19 @ 06:04) (81 - 94)  BP: 102/72 (10-06-19 @ 06:04) (102/72 - 124/79)  RR: 14 (10-05-19 @ 20:56) (14 - 14)  SpO2: 98% (10-05-19 @ 20:56) (98% - 98%)  Wt(kg): --    156 mg/dL (10-05-19 @ 22:17)  146 mg/dL (10-05-19 @ 17:04)  148 mg/dL (10-05-19 @ 11:42)  157 mg/dL (10-05-19 @ 07:50)      MEDICATIONS   acetaminophen   Tablet .. 650 milliGRAM(s) every 6 hours PRN  aspirin  chewable 81 milliGRAM(s) daily  atorvastatin 40 milliGRAM(s) at bedtime  dextrose 40% Gel 15 Gram(s) once PRN  dextrose 5%. 1000 milliLiter(s) <Continuous>  dextrose 50% Injectable 12.5 Gram(s) once  dextrose 50% Injectable 25 Gram(s) once  dextrose 50% Injectable 25 Gram(s) once  docusate sodium 100 milliGRAM(s) two times a day  enoxaparin Injectable 80 milliGRAM(s) daily  glucagon  Injectable 1 milliGRAM(s) once PRN  insulin lispro (HumaLOG) corrective regimen sliding scale   three times a day before meals  insulin lispro (HumaLOG) corrective regimen sliding scale   at bedtime  metFORMIN 500 milliGRAM(s) daily  metoprolol succinate ER 12.5 milliGRAM(s) daily  pantoprazole    Tablet 40 milliGRAM(s) before breakfast      RECENT LABS/IMAGING          ---------  PHYSICAL EXAM  Constitutional - NAD, Comfortable  Pulm - Breathing comfortably, No wheezing  Abd - Soft, NTND  Extremities - No edema, No calf tenderness  Neurologic Exam -                    Cognitive - Awake, Alert     Communication - Expressive deficits, Limited verbalizations     Motor - Right spastic hemiparesis -- appears improved this AM  Psychiatric - Mood WNL, Affect WNL    ASSESSMENT/PLAN  64y Female with functional deficits after acute CVA  CVA - Lovenox, ASA, Lipitor  AFIB/HTN - Metoprolol  DM2 - Metformin, Well controlled  Pain - Tylenol PRN  DVT PPX - Lovenox	    Continue 3hrs a day of comprehensive rehab program. manage most of your health problems?: Somewhat confident         Vision and Hearing screens: Both screenings were performed and reviewed    Advance care planning documents on file - yes    Cognitive/Functional Status: preexisting cognitive issues - stable    Opioid Review: Leyla is not taking opioid medications.    Recent PHQ 2/9 Score:    PHQ 2:  PHQ 2 Score Adult PHQ 2 Score Adult PHQ 2 Interpretation Little interest or pleasure in activity?   8/9/2023   9:10 AM 0 No further screening needed 0       PHQ 9:       DEPRESSION ASSESSMENT/PLAN:  Depression screening is negative no further plan needed.     Body mass index is 28.9 kg/m².    BMI ASSESSMENT/PLAN:  Patient is overweight.    Journal food intake daily        See Patient Instructions section.   Return in about 3 months (around 11/9/2023).      OUTPATIENT PROGRESS NOTE    Subjective   Chief Complaint Office Visit and Medicare Wellness Visit (Patient is here for a medicare wellness visit. )    HPI   Patient is here for follow-up.  She is using walker.  She is accompanied by her .    Medications  Medications were reviewed and updated today.    Histories  I have personally reviewed and updated the patient's past medical, past surgical, family and social histories during today's visit.    Review of Systems: The patient DENIES symptoms of:  GENERAL fatigue    Objective   Visit Vitals  /70 (BP Location: LUE - Left upper extremity, Patient Position: Sitting, Cuff Size: Regular)   Pulse 94   Temp 98.8 °F (37.1 °C) (Tympanic)   Resp 16   Ht 5' (1.524 m)   Wt 67.1 kg (148 lb)   SpO2 97%   BMI 28.90 kg/m²     Physical Exam  Lungs: clear to auscultation bilaterally    Laboratory  I have reviewed the pertinent laboratory tests. These are the pertinent findings:  ·     Imaging  I have reviewed the pertinent imaging study reports. These are the pertinent findings:  ·     Assessment & Plan   Diagnoses and associated orders for this visit:  1. Benign essential  hypertension  2. Stage 3a chronic kidney disease (CMD)  3. Gastro-esophageal reflux disease without esophagitis  4. Chronic constipation  5. Major depressive disorder with current active episode, unspecified depression episode severity, unspecified whether recurrent  We will continue present management.  Advised her to keep herself active and do range of motion exercises.  Her blood pressure and GERD is stable.  She takes Tylenol as needed.  Her depression is stable.  I will see her back in 3 months.

## 2023-09-30 NOTE — ED PROVIDER NOTE - MDM ORDERS SUBMITTED SELECTION
History & Physical Reviewed:   I have reviewed the History and Physical dated:  23-Aug-2023   History and Physical reviewed and relevant findings noted. Patient examined to review pertinent physical  findings.: No significant changes   Home Medications Reviewed: no changes noted   Allergies Reviewed: no changes noted       ERAS (Enhanced Recovery After Surgery):  ·  ERAS Patient: no     Consent:   COVID-19 Consent:  ·  COVID-19 Risk Consent Surgeon has reviewed key risks related to the risk of derrick COVID-19 and if they contract COVID-19 what the risks are.     Attestation:   Note Completion:  I am a:  Resident/Fellow   Attending Attestation I saw and evaluated the patient.  I personally obtained the key and critical portions of the history and physical exam or was physically present for key and  critical portions performed by the resident/fellow. I reviewed the resident/fellow?s documentation and discussed the patient with the resident/fellow.  I agree with the resident/fellow?s medical decision making as documented in the note.     I personally evaluated the patient on 24-Aug-2023         Electronic Signatures:  Lobito Shafer)  (Signed 25-Aug-2023 10:24)   Authored: Note Completion   Co-Signer: History & Physical Reviewed, ERAS, Consent, Note Completion  Adam Negrete (Resident))  (Signed 24-Aug-2023 06:43)   Authored: History & Physical Reviewed, ERAS, Consent,  Note Completion      Last Updated: 25-Aug-2023 10:24 by Lobito Shafer)    EKG/Imaging Studies/Labs

## 2023-11-10 PROBLEM — Z74.09 IMPAIRED MOBILITY: Status: ACTIVE | Noted: 2023-01-01

## 2023-11-10 PROBLEM — J02.8 SORE THROAT (VIRAL): Status: ACTIVE | Noted: 2023-01-01 | Resolved: 2023-01-01
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
## 2023-11-25 NOTE — H&P ADULT - NSICDXPASTMEDICALHX_GEN_ALL_CORE_FT
PAST MEDICAL HISTORY:  Chronic atrial fibrillation     CVA (cerebrovascular accident)     DM (diabetes mellitus), type 2     HTN (hypertension)

## 2023-11-25 NOTE — H&P ADULT - NSHPLABSRESULTS_GEN_ALL_CORE
9.7    15.33 )-----------( 485      ( 25 Nov 2023 01:10 )             30.5     11-25    136  |  95<L>  |  14  ----------------------------<  190<H>  4.6   |  29  |  0.60    Ca    9.9      25 Nov 2023 01:10    TPro  7.2  /  Alb  3.8  /  TBili  0.4  /  DBili  x   /  AST  16  /  ALT  32  /  AlkPhos  64  11-25    PT/INR - ( 25 Nov 2023 01:10 )   PT: 17.5 sec;   INR: 1.57 ratio         PTT - ( 25 Nov 2023 01:10 )  PTT:40.7 sec  Urinalysis Basic - ( 25 Nov 2023 01:10 )    Color: x / Appearance: x / SG: x / pH: x  Gluc: 190 mg/dL / Ketone: x  / Bili: x / Urobili: x   Blood: x / Protein: x / Nitrite: x   Leuk Esterase: x / RBC: x / WBC x   Sq Epi: x / Non Sq Epi: x / Bacteria: x      RADIOLOGY:  < from: Xray Chest 1 View- PORTABLE-Urgent (Xray Chest 1 View- PORTABLE-Urgent .) (11.25.23 @ 02:17) >    IMPRESSION: Perihilar opacities consistent with CHF more than infection.    < end of copied text >    < from: CT Angio Neck Stroke Protocol w/ IV Cont (11.25.23 @ 01:21) >    IMPRESSION:    CT PERFUSION: Calculus small volume ischemic penumbra in the right   parieto-occipital lobe corresponding to the PCA territory, without   evidence of core infarct at the time of this imaging.    CTA BRAIN:  1. Abrupt occlusion of the posterior P2 segment of the right PCA, without   reconstitution.    CTA NECK:  1. Patent cervical vasculature. No flow limiting stenosis or occlusion   according to NASCET criteria.  2. Complete atelectasis of the lateral segment right middle lobe and   scattered micronodules measuring up to 8 mm in the right upper lobe.   Additional tiny cavitary micronodule in the right upper lobe. A CT chest   with contrast is recommended for further evaluation.    Findings were discussed with neurology provider Dr. Islas by Dr. Manzano   on 11/25/2023 1:41 AM with read back confirmation.    NASCET CAROTID STENOSIS CRITERIA (distal normal appearing ICA as   denominator for measurement): 0%-none, 1-49%-mild, 50-70%-moderate,   70-89%-severe, 90-99%-critical.    < end of copied text >

## 2023-11-25 NOTE — PROVIDER CONTACT NOTE (OTHER) - SITUATION
Pt. notified.  Says he doesn't know why it showed up.   
Patient is Bradycardic to 38-40s
Patient is lethargic and not responding to commands for neuro checks
Patient is lethargic and not responding to commands for neuro checks

## 2023-11-25 NOTE — H&P ADULT - NSHPLANGTRANSLATORFT_GEN_A_CORE
Last refill #30 on 5/1/2021  Last office visit pertaining to refill on 10/9/2020  No future appointments. Family provided translation

## 2023-11-25 NOTE — ED PROVIDER NOTE - PROGRESS NOTE
Health Call Center    Phone Message    May a detailed message be left on voicemail: yes     Reason for Call: Symptoms or Concerns     If patient has red-flag symptoms, warm transfer to triage line    Current symptom or concern: Frequent vomiting and dizziness    Symptoms have been present for:  2 week(s)    Has patient previously been seen for this? Yes    Are there any new or worsening symptoms? Yes: more frequent.     Mom is also requesting a refill on the zofran please. Needs asap    ondansetron (ZOFRAN) 4 MG tablet    [facility][id number] [address] AT [facility] [address] (Ph: [phone])    Ritika Hendricks, DO     told mom to call if Farzana starts to have symptoms again, so she called, booked soonest available appointment, added to waitlist. Sending message to see if she can get in sooner and for refill on zofran. Sending HP.    Action Taken: Message routed to:  Other: [facility]    Travel Screening: Not Applicable                                                                       Stable.

## 2023-11-25 NOTE — ED PROVIDER NOTE - OBJECTIVE STATEMENT
69 yo F PMHx of Afib on Eliquis, prior CVAs with R sided deficits and dysarthria, HTN, HLD, DM2, mitral stenosis, rheumatic valvular disease presents with rightward deviated gaze, new aphasia since 11.15 pm. LKW was 10PM when the patient went to sleep.

## 2023-11-25 NOTE — PROVIDER CONTACT NOTE (OTHER) - BACKGROUND
Patient admitted for right sided weakness and R/O stroke and seizure
Patient admitted for Right sided weakness
Patient admitted for right sided weakness and R/O stroke and seizure

## 2023-11-25 NOTE — CONSULT NOTE ADULT - ATTENDING COMMENTS
During follow up visits, patient's son and daughter in law were present. I appreciate that.   Ms. Portillo is 68 year old right handed Nadine speaking woman with PMHx of afib on eliquis, R MCA stroke 2013, L MCA Stroke 2019 with residual right hemiparesis and aphasia, vascular risk factors, mitral stenosis/rheumatic valvular disease who brought to [facility] ER due to the lethargy, ? right gaze preference and aphasia. During my evaluation, patient is back to baseline with residual symptoms mild to moderate aphasia, right side hemiparesis.   Etiology of above symptoms most likely due to recrudescence from infection / toxic/metabolic versus seizure versus acute vascular event.   I agree with above exam, assessment and plan unless noted below,  VEEG  MRI brain  Will hold on Keppra   Infection / metabolic work as per primary team  Continue medical management, neuro- check and fall precaution.  GI and DVT prophylaxis.  PT/OT / S/S evaluation.   I discussed the diagnosis and treatment plan with the patient's family. Risk benefit and alternatives to the treatment were discussed. All questions and concerns were addressed. The patient's family demonstrated good understanding of the treatment plan.  My cumulative time taking care of this patient is 75 minutes  If you have any further questions, please do not hesitate to contact our consult service.  Thank you for allowing us to participate in this patient care. During follow up visits, patient's son and daughter in law were present. I appreciate that.   Ms. Portillo is 68 year old right handed Nadine speaking woman with PMHx of afib on eliquis, R MCA stroke 2013, L MCA Stroke 2019 with residual right hemiparesis and aphasia, vascular risk factors, mitral stenosis/rheumatic valvular disease who brought to [facility] ER due to the lethargy, ? right gaze preference and aphasia. During my evaluation, patient is back to baseline with residual symptoms mild to moderate aphasia, right side hemiparesis.   Etiology of above symptoms most likely due to recrudescence from infection / toxic/metabolic versus seizure versus acute vascular event.   I agree with above exam, assessment and plan unless noted below,  VEEG  MRI brain  Will hold on Keppra   Infection / metabolic work as per primary team  Continue medical management, neuro- check and fall precaution.  GI and DVT prophylaxis.  PT/OT / S/S evaluation.   I discussed the diagnosis and treatment plan with the patient's family. Risk benefit and alternatives to the treatment were discussed. All questions and concerns were addressed. The patient's family demonstrated good understanding of the treatment plan.  My cumulative time taking care of this patient is 75 minutes  If you have any further questions, please do not hesitate to contact our consult service.  Thank you for allowing us to participate in this patient care. During follow up visits, patient's son and daughter in law were present. I appreciate that.   Ms. Portillo is 68 year old right handed Nadine speaking woman with PMHx of afib on eliquis, R MCA stroke 2013, L MCA Stroke 2019 with residual right hemiparesis and aphasia, vascular risk factors, mitral stenosis/rheumatic valvular disease who brought to [facility] ER due to the lethargy, ? right gaze preference and aphasia. During my evaluation, patient is back to baseline with residual symptoms mild to moderate aphasia, right side hemiparesis.   Etiology of above symptoms most likely due to recrudescence from infection / toxic/metabolic versus seizure versus acute vascular event.   I agree with above exam, assessment and plan unless noted below,  VEEG  MRI brain  Will hold on Keppra   Infection / metabolic work as per primary team  Continue medical management, neuro- check and fall precaution.  GI and DVT prophylaxis.  PT/OT / S/S evaluation.   I discussed the diagnosis and treatment plan with the patient's family. Risk benefit and alternatives to the treatment were discussed. All questions and concerns were addressed. The patient's family demonstrated good understanding of the treatment plan.  My cumulative time taking care of this patient is 75 minutes  If you have any further questions, please do not hesitate to contact our consult service.  Thank you for allowing us to participate in this patient care. During follow up visits, patient's son and daughter in law were present. I appreciate that.   Ms. Portillo is 68 year old right handed Nadine speaking woman with PMHx of afib on eliquis, R MCA stroke 2013, L MCA Stroke 2019 with residual right hemiparesis and aphasia, vascular risk factors, mitral stenosis/rheumatic valvular disease who brought to [facility] ER due to the lethargy, ? right gaze preference and aphasia. During my evaluation, patient is back to baseline with residual symptoms mild to moderate aphasia, right side hemiparesis.   Etiology of above symptoms most likely due to recrudescence from infection / toxic/metabolic versus seizure versus acute vascular event.   I agree with above exam, assessment and plan unless noted below,  VEEG  MRI brain  Continue Keppra 750 mg BID  Infection / metabolic work as per primary team  Continue medical management, neuro- check and fall precaution.  GI and DVT prophylaxis.  PT/OT / S/S evaluation.   I discussed the diagnosis and treatment plan with the patient's family. Risk benefit and alternatives to the treatment were discussed. All questions and concerns were addressed. The patient's family demonstrated good understanding of the treatment plan.  My cumulative time taking care of this patient is 75 minutes  If you have any further questions, please do not hesitate to contact our consult service.  Thank you for allowing us to participate in this patient care. During follow up visits, patient's son and daughter in law were present. I appreciate that.   Ms. Portillo is 68 year old right handed Nadine speaking woman with PMHx of afib on eliquis, R MCA stroke 2013, L MCA Stroke 2019 with residual right hemiparesis and aphasia, vascular risk factors, mitral stenosis/rheumatic valvular disease who brought to [facility] ER due to the lethargy, ? right gaze preference and aphasia. During my evaluation, patient is back to baseline with residual symptoms mild to moderate aphasia, right side hemiparesis.   Etiology of above symptoms most likely due to recrudescence from infection / toxic/metabolic versus seizure versus acute vascular event.   I agree with above exam, assessment and plan unless noted below,  VEEG  MRI brain  Continue Keppra 750 mg BID  Infection / metabolic work as per primary team  Continue medical management, neuro- check and fall precaution.  GI and DVT prophylaxis.  PT/OT / S/S evaluation.   I discussed the diagnosis and treatment plan with the patient's family. Risk benefit and alternatives to the treatment were discussed. All questions and concerns were addressed. The patient's family demonstrated good understanding of the treatment plan.  My cumulative time taking care of this patient is 75 minutes  If you have any further questions, please do not hesitate to contact our consult service.  Thank you for allowing us to participate in this patient care. During follow up visits, patient's son and daughter in law were present. I appreciate that.   Ms. Portillo is 68 year old right handed Nadine speaking woman with PMHx of afib on eliquis, R MCA stroke 2013, L MCA Stroke 2019 with residual right hemiparesis and aphasia, vascular risk factors, mitral stenosis/rheumatic valvular disease who brought to [facility] ER due to the lethargy, ? right gaze preference and aphasia. During my evaluation, patient is back to baseline with residual symptoms mild to moderate aphasia, right side hemiparesis.   Etiology of above symptoms most likely due to recrudescence from infection / toxic/metabolic versus seizure versus acute vascular event.   I agree with above exam, assessment and plan unless noted below,  VEEG  MRI brain  Continue Keppra 750 mg BID  Infection / metabolic work as per primary team  Continue medical management, neuro- check and fall precaution.  GI and DVT prophylaxis.  PT/OT / S/S evaluation.   I discussed the diagnosis and treatment plan with the patient's family. Risk benefit and alternatives to the treatment were discussed. All questions and concerns were addressed. The patient's family demonstrated good understanding of the treatment plan.  My cumulative time taking care of this patient is 75 minutes  If you have any further questions, please do not hesitate to contact our consult service.  Thank you for allowing us to participate in this patient care.

## 2023-11-25 NOTE — H&P ADULT - PROBLEM SELECTOR PLAN 1
-CT shows occlusion of R PCA P2 segment. Not TPA candidate.  -Permissive HTN <220/120 for first 24h. Gradual reduction.  -Passed dysphagia screen - diet ordered.  -PT/OT evaluation.   -Spoke with neuro -> will repeat CTH now to assess for interval changes. Hold off MRI for now.  -A1c/lipid panel.  -Holding Eliquis as per neuro. -CT shows occlusion of R PCA P2 segment. Not TPA candidate.  -Permissive HTN <220/120 for first 24h. Gradual reduction.  -Passed dysphagia screen - diet ordered.  -PT/OT evaluation.   -Cont telemetry. Neuro checks.  -Spoke with neuro -> will repeat CTH now to assess for interval changes. Hold off MRI for now.  -A1c/lipid panel.  -Holding Eliquis as per neuro.

## 2023-11-25 NOTE — PHYSICAL THERAPY INITIAL EVALUATION ADULT - SITTING BALANCE: STATIC
Pt able to maintain static seated balance on EOB with contact-guard assistance for safety. Occasional unsteadiness laterally left > right./poor balance

## 2023-11-25 NOTE — PHYSICAL THERAPY INITIAL EVALUATION ADULT - GENERAL OBSERVATIONS, REHAB EVAL
Upon entry, pt semi-supine in bed in NAD; + nasal cannula, + telemetry. HR 65 bpm. Daughter-in-law present at bedside. Pt left as received with all tubes/lines intact, bed alarm on, call bell in reach and in NAD.

## 2023-11-25 NOTE — PHYSICAL THERAPY INITIAL EVALUATION ADULT - PATIENT PROFILE REVIEW, REHAB EVAL
PT initial evaluation received and chart review completed. Pt agreeable to participate in PT evaluation. Pt cleared by JACOBY Beyer./yes

## 2023-11-25 NOTE — CHART NOTE - NSCHARTNOTEFT_GEN_A_CORE
Medicine NP note      68F with PMH of AFib on Eliquis, CVA 2019 w/ R residual deficits (including speed deficits), HTN, DM2, mitral stenosis brought in by family for rightward gaze and repeating speech. Last known normal was at 10PM. As per .  son later in the evening around 1130PM - pt was not acting like her usual self . She was brought in for evaluation and called as code stroke . Additionally patient has cough. family reports pt around grandkids who had viral URI - which they believe explains the cough she has been having x2 wks. No fevers, chills. No sputum, hemoptysis. No reports of night sweats, weight loss. Per family no known exposure to TB. No personal hx of TB.   She woke up at 11:15 pm 11/24 with right gaze preference and was repeating phrases. Denies previous history of seizures, no tongue biting or incontinence or shaking of extremities. At baseline, patient ambulates with walker with assistance of family and requires assistance with most ADLs. Patient was called stroke code and also received Ativan 1 mg IV at 2: 24 am  and 2000mg Keppra IV at 2: 25 mg based on body weight 56 kg.   At receiving report from night ACP at 6 : 30 am there was no reported about pt's lethargy or respiratory depression.   At 8 : 30 am received call from primary RN concerned about orders for patient since family was asking, explained that patient came to floor from ER before 6 : 30 am and was not reported anything making concern. Explained to nurse that until dysphasia screen patient can not eat and in case of low blood sugar we can order IV fluids or in case of pain we can order pain medications but for all general medications patient is taking , orders have to be put by admitting doctor doing H and P. At this time primary nurse did not reported any declining condition of patient , was only concerned that patient is not having orders for medications and tests. At this time PALLAVI Gibson did not stated as per Primary Nurse Amish Beyer said " she was too busy to come and see the patient"   After rounds NP called Hospitalist in charge to ask who is Attending for patient - it was 10 : 50 am and was told that Attending was not assigned yet, the same time received message via Teams from the same RN asking about plan for patient and answered that we are waiting for admitting doctor . At 11: 46 am received information on Teams that son is concerned that mother is lethargic. This is the FIRST time mentioning by RN that patient is lethargic more from baseline receiving from Emergency tavon  suggesting decline in status. Told RN in this case if patient is lethargic to call rapid response and that I am coming to evaluate patient . Called for supervisor Mani Rizvi to come with me . RN did not call for rapid response. Patient was assessed by PALLAVI Gibson , no respiratory distress, equal breathing in and out , responding to voice and tactile stimuli during assessment. Patient was under sedative effect of combine bot medications Ativan and Keppra, often during exam was opening eyes. After finishing exam talked to 2 female family members explained them all situation that in case of need ( hypoglycemia, pain, emergency of any case ) I am able to order necessary medications but patient for plan of treatment has to be assessed by Admitting doctor. Family verbalized understanding and patient started to be slightly more active ( moving extremities when touched). After leaving room received text that heart rate of patient is 38- 40 beats . Ordered EKG to be done . Auscultated heart at apex for 4 minutes , each time getting heart rate was 60 beats per minute and each time patient actively was pushing out my hand with stethoscope. Reassured family that heart rate is normal, Pallavi Gibson left room . On the way to main building  saw Dr. Marquez who supposed too admit patient and summarized him condition of patient . Will endorse to night ACP to monitor patient . Patient on airborne precautions to r/o TB, AFB sputums x 3 ordered.      OBJECTIVE:  Vital Signs Last 24 Hrs  T(C): 36.4 (25 Nov 2023 16:35), Max: 37.3 (25 Nov 2023 00:50)  T(F): 97.6 (25 Nov 2023 16:35), Max: 99.2 (25 Nov 2023 00:50)  HR: 63 (25 Nov 2023 16:35) (63 - 81)  BP: 147/53 (25 Nov 2023 16:35) (125/81 - 152/80)  BP(mean): --  RR: 18 (25 Nov 2023 16:35) (17 - 18)  SpO2: 100% (25 Nov 2023 16:35) (98% - 100%)    Parameters below as of 25 Nov 2023 16:35  Patient On (Oxygen Delivery Method): nasal cannula    FOCUSED PHYSICAL EXAM:  Neuro: drowsy, asleep   Cardiovascular: Pulses +2 B/L in lower and upper extremities, HR regular, BP stable, No edema.  Respiratory: Respirations regular, unlabored, breath sounds clear B/L.   GI: Abdomen soft, non-tender, positive bowel sounds.  : no bladder distention noted. No complaints at this time.  Skin: Dry, intact, no bruising, no diaphoresis.    I&O's    11-25-23 @ 07:01  -  11-25-23 @ 18:38  --------------------------------------------------------  IN: 0 mL / OUT: 800 mL / NET: -800 mL    LABS:                        9.7    15.33 )-----------( 485      ( 25 Nov 2023 01:10 )             30.5     11-25    136  |  95<L>  |  14  ----------------------------<  190<H>  4.6   |  29  |  0.60    Ca    9.9      25 Nov 2023 01:10    TPro  7.2  /  Alb  3.8  /  TBili  0.4  /  DBili  x   /  AST  16  /  ALT  32  /  AlkPhos  64  11-25    Patent admited to telemetry for stroke vs telemetry workup       Jailyn Gibson NP-C  Department of Medicine- [facility]  In House Pager #[phone] Medicine NP note      68F with PMH of AFib on Eliquis, CVA 2019 w/ R residual deficits (including speed deficits), HTN, DM2, mitral stenosis brought in by family for rightward gaze and repeating speech. Last known normal was at 10PM. As per .  son later in the evening around 1130PM - pt was not acting like her usual self . She was brought in for evaluation and called as code stroke . Additionally patient has cough. family reports pt around grandkids who had viral URI - which they believe explains the cough she has been having x2 wks. No fevers, chills. No sputum, hemoptysis. No reports of night sweats, weight loss. Per family no known exposure to TB. No personal hx of TB.   She woke up at 11:15 pm 11/24 with right gaze preference and was repeating phrases. Denies previous history of seizures, no tongue biting or incontinence or shaking of extremities. At baseline, patient ambulates with walker with assistance of family and requires assistance with most ADLs. Patient was called stroke code and also received Ativan 1 mg IV at 2: 24 am  and 2000mg Keppra IV at 2: 25 mg based on body weight 56 kg.   At receiving report from night ACP at 6 : 30 am there was no reported about pt's lethargy or respiratory depression.   At 8 : 30 am received call from primary RN concerned about orders for patient since family was asking, explained that patient came to floor from ER before 6 : 30 am and was not reported anything making concern. Explained to nurse that until dysphasia screen patient can not eat and in case of low blood sugar we can order IV fluids or in case of pain we can order pain medications but for all general medications patient is taking , orders have to be put by admitting doctor doing H and P. At this time primary nurse did not reported any declining condition of patient , was only concerned that patient is not having orders for medications and tests. At this time PALLAVI Gibson did not stated as per Primary Nurse Amish Beyer said " she was too busy to come and see the patient"   After rounds NP called Hospitalist in charge to ask who is Attending for patient - it was 10 : 50 am and was told that Attending was not assigned yet, the same time received message via Teams from the same RN asking about plan for patient and answered that we are waiting for admitting doctor . At 11: 46 am received information on Teams that son is concerned that mother is lethargic. This is the FIRST time mentioning by RN that patient is lethargic more from baseline receiving from Emergency tavon  suggesting decline in status. Told RN in this case if patient is lethargic to call rapid response and that I am coming to evaluate patient . Called for supervisor Mani Rizvi to come with me . RN did not call for rapid response. Patient was assessed by PALLAVI Gibson , no respiratory distress, equal breathing in and out , responding to voice and tactile stimuli during assessment. Patient was under sedative effect of combine bot medications Ativan and Keppra, often during exam was opening eyes. After finishing exam talked to 2 female family members explained them all situation that in case of need ( hypoglycemia, pain, emergency of any case ) I am able to order necessary medications but patient for plan of treatment has to be assessed by Admitting doctor. Family verbalized understanding and patient started to be slightly more active ( moving extremities when touched). After leaving room received text that heart rate of patient is 38- 40 beats . Ordered EKG to be done . Auscultated heart at apex for 4 minutes , each time getting heart rate was 60 beats per minute and each time patient actively was pushing out my hand with stethoscope. Reassured family that heart rate is normal, Pallavi Gibson left room . On the way to main building  saw Dr. Marquez who supposed too admit patient and summarized him condition of patient . Will endorse to night ACP to monitor patient . Patient on airborne precautions to r/o TB, AFB sputums x 3 ordered.      OBJECTIVE:  Vital Signs Last 24 Hrs  T(C): 36.4 (25 Nov 2023 16:35), Max: 37.3 (25 Nov 2023 00:50)  T(F): 97.6 (25 Nov 2023 16:35), Max: 99.2 (25 Nov 2023 00:50)  HR: 63 (25 Nov 2023 16:35) (63 - 81)  BP: 147/53 (25 Nov 2023 16:35) (125/81 - 152/80)  BP(mean): --  RR: 18 (25 Nov 2023 16:35) (17 - 18)  SpO2: 100% (25 Nov 2023 16:35) (98% - 100%)    Parameters below as of 25 Nov 2023 16:35  Patient On (Oxygen Delivery Method): nasal cannula    FOCUSED PHYSICAL EXAM:  Neuro: drowsy, asleep   Cardiovascular: Pulses +2 B/L in lower and upper extremities, HR regular, BP stable, No edema.  Respiratory: Respirations regular, unlabored, breath sounds clear B/L.   GI: Abdomen soft, non-tender, positive bowel sounds.  : no bladder distention noted. No complaints at this time.  Skin: Dry, intact, no bruising, no diaphoresis.    I&O's    11-25-23 @ 07:01  -  11-25-23 @ 18:38  --------------------------------------------------------  IN: 0 mL / OUT: 800 mL / NET: -800 mL    LABS:                        9.7    15.33 )-----------( 485      ( 25 Nov 2023 01:10 )             30.5     11-25    136  |  95<L>  |  14  ----------------------------<  190<H>  4.6   |  29  |  0.60    Ca    9.9      25 Nov 2023 01:10    TPro  7.2  /  Alb  3.8  /  TBili  0.4  /  DBili  x   /  AST  16  /  ALT  32  /  AlkPhos  64  11-25    Patent admited to telemetry for stroke vs telemetry workup       Jailyn Gibson NP-C  Department of Medicine- [facility]  In House Pager #[phone] Medicine NP note      68F with PMH of AFib on Eliquis, CVA 2019 w/ R residual deficits (including speed deficits), HTN, DM2, mitral stenosis brought in by family for rightward gaze and repeating speech. Last known normal was at 10PM. As per .  son later in the evening around 1130PM - pt was not acting like her usual self . She was brought in for evaluation and called as code stroke . Additionally patient has cough. family reports pt around grandkids who had viral URI - which they believe explains the cough she has been having x2 wks. No fevers, chills. No sputum, hemoptysis. No reports of night sweats, weight loss. Per family no known exposure to TB. No personal hx of TB.   She woke up at 11:15 pm 11/24 with right gaze preference and was repeating phrases. Denies previous history of seizures, no tongue biting or incontinence or shaking of extremities. At baseline, patient ambulates with walker with assistance of family and requires assistance with most ADLs. Patient was called stroke code and also received Ativan 1 mg IV at 2: 24 am  and 2000mg Keppra IV at 2: 25 mg based on body weight 56 kg.   At receiving report from night ACP at 6 : 30 am there was no reported about pt's lethargy or respiratory depression.   At 8 : 30 am received call from primary RN concerned about orders for patient since family was asking, explained that patient came to floor from ER before 6 : 30 am and was not reported anything making concern. Explained to nurse that until dysphasia screen patient can not eat and in case of low blood sugar we can order IV fluids or in case of pain we can order pain medications but for all general medications patient is taking , orders have to be put by admitting doctor doing H and P. At this time primary nurse did not reported any declining condition of patient , was only concerned that patient is not having orders for medications and tests. At this time PALLAVI Gibson did not stated as per Primary Nurse Amish Beyer said " she was too busy to come and see the patient"   After rounds NP called Hospitalist in charge to ask who is Attending for patient - it was 10 : 50 am and was told that Attending was not assigned yet, the same time received message via Teams from the same RN asking about plan for patient and answered that we are waiting for admitting doctor . At 11: 46 am received information on Teams that son is concerned that mother is lethargic. This is the FIRST time mentioning by RN that patient is lethargic more from baseline receiving from Emergency tavon  suggesting decline in status. Told RN in this case if patient is lethargic to call rapid response and that I am coming to evaluate patient . Called for supervisor Mani Rizvi to come with me . RN did not call for rapid response. Patient was assessed by PALLAVI Gibson , no respiratory distress, equal breathing in and out , responding to voice and tactile stimuli during assessment. Patient was under sedative effect of combine bot medications Ativan and Keppra, often during exam was opening eyes. After finishing exam talked to 2 female family members explained them all situation that in case of need ( hypoglycemia, pain, emergency of any case ) I am able to order necessary medications but patient for plan of treatment has to be assessed by Admitting doctor. Family verbalized understanding and patient started to be slightly more active ( moving extremities when touched). After leaving room received text that heart rate of patient is 38- 40 beats . Ordered EKG to be done . Auscultated heart at apex for 4 minutes , each time getting heart rate was 60 beats per minute and each time patient actively was pushing out my hand with stethoscope. Reassured family that heart rate is normal, Pallavi Gibson left room . On the way to main building  saw Dr. Marquez who supposed too admit patient and summarized him condition of patient . Will endorse to night ACP to monitor patient . Patient on airborne precautions to r/o TB, AFB sputums x 3 ordered.      OBJECTIVE:  Vital Signs Last 24 Hrs  T(C): 36.4 (25 Nov 2023 16:35), Max: 37.3 (25 Nov 2023 00:50)  T(F): 97.6 (25 Nov 2023 16:35), Max: 99.2 (25 Nov 2023 00:50)  HR: 63 (25 Nov 2023 16:35) (63 - 81)  BP: 147/53 (25 Nov 2023 16:35) (125/81 - 152/80)  BP(mean): --  RR: 18 (25 Nov 2023 16:35) (17 - 18)  SpO2: 100% (25 Nov 2023 16:35) (98% - 100%)    Parameters below as of 25 Nov 2023 16:35  Patient On (Oxygen Delivery Method): nasal cannula    FOCUSED PHYSICAL EXAM:  Neuro: drowsy, asleep   Cardiovascular: Pulses +2 B/L in lower and upper extremities, HR regular, BP stable, No edema.  Respiratory: Respirations regular, unlabored, breath sounds clear B/L.   GI: Abdomen soft, non-tender, positive bowel sounds.  : no bladder distention noted. No complaints at this time.  Skin: Dry, intact, no bruising, no diaphoresis.    I&O's    11-25-23 @ 07:01  -  11-25-23 @ 18:38  --------------------------------------------------------  IN: 0 mL / OUT: 800 mL / NET: -800 mL    LABS:                        9.7    15.33 )-----------( 485      ( 25 Nov 2023 01:10 )             30.5     11-25    136  |  95<L>  |  14  ----------------------------<  190<H>  4.6   |  29  |  0.60    Ca    9.9      25 Nov 2023 01:10    TPro  7.2  /  Alb  3.8  /  TBili  0.4  /  DBili  x   /  AST  16  /  ALT  32  /  AlkPhos  64  11-25    Patent admited to telemetry for stroke vs telemetry workup       Jailyn Gibson NP-C  Department of Medicine- [facility]  In House Pager #[phone] Medicine NP note      68F with PMH of AFib on Eliquis, CVA 2019 w/ R residual deficits (including speed deficits), HTN, DM2, mitral stenosis brought in by family for rightward gaze and repeating speech. Last known normal was at 10PM. As per .  son later in the evening around 1130PM - pt was not acting like her usual self . She was brought in for evaluation and called as code stroke . Additionally patient has cough. family reports pt around grandkids who had viral URI - which they believe explains the cough she has been having x2 wks. No fevers, chills. No sputum, hemoptysis. No reports of night sweats, weight loss. Per family no known exposure to TB. No personal hx of TB.   She woke up at 11:15 pm 11/24 with right gaze preference and was repeating phrases. Denies previous history of seizures, no tongue biting or incontinence or shaking of extremities. At baseline, patient ambulates with walker with assistance of family and requires assistance with most ADLs. Patient was called stroke code and also received Ativan 1 mg IV at 2: 24 am  and 2000mg Keppra IV at 2: 25 mg based on body weight 56 kg.   At receiving report from night ACP at 6 : 30 am there was no reported about pt's lethargy or respiratory depression.   At 8 : 30 am received call from primary RN concerned about orders for patient since family was asking, explained that patient came to floor from ER before 6 : 30 am and was not reported anything making concern. Explained to nurse that until dysphasia screen patient can not eat and in case of low blood sugar we can order IV fluids or in case of pain we can order pain medications but for all general medications patient is taking , orders have to be put by admitting doctor doing H and P. At this time primary nurse did not reported any declining condition of patient , was only concerned that patient is not having orders for medications and tests. At this time PALLAVI Gibson did not stated as per Primary Nurse Amish Beyer said  " she was too busy to come and see the patient"   After rounds NP called Hospitalist in charge to ask who is Attending for patient - it was 10 : 50 am and was told that Attending was not assigned yet, the same time received message via Teams from the same RN asking about plan for patient and answered that we are waiting for admitting doctor . At 11: 46 am received information on Teams that son is concerned that mother is lethargic. This is the FIRST time mentioning by RN that patient is lethargic more from baseline receiving from Emergency tavon  suggesting decline in status. Told RN in this case if patient is lethargic to call rapid response and that I am coming to evaluate patient . Called for supervisor Mani Rizvi to come with me . RN did not call for rapid response. Patient was assessed by PALLAVI Gibson , no respiratory distress, equal breathing in and out , responding to voice and tactile stimuli during assessment. Patient was under sedative effect of combine bot medications Ativan and Keppra, often during exam was opening eyes. After finishing exam talked to 2 female family members explained them all situation that in case of need ( hypoglycemia, pain, emergency of any case ) I am able to order necessary medications but patient for plan of treatment has to be assessed by Admitting doctor. Family verbalized understanding and patient started to be slightly more active ( moving extremities when touched). After leaving room received text that heart rate of patient is 38- 40 beats . Ordered EKG to be done . Auscultated heart at apex for 4 minutes , each time getting heart rate was 60 beats per minute and each time patient actively was pushing out my hand with stethoscope. Reassured family that heart rate is normal, Pallavi Gibson left room . On the way to main building  saw Dr. Marquez who supposed too admit patient and summarized him condition of patient . Will endorse to night ACP to monitor patient . Patient on airborne precautions to r/o TB, AFB sputums x 3 ordered.      OBJECTIVE:  Vital Signs Last 24 Hrs  T(C): 36.4 (25 Nov 2023 16:35), Max: 37.3 (25 Nov 2023 00:50)  T(F): 97.6 (25 Nov 2023 16:35), Max: 99.2 (25 Nov 2023 00:50)  HR: 63 (25 Nov 2023 16:35) (63 - 81)  BP: 147/53 (25 Nov 2023 16:35) (125/81 - 152/80)  BP(mean): --  RR: 18 (25 Nov 2023 16:35) (17 - 18)  SpO2: 100% (25 Nov 2023 16:35) (98% - 100%)    Parameters below as of 25 Nov 2023 16:35  Patient On (Oxygen Delivery Method): nasal cannula    FOCUSED PHYSICAL EXAM:  Neuro: drowsy, asleep   Cardiovascular: Pulses +2 B/L in lower and upper extremities, HR regular, BP stable, No edema.  Respiratory: Respirations regular, unlabored, breath sounds clear B/L.   GI: Abdomen soft, non-tender, positive bowel sounds.  : no bladder distention noted. No complaints at this time.  Skin: Dry, intact, no bruising, no diaphoresis.    I&O's    11-25-23 @ 07:01  -  11-25-23 @ 18:38  --------------------------------------------------------  IN: 0 mL / OUT: 800 mL / NET: -800 mL    LABS:                        9.7    15.33 )-----------( 485      ( 25 Nov 2023 01:10 )             30.5     11-25    136  |  95<L>  |  14  ----------------------------<  190<H>  4.6   |  29  |  0.60    Ca    9.9      25 Nov 2023 01:10    TPro  7.2  /  Alb  3.8  /  TBili  0.4  /  DBili  x   /  AST  16  /  ALT  32  /  AlkPhos  64  11-25    Patent admited to telemetry for stroke vs telemetry workup       Jailyn Gibson NP-C  Department of Medicine- [facility]  In House Pager #[phone] Medicine NP note      68F with PMH of AFib on Eliquis, CVA 2019 w/ R residual deficits (including speed deficits), HTN, DM2, mitral stenosis brought in by family for rightward gaze and repeating speech. Last known normal was at 10PM. As per .  son later in the evening around 1130PM - pt was not acting like her usual self . She was brought in for evaluation and called as code stroke . Additionally patient has cough. family reports pt around grandkids who had viral URI - which they believe explains the cough she has been having x2 wks. No fevers, chills. No sputum, hemoptysis. No reports of night sweats, weight loss. Per family no known exposure to TB. No personal hx of TB.   She woke up at 11:15 pm 11/24 with right gaze preference and was repeating phrases. Denies previous history of seizures, no tongue biting or incontinence or shaking of extremities. At baseline, patient ambulates with walker with assistance of family and requires assistance with most ADLs. Patient was called stroke code and also received Ativan 1 mg IV at 2: 24 am  and 2000mg Keppra IV at 2: 25 mg based on body weight 56 kg.   At receiving report from night ACP at 6 : 30 am there was no reported about pt's lethargy or respiratory depression.   At 8 : 30 am received call from primary RN concerned about orders for patient since family was asking, explained that patient came to floor from ER before 6 : 30 am and was not reported anything making concern. Explained to nurse that until dysphasia screen patient can not eat and in case of low blood sugar we can order IV fluids or in case of pain we can order pain medications but for all general medications patient is taking , orders have to be put by admitting doctor doing H and P. At this time primary nurse did not reported any declining condition of patient , was only concerned that patient is not having orders for medications and tests. At this time PALLAVI Gibson did not stated as per Primary Nurse Amish Beyer said  " she was too busy to come and see the patient"   After rounds NP called Hospitalist in charge to ask who is Attending for patient - it was 10 : 50 am and was told that Attending was not assigned yet, the same time received message via Teams from the same RN asking about plan for patient and answered that we are waiting for admitting doctor . At 11: 46 am received information on Teams that son is concerned that mother is lethargic. This is the FIRST time mentioning by RN that patient is lethargic more from baseline receiving from Emergency tavon  suggesting decline in status. Told RN in this case if patient is lethargic to call rapid response and that I am coming to evaluate patient . Called for supervisor Mani Rizvi to come with me . RN did not call for rapid response. Patient was assessed by PALLAVI Gibson , no respiratory distress, equal breathing in and out , responding to voice and tactile stimuli during assessment. Patient was under sedative effect of combine bot medications Ativan and Keppra, often during exam was opening eyes. After finishing exam talked to 2 female family members explained them all situation that in case of need ( hypoglycemia, pain, emergency of any case ) I am able to order necessary medications but patient for plan of treatment has to be assessed by Admitting doctor. Family verbalized understanding and patient started to be slightly more active ( moving extremities when touched). After leaving room received text that heart rate of patient is 38- 40 beats . Ordered EKG to be done . Auscultated heart at apex for 4 minutes , each time getting heart rate was 60 beats per minute and each time patient actively was pushing out my hand with stethoscope. Reassured family that heart rate is normal, Pallavi Gibson left room . On the way to main building  saw Dr. Marquez who supposed too admit patient and summarized him condition of patient . Will endorse to night ACP to monitor patient . Patient on airborne precautions to r/o TB, AFB sputums x 3 ordered.      OBJECTIVE:  Vital Signs Last 24 Hrs  T(C): 36.4 (25 Nov 2023 16:35), Max: 37.3 (25 Nov 2023 00:50)  T(F): 97.6 (25 Nov 2023 16:35), Max: 99.2 (25 Nov 2023 00:50)  HR: 63 (25 Nov 2023 16:35) (63 - 81)  BP: 147/53 (25 Nov 2023 16:35) (125/81 - 152/80)  BP(mean): --  RR: 18 (25 Nov 2023 16:35) (17 - 18)  SpO2: 100% (25 Nov 2023 16:35) (98% - 100%)    Parameters below as of 25 Nov 2023 16:35  Patient On (Oxygen Delivery Method): nasal cannula    FOCUSED PHYSICAL EXAM:  Neuro: drowsy, asleep   Cardiovascular: Pulses +2 B/L in lower and upper extremities, HR regular, BP stable, No edema.  Respiratory: Respirations regular, unlabored, breath sounds clear B/L.   GI: Abdomen soft, non-tender, positive bowel sounds.  : no bladder distention noted. No complaints at this time.  Skin: Dry, intact, no bruising, no diaphoresis.    I&O's    11-25-23 @ 07:01  -  11-25-23 @ 18:38  --------------------------------------------------------  IN: 0 mL / OUT: 800 mL / NET: -800 mL    LABS:                        9.7    15.33 )-----------( 485      ( 25 Nov 2023 01:10 )             30.5     11-25    136  |  95<L>  |  14  ----------------------------<  190<H>  4.6   |  29  |  0.60    Ca    9.9      25 Nov 2023 01:10    TPro  7.2  /  Alb  3.8  /  TBili  0.4  /  DBili  x   /  AST  16  /  ALT  32  /  AlkPhos  64  11-25    Patent admited to telemetry for stroke vs telemetry workup       Jailyn Gibson NP-C  Department of Medicine- [facility]  In House Pager #[phone] Medicine NP note      68F with PMH of AFib on Eliquis, CVA 2019 w/ R residual deficits (including speed deficits), HTN, DM2, mitral stenosis brought in by family for rightward gaze and repeating speech. Last known normal was at 10PM. As per .  son later in the evening around 1130PM - pt was not acting like her usual self . She was brought in for evaluation and called as code stroke . Additionally patient has cough. family reports pt around grandkids who had viral URI - which they believe explains the cough she has been having x2 wks. No fevers, chills. No sputum, hemoptysis. No reports of night sweats, weight loss. Per family no known exposure to TB. No personal hx of TB.   She woke up at 11:15 pm 11/24 with right gaze preference and was repeating phrases. Denies previous history of seizures, no tongue biting or incontinence or shaking of extremities. At baseline, patient ambulates with walker with assistance of family and requires assistance with most ADLs. Patient was called stroke code and also received Ativan 1 mg IV at 2: 24 am  and 2000mg Keppra IV at 2: 25 mg based on body weight 56 kg.   At receiving report from night ACP at 6 : 30 am there was no reported about pt's lethargy or respiratory depression.   At 8 : 30 am received call from primary RN concerned about orders for patient since family was asking, explained that patient came to floor from ER before 6 : 30 am and was not reported anything making concern. Explained to nurse that until dysphasia screen patient can not eat and in case of low blood sugar we can order IV fluids or in case of pain we can order pain medications but for all general medications patient is taking , orders have to be put by admitting doctor doing H and P. At this time primary nurse did not reported any declining condition of patient , was only concerned that patient is not having orders for medications and tests. At this time PALLAVI Gibson did not stated as per Primary Nurse Amish Beyer said  " she was too busy to come and see the patient"   After rounds NP called Hospitalist in charge to ask who is Attending for patient - it was 10 : 50 am and was told that Attending was not assigned yet, the same time received message via Teams from the same RN asking about plan for patient and answered that we are waiting for admitting doctor . At 11: 46 am received information on Teams that son is concerned that mother is lethargic. This is the FIRST time mentioning by RN that patient is lethargic more from baseline receiving from Emergency tavon  suggesting decline in status. Told RN in this case if patient is lethargic to call rapid response and that I am coming to evaluate patient . Called for supervisor Mani Rizvi to come with me . RN did not call for rapid response. Patient was assessed by PALLAVI Gibson , no respiratory distress, equal breathing in and out , responding to voice and tactile stimuli during assessment. Patient was under sedative effect of combine bot medications Ativan and Keppra, often during exam was opening eyes. After finishing exam talked to 2 female family members explained them all situation that in case of need ( hypoglycemia, pain, emergency of any case ) I am able to order necessary medications but patient for plan of treatment has to be assessed by Admitting doctor. Family verbalized understanding and patient started to be slightly more active ( moving extremities when touched). After leaving room received text that heart rate of patient is 38- 40 beats . Ordered EKG to be done . Auscultated heart at apex for 4 minutes , each time getting heart rate was 60 beats per minute and each time patient actively was pushing out my hand with stethoscope. Reassured family that heart rate is normal, Pallavi Gibson left room . On the way to main building  saw Dr. Marquez who supposed too admit patient and summarized him condition of patient . Will endorse to night ACP to monitor patient . Patient on airborne precautions to r/o TB, AFB sputums x 3 ordered.      OBJECTIVE:  Vital Signs Last 24 Hrs  T(C): 36.4 (25 Nov 2023 16:35), Max: 37.3 (25 Nov 2023 00:50)  T(F): 97.6 (25 Nov 2023 16:35), Max: 99.2 (25 Nov 2023 00:50)  HR: 63 (25 Nov 2023 16:35) (63 - 81)  BP: 147/53 (25 Nov 2023 16:35) (125/81 - 152/80)  BP(mean): --  RR: 18 (25 Nov 2023 16:35) (17 - 18)  SpO2: 100% (25 Nov 2023 16:35) (98% - 100%)    Parameters below as of 25 Nov 2023 16:35  Patient On (Oxygen Delivery Method): nasal cannula    FOCUSED PHYSICAL EXAM:  Neuro: drowsy, asleep   Cardiovascular: Pulses +2 B/L in lower and upper extremities, HR regular, BP stable, No edema.  Respiratory: Respirations regular, unlabored, breath sounds clear B/L.   GI: Abdomen soft, non-tender, positive bowel sounds.  : no bladder distention noted. No complaints at this time.  Skin: Dry, intact, no bruising, no diaphoresis.    I&O's    11-25-23 @ 07:01  -  11-25-23 @ 18:38  --------------------------------------------------------  IN: 0 mL / OUT: 800 mL / NET: -800 mL    LABS:                        9.7    15.33 )-----------( 485      ( 25 Nov 2023 01:10 )             30.5     11-25    136  |  95<L>  |  14  ----------------------------<  190<H>  4.6   |  29  |  0.60    Ca    9.9      25 Nov 2023 01:10    TPro  7.2  /  Alb  3.8  /  TBili  0.4  /  DBili  x   /  AST  16  /  ALT  32  /  AlkPhos  64  11-25    Patent admited to telemetry for stroke vs telemetry workup       Jailyn Gibson NP-C  Department of Medicine- [facility]  In House Pager #[phone] Medicine NP note    68F with PMH of AFib on Eliquis, CVA 2019 w/ R residual deficits (including speed deficits), HTN, DM2, mitral stenosis brought in by family for rightward gaze and repeating speech. Last known normal was at 10PM. As per son , she woke up at 11:15 pm 11/24 with right gaze preference and was repeating phrases. Denies previous history of seizures, no tongue biting or incontinence or shaking of extremities. At baseline, patient ambulates with walker with assistance of family and requires assistance with most ADLs. Additionally patient had cough x 2 weeks, as per family, there were grandkids having viral URI. She was brought in for evaluation and called as code stroke and received Ativan 1 mg IV at 2: 24 am  and 2000mg Keppra IV at 2: 25 mg based on body weight 56 kg. At 8:30 am primary RN expressed concern given by family that patient did not have her home  medications prescribed yet. Explained to RN that there is no H and P done yet by Admitting doctor, most likely because of amount patients being admitted by Admitting doctor, so they can order regular medications but all necessary medications in case of  emergency ( pain, hyperglycemia, fever ) will be ordered by ACP if needed. At that time primary RN did not report any declined condition of patient, only concern was not having orders for home medications and additional tests. . At 10 : 50 am ACP called [facility] to check who was assigned Attending to patient and was told that assigning is in progress to check after about 1 hour who is Attending assigned to this particular doctor.  At 11: 46 am received information by RN on Teams that son is concerned that mother is lethargic. Called floor and spoke to RN, notified her about coming to assess patient and if she things is necessary to call RRT. Called ACP Supervisor for assistance to come with me to floor.Upon assessment no respiratory distress, equal breathing in and out , patient responding to voice and tactile stimuli . Patient was under sedative effect of combine bot medications Ativan and Keppra, often during exam was opening eyes. . After finishing exam talked to 2 female family members presented in room and  explained them why patient is all time sleeping and reassuring  that in case of need of  pain and emergency of any case  I am able to order necessary medications but patient for further tests and more detailed  plan of treatment has to be assessed by Admitting doctor. Family verbalized understanding and patient started to be slightly more active the same time ( moving extremities when touched). Few minutes after leaving room , received text message that heart rate of patient was 38- 40 beats . Ordered EKG to be done and returned to room to re-examine patient. Auscultated heart at apex for 4 minutes , each time getting heart rate was 60 beats per minute and each time patient actively was pushing out my hand with stethoscope. Reassured family that heart rate is normal. On the way to [facility]  saw Dr. Marquez who was on the way to admit patient and spoke to him summarizing him condition of patient. Dr. Marquez spoke to me after admission telling me that he ordered CTH without contrast and CT chest with IV contrast . Patient on airborne precautions to r/o TB, AFB sputum x 3 ordered. Will endorse to night ACP to monitor pt.     OBJECTIVE:  Vital Signs Last 24 Hrs  T(C): 36.4 (25 Nov 2023 16:35), Max: 37.3 (25 Nov 2023 00:50)  T(F): 97.6 (25 Nov 2023 16:35), Max: 99.2 (25 Nov 2023 00:50)  HR: 63 (25 Nov 2023 16:35) (63 - 81)  BP: 147/53 (25 Nov 2023 16:35) (125/81 - 152/80)  BP(mean): --  RR: 18 (25 Nov 2023 16:35) (17 - 18)  SpO2: 100% (25 Nov 2023 16:35) (98% - 100%)    Parameters below as of 25 Nov 2023 16:35  Patient On (Oxygen Delivery Method): nasal cannula    FOCUSED PHYSICAL EXAM:  Neuro: drowsy, asleep   Cardiovascular: Pulses +2 B/L in lower and upper extremities, HR regular, BP stable, No edema.  Respiratory: Respirations regular, unlabored, breath sounds clear B/L.   GI: Abdomen soft, non-tender, positive bowel sounds.  : no bladder distention noted. No complaints at this time.  Skin: Dry, intact, no bruising, no diaphoresis.    I&O's    11-25-23 @ 07:01  -  11-25-23 @ 18:38  --------------------------------------------------------  IN: 0 mL / OUT: 800 mL / NET: -800 mL    LABS:                        9.7    15.33 )-----------( 485      ( 25 Nov 2023 01:10 )             30.5     11-25    136  |  95<L>  |  14  ----------------------------<  190<H>  4.6   |  29  |  0.60    Ca    9.9      25 Nov 2023 01:10    TPro  7.2  /  Alb  3.8  /  TBili  0.4  /  DBili  x   /  AST  16  /  ALT  32  /  AlkPhos  64  11-25    Patent admited to telemetry for stroke vs telemetry workup       Jailyn Gibson NP-C  Department of Medicine- [facility]  In House Pager #[phone] Medicine NP note    68F with PMH of AFib on Eliquis, CVA 2019 w/ R residual deficits (including speed deficits), HTN, DM2, mitral stenosis brought in by family for rightward gaze and repeating speech. Last known normal was at 10PM. As per son , she woke up at 11:15 pm 11/24 with right gaze preference and was repeating phrases. Denies previous history of seizures, no tongue biting or incontinence or shaking of extremities. At baseline, patient ambulates with walker with assistance of family and requires assistance with most ADLs. Additionally patient had cough x 2 weeks, as per family, there were grandkids having viral URI. She was brought in for evaluation and called as code stroke and received Ativan 1 mg IV at 2: 24 am  and 2000mg Keppra IV at 2: 25 mg based on body weight 56 kg. At 8:30 am primary RN expressed concern given by family that patient did not have her home  medications prescribed yet. Explained to RN that there is no H and P done yet by Admitting doctor, most likely because of amount patients being admitted by Admitting doctor, so they can order regular medications but all necessary medications in case of  emergency ( pain, hyperglycemia, fever ) will be ordered by ACP if needed. At that time primary RN did not report any declined condition of patient, only concern was not having orders for home medications and additional tests. . At 10 : 50 am ACP called [facility] to check who was assigned Attending to patient and was told that assigning is in progress to check after about 1 hour who is Attending assigned to this particular doctor.  At 11: 46 am received information by RN on Teams that son is concerned that mother is lethargic. Called floor and spoke to RN, notified her about coming to assess patient and if she things is necessary to call RRT. Called ACP Supervisor for assistance to come with me to floor.Upon assessment no respiratory distress, equal breathing in and out , patient responding to voice and tactile stimuli . Patient was under sedative effect of combine bot medications Ativan and Keppra, often during exam was opening eyes. . After finishing exam talked to 2 female family members presented in room and  explained them why patient is all time sleeping and reassuring  that in case of need of  pain and emergency of any case  I am able to order necessary medications but patient for further tests and more detailed  plan of treatment has to be assessed by Admitting doctor. Family verbalized understanding and patient started to be slightly more active the same time ( moving extremities when touched). Few minutes after leaving room , received text message that heart rate of patient was 38- 40 beats . Ordered EKG to be done and returned to room to re-examine patient. Auscultated heart at apex for 4 minutes , each time getting heart rate was 60 beats per minute and each time patient actively was pushing out my hand with stethoscope. Reassured family that heart rate is normal. On the way to [facility]  saw Dr. Marquez who was on the way to admit patient and spoke to him summarizing him condition of patient. Dr. Marquez spoke to me after admission telling me that he ordered CTH without contrast and CT chest with IV contrast . Patient on airborne precautions to r/o TB, AFB sputum x 3 ordered. Will endorse to night ACP to monitor pt.     OBJECTIVE:  Vital Signs Last 24 Hrs  T(C): 36.4 (25 Nov 2023 16:35), Max: 37.3 (25 Nov 2023 00:50)  T(F): 97.6 (25 Nov 2023 16:35), Max: 99.2 (25 Nov 2023 00:50)  HR: 63 (25 Nov 2023 16:35) (63 - 81)  BP: 147/53 (25 Nov 2023 16:35) (125/81 - 152/80)  BP(mean): --  RR: 18 (25 Nov 2023 16:35) (17 - 18)  SpO2: 100% (25 Nov 2023 16:35) (98% - 100%)    Parameters below as of 25 Nov 2023 16:35  Patient On (Oxygen Delivery Method): nasal cannula    FOCUSED PHYSICAL EXAM:  Neuro: drowsy, asleep   Cardiovascular: Pulses +2 B/L in lower and upper extremities, HR regular, BP stable, No edema.  Respiratory: Respirations regular, unlabored, breath sounds clear B/L.   GI: Abdomen soft, non-tender, positive bowel sounds.  : no bladder distention noted. No complaints at this time.  Skin: Dry, intact, no bruising, no diaphoresis.    I&O's    11-25-23 @ 07:01  -  11-25-23 @ 18:38  --------------------------------------------------------  IN: 0 mL / OUT: 800 mL / NET: -800 mL    LABS:                        9.7    15.33 )-----------( 485      ( 25 Nov 2023 01:10 )             30.5     11-25    136  |  95<L>  |  14  ----------------------------<  190<H>  4.6   |  29  |  0.60    Ca    9.9      25 Nov 2023 01:10    TPro  7.2  /  Alb  3.8  /  TBili  0.4  /  DBili  x   /  AST  16  /  ALT  32  /  AlkPhos  64  11-25    Patent admited to telemetry for stroke vs telemetry workup       Jailyn Gibson NP-C  Department of Medicine- [facility]  In House Pager #[phone] Medicine NP note    68F with PMH of AFib on Eliquis, CVA 2019 w/ R residual deficits (including speed deficits), HTN, DM2, mitral stenosis brought in by family for rightward gaze and repeating speech. Last known normal was at 10PM. As per son , she woke up at 11:15 pm 11/24 with right gaze preference and was repeating phrases. Denies previous history of seizures, no tongue biting or incontinence or shaking of extremities. At baseline, patient ambulates with walker with assistance of family and requires assistance with most ADLs. Additionally patient had cough x 2 weeks, as per family, there were grandkids having viral URI. She was brought in for evaluation and called as code stroke and received Ativan 1 mg IV at 2: 24 am  and 2000mg Keppra IV at 2: 25 mg based on body weight 56 kg. At 8:30 am primary RN expressed concern given by family that patient did not have her home  medications prescribed yet. Explained to RN that there is no H and P done yet by Admitting doctor, most likely because of amount patients being admitted by Admitting doctor, so they can order regular medications but all necessary medications in case of  emergency ( pain, hyperglycemia, fever ) will be ordered by ACP if needed. At that time primary RN did not report any declined condition of patient, only concern was not having orders for home medications and additional tests. . At 10 : 50 am ACP called [facility] to check who was assigned Attending to patient and was told that assigning is in progress to check after about 1 hour who is Attending assigned to this particular doctor.  At 11: 46 am received information by RN on Teams that son is concerned that mother is lethargic. Called floor and spoke to RN, notified her about coming to assess patient and if she things is necessary to call RRT. Called ACP Supervisor for assistance to come with me to floor.Upon assessment no respiratory distress, equal breathing in and out , patient responding to voice and tactile stimuli . Patient was under sedative effect of combine bot medications Ativan and Keppra, often during exam was opening eyes. . After finishing exam talked to 2 female family members presented in room and  explained them why patient is all time sleeping and reassuring  that in case of need of  pain and emergency of any case  I am able to order necessary medications but patient for further tests and more detailed  plan of treatment has to be assessed by Admitting doctor. Family verbalized understanding and patient started to be slightly more active the same time ( moving extremities when touched). Few minutes after leaving room , received text message that heart rate of patient was 38- 40 beats . Ordered EKG to be done and returned to room to re-examine patient. Auscultated heart at apex for 4 minutes , each time getting heart rate was 60 beats per minute and each time patient actively was pushing out my hand with stethoscope. Reassured family that heart rate is normal. On the way to [facility]  saw Dr. Marquez who was on the way to admit patient and spoke to him summarizing him condition of patient. Dr. Marquez spoke to me after admission telling me that he ordered CTH without contrast and CT chest with IV contrast . Patient on airborne precautions to r/o TB, AFB sputum x 3 ordered. Will endorse to night ACP to monitor pt.     OBJECTIVE:  Vital Signs Last 24 Hrs  T(C): 36.4 (25 Nov 2023 16:35), Max: 37.3 (25 Nov 2023 00:50)  T(F): 97.6 (25 Nov 2023 16:35), Max: 99.2 (25 Nov 2023 00:50)  HR: 63 (25 Nov 2023 16:35) (63 - 81)  BP: 147/53 (25 Nov 2023 16:35) (125/81 - 152/80)  BP(mean): --  RR: 18 (25 Nov 2023 16:35) (17 - 18)  SpO2: 100% (25 Nov 2023 16:35) (98% - 100%)    Parameters below as of 25 Nov 2023 16:35  Patient On (Oxygen Delivery Method): nasal cannula    FOCUSED PHYSICAL EXAM:  Neuro: drowsy, asleep   Cardiovascular: Pulses +2 B/L in lower and upper extremities, HR regular, BP stable, No edema.  Respiratory: Respirations regular, unlabored, breath sounds clear B/L.   GI: Abdomen soft, non-tender, positive bowel sounds.  : no bladder distention noted. No complaints at this time.  Skin: Dry, intact, no bruising, no diaphoresis.    I&O's    11-25-23 @ 07:01  -  11-25-23 @ 18:38  --------------------------------------------------------  IN: 0 mL / OUT: 800 mL / NET: -800 mL    LABS:                        9.7    15.33 )-----------( 485      ( 25 Nov 2023 01:10 )             30.5     11-25    136  |  95<L>  |  14  ----------------------------<  190<H>  4.6   |  29  |  0.60    Ca    9.9      25 Nov 2023 01:10    TPro  7.2  /  Alb  3.8  /  TBili  0.4  /  DBili  x   /  AST  16  /  ALT  32  /  AlkPhos  64  11-25    Patent admited to telemetry for stroke vs telemetry workup       Jailyn Gibson NP-C  Department of Medicine- [facility]  In House Pager #[phone]

## 2023-11-25 NOTE — H&P ADULT - PROBLEM SELECTOR PLAN 6
Holding metoprolol as discussed above. Stable.   -Check A1c.  -Correctional insulin TIDACHS.  -Titrate PRN.

## 2023-11-25 NOTE — ED PROVIDER NOTE - PHYSICAL EXAMINATION
GENERAL: no acute distress, non-toxic appearing  HEAD: normocephalic, atraumatic  HEENT: normal conjunctiva, oral mucosa moist, neck supple  CARDIAC: regular rate and rhythm, normal S1 and S2,  no appreciable murmurs  PULM: clear to ascultation bilaterally, no crackles, rales, rhonchi, or wheezing  GI: abdomen nondistended, soft, nontender, no guarding or rebound tenderness  NEURO: alert and oriented x 0, dysarthric, not following commands, PERRLA,+right gaze deviation, +R facial droop  MSK: no visible deformities, no peripheral edema, calf tenderness/redness/swelling  SKIN: no visible rashes, dry, well-perfused

## 2023-11-25 NOTE — PHYSICAL THERAPY INITIAL EVALUATION ADULT - ADDITIONAL COMMENTS
Pt confused; unable to provide information regarding prior level of function and living situation. Information obtained from daughter-in-law at bedside. Pt resides in a private house with her family. There are 2 steps to enter; pt is dependent via wheelchair for negotiation. Requires 2 person assistance for transfers OOB to wheelchair. Family provides assistance; no home health aide.

## 2023-11-25 NOTE — ED ADULT NURSE NOTE - OBJECTIVE STATEMENT
Kuldeep RN: Pt received as a code stroke to the CT area. Pt primarily Nadine speaking, son at bedside to translate. Per son, pt LKN was 2200. States pt took a shower, ate, and watched TV, no complaints until around 2200 started c/o headache. Around 2330, family member noticed pt has right sided gaze, generalized weakness and unable to talk properly. States pt is usually A&Ox3, ambulatory with a cane at baseline. PMHx CVA in 2019 with right lower extremity residual, HTN, HLD, DM2. Pt currently A&Ox2, breathing even and unlabored. NSR on the cardiac monitor. Right gaze preference present. No facial droop or slurred speech noted. Pt has equal strength, motor, and sensation to bilateral upper and lower extremities. No drift noted to left lower extremity. 20G IV placed on left AC. Labs drawn and sent. Waiting for CT result. Will endorse to primary RN.

## 2023-11-25 NOTE — H&P ADULT - PROBLEM SELECTOR PLAN 3
Incidental finding of RML collapsed and scattered micronodules measuring up to 8 mm in the right upper lobe.   Additional tiny cavitary micronodule in the right upper lobe.   -Risk factor for TB reviewed - born in [place]. [place] in the [place]. No personal hx of TB. No known exposure.  -Airborne isolation  -Quantiferon gold.  -Sputum AFB x3 (8h apart) - ordered.  -CT Chest w/ contrast ordered to further characterize area of concern.  -No indication for abx at this point - will watch off abx.

## 2023-11-25 NOTE — PHYSICAL THERAPY INITIAL EVALUATION ADULT - PLANNED THERAPY INTERVENTIONS, PT EVAL
bed mobility training/neuromuscular re-education/postural re-education/strengthening/transfer training

## 2023-11-25 NOTE — H&P ADULT - ASSESSMENT
68F with PMH of AFib on Eliquis, CVA 2019 w/ R residual deficits (including speed deficits), HTN, DM2, mitral stenosis brought in by family for rightward gaze and repeating speech. Stroke code activated. Admit for stroke workup.

## 2023-11-25 NOTE — H&P ADULT - HISTORY OF PRESENT ILLNESS
68F with PMH of AFib on Eliquis, CVA 2019 w/ R residual deficits (including speed deficits), HTN, DM2, mitral stenosis brought in by family for rightward gaze and repeating speech. Last known normal was at 10PM. On follow up by son later in the evening around 1130PM - pt was not acting like her usual self w/ a deviated gaze. She was brought in for eval. Of note - family reports pt around grandkids who had viral URI - which they believe explains the cough she has been having x2 wks. No fevers, chills. No sputum, hemoptysis. No reports of night sweats, weightloss.     Per daughter in law - present at bedside - pt is from [place]. Has been here x10yrs. No known exposure to TB. No personal hx of TB. Cannot say if she's ever been tested for TB.

## 2023-11-25 NOTE — CONSULT NOTE ADULT - SUBJECTIVE AND OBJECTIVE BOX
Neurology - Consult Note    -  Spectra: [phone] ([facility]), [phone] ([facility])  -    HPI: Patient RUSSELL FORTUNE is a 68y (1955) woman with afib on eliquis presenting as code stroke for right gaze preference and aphasia. Patient reports       Review of Systems:    Unable to obtain     Allergies:  No Known Allergies      PMHx/PSHx/Family Hx: As above, otherwise see below       Social Hx:  No current use of tobacco, alcohol, or illicit drugs      Medications:  MEDICATIONS  (STANDING):    MEDICATIONS  (PRN):      Vitals:  T(C): 37.3 (11-25-23 @ 00:50), Max: 37.3 (11-25-23 @ 00:50)  HR: 81 (11-25-23 @ 00:50) (81 - 81)  BP: 152/80 (11-25-23 @ 00:50) (152/80 - 152/80)  RR: 18 (11-25-23 @ 00:50) (18 - 18)  SpO2: 100% (11-25-23 @ 00:50) (100% - 100%)    Physical Examination: INCOMPLETE  General - NAD  Cardiovascular - Peripheral pulses palpable, no edema  Eyes - Fundoscopy with flat, sharp optic discs and no hemorrhage or exudates; Fundoscopy not well visualized; Fundoscopy not performed due to safety precautions in the setting of the COVID-19 pandemic    Neurologic Exam:  Mental status - Awake, Alert, Oriented to person, place, and time. Speech fluent, repetition and naming intact. Follows simple and complex commands. Attention/concentration, recent and remote memory (including registration and recall), and fund of knowledge intact    Cranial nerves - PERRLA, VFF, EOMI, face sensation (V1-V3) intact b/l, facial strength intact without asymmetry b/l, hearing intact b/l, palate with symmetric elevation, trapezius OR sternocleidomastiod 5/5 strength b/l, tongue midline on protrusion with full lateral movement    Motor - Normal bulk and tone throughout. No pronator drift.  Strength testing            Deltoid      Biceps      Triceps     Wrist Extension    Wrist Flexion     Interossei         R            5                 5               5                     5                              5                        5                 5  L             5                 5               5                     5                              5                        5                 5              Hip Flexion    Hip Extension    Knee Flexion    Knee Extension    Dorsiflexion    Plantar Flexion  R              5                           5                       5                           5                            5                          5  L              5                           5                        5                           5                            5                          5    Sensation - Light touch/temperature OR pain/vibration intact throughout    DTR's -             Biceps      Triceps     Brachioradialis      Patellar    Ankle    Toes/plantar response  R             2+             2+                  2+                       2+            2+                 Down  L              2+             2+                 2+                        2+           2+                 Down    Coordination - Finger to Nose intact b/l. No tremors appreciated    Gait and station - Normal casual gait. Romberg (-)    Labs:          CAPILLARY BLOOD GLUCOSE      POCT Blood Glucose.: 195 mg/dL (25 Nov 2023 00:53)          Radiology:     Neurology - Consult Note    -  Spectra: [phone] ([facility]), [phone] ([facility])  -    HPI: Patient RUSSELL FORTUNE is a 68y (1955) woman with afib on eliquis presenting as code stroke for right gaze preference and aphasia. Patient reports       Review of Systems:    Unable to obtain     Allergies:  No Known Allergies      PMHx/PSHx/Family Hx: As above, otherwise see below       Social Hx:  No current use of tobacco, alcohol, or illicit drugs      Medications:  MEDICATIONS  (STANDING):    MEDICATIONS  (PRN):      Vitals:  T(C): 37.3 (11-25-23 @ 00:50), Max: 37.3 (11-25-23 @ 00:50)  HR: 81 (11-25-23 @ 00:50) (81 - 81)  BP: 152/80 (11-25-23 @ 00:50) (152/80 - 152/80)  RR: 18 (11-25-23 @ 00:50) (18 - 18)  SpO2: 100% (11-25-23 @ 00:50) (100% - 100%)    Physical Examination: INCOMPLETE  General - NAD  Cardiovascular - Peripheral pulses palpable, no edema  Eyes - Fundoscopy with flat, sharp optic discs and no hemorrhage or exudates; Fundoscopy not well visualized; Fundoscopy not performed due to safety precautions in the setting of the COVID-19 pandemic    Neurologic Exam:  Mental status - Awake, Alert, Oriented to person, place, and time. Speech fluent, repetition and naming intact. Follows simple and complex commands. Attention/concentration, recent and remote memory (including registration and recall), and fund of knowledge intact    Cranial nerves - PERRLA, VFF, EOMI, face sensation (V1-V3) intact b/l, facial strength intact without asymmetry b/l, hearing intact b/l, palate with symmetric elevation, trapezius OR sternocleidomastiod 5/5 strength b/l, tongue midline on protrusion with full lateral movement    Motor - Normal bulk and tone throughout. No pronator drift.  Strength testing            Deltoid      Biceps      Triceps     Wrist Extension    Wrist Flexion     Interossei         R            5                 5               5                     5                              5                        5                 5  L             5                 5               5                     5                              5                        5                 5              Hip Flexion    Hip Extension    Knee Flexion    Knee Extension    Dorsiflexion    Plantar Flexion  R              5                           5                       5                           5                            5                          5  L              5                           5                        5                           5                            5                          5    Sensation - Light touch/temperature OR pain/vibration intact throughout    DTR's -             Biceps      Triceps     Brachioradialis      Patellar    Ankle    Toes/plantar response  R             2+             2+                  2+                       2+            2+                 Down  L              2+             2+                 2+                        2+           2+                 Down    Coordination - Finger to Nose intact b/l. No tremors appreciated    Gait and station - Normal casual gait. Romberg (-)    Labs:          CAPILLARY BLOOD GLUCOSE      POCT Blood Glucose.: 195 mg/dL (25 Nov 2023 00:53)          Radiology:     Neurology - Consult Note    -  Spectra: [phone] ([facility]), [phone] ([facility])  -    HPI: Patient RUSSELL FORTUNE is a 68y (1955) woman with afib on eliquis presenting as code stroke for right gaze preference and aphasia. Patient reports       Review of Systems:    Unable to obtain     Allergies:  No Known Allergies      PMHx/PSHx/Family Hx: As above, otherwise see below       Social Hx:  No current use of tobacco, alcohol, or illicit drugs      Medications:  MEDICATIONS  (STANDING):    MEDICATIONS  (PRN):      Vitals:  T(C): 37.3 (11-25-23 @ 00:50), Max: 37.3 (11-25-23 @ 00:50)  HR: 81 (11-25-23 @ 00:50) (81 - 81)  BP: 152/80 (11-25-23 @ 00:50) (152/80 - 152/80)  RR: 18 (11-25-23 @ 00:50) (18 - 18)  SpO2: 100% (11-25-23 @ 00:50) (100% - 100%)    Physical Examination: INCOMPLETE  General - NAD  Cardiovascular - Peripheral pulses palpable, no edema  Eyes - Fundoscopy with flat, sharp optic discs and no hemorrhage or exudates; Fundoscopy not well visualized; Fundoscopy not performed due to safety precautions in the setting of the COVID-19 pandemic    Neurologic Exam:  Mental status - Awake, Alert, Oriented to person, place, and time. Speech fluent, repetition and naming intact. Follows simple and complex commands. Attention/concentration, recent and remote memory (including registration and recall), and fund of knowledge intact    Cranial nerves - PERRLA, VFF, EOMI, face sensation (V1-V3) intact b/l, facial strength intact without asymmetry b/l, hearing intact b/l, palate with symmetric elevation, trapezius OR sternocleidomastiod 5/5 strength b/l, tongue midline on protrusion with full lateral movement    Motor - Normal bulk and tone throughout. No pronator drift.  Strength testing            Deltoid      Biceps      Triceps     Wrist Extension    Wrist Flexion     Interossei         R            5                 5               5                     5                              5                        5                 5  L             5                 5               5                     5                              5                        5                 5              Hip Flexion    Hip Extension    Knee Flexion    Knee Extension    Dorsiflexion    Plantar Flexion  R              5                           5                       5                           5                            5                          5  L              5                           5                        5                           5                            5                          5    Sensation - Light touch/temperature OR pain/vibration intact throughout    DTR's -             Biceps      Triceps     Brachioradialis      Patellar    Ankle    Toes/plantar response  R             2+             2+                  2+                       2+            2+                 Down  L              2+             2+                 2+                        2+           2+                 Down    Coordination - Finger to Nose intact b/l. No tremors appreciated    Gait and station - Normal casual gait. Romberg (-)    Labs:          CAPILLARY BLOOD GLUCOSE      POCT Blood Glucose.: 195 mg/dL (25 Nov 2023 00:53)          Radiology:     Neurology - Consult Note    -  Spectra: [phone] ([facility]), [phone] ([facility])  -    HPI: Patient RUSSELL FORTUNE is a 68y (1955) RH Nad[place] speaking woman with afib on eliquis, R MCA stroke 2013, Stroke 2019 with residual right hemiparesis and dysarthria HTN, HLD, DM2, mitral stenosis/rheumatic valvular disease presenting as code stroke for right gaze preference and aphasia.  Son at bedside providing translation and collateral history reports 10 pm patient went to bed at baseline. She woke up at 11:15 with right gaze preference and was repeating phrases. At baseline, patient ambulates with walker with assistance of family and requires assistance with most ADLs.       LKN 10 pm 11/24  NIHSS 20  mRS 4    Review of Systems:    Unable to obtain     Allergies:  No Known Allergies      PMHx/PSHx/Family Hx: As above, otherwise see below       Social Hx:  No current use of tobacco, alcohol, or illicit drugs      Medications:  MEDICATIONS  (STANDING):    MEDICATIONS  (PRN):      Vitals:  T(C): 37.3 (11-25-23 @ 00:50), Max: 37.3 (11-25-23 @ 00:50)  HR: 81 (11-25-23 @ 00:50) (81 - 81)  BP: 152/80 (11-25-23 @ 00:50) (152/80 - 152/80)  RR: 18 (11-25-23 @ 00:50) (18 - 18)  SpO2: 100% (11-25-23 @ 00:50) (100% - 100%)    Physical Examination:   General - Right gaze preference     Neurologic Exam:  Mental status - Awake, Alert, Oriented x0. Speech mildly dysarthric, dysfluent, unable to repeat or name. Unable to follow commandsCranial nerves - PERRLA, VFF, EOMI, face sensation (V1-V3) intact b/l, facial strength intact without asymmetry b/l, hearing intact b/l, palate with symmetric elevation, trapezius OR sternocleidomastiod 5/5 strength b/l, tongue midline on protrusion with full lateral movement    Motor - Normal bulk and tone throughout. No pronator drift.  Strength testing            Deltoid      Biceps      Triceps     Wrist Extension    Wrist Flexion     Interossei         R            5                 5               5                     5                              5                        5                 5  L             5                 5               5                     5                              5                        5                 5              Hip Flexion    Hip Extension    Knee Flexion    Knee Extension    Dorsiflexion    Plantar Flexion  R              5                           5                       5                           5                            5                          5  L              5                           5                        5                           5                            5                          5    Sensation - Light touch/temperature OR pain/vibration intact throughout    DTR's -             Biceps      Triceps     Brachioradialis      Patellar    Ankle    Toes/plantar response  R             2+             2+                  2+                       2+            2+                 Down  L              2+             2+                 2+                        2+           2+                 Down    Coordination - Finger to Nose intact b/l. No tremors appreciated    Gait and station - Deffered unable to assess    Labs:          CAPILLARY BLOOD GLUCOSE      POCT Blood Glucose.: 195 mg/dL (25 Nov 2023 00:53)          Radiology:     Neurology - Consult Note    -  Spectra: [phone] ([facility]), [phone] ([facility])  -    HPI: Patient RUSSELL FORTUNE is a 68y (1955) RH [place] speaking woman with afib on eliquis, R MCA stroke 2013, Stroke 2019 with residual right hemiparesis and dysarthria HTN, HLD, DM2, mitral stenosis/rheumatic valvular disease presenting as code stroke for right gaze preference and aphasia.  Son at bedside providing translation and collateral history reports 10 pm patient went to bed at baseline. She woke up at 11:15 with right gaze preference and was repeating phrases. At baseline, patient ambulates with walker with assistance of family and requires assistance with most ADLs.       LKN 10 pm 11/24  NIHSS 20  mRS 4    Review of Systems:    Unable to obtain     Allergies:  No Known Allergies      PMHx/PSHx/Family Hx: As above, otherwise see below       Social Hx:  No current use of tobacco, alcohol, or illicit drugs      Medications:  MEDICATIONS  (STANDING):    MEDICATIONS  (PRN):      Vitals:  T(C): 37.3 (11-25-23 @ 00:50), Max: 37.3 (11-25-23 @ 00:50)  HR: 81 (11-25-23 @ 00:50) (81 - 81)  BP: 152/80 (11-25-23 @ 00:50) (152/80 - 152/80)  RR: 18 (11-25-23 @ 00:50) (18 - 18)  SpO2: 100% (11-25-23 @ 00:50) (100% - 100%)    Physical Examination:   General - Right gaze preference     Neurologic Exam:  Mental status - Awake, Alert, Oriented x0. Speech mildly dysarthric, dysfluent, unable to repeat or name. Unable to follow commandsCranial nerves - PERRLA, VFF, EOMI, face sensation (V1-V3) intact b/l, facial strength intact without asymmetry b/l, hearing intact b/l, palate with symmetric elevation, trapezius OR sternocleidomastiod 5/5 strength b/l, tongue midline on protrusion with full lateral movement    Motor - Normal bulk and tone throughout. No pronator drift.  Strength testing            Deltoid      Biceps      Triceps     Wrist Extension    Wrist Flexion     Interossei         R            5                 5               5                     5                              5                        5                 5  L             5                 5               5                     5                              5                        5                 5              Hip Flexion    Hip Extension    Knee Flexion    Knee Extension    Dorsiflexion    Plantar Flexion  R              5                           5                       5                           5                            5                          5  L              5                           5                        5                           5                            5                          5    Sensation - Light touch/temperature OR pain/vibration intact throughout    DTR's -             Biceps      Triceps     Brachioradialis      Patellar    Ankle    Toes/plantar response  R             2+             2+                  2+                       2+            2+                 Down  L              2+             2+                 2+                        2+           2+                 Down    Coordination - Finger to Nose intact b/l. No tremors appreciated    Gait and station - Deffered unable to assess    Labs:          CAPILLARY BLOOD GLUCOSE      POCT Blood Glucose.: 195 mg/dL (25 Nov 2023 00:53)          Radiology:     Neurology - Consult Note    -  Spectra: [phone] ([facility]), [phone] ([facility])  -    HPI: Patient RUSSELL FORTUNE is a 68y (1955) RH Nad[place] speaking woman with afib on eliquis, R MCA stroke 2013, Stroke 2019 with residual right hemiparesis and dysarthria HTN, HLD, DM2, mitral stenosis/rheumatic valvular disease presenting as code stroke for right gaze preference and aphasia.  Son at bedside providing translation and collateral history reports 10 pm patient went to bed at baseline. She woke up at 11:15 with right gaze preference and was repeating phrases. At baseline, patient ambulates with walker with assistance of family and requires assistance with most ADLs.       LKN 10 pm 11/24  NIHSS 20  mRS 4    Review of Systems:    Unable to obtain     Allergies:  No Known Allergies      PMHx/PSHx/Family Hx: As above, otherwise see below       Social Hx:  No current use of tobacco, alcohol, or illicit drugs      Medications:  MEDICATIONS  (STANDING):    MEDICATIONS  (PRN):      Vitals:  T(C): 37.3 (11-25-23 @ 00:50), Max: 37.3 (11-25-23 @ 00:50)  HR: 81 (11-25-23 @ 00:50) (81 - 81)  BP: 152/80 (11-25-23 @ 00:50) (152/80 - 152/80)  RR: 18 (11-25-23 @ 00:50) (18 - 18)  SpO2: 100% (11-25-23 @ 00:50) (100% - 100%)    Physical Examination:   General - Right gaze preference     Neurologic Exam:  Mental status - Awake, Alert, Oriented x0. Speech mildly dysarthric, dysfluent, unable to repeat or name. Unable to follow commandsCranial nerves - PERRLA, VFF, EOMI, face sensation (V1-V3) intact b/l, facial strength intact without asymmetry b/l, hearing intact b/l, palate with symmetric elevation, trapezius OR sternocleidomastiod 5/5 strength b/l, tongue midline on protrusion with full lateral movement    Motor - Normal bulk and tone throughout. No pronator drift.  Strength testing            Deltoid      Biceps      Triceps     Wrist Extension    Wrist Flexion     Interossei         R            5                 5               5                     5                              5                        5                 5  L             5                 5               5                     5                              5                        5                 5              Hip Flexion    Hip Extension    Knee Flexion    Knee Extension    Dorsiflexion    Plantar Flexion  R              5                           5                       5                           5                            5                          5  L              5                           5                        5                           5                            5                          5    Sensation - Light touch/temperature OR pain/vibration intact throughout    DTR's -             Biceps      Triceps     Brachioradialis      Patellar    Ankle    Toes/plantar response  R             2+             2+                  2+                       2+            2+                 Down  L              2+             2+                 2+                        2+           2+                 Down    Coordination - Finger to Nose intact b/l. No tremors appreciated    Gait and station - Deffered unable to assess    Labs:          CAPILLARY BLOOD GLUCOSE      POCT Blood Glucose.: 195 mg/dL (25 Nov 2023 00:53)          Radiology:     Neurology - Consult Note    -  Spectra: [phone] ([facility]), [phone] ([facility])  -    HPI: Patient RUSSELL FORTUNE is a 68y (1955) RH Nad[place] speaking woman with afib on eliquis, R MCA stroke 2013, L MCA Stroke 2019 with residual right hemiparesis and dysarthria, HTN, HLD, DM2, mitral stenosis/rheumatic valvular disease presenting as code stroke for right gaze preference and aphasia.  Son at bedside providing translation and collateral history reports 10 pm 11/24 patient went to bed at baseline. She woke up at 11:15 pm 11/24 with right gaze preference and was repeating phrases. Denies previous history of seizures, no tongue biting or incontinence or shaking of extremities. At baseline, patient ambulates with walker with assistance of family and requires assistance with most ADLs.       LKN 10 pm 11/24  NIHSS 20  mRS 4    Review of Systems:    Unable to obtain     Allergies:  No Known Allergies      PMHx/PSHx/Family Hx: As above, otherwise see below       Social Hx:  No current use of tobacco, alcohol, or illicit drugs      Medications:  MEDICATIONS  (STANDING):    MEDICATIONS  (PRN):      Vitals:  T(C): 37.3 (11-25-23 @ 00:50), Max: 37.3 (11-25-23 @ 00:50)  HR: 81 (11-25-23 @ 00:50) (81 - 81)  BP: 152/80 (11-25-23 @ 00:50) (152/80 - 152/80)  RR: 18 (11-25-23 @ 00:50) (18 - 18)  SpO2: 100% (11-25-23 @ 00:50) (100% - 100%)    Physical Examination:   General - Right gaze preference     Neurologic Exam:  Mental status - Awake, Alert, Oriented x0. Speech mildly dysarthric, dysfluent, unable to repeat or name. Unable to follow commands.     Cranial nerves - Pupils equal round, minimally reactive, BTT difficult to assess and inconsistent, eyes unable to cross midline, right gaze deviation,  R facial palsy    Motor - Normal bulk and tone throughout. No pronator drift.  Strength testing   Able to lift bilateral upper extremity antigravity, mild drift R >L  Right leg no movement (baseline)  Left leg some effort against gravity     Sensation - does not withdraw to noxious stimuli to right leg     DTR's -             Biceps      Triceps     Brachioradialis      Patellar    Ankle    Toes/plantar response  R             2+             2+                  2+                       2+            2+                 Down  L              2+             2+                 2+                        2+           2+                 Down    Coordination -unable to assess Finger to Nose b/l. No tremors appreciated    Gait and station - Deffered unable to assess    Labs:          CAPILLARY BLOOD GLUCOSE      POCT Blood Glucose.: 195 mg/dL (25 Nov 2023 00:53)          Radiology:    < from: CT Brain Stroke Protocol (11.25.23 @ 01:19) >  IMPRESSION:  1. No acute intracranial hemorrhage or CT evidence of acute, large   territorial infarct.       Neurology - Consult Note    -  Spectra: [phone] ([facility]), [phone] ([facility])  -    HPI: Patient RUSSELL FORTUNE is a 68y (1955) RH Nad[place] speaking woman with afib on eliquis, R MCA stroke 2013, L MCA Stroke 2019 with residual right hemiparesis and dysarthria, HTN, HLD, DM2, mitral stenosis/rheumatic valvular disease presenting as code stroke for right gaze preference and aphasia.  Son at bedside providing translation and collateral history reports 10 pm 11/24 patient went to bed at baseline. She woke up at 11:15 pm 11/24 with right gaze preference and was repeating phrases. Denies previous history of seizures, no tongue biting or incontinence or shaking of extremities. At baseline, patient ambulates with walker with assistance of family and requires assistance with most ADLs.       LKN 10 pm 11/24  NIHSS 20  mRS 4    Review of Systems:    Unable to obtain     Allergies:  No Known Allergies      PMHx/PSHx/Family Hx: As above, otherwise see below       Social Hx:  No current use of tobacco, alcohol, or illicit drugs      Medications:  MEDICATIONS  (STANDING):    MEDICATIONS  (PRN):      Vitals:  T(C): 37.3 (11-25-23 @ 00:50), Max: 37.3 (11-25-23 @ 00:50)  HR: 81 (11-25-23 @ 00:50) (81 - 81)  BP: 152/80 (11-25-23 @ 00:50) (152/80 - 152/80)  RR: 18 (11-25-23 @ 00:50) (18 - 18)  SpO2: 100% (11-25-23 @ 00:50) (100% - 100%)    Physical Examination:   General - Right gaze preference     Neurologic Exam:  Mental status - Awake, Alert, Oriented x0. Speech mildly dysarthric, dysfluent, unable to repeat or name. Unable to follow commands.     Cranial nerves - Pupils equal round, minimally reactive, BTT difficult to assess and inconsistent, eyes unable to cross midline, right gaze deviation,  R facial palsy    Motor - Normal bulk and tone throughout. No pronator drift.  Strength testing   Able to lift bilateral upper extremity antigravity, mild drift R >L  Right leg no movement (baseline)  Left leg some effort against gravity     Sensation - does not withdraw to noxious stimuli to right leg     DTR's -             Biceps      Triceps     Brachioradialis      Patellar    Ankle    Toes/plantar response  R             2+             2+                  2+                       2+            2+                 Down  L              2+             2+                 2+                        2+           2+                 Down    Coordination -unable to assess Finger to Nose b/l. No tremors appreciated    Gait and station - Deffered unable to assess    Labs:          CAPILLARY BLOOD GLUCOSE      POCT Blood Glucose.: 195 mg/dL (25 Nov 2023 00:53)          Radiology:    < from: CT Brain Stroke Protocol (11.25.23 @ 01:19) >  IMPRESSION:  1. No acute intracranial hemorrhage or CT evidence of acute, large   territorial infarct.       Neurology - Consult Note    -  Spectra: [phone] ([facility]), [phone] ([facility])  -    HPI: Patient RUSSELL FORTUNE is a 68y (1955) RH Nad[place] speaking woman with afib on eliquis, R MCA stroke 2013, L MCA Stroke 2019 with residual right hemiparesis and dysarthria, HTN, HLD, DM2, mitral stenosis/rheumatic valvular disease presenting as code stroke for right gaze preference and aphasia.  Son at bedside providing translation and collateral history reports 10 pm 11/24 patient went to bed at baseline. She woke up at 11:15 pm 11/24 with right gaze preference and was repeating phrases. Denies previous history of seizures, no tongue biting or incontinence or shaking of extremities. At baseline, patient ambulates with walker with assistance of family and requires assistance with most ADLs.       LKN 10 pm 11/24  NIHSS 20  mRS 4    Review of Systems:    Unable to obtain     Allergies:  No Known Allergies      PMHx/PSHx/Family Hx: As above, otherwise see below       Social Hx:  No current use of tobacco, alcohol, or illicit drugs      Medications:  MEDICATIONS  (STANDING):    MEDICATIONS  (PRN):      Vitals:  T(C): 37.3 (11-25-23 @ 00:50), Max: 37.3 (11-25-23 @ 00:50)  HR: 81 (11-25-23 @ 00:50) (81 - 81)  BP: 152/80 (11-25-23 @ 00:50) (152/80 - 152/80)  RR: 18 (11-25-23 @ 00:50) (18 - 18)  SpO2: 100% (11-25-23 @ 00:50) (100% - 100%)    Physical Examination:   General - Right gaze preference     Neurologic Exam:  Mental status - Awake, Alert, Oriented x0. Speech mildly dysarthric, dysfluent, unable to repeat or name. Unable to follow commands.     Cranial nerves - Pupils equal round, minimally reactive, BTT difficult to assess and inconsistent, eyes unable to cross midline, right gaze deviation,  R facial palsy    Motor - Normal bulk and tone throughout. No pronator drift.  Strength testing   Able to lift bilateral upper extremity antigravity, mild drift R >L  Right leg no movement (baseline)  Left leg some effort against gravity     Sensation - does not withdraw to noxious stimuli to right leg     DTR's -             Biceps      Triceps     Brachioradialis      Patellar    Ankle    Toes/plantar response  R             2+             2+                  2+                       2+            2+                 Down  L              2+             2+                 2+                        2+           2+                 Down    Coordination -unable to assess Finger to Nose b/l. No tremors appreciated    Gait and station - Deffered unable to assess    Labs:          CAPILLARY BLOOD GLUCOSE      POCT Blood Glucose.: 195 mg/dL (25 Nov 2023 00:53)          Radiology:    < from: CT Brain Stroke Protocol (11.25.23 @ 01:19) >  IMPRESSION:  1. No acute intracranial hemorrhage or CT evidence of acute, large   territorial infarct.       Neurology - Consult Note    -  Spectra: [phone] ([facility]), [phone] ([facility])  -    HPI: Patient RUSSELL FORTUNE is a 68y (1955) RH Nadine speaking woman with afib on eliquis, R MCA stroke 2013, L MCA Stroke 2019 with residual right hemiparesis and dysarthria, HTN, HLD, DM2, mitral stenosis/rheumatic valvular disease presenting as code stroke for right gaze preference and aphasia.  Son at bedside providing translation and collateral history reports 10 pm 11/24 patient went to bed at baseline. She woke up at 11:15 pm 11/24 with right gaze preference and was repeating phrases. Denies previous history of seizures, no tongue biting or incontinence or shaking of extremities. At baseline, patient ambulates with walker with assistance of family and requires assistance with most ADLs.       LKN 10 pm 11/24  NIHSS 20  mRS 4    Review of Systems:    Unable to obtain     Allergies:  No Known Allergies      PMHx/PSHx/Family Hx: As above, otherwise see below       Social Hx:  No current use of tobacco, alcohol, or illicit drugs      Medications:  MEDICATIONS  (STANDING):    MEDICATIONS  (PRN):      Vitals:  T(C): 37.3 (11-25-23 @ 00:50), Max: 37.3 (11-25-23 @ 00:50)  HR: 81 (11-25-23 @ 00:50) (81 - 81)  BP: 152/80 (11-25-23 @ 00:50) (152/80 - 152/80)  RR: 18 (11-25-23 @ 00:50) (18 - 18)  SpO2: 100% (11-25-23 @ 00:50) (100% - 100%)    Physical Examination:   General - Right gaze preference     Neurologic Exam:  Mental status - Awake, Alert, Oriented x0. Speech mildly dysarthric, dysfluent, unable to repeat or name. Unable to follow commands.     Cranial nerves - Pupils equal round, minimally reactive, BTT difficult to assess and inconsistent, eyes unable to cross midline, right gaze deviation,  R facial palsy    Motor - Normal bulk and tone throughout. No pronator drift.  Strength testing   Able to lift bilateral upper extremity antigravity, mild drift R >L  Right leg no movement (baseline)  Left leg some effort against gravity     Sensation - does not withdraw to noxious stimuli to right leg     DTR's -             Biceps      Triceps     Brachioradialis      Patellar    Ankle    Toes/plantar response  R             2+             2+                  2+                       2+            2+                 Down  L              2+             2+                 2+                        2+           2+                 Down    Coordination -unable to assess Finger to Nose b/l. No tremors appreciated    Gait and station - Deffered unable to assess    Labs:          CAPILLARY BLOOD GLUCOSE      POCT Blood Glucose.: 195 mg/dL (25 Nov 2023 00:53)          Radiology:    < from: CT Brain Stroke Protocol (11.25.23 @ 01:19) >  IMPRESSION:  1. No acute intracranial hemorrhage or CT evidence of acute, large   territorial infarct.      < from: CT Angio Neck Stroke Protocol w/ IV Cont (11.25.23 @ 01:21) >  CT PERFUSION: Calculus small volume ischemic penumbra in the right   parieto-occipital lobe corresponding to the PCA territory, without   evidence of core infarct at the time of this imaging.    CTA BRAIN:  1. Abrupt occlusion of the posterior P2 segment of the right PCA, without   reconstitution.    CTA NECK:  1. Patent cervical vasculature. No flow limiting stenosis or occlusion   according to NASCET criteria.  2. Complete atelectasis of the lateral segment right middle lobe and   scattered micronodules measuring up to 8 mm in the right upper lobe.   Additional tiny cavitary micronodule in the right upper lobe. A CT chest   with contrast is recommended for further evaluation.       Neurology - Consult Note    -  Spectra: [phone] ([facility]), [phone] ([facility]  -    HPI: Patient RUSSELL FORTUNE is a 68y (1955) RH Nadine speaking woman with afib on eliquis, R MCA stroke 2013, L MCA Stroke 2019 with residual right hemiparesis and dysarthria, HTN, HLD, DM2, mitral stenosis/rheumatic valvular disease presenting as code stroke for right gaze preference and aphasia.  Son at bedside providing translation and collateral history reports 10 pm 11/24 patient went to bed at baseline. She woke up at 11:15 pm 11/24 with right gaze preference and was repeating phrases. Denies previous history of seizures, no tongue biting or incontinence or shaking of extremities. At baseline, patient ambulates with walker with assistance of family and requires assistance with most ADLs.       LKN 10 pm 11/24  NIHSS 20  mRS 4    Review of Systems:    Unable to obtain     Allergies:  No Known Allergies      PMHx/PSHx/Family Hx: As above, otherwise see below       Social Hx:  No current use of tobacco, alcohol, or illicit drugs      Medications:  MEDICATIONS  (STANDING):    MEDICATIONS  (PRN):      Vitals:  T(C): 37.3 (11-25-23 @ 00:50), Max: 37.3 (11-25-23 @ 00:50)  HR: 81 (11-25-23 @ 00:50) (81 - 81)  BP: 152/80 (11-25-23 @ 00:50) (152/80 - 152/80)  RR: 18 (11-25-23 @ 00:50) (18 - 18)  SpO2: 100% (11-25-23 @ 00:50) (100% - 100%)    Physical Examination:   General - Right gaze preference     Neurologic Exam:  Mental status - Awake, Alert, Oriented x0. Speech mildly dysarthric, dysfluent, unable to repeat or name. Unable to follow commands.     Cranial nerves - Pupils equal round, minimally reactive, BTT difficult to assess and inconsistent, eyes unable to cross midline, right gaze deviation,  R facial palsy    Motor - Normal bulk and tone throughout. No pronator drift.  Strength testing   Able to lift bilateral upper extremity antigravity, mild drift R >L  Right leg no movement (baseline)  Left leg some effort against gravity     Sensation - does not withdraw to noxious stimuli to right leg     DTR's -             Biceps      Triceps     Brachioradialis      Patellar    Ankle    Toes/plantar response  R             2+             2+                  2+                       2+            2+                 Down  L              2+             2+                 2+                        2+           2+                 Down    Coordination -unable to assess Finger to Nose b/l. No tremors appreciated    Gait and station - Deffered unable to assess    Labs:          CAPILLARY BLOOD GLUCOSE      POCT Blood Glucose.: 195 mg/dL (25 Nov 2023 00:53)          Radiology:    < from: CT Brain Stroke Protocol (11.25.23 @ 01:19) >  IMPRESSION:  1. No acute intracranial hemorrhage or CT evidence of acute, large   territorial infarct.      < from: CT Angio Neck Stroke Protocol w/ IV Cont (11.25.23 @ 01:21) >  CT PERFUSION: Calculus small volume ischemic penumbra in the right   parieto-occipital lobe corresponding to the PCA territory, without   evidence of core infarct at the time of this imaging.    CTA BRAIN:  1. Abrupt occlusion of the posterior P2 segment of the right PCA, without   reconstitution.    CTA NECK:  1. Patent cervical vasculature. No flow limiting stenosis or occlusion   according to NASCET criteria.  2. Complete atelectasis of the lateral segment right middle lobe and   scattered micronodules measuring up to 8 mm in the right upper lobe.   Additional tiny cavitary micronodule in the right upper lobe. A CT chest   with contrast is recommended for further evaluation.       Neurology - Consult Note    -  Spectra: [phone] ([facility]), [phone] ([facility])  -    HPI: Patient RUSSELL FORTUNE is a 68y (1955) RH Nadine speaking woman with afib on eliquis, R MCA stroke 2013, L MCA Stroke 2019 with residual right hemiparesis and dysarthria, HTN, HLD, DM2, mitral stenosis/rheumatic valvular disease presenting as code stroke for right gaze preference and aphasia.  Son at bedside providing translation and collateral history reports 10 pm 11/24 patient went to bed at baseline. She woke up at 11:15 pm 11/24 with right gaze preference and was repeating phrases. Denies previous history of seizures, no tongue biting or incontinence or shaking of extremities. At baseline, patient ambulates with walker with assistance of family and requires assistance with most ADLs.       LKN 10 pm 11/24  NIHSS 20  mRS 4    Review of Systems:    Unable to obtain     Allergies:  No Known Allergies      PMHx/PSHx/Family Hx: As above, otherwise see below       Social Hx:  No current use of tobacco, alcohol, or illicit drugs      Medications:  MEDICATIONS  (STANDING):    MEDICATIONS  (PRN):      Vitals:  T(C): 37.3 (11-25-23 @ 00:50), Max: 37.3 (11-25-23 @ 00:50)  HR: 81 (11-25-23 @ 00:50) (81 - 81)  BP: 152/80 (11-25-23 @ 00:50) (152/80 - 152/80)  RR: 18 (11-25-23 @ 00:50) (18 - 18)  SpO2: 100% (11-25-23 @ 00:50) (100% - 100%)    Physical Examination:   General - Right gaze preference     Neurologic Exam:  Mental status - Awake, Alert, Oriented x0. Speech mildly dysarthric, dysfluent, unable to repeat or name. Unable to follow commands.     Cranial nerves - Pupils equal round, minimally reactive, BTT difficult to assess and inconsistent, eyes unable to cross midline, right gaze deviation,  R facial palsy    Motor - Normal bulk and tone throughout. No pronator drift.  Strength testing   Able to lift bilateral upper extremity antigravity, mild drift R >L  Right leg no movement (baseline)  Left leg some effort against gravity     Sensation - does not withdraw to noxious stimuli to right leg     DTR's -             Biceps      Triceps     Brachioradialis      Patellar    Ankle    Toes/plantar response  R             2+             2+                  2+                       2+            2+                 Down  L              2+             2+                 2+                        2+           2+                 Down    Coordination -unable to assess Finger to Nose b/l. No tremors appreciated    Gait and station - Deffered unable to assess    Labs:          CAPILLARY BLOOD GLUCOSE      POCT Blood Glucose.: 195 mg/dL (25 Nov 2023 00:53)          Radiology:    < from: CT Brain Stroke Protocol (11.25.23 @ 01:19) >  IMPRESSION:  1. No acute intracranial hemorrhage or CT evidence of acute, large   territorial infarct.      < from: CT Angio Neck Stroke Protocol w/ IV Cont (11.25.23 @ 01:21) >  CT PERFUSION: Calculus small volume ischemic penumbra in the right   parieto-occipital lobe corresponding to the PCA territory, without   evidence of core infarct at the time of this imaging.    CTA BRAIN:  1. Abrupt occlusion of the posterior P2 segment of the right PCA, without   reconstitution.    CTA NECK:  1. Patent cervical vasculature. No flow limiting stenosis or occlusion   according to NASCET criteria.  2. Complete atelectasis of the lateral segment right middle lobe and   scattered micronodules measuring up to 8 mm in the right upper lobe.   Additional tiny cavitary micronodule in the right upper lobe. A CT chest   with contrast is recommended for further evaluation.

## 2023-11-25 NOTE — H&P ADULT - PROBLEM SELECTOR PLAN 2
S/p ativan and keppra load.  -Start keppra 750 IV q12h.  -EEG ordered.  -Stroke w/u as above. Seizure precautions.   -Given concern for seizure, advise patient to not drive, operate heavy machinery, avoid heights, pools, bathtubs, locked doors until cleared by further follow up outpatient by neurology.

## 2023-11-25 NOTE — ED PROVIDER NOTE - PROGRESS NOTE DETAILS
natan burns DO:  As per neuro, P2 occlusion noted, no interventions at this time. Rightward gaze concern for seizures, rec administer keppra 2grams and 1mg ativan in ED. Upon admission will most likely repeat CT on sunday Patient desaturated to 84%, placed on 2L NC now satting 100%.    Evette Helm MD, PGY3

## 2023-11-25 NOTE — H&P ADULT - NSHPPHYSICALEXAM_GEN_ALL_CORE
Vital Signs Last 24 Hrs  T(C): 36.3 (25 Nov 2023 11:33), Max: 37.3 (25 Nov 2023 00:50)  T(F): 97.4 (25 Nov 2023 11:33), Max: 99.2 (25 Nov 2023 00:50)  HR: 67 (25 Nov 2023 11:33) (67 - 81)  BP: 129/81 (25 Nov 2023 11:33) (125/81 - 152/80)  BP(mean): --  RR: 17 (25 Nov 2023 11:33) (17 - 18)  SpO2: 99% (25 Nov 2023 11:33) (98% - 100%)    Parameters below as of 25 Nov 2023 11:33  Patient On (Oxygen Delivery Method): nasal cannula  O2 Flow (L/min): 2      Gen- In bed, comfortable, NAD  Eyes- EOMI, PERRLA, nonicteric.  EMNT- Fair dentition. MMM. No tonsilar exudates. No posterior pharynx erythema.  Neck- Supple. No masses. No tracheal deviation.  Resp- CTAB, good effort. No r/r/w. No accessory muscle use.  CVS- RRR, S1S2, no g/r/m. No LE edema.  GI- Soft abd, NT, ND, +BSx4. No HSM.  MSK- No C/C. ROM intact. No crepitus.  Neuro- Moves all extremities. Face symmetric. Limited speech (which per dtr in law - is around her baseline).  Skin- No rashes/ulcers. Warm.  Psych- Appropriate mood/affect.

## 2023-11-25 NOTE — PROVIDER CONTACT NOTE (OTHER) - ASSESSMENT
Patient is AOx2, denies pain or discomfort. Patient is bradycardic to 40s while asleep
Patient is not responding while assessing neuro status, patient not responding to name or opening her eyes
Patient is opening eyes but still not responding to commands and still lethargic. VSS.

## 2023-11-25 NOTE — ED ADULT NURSE NOTE - NSFALLHARMRISKINTERV_ED_ALL_ED
Assistance OOB with selected safe patient handling equipment if applicable/Assistance with ambulation/Communicate risk of Fall with Harm to all staff, patient, and family/Monitor gait and stability/Provide patient with walking aids/Provide visual cue: red socks, yellow wristband, yellow gown, etc/Reinforce activity limits and safety measures with patient and family/Bed in lowest position, wheels locked, appropriate side rails in place/Call bell, personal items and telephone in reach/Instruct patient to call for assistance before getting out of bed/chair/stretcher/Non-slip footwear applied when patient is off stretcher/[place] to call system/Physically safe environment - no spills, clutter or unnecessary equipment/Purposeful Proactive Rounding/Room/bathroom lighting operational, light cord in reach Assistance OOB with selected safe patient handling equipment if applicable/Assistance with ambulation/Communicate risk of Fall with Harm to all staff, patient, and family/Monitor gait and stability/Provide patient with walking aids/Provide visual cue: red socks, yellow wristband, yellow gown, etc/Reinforce activity limits and safety measures with patient and family/Bed in lowest position, wheels locked, appropriate side rails in place/Call bell, personal items and telephone in reach/Instruct patient to call for assistance before getting out of bed/chair/stretcher/Non-slip footwear applied when patient is off stretcher/[place] to call system/Physically safe environment - no spills, clutter or unnecessary equipment/Purposeful Proactive Rounding/Room/bathroom lighting operational, light cord in reach Assistance OOB with selected safe patient handling equipment if applicable/Assistance with ambulation/Communicate risk of Fall with Harm to all staff, patient, and family/Monitor gait and stability/Provide patient with walking aids/Provide visual cue: red socks, yellow wristband, yellow gown, etc/Reinforce activity limits and safety measures with patient and family/Bed in lowest position, wheels locked, appropriate side rails in place/Call bell, personal items and telephone in reach/Instruct patient to call for assistance before getting out of bed/chair/stretcher/Non-slip footwear applied when patient is off stretcher/[place] to call system/Physically safe environment - no spills, clutter or unnecessary equipment/Purposeful Proactive Rounding/Room/bathroom lighting operational, light cord in reach

## 2023-11-25 NOTE — ED PROVIDER NOTE - ATTENDING CONTRIBUTION TO CARE
69 y/o F with h/o AF on eliquis, R MCA stroke (2013), L MCA Stroke (2019) with residual right sided hemiparesis and dysarthria, HTN, hyperlipidemia, DM2, mitral stenosis/rheumatic valvular disease BIB EMS for right gaze deviation.  Pt last known normal around 10pm.  Her son came to check on her around 11:30pm and she was noted to be preferentially looking to the right and not responding normally.  Pt normally alert and oriented x 3, but tonight noted to be responding slower and slightly confused.  No fever, ha, cp, sob, abd pain, weakness, fall or injury.  Son notes pt with 2 weeks of cough, but no other recent illness.    Pt lying comfortably in stretcher, awake and alert, nontoxic.  AF/VSS.  NCAT PERRL R gaze preference.  Neck supple.  Lungs cta bl.  Cards nl S1/S2, RRR, no MRG.  Abd soft ntnd.  Awake, responding appropriately in primary language, responding to questions and follows commands.  Strength grossly normal throughout, gait deferred, CN II-XII intact.    Stroke code called in triage - neuro at bedside, plan for labs, ct/cta/ctp, ekg, likely admit. 67 y/o F with h/o AF on eliquis, R MCA stroke (2013), L MCA Stroke (2019) with residual right sided hemiparesis and dysarthria, HTN, hyperlipidemia, DM2, mitral stenosis/rheumatic valvular disease BIB EMS for right gaze deviation.  Pt last known normal around 10pm.  Her son came to check on her around 11:30pm and she was noted to be preferentially looking to the right and not responding normally.  Pt normally alert and oriented x 3, but tonight noted to be responding slower and slightly confused.  No fever, ha, cp, sob, abd pain, weakness, fall or injury.  Son notes pt with 2 weeks of cough, but no other recent illness.    Pt lying comfortably in stretcher, awake and alert, nontoxic.  AF/VSS.  NCAT PERRL R gaze preference.  Neck supple.  Lungs cta bl.  Cards nl S1/S2, RRR, no MRG.  Abd soft ntnd.  Awake, responding appropriately in primary language, responding to questions and follows commands.  Strength grossly normal throughout, gait deferred, CN II-XII intact.    Stroke code called in triage - neuro at bedside, plan for labs, ct/cta/ctp, ekg, likely admit.

## 2023-11-25 NOTE — PATIENT PROFILE ADULT - MONEY FOR FOOD
----- Message from Demetrio Junior MD sent at 3/13/2020 11:13 AM CDT -----  Please notify the patient about the abnormal laboratory results. .  Most of your lab work was normal.  The main problem that I see is you are suffering from an overactive thyroid. chemistry profile with normal electrolytes liver and kidney function, CBC was normal, I will send you to Endocrinology.
L/M for pt with test results and referral to endo.
no

## 2023-11-25 NOTE — PHYSICAL THERAPY INITIAL EVALUATION ADULT - PERTINENT HX OF CURRENT PROBLEM, REHAB EVAL
Pt is a 68 year old female presenting for right gaze preference and aphasia. CT angio neck significant for abrupt occlusion of the posterior P2 segment of the right PCA without reconstitution. CT perfusion revealed small volume ischemic penumbra in the right parieto-occipital lobe corresponding to the PCA territory.

## 2023-11-25 NOTE — H&P ADULT - PROBLEM SELECTOR PLAN 4
Stable.  Rate controlled, slow AFib.  -Hold metoprolol for now to avoid hypotension and worsening tulio.  -Hold Eliquis as per neuro pending additional neuro imaging. Reportedly drowsy - likely 2' AED/BZD she received.  -On my assessment - mentation much improve - awake/alert. Passed dysphagia screen.  -Monitor for now.

## 2023-11-25 NOTE — ED ADULT TRIAGE NOTE - CHIEF COMPLAINT QUOTE
r/o cva    pt has hx of cva with right sided weakness. was in [facility] at 10:30pm when pt was fed by son.  at 11:30pm, son noted pt had right sided gaze and general weakness and numbness. son states mother is normally very alert and descriptive but vague at this time.  speaks only kimber[place]- son translating.  eval by dr. denson.. stroke code called

## 2023-11-25 NOTE — H&P ADULT - PROBLEM SELECTOR PLAN 7
DVT ppx- SCD    Dispo- Stroke w/u.     Comm- Spoke w/ dtr in [facility] at bedside. DVT ppx- SCD    Dispo- Stroke w/u.     Comm- Spoke w/ dtr in [facility] at bedside. DVT ppx- SCD    Dispo- Stroke w/u.     Comm- Spoke w/ dtr in [facility] at bedside. Holding metoprolol as discussed above.

## 2023-11-25 NOTE — PHYSICAL THERAPY INITIAL EVALUATION ADULT - IMPAIRMENTS FOUND, PT EVAL
aerobic capacity/endurance/decreased midline orientation/ergonomics and body mechanics/gait, locomotion, and balance/gross motor/muscle strength/posture/ROM/tone

## 2023-11-25 NOTE — PROVIDER CONTACT NOTE (OTHER) - ACTION/TREATMENT ORDERED:
12 lead EKG ordered
Provider made aware. She stated that someone will come assess the patient shortly.
Provider stated that she was too busy to come and see the patient and that she was waiting for attending to put in H&P. The ADN, hospitalist and Clinical impact nurse were made aware of situation.

## 2023-11-25 NOTE — ED PROVIDER NOTE - CLINICAL SUMMARY MEDICAL DECISION MAKING FREE TEXT BOX
67 yo F PMHx of Afib on Eliquis, prior CVAs with R sided deficits, HTN, HLD, DM2, mitral stenosis,rheumatic valvulvar disease presents with R sided gaze deviation, dysarthria - exam as described - patient was a code stroke, not a candidate for TPA given eliquis, will eval for LVO with CTs, neuro cs. 69 yo F PMHx of Afib on Eliquis, prior CVAs with R sided deficits, HTN, HLD, DM2, mitral stenosis,rheumatic valvulvar disease presents with R sided gaze deviation, dysarthria - exam as described - patient was a code stroke, not a candidate for TPA given eliquis, will eval for LVO with CTs, neuro cs.

## 2023-11-25 NOTE — H&P ADULT - PROBLEM SELECTOR PLAN 5
Stable.   -Check A1c.  -Correctional insulin TIDACHS.  -Titrate PRN. Stable.  Rate controlled, slow AFib.  -Tulio down to the 30s-40s when asleep. On reassessment while awake - now in the 50s.   -Hold metoprolol for now to avoid hypotension and worsening tulio.  -Hold Eliquis as per neuro pending additional neuro imaging.  -Cont telemetry.

## 2023-11-25 NOTE — H&P ADULT - PROBLEM SELECTOR PLAN 8
DVT ppx- SCD    Dispo- Stroke w/u.     Comm- Spoke w/ dtr in [facility] at bedside. DVT ppx- SCD    Dispo- Stroke w/u.     Comm- Spoke w/ dtr in [facility] at bedside. DVT ppx- SCD    Dispo- Stroke w/u.     Comm- Spoke w/ dtr in [facility] at bedside.

## 2023-11-25 NOTE — CHART NOTE - NSCHARTNOTEFT_GEN_A_CORE
Upon start of shift and reviewing of patient chart expedited CTH.     CTH results: IMPRESSION: Evolving acute/subacute infarcts within the right paramedian   occipital lobe as well as the right cerebellar hemisphere. No hemorrhagic   transformation.    The results above discussed with Neurology resident. Recommends resuming Eliquis as long as Hgb stable in in AM. MRI head recommended. Will advise day team of plan and continue to monitor the patient. Upon start of shift and reviewing of patient chart expedited CTH.     CTH results: IMPRESSION: Evolving acute/subacute infarcts within the right paramedian   occipital lobe as well as the right cerebellar hemisphere. No hemorrhagic   transformation.    The results above discussed with Neurology resident. Recommends resuming Eliquis as long as Hgb stable in in AM. MRI head likely to be recommended. Will advise day team of plan and continue to monitor the patient.

## 2023-11-25 NOTE — PATIENT PROFILE ADULT - FALL HARM RISK - HARM RISK INTERVENTIONS
Assistance with ambulation/Assistance OOB with selected safe patient handling equipment/Communicate Risk of Fall with Harm to all staff/Discuss with provider need for PT consult/Monitor gait and stability/Provide patient with walking aids - walker, cane, crutches/Reinforce activity limits and safety measures with patient and family/Tailored Fall Risk Interventions/Visual Cue: Yellow wristband and red socks/Bed in lowest position, wheels locked, appropriate side rails in place/Call bell, personal items and telephone in reach/Instruct patient to call for assistance before getting out of bed or chair/Non-slip footwear when patient is out of bed/[place] to call system/Physically safe environment - no spills, clutter or unnecessary equipment/Purposeful Proactive Rounding/Room/bathroom lighting operational, light cord in reach Assistance with ambulation/Assistance OOB with selected safe patient handling equipment/Communicate Risk of Fall with Harm to all staff/Discuss with provider need for PT consult/Monitor gait and stability/Provide patient with walking aids - walker, cane, crutches/Reinforce activity limits and safety measures with patient and family/Tailored Fall Risk Interventions/Visual Cue: Yellow wristband and red socks/Bed in lowest position, wheels locked, appropriate side rails in place/Call bell, personal items and telephone in reach/Instruct patient to call for assistance before getting out of bed or chair/Non-slip footwear when patient is out of bed/[place] to call system/Physically safe environment - no spills, clutter or unnecessary equipment/Purposeful Proactive Rounding/Room/bathroom lighting operational, light cord in reach Assistance with ambulation/Assistance OOB with selected safe patient handling equipment/Communicate Risk of Fall with Harm to all staff/Discuss with provider need for PT consult/Monitor gait and stability/Provide patient with walking aids - walker, cane, crutches/Reinforce activity limits and safety measures with patient and family/Tailored Fall Risk Interventions/Visual Cue: Yellow wristband and red socks/Bed in lowest position, wheels locked, appropriate side rails in place/Call bell, personal items and telephone in reach/Instruct patient to call for assistance before getting out of bed or chair/Non-slip footwear when patient is out of bed/[place] to call system/Physically safe environment - no spills, clutter or unnecessary equipment/Purposeful Proactive Rounding/Room/bathroom lighting operational, light cord in reach

## 2023-11-25 NOTE — CONSULT NOTE ADULT - ASSESSMENT
68y (1955) RH Nadine speaking woman with afib on eliquis, R MCA stroke 2013, L MCA Stroke 2019 with residual right hemiparesis and dysarthria, HTN, HLD, DM2, mitral stenosis/rheumatic valvular disease presenting as code stroke for right gaze preference and aphasia.  Son at bedside providing translation and collateral history reports 10 pm 11/24 patient went to bed at baseline. She woke up at 11:15 pm 11/24 with right gaze preference and was repeating phrases. Denies previous history of seizures, no tongue biting or incontinence or shaking of extremities. At baseline, patient ambulates with walker with assistance of family and requires assistance with most ADLs. On exam ,Right gaze preference       Not Tenecteplase candidate due to Eliquis use and abnormal coags    68y (1955) RH Nadine speaking woman with afib on eliquis, R MCA stroke 2013, L MCA Stroke 2019 with residual right hemiparesis and dysarthria, HTN, HLD, DM2, mitral stenosis/rheumatic valvular disease presenting as code stroke for right gaze preference and aphasia.  Son at bedside providing translation and collateral history reports 10 pm 11/24 patient went to bed at baseline. She woke up at 11:15 pm 11/24 with right gaze preference and was repeating phrases. Denies previous history of seizures, no tongue biting or incontinence or shaking of extremities. At baseline, patient ambulates with walker with assistance of family and requires assistance with most ADLs. On exam ,Right gaze preference. CTH no acute findings, CTA Abrupt occlusion of the posterior P2 segment of the right PCA.     Not Tenecteplase candidate due to Eliquis use and abnormal coags   Not thrombectomy candidate due occlusion at distal P2     Impression: Right gaze deviation and global aphasia possibly due to seizure vs acute ischemic stroke     Recommendation:    Imaging/Labs  [] MRI brain w/o   [] If MRI B not done by 11/26, then repeat CTH   [] TTE if MRI B shows stroke   [] HbA1C and Lipid Panel    Meds  [] 1 mg ativan IVP now  [] Keppra load 2 g x 1 IVPB  [] Start maintenance Keppra 750 mg BID IV after 12 hours from load  [] Hold eliquis for now, time to restart depends on repeat imaging on 11/26 to look for large stroke or bleed  [] Atorvastatin 80MG QHS, titrate to LDL<70    Other  [] Telemonitoring; Neurochecks and vital signs per unit protocol, Q4H  [] Permissive HTN up to 220/120 mmHg for first 24 hours after symptom onset followed by gradual normotension.   [] BG goal <180, avoid hypoglycemia  [] NPO until clears dysphagia screen, otherwise swallow evaluation  [] Fall, aspiration, seizure precautions  [] PT/OT eval  [] Given concern for seizure, advise patient to not drive, operate heavy machinery, avoid heights, pools, bathtubs, locked doors until cleared by further follow up outpatient by neurology.    Discussed with Dr. Baptiste stroke attending.        68y (1955) RH Nadine speaking woman with afib on eliquis, R MCA stroke 2013, L MCA Stroke 2019 with residual right hemiparesis and dysarthria, HTN, HLD, DM2, mitral stenosis/rheumatic valvular disease presenting as code stroke for right gaze preference and aphasia.  Son at bedside providing translation and collateral history reports 10 pm 11/24 patient went to bed at baseline. She woke up at 11:15 pm 11/24 with right gaze preference and was repeating phrases. Denies previous history of seizures, no tongue biting or incontinence or shaking of extremities. At baseline, patient ambulates with walker with assistance of family and requires assistance with most ADLs. On exam ,Right gaze preference. CTH no acute findings, CTA Abrupt occlusion of the posterior P2 segment of the right PCA.     Not Tenecteplase candidate due to Eliquis use and abnormal coags   Not thrombectomy candidate due occlusion at distal P2     Impression: Right gaze deviation and global aphasia possibly due to acute ischemic stroke  vs seizure     Recommendation:    Imaging/Labs  [] MRI brain w/o   [] If MRI B not done by 11/26, then repeat CTH   [] TTE if MRI B shows stroke   [] HbA1C and Lipid Panel    Meds  [] 1 mg ativan IVP now  [] Keppra load 2 g x 1 IVPB now  [] Start maintenance Keppra 750 mg BID IV after 12 hours from load  [] Hold eliquis for now, time to restart depends on repeat imaging on 11/26 to look for large stroke or bleed  [] Atorvastatin 80MG QHS, titrate to LDL<70    Other  [] Telemonitoring; Neurochecks and vital signs per unit protocol, Q4H  [] Permissive HTN up to 220/120 mmHg for first 24 hours after symptom onset followed by gradual normotension.   [] BG goal <180, avoid hypoglycemia  [] NPO until clears dysphagia screen, otherwise swallow evaluation  [] Fall, aspiration, seizure precautions  [] PT/OT eval  [] Given concern for seizure, advise patient to not drive, operate heavy machinery, avoid heights, pools, bathtubs, locked doors until cleared by further follow up outpatient by neurology.    Discussed with Dr. Baptiste stroke attending.        68y (1955) RH Nadine speaking woman with afib on eliquis, R MCA stroke 2013, L MCA Stroke 2019 with residual right hemiparesis and dysarthria, HTN, HLD, DM2, mitral stenosis/rheumatic valvular disease presenting as code stroke for right gaze preference and aphasia.  Son at bedside providing translation and collateral history reports 10 pm 11/24 patient went to bed at baseline. She woke up at 11:15 pm 11/24 with right gaze preference and was repeating phrases. Denies previous history of seizures, no tongue biting or incontinence or shaking of extremities. At baseline, patient ambulates with walker with assistance of family and requires assistance with most ADLs. On exam ,Right gaze preference. CTH no acute findings, CTA Abrupt occlusion of the posterior P2 segment of the right PCA.     Not Tenecteplase candidate due to Eliquis use and abnormal coags   Not thrombectomy candidate due occlusion at distal P2     Impression: Right gaze deviation and global aphasia possibly due to acute ischemic stroke  vs seizure     Recommendation:    Imaging/Labs  [] MRI brain w/o   [] If MRI B not done by 11/26, then repeat CTH   [] vEEG  [] TTE if MRI B shows stroke   [] HbA1C and Lipid Panel    Meds  [] 1 mg ativan IVP now  [] Keppra load 2 g x 1 IVPB now  [] Start maintenance Keppra 750 mg BID IV after 12 hours from load  [] Hold eliquis for now, time to restart depends on repeat imaging on 11/26 to look for large stroke or bleed  [] Atorvastatin 80MG QHS, titrate to LDL<70    Other  [] Telemonitoring; Neurochecks and vital signs per unit protocol, Q4H  [] Permissive HTN up to 220/120 mmHg for first 24 hours after symptom onset followed by gradual normotension.   [] BG goal <180, avoid hypoglycemia  [] NPO until clears dysphagia screen, otherwise swallow evaluation  [] Fall, aspiration, seizure precautions  [] PT/OT eval  [] Given concern for seizure, advise patient to not drive, operate heavy machinery, avoid heights, pools, bathtubs, locked doors until cleared by further follow up outpatient by neurology.    Discussed with Dr. Baptiste stroke attending.        68y (1955) RH Nadine speaking woman with afib on eliquis, R MCA stroke 2013, L MCA Stroke 2019 with residual right hemiparesis and dysarthria, HTN, HLD, DM2, mitral stenosis/rheumatic valvular disease presenting as code stroke for right gaze preference and aphasia.  Son at bedside providing translation and collateral history reports 10 pm 11/24 patient went to bed at baseline. She woke up at 11:15 pm 11/24 with right gaze preference and was repeating phrases. Denies previous history of seizures, no tongue biting or incontinence or shaking of extremities. At baseline, patient ambulates with walker with assistance of family and requires assistance with most ADLs. On exam ,Right gaze preference. CTH no acute findings, CTA Abrupt occlusion of the posterior P2 segment of the right PCA.     Not Tenecteplase candidate due to Eliquis use and abnormal coags   Not thrombectomy candidate due occlusion at distal P2     Impression: Right gaze deviation and global aphasia possibly due to acute ischemic stroke  vs seizure     Recommendation:    Imaging/Labs  [] MRI brain w/o   [] If MRI B not done by 11/26, then repeat CTH   [] vEEG  [] TTE if MRI B shows stroke   [] HbA1C and Lipid Panel    Meds  [] 1 mg ativan IVP now  [] Keppra load 2 g x 1 IVPB now  [] Start maintenance Keppra 750 mg BID IV after 12 hours from load  [] Continue eliquis   [] Atorvastatin 80MG QHS, titrate to LDL<70    Other  [] Telemonitoring; Neurochecks and vital signs per unit protocol, Q4H  [] Permissive HTN up to 220/120 mmHg for first 24 hours after symptom onset followed by gradual normotension.   [] BG goal <180, avoid hypoglycemia  [] NPO until clears dysphagia screen, otherwise swallow evaluation  [] Fall, aspiration, seizure precautions  [] PT/OT eval  [] Given concern for seizure, advise patient to not drive, operate heavy machinery, avoid heights, pools, bathtubs, locked doors until cleared by further follow up outpatient by neurology.    Discussed with Dr. Baptiste stroke attending.

## 2023-11-26 NOTE — CHART NOTE - NSCHARTNOTEFT_GEN_A_CORE
MRI safety sheet filled out with patient's son at bedside and placed in chart.    Spoke w/ patient's son, Myles, at bedside and updated with current plan of care. All other medical questions were answered to the best of my ability and teach-back offered with demonstrated understanding.

## 2023-11-26 NOTE — PROGRESS NOTE ADULT - PROBLEM SELECTOR PLAN 4
Reportedly drowsy - likely 2' AED/BZD she received.  -On my assessment - mentation much improve - awake/alert. Passed dysphagia screen.  -Monitor for now. Incidental finding of RML collapsed and scattered micronodules measuring up to 8 mm in the right upper lobe.   Additional tiny cavitary micronodule in the right upper lobe.   -Risk factor for TB reviewed - born in [place]. [place] in [place]. No personal hx of TB. No known exposure.  -Airborne isolation  -Quantiferon gold.  -Sputum AFB x3 (8h apart) - ordered.  -CT Chest w/ contrast ordered to further characterize area of concern, - noted Segmental atelectasis of the lateral segment of the right middle lobe.Mild interlobular septal thickening, left greater than right, may be seen   with asymmetric edema. Increased cluster of mediastinal lymph nodes, the largest measuring 1.1   cm in the right paratracheal station.  Per son pt had a viral URI recently  -No indication for abx at this point - will watch off abx. Pt may need repeat imaging in 4 weeks  to ensure resolution

## 2023-11-26 NOTE — PROGRESS NOTE ADULT - PROBLEM SELECTOR PLAN 2
S/p ativan and keppra load.  -Start keppra 750 IV q12h.  -EEG ordered.  -Stroke w/u as above. Seizure precautions.   -Given concern for seizure, advise patient to not drive, operate heavy machinery, avoid heights, pools, bathtubs, locked doors until cleared by further follow up outpatient by neurology. Ipt noted on ct chest to have Intracardiac thrombus seen in the left atrial appendage measuring 1.5 cm.  Cardiomegaly with biatrial enlargement.   will check TTE   Eliquis resumed   will request cardiology eval

## 2023-11-26 NOTE — PROGRESS NOTE ADULT - PROBLEM SELECTOR PLAN 6
Stable.   -Check A1c.  -Correctional insulin TIDACHS.  -Titrate PRN. Stable.  Rate controlled, slow AFib.  -per H&P, Tulio down to the 30s-40s when asleep. HR 63-77   -Hold metoprolol for now to avoid hypotension and worsening tulio.  - will resume Eliquis   -Cont telemetry.

## 2023-11-26 NOTE — CHART NOTE - NSCHARTNOTEFT_GEN_A_CORE
Restarted home Eliquis per discussion with neurology. Restarted home Eliquis per discussion with neurology.    Provider called EEG technician to expedite EEG. Per tech, will likely start EEG tomorrow, however, will attempt to expedite today if availability allows.

## 2023-11-26 NOTE — PROGRESS NOTE ADULT - PROBLEM SELECTOR PLAN 8
DVT ppx- SCD    Dispo- Stroke w/u.     Comm- Spoke w/ dtr in [facility] at bedside. DVT ppx- SCD    Dispo- Stroke w/u.     Comm- Spoke w/ dtr in [facility] at bedside. DVT ppx- SCD    Dispo- Stroke w/u.     Comm- Spoke w/ dtr in [facility] at bedside. Holding metoprolol as discussed above.

## 2023-11-26 NOTE — PROGRESS NOTE ADULT - PROBLEM SELECTOR PLAN 7
Holding metoprolol as discussed above. Stable.   - A1c.-7.9   -Correctional insulin TIDACHS.  -Titrate PRN.

## 2023-11-26 NOTE — PROGRESS NOTE ADULT - SUBJECTIVE AND OBJECTIVE BOX
Patient is a 68y old  Female who presents with a chief complaint of R/o CVA (25 Nov 2023 14:05)      SUBJECTIVE / OVERNIGHT EVENTS:    MEDICATIONS  (STANDING):  atorvastatin 80 milliGRAM(s) Oral at bedtime  dextrose 5%. 1000 milliLiter(s) (50 mL/Hr) IV Continuous <Continuous>  dextrose 5%. 1000 milliLiter(s) (100 mL/Hr) IV Continuous <Continuous>  dextrose 50% Injectable 25 Gram(s) IV Push once  dextrose 50% Injectable 25 Gram(s) IV Push once  dextrose 50% Injectable 12.5 Gram(s) IV Push once  glucagon  Injectable 1 milliGRAM(s) IntraMuscular once  insulin lispro (ADMELOG) corrective regimen sliding scale   SubCutaneous every 6 hours  levETIRAcetam  IVPB 750 milliGRAM(s) IV Intermittent every 12 hours  mirtazapine 15 milliGRAM(s) Oral at bedtime    MEDICATIONS  (PRN):  dextrose Oral Gel 15 Gram(s) Oral once PRN Blood Glucose LESS THAN 70 milliGRAM(s)/deciliter      Vital Signs Last 24 Hrs  T(C): 37 (26 Nov 2023 09:51), Max: 37 (26 Nov 2023 09:51)  T(F): 98.6 (26 Nov 2023 09:51), Max: 98.6 (26 Nov 2023 09:51)  HR: 77 (26 Nov 2023 09:51) (63 - 77)  BP: 140/77 (26 Nov 2023 09:51) (127/71 - 147/53)  BP(mean): --  RR: 18 (26 Nov 2023 09:51) (17 - 18)  SpO2: 99% (26 Nov 2023 09:51) (97% - 100%)    Parameters below as of 26 Nov 2023 09:51  Patient On (Oxygen Delivery Method): nasal cannula  O2 Flow (L/min): 2    CAPILLARY BLOOD GLUCOSE      POCT Blood Glucose.: 139 mg/dL (26 Nov 2023 07:56)  POCT Blood Glucose.: 147 mg/dL (26 Nov 2023 07:35)  POCT Blood Glucose.: 203 mg/dL (26 Nov 2023 00:52)  POCT Blood Glucose.: 138 mg/dL (25 Nov 2023 21:48)  POCT Blood Glucose.: 129 mg/dL (25 Nov 2023 16:27)  POCT Blood Glucose.: 146 mg/dL (25 Nov 2023 12:25)    I&O's Summary    25 Nov 2023 07:01  -  26 Nov 2023 07:00  --------------------------------------------------------  IN: 0 mL / OUT: 800 mL / NET: -800 mL    26 Nov 2023 07:01  -  26 Nov 2023 10:30  --------------------------------------------------------  IN: 150 mL / OUT: 300 mL / NET: -150 mL        PHYSICAL EXAM:  GENERAL: NAD, well-developed  HEAD:  Atraumatic, Normocephalic  EYES: EOMI, PERRLA, conjunctiva and sclera clear  NECK: Supple, No JVD  CHEST/LUNG: Clear to auscultation bilaterally; No wheeze  HEART: Regular rate and rhythm; No murmurs, rubs, or gallops  ABDOMEN: Soft, Nontender, Nondistended; Bowel sounds present  EXTREMITIES:  2+ Peripheral Pulses, No clubbing, cyanosis, or edema  PSYCH: AAOx3  NEUROLOGY: non-focal  SKIN: No rashes or lesions    LABS:                        9.7    15.33 )-----------( 485      ( 25 Nov 2023 01:10 )             30.5     11-26    140  |  102  |  11  ----------------------------<  137<H>  3.9   |  29  |  0.78    Ca    9.3      26 Nov 2023 06:17    TPro  7.2  /  Alb  3.8  /  TBili  0.4  /  DBili  x   /  AST  16  /  ALT  32  /  AlkPhos  64  11-25    PT/INR - ( 25 Nov 2023 01:10 )   PT: 17.5 sec;   INR: 1.57 ratio         PTT - ( 25 Nov 2023 01:10 )  PTT:40.7 sec      Urinalysis Basic - ( 26 Nov 2023 06:17 )    Color: x / Appearance: x / SG: x / pH: x  Gluc: 137 mg/dL / Ketone: x  / Bili: x / Urobili: x   Blood: x / Protein: x / Nitrite: x   Leuk Esterase: x / RBC: x / WBC x   Sq Epi: x / Non Sq Epi: x / Bacteria: x        RADIOLOGY & ADDITIONAL TESTS:    Imaging Personally Reviewed:    Consultant(s) Notes Reviewed:      Care Discussed with Consultants/Other Providers:   Patient is a 68y old  Female who presents with a chief complaint of R/o CVA (25 Nov 2023 14:05)      SUBJECTIVE / OVERNIGHT EVENTS: patient seen and examined by bedside, pt reports feeling better, per son gaze has improved and speech also at baseline,     MEDICATIONS  (STANDING):  atorvastatin 80 milliGRAM(s) Oral at bedtime  dextrose 5%. 1000 milliLiter(s) (50 mL/Hr) IV Continuous <Continuous>  dextrose 5%. 1000 milliLiter(s) (100 mL/Hr) IV Continuous <Continuous>  dextrose 50% Injectable 25 Gram(s) IV Push once  dextrose 50% Injectable 25 Gram(s) IV Push once  dextrose 50% Injectable 12.5 Gram(s) IV Push once  glucagon  Injectable 1 milliGRAM(s) IntraMuscular once  insulin lispro (ADMELOG) corrective regimen sliding scale   SubCutaneous every 6 hours  levETIRAcetam  IVPB 750 milliGRAM(s) IV Intermittent every 12 hours  mirtazapine 15 milliGRAM(s) Oral at bedtime    MEDICATIONS  (PRN):  dextrose Oral Gel 15 Gram(s) Oral once PRN Blood Glucose LESS THAN 70 milliGRAM(s)/deciliter      Vital Signs Last 24 Hrs  T(C): 37 (26 Nov 2023 09:51), Max: 37 (26 Nov 2023 09:51)  T(F): 98.6 (26 Nov 2023 09:51), Max: 98.6 (26 Nov 2023 09:51)  HR: 77 (26 Nov 2023 09:51) (63 - 77)  BP: 140/77 (26 Nov 2023 09:51) (127/71 - 147/53)  BP(mean): --  RR: 18 (26 Nov 2023 09:51) (17 - 18)  SpO2: 99% (26 Nov 2023 09:51) (97% - 100%)    Parameters below as of 26 Nov 2023 09:51  Patient On (Oxygen Delivery Method): nasal cannula  O2 Flow (L/min): 2    CAPILLARY BLOOD GLUCOSE      POCT Blood Glucose.: 139 mg/dL (26 Nov 2023 07:56)  POCT Blood Glucose.: 147 mg/dL (26 Nov 2023 07:35)  POCT Blood Glucose.: 203 mg/dL (26 Nov 2023 00:52)  POCT Blood Glucose.: 138 mg/dL (25 Nov 2023 21:48)  POCT Blood Glucose.: 129 mg/dL (25 Nov 2023 16:27)  POCT Blood Glucose.: 146 mg/dL (25 Nov 2023 12:25)    I&O's Summary    25 Nov 2023 07:01  -  26 Nov 2023 07:00  --------------------------------------------------------  IN: 0 mL / OUT: 800 mL / NET: -800 mL    26 Nov 2023 07:01  -  26 Nov 2023 10:30  --------------------------------------------------------  IN: 150 mL / OUT: 300 mL / NET: -150 mL        PHYSICAL EXAM:  GENERAL: NAD,   HEAD:  Atraumatic, Normocephalic  EYES: EOMI, PERRLA, conjunctiva and sclera clear  NECK: Supple, No JVD  CHEST/LUNG: Clear to auscultation bilaterally; No wheeze  HEART: Regular rate and rhythm; No murmurs, rubs, or gallops  ABDOMEN: Soft, Nontender, Nondistended; Bowel sounds present  EXTREMITIES:  2+ Peripheral Pulses, No clubbing, cyanosis, or edema  NEUROLOGY: moving all extremities , per son pt has rt sided weakness from previous stroke, pt answering questions appropriately   SKIN: No rashes or lesions    LABS:                        9.7    15.33 )-----------( 485      ( 25 Nov 2023 01:10 )             30.5     11-26    140  |  102  |  11  ----------------------------<  137<H>  3.9   |  29  |  0.78    Ca    9.3      26 Nov 2023 06:17    TPro  7.2  /  Alb  3.8  /  TBili  0.4  /  DBili  x   /  AST  16  /  ALT  32  /  AlkPhos  64  11-25    PT/INR - ( 25 Nov 2023 01:10 )   PT: 17.5 sec;   INR: 1.57 ratio         PTT - ( 25 Nov 2023 01:10 )  PTT:40.7 sec      Urinalysis Basic - ( 26 Nov 2023 06:17 )    Color: x / Appearance: x / SG: x / pH: x  Gluc: 137 mg/dL / Ketone: x  / Bili: x / Urobili: x   Blood: x / Protein: x / Nitrite: x   Leuk Esterase: x / RBC: x / WBC x   Sq Epi: x / Non Sq Epi: x / Bacteria: x        RADIOLOGY & ADDITIONAL TESTS:  < from: CT Chest w/ IV Cont (11.25.23 @ 23:14) >  FINDINGS:    LUNGS AND AIRWAYS: Patent central airways. Increased respiratory motion.   There is segmental atelectasis of the lateral segment of the right middle   lobe. No cavitary lesion identified. Scattered right upper lobe   micronodules. There is mild interlobular septal thickening, left greater   than right. There is mosaic attention, most prominent in the left upper   lobe, likely accentuated by poor inspiration.  PLEURA: Trace right pleural effusion.  MEDIASTINUM AND SREE: Increased cluster mediastinal lymph nodes, the   largest measuring 1.1 cm in the right paratracheal station.  VESSELS: Vascular calcifications of the coronary arteries and aorta.  HEART: The heart is enlarged. There is biatrial enlargement and dilation.   There is a thrombus seen in the left atrial appendage measuring 1.5 cm.    No pericardial effusion. Calcified mitral valve.  CHEST WALL AND LOWER NECK: Within normal limits.  VISUALIZED UPPER ABDOMEN: Vicarious excretion of contrast in the   gallbladder lumen, secondary to prior contrast administered CT exam.   Bilateral renal cortical scarring and subcentimeter hypodensities too   small to characterize.  BONES: There is pectus excavatum with Dana index of 3.1.    IMPRESSION:  No cavitary lesion identified.  Intracardiac thrombus seen in the left atrial appendage measuring 1.5 cm.  Cardiomegaly with biatrial enlargement.  Scattered right upper lobe micronodules.  Segmental atelectasis of the lateral segment of the right middle lobe.  Mild interlobular septal thickening, left greater than right, may be seen   with asymmetric edema.  Increased cluster of mediastinal lymph nodes, the largest measuring 1.1   cm in the right paratracheal station.    < end of copied text >    Imaging Personally Reviewed:  < from: CT Head No Cont (11.25.23 @ 23:12) >  COMPARISON: Prior CT code stroke series dated 11/25/2023.    FINDINGS: Evolving acute/subacute infarct within the right paramedian   occipital lobe is seen with associated cytotoxic edema. No hemorrhagic   transformation is seen.    Additional acute/subacute wedge-shaped infarct within the right   cerebellar hemisphere is seen. No hemorrhagic aspiration is also seen in   this location.    Chronic bilateral frontal lobe infarctions appear unchanged. A chronic   left-sided KUSUM distribution infarct appears unchanged.    There is no acute intracranial hemorrhage, shift of the midline   structures, or herniation.    No evidence of obstructive hydrocephalus.    The paranasal sinuses and tympanomastoid cavities are clear. The   calvarium appears intact. There is evidence of bilateral cataract removal.    IMPRESSION: Evolving acute/subacute infarcts within the right paramedian   occipital lobe as well as the right cerebellar hemisphere. No hemorrhagic   transformation.    Multiple additional unchanged chronic findings, as discussed.    --- End of Report ---    < end of copied text >    Consultant(s) Notes Reviewed:      Care Discussed with Consultants/Other Providers:

## 2023-11-26 NOTE — PROGRESS NOTE ADULT - PROBLEM SELECTOR PLAN 5
Stable.  Rate controlled, slow AFib.  -Tulio down to the 30s-40s when asleep. On reassessment while awake - now in the 50s.   -Hold metoprolol for now to avoid hypotension and worsening tulio.  -Hold Eliquis as per neuro pending additional neuro imaging.  -Cont telemetry. Reportedly drowsy - likely 2' AED/BZD she received.  - mentation much improve - awake/alert. Passed dysphagia screen.  -Monitor for now.

## 2023-11-26 NOTE — PROGRESS NOTE ADULT - PROBLEM SELECTOR PLAN 3
Incidental finding of RML collapsed and scattered micronodules measuring up to 8 mm in the right upper lobe.   Additional tiny cavitary micronodule in the right upper lobe.   -Risk factor for TB reviewed - born in [place]. [place] in the [place]. No personal hx of TB. No known exposure.  -Airborne isolation  -Quantiferon gold.  -Sputum AFB x3 (8h apart) - ordered.  -CT Chest w/ contrast ordered to further characterize area of concern.  -No indication for abx at this point - will watch off abx. S/p ativan and keppra load.  -Start keppra 750 IV q12h.  -EEG ordered.  -Stroke w/u as above. Seizure precautions.   -Given concern for seizure, advise patient to not drive, operate heavy machinery, avoid heights, pools, bathtubs, locked doors until cleared by further follow up outpatient by neurology.

## 2023-11-26 NOTE — CHART NOTE - NSCHARTNOTEFT_GEN_A_CORE
Patient noted to have KRISTAN thrombus on CT chest. TTE ordered. Cardiology, Dr. Owusu, consulted. Appreciate recommendations.

## 2023-11-26 NOTE — PROGRESS NOTE ADULT - PROBLEM SELECTOR PLAN 1
-CT shows occlusion of R PCA P2 segment. Not TPA candidate.  -Permissive HTN <220/120 for first 24h. Gradual reduction.  -Passed dysphagia screen - diet ordered.  -PT/OT evaluation.   -Cont telemetry. Neuro checks.  -Spoke with neuro -> will repeat CTH now to assess for interval changes. Hold off MRI for now.  -A1c/lipid panel.  -Holding Eliquis as per neuro. -CT shows occlusion of R PCA P2 segment. Not TPA candidate.  -Permissive HTN <220/120 for first 24h. Gradual reduction.  -Passed dysphagia screen - diet ordered.  -PT/OT evaluation.   -Cont telemetry. Neuro checks.  -per discussion  with neuro ->  CTH repeated  assess for interval changes. noted with Evolving acute/subacute infarcts within the right paramedian occipital lobe as well as the right cerebellar hemisphere. No hemorrhagic   transformation.  case was discussed with Neuro by ACP, will resume Eliquis  -A1c/lipid panel. noted,HBA1C 7.9,LDL at goal -44

## 2023-11-27 NOTE — CONSULT NOTE ADULT - SUBJECTIVE AND OBJECTIVE BOX
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 Upon admission it was reported that she had a cough for 2 weeks PTA but her family today denies that.  She was admitted to medicine and found to have a R PCA occlusion, a KRISTAN thrombus, and seizures.  Thoracic surgery was consulted for a possible RUL cavitary lesion.  The family denies any TB exposure.      PAST MEDICAL & SURGICAL HISTORY:  Chronic Atrial Fibrillation  CVA 2013, 2019 with R-side deficits  HTN   DM 2  Mitral stenosis/ Rheumatic Valvular Disease  H/O hernia repair    REVIEW OF SYSTEMS (Per family at bedside)  General: No recent weight change, fatigue, HA, or Dizziness; Now very Lethargic since admission	  Skin/Breast: No Rashes or Lesions  Ophthalmologic: No Blurry vision	  ENMT: No Hearing loss  Respiratory and Thorax: No Cough, Wheezing, SOB, Hemoptysis, or Sputum production	  Cardiovascular: No Chest pain, Palpitations, or Diaphoresis	  Gastrointestinal: No Nausea, Vomiting, Constipation, or Appetite Change	  Genitourinary: No Hematuria or Dysuria	  Musculoskeletal: Usual R-sided weakness	  Neurological: h/o CVA but no known Seizures; R-side weakness	  Psychiatric: +Depression; No Dementia  Hematology/Lymphatics: No hx of bleeding or Edema	  Endocrine: +DM  Allergic/Immunologic: No Anaphylaxis or Recent illnesses    MEDICATIONS  ([facility]):  atorvastatin 80 milliGRAM(s) Oral at bedtime  enoxaparin Injectable 60 milliGRAM(s) SubCutaneous every 12 hours  insulin lispro (ADMELOG) corrective regimen sliding scale   SubCutaneous four times a day: before meals and at bedtime  levETIRAcetam  IVPB 750 milliGRAM(s) IV Intermittent every 12 hours  metoprolol tartrate 25 milliGRAM(s) Oral two times a day  mirtazapine 15 milliGRAM(s) Oral at bedtime  sodium chloride 3%  Inhalation 4 milliLiter(s) Inhalation every 8 hours    Medications (Home):  · 	Metoprolol Tartrate 25 mg oral tablet: Last Dose Taken:  , 1 tab(s) orally 2 times a day  · 	aspirin 81 mg oral delayed release capsule: Last Dose Taken:  , orally once a day  · 	metFORMIN 1000 mg oral tablet: Last Dose Taken:  , 1 tab(s) orally 2 times a day  · 	mirtazapine 15 mg oral tablet: Last Dose Taken:  , 1 tab(s) orally once a day (at bedtime)  · 	Eliquis 5 mg oral tablet: Last Dose Taken:  , 1 tab(s) ora      Allergies  No Known Allergies    SOCIAL HISTORY:   with grown children    FAMILY HISTORY:  No pertinent family history in first degree relatives    Vital Signs Last 24 Hrs  T(C): 36.5 (27 Nov 2023 19:47), Max: 36.9 (27 Nov 2023 08:26)  T(F): 97.7 (27 Nov 2023 19:47), Max: 98.4 (27 Nov 2023 08:26)  HR: 89 (27 Nov 2023 19:47) (73 - 98)  BP: 150/50 (27 Nov 2023 19:47) (117/59 - 150/50)  RR: 18 (27 Nov 2023 19:47) (16 - 18)  SpO2: 97% (27 Nov 2023 19:47) (96% - 99%) RA    General: WN/WD NAD  Neurology: Awake, nonfocal, ARTIS x 4; pt was alert but would not answer questions or speak in general  Eyes: Scleras clear, no eyelid droop  ENT: Gross hearing intact, grossly patent pharynx, no stridor  Neck: Neck supple, trachea midline, No JVD,   Respiratory: CTA B/L, No wheezing, rales, rhonchi  CV: S1S2  Abdominal: Soft  Extremities: No edema, ARTIS  Skin: No Rashes, Hematoma, Ecchymosis  Psych: Blunted affect; BROCK mental status as pt would not speak; (Behavior not unusual as per family)    LABS:                        10.2   14.39 )-----------( 468      ( 27 Nov 2023 05:41 )             32.6     11-27    138  |  101  |  18  ----------------------------<  154<H>  4.6   |  26  |  0.85    Ca    9.1      27 Nov 2023 05:41  Phos  3.7     11-27  Mg     1.90     11-27    Urinalysis Basic - ( 27 Nov 2023 05:41 )  Color: x / Appearance: x / SG: x / pH: x  Gluc: 154 mg/dL / Ketone: x  / Bili: x / Urobili: x   Blood: x / Protein: x / Nitrite: x   Leuk Esterase: x / RBC: x / WBC x   Sq Epi: x / Non Sq Epi: x / Bacteria: x    RADIOLOGY & ADDITIONAL STUDIES:  CT Chest w/ IV Cont (11.25.23 @ 23:14) >  No cavitary lesion identified.  Intracardiac thrombus seen in the left atrial appendage measuring 1.5 cm.  Cardiomegaly with biatrial enlargement.  Scattered right upper lobe micronodules.  Segmental atelectasis of the lateral segment of the right middle lobe.  Mild interlobular septal thickening, left greater than right, may be seen   with asymmetric edema.  Increased cluster of mediastinal lymph nodes, the largest measuring 1.1   cm in the right paratracheal station.    Xray Chest 1 View (11.25.23 @ 02:17) >  Perihilar opacities consistent with CHF more than infection.    ASSESSMENT:   Admitted for CVA;   CT chest with some abnormalities but no cavitary lesion    PLAN:  Case discussed with Dr Maxine Goodman will review the films and will follow up. 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 Upon admission it was reported that she had a cough for 2 weeks PTA but her family today denies that.  She was admitted to medicine and found to have a R PCA occlusion, a KRISTAN thrombus, and seizures.  Thoracic surgery was consulted for a possible RUL cavitary lesion.  The family denies any TB exposure.      PAST MEDICAL & SURGICAL HISTORY:  Chronic Atrial Fibrillation  CVA 2013, 2019 with R-side deficits  HTN   DM 2  Mitral stenosis/ Rheumatic Valvular Disease  H/O hernia repair    REVIEW OF SYSTEMS (Per family at bedside)  General: No recent weight change, fatigue, HA, or Dizziness; Now very Lethargic since admission	  Skin/Breast: No Rashes or Lesions  Ophthalmologic: No Blurry vision	  ENMT: No Hearing loss  Respiratory and Thorax: No Cough, Wheezing, SOB, Hemoptysis, or Sputum production	  Cardiovascular: No Chest pain, Palpitations, or Diaphoresis	  Gastrointestinal: No Nausea, Vomiting, Constipation, or Appetite Change	  Genitourinary: No Hematuria or Dysuria	  Musculoskeletal: Usual R-sided weakness	  Neurological: h/o CVA but no known Seizures; R-side weakness	  Psychiatric: +Depression; No Dementia  Hematology/Lymphatics: No hx of bleeding or Edema	  Endocrine: +DM  Allergic/Immunologic: No Anaphylaxis or Recent illnesses    MEDICATIONS  ([facility]):  atorvastatin 80 milliGRAM(s) Oral at bedtime  enoxaparin Injectable 60 milliGRAM(s) SubCutaneous every 12 hours  insulin lispro (ADMELOG) corrective regimen sliding scale   SubCutaneous four times a day: before meals and at bedtime  levETIRAcetam  IVPB 750 milliGRAM(s) IV Intermittent every 12 hours  metoprolol tartrate 25 milliGRAM(s) Oral two times a day  mirtazapine 15 milliGRAM(s) Oral at bedtime  sodium chloride 3%  Inhalation 4 milliLiter(s) Inhalation every 8 hours    Medications (Home):  · 	Metoprolol Tartrate 25 mg oral tablet: Last Dose Taken:  , 1 tab(s) orally 2 times a day  · 	aspirin 81 mg oral delayed release capsule: Last Dose Taken:  , orally once a day  · 	metFORMIN 1000 mg oral tablet: Last Dose Taken:  , 1 tab(s) orally 2 times a day  · 	mirtazapine 15 mg oral tablet: Last Dose Taken:  , 1 tab(s) orally once a day (at bedtime)  · 	Eliquis 5 mg oral tablet: Last Dose Taken:  , 1 tab(s) ora      Allergies  No Known Allergies    SOCIAL HISTORY:   with grown children    FAMILY HISTORY:  No pertinent family history in first degree relatives    Vital Signs Last 24 Hrs  T(C): 36.5 (27 Nov 2023 19:47), Max: 36.9 (27 Nov 2023 08:26)  T(F): 97.7 (27 Nov 2023 19:47), Max: 98.4 (27 Nov 2023 08:26)  HR: 89 (27 Nov 2023 19:47) (73 - 98)  BP: 150/50 (27 Nov 2023 19:47) (117/59 - 150/50)  RR: 18 (27 Nov 2023 19:47) (16 - 18)  SpO2: 97% (27 Nov 2023 19:47) (96% - 99%) RA    General: WN/WD NAD  Neurology: Awake, nonfocal, ARTIS x 4; pt was alert but would not answer questions or speak in general  Eyes: Scleras clear, no eyelid droop  ENT: Gross hearing intact, grossly patent pharynx, no stridor  Neck: Neck supple, trachea midline, No JVD,   Respiratory: CTA B/L, No wheezing, rales, rhonchi  CV: S1S2  Abdominal: Soft  Extremities: No edema, ARTIS  Skin: No Rashes, Hematoma, Ecchymosis  Psych: Blunted affect; BROCK mental status as pt would not speak; (Behavior not unusual as per family)    LABS:                        10.2   14.39 )-----------( 468      ( 27 Nov 2023 05:41 )             32.6     11-27    138  |  101  |  18  ----------------------------<  154<H>  4.6   |  26  |  0.85    Ca    9.1      27 Nov 2023 05:41  Phos  3.7     11-27  Mg     1.90     11-27    Urinalysis Basic - ( 27 Nov 2023 05:41 )  Color: x / Appearance: x / SG: x / pH: x  Gluc: 154 mg/dL / Ketone: x  / Bili: x / Urobili: x   Blood: x / Protein: x / Nitrite: x   Leuk Esterase: x / RBC: x / WBC x   Sq Epi: x / Non Sq Epi: x / Bacteria: x    RADIOLOGY & ADDITIONAL STUDIES:  CT Chest w/ IV Cont (11.25.23 @ 23:14) >  No cavitary lesion identified.  Intracardiac thrombus seen in the left atrial appendage measuring 1.5 cm.  Cardiomegaly with biatrial enlargement.  Scattered right upper lobe micronodules.  Segmental atelectasis of the lateral segment of the right middle lobe.  Mild interlobular septal thickening, left greater than right, may be seen   with asymmetric edema.  Increased cluster of mediastinal lymph nodes, the largest measuring 1.1   cm in the right paratracheal station.    Xray Chest 1 View (11.25.23 @ 02:17) >  Perihilar opacities consistent with CHF more than infection.    ASSESSMENT:   Admitted for CVA;   CT chest with some abnormalities but no cavitary lesion    PLAN:  Case discussed with Dr Maxine Goodmna will review the films and will follow up.

## 2023-11-27 NOTE — CONSULT NOTE ADULT - NS ATTEND AMEND GEN_ALL_CORE FT
ct scan reviewed, no evidence of cavitary lung lesions - recommend cardiac surgery consult for large atrial thrombus.   no thoracic surgical intervention warranted at this time. reconsult prn

## 2023-11-27 NOTE — PROGRESS NOTE ADULT - SUBJECTIVE AND OBJECTIVE BOX
[facility] Division of Hospital Medicine  Jean Carrillo DO  Available via MS Teams  Pager:[phone]    SUBJECTIVE / OVERNIGHT EVENTS: No acute events overnight. Patient describes b/l LE weakness otherwise denies HA, visual changes, shaking, numbness or tingling, bladder or bowel incontinence.    ADDITIONAL REVIEW OF SYSTEMS:  Review of Systems:   CONSTITUTIONAL: No fever, weight loss  EYES: No eye pain, visual disturbances, or discharge  ENMT:  No difficulty hearing, tinnitus, vertigo; No sinus or throat pain  RESPIRATORY: No SOB. No cough, wheezing, chills or hemoptysis  CARDIOVASCULAR: No chest pain, palpitations, dizziness, or leg swelling  GASTROINTESTINAL: No abdominal or epigastric pain. No nausea, vomiting, or hematemesis; No diarrhea or constipation. No melena or hematochezia.  GENITOURINARY: No dysuria, frequency, hematuria, or incontinence  NEUROLOGICAL: No headaches, memory loss, loss of strength, numbness, or tremors  SKIN: No itching, burning, rashes, or lesions   LYMPH NODES: No enlarged glands  ENDOCRINE: No heat or cold intolerance; No hair loss  MUSCULOSKELETAL: No joint pain or swelling; No muscle, back pain  PSYCHIATRIC: No depression, anxiety, mood swings, or difficulty sleeping  HEME/LYMPH: No easy bruising, or bleeding gums      MEDICATIONS  (STANDING):  apixaban 5 milliGRAM(s) Oral every 12 hours  atorvastatin 80 milliGRAM(s) Oral at bedtime  dextrose 5%. 1000 milliLiter(s) (100 mL/Hr) IV Continuous <Continuous>  dextrose 5%. 1000 milliLiter(s) (50 mL/Hr) IV Continuous <Continuous>  dextrose 50% Injectable 25 Gram(s) IV Push once  dextrose 50% Injectable 25 Gram(s) IV Push once  dextrose 50% Injectable 12.5 Gram(s) IV Push once  glucagon  Injectable 1 milliGRAM(s) IntraMuscular once  insulin lispro (ADMELOG) corrective regimen sliding scale   SubCutaneous at bedtime  insulin lispro (ADMELOG) corrective regimen sliding scale   SubCutaneous three times a day before meals  levETIRAcetam  IVPB 750 milliGRAM(s) IV Intermittent every 12 hours  metoprolol tartrate 25 milliGRAM(s) Oral two times a day  mirtazapine 15 milliGRAM(s) Oral at bedtime  sodium chloride 3%  Inhalation 4 milliLiter(s) Inhalation every 8 hours    MEDICATIONS  (PRN):  dextrose Oral Gel 15 Gram(s) Oral once PRN Blood Glucose LESS THAN 70 milliGRAM(s)/deciliter      I&O's Summary    26 Nov 2023 07:01  -  27 Nov 2023 07:00  --------------------------------------------------------  IN: 625 mL / OUT: 1050 mL / NET: -425 mL        PHYSICAL EXAM:  Vital Signs Last 24 Hrs  T(C): 36.6 (27 Nov 2023 16:13), Max: 36.9 (26 Nov 2023 20:00)  T(F): 97.8 (27 Nov 2023 16:13), Max: 98.4 (26 Nov 2023 20:00)  HR: 92 (27 Nov 2023 16:13) (73 - 98)  BP: 128/68 (27 Nov 2023 16:13) (117/59 - 147/79)  BP(mean): --  RR: 18 (27 Nov 2023 16:13) (16 - 18)  SpO2: 99% (27 Nov 2023 16:13) (96% - 100%)    Parameters below as of 27 Nov 2023 16:13  Patient On (Oxygen Delivery Method): room air      CONSTITUTIONAL: NAD, well-groomed  EYES: PERRLA; conjunctiva and sclera clear  ENMT: Moist oral mucosa, no pharyngeal injection or exudates; normal dentition  NECK: Supple, no palpable masses; no thyromegaly  RESPIRATORY: Normal respiratory effort; lungs are clear to auscultation bilaterally  CARDIOVASCULAR: Regular rate and rhythm, normal S1 and S2, no murmur/rub/gallop; No lower extremity edema; Peripheral pulses are 2+ bilaterally  ABDOMEN: Nontender to palpation, normoactive bowel sounds, no rebound/guarding; No hepatosplenomegaly  MUSCULOSKELETAL:  Normal gait; no clubbing or cyanosis of digits; no joint swelling or tenderness to palpation  PSYCH: A+O to person, place, and time; affect appropriate  NEUROLOGY: CN 2-12 are intact and symmetric; no gross sensory deficits   SKIN: No rashes; no palpable lesions    LABS:                        10.2   14.39 )-----------( 468      ( 27 Nov 2023 05:41 )             32.6     11-27    138  |  101  |  18  ----------------------------<  154<H>  4.6   |  26  |  0.85    Ca    9.1      27 Nov 2023 05:41  Phos  3.7     11-27  Mg     1.90     11-27            Urinalysis Basic - ( 27 Nov 2023 05:41 )    Color: x / Appearance: x / SG: x / pH: x  Gluc: 154 mg/dL / Ketone: x  / Bili: x / Urobili: x   Blood: x / Protein: x / Nitrite: x   Leuk Esterase: x / RBC: x / WBC x   Sq Epi: x / Non Sq Epi: x / Bacteria: x            RADIOLOGY & ADDITIONAL TESTS:  New Imaging Personally Reviewed Today: Yes  New Electrocardiogram Personally Reviewed Today: N/A  Other Results Reviewed Today: Yes  Prior or Outpatient Records Reviewed Today with Summary: Yes    COORDINATION OF CARE:  Consultant Communication and Details of Discussion (where applicable): Yes     [facility] Division of Hospital Medicine  Jean Carrillo DO  Available via MS Teams  Pager:[phone]    SUBJECTIVE / OVERNIGHT EVENTS: No acute events overnight. Patient describes b/l LE weakness otherwise denies HA, visual changes, shaking, numbness or tingling, bladder or bowel incontinence.    ADDITIONAL REVIEW OF SYSTEMS:  Review of Systems:   CONSTITUTIONAL: No fever, weight loss  EYES: No eye pain, visual disturbances, or discharge  ENMT:  No difficulty hearing, tinnitus, vertigo; No sinus or throat pain  RESPIRATORY: No SOB. No cough, wheezing, chills or hemoptysis  CARDIOVASCULAR: No chest pain, palpitations, dizziness, or leg swelling  GASTROINTESTINAL: No abdominal or epigastric pain. No nausea, vomiting, or hematemesis; No diarrhea or constipation. No melena or hematochezia.  GENITOURINARY: No dysuria, frequency, hematuria, or incontinence  NEUROLOGICAL: No headaches, memory loss, loss of strength, numbness, or tremors  SKIN: No itching, burning, rashes, or lesions   LYMPH NODES: No enlarged glands  ENDOCRINE: No heat or cold intolerance; No hair loss  MUSCULOSKELETAL: No joint pain or swelling; No muscle, back pain  PSYCHIATRIC: No depression, anxiety, mood swings, or difficulty sleeping  HEME/LYMPH: No easy bruising, or bleeding gums      MEDICATIONS  (STANDING):  apixaban 5 milliGRAM(s) Oral every 12 hours  atorvastatin 80 milliGRAM(s) Oral at bedtime  dextrose 5%. 1000 milliLiter(s) (100 mL/Hr) IV Continuous <Continuous>  dextrose 5%. 1000 milliLiter(s) (50 mL/Hr) IV Continuous <Continuous>  dextrose 50% Injectable 25 Gram(s) IV Push once  dextrose 50% Injectable 25 Gram(s) IV Push once  dextrose 50% Injectable 12.5 Gram(s) IV Push once  glucagon  Injectable 1 milliGRAM(s) IntraMuscular once  insulin lispro (ADMELOG) corrective regimen sliding scale   SubCutaneous at bedtime  insulin lispro (ADMELOG) corrective regimen sliding scale   SubCutaneous three times a day before meals  levETIRAcetam  IVPB 750 milliGRAM(s) IV Intermittent every 12 hours  metoprolol tartrate 25 milliGRAM(s) Oral two times a day  mirtazapine 15 milliGRAM(s) Oral at bedtime  sodium chloride 3%  Inhalation 4 milliLiter(s) Inhalation every 8 hours    MEDICATIONS  (PRN):  dextrose Oral Gel 15 Gram(s) Oral once PRN Blood Glucose LESS THAN 70 milliGRAM(s)/deciliter      I&O's Summary    26 Nov 2023 07:01  -  27 Nov 2023 07:00  --------------------------------------------------------  IN: 625 mL / OUT: 1050 mL / NET: -425 mL        PHYSICAL EXAM:  Vital Signs Last 24 Hrs  T(C): 36.6 (27 Nov 2023 16:13), Max: 36.9 (26 Nov 2023 20:00)  T(F): 97.8 (27 Nov 2023 16:13), Max: 98.4 (26 Nov 2023 20:00)  HR: 92 (27 Nov 2023 16:13) (73 - 98)  BP: 128/68 (27 Nov 2023 16:13) (117/59 - 147/79)  BP(mean): --  RR: 18 (27 Nov 2023 16:13) (16 - 18)  SpO2: 99% (27 Nov 2023 16:13) (96% - 100%)    Parameters below as of 27 Nov 2023 16:13  Patient On (Oxygen Delivery Method): room air      CONSTITUTIONAL: NAD, well-groomed  EYES: PERRLA; conjunctiva and sclera clear  ENMT: Moist oral mucosa, no pharyngeal injection or exudates; normal dentition  NECK: Supple, no palpable masses; no thyromegaly  RESPIRATORY: Normal respiratory effort; lungs are clear to auscultation bilaterally  CARDIOVASCULAR: Regular rate and rhythm, normal S1 and S2, no murmur/rub/gallop; No lower extremity edema; Peripheral pulses are 2+ bilaterally  ABDOMEN: Nontender to palpation, normoactive bowel sounds, no rebound/guarding; No hepatosplenomegaly  MUSCULOSKELETAL:  Normal gait; no clubbing or cyanosis of digits; no joint swelling or tenderness to palpation  PSYCH: A+O to person, place, and time; affect appropriate  NEUROLOGY: CN 2-12 are intact and symmetric; no gross sensory deficits   SKIN: No rashes; no palpable lesions    LABS:                        10.2   14.39 )-----------( 468      ( 27 Nov 2023 05:41 )             32.6     11-27    138  |  101  |  18  ----------------------------<  154<H>  4.6   |  26  |  0.85    Ca    9.1      27 Nov 2023 05:41  Phos  3.7     11-27  Mg     1.90     11-27            Urinalysis Basic - ( 27 Nov 2023 05:41 )    Color: x / Appearance: x / SG: x / pH: x  Gluc: 154 mg/dL / Ketone: x  / Bili: x / Urobili: x   Blood: x / Protein: x / Nitrite: x   Leuk Esterase: x / RBC: x / WBC x   Sq Epi: x / Non Sq Epi: x / Bacteria: x            RADIOLOGY & ADDITIONAL TESTS:  New Imaging Personally Reviewed Today: Yes  New Electrocardiogram Personally Reviewed Today: N/A  Other Results Reviewed Today: Yes  Prior or Outpatient Records Reviewed Today with Summary: Yes    COORDINATION OF CARE:  Consultant Communication and Details of Discussion (where applicable): Yes     [facility] Division of Hospital Medicine  Jean Carrillo DO  Available via MS Teams  Pager:[phone]    SUBJECTIVE / OVERNIGHT EVENTS: No acute events overnight. Patient describes b/l LE weakness otherwise denies HA, visual changes, shaking, numbness or tingling, bladder or bowel incontinence.    ADDITIONAL REVIEW OF SYSTEMS:  Review of Systems:   CONSTITUTIONAL: No fever, weight loss  EYES: No eye pain, visual disturbances, or discharge  ENMT:  No difficulty hearing, tinnitus, vertigo; No sinus or throat pain  RESPIRATORY: No SOB. No cough, wheezing, chills or hemoptysis  CARDIOVASCULAR: No chest pain, palpitations, dizziness, or leg swelling  GASTROINTESTINAL: No abdominal or epigastric pain. No nausea, vomiting, or hematemesis; No diarrhea or constipation. No melena or hematochezia.  GENITOURINARY: No dysuria, frequency, hematuria, or incontinence  NEUROLOGICAL: No headaches, memory loss, loss of strength, numbness, or tremors  SKIN: No itching, burning, rashes, or lesions   LYMPH NODES: No enlarged glands  ENDOCRINE: No heat or cold intolerance; No hair loss  MUSCULOSKELETAL: No joint pain or swelling; No muscle, back pain  PSYCHIATRIC: No depression, anxiety, mood swings, or difficulty sleeping  HEME/LYMPH: No easy bruising, or bleeding gums      MEDICATIONS  (STANDING):  apixaban 5 milliGRAM(s) Oral every 12 hours  atorvastatin 80 milliGRAM(s) Oral at bedtime  dextrose 5%. 1000 milliLiter(s) (100 mL/Hr) IV Continuous <Continuous>  dextrose 5%. 1000 milliLiter(s) (50 mL/Hr) IV Continuous <Continuous>  dextrose 50% Injectable 25 Gram(s) IV Push once  dextrose 50% Injectable 25 Gram(s) IV Push once  dextrose 50% Injectable 12.5 Gram(s) IV Push once  glucagon  Injectable 1 milliGRAM(s) IntraMuscular once  insulin lispro (ADMELOG) corrective regimen sliding scale   SubCutaneous at bedtime  insulin lispro (ADMELOG) corrective regimen sliding scale   SubCutaneous three times a day before meals  levETIRAcetam  IVPB 750 milliGRAM(s) IV Intermittent every 12 hours  metoprolol tartrate 25 milliGRAM(s) Oral two times a day  mirtazapine 15 milliGRAM(s) Oral at bedtime  sodium chloride 3%  Inhalation 4 milliLiter(s) Inhalation every 8 hours    MEDICATIONS  (PRN):  dextrose Oral Gel 15 Gram(s) Oral once PRN Blood Glucose LESS THAN 70 milliGRAM(s)/deciliter      I&O's Summary    26 Nov 2023 07:01  -  27 Nov 2023 07:00  --------------------------------------------------------  IN: 625 mL / OUT: 1050 mL / NET: -425 mL        PHYSICAL EXAM:  Vital Signs Last 24 Hrs  T(C): 36.6 (27 Nov 2023 16:13), Max: 36.9 (26 Nov 2023 20:00)  T(F): 97.8 (27 Nov 2023 16:13), Max: 98.4 (26 Nov 2023 20:00)  HR: 92 (27 Nov 2023 16:13) (73 - 98)  BP: 128/68 (27 Nov 2023 16:13) (117/59 - 147/79)  BP(mean): --  RR: 18 (27 Nov 2023 16:13) (16 - 18)  SpO2: 99% (27 Nov 2023 16:13) (96% - 100%)    Parameters below as of 27 Nov 2023 16:13  Patient On (Oxygen Delivery Method): room air      CONSTITUTIONAL: NAD, well-groomed  EYES: PERRLA; conjunctiva and sclera clear  ENMT: Moist oral mucosa, no pharyngeal injection or exudates; normal dentition  NECK: Supple, no palpable masses; no thyromegaly  RESPIRATORY: Normal respiratory effort; lungs are clear to auscultation bilaterally  CARDIOVASCULAR: Regular rate and rhythm, normal S1 and S2, no murmur/rub/gallop; No lower extremity edema; Peripheral pulses are 2+ bilaterally  ABDOMEN: Nontender to palpation, normoactive bowel sounds, no rebound/guarding; No hepatosplenomegaly  MUSCULOSKELETAL:  Normal gait; no clubbing or cyanosis of digits; no joint swelling or tenderness to palpation  PSYCH: A+O to person, place, and time; affect appropriate  NEUROLOGY: CN 2-12 are intact and symmetric; no gross sensory deficits   SKIN: No rashes; no palpable lesions    LABS:                        10.2   14.39 )-----------( 468      ( 27 Nov 2023 05:41 )             32.6     11-27    138  |  101  |  18  ----------------------------<  154<H>  4.6   |  26  |  0.85    Ca    9.1      27 Nov 2023 05:41  Phos  3.7     11-27  Mg     1.90     11-27            Urinalysis Basic - ( 27 Nov 2023 05:41 )    Color: x / Appearance: x / SG: x / pH: x  Gluc: 154 mg/dL / Ketone: x  / Bili: x / Urobili: x   Blood: x / Protein: x / Nitrite: x   Leuk Esterase: x / RBC: x / WBC x   Sq Epi: x / Non Sq Epi: x / Bacteria: x            RADIOLOGY & ADDITIONAL TESTS:  New Imaging Personally Reviewed Today: Yes  New Electrocardiogram Personally Reviewed Today: N/A  Other Results Reviewed Today: Yes  Prior or Outpatient Records Reviewed Today with Summary: Yes    COORDINATION OF CARE:  Consultant Communication and Details of Discussion (where applicable): Yes

## 2023-11-27 NOTE — PROGRESS NOTE ADULT - PROBLEM SELECTOR PLAN 2
CT chest with Intracardiac thrombus seen in the left atrial appendage measuring 1.5 cm.  f/u dediated TTE, appreciate cardiology recommendations  f/u CTS eval  Patient developed intracardiac thrombus despite on eliquis for afib, will transition to therapeutic lovenox, appreciate cardiology eval

## 2023-11-27 NOTE — PROGRESS NOTE ADULT - PROBLEM SELECTOR PLAN 4
EKG afib LAFB 72 bpm, rate controlled  Patient with reported bradycardia to 30-40s while asleep - cardiology recommending metoprolol 25 BID - will hold for now given episodic bradycardia in setting of LAFB, patient is presently rate controlled  Will switch eliquis to lovenox  Maintain tele monitoring  f/u TSH

## 2023-11-27 NOTE — OCCUPATIONAL THERAPY INITIAL EVALUATION ADULT - PERTINENT HX OF CURRENT PROBLEM, REHAB EVAL
I have discussed with the Attending the patient's status, as well as the H&P and the fetal status. The Attending agreed with the suggested management. Pt is a 68 year old female presenting for right gaze preference and aphasia. CT angio neck significant for abrupt occlusion of the posterior P2 segment of the right PCA without reconstitution. CT perfusion revealed small volume ischemic penumbra in the right parieto-occipital lobe corresponding to the PCA territory.

## 2023-11-27 NOTE — CONSULT NOTE ADULT - ASSESSMENT
EKG Afib     Echo pending     Assessment and plan     1) CVA : family reports  , labile INR on coumadin and strokes on Coumadin, now had a CVA and developed KRISTAN thrombus on eliquis (eliquis failure) recommend Sub q lovenox moving forward , TTE pending , f/u  neuro recs     2) Afib lovenox as above start metoprolol 25 mg po BID     3) DVT PPX lovenox

## 2023-11-27 NOTE — PROGRESS NOTE ADULT - SUBJECTIVE AND OBJECTIVE BOX
INTERVAL HISTORY: Patient seen at bedside by stroke team & attending. Translation provided by attending in patient's primary language. Patient without significant complaints at this time. Discussed stroke workup and management moving forward with daughter. All questions answered & concerns addressed.     PAST MEDICAL & SURGICAL HISTORY:  Chronic atrial fibrillation      CVA (cerebrovascular accident)      HTN (hypertension)      DM (diabetes mellitus), type 2      H/O hernia repair        FAMILY HISTORY:  No pertinent family history in first degree relatives      SOCIAL HISTORY:   T/E/D:   Occupation:   Lives with:     MEDICATIONS (HOME):  Home Medications:  aspirin 81 mg oral delayed release capsule: orally once a day (25 Nov 2023 14:32)  Eliquis 5 mg oral tablet: 1 tab(s) orally 2 times a day (25 Nov 2023 14:32)  metFORMIN 1000 mg oral tablet: 1 tab(s) orally 2 times a day (25 Nov 2023 14:32)  Metoprolol Tartrate 25 mg oral tablet: 1 tab(s) orally 2 times a day (25 Nov 2023 14:32)  mirtazapine 15 mg oral tablet: 1 tab(s) orally once a day (at bedtime) (25 Nov 2023 14:33)    MEDICATIONS  (STANDING):  apixaban 5 milliGRAM(s) Oral every 12 hours  atorvastatin 80 milliGRAM(s) Oral at bedtime  dextrose 5%. 1000 milliLiter(s) (50 mL/Hr) IV Continuous <Continuous>  dextrose 5%. 1000 milliLiter(s) (100 mL/Hr) IV Continuous <Continuous>  dextrose 50% Injectable 25 Gram(s) IV Push once  dextrose 50% Injectable 25 Gram(s) IV Push once  dextrose 50% Injectable 12.5 Gram(s) IV Push once  glucagon  Injectable 1 milliGRAM(s) IntraMuscular once  insulin lispro (ADMELOG) corrective regimen sliding scale   SubCutaneous at bedtime  insulin lispro (ADMELOG) corrective regimen sliding scale   SubCutaneous three times a day before meals  levETIRAcetam  IVPB 750 milliGRAM(s) IV Intermittent every 12 hours  mirtazapine 15 milliGRAM(s) Oral at bedtime  sodium chloride 3%  Inhalation 4 milliLiter(s) Inhalation every 8 hours    MEDICATIONS  (PRN):  dextrose Oral Gel 15 Gram(s) Oral once PRN Blood Glucose LESS THAN 70 milliGRAM(s)/deciliter    ALLERGIES/INTOLERANCES:  Allergies  No Known Allergies    Intolerances    VITALS & EXAMINATION:  Vital Signs Last 24 Hrs  T(C): 36.9 (27 Nov 2023 12:08), Max: 36.9 (26 Nov 2023 20:00)  T(F): 98.4 (27 Nov 2023 12:08), Max: 98.4 (26 Nov 2023 20:00)  HR: 96 (27 Nov 2023 12:08) (73 - 98)  BP: 123/69 (27 Nov 2023 12:08) (117/59 - 148/77)  BP(mean): --  RR: 18 (27 Nov 2023 12:08) (16 - 18)  SpO2: 99% (27 Nov 2023 12:08) (96% - 100%)    Parameters below as of 27 Nov 2023 12:08  Patient On (Oxygen Delivery Method): room air        General:  Constitutional: Female, appears stated age, in no apparent distress including pain  Head: Normocephalic & Atraumatic.  Respiratory: Breathing comfortably.  Extremities: No cyanosis, clubbing, or edema    Neurological:  MS: Eyes open. Awake, alert, oriented to person, place (does not know which hospital), situation, does not know the month (states october). Follows all simple 1 step midline commands.    Language: Speech is fluent with good repetition & comprehension.    CNs: Absent blink to threat  on the L, suspect LHH. EOMI no nystagmus. V1-3 intact to LT b/l. No obvious facial asymmetry b/l, full eye closure strength b/l. No dysarthria.     Motor: Normal muscle bulk & tone. No noticeable tremor. No drift of UE or LE b/l.    Sensation: Intact to LT b/l throughout.     Cortical: Extinction on DSS (neglect): none    Coordination: No dysmetria on FTN b/l.     Gait: Deferred.         LABORATORY:  CBC                       10.2   14.39 )-----------( 468      ( 27 Nov 2023 05:41 )             32.6     Chem 11-27    138  |  101  |  18  ----------------------------<  154<H>  4.6   |  26  |  0.85    Ca    9.1      27 Nov 2023 05:41  Phos  3.7     11-27  Mg     1.90     11-27      LFTs   Coagulopathy   Lipid Panel 11-26 Chol 102 LDL -- HDL 39<L> Trig 94  A1c   Cardiac enzymes     U/A Urinalysis Basic - ( 27 Nov 2023 05:41 )    Color: x / Appearance: x / SG: x / pH: x  Gluc: 154 mg/dL / Ketone: x  / Bili: x / Urobili: x   Blood: x / Protein: x / Nitrite: x   Leuk Esterase: x / RBC: x / WBC x   Sq Epi: x / Non Sq Epi: x / Bacteria: x      CSF  Immunological  Other    STUDIES & IMAGING:  Studies (EKG, EEG, EMG, etc):     Radiology (XR, CT, MR, U/S, TTE/BUBBA):    [facility] 11/25 @ 23:12:     Evolving acute/subacute infarcts within the right paramedian   occipital lobe as well as the right cerebellar hemisphere. No hemorrhagic   transformation.    Multiple additional unchanged chronic findings, as discussed.    --- End of Report ---        < from: CT Brain Stroke Protocol (11.25.23 @ 01:19) >  IMPRESSION:  1. No acute intracranial hemorrhage or CT evidence of acute, large   territorial infarct.      < from: CT Angio Neck Stroke Protocol w/ IV Cont (11.25.23 @ 01:21) >  CT PERFUSION: Calculus small volume ischemic penumbra in the right   parieto-occipital lobe corresponding to the PCA territory, without   evidence of core infarct at the time of this imaging.    CTA BRAIN:  1. Abrupt occlusion of the posterior P2 segment of the right PCA, without   reconstitution.    CTA NECK:  1. Patent cervical vasculature. No flow limiting stenosis or occlusion   according to NASCET criteria.  2. Complete atelectasis of the lateral segment right middle lobe and   scattered micronodules measuring up to 8 mm in the right upper lobe.   Additional tiny cavitary micronodule in the right upper lobe. A CT chest   with contrast is recommended for further evaluation.       INTERVAL HISTORY: Patient seen at bedside by stroke team & attending. Translation provided by attending in patient's primary language. Patient without significant complaints at this time. Discussed stroke workup and management moving forward with daughter. All questions answered & concerns addressed.     PAST MEDICAL & SURGICAL HISTORY:  Chronic atrial fibrillation      CVA (cerebrovascular accident)      HTN (hypertension)      DM (diabetes mellitus), type 2      H/O hernia repair        FAMILY HISTORY:  No pertinent family history in first degree relatives      SOCIAL HISTORY:   T/E/D:   Occupation:   Lives with:     MEDICATIONS (HOME):  Home Medications:  aspirin 81 mg oral delayed release capsule: orally once a day (25 Nov 2023 14:32)  Eliquis 5 mg oral tablet: 1 tab(s) orally 2 times a day (25 Nov 2023 14:32)  metFORMIN 1000 mg oral tablet: 1 tab(s) orally 2 times a day (25 Nov 2023 14:32)  Metoprolol Tartrate 25 mg oral tablet: 1 tab(s) orally 2 times a day (25 Nov 2023 14:32)  mirtazapine 15 mg oral tablet: 1 tab(s) orally once a day (at bedtime) (25 Nov 2023 14:33)    MEDICATIONS  (STANDING):  apixaban 5 milliGRAM(s) Oral every 12 hours  atorvastatin 80 milliGRAM(s) Oral at bedtime  dextrose 5%. 1000 milliLiter(s) (50 mL/Hr) IV Continuous <Continuous>  dextrose 5%. 1000 milliLiter(s) (100 mL/Hr) IV Continuous <Continuous>  dextrose 50% Injectable 25 Gram(s) IV Push once  dextrose 50% Injectable 25 Gram(s) IV Push once  dextrose 50% Injectable 12.5 Gram(s) IV Push once  glucagon  Injectable 1 milliGRAM(s) IntraMuscular once  insulin lispro (ADMELOG) corrective regimen sliding scale   SubCutaneous at bedtime  insulin lispro (ADMELOG) corrective regimen sliding scale   SubCutaneous three times a day before meals  levETIRAcetam  IVPB 750 milliGRAM(s) IV Intermittent every 12 hours  mirtazapine 15 milliGRAM(s) Oral at bedtime  sodium chloride 3%  Inhalation 4 milliLiter(s) Inhalation every 8 hours    MEDICATIONS  (PRN):  dextrose Oral Gel 15 Gram(s) Oral once PRN Blood Glucose LESS THAN 70 milliGRAM(s)/deciliter    ALLERGIES/INTOLERANCES:  Allergies  No Known Allergies    Intolerances    VITALS & EXAMINATION:  Vital Signs Last 24 Hrs  T(C): 36.9 (27 Nov 2023 12:08), Max: 36.9 (26 Nov 2023 20:00)  T(F): 98.4 (27 Nov 2023 12:08), Max: 98.4 (26 Nov 2023 20:00)  HR: 96 (27 Nov 2023 12:08) (73 - 98)  BP: 123/69 (27 Nov 2023 12:08) (117/59 - 148/77)  BP(mean): --  RR: 18 (27 Nov 2023 12:08) (16 - 18)  SpO2: 99% (27 Nov 2023 12:08) (96% - 100%)    Parameters below as of 27 Nov 2023 12:08  Patient On (Oxygen Delivery Method): room air        General:  Constitutional: Female, appears stated age, in no apparent distress including pain  Head: Normocephalic & Atraumatic.  Respiratory: Breathing comfortably.  Extremities: No cyanosis, clubbing, or edema    Neurological:  MS: Eyes open. Awake, alert, oriented to person, place (does not know which hospital), situation, does not know the month (states october). Follows all simple 1 step midline commands.    Language: Speech is fluent with good repetition & comprehension.    CNs: Absent blink to threat  on the L, suspect LHH. EOMI no nystagmus. V1-3 intact to LT b/l. No obvious facial asymmetry b/l, full eye closure strength b/l. No dysarthria.     Motor: Normal muscle bulk & tone. No noticeable tremor. No drift of UE or LE b/l.    Sensation: Intact to LT b/l throughout.     Cortical: Extinction on DSS (neglect): none    Coordination: No dysmetria on FTN b/l.     Gait: Deferred.         LABORATORY:  CBC                       10.2   14.39 )-----------( 468      ( 27 Nov 2023 05:41 )             32.6     Chem 11-27    138  |  101  |  18  ----------------------------<  154<H>  4.6   |  26  |  0.85    Ca    9.1      27 Nov 2023 05:41  Phos  3.7     11-27  Mg     1.90     11-27      LFTs   Coagulopathy   Lipid Panel 11-26 Chol 102 LDL -- HDL 39<L> Trig 94  A1c   Cardiac enzymes     U/A Urinalysis Basic - ( 27 Nov 2023 05:41 )    Color: x / Appearance: x / SG: x / pH: x  Gluc: 154 mg/dL / Ketone: x  / Bili: x / Urobili: x   Blood: x / Protein: x / Nitrite: x   Leuk Esterase: x / RBC: x / WBC x   Sq Epi: x / Non Sq Epi: x / Bacteria: x      CSF  Immunological  Other    STUDIES & IMAGING:  Studies (EKG, EEG, EMG, etc):     Radiology (XR, CT, MR, U/S, TTE/BUBBA):    [facility] 11/25 @ 23:12:     Evolving acute/subacute infarcts within the right paramedian   occipital lobe as well as the right cerebellar hemisphere. No hemorrhagic   transformation.    Multiple additional unchanged chronic findings, as discussed.    --- End of Report ---        < from: CT Brain Stroke Protocol (11.25.23 @ 01:19) >  IMPRESSION:  1. No acute intracranial hemorrhage or CT evidence of acute, large   territorial infarct.      < from: CT Angio Neck Stroke Protocol w/ IV Cont (11.25.23 @ 01:21) >  CT PERFUSION: Calculus small volume ischemic penumbra in the right   parieto-occipital lobe corresponding to the PCA territory, without   evidence of core infarct at the time of this imaging.    CTA BRAIN:  1. Abrupt occlusion of the posterior P2 segment of the right PCA, without   reconstitution.    CTA NECK:  1. Patent cervical vasculature. No flow limiting stenosis or occlusion   according to NASCET criteria.  2. Complete atelectasis of the lateral segment right middle lobe and   scattered micronodules measuring up to 8 mm in the right upper lobe.   Additional tiny cavitary micronodule in the right upper lobe. A CT chest   with contrast is recommended for further evaluation.       INTERVAL HISTORY: Patient seen at bedside by stroke team & attending. Translation provided by attending in patient's primary language. Patient without significant complaints at this time. Discussed stroke workup and management moving forward with daughter. All questions answered & concerns addressed.     PAST MEDICAL & SURGICAL HISTORY:  Chronic atrial fibrillation      CVA (cerebrovascular accident)      HTN (hypertension)      DM (diabetes mellitus), type 2      H/O hernia repair        FAMILY HISTORY:  No pertinent family history in first degree relatives      SOCIAL HISTORY:   T/E/D:   Occupation:   Lives with:     MEDICATIONS (HOME):  Home Medications:  aspirin 81 mg oral delayed release capsule: orally once a day (25 Nov 2023 14:32)  Eliquis 5 mg oral tablet: 1 tab(s) orally 2 times a day (25 Nov 2023 14:32)  metFORMIN 1000 mg oral tablet: 1 tab(s) orally 2 times a day (25 Nov 2023 14:32)  Metoprolol Tartrate 25 mg oral tablet: 1 tab(s) orally 2 times a day (25 Nov 2023 14:32)  mirtazapine 15 mg oral tablet: 1 tab(s) orally once a day (at bedtime) (25 Nov 2023 14:33)    MEDICATIONS  (STANDING):  apixaban 5 milliGRAM(s) Oral every 12 hours  atorvastatin 80 milliGRAM(s) Oral at bedtime  dextrose 5%. 1000 milliLiter(s) (50 mL/Hr) IV Continuous <Continuous>  dextrose 5%. 1000 milliLiter(s) (100 mL/Hr) IV Continuous <Continuous>  dextrose 50% Injectable 25 Gram(s) IV Push once  dextrose 50% Injectable 25 Gram(s) IV Push once  dextrose 50% Injectable 12.5 Gram(s) IV Push once  glucagon  Injectable 1 milliGRAM(s) IntraMuscular once  insulin lispro (ADMELOG) corrective regimen sliding scale   SubCutaneous at bedtime  insulin lispro (ADMELOG) corrective regimen sliding scale   SubCutaneous three times a day before meals  levETIRAcetam  IVPB 750 milliGRAM(s) IV Intermittent every 12 hours  mirtazapine 15 milliGRAM(s) Oral at bedtime  sodium chloride 3%  Inhalation 4 milliLiter(s) Inhalation every 8 hours    MEDICATIONS  (PRN):  dextrose Oral Gel 15 Gram(s) Oral once PRN Blood Glucose LESS THAN 70 milliGRAM(s)/deciliter    ALLERGIES/INTOLERANCES:  Allergies  No Known Allergies    Intolerances    VITALS & EXAMINATION:  Vital Signs Last 24 Hrs  T(C): 36.9 (27 Nov 2023 12:08), Max: 36.9 (26 Nov 2023 20:00)  T(F): 98.4 (27 Nov 2023 12:08), Max: 98.4 (26 Nov 2023 20:00)  HR: 96 (27 Nov 2023 12:08) (73 - 98)  BP: 123/69 (27 Nov 2023 12:08) (117/59 - 148/77)  BP(mean): --  RR: 18 (27 Nov 2023 12:08) (16 - 18)  SpO2: 99% (27 Nov 2023 12:08) (96% - 100%)    Parameters below as of 27 Nov 2023 12:08  Patient On (Oxygen Delivery Method): room air        General:  Constitutional: Female, appears stated age, in no apparent distress including pain  Head: Normocephalic & Atraumatic.  Respiratory: Breathing comfortably.  Extremities: No cyanosis, clubbing, or edema    Neurological:  MS: Eyes open. Awake, alert, oriented to person, place (does not know which hospital), situation, does not know the month (states october). Follows all simple 1 step midline commands.    Language: Speech is fluent with good repetition & comprehension.    CNs: Absent blink to threat  on the L, suspect LHH. EOMI no nystagmus. V1-3 intact to LT b/l. No obvious facial asymmetry b/l, full eye closure strength b/l. No dysarthria.     Motor: Normal muscle bulk & tone. No noticeable tremor. No drift of UE or LE b/l.    Sensation: Intact to LT b/l throughout.     Cortical: Extinction on DSS (neglect): none    Coordination: No dysmetria on FTN b/l.     Gait: Deferred.         LABORATORY:  CBC                       10.2   14.39 )-----------( 468      ( 27 Nov 2023 05:41 )             32.6     Chem 11-27    138  |  101  |  18  ----------------------------<  154<H>  4.6   |  26  |  0.85    Ca    9.1      27 Nov 2023 05:41  Phos  3.7     11-27  Mg     1.90     11-27      LFTs   Coagulopathy   Lipid Panel 11-26 Chol 102 LDL -- HDL 39<L> Trig 94  A1c   Cardiac enzymes     U/A Urinalysis Basic - ( 27 Nov 2023 05:41 )    Color: x / Appearance: x / SG: x / pH: x  Gluc: 154 mg/dL / Ketone: x  / Bili: x / Urobili: x   Blood: x / Protein: x / Nitrite: x   Leuk Esterase: x / RBC: x / WBC x   Sq Epi: x / Non Sq Epi: x / Bacteria: x      CSF  Immunological  Other    STUDIES & IMAGING:  Studies (EKG, EEG, EMG, etc):     Radiology (XR, CT, MR, U/S, TTE/BUBBA):    [facility] 11/25 @ 23:12:     Evolving acute/subacute infarcts within the right paramedian   occipital lobe as well as the right cerebellar hemisphere. No hemorrhagic   transformation.    Multiple additional unchanged chronic findings, as discussed.    --- End of Report ---        < from: CT Brain Stroke Protocol (11.25.23 @ 01:19) >  IMPRESSION:  1. No acute intracranial hemorrhage or CT evidence of acute, large   territorial infarct.      < from: CT Angio Neck Stroke Protocol w/ IV Cont (11.25.23 @ 01:21) >  CT PERFUSION: Calculus small volume ischemic penumbra in the right   parieto-occipital lobe corresponding to the PCA territory, without   evidence of core infarct at the time of this imaging.    CTA BRAIN:  1. Abrupt occlusion of the posterior P2 segment of the right PCA, without   reconstitution.    CTA NECK:  1. Patent cervical vasculature. No flow limiting stenosis or occlusion   according to NASCET criteria.  2. Complete atelectasis of the lateral segment right middle lobe and   scattered micronodules measuring up to 8 mm in the right upper lobe.   Additional tiny cavitary micronodule in the right upper lobe. A CT chest   with contrast is recommended for further evaluation.

## 2023-11-27 NOTE — PROGRESS NOTE ADULT - PROBLEM SELECTOR PLAN 8
WBC 14  patient is otherwise hemodynamically stable, afebrile  Monitor CBC, low threshold for antibiotics

## 2023-11-27 NOTE — PROGRESS NOTE ADULT - TIME BILLING
50 minutes of total time dedicated to this patient visit today including preparing to see the patient (eg. review of tests), obtaining and/or reviewing separately obtained history, obtaining/reviewing vitals, performing a medically appropriate examination and/or evaluation, counseling and educating the patient/family/caregiver, reviewing previous notes and test results, and procedures, communicating with other health professionals (when not separately reported), and documenting clinical information in the electronic health record.

## 2023-11-27 NOTE — PROGRESS NOTE ADULT - ASSESSMENT
68y (1955) RH Nadine speaking woman with afib on eliquis, R MCA stroke 2013, L MCA Stroke 2019 with residual right hemiparesis and dysarthria, HTN, HLD, DM2, mitral stenosis/rheumatic valvular disease presenting as code stroke for right gaze preference and aphasia.  Son at bedside providing translation and collateral history reports 10 pm 11/24 patient went to bed at baseline. She woke up at 11:15 pm 11/24 with right gaze preference and was repeating phrases. Denies previous history of seizures, no tongue biting or incontinence or shaking of extremities. At baseline, patient ambulates with walker with assistance of family and requires assistance with most ADLs. On exam, Right gaze preference. CTH no acute findings, CTA Abrupt occlusion of the posterior P2 segment of the right PCA.    Impression: LHH & disorientation likely due to evolving R PCA infarct (in association with smaller R cerebellar infarct); mechanism is likely cardioembolic in the setting of valvular afib +/- KRISTAN thrombus.    Recommendations     [] Recommend Cardiology & Cardiothoracic surgery consultation to determine best course given patient's history.  [] No need for inpatient MRI brain w/o  contrast as unlikely to . Can be obtained in outpatient setting.   [] Likely need full dose anticoagulation, but appreciate input from the above parties. Has been on Coumadin in the past with poor compliance/results. Has had better success with full dose Lovenox SQ. Unclear if DOAC will be sufficient in the setting of valvular afib.   [] While on anticoagulation, no need to be on concomitant aspirin from the neurovascular standpoint.   [] Neurochecks: q4hr   [] BP goals: Gradual normotension, avoid hypotension   [] PT/OT: home PT   [] Diet: easy to chew diet, advance as indicated   [] HgA1C goals < 7.0   [] Lifestyle modifications: smoking cessation, exercise, and a Mediterranean style diet.   [] Patient should follow up with Stroke NP, Terri Morrison or Missy Cardenas, in clinic at [address] [place] [address]. Please email [email or web address] w/ basic PHI.     Patient case discussed and seen with stroke attending, Dr. Freitas.    Please call with questions: [phone]  68y (1955) RH Nadine speaking woman with afib on eliquis, R MCA stroke 2013, L MCA Stroke 2019 with residual right hemiparesis and dysarthria, HTN, HLD, DM2, mitral stenosis/rheumatic valvular disease presenting as code stroke for right gaze preference and aphasia.  Son at bedside providing translation and collateral history reports 10 pm 11/24 patient went to bed at baseline. She woke up at 11:15 pm 11/24 with right gaze preference and was repeating phrases. Denies previous history of seizures, no tongue biting or incontinence or shaking of extremities. At baseline, patient ambulates with walker with assistance of family and requires assistance with most ADLs. On exam, Right gaze preference. CTH no acute findings, CTA Abrupt occlusion of the posterior P2 segment of the right PCA.    Impression: LHH & disorientation likely due to evolving R PCA infarct (in association with smaller R cerebellar infarct); mechanism is likely cardioembolic in the setting of valvular afib +/- KRISTAN thrombus.    Recommendations     [] Recommend Cardiology & Cardiothoracic surgery consultation to determine best course given patient's history.  [] No need for inpatient MRI brain w/o  contrast as unlikely to . Can be obtained in outpatient setting.   [] Likely need full dose anticoagulation, but appreciate input from the above parties. Has been on Coumadin in the past with poor compliance/results. Has had better success with full dose Lovenox SQ. Unclear if DOAC will be sufficient in the setting of valvular afib.   [] While on anticoagulation, no need to be on concomitant aspirin from the neurovascular standpoint.   [] Neurochecks: q4hr   [] BP goals: Gradual normotension, avoid hypotension   [] PT/OT: home PT   [] Diet: easy to chew diet, advance as indicated   [] HgA1C goals < 7.0   [] Lifestyle modifications: smoking cessation, exercise, and a Mediterranean style diet.   [] Patient should follow up with Stroke NP, Terri Morrison or Missy Cardenas, in clinic at [address] [place] [address]. Please email [email or web address] w/ basic PHI.     Patient case discussed and seen with stroke attending, Dr. Freitas.    Please call with questions: [phone]  68y (1955) RH Nadine speaking woman with afib on eliquis, R MCA stroke 2013, L MCA Stroke 2019 with residual right hemiparesis and dysarthria, HTN, HLD, DM2, mitral stenosis/rheumatic valvular disease presenting as code stroke for right gaze preference and aphasia.  Son at bedside providing translation and collateral history reports 10 pm 11/24 patient went to bed at baseline. She woke up at 11:15 pm 11/24 with right gaze preference and was repeating phrases. Denies previous history of seizures, no tongue biting or incontinence or shaking of extremities. At baseline, patient ambulates with walker with assistance of family and requires assistance with most ADLs. On exam, Right gaze preference. CTH no acute findings, CTA Abrupt occlusion of the posterior P2 segment of the right PCA.    Impression: LHH & disorientation likely due to evolving R PCA infarct (in association with smaller R cerebellar infarct); mechanism is likely cardioembolic in the setting of valvular afib +/- KRISTAN thrombus.    Recommendations     [] Recommend Cardiology & Cardiothoracic surgery consultation to determine best course given patient's history.  [] No need for inpatient MRI brain w/o  contrast as unlikely to . Can be obtained in outpatient setting.   [] Likely need full dose anticoagulation, but appreciate input from the above parties. Has been on Coumadin in the past with poor compliance/results. Has had better success with full dose Lovenox SQ. Unclear if DOAC will be sufficient in the setting of valvular afib.   [] While on anticoagulation, no need to be on concomitant aspirin from the neurovascular standpoint.   [] Neurochecks: q4hr   [] BP goals: Gradual normotension, avoid hypotension   [] PT/OT: home PT   [] Diet: easy to chew diet, advance as indicated   [] HgA1C goals < 7.0   [] Lifestyle modifications: smoking cessation, exercise, and a Mediterranean style diet.   [] Patient should follow up with Stroke NP, Terri Morrison or Missy Cardensa, in clinic at [address] [place] [address]. Please email [email or web address] w/ basic PHI.     Patient case discussed and seen with stroke attending, Dr. Freitas.    Please call with questions: [phone]

## 2023-11-27 NOTE — PROGRESS NOTE ADULT - PROBLEM SELECTOR PLAN 3
Incidental finding of RML collapsed and scattered micronodules measuring up to 8 mm in the right upper lobe.   Additional tiny cavitary micronodule in the right upper lobe.   -Risk factor for TB reviewed - [place] in [place] No personal hx of TB. No known exposure.  -Airborne isolation  -Quantiferon gold.  -f/u Sputum AFB x3 (8h apart)  -CT Chest w/ contrast ordered to further characterize area of concern, - noted Segmental atelectasis of the lateral segment of the right middle lobe. Mild interlobular septal thickening, left greater than right, may be seen   with asymmetric edema. Increased cluster of mediastinal lymph nodes, the largest measuring 1.1   cm in the right paratracheal station.  Per son pt had a viral URI recently

## 2023-11-27 NOTE — PROGRESS NOTE ADULT - PROBLEM SELECTOR PLAN 7
DVT ppx- lovenox Hgb 10.2  f/u iron, TIBC, ferritin  monitor CBC, maintain active type and screen, transfuse for Hgb<7.0

## 2023-11-27 NOTE — PROGRESS NOTE ADULT - ASSESSMENT
68F PMH AFib on Eliquis, CVA 2019 w/ R residual deficits (including speed deficits), HTN, DM2, mitral stenosis brought in by family for rightward gaze and repetative speech. 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 Neuro evaluated and thrombolytic therapy was deferred.

## 2023-11-27 NOTE — CHART NOTE - NSCHARTNOTEFT_GEN_A_CORE
Spoke to CTS service Dr. Singh in consultation with Dr. Lujan who agrees with AC per cardiology. Recommends BUBBA for further evaluation of intracardiac thrombus. CTS service will follow imaging no acute surgical intervention.

## 2023-11-27 NOTE — PROGRESS NOTE ADULT - PROBLEM SELECTOR PLAN 1
CTA with occlusion of R PCA P2 segment. Not TPA candidate.  CTH with acute/subacute infarcts within the right paramedian occipital lobe as well as the right cerebellar hemisphere. No hemorrhagic transformation.  f/u MRI brain as outpatient per neuro, f/u EEG  Upon discussion with neuro AC was resumed  -A1c/lipid panel: HBA1C 7.9,LDL at goal -44  -Permissive HTN <220/120 for first 24h. Gradual reduction.  -Passed dysphagia screen - diet ordered.  -PT/OT evaluation.   -Cont telemetry. Neuro checks.

## 2023-11-27 NOTE — CONSULT NOTE ADULT - SUBJECTIVE AND OBJECTIVE BOX
Chintan Owusu MD  Interventional Cardiology / Endovascular Specialist  [place] Office : [address] [place] [address] [place] [address]  Tel:   [place] Office : [address] [facility] [place] [address]  Tel: [phone]        HISTORY OF PRESENTING ILLNESS:  68y (1955) RH Nadine speaking woman with afib on eliquis, R MCA stroke 2013, L MCA Stroke 2019 with residual right hemiparesis and dysarthria, HTN, HLD, DM2, mitral stenosis/rheumatic valvular disease presenting as code stroke for right gaze preference and aphasia.  Son at bedside providing translation and collateral history reports 10 pm 11/24 patient went to bed at baseline. She woke up at 11:15 pm 11/24 with right gaze preference and was repeating phrases. Denies previous history of seizures, no tongue biting or incontinence or shaking of extremities. At baseline, patient ambulates with walker with assistance of family and requires assistance with most ADLs.     PAST MEDICAL & SURGICAL HISTORY:  Chronic atrial fibrillation      CVA (cerebrovascular accident)      HTN (hypertension)      DM (diabetes mellitus), type 2      H/O hernia repair          SOCIAL HISTORY: Substance Use (street drugs): ( x ) never used  (  ) other:    FAMILY HISTORY:  No pertinent family history in first degree relatives        REVIEW OF SYSTEMS:  CONSTITUTIONAL: No fever, weight loss, or fatigue  EYES: No eye pain, visual disturbances, or discharge  ENMT:  No difficulty hearing, tinnitus, vertigo; No sinus or throat pain  BREASTS: No pain, masses, or nipple discharge  GASTROINTESTINAL: No abdominal or epigastric pain. No nausea, vomiting, or hematemesis; No diarrhea or constipation. No melena or hematochezia.  GENITOURINARY: No dysuria, frequency, hematuria, or incontinence  NEUROLOGICAL: No headaches, memory loss, loss of strength, numbness, or tremors  ENDOCRINE: No heat or cold intolerance; No hair loss  MUSCULOSKELETAL: No joint pain or swelling; No muscle, back, or extremity pain  PSYCHIATRIC: No depression, anxiety, mood swings, or difficulty sleeping  HEME/LYMPH: No easy bruising, or bleeding gums  All others negative    MEDICATIONS:  apixaban 5 milliGRAM(s) Oral every 12 hours      sodium chloride 3%  Inhalation 4 milliLiter(s) Inhalation every 8 hours    levETIRAcetam  IVPB 750 milliGRAM(s) IV Intermittent every 12 hours  mirtazapine 15 milliGRAM(s) Oral at bedtime      atorvastatin 80 milliGRAM(s) Oral at bedtime  dextrose 50% Injectable 25 Gram(s) IV Push once  dextrose 50% Injectable 25 Gram(s) IV Push once  dextrose 50% Injectable 12.5 Gram(s) IV Push once  dextrose Oral Gel 15 Gram(s) Oral once PRN  glucagon  Injectable 1 milliGRAM(s) IntraMuscular once  insulin lispro (ADMELOG) corrective regimen sliding scale   SubCutaneous three times a day before meals  insulin lispro (ADMELOG) corrective regimen sliding scale   SubCutaneous at bedtime    dextrose 5%. 1000 milliLiter(s) IV Continuous <Continuous>  dextrose 5%. 1000 milliLiter(s) IV Continuous <Continuous>      FAMILY HISTORY:  No pertinent family history in first degree relatives          Allergies    No Known Allergies    Intolerances    	      PHYSICAL EXAM:  T(C): 36.6 (11-27-23 @ 16:13), Max: 36.9 (11-26-23 @ 20:00)  HR: 92 (11-27-23 @ 16:13) (73 - 98)  BP: 128/68 (11-27-23 @ 16:13) (117/59 - 148/77)  RR: 18 (11-27-23 @ 16:13) (16 - 18)  SpO2: 99% (11-27-23 @ 16:13) (96% - 100%)  Wt(kg): --  I&O's Summary    26 Nov 2023 07:01  -  27 Nov 2023 07:00  --------------------------------------------------------  IN: 625 mL / OUT: 1050 mL / NET: -425 mL        GENERAL: NAD, well-groomed, well-developed  EYES: conjunctiva and sclera clear  ENMT: No tonsillar erythema, exudates, or enlargement;  Cardiovascular: Normal S1 S2, No JVD, No murmurs, No edema  Respiratory: Lungs clear to auscultation	  Gastrointestinal:  Soft, Non-tender, + BS	  Extremities: No edema        LABS:	 	    CARDIAC MARKERS:                                  10.2   14.39 )-----------( 468      ( 27 Nov 2023 05:41 )             32.6     11-27    138  |  101  |  18  ----------------------------<  154<H>  4.6   |  26  |  0.85    Ca    9.1      27 Nov 2023 05:41  Phos  3.7     11-27  Mg     1.90     11-27      proBNP:   Lipid Profile:   HgA1c:   TSH:     Consultant(s) Notes Reviewed:  [x ] YES  [ ] NO    Care Discussed with Consultants/Other Providers [ x] YES  [ ] NO    Imaging Personally Reviewed independently:  [x] YES  [ ] NO    All labs, radiologic studies, vitals, orders and medications list reviewed. Patient is seen and examined at bedside. Case discussed with medical team.    ASSESSMENT/PLAN: 	   Chintan Owusu MD  Interventional Cardiology / Endovascular Specialist  [place] Office : [address] [place] [address] [place] [address]  Tel:   [place] Office : [address] [facility] [place] [address]  Tel: [phone]        HISTORY OF PRESENTING ILLNESS:  68y (1955) RH Nadine speaking woman with afib on eliquis, R MCA stroke 2013, L MCA Stroke 2019 with residual right hemiparesis and dysarthria, HTN, HLD, DM2, mitral stenosis/rheumatic valvular disease presenting as code stroke for right gaze preference and aphasia.  Son at bedside providing translation and collateral history reports 10 pm 11/24 patient went to bed at baseline. She woke up at 11:15 pm 11/24 with right gaze preference and was repeating phrases. Denies previous history of seizures, no tongue biting or incontinence or shaking of extremities. At baseline, patient ambulates with walker with assistance of family and requires assistance with most ADLs.     PAST MEDICAL & SURGICAL HISTORY:  Chronic atrial fibrillation      CVA (cerebrovascular accident)      HTN (hypertension)      DM (diabetes mellitus), type 2      H/O hernia repair          SOCIAL HISTORY: Substance Use (street drugs): ( x ) never used  (  ) other:    FAMILY HISTORY:  No pertinent family history in first degree relatives        REVIEW OF SYSTEMS:  CONSTITUTIONAL: No fever, weight loss, or fatigue  EYES: No eye pain, visual disturbances, or discharge  ENMT:  No difficulty hearing, tinnitus, vertigo; No sinus or throat pain  BREASTS: No pain, masses, or nipple discharge  GASTROINTESTINAL: No abdominal or epigastric pain. No nausea, vomiting, or hematemesis; No diarrhea or constipation. No melena or hematochezia.  GENITOURINARY: No dysuria, frequency, hematuria, or incontinence  NEUROLOGICAL: No headaches, memory loss, loss of strength, numbness, or tremors  ENDOCRINE: No heat or cold intolerance; No hair loss  MUSCULOSKELETAL: No joint pain or swelling; No muscle, back, or extremity pain  PSYCHIATRIC: No depression, anxiety, mood swings, or difficulty sleeping  HEME/LYMPH: No easy bruising, or bleeding gums  All others negative    MEDICATIONS:  apixaban 5 milliGRAM(s) Oral every 12 hours      sodium chloride 3%  Inhalation 4 milliLiter(s) Inhalation every 8 hours    levETIRAcetam  IVPB 750 milliGRAM(s) IV Intermittent every 12 hours  mirtazapine 15 milliGRAM(s) Oral at bedtime      atorvastatin 80 milliGRAM(s) Oral at bedtime  dextrose 50% Injectable 25 Gram(s) IV Push once  dextrose 50% Injectable 25 Gram(s) IV Push once  dextrose 50% Injectable 12.5 Gram(s) IV Push once  dextrose Oral Gel 15 Gram(s) Oral once PRN  glucagon  Injectable 1 milliGRAM(s) IntraMuscular once  insulin lispro (ADMELOG) corrective regimen sliding scale   SubCutaneous three times a day before meals  insulin lispro (ADMELOG) corrective regimen sliding scale   SubCutaneous at bedtime    dextrose 5%. 1000 milliLiter(s) IV Continuous <Continuous>  dextrose 5%. 1000 milliLiter(s) IV Continuous <Continuous>      FAMILY HISTORY:  No pertinent family history in first degree relatives          Allergies    No Known Allergies    Intolerances    	      PHYSICAL EXAM:  T(C): 36.6 (11-27-23 @ 16:13), Max: 36.9 (11-26-23 @ 20:00)  HR: 92 (11-27-23 @ 16:13) (73 - 98)  BP: 128/68 (11-27-23 @ 16:13) (117/59 - 148/77)  RR: 18 (11-27-23 @ 16:13) (16 - 18)  SpO2: 99% (11-27-23 @ 16:13) (96% - 100%)  Wt(kg): --  I&O's Summary    26 Nov 2023 07:01  -  27 Nov 2023 07:00  --------------------------------------------------------  IN: 625 mL / OUT: 1050 mL / NET: -425 mL        GENERAL: NAD, well-groomed, well-developed  EYES: conjunctiva and sclera clear  ENMT: No tonsillar erythema, exudates, or enlargement;  Cardiovascular: Normal S1 S2, No JVD, No murmurs, No edema  Respiratory: Lungs clear to auscultation	  Gastrointestinal:  Soft, Non-tender, + BS	  Extremities: No edema        LABS:	 	    CARDIAC MARKERS:                                  10.2   14.39 )-----------( 468      ( 27 Nov 2023 05:41 )             32.6     11-27    138  |  101  |  18  ----------------------------<  154<H>  4.6   |  26  |  0.85    Ca    9.1      27 Nov 2023 05:41  Phos  3.7     11-27  Mg     1.90     11-27      proBNP:   Lipid Profile:   HgA1c:   TSH:     Consultant(s) Notes Reviewed:  [x ] YES  [ ] NO    Care Discussed with Consultants/Other Providers [ x] YES  [ ] NO    Imaging Personally Reviewed independently:  [x] YES  [ ] NO    All labs, radiologic studies, vitals, orders and medications list reviewed. Patient is seen and examined at bedside. Case discussed with medical team.    ASSESSMENT/PLAN: 	   Chintan Owusu MD  Interventional Cardiology / Endovascular Specialist  [place] Office : [address] [place] [address] [place] [address]  Tel:   [place] Office : [address] [facility] [place] [address]  Tel: [phone]        HISTORY OF PRESENTING ILLNESS:  68y (1955) RH Nadine speaking woman with afib on eliquis, R MCA stroke 2013, L MCA Stroke 2019 with residual right hemiparesis and dysarthria, HTN, HLD, DM2, mitral stenosis/rheumatic valvular disease presenting as code stroke for right gaze preference and aphasia.  Son at bedside providing translation and collateral history reports 10 pm 11/24 patient went to bed at baseline. She woke up at 11:15 pm 11/24 with right gaze preference and was repeating phrases. Denies previous history of seizures, no tongue biting or incontinence or shaking of extremities. At baseline, patient ambulates with walker with assistance of family and requires assistance with most ADLs.     PAST MEDICAL & SURGICAL HISTORY:  Chronic atrial fibrillation      CVA (cerebrovascular accident)      HTN (hypertension)      DM (diabetes mellitus), type 2      H/O hernia repair          SOCIAL HISTORY: Substance Use (street drugs): ( x ) never used  (  ) other:    FAMILY HISTORY:  No pertinent family history in first degree relatives        REVIEW OF SYSTEMS:  CONSTITUTIONAL: No fever, weight loss, or fatigue  EYES: No eye pain, visual disturbances, or discharge  ENMT:  No difficulty hearing, tinnitus, vertigo; No sinus or throat pain  BREASTS: No pain, masses, or nipple discharge  GASTROINTESTINAL: No abdominal or epigastric pain. No nausea, vomiting, or hematemesis; No diarrhea or constipation. No melena or hematochezia.  GENITOURINARY: No dysuria, frequency, hematuria, or incontinence  NEUROLOGICAL: No headaches, memory loss, loss of strength, numbness, or tremors  ENDOCRINE: No heat or cold intolerance; No hair loss  MUSCULOSKELETAL: No joint pain or swelling; No muscle, back, or extremity pain  PSYCHIATRIC: No depression, anxiety, mood swings, or difficulty sleeping  HEME/LYMPH: No easy bruising, or bleeding gums  All others negative    MEDICATIONS:  apixaban 5 milliGRAM(s) Oral every 12 hours      sodium chloride 3%  Inhalation 4 milliLiter(s) Inhalation every 8 hours    levETIRAcetam  IVPB 750 milliGRAM(s) IV Intermittent every 12 hours  mirtazapine 15 milliGRAM(s) Oral at bedtime      atorvastatin 80 milliGRAM(s) Oral at bedtime  dextrose 50% Injectable 25 Gram(s) IV Push once  dextrose 50% Injectable 25 Gram(s) IV Push once  dextrose 50% Injectable 12.5 Gram(s) IV Push once  dextrose Oral Gel 15 Gram(s) Oral once PRN  glucagon  Injectable 1 milliGRAM(s) IntraMuscular once  insulin lispro (ADMELOG) corrective regimen sliding scale   SubCutaneous three times a day before meals  insulin lispro (ADMELOG) corrective regimen sliding scale   SubCutaneous at bedtime    dextrose 5%. 1000 milliLiter(s) IV Continuous <Continuous>  dextrose 5%. 1000 milliLiter(s) IV Continuous <Continuous>      FAMILY HISTORY:  No pertinent family history in first degree relatives          Allergies    No Known Allergies    Intolerances    	      PHYSICAL EXAM:  T(C): 36.6 (11-27-23 @ 16:13), Max: 36.9 (11-26-23 @ 20:00)  HR: 92 (11-27-23 @ 16:13) (73 - 98)  BP: 128/68 (11-27-23 @ 16:13) (117/59 - 148/77)  RR: 18 (11-27-23 @ 16:13) (16 - 18)  SpO2: 99% (11-27-23 @ 16:13) (96% - 100%)  Wt(kg): --  I&O's Summary    26 Nov 2023 07:01  -  27 Nov 2023 07:00  --------------------------------------------------------  IN: 625 mL / OUT: 1050 mL / NET: -425 mL        GENERAL: NAD, well-groomed, well-developed  EYES: conjunctiva and sclera clear  ENMT: No tonsillar erythema, exudates, or enlargement;  Cardiovascular: Normal S1 S2, No JVD, No murmurs, No edema  Respiratory: Lungs clear to auscultation	  Gastrointestinal:  Soft, Non-tender, + BS	  Extremities: No edema        LABS:	 	    CARDIAC MARKERS:                                  10.2   14.39 )-----------( 468      ( 27 Nov 2023 05:41 )             32.6     11-27    138  |  101  |  18  ----------------------------<  154<H>  4.6   |  26  |  0.85    Ca    9.1      27 Nov 2023 05:41  Phos  3.7     11-27  Mg     1.90     11-27      proBNP:   Lipid Profile:   HgA1c:   TSH:     Consultant(s) Notes Reviewed:  [x ] YES  [ ] NO    Care Discussed with Consultants/Other Providers [ x] YES  [ ] NO    Imaging Personally Reviewed independently:  [x] YES  [ ] NO    All labs, radiologic studies, vitals, orders and medications list reviewed. Patient is seen and examined at bedside. Case discussed with medical team.    ASSESSMENT/PLAN:

## 2023-11-28 NOTE — PROGRESS NOTE ADULT - PROBLEM SELECTOR PLAN 7
Hgb 10.1  iron studies reviewed  monitor CBC, maintain active type and screen, transfuse for Hgb<7.0

## 2023-11-28 NOTE — CHART NOTE - NSCHARTNOTESELECT_GEN_ALL_CORE
CTH results/Event Note
Event Note
Neurology
Neurology Resident/Event Note

## 2023-11-28 NOTE — CHART NOTE - NSCHARTNOTEFT_GEN_A_CORE
rEEG performed. Report as below:       "EEG Classification / Summary:  Abnormal routine EEG in the awake state.  Right and left temporal focal slowing.  Mild diffuse slowing.  No epileptiform abnormalities are captured.     Clinical Impression:  Right and left temporal focal cerebral dysfunction can be structural or functional in etiology.   Mild diffuse cerebral dysfunction is nonspecific in etiology.   Single-lead EKG shows irregularly irregular rhythm."    At this time, okay to discontinue EEG and okay to discontinue Keppra 750 mg BID as the suspicion seizure event is low. Altered mental status & R gaze preference (which in hindsight was due to a LHH) explained by R PCA infarct.     Agree with full dose Lovenox for secondary prevention of cardioembolsim. Okay with TTE/BUBBA as per cardiology/CTS, less likely to change our management. No further inpatient neurovascular workup required at this time. Signing off, please reconsult PRN.      Please call with questions: [id number] rEEG performed. Report as below:       "EEG Classification / Summary:  Abnormal routine EEG in the awake state.  Right and left temporal focal slowing.  Mild diffuse slowing.  No epileptiform abnormalities are captured.     Clinical Impression:  Right and left temporal focal cerebral dysfunction can be structural or functional in etiology.   Mild diffuse cerebral dysfunction is nonspecific in etiology.   Single-lead EKG shows irregularly irregular rhythm."    At this time, okay to discontinue EEG and okay to discontinue Keppra 750 mg BID as the suspicion seizure event is low. Altered mental status & R gaze preference (which in hindsight was due to a LHH) explained by R PCA infarct.     Agree with full dose Lovenox for secondary prevention of cardioembolsim. Okay with TTE/BUBBA as per cardiology/CTS, less likely to change our management. No further inpatient neurovascular workup required at this time. Signing off, please reconsult PRN.      Please call with questions: [phone] rEEG performed. Report as below:       "EEG Classification / Summary:  Abnormal routine EEG in the awake state.  Right and left temporal focal slowing.  Mild diffuse slowing.  No epileptiform abnormalities are captured.     Clinical Impression:  Right and left temporal focal cerebral dysfunction can be structural or functional in etiology.   Mild diffuse cerebral dysfunction is nonspecific in etiology.   Single-lead EKG shows irregularly irregular rhythm."    At this time, okay to discontinue EEG and okay to discontinue Keppra 750 mg BID as the suspicion seizure event is low. Altered mental status & R gaze preference (which in hindsight was due to a LHH) explained by R PCA infarct.     Agree with full dose Lovenox for secondary prevention of cardioembolsim. Okay with TTE/BUBBA as per cardiology/CTS, less likely to change our management. No further inpatient neurovascular workup required at this time. Signing off, please reconsult PRN.      Please call with questions: [phone]

## 2023-11-28 NOTE — PROGRESS NOTE ADULT - SUBJECTIVE AND OBJECTIVE BOX
Chintan Owusu MD  Interventional Cardiology / Advance Heart Failure and Cardiac Transplant Specialist  [place] Office : [address] [place] [id number]  Tel:   [place] Office : [address] [facility] [address]  Tel: [phone]      Subjective/Overnight events: Pt is lying in bed comfortable not in distress, no chest pains no SOB no palpitations  	  MEDICATIONS:  enoxaparin Injectable 60 milliGRAM(s) SubCutaneous every 12 hours  metoprolol tartrate 25 milliGRAM(s) Oral two times a day      sodium chloride 3%  Inhalation 4 milliLiter(s) Inhalation every 8 hours    levETIRAcetam  IVPB 750 milliGRAM(s) IV Intermittent every 12 hours  mirtazapine 15 milliGRAM(s) Oral at bedtime      atorvastatin 80 milliGRAM(s) Oral at bedtime  dextrose 50% Injectable 25 Gram(s) IV Push once  dextrose 50% Injectable 25 Gram(s) IV Push once  dextrose 50% Injectable 12.5 Gram(s) IV Push once  dextrose Oral Gel 15 Gram(s) Oral once PRN  glucagon  Injectable 1 milliGRAM(s) IntraMuscular once  insulin lispro (ADMELOG) corrective regimen sliding scale   SubCutaneous at bedtime  insulin lispro (ADMELOG) corrective regimen sliding scale   SubCutaneous three times a day before meals    dextrose 5%. 1000 milliLiter(s) IV Continuous <Continuous>  dextrose 5%. 1000 milliLiter(s) IV Continuous <Continuous>      PAST MEDICAL/SURGICAL HISTORY  PAST MEDICAL & SURGICAL HISTORY:  Chronic atrial fibrillation      CVA (cerebrovascular accident)      HTN (hypertension)      DM (diabetes mellitus), type 2      H/O hernia repair          SOCIAL HISTORY: Substance Use (street drugs): ( x ) never used  (  ) other:    FAMILY HISTORY:  No pertinent family history in first degree relatives          PHYSICAL EXAM:  T(C): 36.3 (11-28-23 @ 11:41), Max: 36.7 (11-28-23 @ 04:30)  HR: 62 (11-28-23 @ 11:41) (62 - 92)  BP: 147/85 (11-28-23 @ 11:41) (128/68 - 150/50)  RR: 18 (11-28-23 @ 11:41) (18 - 18)  SpO2: 98% (11-28-23 @ 11:41) (96% - 99%)  Wt(kg): --  I&O's Summary    27 Nov 2023 07:01  -  28 Nov 2023 07:00  --------------------------------------------------------  IN: 300 mL / OUT: 500 mL / NET: -200 mL          GENERAL: NAD  EYES:  conjunctiva and sclera clear  ENMT: No tonsillar erythema, exudates, or enlargement  Cardiovascular: Normal S1 S2, No JVD, No murmurs, No edema  Respiratory: Lungs clear to auscultation	  Gastrointestinal:  Soft, Non-tender, + BS	  Extremities: No edema                                     10.1   12.40 )-----------( 458      ( 28 Nov 2023 06:43 )             31.5     11-28    139  |  102  |  12  ----------------------------<  170<H>  4.4   |  27  |  0.73    Ca    9.1      28 Nov 2023 06:43  Phos  3.2     11-28  Mg     1.80     11-28      proBNP:   Lipid Profile:   HgA1c:   TSH: Thyroid Stimulating Hormone, Serum: 1.58 uIU/mL (11-28 @ 06:43)      Consultant(s) Notes Reviewed:  [x ] YES  [ ] NO    Care Discussed with Consultants/Other Providers [ x] YES  [ ] NO    Imaging Personally Reviewed independently:  [x] YES  [ ] NO    All labs, radiologic studies, vitals, orders and medications list reviewed. Patient is seen and examined at bedside. Case discussed with medical team.         Chintan Owusu MD  Interventional Cardiology / Advance Heart Failure and Cardiac Transplant Specialist  [place] Office : [address] [place] [address]  Tel:   [place] Office : [address] [facility] [address]  Tel: [phone]      Subjective/Overnight events: Pt is lying in bed comfortable not in distress, no chest pains no SOB no palpitations  	  MEDICATIONS:  enoxaparin Injectable 60 milliGRAM(s) SubCutaneous every 12 hours  metoprolol tartrate 25 milliGRAM(s) Oral two times a day      sodium chloride 3%  Inhalation 4 milliLiter(s) Inhalation every 8 hours    levETIRAcetam  IVPB 750 milliGRAM(s) IV Intermittent every 12 hours  mirtazapine 15 milliGRAM(s) Oral at bedtime      atorvastatin 80 milliGRAM(s) Oral at bedtime  dextrose 50% Injectable 25 Gram(s) IV Push once  dextrose 50% Injectable 25 Gram(s) IV Push once  dextrose 50% Injectable 12.5 Gram(s) IV Push once  dextrose Oral Gel 15 Gram(s) Oral once PRN  glucagon  Injectable 1 milliGRAM(s) IntraMuscular once  insulin lispro (ADMELOG) corrective regimen sliding scale   SubCutaneous at bedtime  insulin lispro (ADMELOG) corrective regimen sliding scale   SubCutaneous three times a day before meals    dextrose 5%. 1000 milliLiter(s) IV Continuous <Continuous>  dextrose 5%. 1000 milliLiter(s) IV Continuous <Continuous>      PAST MEDICAL/SURGICAL HISTORY  PAST MEDICAL & SURGICAL HISTORY:  Chronic atrial fibrillation      CVA (cerebrovascular accident)      HTN (hypertension)      DM (diabetes mellitus), type 2      H/O hernia repair          SOCIAL HISTORY: Substance Use (street drugs): ( x ) never used  (  ) other:    FAMILY HISTORY:  No pertinent family history in first degree relatives          PHYSICAL EXAM:  T(C): 36.3 (11-28-23 @ 11:41), Max: 36.7 (11-28-23 @ 04:30)  HR: 62 (11-28-23 @ 11:41) (62 - 92)  BP: 147/85 (11-28-23 @ 11:41) (128/68 - 150/50)  RR: 18 (11-28-23 @ 11:41) (18 - 18)  SpO2: 98% (11-28-23 @ 11:41) (96% - 99%)  Wt(kg): --  I&O's Summary    27 Nov 2023 07:01  -  28 Nov 2023 07:00  --------------------------------------------------------  IN: 300 mL / OUT: 500 mL / NET: -200 mL          GENERAL: NAD  EYES:  conjunctiva and sclera clear  ENMT: No tonsillar erythema, exudates, or enlargement  Cardiovascular: Normal S1 S2, No JVD, No murmurs, No edema  Respiratory: Lungs clear to auscultation	  Gastrointestinal:  Soft, Non-tender, + BS	  Extremities: No edema                                     10.1   12.40 )-----------( 458      ( 28 Nov 2023 06:43 )             31.5     11-28    139  |  102  |  12  ----------------------------<  170<H>  4.4   |  27  |  0.73    Ca    9.1      28 Nov 2023 06:43  Phos  3.2     11-28  Mg     1.80     11-28      proBNP:   Lipid Profile:   HgA1c:   TSH: Thyroid Stimulating Hormone, Serum: 1.58 uIU/mL (11-28 @ 06:43)      Consultant(s) Notes Reviewed:  [x ] YES  [ ] NO    Care Discussed with Consultants/Other Providers [ x] YES  [ ] NO    Imaging Personally Reviewed independently:  [x] YES  [ ] NO    All labs, radiologic studies, vitals, orders and medications list reviewed. Patient is seen and examined at bedside. Case discussed with medical team.         Chintan Owusu MD  Interventional Cardiology / Advance Heart Failure and Cardiac Transplant Specialist  [place] Office : [address] [place] [address]  Tel:   [place] Office : [address] [facility] [address]  Tel: [phone]      Subjective/Overnight events: Pt is lying in bed comfortable not in distress, no chest pains no SOB no palpitations  	  MEDICATIONS:  enoxaparin Injectable 60 milliGRAM(s) SubCutaneous every 12 hours  metoprolol tartrate 25 milliGRAM(s) Oral two times a day      sodium chloride 3%  Inhalation 4 milliLiter(s) Inhalation every 8 hours    levETIRAcetam  IVPB 750 milliGRAM(s) IV Intermittent every 12 hours  mirtazapine 15 milliGRAM(s) Oral at bedtime      atorvastatin 80 milliGRAM(s) Oral at bedtime  dextrose 50% Injectable 25 Gram(s) IV Push once  dextrose 50% Injectable 25 Gram(s) IV Push once  dextrose 50% Injectable 12.5 Gram(s) IV Push once  dextrose Oral Gel 15 Gram(s) Oral once PRN  glucagon  Injectable 1 milliGRAM(s) IntraMuscular once  insulin lispro (ADMELOG) corrective regimen sliding scale   SubCutaneous at bedtime  insulin lispro (ADMELOG) corrective regimen sliding scale   SubCutaneous three times a day before meals    dextrose 5%. 1000 milliLiter(s) IV Continuous <Continuous>  dextrose 5%. 1000 milliLiter(s) IV Continuous <Continuous>      PAST MEDICAL/SURGICAL HISTORY  PAST MEDICAL & SURGICAL HISTORY:  Chronic atrial fibrillation      CVA (cerebrovascular accident)      HTN (hypertension)      DM (diabetes mellitus), type 2      H/O hernia repair          SOCIAL HISTORY: Substance Use (street drugs): ( x ) never used  (  ) other:    FAMILY HISTORY:  No pertinent family history in first degree relatives          PHYSICAL EXAM:  T(C): 36.3 (11-28-23 @ 11:41), Max: 36.7 (11-28-23 @ 04:30)  HR: 62 (11-28-23 @ 11:41) (62 - 92)  BP: 147/85 (11-28-23 @ 11:41) (128/68 - 150/50)  RR: 18 (11-28-23 @ 11:41) (18 - 18)  SpO2: 98% (11-28-23 @ 11:41) (96% - 99%)  Wt(kg): --  I&O's Summary    27 Nov 2023 07:01  -  28 Nov 2023 07:00  --------------------------------------------------------  IN: 300 mL / OUT: 500 mL / NET: -200 mL          GENERAL: NAD  EYES:  conjunctiva and sclera clear  ENMT: No tonsillar erythema, exudates, or enlargement  Cardiovascular: Normal S1 S2, No JVD, No murmurs, No edema  Respiratory: Lungs clear to auscultation	  Gastrointestinal:  Soft, Non-tender, + BS	  Extremities: No edema                                     10.1   12.40 )-----------( 458      ( 28 Nov 2023 06:43 )             31.5     11-28    139  |  102  |  12  ----------------------------<  170<H>  4.4   |  27  |  0.73    Ca    9.1      28 Nov 2023 06:43  Phos  3.2     11-28  Mg     1.80     11-28      proBNP:   Lipid Profile:   HgA1c:   TSH: Thyroid Stimulating Hormone, Serum: 1.58 uIU/mL (11-28 @ 06:43)      Consultant(s) Notes Reviewed:  [x ] YES  [ ] NO    Care Discussed with Consultants/Other Providers [ x] YES  [ ] NO    Imaging Personally Reviewed independently:  [x] YES  [ ] NO    All labs, radiologic studies, vitals, orders and medications list reviewed. Patient is seen and examined at bedside. Case discussed with medical team.

## 2023-11-28 NOTE — PROGRESS NOTE ADULT - SUBJECTIVE AND OBJECTIVE BOX
[facility] Division of Hospital Medicine  Jean Carrillo DO  Available via MS Teams  Pager:[phone]    SUBJECTIVE / OVERNIGHT EVENTS: No acute events overnight. Patient denies any chest pain, palpitations, shortness of breath.    ADDITIONAL REVIEW OF SYSTEMS:  Review of Systems:   CONSTITUTIONAL: No fever, weight loss  EYES: No eye pain, visual disturbances, or discharge  ENMT:  No difficulty hearing, tinnitus, vertigo; No sinus or throat pain  RESPIRATORY: No SOB. No cough, wheezing, chills or hemoptysis  CARDIOVASCULAR: No chest pain, palpitations, dizziness, or leg swelling  GASTROINTESTINAL: No abdominal or epigastric pain. No nausea, vomiting, or hematemesis; No diarrhea or constipation. No melena or hematochezia.  GENITOURINARY: No dysuria, frequency, hematuria, or incontinence  NEUROLOGICAL: No headaches, memory loss, loss of strength, numbness, or tremors  SKIN: No itching, burning, rashes, or lesions   LYMPH NODES: No enlarged glands  ENDOCRINE: No heat or cold intolerance; No hair loss  MUSCULOSKELETAL: No joint pain or swelling; No muscle, back pain  PSYCHIATRIC: No depression, anxiety, mood swings, or difficulty sleeping  HEME/LYMPH: No easy bruising, or bleeding gums      MEDICATIONS  (STANDING):  atorvastatin 80 milliGRAM(s) Oral at bedtime  dextrose 5%. 1000 milliLiter(s) (50 mL/Hr) IV Continuous <Continuous>  dextrose 5%. 1000 milliLiter(s) (100 mL/Hr) IV Continuous <Continuous>  dextrose 50% Injectable 12.5 Gram(s) IV Push once  dextrose 50% Injectable 25 Gram(s) IV Push once  dextrose 50% Injectable 25 Gram(s) IV Push once  enoxaparin Injectable 60 milliGRAM(s) SubCutaneous every 12 hours  glucagon  Injectable 1 milliGRAM(s) IntraMuscular once  insulin lispro (ADMELOG) corrective regimen sliding scale   SubCutaneous three times a day before meals  insulin lispro (ADMELOG) corrective regimen sliding scale   SubCutaneous at bedtime  metoprolol tartrate 25 milliGRAM(s) Oral two times a day  mirtazapine 15 milliGRAM(s) Oral at bedtime  sodium chloride 7% Inhalation 4 milliLiter(s) Inhalation every 12 hours    MEDICATIONS  (PRN):  dextrose Oral Gel 15 Gram(s) Oral once PRN Blood Glucose LESS THAN 70 milliGRAM(s)/deciliter      I&O's Summary    27 Nov 2023 07:01  -  28 Nov 2023 07:00  --------------------------------------------------------  IN: 300 mL / OUT: 500 mL / NET: -200 mL    28 Nov 2023 07:01  -  28 Nov 2023 18:37  --------------------------------------------------------  IN: 0 mL / OUT: 500 mL / NET: -500 mL        PHYSICAL EXAM:  Vital Signs Last 24 Hrs  T(C): 36.5 (28 Nov 2023 15:52), Max: 36.7 (28 Nov 2023 04:30)  T(F): 97.7 (28 Nov 2023 15:52), Max: 98.1 (28 Nov 2023 04:30)  HR: 86 (28 Nov 2023 15:57) (62 - 89)  BP: 142/83 (28 Nov 2023 15:52) (134/58 - 150/50)  BP(mean): --  RR: 18 (28 Nov 2023 15:52) (18 - 18)  SpO2: 100% (28 Nov 2023 15:52) (96% - 100%)    Parameters below as of 28 Nov 2023 15:57  Patient On (Oxygen Delivery Method): room air      CONSTITUTIONAL: NAD, well-groomed  EYES: PERRLA; conjunctiva and sclera clear  ENMT: Moist oral mucosa, no pharyngeal injection or exudates; normal dentition  NECK: Supple, no palpable masses; no thyromegaly  RESPIRATORY: Normal respiratory effort; lungs are clear to auscultation bilaterally  CARDIOVASCULAR: Regular rate and rhythm, normal S1 and S2, no murmur/rub/gallop; No lower extremity edema; Peripheral pulses are 2+ bilaterally  ABDOMEN: Nontender to palpation, normoactive bowel sounds, no rebound/guarding; No hepatosplenomegaly  MUSCULOSKELETAL:  Normal gait; no clubbing or cyanosis of digits; no joint swelling or tenderness to palpation  PSYCH: A+O to person, place, and time; affect appropriate  NEUROLOGY: CN 2-12 are intact and symmetric; no gross sensory deficits   SKIN: No rashes; no palpable lesions    LABS:                        10.1   12.40 )-----------( 458      ( 28 Nov 2023 06:43 )             31.5     11-28    139  |  102  |  12  ----------------------------<  170<H>  4.4   |  27  |  0.73    Ca    9.1      28 Nov 2023 06:43  Phos  3.2     11-28  Mg     1.80     11-28            Urinalysis Basic - ( 28 Nov 2023 06:43 )    Color: x / Appearance: x / SG: x / pH: x  Gluc: 170 mg/dL / Ketone: x  / Bili: x / Urobili: x   Blood: x / Protein: x / Nitrite: x   Leuk Esterase: x / RBC: x / WBC x   Sq Epi: x / Non Sq Epi: x / Bacteria: x            RADIOLOGY & ADDITIONAL TESTS:  New Imaging Personally Reviewed Today: Yes  New Electrocardiogram Personally Reviewed Today: N/A  Other Results Reviewed Today: Yes  Prior or Outpatient Records Reviewed Today with Summary: Yes    COORDINATION OF CARE:  Consultant Communication and Details of Discussion (where applicable): Yes     [facility] Division of Hospital Medicine  Jean Carrillo DO  Available via MS Teams  Pager:[phone]    SUBJECTIVE / OVERNIGHT EVENTS: No acute events overnight. Patient denies any chest pain, palpitations, shortness of breath.    ADDITIONAL REVIEW OF SYSTEMS:  Review of Systems:   CONSTITUTIONAL: No fever, weight loss  EYES: No eye pain, visual disturbances, or discharge  ENMT:  No difficulty hearing, tinnitus, vertigo; No sinus or throat pain  RESPIRATORY: No SOB. No cough, wheezing, chills or hemoptysis  CARDIOVASCULAR: No chest pain, palpitations, dizziness, or leg swelling  GASTROINTESTINAL: No abdominal or epigastric pain. No nausea, vomiting, or hematemesis; No diarrhea or constipation. No melena or hematochezia.  GENITOURINARY: No dysuria, frequency, hematuria, or incontinence  NEUROLOGICAL: No headaches, memory loss, loss of strength, numbness, or tremors  SKIN: No itching, burning, rashes, or lesions   LYMPH NODES: No enlarged glands  ENDOCRINE: No heat or cold intolerance; No hair loss  MUSCULOSKELETAL: No joint pain or swelling; No muscle, back pain  PSYCHIATRIC: No depression, anxiety, mood swings, or difficulty sleeping  HEME/LYMPH: No easy bruising, or bleeding gums      MEDICATIONS  (STANDING):  atorvastatin 80 milliGRAM(s) Oral at bedtime  dextrose 5%. 1000 milliLiter(s) (50 mL/Hr) IV Continuous <Continuous>  dextrose 5%. 1000 milliLiter(s) (100 mL/Hr) IV Continuous <Continuous>  dextrose 50% Injectable 12.5 Gram(s) IV Push once  dextrose 50% Injectable 25 Gram(s) IV Push once  dextrose 50% Injectable 25 Gram(s) IV Push once  enoxaparin Injectable 60 milliGRAM(s) SubCutaneous every 12 hours  glucagon  Injectable 1 milliGRAM(s) IntraMuscular once  insulin lispro (ADMELOG) corrective regimen sliding scale   SubCutaneous three times a day before meals  insulin lispro (ADMELOG) corrective regimen sliding scale   SubCutaneous at bedtime  metoprolol tartrate 25 milliGRAM(s) Oral two times a day  mirtazapine 15 milliGRAM(s) Oral at bedtime  sodium chloride 7% Inhalation 4 milliLiter(s) Inhalation every 12 hours    MEDICATIONS  (PRN):  dextrose Oral Gel 15 Gram(s) Oral once PRN Blood Glucose LESS THAN 70 milliGRAM(s)/deciliter      I&O's Summary    27 Nov 2023 07:01  -  28 Nov 2023 07:00  --------------------------------------------------------  IN: 300 mL / OUT: 500 mL / NET: -200 mL    28 Nov 2023 07:01  -  28 Nov 2023 18:37  --------------------------------------------------------  IN: 0 mL / OUT: 500 mL / NET: -500 mL        PHYSICAL EXAM:  Vital Signs Last 24 Hrs  T(C): 36.5 (28 Nov 2023 15:52), Max: 36.7 (28 Nov 2023 04:30)  T(F): 97.7 (28 Nov 2023 15:52), Max: 98.1 (28 Nov 2023 04:30)  HR: 86 (28 Nov 2023 15:57) (62 - 89)  BP: 142/83 (28 Nov 2023 15:52) (134/58 - 150/50)  BP(mean): --  RR: 18 (28 Nov 2023 15:52) (18 - 18)  SpO2: 100% (28 Nov 2023 15:52) (96% - 100%)    Parameters below as of 28 Nov 2023 15:57  Patient On (Oxygen Delivery Method): room air      CONSTITUTIONAL: NAD, well-groomed  EYES: PERRLA; conjunctiva and sclera clear  ENMT: Moist oral mucosa, no pharyngeal injection or exudates; normal dentition  NECK: Supple, no palpable masses; no thyromegaly  RESPIRATORY: Normal respiratory effort; lungs are clear to auscultation bilaterally  CARDIOVASCULAR: Regular rate and rhythm, normal S1 and S2, no murmur/rub/gallop; No lower extremity edema; Peripheral pulses are 2+ bilaterally  ABDOMEN: Nontender to palpation, normoactive bowel sounds, no rebound/guarding; No hepatosplenomegaly  MUSCULOSKELETAL:  Normal gait; no clubbing or cyanosis of digits; no joint swelling or tenderness to palpation  PSYCH: A+O to person, place, and time; affect appropriate  NEUROLOGY: CN 2-12 are intact and symmetric; no gross sensory deficits   SKIN: No rashes; no palpable lesions    LABS:                        10.1   12.40 )-----------( 458      ( 28 Nov 2023 06:43 )             31.5     11-28    139  |  102  |  12  ----------------------------<  170<H>  4.4   |  27  |  0.73    Ca    9.1      28 Nov 2023 06:43  Phos  3.2     11-28  Mg     1.80     11-28            Urinalysis Basic - ( 28 Nov 2023 06:43 )    Color: x / Appearance: x / SG: x / pH: x  Gluc: 170 mg/dL / Ketone: x  / Bili: x / Urobili: x   Blood: x / Protein: x / Nitrite: x   Leuk Esterase: x / RBC: x / WBC x   Sq Epi: x / Non Sq Epi: x / Bacteria: x            RADIOLOGY & ADDITIONAL TESTS:  New Imaging Personally Reviewed Today: Yes  New Electrocardiogram Personally Reviewed Today: N/A  Other Results Reviewed Today: Yes  Prior or Outpatient Records Reviewed Today with Summary: Yes    COORDINATION OF CARE:  Consultant Communication and Details of Discussion (where applicable): Yes     [facility] Division of Hospital Medicine  Jean Carrillo DO  Available via MS Teams  Pager:[phone]    SUBJECTIVE / OVERNIGHT EVENTS: No acute events overnight. Patient denies any chest pain, palpitations, shortness of breath.    ADDITIONAL REVIEW OF SYSTEMS:  Review of Systems:   CONSTITUTIONAL: No fever, weight loss  EYES: No eye pain, visual disturbances, or discharge  ENMT:  No difficulty hearing, tinnitus, vertigo; No sinus or throat pain  RESPIRATORY: No SOB. No cough, wheezing, chills or hemoptysis  CARDIOVASCULAR: No chest pain, palpitations, dizziness, or leg swelling  GASTROINTESTINAL: No abdominal or epigastric pain. No nausea, vomiting, or hematemesis; No diarrhea or constipation. No melena or hematochezia.  GENITOURINARY: No dysuria, frequency, hematuria, or incontinence  NEUROLOGICAL: No headaches, memory loss, loss of strength, numbness, or tremors  SKIN: No itching, burning, rashes, or lesions   LYMPH NODES: No enlarged glands  ENDOCRINE: No heat or cold intolerance; No hair loss  MUSCULOSKELETAL: No joint pain or swelling; No muscle, back pain  PSYCHIATRIC: No depression, anxiety, mood swings, or difficulty sleeping  HEME/LYMPH: No easy bruising, or bleeding gums      MEDICATIONS  (STANDING):  atorvastatin 80 milliGRAM(s) Oral at bedtime  dextrose 5%. 1000 milliLiter(s) (50 mL/Hr) IV Continuous <Continuous>  dextrose 5%. 1000 milliLiter(s) (100 mL/Hr) IV Continuous <Continuous>  dextrose 50% Injectable 12.5 Gram(s) IV Push once  dextrose 50% Injectable 25 Gram(s) IV Push once  dextrose 50% Injectable 25 Gram(s) IV Push once  enoxaparin Injectable 60 milliGRAM(s) SubCutaneous every 12 hours  glucagon  Injectable 1 milliGRAM(s) IntraMuscular once  insulin lispro (ADMELOG) corrective regimen sliding scale   SubCutaneous three times a day before meals  insulin lispro (ADMELOG) corrective regimen sliding scale   SubCutaneous at bedtime  metoprolol tartrate 25 milliGRAM(s) Oral two times a day  mirtazapine 15 milliGRAM(s) Oral at bedtime  sodium chloride 7% Inhalation 4 milliLiter(s) Inhalation every 12 hours    MEDICATIONS  (PRN):  dextrose Oral Gel 15 Gram(s) Oral once PRN Blood Glucose LESS THAN 70 milliGRAM(s)/deciliter      I&O's Summary    27 Nov 2023 07:01  -  28 Nov 2023 07:00  --------------------------------------------------------  IN: 300 mL / OUT: 500 mL / NET: -200 mL    28 Nov 2023 07:01  -  28 Nov 2023 18:37  --------------------------------------------------------  IN: 0 mL / OUT: 500 mL / NET: -500 mL        PHYSICAL EXAM:  Vital Signs Last 24 Hrs  T(C): 36.5 (28 Nov 2023 15:52), Max: 36.7 (28 Nov 2023 04:30)  T(F): 97.7 (28 Nov 2023 15:52), Max: 98.1 (28 Nov 2023 04:30)  HR: 86 (28 Nov 2023 15:57) (62 - 89)  BP: 142/83 (28 Nov 2023 15:52) (134/58 - 150/50)  BP(mean): --  RR: 18 (28 Nov 2023 15:52) (18 - 18)  SpO2: 100% (28 Nov 2023 15:52) (96% - 100%)    Parameters below as of 28 Nov 2023 15:57  Patient On (Oxygen Delivery Method): room air      CONSTITUTIONAL: NAD, well-groomed  EYES: PERRLA; conjunctiva and sclera clear  ENMT: Moist oral mucosa, no pharyngeal injection or exudates; normal dentition  NECK: Supple, no palpable masses; no thyromegaly  RESPIRATORY: Normal respiratory effort; lungs are clear to auscultation bilaterally  CARDIOVASCULAR: Regular rate and rhythm, normal S1 and S2, no murmur/rub/gallop; No lower extremity edema; Peripheral pulses are 2+ bilaterally  ABDOMEN: Nontender to palpation, normoactive bowel sounds, no rebound/guarding; No hepatosplenomegaly  MUSCULOSKELETAL:  Normal gait; no clubbing or cyanosis of digits; no joint swelling or tenderness to palpation  PSYCH: A+O to person, place, and time; affect appropriate  NEUROLOGY: CN 2-12 are intact and symmetric; no gross sensory deficits   SKIN: No rashes; no palpable lesions    LABS:                        10.1   12.40 )-----------( 458      ( 28 Nov 2023 06:43 )             31.5     11-28    139  |  102  |  12  ----------------------------<  170<H>  4.4   |  27  |  0.73    Ca    9.1      28 Nov 2023 06:43  Phos  3.2     11-28  Mg     1.80     11-28            Urinalysis Basic - ( 28 Nov 2023 06:43 )    Color: x / Appearance: x / SG: x / pH: x  Gluc: 170 mg/dL / Ketone: x  / Bili: x / Urobili: x   Blood: x / Protein: x / Nitrite: x   Leuk Esterase: x / RBC: x / WBC x   Sq Epi: x / Non Sq Epi: x / Bacteria: x            RADIOLOGY & ADDITIONAL TESTS:  New Imaging Personally Reviewed Today: Yes  New Electrocardiogram Personally Reviewed Today: N/A  Other Results Reviewed Today: Yes  Prior or Outpatient Records Reviewed Today with Summary: Yes    COORDINATION OF CARE:  Consultant Communication and Details of Discussion (where applicable): Yes

## 2023-11-28 NOTE — PROGRESS NOTE ADULT - PROBLEM SELECTOR PLAN 8
WBC 14->12.4, downtrending  patient is otherwise hemodynamically stable, afebrile  Monitor CBC, low threshold for antibiotics

## 2023-11-28 NOTE — PROGRESS NOTE ADULT - PROBLEM SELECTOR PLAN 4
EKG afib LAFB 72 bpm, rate controlled  Continue therapeutic lovenox, metoprolol per cardiology  Maintain tele monitoring  TSH wnl

## 2023-11-28 NOTE — PROGRESS NOTE ADULT - ASSESSMENT
68y (1955) RH Nadine speaking woman with afib on eliquis, R MCA stroke 2013, L MCA Stroke 2019 with residual right hemiparesis and dysarthria, HTN, HLD, DM2, mitral stenosis/rheumatic valvular disease presenting as code stroke for right gaze preference and aphasia.      EKG Afib       1) CVA  -family reports labile INR on coumadin and strokes on Coumadin, now had a CVA and developed KRISTAN thrombus (seen on CT) on eliquis (eliquis failure)   -recommend Sub q lovenox moving forward  -BUBBA pending although will not happen while patient is on airborne precautions    2) Afib  - lovenox as above  -c/w metoprolol 25 mg po BID     3) RUL micronodules  -CT w/o cavitary lesion + mediastinal LNs  -on airborne isolation for r/o TB; pending AFB  -f.u thoracic recs      4) DVT PPX  - lovenox

## 2023-11-28 NOTE — PROGRESS NOTE ADULT - PROBLEM SELECTOR PLAN 2
CT chest with Intracardiac thrombus seen in the left atrial appendage measuring 1.5 cm.  f/u BUBBA, per cardiology would not happen while patient is on airborne precautions  f/u dedicated TTE for now, appreciate cardiology recommendations  f/u CTS eval  Patient developed intracardiac thrombus despite on eliquis for afib, transitioned to therapeutic lovenox, appreciate cardiology eval

## 2023-11-28 NOTE — PROGRESS NOTE ADULT - ASSESSMENT
68F PMH AFib on Eliquis, CVA 2019 w/ R residual deficits (including speed deficits), HTN, DM2, mitral stenosis brought in by family for rightward gaze and repetitive speech. 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 Neuro evaluated and thrombolytic therapy was deferred.

## 2023-11-28 NOTE — PROGRESS NOTE ADULT - PROBLEM SELECTOR PLAN 1
CTA with occlusion of R PCA P2 segment. Not TPA candidate.  CTH with acute/subacute infarcts within the right paramedian occipital lobe as well as the right cerebellar hemisphere. No hemorrhagic transformation.  f/u MRI brain as outpatient per neuro  EEG showed focal cerebral dysfunction of R and L temporal lobe, mild diffuse cerebral dysfunction, without epileptiform abnormalities. Neurology reviewed and discontinued EEG, recommended discontinuation of keppra  Upon discussion with neuro AC was resumed  -A1c/lipid panel: HBA1C 7.9,LDL at goal -44  -Permissive HTN <220/120 for first 24h. Gradual reduction.  -Passed dysphagia screen - diet ordered.  -PT/OT evaluation.   -Cont telemetry. Neuro checks.

## 2023-11-28 NOTE — PROGRESS NOTE ADULT - PROBLEM SELECTOR PLAN 3
Incidental finding of RML collapsed and scattered micronodules measuring up to 8 mm in the right upper lobe.   Additional tiny cavitary micronodule in the right upper lobe.   -Risk factor for TB reviewed - born in [place]. [place] in the [place]. No personal hx of TB. No known exposure.  -Airborne isolation  -Quantiferon gold.  -f/u Sputum AFB x3 (8h apart), RT was unable to induce with 3% saline nebs, changed to 7% saline nebs in attempt to induce sputum for samples  -CT Chest w/ contrast ordered to further characterize area of concern, - noted Segmental atelectasis of the lateral segment of the right middle lobe. Mild interlobular septal thickening, left greater than right, may be seen   with asymmetric edema. Increased cluster of mediastinal lymph nodes, the largest measuring 1.1 cm in the right paratracheal station.  Per son pt had a viral URI recently

## 2023-11-28 NOTE — EEG REPORT - NS EEG TEXT BOX
RUSSELL FORTUNE [id number]     Study Date: 11-28-23  Duration: 20 min  --------------------------------------------------------------------------------------------------  History:  CC/ HPI Patient is a 68y old  Female who presents with a chief complaint of R/o CVA (28 Nov 2023 13:44)    MEDICATIONS  (STANDING):  atorvastatin 80 milliGRAM(s) Oral at bedtime  dextrose 5%. 1000 milliLiter(s) (50 mL/Hr) IV Continuous <Continuous>  dextrose 5%. 1000 milliLiter(s) (100 mL/Hr) IV Continuous <Continuous>  dextrose 50% Injectable 12.5 Gram(s) IV Push once  dextrose 50% Injectable 25 Gram(s) IV Push once  dextrose 50% Injectable 25 Gram(s) IV Push once  enoxaparin Injectable 60 milliGRAM(s) SubCutaneous every 12 hours  glucagon  Injectable 1 milliGRAM(s) IntraMuscular once  insulin lispro (ADMELOG) corrective regimen sliding scale   SubCutaneous three times a day before meals  insulin lispro (ADMELOG) corrective regimen sliding scale   SubCutaneous at bedtime  levETIRAcetam  IVPB 750 milliGRAM(s) IV Intermittent every 12 hours  metoprolol tartrate 25 milliGRAM(s) Oral two times a day  mirtazapine 15 milliGRAM(s) Oral at bedtime  sodium chloride 3%  Inhalation 4 milliLiter(s) Inhalation every 8 hours    --------------------------------------------------------------------------------------------------  Study Interpretation:    [[[Abbreviation Key:  PDR=alpha rhythm/posterior dominant rhythm. A-P=anterior posterior.  Amplitude: ‘very low’:<20; ‘low’:20-49; ‘medium’:; ‘high’:>150uV.  Persistence for periodic/rhythmic patterns (% of epoch) ‘rare’:<1%; ‘occasional’:1-10%; ‘frequent’:10-50%; ‘abundant’:50-90%; ‘continuous’:>90%.  Persistence for sporadic discharges: ‘rare’:<1/hr; ‘occasional’:1/min-1/hr; ‘frequent’:>1/min; ‘abundant’:>1/10 sec.  RPP=rhythmic and periodic patterns; GRDA=generalized rhythmic delta activity; FIRDA=frontal intermittent GRDA; LRDA=lateralized rhythmic delta activity; TIRDA=temporal intermittent rhythmic delta activity;  LPD=PLED=lateralized periodic discharges; GPD=generalized periodic discharges; BIPDs =bilateral independent periodic discharges; Mf=multifocal; SIRPDs=stimulus induced rhythmic, periodic, or ictal appearing discharges; BIRDs=brief potentially ictal rhythmic discharges >4 Hz, lasting .5-10s; PFA (paroxysmal bursts >13 Hz or =8 Hz <10s).  Modifiers: +F=with fast component; +S=with spike component; +R=with rhythmic component.  S-B=burst suppression pattern.  Max=maximal. N1-drowsy; N2-stage II sleep; N3-slow wave sleep. SSS/BETS=small sharp spikes/benign epileptiform transients of sleep. HV=hyperventilation; PS=photic stimulation]]]    Daily EEG Visual Analysis    FINDINGS:      Background:  Continuity: Continuous  Symmetry: Symmetric  Posterior dominant rhythm (PDR): 7.5 Hz, reactive to eye closure. Symmetric low-amplitude frontal beta in wakefulness.  State Change: Present  Voltage: Normal  Anterior-Posterior Gradient: Present  Other background findings: None  Breach: Absent    Background Slowing:  Generalized slowing:   Focal slowing: Occasional right and left temporal focal polymorphic delta slowing    State Changes:   Drowsiness and stage 2 sleep are not captured.    Interictal Findings:  None    Electrographic and Electroclinical seizures:  None    Other Clinical Events:  None    Activation Procedures:   Hyperventilation is not performed.    Photic stimulation is not performed.    Artifacts:  Intermittent myogenic and movement artifacts are present.    EKG:  Single-lead EKG shows irregularly irregular rhythm.    EEG Classification / Summary:  Abnormal routine EEG in the awake state.  Right and left temporal focal slowing.  Mild diffuse slowing.  No epileptiform abnormalities are captured.     Clinical Impression:  Right and left temporal focal cerebral dysfunction can be structural or functional in etiology.   Mild diffuse cerebral dysfunction is nonspecific in etiology.   Single-lead EKG shows irregularly irregular rhythm.          -------------------------------------------------------------------------------------------------------  [facility] EEG Reading Room Ph#: [phone]  Epilepsy Answering Service after 5PM and before 8:30AM: Ph#: [phone]    Joslyn Phillips MD  Attending Physician, [facility]   RUSSELL FORTUNE [id number]     Study Date: 11-28-23  Duration: 20 min  --------------------------------------------------------------------------------------------------  History:  CC/ HPI Patient is a 68y old  Female who presents with a chief complaint of R/o CVA (28 Nov 2023 13:44)    MEDICATIONS  (STANDING):  atorvastatin 80 milliGRAM(s) Oral at bedtime  dextrose 5%. 1000 milliLiter(s) (50 mL/Hr) IV Continuous <Continuous>  dextrose 5%. 1000 milliLiter(s) (100 mL/Hr) IV Continuous <Continuous>  dextrose 50% Injectable 12.5 Gram(s) IV Push once  dextrose 50% Injectable 25 Gram(s) IV Push once  dextrose 50% Injectable 25 Gram(s) IV Push once  enoxaparin Injectable 60 milliGRAM(s) SubCutaneous every 12 hours  glucagon  Injectable 1 milliGRAM(s) IntraMuscular once  insulin lispro (ADMELOG) corrective regimen sliding scale   SubCutaneous three times a day before meals  insulin lispro (ADMELOG) corrective regimen sliding scale   SubCutaneous at bedtime  levETIRAcetam  IVPB 750 milliGRAM(s) IV Intermittent every 12 hours  metoprolol tartrate 25 milliGRAM(s) Oral two times a day  mirtazapine 15 milliGRAM(s) Oral at bedtime  sodium chloride 3%  Inhalation 4 milliLiter(s) Inhalation every 8 hours    --------------------------------------------------------------------------------------------------  Study Interpretation:    [[[Abbreviation Key:  PDR=alpha rhythm/posterior dominant rhythm. A-P=anterior posterior.  Amplitude: ‘very low’:<20; ‘low’:20-49; ‘medium’:; ‘high’:>150uV.  Persistence for periodic/rhythmic patterns (% of epoch) ‘rare’:<1%; ‘occasional’:1-10%; ‘frequent’:10-50%; ‘abundant’:50-90%; ‘continuous’:>90%.  Persistence for sporadic discharges: ‘rare’:<1/hr; ‘occasional’:1/min-1/hr; ‘frequent’:>1/min; ‘abundant’:>1/10 sec.  RPP=rhythmic and periodic patterns; GRDA=generalized rhythmic delta activity; FIRDA=frontal intermittent GRDA; LRDA=lateralized rhythmic delta activity; TIRDA=temporal intermittent rhythmic delta activity;  LPD=PLED=lateralized periodic discharges; GPD=generalized periodic discharges; BIPDs =bilateral independent periodic discharges; Mf=multifocal; SIRPDs=stimulus induced rhythmic, periodic, or ictal appearing discharges; BIRDs=brief potentially ictal rhythmic discharges >4 Hz, lasting .5-10s; PFA (paroxysmal bursts >13 Hz or =8 Hz <10s).  Modifiers: +F=with fast component; +S=with spike component; +R=with rhythmic component.  S-B=burst suppression pattern.  Max=maximal. N1-drowsy; N2-stage II sleep; N3-slow wave sleep. SSS/BETS=small sharp spikes/benign epileptiform transients of sleep. HV=hyperventilation; PS=photic stimulation]]]    Daily EEG Visual Analysis    FINDINGS:      Background:  Continuity: Continuous  Symmetry: Symmetric  Posterior dominant rhythm (PDR): 7.5 Hz, reactive to eye closure. Symmetric low-amplitude frontal beta in wakefulness.  State Change: Present  Voltage: Normal  Anterior-Posterior Gradient: Present  Other background findings: None  Breach: Absent    Background Slowing:  Generalized slowing:   Focal slowing: Occasional right and left temporal focal polymorphic delta slowing    State Changes:   Drowsiness and stage 2 sleep are not captured.    Interictal Findings:  None    Electrographic and Electroclinical seizures:  None    Other Clinical Events:  None    Activation Procedures:   Hyperventilation is not performed.    Photic stimulation is not performed.    Artifacts:  Intermittent myogenic and movement artifacts are present.    EKG:  Single-lead EKG shows irregularly irregular rhythm.    EEG Classification / Summary:  Abnormal routine EEG in the awake state.  Right and left temporal focal slowing.  Mild diffuse slowing.  No epileptiform abnormalities are captured.     Clinical Impression:  Right and left temporal focal cerebral dysfunction can be structural or functional in etiology.   Mild diffuse cerebral dysfunction is nonspecific in etiology.   Single-lead EKG shows irregularly irregular rhythm.          -------------------------------------------------------------------------------------------------------  [facility] EEG Reading Room Ph#: [phone]  Epilepsy Answering Service after 5PM and before 8:30AM: Ph#: [phone]    Joslyn Phillips MD  Attending Physician, [facility]   RUSSELL FORTUNE [id number]     Study Date: 11-28-23  Duration: 20 min  --------------------------------------------------------------------------------------------------  History:  CC/ HPI Patient is a 68y old  Female who presents with a chief complaint of R/o CVA (28 Nov 2023 13:44)    MEDICATIONS  (STANDING):  atorvastatin 80 milliGRAM(s) Oral at bedtime  dextrose 5%. 1000 milliLiter(s) (50 mL/Hr) IV Continuous <Continuous>  dextrose 5%. 1000 milliLiter(s) (100 mL/Hr) IV Continuous <Continuous>  dextrose 50% Injectable 12.5 Gram(s) IV Push once  dextrose 50% Injectable 25 Gram(s) IV Push once  dextrose 50% Injectable 25 Gram(s) IV Push once  enoxaparin Injectable 60 milliGRAM(s) SubCutaneous every 12 hours  glucagon  Injectable 1 milliGRAM(s) IntraMuscular once  insulin lispro (ADMELOG) corrective regimen sliding scale   SubCutaneous three times a day before meals  insulin lispro (ADMELOG) corrective regimen sliding scale   SubCutaneous at bedtime  levETIRAcetam  IVPB 750 milliGRAM(s) IV Intermittent every 12 hours  metoprolol tartrate 25 milliGRAM(s) Oral two times a day  mirtazapine 15 milliGRAM(s) Oral at bedtime  sodium chloride 3%  Inhalation 4 milliLiter(s) Inhalation every 8 hours    --------------------------------------------------------------------------------------------------  Study Interpretation:    [[[Abbreviation Key:  PDR=alpha rhythm/posterior dominant rhythm. A-P=anterior posterior.  Amplitude: ‘very low’:<20; ‘low’:20-49; ‘medium’:; ‘high’:>150uV.  Persistence for periodic/rhythmic patterns (% of epoch) ‘rare’:<1%; ‘occasional’:1-10%; ‘frequent’:10-50%; ‘abundant’:50-90%; ‘continuous’:>90%.  Persistence for sporadic discharges: ‘rare’:<1/hr; ‘occasional’:1/min-1/hr; ‘frequent’:>1/min; ‘abundant’:>1/10 sec.  RPP=rhythmic and periodic patterns; GRDA=generalized rhythmic delta activity; FIRDA=frontal intermittent GRDA; LRDA=lateralized rhythmic delta activity; TIRDA=temporal intermittent rhythmic delta activity;  LPD=PLED=lateralized periodic discharges; GPD=generalized periodic discharges; BIPDs =bilateral independent periodic discharges; Mf=multifocal; SIRPDs=stimulus induced rhythmic, periodic, or ictal appearing discharges; BIRDs=brief potentially ictal rhythmic discharges >4 Hz, lasting .5-10s; PFA (paroxysmal bursts >13 Hz or =8 Hz <10s).  Modifiers: +F=with fast component; +S=with spike component; +R=with rhythmic component.  S-B=burst suppression pattern.  Max=maximal. N1-drowsy; N2-stage II sleep; N3-slow wave sleep. SSS/BETS=small sharp spikes/benign epileptiform transients of sleep. HV=hyperventilation; PS=photic stimulation]]]    Daily EEG Visual Analysis    FINDINGS:      Background:  Continuity: Continuous  Symmetry: Symmetric  Posterior dominant rhythm (PDR): 7.5 Hz, reactive to eye closure. Symmetric low-amplitude frontal beta in wakefulness.  State Change: Present  Voltage: Normal  Anterior-Posterior Gradient: Present  Other background findings: None  Breach: Absent    Background Slowing:  Generalized slowing:   Focal slowing: Occasional right and left temporal focal polymorphic delta slowing    State Changes:   Drowsiness and stage 2 sleep are not captured.    Interictal Findings:  None    Electrographic and Electroclinical seizures:  None    Other Clinical Events:  None    Activation Procedures:   Hyperventilation is not performed.    Photic stimulation is not performed.    Artifacts:  Intermittent myogenic and movement artifacts are present.    EKG:  Single-lead EKG shows irregularly irregular rhythm.    EEG Classification / Summary:  Abnormal routine EEG in the awake state.  Right and left temporal focal slowing.  Mild diffuse slowing.  No epileptiform abnormalities are captured.     Clinical Impression:  Right and left temporal focal cerebral dysfunction can be structural or functional in etiology.   Mild diffuse cerebral dysfunction is nonspecific in etiology.   Single-lead EKG shows irregularly irregular rhythm.          -------------------------------------------------------------------------------------------------------  [facility] EEG Reading Room Ph#: [phone]  Epilepsy Answering Service after 5PM and before 8:30AM: Ph#: [phone]    Joslyn Phillips MD  Attending Physician, [facility]

## 2023-11-29 NOTE — SWALLOW BEDSIDE ASSESSMENT ADULT - ADDITIONAL RECOMMENDATIONS
This department to follow up as schedule permits to assess for diet tolerance. Medical team further advised to reconsult if there is a change in patient's medical status and/or observed change in patient's tolerance of recommended PO diet.

## 2023-11-29 NOTE — PROGRESS NOTE ADULT - PROBLEM SELECTOR PLAN 2
CT chest with Intracardiac thrombus seen in the left atrial appendage measuring 1.5 cm.  f/u BUBBA, per cardiology would not happen while patient is on airborne precautions, AFBx1 collected, f/u additional AFBx2, continue saline nebs for induction  f/u dedicated TTE for now, appreciate cardiology recommendations  Appreciate CTS eval  Patient developed intracardiac thrombus despite on eliquis for afib, transitioned to therapeutic lovenox

## 2023-11-29 NOTE — PROGRESS NOTE ADULT - SUBJECTIVE AND OBJECTIVE BOX
Chintan Owusu MD  Interventional Cardiology / Advance Heart Failure and Cardiac Transplant Specialist  [place] Office : [address] [place] [address]  Tel:   [place] Office : [address] [facility] [address]  Tel: [phone]      Subjective/Overnight events: Pt is lying in bed not in distress, no chest pains no SOB no palpitations  	  MEDICATIONS:  enoxaparin Injectable 60 milliGRAM(s) SubCutaneous every 12 hours  metoprolol tartrate 25 milliGRAM(s) Oral two times a day      sodium chloride 7% Inhalation 4 milliLiter(s) Inhalation every 12 hours    mirtazapine 15 milliGRAM(s) Oral at bedtime      atorvastatin 80 milliGRAM(s) Oral at bedtime  dextrose 50% Injectable 25 Gram(s) IV Push once  dextrose 50% Injectable 25 Gram(s) IV Push once  dextrose 50% Injectable 12.5 Gram(s) IV Push once  dextrose Oral Gel 15 Gram(s) Oral once PRN  glucagon  Injectable 1 milliGRAM(s) IntraMuscular once  insulin lispro (ADMELOG) corrective regimen sliding scale   SubCutaneous three times a day before meals  insulin lispro (ADMELOG) corrective regimen sliding scale   SubCutaneous at bedtime    dextrose 5%. 1000 milliLiter(s) IV Continuous <Continuous>  dextrose 5%. 1000 milliLiter(s) IV Continuous <Continuous>      PAST MEDICAL/SURGICAL HISTORY  PAST MEDICAL & SURGICAL HISTORY:  Chronic atrial fibrillation      CVA (cerebrovascular accident)      HTN (hypertension)      DM (diabetes mellitus), type 2      H/O hernia repair          SOCIAL HISTORY: Substance Use (street drugs): ( x ) never used  (  ) other:    FAMILY HISTORY:  No pertinent family history in first degree relatives          PHYSICAL EXAM:  T(C): 36.7 (11-29-23 @ 11:45), Max: 37.2 (11-29-23 @ 09:00)  HR: 70 (11-29-23 @ 11:45) (65 - 86)  BP: 145/53 (11-29-23 @ 11:45) (130/63 - 149/82)  RR: 18 (11-29-23 @ 11:45) (18 - 18)  SpO2: 98% (11-29-23 @ 11:45) (97% - 100%)  Wt(kg): --  I&O's Summary    28 Nov 2023 07:01  -  29 Nov 2023 07:00  --------------------------------------------------------  IN: 140 mL / OUT: 900 mL / NET: -760 mL    29 Nov 2023 07:01  -  29 Nov 2023 13:28  --------------------------------------------------------  IN: 100 mL / OUT: 400 mL / NET: -300 mL          GENERAL: NAD  EYES: conjunctiva and sclera clear  ENMT: No tonsillar erythema, exudates, or enlargement  Cardiovascular: Normal S1 S2, No JVD, No murmurs, No edema  Respiratory: Lungs clear to auscultation	  Gastrointestinal:  Soft, Non-tender, + BS	  Extremities: No edema                                     10.3   10.98 )-----------( 459      ( 29 Nov 2023 05:25 )             33.6     11-29    138  |  102  |  22  ----------------------------<  176<H>  4.3   |  26  |  0.72    Ca    9.1      29 Nov 2023 05:25  Phos  3.2     11-28  Mg     1.80     11-28    TPro  7.0  /  Alb  3.2<L>  /  TBili  0.4  /  DBili  x   /  AST  21  /  ALT  23  /  AlkPhos  61  11-29    proBNP:   Lipid Profile:   HgA1c:   TSH:     Consultant(s) Notes Reviewed:  [x ] YES  [ ] NO    Care Discussed with Consultants/Other Providers [ x] YES  [ ] NO    Imaging Personally Reviewed independently:  [x] YES  [ ] NO    All labs, radiologic studies, vitals, orders and medications list reviewed. Patient is seen and examined at bedside. Case discussed with medical team.         Chintan Owusu MD  Interventional Cardiology / Advance Heart Failure and Cardiac Transplant Specialist  [place] Office : [address] [place] [address]  Tel:   [place] Office : [address] [facility] [address]  Tel: [phone]      Subjective/Overnight events: Pt is lying in bed not in distress, no chest pains no SOB no palpitations  	  MEDICATIONS:  enoxaparin Injectable 60 milliGRAM(s) SubCutaneous every 12 hours  metoprolol tartrate 25 milliGRAM(s) Oral two times a day      sodium chloride 7% Inhalation 4 milliLiter(s) Inhalation every 12 hours    mirtazapine 15 milliGRAM(s) Oral at bedtime      atorvastatin 80 milliGRAM(s) Oral at bedtime  dextrose 50% Injectable 25 Gram(s) IV Push once  dextrose 50% Injectable 25 Gram(s) IV Push once  dextrose 50% Injectable 12.5 Gram(s) IV Push once  dextrose Oral Gel 15 Gram(s) Oral once PRN  glucagon  Injectable 1 milliGRAM(s) IntraMuscular once  insulin lispro (ADMELOG) corrective regimen sliding scale   SubCutaneous three times a day before meals  insulin lispro (ADMELOG) corrective regimen sliding scale   SubCutaneous at bedtime    dextrose 5%. 1000 milliLiter(s) IV Continuous <Continuous>  dextrose 5%. 1000 milliLiter(s) IV Continuous <Continuous>      PAST MEDICAL/SURGICAL HISTORY  PAST MEDICAL & SURGICAL HISTORY:  Chronic atrial fibrillation      CVA (cerebrovascular accident)      HTN (hypertension)      DM (diabetes mellitus), type 2      H/O hernia repair          SOCIAL HISTORY: Substance Use (street drugs): ( x ) never used  (  ) other:    FAMILY HISTORY:  No pertinent family history in first degree relatives          PHYSICAL EXAM:  T(C): 36.7 (11-29-23 @ 11:45), Max: 37.2 (11-29-23 @ 09:00)  HR: 70 (11-29-23 @ 11:45) (65 - 86)  BP: 145/53 (11-29-23 @ 11:45) (130/63 - 149/82)  RR: 18 (11-29-23 @ 11:45) (18 - 18)  SpO2: 98% (11-29-23 @ 11:45) (97% - 100%)  Wt(kg): --  I&O's Summary    28 Nov 2023 07:01  -  29 Nov 2023 07:00  --------------------------------------------------------  IN: 140 mL / OUT: 900 mL / NET: -760 mL    29 Nov 2023 07:01  -  29 Nov 2023 13:28  --------------------------------------------------------  IN: 100 mL / OUT: 400 mL / NET: -300 mL          GENERAL: NAD  EYES: conjunctiva and sclera clear  ENMT: No tonsillar erythema, exudates, or enlargement  Cardiovascular: Normal S1 S2, No JVD, No murmurs, No edema  Respiratory: Lungs clear to auscultation	  Gastrointestinal:  Soft, Non-tender, + BS	  Extremities: No edema                                     10.3   10.98 )-----------( 459      ( 29 Nov 2023 05:25 )             33.6     11-29    138  |  102  |  22  ----------------------------<  176<H>  4.3   |  26  |  0.72    Ca    9.1      29 Nov 2023 05:25  Phos  3.2     11-28  Mg     1.80     11-28    TPro  7.0  /  Alb  3.2<L>  /  TBili  0.4  /  DBili  x   /  AST  21  /  ALT  23  /  AlkPhos  61  11-29    proBNP:   Lipid Profile:   HgA1c:   TSH:     Consultant(s) Notes Reviewed:  [x ] YES  [ ] NO    Care Discussed with Consultants/Other Providers [ x] YES  [ ] NO    Imaging Personally Reviewed independently:  [x] YES  [ ] NO    All labs, radiologic studies, vitals, orders and medications list reviewed. Patient is seen and examined at bedside. Case discussed with medical team.         Chintan Owusu MD  Interventional Cardiology / Advance Heart Failure and Cardiac Transplant Specialist  [place] Office : [address] [place] [address]  Tel:   [place] Office : [address] [facility] [address]  Tel: [phone]      Subjective/Overnight events: Pt is lying in bed not in distress, no chest pains no SOB no palpitations  	  MEDICATIONS:  enoxaparin Injectable 60 milliGRAM(s) SubCutaneous every 12 hours  metoprolol tartrate 25 milliGRAM(s) Oral two times a day      sodium chloride 7% Inhalation 4 milliLiter(s) Inhalation every 12 hours    mirtazapine 15 milliGRAM(s) Oral at bedtime      atorvastatin 80 milliGRAM(s) Oral at bedtime  dextrose 50% Injectable 25 Gram(s) IV Push once  dextrose 50% Injectable 25 Gram(s) IV Push once  dextrose 50% Injectable 12.5 Gram(s) IV Push once  dextrose Oral Gel 15 Gram(s) Oral once PRN  glucagon  Injectable 1 milliGRAM(s) IntraMuscular once  insulin lispro (ADMELOG) corrective regimen sliding scale   SubCutaneous three times a day before meals  insulin lispro (ADMELOG) corrective regimen sliding scale   SubCutaneous at bedtime    dextrose 5%. 1000 milliLiter(s) IV Continuous <Continuous>  dextrose 5%. 1000 milliLiter(s) IV Continuous <Continuous>      PAST MEDICAL/SURGICAL HISTORY  PAST MEDICAL & SURGICAL HISTORY:  Chronic atrial fibrillation      CVA (cerebrovascular accident)      HTN (hypertension)      DM (diabetes mellitus), type 2      H/O hernia repair          SOCIAL HISTORY: Substance Use (street drugs): ( x ) never used  (  ) other:    FAMILY HISTORY:  No pertinent family history in first degree relatives          PHYSICAL EXAM:  T(C): 36.7 (11-29-23 @ 11:45), Max: 37.2 (11-29-23 @ 09:00)  HR: 70 (11-29-23 @ 11:45) (65 - 86)  BP: 145/53 (11-29-23 @ 11:45) (130/63 - 149/82)  RR: 18 (11-29-23 @ 11:45) (18 - 18)  SpO2: 98% (11-29-23 @ 11:45) (97% - 100%)  Wt(kg): --  I&O's Summary    28 Nov 2023 07:01  -  29 Nov 2023 07:00  --------------------------------------------------------  IN: 140 mL / OUT: 900 mL / NET: -760 mL    29 Nov 2023 07:01  -  29 Nov 2023 13:28  --------------------------------------------------------  IN: 100 mL / OUT: 400 mL / NET: -300 mL          GENERAL: NAD  EYES: conjunctiva and sclera clear  ENMT: No tonsillar erythema, exudates, or enlargement  Cardiovascular: Normal S1 S2, No JVD, No murmurs, No edema  Respiratory: Lungs clear to auscultation	  Gastrointestinal:  Soft, Non-tender, + BS	  Extremities: No edema                                     10.3   10.98 )-----------( 459      ( 29 Nov 2023 05:25 )             33.6     11-29    138  |  102  |  22  ----------------------------<  176<H>  4.3   |  26  |  0.72    Ca    9.1      29 Nov 2023 05:25  Phos  3.2     11-28  Mg     1.80     11-28    TPro  7.0  /  Alb  3.2<L>  /  TBili  0.4  /  DBili  x   /  AST  21  /  ALT  23  /  AlkPhos  61  11-29    proBNP:   Lipid Profile:   HgA1c:   TSH:     Consultant(s) Notes Reviewed:  [x ] YES  [ ] NO    Care Discussed with Consultants/Other Providers [ x] YES  [ ] NO    Imaging Personally Reviewed independently:  [x] YES  [ ] NO    All labs, radiologic studies, vitals, orders and medications list reviewed. Patient is seen and examined at bedside. Case discussed with medical team.

## 2023-11-29 NOTE — PROGRESS NOTE ADULT - PROBLEM SELECTOR PLAN 3
Incidental finding of RML collapsed and scattered micronodules measuring up to 8 mm in the right upper lobe.   Additional tiny cavitary micronodule in the right upper lobe.   -Risk factor for TB reviewed - born in [place]. [place] in the [place]. No personal hx of TB. No known exposure.  -Airborne isolation  -Quantiferon gold.  -f/u Sputum AFB x3 (8h apart), RT was unable to induce with 3% saline nebs, continue 7% saline nebs in attempt to induce sputum for samples (1 collected thus far)  -CT Chest w/ contrast ordered to further characterize area of concern, - noted Segmental atelectasis of the lateral segment of the right middle lobe. Mild interlobular septal thickening, left greater than right, may be seen   with asymmetric edema. Increased cluster of mediastinal lymph nodes, the largest measuring 1.1 cm in the right paratracheal station.

## 2023-11-29 NOTE — SPEECH LANGUAGE PATHOLOGY EVALUATION - COMMENTS
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 Neuro evaluated and thrombolytic therapy was deferred."     Neurosurgery PA Note 11/27: "Impression: LHH & disorientation likely due to evolving R PCA infarct (in association with smaller R cerebellar infarct); mechanism is likely cardioembolic in the setting of valvular afib +/- KRISTAN thrombus... Language: Speech is fluent with good repetition & comprehension."    Of note, patient also seen today for a swallow evaluation. (see report for details)     Patient seen awake/alert during speech and language evaluation this PM with patient's daughter-in-law present. Patient is Nadine speaking, Language Line offered and declined with preference for daughter-in-law to translate. Patient agreeable to evaluation. Monitor patient for any evolving neurological change that may impact on speech/language output warranting a reconsult to reassess for speech/language skills.

## 2023-11-29 NOTE — PROGRESS NOTE ADULT - ASSESSMENT
68y (1955) RH Nadine speaking woman with afib on eliquis, R MCA stroke 2013, L MCA Stroke 2019 with residual right hemiparesis and dysarthria, HTN, HLD, DM2, mitral stenosis/rheumatic valvular disease presenting as code stroke for right gaze preference and aphasia.      EKG Afib       1) CVA  -family reports labile INR on coumadin and strokes on Coumadin, now had a CVA and developed KRISTAN thrombus (seen on CT) on eliquis (eliquis failure)   -recommend Sub q lovenox moving forward  -BUBBA pending although will not happen while patient is on airborne precautions. obtain TTE    2) Afib  - lovenox as above  -c/w metoprolol 25 mg po BID     3) RUL micronodules  -CT w/o cavitary lesion + mediastinal LNs  -on airborne isolation for r/o TB; pending AFB  -f.u thoracic recs      4) DVT PPX  - lovenox

## 2023-11-29 NOTE — PROGRESS NOTE ADULT - SUBJECTIVE AND OBJECTIVE BOX
[facility] Division of Hospital Medicine  Jean Carrillo DO  Available via MS Teams  Pager:[phone]    SUBJECTIVE / OVERNIGHT EVENTS: No acute events overnight. Patient has no new complaints. Evaluated patient with daughter at bedside. Patient denies chest pain, shortness of breath, dizziness, palpitations, cough, hemoptysis.    ADDITIONAL REVIEW OF SYSTEMS:  Review of Systems:   CONSTITUTIONAL: No fever, weight loss  EYES: No eye pain, visual disturbances, or discharge  ENMT:  No difficulty hearing, tinnitus, vertigo; No sinus or throat pain  RESPIRATORY: No SOB. No cough, wheezing, chills or hemoptysis  CARDIOVASCULAR: No chest pain, palpitations, dizziness, or leg swelling  GASTROINTESTINAL: No abdominal or epigastric pain. No nausea, vomiting, or hematemesis; No diarrhea or constipation. No melena or hematochezia.  GENITOURINARY: No dysuria, frequency, hematuria, or incontinence  NEUROLOGICAL: No headaches, memory loss, loss of strength, numbness, or tremors  SKIN: No itching, burning, rashes, or lesions   LYMPH NODES: No enlarged glands  ENDOCRINE: No heat or cold intolerance; No hair loss  MUSCULOSKELETAL: No joint pain or swelling; No muscle, back pain  PSYCHIATRIC: No depression, anxiety, mood swings, or difficulty sleeping  HEME/LYMPH: No easy bruising, or bleeding gums      MEDICATIONS  (STANDING):  atorvastatin 80 milliGRAM(s) Oral at bedtime  dextrose 5%. 1000 milliLiter(s) (100 mL/Hr) IV Continuous <Continuous>  dextrose 5%. 1000 milliLiter(s) (50 mL/Hr) IV Continuous <Continuous>  dextrose 50% Injectable 25 Gram(s) IV Push once  dextrose 50% Injectable 25 Gram(s) IV Push once  dextrose 50% Injectable 12.5 Gram(s) IV Push once  enoxaparin Injectable 60 milliGRAM(s) SubCutaneous every 12 hours  glucagon  Injectable 1 milliGRAM(s) IntraMuscular once  insulin lispro (ADMELOG) corrective regimen sliding scale   SubCutaneous three times a day before meals  insulin lispro (ADMELOG) corrective regimen sliding scale   SubCutaneous at bedtime  metoprolol tartrate 25 milliGRAM(s) Oral two times a day  mirtazapine 15 milliGRAM(s) Oral at bedtime  sodium chloride 7% Inhalation 4 milliLiter(s) Inhalation every 12 hours    MEDICATIONS  (PRN):  dextrose Oral Gel 15 Gram(s) Oral once PRN Blood Glucose LESS THAN 70 milliGRAM(s)/deciliter      I&O's Summary    28 Nov 2023 07:01  -  29 Nov 2023 07:00  --------------------------------------------------------  IN: 140 mL / OUT: 900 mL / NET: -760 mL    29 Nov 2023 07:01  -  29 Nov 2023 16:12  --------------------------------------------------------  IN: 100 mL / OUT: 400 mL / NET: -300 mL        PHYSICAL EXAM:  Vital Signs Last 24 Hrs  T(C): 36.7 (29 Nov 2023 11:45), Max: 37.2 (29 Nov 2023 09:00)  T(F): 98 (29 Nov 2023 11:45), Max: 98.9 (29 Nov 2023 09:00)  HR: 70 (29 Nov 2023 11:45) (65 - 82)  BP: 145/53 (29 Nov 2023 11:45) (130/63 - 149/82)  BP(mean): --  RR: 18 (29 Nov 2023 11:45) (18 - 18)  SpO2: 98% (29 Nov 2023 11:45) (97% - 100%)    Parameters below as of 29 Nov 2023 11:45  Patient On (Oxygen Delivery Method): room air      CONSTITUTIONAL: NAD, well-groomed  EYES: PERRLA; conjunctiva and sclera clear  ENMT: Moist oral mucosa, no pharyngeal injection or exudates; normal dentition  NECK: Supple, no palpable masses; no thyromegaly  RESPIRATORY: Normal respiratory effort; lungs are clear to auscultation bilaterally  CARDIOVASCULAR: Regular rate and rhythm, normal S1 and S2, no murmur/rub/gallop; No lower extremity edema; Peripheral pulses are 2+ bilaterally  ABDOMEN: Nontender to palpation, normoactive bowel sounds, no rebound/guarding; No hepatosplenomegaly  MUSCULOSKELETAL:  Normal gait; no clubbing or cyanosis of digits; no joint swelling or tenderness to palpation  PSYCH: A+O to person, place, and time; affect appropriate  NEUROLOGY: CN 2-12 are intact and symmetric; no gross sensory deficits   SKIN: No rashes; no palpable lesions    LABS:                        10.3   10.98 )-----------( 459      ( 29 Nov 2023 05:25 )             33.6     11-29    138  |  102  |  22  ----------------------------<  176<H>  4.3   |  26  |  0.72    Ca    9.1      29 Nov 2023 05:25  Phos  3.2     11-28  Mg     1.80     11-28    TPro  7.0  /  Alb  3.2<L>  /  TBili  0.4  /  DBili  x   /  AST  21  /  ALT  23  /  AlkPhos  61  11-29          Urinalysis Basic - ( 29 Nov 2023 05:25 )    Color: x / Appearance: x / SG: x / pH: x  Gluc: 176 mg/dL / Ketone: x  / Bili: x / Urobili: x   Blood: x / Protein: x / Nitrite: x   Leuk Esterase: x / RBC: x / WBC x   Sq Epi: x / Non Sq Epi: x / Bacteria: x            RADIOLOGY & ADDITIONAL TESTS:  New Imaging Personally Reviewed Today: Yes  New Electrocardiogram Personally Reviewed Today: N/A  Other Results Reviewed Today: Yes  Prior or Outpatient Records Reviewed Today with Summary: Yes    COORDINATION OF CARE:  Consultant Communication and Details of Discussion (where applicable): Yes     [facility] Division of Hospital Medicine  Jean Carrillo DO  Available via MS Teams  Pager:[phone]    SUBJECTIVE / OVERNIGHT EVENTS: No acute events overnight. Patient has no new complaints. Evaluated patient with daughter at bedside. Patient denies chest pain, shortness of breath, dizziness, palpitations, cough, hemoptysis.    ADDITIONAL REVIEW OF SYSTEMS:  Review of Systems:   CONSTITUTIONAL: No fever, weight loss  EYES: No eye pain, visual disturbances, or discharge  ENMT:  No difficulty hearing, tinnitus, vertigo; No sinus or throat pain  RESPIRATORY: No SOB. No cough, wheezing, chills or hemoptysis  CARDIOVASCULAR: No chest pain, palpitations, dizziness, or leg swelling  GASTROINTESTINAL: No abdominal or epigastric pain. No nausea, vomiting, or hematemesis; No diarrhea or constipation. No melena or hematochezia.  GENITOURINARY: No dysuria, frequency, hematuria, or incontinence  NEUROLOGICAL: No headaches, memory loss, loss of strength, numbness, or tremors  SKIN: No itching, burning, rashes, or lesions   LYMPH NODES: No enlarged glands  ENDOCRINE: No heat or cold intolerance; No hair loss  MUSCULOSKELETAL: No joint pain or swelling; No muscle, back pain  PSYCHIATRIC: No depression, anxiety, mood swings, or difficulty sleeping  HEME/LYMPH: No easy bruising, or bleeding gums      MEDICATIONS  (STANDING):  atorvastatin 80 milliGRAM(s) Oral at bedtime  dextrose 5%. 1000 milliLiter(s) (100 mL/Hr) IV Continuous <Continuous>  dextrose 5%. 1000 milliLiter(s) (50 mL/Hr) IV Continuous <Continuous>  dextrose 50% Injectable 25 Gram(s) IV Push once  dextrose 50% Injectable 25 Gram(s) IV Push once  dextrose 50% Injectable 12.5 Gram(s) IV Push once  enoxaparin Injectable 60 milliGRAM(s) SubCutaneous every 12 hours  glucagon  Injectable 1 milliGRAM(s) IntraMuscular once  insulin lispro (ADMELOG) corrective regimen sliding scale   SubCutaneous three times a day before meals  insulin lispro (ADMELOG) corrective regimen sliding scale   SubCutaneous at bedtime  metoprolol tartrate 25 milliGRAM(s) Oral two times a day  mirtazapine 15 milliGRAM(s) Oral at bedtime  sodium chloride 7% Inhalation 4 milliLiter(s) Inhalation every 12 hours    MEDICATIONS  (PRN):  dextrose Oral Gel 15 Gram(s) Oral once PRN Blood Glucose LESS THAN 70 milliGRAM(s)/deciliter      I&O's Summary    28 Nov 2023 07:01  -  29 Nov 2023 07:00  --------------------------------------------------------  IN: 140 mL / OUT: 900 mL / NET: -760 mL    29 Nov 2023 07:01  -  29 Nov 2023 16:12  --------------------------------------------------------  IN: 100 mL / OUT: 400 mL / NET: -300 mL        PHYSICAL EXAM:  Vital Signs Last 24 Hrs  T(C): 36.7 (29 Nov 2023 11:45), Max: 37.2 (29 Nov 2023 09:00)  T(F): 98 (29 Nov 2023 11:45), Max: 98.9 (29 Nov 2023 09:00)  HR: 70 (29 Nov 2023 11:45) (65 - 82)  BP: 145/53 (29 Nov 2023 11:45) (130/63 - 149/82)  BP(mean): --  RR: 18 (29 Nov 2023 11:45) (18 - 18)  SpO2: 98% (29 Nov 2023 11:45) (97% - 100%)    Parameters below as of 29 Nov 2023 11:45  Patient On (Oxygen Delivery Method): room air      CONSTITUTIONAL: NAD, well-groomed  EYES: PERRLA; conjunctiva and sclera clear  ENMT: Moist oral mucosa, no pharyngeal injection or exudates; normal dentition  NECK: Supple, no palpable masses; no thyromegaly  RESPIRATORY: Normal respiratory effort; lungs are clear to auscultation bilaterally  CARDIOVASCULAR: Regular rate and rhythm, normal S1 and S2, no murmur/rub/gallop; No lower extremity edema; Peripheral pulses are 2+ bilaterally  ABDOMEN: Nontender to palpation, normoactive bowel sounds, no rebound/guarding; No hepatosplenomegaly  MUSCULOSKELETAL:  Normal gait; no clubbing or cyanosis of digits; no joint swelling or tenderness to palpation  PSYCH: A+O to person, place, and time; affect appropriate  NEUROLOGY: CN 2-12 are intact and symmetric; no gross sensory deficits   SKIN: No rashes; no palpable lesions    LABS:                        10.3   10.98 )-----------( 459      ( 29 Nov 2023 05:25 )             33.6     11-29    138  |  102  |  22  ----------------------------<  176<H>  4.3   |  26  |  0.72    Ca    9.1      29 Nov 2023 05:25  Phos  3.2     11-28  Mg     1.80     11-28    TPro  7.0  /  Alb  3.2<L>  /  TBili  0.4  /  DBili  x   /  AST  21  /  ALT  23  /  AlkPhos  61  11-29          Urinalysis Basic - ( 29 Nov 2023 05:25 )    Color: x / Appearance: x / SG: x / pH: x  Gluc: 176 mg/dL / Ketone: x  / Bili: x / Urobili: x   Blood: x / Protein: x / Nitrite: x   Leuk Esterase: x / RBC: x / WBC x   Sq Epi: x / Non Sq Epi: x / Bacteria: x            RADIOLOGY & ADDITIONAL TESTS:  New Imaging Personally Reviewed Today: Yes  New Electrocardiogram Personally Reviewed Today: N/A  Other Results Reviewed Today: Yes  Prior or Outpatient Records Reviewed Today with Summary: Yes    COORDINATION OF CARE:  Consultant Communication and Details of Discussion (where applicable): Yes     [facility] Division of Hospital Medicine  Jean Carrillo DO  Available via MS Teams  Pager:[phone]    SUBJECTIVE / OVERNIGHT EVENTS: No acute events overnight. Patient has no new complaints. Evaluated patient with daughter at bedside. Patient denies chest pain, shortness of breath, dizziness, palpitations, cough, hemoptysis.    ADDITIONAL REVIEW OF SYSTEMS:  Review of Systems:   CONSTITUTIONAL: No fever, weight loss  EYES: No eye pain, visual disturbances, or discharge  ENMT:  No difficulty hearing, tinnitus, vertigo; No sinus or throat pain  RESPIRATORY: No SOB. No cough, wheezing, chills or hemoptysis  CARDIOVASCULAR: No chest pain, palpitations, dizziness, or leg swelling  GASTROINTESTINAL: No abdominal or epigastric pain. No nausea, vomiting, or hematemesis; No diarrhea or constipation. No melena or hematochezia.  GENITOURINARY: No dysuria, frequency, hematuria, or incontinence  NEUROLOGICAL: No headaches, memory loss, loss of strength, numbness, or tremors  SKIN: No itching, burning, rashes, or lesions   LYMPH NODES: No enlarged glands  ENDOCRINE: No heat or cold intolerance; No hair loss  MUSCULOSKELETAL: No joint pain or swelling; No muscle, back pain  PSYCHIATRIC: No depression, anxiety, mood swings, or difficulty sleeping  HEME/LYMPH: No easy bruising, or bleeding gums      MEDICATIONS  (STANDING):  atorvastatin 80 milliGRAM(s) Oral at bedtime  dextrose 5%. 1000 milliLiter(s) (100 mL/Hr) IV Continuous <Continuous>  dextrose 5%. 1000 milliLiter(s) (50 mL/Hr) IV Continuous <Continuous>  dextrose 50% Injectable 25 Gram(s) IV Push once  dextrose 50% Injectable 25 Gram(s) IV Push once  dextrose 50% Injectable 12.5 Gram(s) IV Push once  enoxaparin Injectable 60 milliGRAM(s) SubCutaneous every 12 hours  glucagon  Injectable 1 milliGRAM(s) IntraMuscular once  insulin lispro (ADMELOG) corrective regimen sliding scale   SubCutaneous three times a day before meals  insulin lispro (ADMELOG) corrective regimen sliding scale   SubCutaneous at bedtime  metoprolol tartrate 25 milliGRAM(s) Oral two times a day  mirtazapine 15 milliGRAM(s) Oral at bedtime  sodium chloride 7% Inhalation 4 milliLiter(s) Inhalation every 12 hours    MEDICATIONS  (PRN):  dextrose Oral Gel 15 Gram(s) Oral once PRN Blood Glucose LESS THAN 70 milliGRAM(s)/deciliter      I&O's Summary    28 Nov 2023 07:01  -  29 Nov 2023 07:00  --------------------------------------------------------  IN: 140 mL / OUT: 900 mL / NET: -760 mL    29 Nov 2023 07:01  -  29 Nov 2023 16:12  --------------------------------------------------------  IN: 100 mL / OUT: 400 mL / NET: -300 mL        PHYSICAL EXAM:  Vital Signs Last 24 Hrs  T(C): 36.7 (29 Nov 2023 11:45), Max: 37.2 (29 Nov 2023 09:00)  T(F): 98 (29 Nov 2023 11:45), Max: 98.9 (29 Nov 2023 09:00)  HR: 70 (29 Nov 2023 11:45) (65 - 82)  BP: 145/53 (29 Nov 2023 11:45) (130/63 - 149/82)  BP(mean): --  RR: 18 (29 Nov 2023 11:45) (18 - 18)  SpO2: 98% (29 Nov 2023 11:45) (97% - 100%)    Parameters below as of 29 Nov 2023 11:45  Patient On (Oxygen Delivery Method): room air      CONSTITUTIONAL: NAD, well-groomed  EYES: PERRLA; conjunctiva and sclera clear  ENMT: Moist oral mucosa, no pharyngeal injection or exudates; normal dentition  NECK: Supple, no palpable masses; no thyromegaly  RESPIRATORY: Normal respiratory effort; lungs are clear to auscultation bilaterally  CARDIOVASCULAR: Regular rate and rhythm, normal S1 and S2, no murmur/rub/gallop; No lower extremity edema; Peripheral pulses are 2+ bilaterally  ABDOMEN: Nontender to palpation, normoactive bowel sounds, no rebound/guarding; No hepatosplenomegaly  MUSCULOSKELETAL:  Normal gait; no clubbing or cyanosis of digits; no joint swelling or tenderness to palpation  PSYCH: A+O to person, place, and time; affect appropriate  NEUROLOGY: CN 2-12 are intact and symmetric; no gross sensory deficits   SKIN: No rashes; no palpable lesions    LABS:                        10.3   10.98 )-----------( 459      ( 29 Nov 2023 05:25 )             33.6     11-29    138  |  102  |  22  ----------------------------<  176<H>  4.3   |  26  |  0.72    Ca    9.1      29 Nov 2023 05:25  Phos  3.2     11-28  Mg     1.80     11-28    TPro  7.0  /  Alb  3.2<L>  /  TBili  0.4  /  DBili  x   /  AST  21  /  ALT  23  /  AlkPhos  61  11-29          Urinalysis Basic - ( 29 Nov 2023 05:25 )    Color: x / Appearance: x / SG: x / pH: x  Gluc: 176 mg/dL / Ketone: x  / Bili: x / Urobili: x   Blood: x / Protein: x / Nitrite: x   Leuk Esterase: x / RBC: x / WBC x   Sq Epi: x / Non Sq Epi: x / Bacteria: x            RADIOLOGY & ADDITIONAL TESTS:  New Imaging Personally Reviewed Today: Yes  New Electrocardiogram Personally Reviewed Today: N/A  Other Results Reviewed Today: Yes  Prior or Outpatient Records Reviewed Today with Summary: Yes    COORDINATION OF CARE:  Consultant Communication and Details of Discussion (where applicable): Yes

## 2023-11-29 NOTE — SWALLOW BEDSIDE ASSESSMENT ADULT - SWALLOW EVAL: DIAGNOSIS
1- Functional oral stage for puree, easy to chew solids, and thin liquids marked by adequate oral containment, adequate bolus manipulation and mastication, adequate anterior to posterior transfer, adequate oral clearance. 2- Functional pharyngeal stage for puree, easy to chew solids, and thin liquids marked by initiation of the pharyngeal swallow with hyolaryngeal excursion without evidence of impaired airway protection.

## 2023-11-29 NOTE — SWALLOW BEDSIDE ASSESSMENT ADULT - SWALLOW EVAL: RECOMMENDED FEEDING/EATING TECHNIQUES
Swallowing Guidelines: Seated Upright during Mealtimes; Slow Pacing; Take Small Bites; Allow for Swallow Prior to Next Presentation; Maintain Oral Hygiene

## 2023-11-29 NOTE — SWALLOW BEDSIDE ASSESSMENT ADULT - SWALLOW EVAL: CURRENT DIET
NPO after midnight 11/27 and easy to chew solids with thin liquids, per MD order
Easy to Chew Solids with Thin Liquids

## 2023-11-29 NOTE — SWALLOW BEDSIDE ASSESSMENT ADULT - COMMENTS
Attempted to get in touch with ACP this AM for clearance of PO trials given patient was NPO after midnight 11/27, however, no answer. Spoke with RN on unit, who also expressed difficulty getting in touch with ACP regarding PO status. Attempted to reach out again x2 at 1:30pm to ACP- no answer. This service to follow up schedule permits.
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 Neuro evaluated and thrombolytic therapy was deferred."     Neurosurgery PA Note 11/27: "Impression: LHH & disorientation likely due to evolving R PCA infarct (in association with smaller R cerebellar infarct); mechanism is likely cardioembolic in the setting of valvular afib +/- KRISTAN thrombus."     Patient seen awake/alert during clinica swallow evaluation this PM with patient's daughter-in-law present at bedside. Patient is Nadine speaking, Language Line offered however declined with preference for daughter-in-law to translate. Daughter-in-law states patient eats soft foods at home at baseline such as roti mashed with jernigan/lentils.

## 2023-11-29 NOTE — SPEECH LANGUAGE PATHOLOGY EVALUATION - SLP DIAGNOSIS
Patient's Receptive/Expressive Language Skills characterized by adequate auditory comprehension (e.g. Following Commands, Yes/No Questions), adequate repetition skills, fluent language output with no word finding deficits noted. Patient presents with functional speech output at the sentence/conversational level.

## 2023-11-29 NOTE — PROGRESS NOTE ADULT - PROBLEM SELECTOR PLAN 1
CTA with occlusion of R PCA P2 segment. Not TPA candidate.  CTH with acute/subacute infarcts within the right paramedian occipital lobe as well as the right cerebellar hemisphere. No hemorrhagic transformation.  Upon discussion with neuro AC was resumed  f/u MRI brain as outpatient per neuro  EEG showed focal cerebral dysfunction of R and L temporal lobe, mild diffuse cerebral dysfunction, without epileptiform abnormalities. Neurology reviewed and discontinued EEG, recommended discontinuation of keppra  -A1c/lipid panel: HBA1C 7.9,LDL at goal -44  -Permissive HTN <220/120 for first 24h. Gradual reduction.  -Passed dysphagia screen - diet ordered.  -PT/OT evaluation.   -Cont telemetry. Neuro checks.

## 2023-11-30 NOTE — PROGRESS NOTE ADULT - PROBLEM SELECTOR PLAN 7
Hgb 10.0  iron studies reviewed  monitor CBC, maintain active type and screen, transfuse for Hgb<7.0

## 2023-11-30 NOTE — PROGRESS NOTE ADULT - SUBJECTIVE AND OBJECTIVE BOX
Chintan Owusu MD  Interventional Cardiology / Advance Heart Failure and Cardiac Transplant Specialist  [place] Office : [address] [place] [address]  Tel:   [place] Office : [address] [facility] [address]  Tel: [phone]      Subjective/Overnight events: Pt is lying in bed comfortable not in distress, no chest pains no SOB no palpitations  	  MEDICATIONS:  enoxaparin Injectable 60 milliGRAM(s) SubCutaneous every 12 hours  metoprolol tartrate 25 milliGRAM(s) Oral two times a day      sodium chloride 7% Inhalation 4 milliLiter(s) Inhalation every 12 hours    mirtazapine 15 milliGRAM(s) Oral at bedtime      atorvastatin 80 milliGRAM(s) Oral at bedtime  dextrose 50% Injectable 25 Gram(s) IV Push once  dextrose 50% Injectable 25 Gram(s) IV Push once  dextrose 50% Injectable 12.5 Gram(s) IV Push once  dextrose Oral Gel 15 Gram(s) Oral once PRN  glucagon  Injectable 1 milliGRAM(s) IntraMuscular once  insulin lispro (ADMELOG) corrective regimen sliding scale   SubCutaneous three times a day before meals  insulin lispro (ADMELOG) corrective regimen sliding scale   SubCutaneous at bedtime    dextrose 5%. 1000 milliLiter(s) IV Continuous <Continuous>  dextrose 5%. 1000 milliLiter(s) IV Continuous <Continuous>      PAST MEDICAL/SURGICAL HISTORY  PAST MEDICAL & SURGICAL HISTORY:  Chronic atrial fibrillation      CVA (cerebrovascular accident)      HTN (hypertension)      DM (diabetes mellitus), type 2      H/O hernia repair          SOCIAL HISTORY: Substance Use (street drugs): ( x ) never used  (  ) other:    FAMILY HISTORY:  No pertinent family history in first degree relatives          PHYSICAL EXAM:  T(C): 36.6 (11-30-23 @ 09:00), Max: 37 (11-29-23 @ 20:39)  HR: 62 (11-30-23 @ 09:00) (60 - 70)  BP: 126/70 (11-30-23 @ 09:00) (110/65 - 170/73)  RR: 18 (11-30-23 @ 09:00) (18 - 18)  SpO2: 100% (11-30-23 @ 09:00) (98% - 100%)  Wt(kg): --  I&O's Summary    29 Nov 2023 07:01  -  30 Nov 2023 07:00  --------------------------------------------------------  IN: 540 mL / OUT: 1101 mL / NET: -561 mL          GENERAL: NAD  EYES: conjunctiva and sclera clear  ENMT: No tonsillar erythema, exudates, or enlargement  Cardiovascular: Normal S1 S2, No JVD, No murmurs, No edema  Respiratory: Lungs clear to auscultation	  Gastrointestinal:  Soft, Non-tender, + BS	  Extremities: No edema                                     10.0   10.78 )-----------( 433      ( 30 Nov 2023 07:10 )             31.5     11-30    137  |  103  |  17  ----------------------------<  176<H>  4.1   |  26  |  0.79    Ca    8.9      30 Nov 2023 07:10    TPro  6.6  /  Alb  3.2<L>  /  TBili  0.5  /  DBili  x   /  AST  14  /  ALT  18  /  AlkPhos  57  11-30    proBNP:   Lipid Profile:   HgA1c:   TSH:     Consultant(s) Notes Reviewed:  [x ] YES  [ ] NO    Care Discussed with Consultants/Other Providers [ x] YES  [ ] NO    Imaging Personally Reviewed independently:  [x] YES  [ ] NO    All labs, radiologic studies, vitals, orders and medications list reviewed. Patient is seen and examined at bedside. Case discussed with medical team.         Chintan Owusu MD  Interventional Cardiology / Advance Heart Failure and Cardiac Transplant Specialist  [place] Office : [address] [place] [address]  Tel:   [place] Office : [address] [facility] [address]  Tel: [phone]      Subjective/Overnight events: Pt is lying in bed comfortable not in distress, no chest pains no SOB no palpitations  	  MEDICATIONS:  enoxaparin Injectable 60 milliGRAM(s) SubCutaneous every 12 hours  metoprolol tartrate 25 milliGRAM(s) Oral two times a day      sodium chloride 7% Inhalation 4 milliLiter(s) Inhalation every 12 hours    mirtazapine 15 milliGRAM(s) Oral at bedtime      atorvastatin 80 milliGRAM(s) Oral at bedtime  dextrose 50% Injectable 25 Gram(s) IV Push once  dextrose 50% Injectable 25 Gram(s) IV Push once  dextrose 50% Injectable 12.5 Gram(s) IV Push once  dextrose Oral Gel 15 Gram(s) Oral once PRN  glucagon  Injectable 1 milliGRAM(s) IntraMuscular once  insulin lispro (ADMELOG) corrective regimen sliding scale   SubCutaneous three times a day before meals  insulin lispro (ADMELOG) corrective regimen sliding scale   SubCutaneous at bedtime    dextrose 5%. 1000 milliLiter(s) IV Continuous <Continuous>  dextrose 5%. 1000 milliLiter(s) IV Continuous <Continuous>      PAST MEDICAL/SURGICAL HISTORY  PAST MEDICAL & SURGICAL HISTORY:  Chronic atrial fibrillation      CVA (cerebrovascular accident)      HTN (hypertension)      DM (diabetes mellitus), type 2      H/O hernia repair          SOCIAL HISTORY: Substance Use (street drugs): ( x ) never used  (  ) other:    FAMILY HISTORY:  No pertinent family history in first degree relatives          PHYSICAL EXAM:  T(C): 36.6 (11-30-23 @ 09:00), Max: 37 (11-29-23 @ 20:39)  HR: 62 (11-30-23 @ 09:00) (60 - 70)  BP: 126/70 (11-30-23 @ 09:00) (110/65 - 170/73)  RR: 18 (11-30-23 @ 09:00) (18 - 18)  SpO2: 100% (11-30-23 @ 09:00) (98% - 100%)  Wt(kg): --  I&O's Summary    29 Nov 2023 07:01  -  30 Nov 2023 07:00  --------------------------------------------------------  IN: 540 mL / OUT: 1101 mL / NET: -561 mL          GENERAL: NAD  EYES: conjunctiva and sclera clear  ENMT: No tonsillar erythema, exudates, or enlargement  Cardiovascular: Normal S1 S2, No JVD, No murmurs, No edema  Respiratory: Lungs clear to auscultation	  Gastrointestinal:  Soft, Non-tender, + BS	  Extremities: No edema                                     10.0   10.78 )-----------( 433      ( 30 Nov 2023 07:10 )             31.5     11-30    137  |  103  |  17  ----------------------------<  176<H>  4.1   |  26  |  0.79    Ca    8.9      30 Nov 2023 07:10    TPro  6.6  /  Alb  3.2<L>  /  TBili  0.5  /  DBili  x   /  AST  14  /  ALT  18  /  AlkPhos  57  11-30    proBNP:   Lipid Profile:   HgA1c:   TSH:     Consultant(s) Notes Reviewed:  [x ] YES  [ ] NO    Care Discussed with Consultants/Other Providers [ x] YES  [ ] NO    Imaging Personally Reviewed independently:  [x] YES  [ ] NO    All labs, radiologic studies, vitals, orders and medications list reviewed. Patient is seen and examined at bedside. Case discussed with medical team.         Chintan Owusu MD  Interventional Cardiology / Advance Heart Failure and Cardiac Transplant Specialist  [place] Office : [address] [place] [address]  Tel:   [place] Office : [address] [facility] [address]  Tel: [phone]      Subjective/Overnight events: Pt is lying in bed comfortable not in distress, no chest pains no SOB no palpitations  	  MEDICATIONS:  enoxaparin Injectable 60 milliGRAM(s) SubCutaneous every 12 hours  metoprolol tartrate 25 milliGRAM(s) Oral two times a day      sodium chloride 7% Inhalation 4 milliLiter(s) Inhalation every 12 hours    mirtazapine 15 milliGRAM(s) Oral at bedtime      atorvastatin 80 milliGRAM(s) Oral at bedtime  dextrose 50% Injectable 25 Gram(s) IV Push once  dextrose 50% Injectable 25 Gram(s) IV Push once  dextrose 50% Injectable 12.5 Gram(s) IV Push once  dextrose Oral Gel 15 Gram(s) Oral once PRN  glucagon  Injectable 1 milliGRAM(s) IntraMuscular once  insulin lispro (ADMELOG) corrective regimen sliding scale   SubCutaneous three times a day before meals  insulin lispro (ADMELOG) corrective regimen sliding scale   SubCutaneous at bedtime    dextrose 5%. 1000 milliLiter(s) IV Continuous <Continuous>  dextrose 5%. 1000 milliLiter(s) IV Continuous <Continuous>      PAST MEDICAL/SURGICAL HISTORY  PAST MEDICAL & SURGICAL HISTORY:  Chronic atrial fibrillation      CVA (cerebrovascular accident)      HTN (hypertension)      DM (diabetes mellitus), type 2      H/O hernia repair          SOCIAL HISTORY: Substance Use (street drugs): ( x ) never used  (  ) other:    FAMILY HISTORY:  No pertinent family history in first degree relatives          PHYSICAL EXAM:  T(C): 36.6 (11-30-23 @ 09:00), Max: 37 (11-29-23 @ 20:39)  HR: 62 (11-30-23 @ 09:00) (60 - 70)  BP: 126/70 (11-30-23 @ 09:00) (110/65 - 170/73)  RR: 18 (11-30-23 @ 09:00) (18 - 18)  SpO2: 100% (11-30-23 @ 09:00) (98% - 100%)  Wt(kg): --  I&O's Summary    29 Nov 2023 07:01  -  30 Nov 2023 07:00  --------------------------------------------------------  IN: 540 mL / OUT: 1101 mL / NET: -561 mL          GENERAL: NAD  EYES: conjunctiva and sclera clear  ENMT: No tonsillar erythema, exudates, or enlargement  Cardiovascular: Normal S1 S2, No JVD, No murmurs, No edema  Respiratory: Lungs clear to auscultation	  Gastrointestinal:  Soft, Non-tender, + BS	  Extremities: No edema                                     10.0   10.78 )-----------( 433      ( 30 Nov 2023 07:10 )             31.5     11-30    137  |  103  |  17  ----------------------------<  176<H>  4.1   |  26  |  0.79    Ca    8.9      30 Nov 2023 07:10    TPro  6.6  /  Alb  3.2<L>  /  TBili  0.5  /  DBili  x   /  AST  14  /  ALT  18  /  AlkPhos  57  11-30    proBNP:   Lipid Profile:   HgA1c:   TSH:     Consultant(s) Notes Reviewed:  [x ] YES  [ ] NO    Care Discussed with Consultants/Other Providers [ x] YES  [ ] NO    Imaging Personally Reviewed independently:  [x] YES  [ ] NO    All labs, radiologic studies, vitals, orders and medications list reviewed. Patient is seen and examined at bedside. Case discussed with medical team.

## 2023-11-30 NOTE — PROGRESS NOTE ADULT - PROBLEM SELECTOR PLAN 3
Incidental finding of RML collapsed and scattered micronodules measuring up to 8 mm in the right upper lobe.   Additional tiny cavitary micronodule in the right upper lobe.   -Risk factor for TB reviewed - born in [place]. [place]. No personal hx of TB. No known exposure.  -Airborne isolation  -Quantiferon gold.  -f/u Sputum AFB x3, RT was unable to induce with 3% saline nebs, continue 7% saline nebs in attempt to induce sputum for final sample (2/3 collected)  -CT Chest w/ contrast ordered to further characterize area of concern, - noted Segmental atelectasis of the lateral segment of the right middle lobe. Mild interlobular septal thickening, left greater than right, may be seen   with asymmetric edema. Increased cluster of mediastinal lymph nodes, the largest measuring 1.1 cm in the right paratracheal station.

## 2023-11-30 NOTE — PROGRESS NOTE ADULT - SUBJECTIVE AND OBJECTIVE BOX
[facility] Division of Hospital Medicine  Jean Carrillo DO  Available via MS Teams  Pager:[phone]    SUBJECTIVE / OVERNIGHT EVENTS: No acute events overnight. Patient does not have any complaints. Denies chest pain, shortness of breath, dizziness, palpitations, cough, hemoptysis.    ADDITIONAL REVIEW OF SYSTEMS:  Review of Systems:   CONSTITUTIONAL: No fever, weight loss  EYES: No eye pain, visual disturbances, or discharge  ENMT:  No difficulty hearing, tinnitus, vertigo; No sinus or throat pain  RESPIRATORY: No SOB. No cough, wheezing, chills or hemoptysis  CARDIOVASCULAR: No chest pain, palpitations, dizziness, or leg swelling  GASTROINTESTINAL: No abdominal or epigastric pain. No nausea, vomiting, or hematemesis; No diarrhea or constipation. No melena or hematochezia.  GENITOURINARY: No dysuria, frequency, hematuria, or incontinence  NEUROLOGICAL: No headaches, memory loss, loss of strength, numbness, or tremors  SKIN: No itching, burning, rashes, or lesions   LYMPH NODES: No enlarged glands  ENDOCRINE: No heat or cold intolerance; No hair loss  MUSCULOSKELETAL: No joint pain or swelling; No muscle, back pain  PSYCHIATRIC: No depression, anxiety, mood swings, or difficulty sleeping  HEME/LYMPH: No easy bruising, or bleeding gums      MEDICATIONS  (STANDING):  atorvastatin 80 milliGRAM(s) Oral at bedtime  dextrose 5%. 1000 milliLiter(s) (50 mL/Hr) IV Continuous <Continuous>  dextrose 5%. 1000 milliLiter(s) (100 mL/Hr) IV Continuous <Continuous>  dextrose 50% Injectable 12.5 Gram(s) IV Push once  dextrose 50% Injectable 25 Gram(s) IV Push once  dextrose 50% Injectable 25 Gram(s) IV Push once  enoxaparin Injectable 60 milliGRAM(s) SubCutaneous every 12 hours  glucagon  Injectable 1 milliGRAM(s) IntraMuscular once  insulin lispro (ADMELOG) corrective regimen sliding scale   SubCutaneous three times a day before meals  insulin lispro (ADMELOG) corrective regimen sliding scale   SubCutaneous at bedtime  metoprolol tartrate 25 milliGRAM(s) Oral two times a day  mirtazapine 15 milliGRAM(s) Oral at bedtime  sodium chloride 7% Inhalation 4 milliLiter(s) Inhalation every 12 hours    MEDICATIONS  (PRN):  dextrose Oral Gel 15 Gram(s) Oral once PRN Blood Glucose LESS THAN 70 milliGRAM(s)/deciliter      I&O's Summary    29 Nov 2023 07:01  -  30 Nov 2023 07:00  --------------------------------------------------------  IN: 540 mL / OUT: 1101 mL / NET: -561 mL    30 Nov 2023 07:01  -  30 Nov 2023 15:46  --------------------------------------------------------  IN: 500 mL / OUT: 751 mL / NET: -251 mL        PHYSICAL EXAM:  Vital Signs Last 24 Hrs  T(C): 36.8 (30 Nov 2023 12:21), Max: 37 (29 Nov 2023 20:39)  T(F): 98.2 (30 Nov 2023 12:21), Max: 98.6 (29 Nov 2023 20:39)  HR: 69 (30 Nov 2023 12:21) (60 - 70)  BP: 132/89 (30 Nov 2023 12:21) (110/65 - 170/73)  BP(mean): --  RR: 18 (30 Nov 2023 12:21) (18 - 18)  SpO2: 100% (30 Nov 2023 12:21) (100% - 100%)    Parameters below as of 30 Nov 2023 12:21  Patient On (Oxygen Delivery Method): room air      CONSTITUTIONAL: NAD, well-groomed  EYES: PERRLA; conjunctiva and sclera clear  ENMT: Moist oral mucosa, no pharyngeal injection or exudates; normal dentition  NECK: Supple, no palpable masses; no thyromegaly  RESPIRATORY: Normal respiratory effort; lungs are clear to auscultation bilaterally  CARDIOVASCULAR: Regular rate and rhythm, normal S1 and S2, no murmur/rub/gallop; No lower extremity edema; Peripheral pulses are 2+ bilaterally  ABDOMEN: Nontender to palpation, normoactive bowel sounds, no rebound/guarding; No hepatosplenomegaly  MUSCULOSKELETAL:  Normal gait; no clubbing or cyanosis of digits; no joint swelling or tenderness to palpation  PSYCH: A+O to person, place, and time; affect appropriate  NEUROLOGY: CN 2-12 are intact and symmetric; no gross sensory deficits   SKIN: No rashes; no palpable lesions    LABS:                        10.0   10.78 )-----------( 433      ( 30 Nov 2023 07:10 )             31.5     11-30    137  |  103  |  17  ----------------------------<  176<H>  4.1   |  26  |  0.79    Ca    8.9      30 Nov 2023 07:10    TPro  6.6  /  Alb  3.2<L>  /  TBili  0.5  /  DBili  x   /  AST  14  /  ALT  18  /  AlkPhos  57  11-30          Urinalysis Basic - ( 30 Nov 2023 07:10 )    Color: x / Appearance: x / SG: x / pH: x  Gluc: 176 mg/dL / Ketone: x  / Bili: x / Urobili: x   Blood: x / Protein: x / Nitrite: x   Leuk Esterase: x / RBC: x / WBC x   Sq Epi: x / Non Sq Epi: x / Bacteria: x        Culture - Acid Fast - Sputum w/Smear (collected 30 Nov 2023 09:07)  Source: .Sputum Sputum    Culture - Acid Fast - Sputum w/Smear (collected 29 Nov 2023 09:25)  Source: .Sputum Sputum          RADIOLOGY & ADDITIONAL TESTS:  New Imaging Personally Reviewed Today: Yes  New Electrocardiogram Personally Reviewed Today: N/A  Other Results Reviewed Today: Yes  Prior or Outpatient Records Reviewed Today with Summary: Yes    COORDINATION OF CARE:  Consultant Communication and Details of Discussion (where applicable): Yes     [facility] Division of Hospital Medicine  Jean Carrillo DO  Available via MS Teams  Pager:[phone]    SUBJECTIVE / OVERNIGHT EVENTS: No acute events overnight. Patient does not have any complaints. Denies chest pain, shortness of breath, dizziness, palpitations, cough, hemoptysis.    ADDITIONAL REVIEW OF SYSTEMS:  Review of Systems:   CONSTITUTIONAL: No fever, weight loss  EYES: No eye pain, visual disturbances, or discharge  ENMT:  No difficulty hearing, tinnitus, vertigo; No sinus or throat pain  RESPIRATORY: No SOB. No cough, wheezing, chills or hemoptysis  CARDIOVASCULAR: No chest pain, palpitations, dizziness, or leg swelling  GASTROINTESTINAL: No abdominal or epigastric pain. No nausea, vomiting, or hematemesis; No diarrhea or constipation. No melena or hematochezia.  GENITOURINARY: No dysuria, frequency, hematuria, or incontinence  NEUROLOGICAL: No headaches, memory loss, loss of strength, numbness, or tremors  SKIN: No itching, burning, rashes, or lesions   LYMPH NODES: No enlarged glands  ENDOCRINE: No heat or cold intolerance; No hair loss  MUSCULOSKELETAL: No joint pain or swelling; No muscle, back pain  PSYCHIATRIC: No depression, anxiety, mood swings, or difficulty sleeping  HEME/LYMPH: No easy bruising, or bleeding gums      MEDICATIONS  (STANDING):  atorvastatin 80 milliGRAM(s) Oral at bedtime  dextrose 5%. 1000 milliLiter(s) (50 mL/Hr) IV Continuous <Continuous>  dextrose 5%. 1000 milliLiter(s) (100 mL/Hr) IV Continuous <Continuous>  dextrose 50% Injectable 12.5 Gram(s) IV Push once  dextrose 50% Injectable 25 Gram(s) IV Push once  dextrose 50% Injectable 25 Gram(s) IV Push once  enoxaparin Injectable 60 milliGRAM(s) SubCutaneous every 12 hours  glucagon  Injectable 1 milliGRAM(s) IntraMuscular once  insulin lispro (ADMELOG) corrective regimen sliding scale   SubCutaneous three times a day before meals  insulin lispro (ADMELOG) corrective regimen sliding scale   SubCutaneous at bedtime  metoprolol tartrate 25 milliGRAM(s) Oral two times a day  mirtazapine 15 milliGRAM(s) Oral at bedtime  sodium chloride 7% Inhalation 4 milliLiter(s) Inhalation every 12 hours    MEDICATIONS  (PRN):  dextrose Oral Gel 15 Gram(s) Oral once PRN Blood Glucose LESS THAN 70 milliGRAM(s)/deciliter      I&O's Summary    29 Nov 2023 07:01  -  30 Nov 2023 07:00  --------------------------------------------------------  IN: 540 mL / OUT: 1101 mL / NET: -561 mL    30 Nov 2023 07:01  -  30 Nov 2023 15:46  --------------------------------------------------------  IN: 500 mL / OUT: 751 mL / NET: -251 mL        PHYSICAL EXAM:  Vital Signs Last 24 Hrs  T(C): 36.8 (30 Nov 2023 12:21), Max: 37 (29 Nov 2023 20:39)  T(F): 98.2 (30 Nov 2023 12:21), Max: 98.6 (29 Nov 2023 20:39)  HR: 69 (30 Nov 2023 12:21) (60 - 70)  BP: 132/89 (30 Nov 2023 12:21) (110/65 - 170/73)  BP(mean): --  RR: 18 (30 Nov 2023 12:21) (18 - 18)  SpO2: 100% (30 Nov 2023 12:21) (100% - 100%)    Parameters below as of 30 Nov 2023 12:21  Patient On (Oxygen Delivery Method): room air      CONSTITUTIONAL: NAD, well-groomed  EYES: PERRLA; conjunctiva and sclera clear  ENMT: Moist oral mucosa, no pharyngeal injection or exudates; normal dentition  NECK: Supple, no palpable masses; no thyromegaly  RESPIRATORY: Normal respiratory effort; lungs are clear to auscultation bilaterally  CARDIOVASCULAR: Regular rate and rhythm, normal S1 and S2, no murmur/rub/gallop; No lower extremity edema; Peripheral pulses are 2+ bilaterally  ABDOMEN: Nontender to palpation, normoactive bowel sounds, no rebound/guarding; No hepatosplenomegaly  MUSCULOSKELETAL:  Normal gait; no clubbing or cyanosis of digits; no joint swelling or tenderness to palpation  PSYCH: A+O to person, place, and time; affect appropriate  NEUROLOGY: CN 2-12 are intact and symmetric; no gross sensory deficits   SKIN: No rashes; no palpable lesions    LABS:                        10.0   10.78 )-----------( 433      ( 30 Nov 2023 07:10 )             31.5     11-30    137  |  103  |  17  ----------------------------<  176<H>  4.1   |  26  |  0.79    Ca    8.9      30 Nov 2023 07:10    TPro  6.6  /  Alb  3.2<L>  /  TBili  0.5  /  DBili  x   /  AST  14  /  ALT  18  /  AlkPhos  57  11-30          Urinalysis Basic - ( 30 Nov 2023 07:10 )    Color: x / Appearance: x / SG: x / pH: x  Gluc: 176 mg/dL / Ketone: x  / Bili: x / Urobili: x   Blood: x / Protein: x / Nitrite: x   Leuk Esterase: x / RBC: x / WBC x   Sq Epi: x / Non Sq Epi: x / Bacteria: x        Culture - Acid Fast - Sputum w/Smear (collected 30 Nov 2023 09:07)  Source: .Sputum Sputum    Culture - Acid Fast - Sputum w/Smear (collected 29 Nov 2023 09:25)  Source: .Sputum Sputum          RADIOLOGY & ADDITIONAL TESTS:  New Imaging Personally Reviewed Today: Yes  New Electrocardiogram Personally Reviewed Today: N/A  Other Results Reviewed Today: Yes  Prior or Outpatient Records Reviewed Today with Summary: Yes    COORDINATION OF CARE:  Consultant Communication and Details of Discussion (where applicable): Yes     [facility] Division of Hospital Medicine  Jaen Carrillo DO  Available via MS Teams  Pager:[phone]    SUBJECTIVE / OVERNIGHT EVENTS: No acute events overnight. Patient does not have any complaints. Denies chest pain, shortness of breath, dizziness, palpitations, cough, hemoptysis.    ADDITIONAL REVIEW OF SYSTEMS:  Review of Systems:   CONSTITUTIONAL: No fever, weight loss  EYES: No eye pain, visual disturbances, or discharge  ENMT:  No difficulty hearing, tinnitus, vertigo; No sinus or throat pain  RESPIRATORY: No SOB. No cough, wheezing, chills or hemoptysis  CARDIOVASCULAR: No chest pain, palpitations, dizziness, or leg swelling  GASTROINTESTINAL: No abdominal or epigastric pain. No nausea, vomiting, or hematemesis; No diarrhea or constipation. No melena or hematochezia.  GENITOURINARY: No dysuria, frequency, hematuria, or incontinence  NEUROLOGICAL: No headaches, memory loss, loss of strength, numbness, or tremors  SKIN: No itching, burning, rashes, or lesions   LYMPH NODES: No enlarged glands  ENDOCRINE: No heat or cold intolerance; No hair loss  MUSCULOSKELETAL: No joint pain or swelling; No muscle, back pain  PSYCHIATRIC: No depression, anxiety, mood swings, or difficulty sleeping  HEME/LYMPH: No easy bruising, or bleeding gums      MEDICATIONS  (STANDING):  atorvastatin 80 milliGRAM(s) Oral at bedtime  dextrose 5%. 1000 milliLiter(s) (50 mL/Hr) IV Continuous <Continuous>  dextrose 5%. 1000 milliLiter(s) (100 mL/Hr) IV Continuous <Continuous>  dextrose 50% Injectable 12.5 Gram(s) IV Push once  dextrose 50% Injectable 25 Gram(s) IV Push once  dextrose 50% Injectable 25 Gram(s) IV Push once  enoxaparin Injectable 60 milliGRAM(s) SubCutaneous every 12 hours  glucagon  Injectable 1 milliGRAM(s) IntraMuscular once  insulin lispro (ADMELOG) corrective regimen sliding scale   SubCutaneous three times a day before meals  insulin lispro (ADMELOG) corrective regimen sliding scale   SubCutaneous at bedtime  metoprolol tartrate 25 milliGRAM(s) Oral two times a day  mirtazapine 15 milliGRAM(s) Oral at bedtime  sodium chloride 7% Inhalation 4 milliLiter(s) Inhalation every 12 hours    MEDICATIONS  (PRN):  dextrose Oral Gel 15 Gram(s) Oral once PRN Blood Glucose LESS THAN 70 milliGRAM(s)/deciliter      I&O's Summary    29 Nov 2023 07:01  -  30 Nov 2023 07:00  --------------------------------------------------------  IN: 540 mL / OUT: 1101 mL / NET: -561 mL    30 Nov 2023 07:01  -  30 Nov 2023 15:46  --------------------------------------------------------  IN: 500 mL / OUT: 751 mL / NET: -251 mL        PHYSICAL EXAM:  Vital Signs Last 24 Hrs  T(C): 36.8 (30 Nov 2023 12:21), Max: 37 (29 Nov 2023 20:39)  T(F): 98.2 (30 Nov 2023 12:21), Max: 98.6 (29 Nov 2023 20:39)  HR: 69 (30 Nov 2023 12:21) (60 - 70)  BP: 132/89 (30 Nov 2023 12:21) (110/65 - 170/73)  BP(mean): --  RR: 18 (30 Nov 2023 12:21) (18 - 18)  SpO2: 100% (30 Nov 2023 12:21) (100% - 100%)    Parameters below as of 30 Nov 2023 12:21  Patient On (Oxygen Delivery Method): room air      CONSTITUTIONAL: NAD, well-groomed  EYES: PERRLA; conjunctiva and sclera clear  ENMT: Moist oral mucosa, no pharyngeal injection or exudates; normal dentition  NECK: Supple, no palpable masses; no thyromegaly  RESPIRATORY: Normal respiratory effort; lungs are clear to auscultation bilaterally  CARDIOVASCULAR: Regular rate and rhythm, normal S1 and S2, no murmur/rub/gallop; No lower extremity edema; Peripheral pulses are 2+ bilaterally  ABDOMEN: Nontender to palpation, normoactive bowel sounds, no rebound/guarding; No hepatosplenomegaly  MUSCULOSKELETAL:  Normal gait; no clubbing or cyanosis of digits; no joint swelling or tenderness to palpation  PSYCH: A+O to person, place, and time; affect appropriate  NEUROLOGY: CN 2-12 are intact and symmetric; no gross sensory deficits   SKIN: No rashes; no palpable lesions    LABS:                        10.0   10.78 )-----------( 433      ( 30 Nov 2023 07:10 )             31.5     11-30    137  |  103  |  17  ----------------------------<  176<H>  4.1   |  26  |  0.79    Ca    8.9      30 Nov 2023 07:10    TPro  6.6  /  Alb  3.2<L>  /  TBili  0.5  /  DBili  x   /  AST  14  /  ALT  18  /  AlkPhos  57  11-30          Urinalysis Basic - ( 30 Nov 2023 07:10 )    Color: x / Appearance: x / SG: x / pH: x  Gluc: 176 mg/dL / Ketone: x  / Bili: x / Urobili: x   Blood: x / Protein: x / Nitrite: x   Leuk Esterase: x / RBC: x / WBC x   Sq Epi: x / Non Sq Epi: x / Bacteria: x        Culture - Acid Fast - Sputum w/Smear (collected 30 Nov 2023 09:07)  Source: .Sputum Sputum    Culture - Acid Fast - Sputum w/Smear (collected 29 Nov 2023 09:25)  Source: .Sputum Sputum          RADIOLOGY & ADDITIONAL TESTS:  New Imaging Personally Reviewed Today: Yes  New Electrocardiogram Personally Reviewed Today: N/A  Other Results Reviewed Today: Yes  Prior or Outpatient Records Reviewed Today with Summary: Yes    COORDINATION OF CARE:  Consultant Communication and Details of Discussion (where applicable): Yes

## 2023-11-30 NOTE — PROGRESS NOTE ADULT - PROBLEM SELECTOR PLAN 2
CT chest with Intracardiac thrombus seen in the left atrial appendage measuring 1.5 cm.  f/u BUBBA, per cardiology would not happen while patient is on airborne precautions, AFBx2 collected, f/u additional AFBx1 this afternoon, continue saline nebs for induction  f/u dedicated TTE for now, appreciate cardiology recommendations  Appreciate CTS eval  Patient developed intracardiac thrombus despite on eliquis for afib, transitioned to therapeutic lovenox

## 2023-12-01 NOTE — PROGRESS NOTE ADULT - SUBJECTIVE AND OBJECTIVE BOX
[facility] Division of Hospital Medicine  Jean Carrillo DO  Available via MS Teams  Pager:[phone]    SUBJECTIVE / OVERNIGHT EVENTS: No acute events overnight. Patient denies any complaints.    ADDITIONAL REVIEW OF SYSTEMS:  Review of Systems:   CONSTITUTIONAL: No fever, weight loss  EYES: No eye pain, visual disturbances, or discharge  ENMT:  No difficulty hearing, tinnitus, vertigo; No sinus or throat pain  RESPIRATORY: No SOB. No cough, wheezing, chills or hemoptysis  CARDIOVASCULAR: No chest pain, palpitations, dizziness, or leg swelling  GASTROINTESTINAL: No abdominal or epigastric pain. No nausea, vomiting, or hematemesis; No diarrhea or constipation. No melena or hematochezia.  GENITOURINARY: No dysuria, frequency, hematuria, or incontinence  NEUROLOGICAL: No headaches, memory loss, loss of strength, numbness, or tremors  SKIN: No itching, burning, rashes, or lesions   LYMPH NODES: No enlarged glands  ENDOCRINE: No heat or cold intolerance; No hair loss  MUSCULOSKELETAL: No joint pain or swelling; No muscle, back pain  PSYCHIATRIC: No depression, anxiety, mood swings, or difficulty sleeping  HEME/LYMPH: No easy bruising, or bleeding gums        MEDICATIONS  (STANDING):  atorvastatin 80 milliGRAM(s) Oral at bedtime  dextrose 5%. 1000 milliLiter(s) (50 mL/Hr) IV Continuous <Continuous>  dextrose 5%. 1000 milliLiter(s) (100 mL/Hr) IV Continuous <Continuous>  dextrose 50% Injectable 25 Gram(s) IV Push once  dextrose 50% Injectable 25 Gram(s) IV Push once  dextrose 50% Injectable 12.5 Gram(s) IV Push once  enoxaparin Injectable 60 milliGRAM(s) SubCutaneous every 12 hours  glucagon  Injectable 1 milliGRAM(s) IntraMuscular once  insulin lispro (ADMELOG) corrective regimen sliding scale   SubCutaneous three times a day before meals  insulin lispro (ADMELOG) corrective regimen sliding scale   SubCutaneous at bedtime  metoprolol tartrate 25 milliGRAM(s) Oral two times a day  mirtazapine 15 milliGRAM(s) Oral at bedtime    MEDICATIONS  (PRN):  dextrose Oral Gel 15 Gram(s) Oral once PRN Blood Glucose LESS THAN 70 milliGRAM(s)/deciliter      I&O's Summary    30 Nov 2023 07:01  -  01 Dec 2023 07:00  --------------------------------------------------------  IN: 1100 mL / OUT: 2701 mL / NET: -1601 mL    01 Dec 2023 07:01  -  01 Dec 2023 14:23  --------------------------------------------------------  IN: 0 mL / OUT: 300 mL / NET: -300 mL        PHYSICAL EXAM:  Vital Signs Last 24 Hrs  T(C): 36.8 (01 Dec 2023 12:15), Max: 36.8 (01 Dec 2023 12:15)  T(F): 98.2 (01 Dec 2023 12:15), Max: 98.2 (01 Dec 2023 12:15)  HR: 58 (01 Dec 2023 12:15) (48 - 101)  BP: 151/67 (01 Dec 2023 12:15) (125/71 - 151/67)  BP(mean): --  RR: 18 (01 Dec 2023 12:15) (17 - 18)  SpO2: 100% (01 Dec 2023 12:15) (98% - 100%)    Parameters below as of 01 Dec 2023 12:15  Patient On (Oxygen Delivery Method): room air      CONSTITUTIONAL: NAD, well-groomed  EYES: PERRLA; conjunctiva and sclera clear  ENMT: Moist oral mucosa, no pharyngeal injection or exudates; normal dentition  NECK: Supple, no palpable masses; no thyromegaly  RESPIRATORY: Normal respiratory effort; lungs are clear to auscultation bilaterally  CARDIOVASCULAR: Regular rate and rhythm, normal S1 and S2, no murmur/rub/gallop; No lower extremity edema; Peripheral pulses are 2+ bilaterally  ABDOMEN: Nontender to palpation, normoactive bowel sounds, no rebound/guarding; No hepatosplenomegaly  MUSCULOSKELETAL:  Normal gait; no clubbing or cyanosis of digits; no joint swelling or tenderness to palpation  PSYCH: A+O to person, place, and time; affect appropriate  NEUROLOGY: CN 2-12 are intact and symmetric; no gross sensory deficits   SKIN: No rashes; no palpable lesions    LABS:                        10.5   10.23 )-----------( 415      ( 01 Dec 2023 05:19 )             32.7     12-01    136  |  103  |  18  ----------------------------<  209<H>  3.9   |  24  |  0.74    Ca    8.9      01 Dec 2023 05:19  Phos  3.4     12-01  Mg     2.00     12-01    TPro  6.6  /  Alb  3.2<L>  /  TBili  0.4  /  DBili  x   /  AST  18  /  ALT  16  /  AlkPhos  56  12-01          Urinalysis Basic - ( 01 Dec 2023 05:19 )    Color: x / Appearance: x / SG: x / pH: x  Gluc: 209 mg/dL / Ketone: x  / Bili: x / Urobili: x   Blood: x / Protein: x / Nitrite: x   Leuk Esterase: x / RBC: x / WBC x   Sq Epi: x / Non Sq Epi: x / Bacteria: x        Culture - Acid Fast - Sputum w/Smear (collected 30 Nov 2023 18:08)  Source: .Sputum Sputum    Culture - Acid Fast - Sputum w/Smear (collected 30 Nov 2023 11:05)  Source: .Sputum Sputum    Culture - Acid Fast - Sputum w/Smear (collected 30 Nov 2023 09:07)  Source: .Sputum Sputum    Culture - Acid Fast - Sputum w/Smear (collected 29 Nov 2023 09:25)  Source: .Sputum Sputum          RADIOLOGY & ADDITIONAL TESTS:  New Imaging Personally Reviewed Today: Yes  New Electrocardiogram Personally Reviewed Today: N/A  Other Results Reviewed Today: Yes  Prior or Outpatient Records Reviewed Today with Summary: Yes    COORDINATION OF CARE:  Consultant Communication and Details of Discussion (where applicable): Yes     [facility] Division of Hospital Medicine  Jean Carrillo DO  Available via MS Teams  Pager:[phone]    SUBJECTIVE / OVERNIGHT EVENTS: No acute events overnight. Patient denies any complaints.    ADDITIONAL REVIEW OF SYSTEMS:  Review of Systems:   CONSTITUTIONAL: No fever, weight loss  EYES: No eye pain, visual disturbances, or discharge  ENMT:  No difficulty hearing, tinnitus, vertigo; No sinus or throat pain  RESPIRATORY: No SOB. No cough, wheezing, chills or hemoptysis  CARDIOVASCULAR: No chest pain, palpitations, dizziness, or leg swelling  GASTROINTESTINAL: No abdominal or epigastric pain. No nausea, vomiting, or hematemesis; No diarrhea or constipation. No melena or hematochezia.  GENITOURINARY: No dysuria, frequency, hematuria, or incontinence  NEUROLOGICAL: No headaches, memory loss, loss of strength, numbness, or tremors  SKIN: No itching, burning, rashes, or lesions   LYMPH NODES: No enlarged glands  ENDOCRINE: No heat or cold intolerance; No hair loss  MUSCULOSKELETAL: No joint pain or swelling; No muscle, back pain  PSYCHIATRIC: No depression, anxiety, mood swings, or difficulty sleeping  HEME/LYMPH: No easy bruising, or bleeding gums        MEDICATIONS  (STANDING):  atorvastatin 80 milliGRAM(s) Oral at bedtime  dextrose 5%. 1000 milliLiter(s) (50 mL/Hr) IV Continuous <Continuous>  dextrose 5%. 1000 milliLiter(s) (100 mL/Hr) IV Continuous <Continuous>  dextrose 50% Injectable 25 Gram(s) IV Push once  dextrose 50% Injectable 25 Gram(s) IV Push once  dextrose 50% Injectable 12.5 Gram(s) IV Push once  enoxaparin Injectable 60 milliGRAM(s) SubCutaneous every 12 hours  glucagon  Injectable 1 milliGRAM(s) IntraMuscular once  insulin lispro (ADMELOG) corrective regimen sliding scale   SubCutaneous three times a day before meals  insulin lispro (ADMELOG) corrective regimen sliding scale   SubCutaneous at bedtime  metoprolol tartrate 25 milliGRAM(s) Oral two times a day  mirtazapine 15 milliGRAM(s) Oral at bedtime    MEDICATIONS  (PRN):  dextrose Oral Gel 15 Gram(s) Oral once PRN Blood Glucose LESS THAN 70 milliGRAM(s)/deciliter      I&O's Summary    30 Nov 2023 07:01  -  01 Dec 2023 07:00  --------------------------------------------------------  IN: 1100 mL / OUT: 2701 mL / NET: -1601 mL    01 Dec 2023 07:01  -  01 Dec 2023 14:23  --------------------------------------------------------  IN: 0 mL / OUT: 300 mL / NET: -300 mL        PHYSICAL EXAM:  Vital Signs Last 24 Hrs  T(C): 36.8 (01 Dec 2023 12:15), Max: 36.8 (01 Dec 2023 12:15)  T(F): 98.2 (01 Dec 2023 12:15), Max: 98.2 (01 Dec 2023 12:15)  HR: 58 (01 Dec 2023 12:15) (48 - 101)  BP: 151/67 (01 Dec 2023 12:15) (125/71 - 151/67)  BP(mean): --  RR: 18 (01 Dec 2023 12:15) (17 - 18)  SpO2: 100% (01 Dec 2023 12:15) (98% - 100%)    Parameters below as of 01 Dec 2023 12:15  Patient On (Oxygen Delivery Method): room air      CONSTITUTIONAL: NAD, well-groomed  EYES: PERRLA; conjunctiva and sclera clear  ENMT: Moist oral mucosa, no pharyngeal injection or exudates; normal dentition  NECK: Supple, no palpable masses; no thyromegaly  RESPIRATORY: Normal respiratory effort; lungs are clear to auscultation bilaterally  CARDIOVASCULAR: Regular rate and rhythm, normal S1 and S2, no murmur/rub/gallop; No lower extremity edema; Peripheral pulses are 2+ bilaterally  ABDOMEN: Nontender to palpation, normoactive bowel sounds, no rebound/guarding; No hepatosplenomegaly  MUSCULOSKELETAL:  Normal gait; no clubbing or cyanosis of digits; no joint swelling or tenderness to palpation  PSYCH: A+O to person, place, and time; affect appropriate  NEUROLOGY: CN 2-12 are intact and symmetric; no gross sensory deficits   SKIN: No rashes; no palpable lesions    LABS:                        10.5   10.23 )-----------( 415      ( 01 Dec 2023 05:19 )             32.7     12-01    136  |  103  |  18  ----------------------------<  209<H>  3.9   |  24  |  0.74    Ca    8.9      01 Dec 2023 05:19  Phos  3.4     12-01  Mg     2.00     12-01    TPro  6.6  /  Alb  3.2<L>  /  TBili  0.4  /  DBili  x   /  AST  18  /  ALT  16  /  AlkPhos  56  12-01          Urinalysis Basic - ( 01 Dec 2023 05:19 )    Color: x / Appearance: x / SG: x / pH: x  Gluc: 209 mg/dL / Ketone: x  / Bili: x / Urobili: x   Blood: x / Protein: x / Nitrite: x   Leuk Esterase: x / RBC: x / WBC x   Sq Epi: x / Non Sq Epi: x / Bacteria: x        Culture - Acid Fast - Sputum w/Smear (collected 30 Nov 2023 18:08)  Source: .Sputum Sputum    Culture - Acid Fast - Sputum w/Smear (collected 30 Nov 2023 11:05)  Source: .Sputum Sputum    Culture - Acid Fast - Sputum w/Smear (collected 30 Nov 2023 09:07)  Source: .Sputum Sputum    Culture - Acid Fast - Sputum w/Smear (collected 29 Nov 2023 09:25)  Source: .Sputum Sputum          RADIOLOGY & ADDITIONAL TESTS:  New Imaging Personally Reviewed Today: Yes  New Electrocardiogram Personally Reviewed Today: N/A  Other Results Reviewed Today: Yes  Prior or Outpatient Records Reviewed Today with Summary: Yes    COORDINATION OF CARE:  Consultant Communication and Details of Discussion (where applicable): Yes     [facility] Division of Hospital Medicine  Jean Carrillo DO  Available via MS Teams  Pager:[phone]    SUBJECTIVE / OVERNIGHT EVENTS: No acute events overnight. Patient denies any complaints.    ADDITIONAL REVIEW OF SYSTEMS:  Review of Systems:   CONSTITUTIONAL: No fever, weight loss  EYES: No eye pain, visual disturbances, or discharge  ENMT:  No difficulty hearing, tinnitus, vertigo; No sinus or throat pain  RESPIRATORY: No SOB. No cough, wheezing, chills or hemoptysis  CARDIOVASCULAR: No chest pain, palpitations, dizziness, or leg swelling  GASTROINTESTINAL: No abdominal or epigastric pain. No nausea, vomiting, or hematemesis; No diarrhea or constipation. No melena or hematochezia.  GENITOURINARY: No dysuria, frequency, hematuria, or incontinence  NEUROLOGICAL: No headaches, memory loss, loss of strength, numbness, or tremors  SKIN: No itching, burning, rashes, or lesions   LYMPH NODES: No enlarged glands  ENDOCRINE: No heat or cold intolerance; No hair loss  MUSCULOSKELETAL: No joint pain or swelling; No muscle, back pain  PSYCHIATRIC: No depression, anxiety, mood swings, or difficulty sleeping  HEME/LYMPH: No easy bruising, or bleeding gums        MEDICATIONS  (STANDING):  atorvastatin 80 milliGRAM(s) Oral at bedtime  dextrose 5%. 1000 milliLiter(s) (50 mL/Hr) IV Continuous <Continuous>  dextrose 5%. 1000 milliLiter(s) (100 mL/Hr) IV Continuous <Continuous>  dextrose 50% Injectable 25 Gram(s) IV Push once  dextrose 50% Injectable 25 Gram(s) IV Push once  dextrose 50% Injectable 12.5 Gram(s) IV Push once  enoxaparin Injectable 60 milliGRAM(s) SubCutaneous every 12 hours  glucagon  Injectable 1 milliGRAM(s) IntraMuscular once  insulin lispro (ADMELOG) corrective regimen sliding scale   SubCutaneous three times a day before meals  insulin lispro (ADMELOG) corrective regimen sliding scale   SubCutaneous at bedtime  metoprolol tartrate 25 milliGRAM(s) Oral two times a day  mirtazapine 15 milliGRAM(s) Oral at bedtime    MEDICATIONS  (PRN):  dextrose Oral Gel 15 Gram(s) Oral once PRN Blood Glucose LESS THAN 70 milliGRAM(s)/deciliter      I&O's Summary    30 Nov 2023 07:01  -  01 Dec 2023 07:00  --------------------------------------------------------  IN: 1100 mL / OUT: 2701 mL / NET: -1601 mL    01 Dec 2023 07:01  -  01 Dec 2023 14:23  --------------------------------------------------------  IN: 0 mL / OUT: 300 mL / NET: -300 mL        PHYSICAL EXAM:  Vital Signs Last 24 Hrs  T(C): 36.8 (01 Dec 2023 12:15), Max: 36.8 (01 Dec 2023 12:15)  T(F): 98.2 (01 Dec 2023 12:15), Max: 98.2 (01 Dec 2023 12:15)  HR: 58 (01 Dec 2023 12:15) (48 - 101)  BP: 151/67 (01 Dec 2023 12:15) (125/71 - 151/67)  BP(mean): --  RR: 18 (01 Dec 2023 12:15) (17 - 18)  SpO2: 100% (01 Dec 2023 12:15) (98% - 100%)    Parameters below as of 01 Dec 2023 12:15  Patient On (Oxygen Delivery Method): room air      CONSTITUTIONAL: NAD, well-groomed  EYES: PERRLA; conjunctiva and sclera clear  ENMT: Moist oral mucosa, no pharyngeal injection or exudates; normal dentition  NECK: Supple, no palpable masses; no thyromegaly  RESPIRATORY: Normal respiratory effort; lungs are clear to auscultation bilaterally  CARDIOVASCULAR: Regular rate and rhythm, normal S1 and S2, no murmur/rub/gallop; No lower extremity edema; Peripheral pulses are 2+ bilaterally  ABDOMEN: Nontender to palpation, normoactive bowel sounds, no rebound/guarding; No hepatosplenomegaly  MUSCULOSKELETAL:  Normal gait; no clubbing or cyanosis of digits; no joint swelling or tenderness to palpation  PSYCH: A+O to person, place, and time; affect appropriate  NEUROLOGY: CN 2-12 are intact and symmetric; no gross sensory deficits   SKIN: No rashes; no palpable lesions    LABS:                        10.5   10.23 )-----------( 415      ( 01 Dec 2023 05:19 )             32.7     12-01    136  |  103  |  18  ----------------------------<  209<H>  3.9   |  24  |  0.74    Ca    8.9      01 Dec 2023 05:19  Phos  3.4     12-01  Mg     2.00     12-01    TPro  6.6  /  Alb  3.2<L>  /  TBili  0.4  /  DBili  x   /  AST  18  /  ALT  16  /  AlkPhos  56  12-01          Urinalysis Basic - ( 01 Dec 2023 05:19 )    Color: x / Appearance: x / SG: x / pH: x  Gluc: 209 mg/dL / Ketone: x  / Bili: x / Urobili: x   Blood: x / Protein: x / Nitrite: x   Leuk Esterase: x / RBC: x / WBC x   Sq Epi: x / Non Sq Epi: x / Bacteria: x        Culture - Acid Fast - Sputum w/Smear (collected 30 Nov 2023 18:08)  Source: .Sputum Sputum    Culture - Acid Fast - Sputum w/Smear (collected 30 Nov 2023 11:05)  Source: .Sputum Sputum    Culture - Acid Fast - Sputum w/Smear (collected 30 Nov 2023 09:07)  Source: .Sputum Sputum    Culture - Acid Fast - Sputum w/Smear (collected 29 Nov 2023 09:25)  Source: .Sputum Sputum          RADIOLOGY & ADDITIONAL TESTS:  New Imaging Personally Reviewed Today: Yes  New Electrocardiogram Personally Reviewed Today: N/A  Other Results Reviewed Today: Yes  Prior or Outpatient Records Reviewed Today with Summary: Yes    COORDINATION OF CARE:  Consultant Communication and Details of Discussion (where applicable): Yes

## 2023-12-01 NOTE — PROGRESS NOTE ADULT - SUBJECTIVE AND OBJECTIVE BOX
Chintan Owusu MD  Interventional Cardiology / Advance Heart Failure and Cardiac Transplant Specialist  [place] Office : [address] [place] [address] [address]. [address]  Tel:   [place] Office : [address] [facility] [place] [address]  Tel: [phone]       Pt is lying in bed comfortable not in distress, no chest pains no SOB no palpitations  	  MEDICATIONS:  enoxaparin Injectable 60 milliGRAM(s) SubCutaneous every 12 hours  metoprolol tartrate 25 milliGRAM(s) Oral two times a day        mirtazapine 15 milliGRAM(s) Oral at bedtime    polyethylene glycol 3350 17 Gram(s) Oral daily    atorvastatin 80 milliGRAM(s) Oral at bedtime  dextrose 50% Injectable 25 Gram(s) IV Push once  dextrose 50% Injectable 25 Gram(s) IV Push once  dextrose 50% Injectable 12.5 Gram(s) IV Push once  dextrose Oral Gel 15 Gram(s) Oral once PRN  glucagon  Injectable 1 milliGRAM(s) IntraMuscular once  insulin lispro (ADMELOG) corrective regimen sliding scale   SubCutaneous three times a day before meals  insulin lispro (ADMELOG) corrective regimen sliding scale   SubCutaneous at bedtime    dextrose 5%. 1000 milliLiter(s) IV Continuous <Continuous>  dextrose 5%. 1000 milliLiter(s) IV Continuous <Continuous>      PAST MEDICAL/SURGICAL HISTORY  PAST MEDICAL & SURGICAL HISTORY:  Chronic atrial fibrillation      CVA (cerebrovascular accident)      HTN (hypertension)      DM (diabetes mellitus), type 2      H/O hernia repair          SOCIAL HISTORY: Substance Use (street drugs): ( x ) never used  (  ) other:    FAMILY HISTORY:  No pertinent family history in first degree relatives         PHYSICAL EXAM:  T(C): 36.6 (12-01-23 @ 19:51), Max: 36.8 (12-01-23 @ 12:15)  HR: 67 (12-01-23 @ 19:51) (48 - 78)  BP: 135/88 (12-01-23 @ 19:51) (101/75 - 151/67)  RR: 18 (12-01-23 @ 19:51) (17 - 18)  SpO2: 100% (12-01-23 @ 19:51) (98% - 100%)  Wt(kg): --  I&O's Summary    30 Nov 2023 07:01  -  01 Dec 2023 07:00  --------------------------------------------------------  IN: 1100 mL / OUT: 2701 mL / NET: -1601 mL    01 Dec 2023 07:01  -  01 Dec 2023 21:49  --------------------------------------------------------  IN: 0 mL / OUT: 700 mL / NET: -700 mL          EYES:   PERRLA   ENMT:   Moist mucous membranes, Good dentition, No lesions  Cardiovascular: Normal S1 S2, No JVD, No murmurs, No edema  Respiratory: Lungs clear to auscultation	  Gastrointestinal:  Soft, Non-tender, + BS	  Extremities: no edema                                    10.5   10.23 )-----------( 415      ( 01 Dec 2023 05:19 )             32.7     12-01    136  |  103  |  18  ----------------------------<  209<H>  3.9   |  24  |  0.74    Ca    8.9      01 Dec 2023 05:19  Phos  3.4     12-01  Mg     2.00     12-01    TPro  6.6  /  Alb  3.2<L>  /  TBili  0.4  /  DBili  x   /  AST  18  /  ALT  16  /  AlkPhos  56  12-01    proBNP:   Lipid Profile:   HgA1c:   TSH:     Consultant(s) Notes Reviewed:  [x ] YES  [ ] NO    Care Discussed with Consultants/Other Providers [ x] YES  [ ] NO    Imaging Personally Reviewed independently:  [x] YES  [ ] NO    All labs, radiologic studies, vitals, orders and medications list reviewed. Patient is seen and examined at bedside. Case discussed with medical team.         Chintan Owusu MD  Interventional Cardiology / Advance Heart Failure and Cardiac Transplant Specialist  [place] Office : [address] [place] [address] [address]. [address]  Tel:   [place] Office : [address] [facility] [place] [address]  Tel: [phone]       Pt is lying in bed comfortable not in distress, no chest pains no SOB no palpitations  	  MEDICATIONS:  enoxaparin Injectable 60 milliGRAM(s) SubCutaneous every 12 hours  metoprolol tartrate 25 milliGRAM(s) Oral two times a day        mirtazapine 15 milliGRAM(s) Oral at bedtime    polyethylene glycol 3350 17 Gram(s) Oral daily    atorvastatin 80 milliGRAM(s) Oral at bedtime  dextrose 50% Injectable 25 Gram(s) IV Push once  dextrose 50% Injectable 25 Gram(s) IV Push once  dextrose 50% Injectable 12.5 Gram(s) IV Push once  dextrose Oral Gel 15 Gram(s) Oral once PRN  glucagon  Injectable 1 milliGRAM(s) IntraMuscular once  insulin lispro (ADMELOG) corrective regimen sliding scale   SubCutaneous three times a day before meals  insulin lispro (ADMELOG) corrective regimen sliding scale   SubCutaneous at bedtime    dextrose 5%. 1000 milliLiter(s) IV Continuous <Continuous>  dextrose 5%. 1000 milliLiter(s) IV Continuous <Continuous>      PAST MEDICAL/SURGICAL HISTORY  PAST MEDICAL & SURGICAL HISTORY:  Chronic atrial fibrillation      CVA (cerebrovascular accident)      HTN (hypertension)      DM (diabetes mellitus), type 2      H/O hernia repair          SOCIAL HISTORY: Substance Use (street drugs): ( x ) never used  (  ) other:    FAMILY HISTORY:  No pertinent family history in first degree relatives         PHYSICAL EXAM:  T(C): 36.6 (12-01-23 @ 19:51), Max: 36.8 (12-01-23 @ 12:15)  HR: 67 (12-01-23 @ 19:51) (48 - 78)  BP: 135/88 (12-01-23 @ 19:51) (101/75 - 151/67)  RR: 18 (12-01-23 @ 19:51) (17 - 18)  SpO2: 100% (12-01-23 @ 19:51) (98% - 100%)  Wt(kg): --  I&O's Summary    30 Nov 2023 07:01  -  01 Dec 2023 07:00  --------------------------------------------------------  IN: 1100 mL / OUT: 2701 mL / NET: -1601 mL    01 Dec 2023 07:01  -  01 Dec 2023 21:49  --------------------------------------------------------  IN: 0 mL / OUT: 700 mL / NET: -700 mL          EYES:   PERRLA   ENMT:   Moist mucous membranes, Good dentition, No lesions  Cardiovascular: Normal S1 S2, No JVD, No murmurs, No edema  Respiratory: Lungs clear to auscultation	  Gastrointestinal:  Soft, Non-tender, + BS	  Extremities: no edema                                    10.5   10.23 )-----------( 415      ( 01 Dec 2023 05:19 )             32.7     12-01    136  |  103  |  18  ----------------------------<  209<H>  3.9   |  24  |  0.74    Ca    8.9      01 Dec 2023 05:19  Phos  3.4     12-01  Mg     2.00     12-01    TPro  6.6  /  Alb  3.2<L>  /  TBili  0.4  /  DBili  x   /  AST  18  /  ALT  16  /  AlkPhos  56  12-01    proBNP:   Lipid Profile:   HgA1c:   TSH:     Consultant(s) Notes Reviewed:  [x ] YES  [ ] NO    Care Discussed with Consultants/Other Providers [ x] YES  [ ] NO    Imaging Personally Reviewed independently:  [x] YES  [ ] NO    All labs, radiologic studies, vitals, orders and medications list reviewed. Patient is seen and examined at bedside. Case discussed with medical team.         Chintan Owusu MD  Interventional Cardiology / Advance Heart Failure and Cardiac Transplant Specialist  [place] Office : [address] [place] [address] [address] [address]  Tel:   [place] Office : [address] [facility] [place] [address]  Tel: [phone]       Pt is lying in bed comfortable not in distress, no chest pains no SOB no palpitations  	  MEDICATIONS:  enoxaparin Injectable 60 milliGRAM(s) SubCutaneous every 12 hours  metoprolol tartrate 25 milliGRAM(s) Oral two times a day        mirtazapine 15 milliGRAM(s) Oral at bedtime    polyethylene glycol 3350 17 Gram(s) Oral daily    atorvastatin 80 milliGRAM(s) Oral at bedtime  dextrose 50% Injectable 25 Gram(s) IV Push once  dextrose 50% Injectable 25 Gram(s) IV Push once  dextrose 50% Injectable 12.5 Gram(s) IV Push once  dextrose Oral Gel 15 Gram(s) Oral once PRN  glucagon  Injectable 1 milliGRAM(s) IntraMuscular once  insulin lispro (ADMELOG) corrective regimen sliding scale   SubCutaneous three times a day before meals  insulin lispro (ADMELOG) corrective regimen sliding scale   SubCutaneous at bedtime    dextrose 5%. 1000 milliLiter(s) IV Continuous <Continuous>  dextrose 5%. 1000 milliLiter(s) IV Continuous <Continuous>      PAST MEDICAL/SURGICAL HISTORY  PAST MEDICAL & SURGICAL HISTORY:  Chronic atrial fibrillation      CVA (cerebrovascular accident)      HTN (hypertension)      DM (diabetes mellitus), type 2      H/O hernia repair          SOCIAL HISTORY: Substance Use (street drugs): ( x ) never used  (  ) other:    FAMILY HISTORY:  No pertinent family history in first degree relatives         PHYSICAL EXAM:  T(C): 36.6 (12-01-23 @ 19:51), Max: 36.8 (12-01-23 @ 12:15)  HR: 67 (12-01-23 @ 19:51) (48 - 78)  BP: 135/88 (12-01-23 @ 19:51) (101/75 - 151/67)  RR: 18 (12-01-23 @ 19:51) (17 - 18)  SpO2: 100% (12-01-23 @ 19:51) (98% - 100%)  Wt(kg): --  I&O's Summary    30 Nov 2023 07:01  -  01 Dec 2023 07:00  --------------------------------------------------------  IN: 1100 mL / OUT: 2701 mL / NET: -1601 mL    01 Dec 2023 07:01  -  01 Dec 2023 21:49  --------------------------------------------------------  IN: 0 mL / OUT: 700 mL / NET: -700 mL          EYES:   PERRLA   ENMT:   Moist mucous membranes, Good dentition, No lesions  Cardiovascular: Normal S1 S2, No JVD, No murmurs, No edema  Respiratory: Lungs clear to auscultation	  Gastrointestinal:  Soft, Non-tender, + BS	  Extremities: no edema                                    10.5   10.23 )-----------( 415      ( 01 Dec 2023 05:19 )             32.7     12-01    136  |  103  |  18  ----------------------------<  209<H>  3.9   |  24  |  0.74    Ca    8.9      01 Dec 2023 05:19  Phos  3.4     12-01  Mg     2.00     12-01    TPro  6.6  /  Alb  3.2<L>  /  TBili  0.4  /  DBili  x   /  AST  18  /  ALT  16  /  AlkPhos  56  12-01    proBNP:   Lipid Profile:   HgA1c:   TSH:     Consultant(s) Notes Reviewed:  [x ] YES  [ ] NO    Care Discussed with Consultants/Other Providers [ x] YES  [ ] NO    Imaging Personally Reviewed independently:  [x] YES  [ ] NO    All labs, radiologic studies, vitals, orders and medications list reviewed. Patient is seen and examined at bedside. Case discussed with medical team.

## 2023-12-01 NOTE — PROGRESS NOTE ADULT - PROBLEM SELECTOR PLAN 2
CT chest with Intracardiac thrombus seen in the left atrial appendage measuring 1.5 cm.  f/u BUBBA, notified BUBBA lab/cardiology team that patient is now off airborne precautions (initially deferred due to that, now afb negative x3)  per cardiology defer TTE for now  keep NPO at midnight prior to BUBBA  Appreciate CTS eval  Patient developed intracardiac thrombus despite on eliquis for afib, transitioned to therapeutic lovenox

## 2023-12-01 NOTE — PROGRESS NOTE ADULT - PROBLEM SELECTOR PLAN 3
Incidental finding of RML collapsed and scattered micronodules measuring up to 8 mm in the right upper lobe.   Additional tiny cavitary micronodule in the right upper lobe.   -Risk factor for TB reviewed - born in [place]. [place] No personal hx of TB. No known exposure.  -Quantiferon negative  -AFB negative x 3, discontinued hypertonic saline nebs, airborne isolation discontinued  -CT Chest w/ contrast ordered to further characterize area of concern, - noted Segmental atelectasis of the lateral segment of the right middle lobe. Mild interlobular septal thickening, left greater than right, may be seen   with asymmetric edema. Increased cluster of mediastinal lymph nodes, the largest measuring 1.1 cm in the right paratracheal station.

## 2023-12-01 NOTE — ADVANCED PRACTICE NURSE CONSULT - RECOMMEDATIONS
Topical recommendations:     Right buttocks: Cleanse with NS, pat dry. Apply Liquid barrier film to periwound skin (allow to dry). Cover with silicone foam with border. Change daily and PRN if soiled. Once foam is in place, apply German moisture barrier cream twice a day and PRN with incontinent episodes.     Continue low air loss bed therapy, continue heel elevation, continue to turn & reposition as per protocol, soft pillow between bony prominences, continue moisture management with barrier creams & single breathable pad, continue measures to decrease friction/shear/pressure. Continue with nutritional support as per dietary/orders.     Plan of care discussed with Primary RN Roma and     Please contact Wound/Ostomy Care Service Line if we can be of further assistance (ext [phone]).  Topical recommendations:     Right buttocks: Cleanse with NS, pat dry. Apply Liquid barrier film to periwound skin (allow to dry). Cover with silicone foam with border. Change daily and PRN if soiled. Once foam is in place, apply German moisture barrier cream twice a day and PRN with incontinent episodes.     Continue low air loss bed therapy, continue heel elevation, continue to turn & reposition as per protocol, soft pillow between bony prominences, continue moisture management with barrier creams & single breathable pad, continue measures to decrease friction/shear/pressure. Continue with nutritional support as per dietary/orders.     Plan of care discussed with Primary RN Roma and     Please contact Wound/Ostomy Care Service Line if we can be of further assistance (ext [phone]).  Topical recommendations:     Right buttocks: Cleanse with NS, pat dry. Apply Liquid barrier film to periwound skin (allow to dry). Cover with silicone foam with border. Change daily and PRN if soiled. Once foam is in place, apply German moisture barrier cream twice a day and PRN with incontinent episodes.     Continue low air loss bed therapy, continue heel elevation, continue to turn & reposition as per protocol, soft pillow between bony prominences, continue moisture management with barrier creams & single breathable pad, continue measures to decrease friction/shear/pressure. Continue with nutritional support as per dietary/orders.     Plan of care discussed with Primary RN Roma and     Please contact Wound/Ostomy Care Service Line if we can be of further assistance (ext [phone]).  Topical recommendations:     Right buttocks: Cleanse with NS, pat dry. Apply Liquid barrier film to periwound skin (allow to dry). Cover with silicone foam with border. Change daily and PRN if soiled. Once foam is in place, apply German moisture barrier cream twice a day and PRN with incontinent episodes.     Continue low air loss bed therapy, continue heel elevation, continue to turn & reposition as per protocol, soft pillow between bony prominences, continue moisture management with barrier creams & single breathable pad, continue measures to decrease friction/shear/pressure. Continue with nutritional support as per dietary/orders.     Plan of care discussed with Primary RN Roma and Ibrahima Klein (PA).    Please contact Wound/Ostomy Care Service Line if we can be of further assistance (ext [phone]).  Topical recommendations:     Right buttocks: Cleanse with NS, pat dry. Apply Liquid barrier film to periwound skin (allow to dry). Cover with silicone foam with border. Change daily and PRN if soiled. Once foam is in place, apply German moisture barrier cream twice a day and PRN with incontinent episodes.     Continue low air loss bed therapy, continue heel elevation, continue to turn & reposition as per protocol, soft pillow between bony prominences, continue moisture management with barrier creams & single breathable pad, continue measures to decrease friction/shear/pressure. Continue with nutritional support as per dietary/orders.     Plan of care discussed with Primary RN Roma and Ibrahima Klein (PA).    Please contact Wound/Ostomy Care Service Line if we can be of further assistance (ext [phone]).  Topical recommendations:     Right buttocks: Cleanse with NS, pat dry. Apply Liquid barrier film to periwound skin (allow to dry). Cover with silicone foam with border. Change daily and PRN if soiled. Once foam is in place, apply German moisture barrier cream twice a day and PRN with incontinent episodes.     Continue low air loss bed therapy, continue heel elevation, continue to turn & reposition as per protocol, soft pillow between bony prominences, continue moisture management with barrier creams & single breathable pad, continue measures to decrease friction/shear/pressure. Continue with nutritional support as per dietary/orders.     Plan of care discussed with Primary RN Roma and Ibrahima Klein (PA).    Please contact Wound/Ostomy Care Service Line if we can be of further assistance (ext [phone]).

## 2023-12-01 NOTE — ADVANCED PRACTICE NURSE CONSULT - REASON FOR CONSULT
Patient seen on skin care rounds after wound care referral received for assessment of skin impairment and recommendations of topical management. As per H&P, patient is a 68F, Nadine-speaking, PMH of AFib on Eliquis, CVA 2019 w/ R residual deficits (including speed deficits), HTN, DM2, mitral stenosis brought in by family for rightward gaze and repeating speech. Stroke code activated. Admitted for stroke work up. Chart reviewed: WBC 10.23, H/H 10.5/32.7, Platelets 415, INR 1.57, Serum albumin 3.2, A1C 7.9, BMI 24.4, Dony 15.

## 2023-12-01 NOTE — ADVANCED PRACTICE NURSE CONSULT - ASSESSMENT
General: A&Ox2, family at bedside for translation. Patient currently in chair, able to ambulate with x2 assistance. Incontinent of urine and stool. Patient has female external urinary  in place. Skin warm, dry with increased moisture in intertriginous folds, scattered areas of hyperpigmentation and hypopigmentation. Patient at risk for moisture/incontinence associated dermatitis.     Vascular of b/l lower extremities: No temperature changes noted. No Edema. Capillary refill less than 3 seconds. +2 DP/PT pulses, with monophasic doppler sounds.    R buttocks: Risk for moisture/incontinence associated dermatitis. On right buttock towards sacralgluteal cleft is area of irregularly bordered serous-filled blister 2/2 frictional forces? measuring 1.5cmx1.6acl3fy with area of irregularly bordered denudation at 6'clock on periwound 1cm away measuring 0.5cmx0.5cmx0.1cm exposing red moist dermis. Does not overly cristopher prominences. No increased warmth, no drainage, no erythema, no induration, no edema, no overt signs of infection noted at this time. Goals of care: monitor for tissue type changes and continue measures to decrease friction/shear/pressure.  General: A&Ox2, family at bedside for translation. Patient currently in chair, able to ambulate with x2 assistance. Incontinent of urine and stool. Patient has female external urinary  in place. Skin warm, dry with increased moisture in intertriginous folds, scattered areas of hyperpigmentation and hypopigmentation. Patient at risk for moisture/incontinence associated dermatitis.     Vascular of b/l lower extremities: No temperature changes noted. No Edema. Capillary refill less than 3 seconds. +2 DP/PT pulses, with monophasic doppler sounds.    R buttocks: Risk for moisture/incontinence associated dermatitis. On right buttock towards sacralgluteal cleft is area of irregularly bordered serous-filled blister 2/2 frictional forces? measuring 1.5cmx1.5fdd0wc with area of irregularly bordered denudation at 6'clock on periwound 1cm away measuring 0.5cmx0.5cmx0.1cm exposing red moist dermis. Does not overly cristopher prominences. No increased warmth, no drainage, no erythema, no induration, no edema, no overt signs of infection noted at this time. Goals of care: monitor for tissue type changes and continue measures to decrease friction/shear/pressure.  General: A&Ox2, family at bedside for translation. Patient currently in chair, able to ambulate with x2 assistance. Incontinent of urine and stool. Patient has female external urinary  in place. Skin warm, dry with increased moisture in intertriginous folds, scattered areas of hyperpigmentation and hypopigmentation. Patient at risk for moisture/incontinence associated dermatitis.     Vascular of b/l lower extremities: No temperature changes noted. No Edema. Capillary refill less than 3 seconds. +2 DP/PT pulses, with monophasic doppler sounds.    R buttocks: Risk for moisture/incontinence associated dermatitis. On right buttock towards sacralgluteal cleft is area of irregularly bordered serous-filled blister 2/2 frictional forces? measuring 1.5cmx1.0baj8ws with area of irregularly bordered denudation at 6'clock on periwound 1cm away measuring 0.5cmx0.5cmx0.1cm exposing red moist dermis. Does not overly cristopher prominences. No increased warmth, no drainage, no erythema, no induration, no edema, no overt signs of infection noted at this time. Goals of care: monitor for tissue type changes and continue measures to decrease friction/shear/pressure.

## 2023-12-02 NOTE — PROGRESS NOTE ADULT - SUBJECTIVE AND OBJECTIVE BOX
[facility] Division of Hospital Medicine  Jean Carrillo DO  Available via MS Teams  Pager:[phone]    SUBJECTIVE / OVERNIGHT EVENTS: No acute events overnight. Patient has no new complaints. Patient is pending BUBBA on Monday.    ADDITIONAL REVIEW OF SYSTEMS:  Review of Systems:   CONSTITUTIONAL: No fever, weight loss  EYES: No eye pain, visual disturbances, or discharge  ENMT:  No difficulty hearing, tinnitus, vertigo; No sinus or throat pain  RESPIRATORY: No SOB. No cough, wheezing, chills or hemoptysis  CARDIOVASCULAR: No chest pain, palpitations, dizziness, or leg swelling  GASTROINTESTINAL: No abdominal or epigastric pain. No nausea, vomiting, or hematemesis; No diarrhea or constipation. No melena or hematochezia.  GENITOURINARY: No dysuria, frequency, hematuria, or incontinence  NEUROLOGICAL: No headaches, memory loss, loss of strength, numbness, or tremors  SKIN: No itching, burning, rashes, or lesions   LYMPH NODES: No enlarged glands  ENDOCRINE: No heat or cold intolerance; No hair loss  MUSCULOSKELETAL: No joint pain or swelling; No muscle, back pain  PSYCHIATRIC: No depression, anxiety, mood swings, or difficulty sleeping  HEME/LYMPH: No easy bruising, or bleeding gums      MEDICATIONS  (STANDING):  atorvastatin 80 milliGRAM(s) Oral at bedtime  dextrose 5%. 1000 milliLiter(s) (50 mL/Hr) IV Continuous <Continuous>  dextrose 5%. 1000 milliLiter(s) (100 mL/Hr) IV Continuous <Continuous>  dextrose 50% Injectable 25 Gram(s) IV Push once  dextrose 50% Injectable 25 Gram(s) IV Push once  dextrose 50% Injectable 12.5 Gram(s) IV Push once  enoxaparin Injectable 60 milliGRAM(s) SubCutaneous every 12 hours  glucagon  Injectable 1 milliGRAM(s) IntraMuscular once  insulin lispro (ADMELOG) corrective regimen sliding scale   SubCutaneous three times a day before meals  insulin lispro (ADMELOG) corrective regimen sliding scale   SubCutaneous at bedtime  metoprolol tartrate 25 milliGRAM(s) Oral two times a day  mirtazapine 15 milliGRAM(s) Oral at bedtime  polyethylene glycol 3350 17 Gram(s) Oral daily    MEDICATIONS  (PRN):  dextrose Oral Gel 15 Gram(s) Oral once PRN Blood Glucose LESS THAN 70 milliGRAM(s)/deciliter      I&O's Summary    01 Dec 2023 07:01  -  02 Dec 2023 07:00  --------------------------------------------------------  IN: 480 mL / OUT: 1500 mL / NET: -1020 mL    02 Dec 2023 07:01  -  02 Dec 2023 14:29  --------------------------------------------------------  IN: 0 mL / OUT: 300 mL / NET: -300 mL        PHYSICAL EXAM:  Vital Signs Last 24 Hrs  T(C): 36.4 (02 Dec 2023 12:27), Max: 36.9 (02 Dec 2023 09:00)  T(F): 97.6 (02 Dec 2023 12:27), Max: 98.4 (02 Dec 2023 09:00)  HR: 82 (02 Dec 2023 12:27) (67 - 92)  BP: 113/52 (02 Dec 2023 12:27) (101/75 - 140/68)  BP(mean): --  RR: 18 (02 Dec 2023 12:27) (17 - 18)  SpO2: 100% (02 Dec 2023 12:27) (100% - 100%)    Parameters below as of 02 Dec 2023 12:27  Patient On (Oxygen Delivery Method): room air      CONSTITUTIONAL: NAD, well-groomed  EYES: PERRLA; conjunctiva and sclera clear  ENMT: Moist oral mucosa, no pharyngeal injection or exudates; normal dentition  NECK: Supple, no palpable masses; no thyromegaly  RESPIRATORY: Normal respiratory effort; lungs are clear to auscultation bilaterally  CARDIOVASCULAR: Regular rate and rhythm, normal S1 and S2, no murmur/rub/gallop; No lower extremity edema; Peripheral pulses are 2+ bilaterally  ABDOMEN: Nontender to palpation, normoactive bowel sounds, no rebound/guarding; No hepatosplenomegaly  MUSCULOSKELETAL:  Normal gait; no clubbing or cyanosis of digits; no joint swelling or tenderness to palpation  PSYCH: A+O to person, place, and time; affect appropriate  NEUROLOGY: CN 2-12 are intact and symmetric; no gross sensory deficits   SKIN: No rashes; no palpable lesions    LABS:                        10.6   11.24 )-----------( 411      ( 02 Dec 2023 05:47 )             32.8     12-02    137  |  100  |  15  ----------------------------<  184<H>  3.9   |  27  |  0.73    Ca    9.3      02 Dec 2023 05:47  Phos  3.5     12-02  Mg     2.00     12-02    TPro  6.6  /  Alb  3.2<L>  /  TBili  0.4  /  DBili  x   /  AST  18  /  ALT  16  /  AlkPhos  56  12-01          Urinalysis Basic - ( 02 Dec 2023 05:47 )    Color: x / Appearance: x / SG: x / pH: x  Gluc: 184 mg/dL / Ketone: x  / Bili: x / Urobili: x   Blood: x / Protein: x / Nitrite: x   Leuk Esterase: x / RBC: x / WBC x   Sq Epi: x / Non Sq Epi: x / Bacteria: x        Culture - Acid Fast - Sputum w/Smear (collected 30 Nov 2023 18:08)  Source: .Sputum Sputum    Culture - Acid Fast - Sputum w/Smear (collected 30 Nov 2023 11:05)  Source: .Sputum Sputum    Culture - Acid Fast - Sputum w/Smear (collected 30 Nov 2023 09:07)  Source: .Sputum Sputum          RADIOLOGY & ADDITIONAL TESTS:  New Imaging Personally Reviewed Today: Yes  New Electrocardiogram Personally Reviewed Today: N/A  Other Results Reviewed Today: Yes  Prior or Outpatient Records Reviewed Today with Summary: Yes    COORDINATION OF CARE:  Consultant Communication and Details of Discussion (where applicable): Yes     [facility] Division of Hospital Medicine  Jean Carrillo DO  Available via MS Teams  Pager:[phone]    SUBJECTIVE / OVERNIGHT EVENTS: No acute events overnight. Patient has no new complaints. Patient is pending BUBBA on Monday.    ADDITIONAL REVIEW OF SYSTEMS:  Review of Systems:   CONSTITUTIONAL: No fever, weight loss  EYES: No eye pain, visual disturbances, or discharge  ENMT:  No difficulty hearing, tinnitus, vertigo; No sinus or throat pain  RESPIRATORY: No SOB. No cough, wheezing, chills or hemoptysis  CARDIOVASCULAR: No chest pain, palpitations, dizziness, or leg swelling  GASTROINTESTINAL: No abdominal or epigastric pain. No nausea, vomiting, or hematemesis; No diarrhea or constipation. No melena or hematochezia.  GENITOURINARY: No dysuria, frequency, hematuria, or incontinence  NEUROLOGICAL: No headaches, memory loss, loss of strength, numbness, or tremors  SKIN: No itching, burning, rashes, or lesions   LYMPH NODES: No enlarged glands  ENDOCRINE: No heat or cold intolerance; No hair loss  MUSCULOSKELETAL: No joint pain or swelling; No muscle, back pain  PSYCHIATRIC: No depression, anxiety, mood swings, or difficulty sleeping  HEME/LYMPH: No easy bruising, or bleeding gums      MEDICATIONS  (STANDING):  atorvastatin 80 milliGRAM(s) Oral at bedtime  dextrose 5%. 1000 milliLiter(s) (50 mL/Hr) IV Continuous <Continuous>  dextrose 5%. 1000 milliLiter(s) (100 mL/Hr) IV Continuous <Continuous>  dextrose 50% Injectable 25 Gram(s) IV Push once  dextrose 50% Injectable 25 Gram(s) IV Push once  dextrose 50% Injectable 12.5 Gram(s) IV Push once  enoxaparin Injectable 60 milliGRAM(s) SubCutaneous every 12 hours  glucagon  Injectable 1 milliGRAM(s) IntraMuscular once  insulin lispro (ADMELOG) corrective regimen sliding scale   SubCutaneous three times a day before meals  insulin lispro (ADMELOG) corrective regimen sliding scale   SubCutaneous at bedtime  metoprolol tartrate 25 milliGRAM(s) Oral two times a day  mirtazapine 15 milliGRAM(s) Oral at bedtime  polyethylene glycol 3350 17 Gram(s) Oral daily    MEDICATIONS  (PRN):  dextrose Oral Gel 15 Gram(s) Oral once PRN Blood Glucose LESS THAN 70 milliGRAM(s)/deciliter      I&O's Summary    01 Dec 2023 07:01  -  02 Dec 2023 07:00  --------------------------------------------------------  IN: 480 mL / OUT: 1500 mL / NET: -1020 mL    02 Dec 2023 07:01  -  02 Dec 2023 14:29  --------------------------------------------------------  IN: 0 mL / OUT: 300 mL / NET: -300 mL        PHYSICAL EXAM:  Vital Signs Last 24 Hrs  T(C): 36.4 (02 Dec 2023 12:27), Max: 36.9 (02 Dec 2023 09:00)  T(F): 97.6 (02 Dec 2023 12:27), Max: 98.4 (02 Dec 2023 09:00)  HR: 82 (02 Dec 2023 12:27) (67 - 92)  BP: 113/52 (02 Dec 2023 12:27) (101/75 - 140/68)  BP(mean): --  RR: 18 (02 Dec 2023 12:27) (17 - 18)  SpO2: 100% (02 Dec 2023 12:27) (100% - 100%)    Parameters below as of 02 Dec 2023 12:27  Patient On (Oxygen Delivery Method): room air      CONSTITUTIONAL: NAD, well-groomed  EYES: PERRLA; conjunctiva and sclera clear  ENMT: Moist oral mucosa, no pharyngeal injection or exudates; normal dentition  NECK: Supple, no palpable masses; no thyromegaly  RESPIRATORY: Normal respiratory effort; lungs are clear to auscultation bilaterally  CARDIOVASCULAR: Regular rate and rhythm, normal S1 and S2, no murmur/rub/gallop; No lower extremity edema; Peripheral pulses are 2+ bilaterally  ABDOMEN: Nontender to palpation, normoactive bowel sounds, no rebound/guarding; No hepatosplenomegaly  MUSCULOSKELETAL:  Normal gait; no clubbing or cyanosis of digits; no joint swelling or tenderness to palpation  PSYCH: A+O to person, place, and time; affect appropriate  NEUROLOGY: CN 2-12 are intact and symmetric; no gross sensory deficits   SKIN: No rashes; no palpable lesions    LABS:                        10.6   11.24 )-----------( 411      ( 02 Dec 2023 05:47 )             32.8     12-02    137  |  100  |  15  ----------------------------<  184<H>  3.9   |  27  |  0.73    Ca    9.3      02 Dec 2023 05:47  Phos  3.5     12-02  Mg     2.00     12-02    TPro  6.6  /  Alb  3.2<L>  /  TBili  0.4  /  DBili  x   /  AST  18  /  ALT  16  /  AlkPhos  56  12-01          Urinalysis Basic - ( 02 Dec 2023 05:47 )    Color: x / Appearance: x / SG: x / pH: x  Gluc: 184 mg/dL / Ketone: x  / Bili: x / Urobili: x   Blood: x / Protein: x / Nitrite: x   Leuk Esterase: x / RBC: x / WBC x   Sq Epi: x / Non Sq Epi: x / Bacteria: x        Culture - Acid Fast - Sputum w/Smear (collected 30 Nov 2023 18:08)  Source: .Sputum Sputum    Culture - Acid Fast - Sputum w/Smear (collected 30 Nov 2023 11:05)  Source: .Sputum Sputum    Culture - Acid Fast - Sputum w/Smear (collected 30 Nov 2023 09:07)  Source: .Sputum Sputum          RADIOLOGY & ADDITIONAL TESTS:  New Imaging Personally Reviewed Today: Yes  New Electrocardiogram Personally Reviewed Today: N/A  Other Results Reviewed Today: Yes  Prior or Outpatient Records Reviewed Today with Summary: Yes    COORDINATION OF CARE:  Consultant Communication and Details of Discussion (where applicable): Yes     [facility] Division of Hospital Medicine  Jean Carrillo DO  Available via MS Teams  Pager:[phone]    SUBJECTIVE / OVERNIGHT EVENTS: No acute events overnight. Patient has no new complaints. Patient is pending BUBBA on Monday.    ADDITIONAL REVIEW OF SYSTEMS:  Review of Systems:   CONSTITUTIONAL: No fever, weight loss  EYES: No eye pain, visual disturbances, or discharge  ENMT:  No difficulty hearing, tinnitus, vertigo; No sinus or throat pain  RESPIRATORY: No SOB. No cough, wheezing, chills or hemoptysis  CARDIOVASCULAR: No chest pain, palpitations, dizziness, or leg swelling  GASTROINTESTINAL: No abdominal or epigastric pain. No nausea, vomiting, or hematemesis; No diarrhea or constipation. No melena or hematochezia.  GENITOURINARY: No dysuria, frequency, hematuria, or incontinence  NEUROLOGICAL: No headaches, memory loss, loss of strength, numbness, or tremors  SKIN: No itching, burning, rashes, or lesions   LYMPH NODES: No enlarged glands  ENDOCRINE: No heat or cold intolerance; No hair loss  MUSCULOSKELETAL: No joint pain or swelling; No muscle, back pain  PSYCHIATRIC: No depression, anxiety, mood swings, or difficulty sleeping  HEME/LYMPH: No easy bruising, or bleeding gums      MEDICATIONS  (STANDING):  atorvastatin 80 milliGRAM(s) Oral at bedtime  dextrose 5%. 1000 milliLiter(s) (50 mL/Hr) IV Continuous <Continuous>  dextrose 5%. 1000 milliLiter(s) (100 mL/Hr) IV Continuous <Continuous>  dextrose 50% Injectable 25 Gram(s) IV Push once  dextrose 50% Injectable 25 Gram(s) IV Push once  dextrose 50% Injectable 12.5 Gram(s) IV Push once  enoxaparin Injectable 60 milliGRAM(s) SubCutaneous every 12 hours  glucagon  Injectable 1 milliGRAM(s) IntraMuscular once  insulin lispro (ADMELOG) corrective regimen sliding scale   SubCutaneous three times a day before meals  insulin lispro (ADMELOG) corrective regimen sliding scale   SubCutaneous at bedtime  metoprolol tartrate 25 milliGRAM(s) Oral two times a day  mirtazapine 15 milliGRAM(s) Oral at bedtime  polyethylene glycol 3350 17 Gram(s) Oral daily    MEDICATIONS  (PRN):  dextrose Oral Gel 15 Gram(s) Oral once PRN Blood Glucose LESS THAN 70 milliGRAM(s)/deciliter      I&O's Summary    01 Dec 2023 07:01  -  02 Dec 2023 07:00  --------------------------------------------------------  IN: 480 mL / OUT: 1500 mL / NET: -1020 mL    02 Dec 2023 07:01  -  02 Dec 2023 14:29  --------------------------------------------------------  IN: 0 mL / OUT: 300 mL / NET: -300 mL        PHYSICAL EXAM:  Vital Signs Last 24 Hrs  T(C): 36.4 (02 Dec 2023 12:27), Max: 36.9 (02 Dec 2023 09:00)  T(F): 97.6 (02 Dec 2023 12:27), Max: 98.4 (02 Dec 2023 09:00)  HR: 82 (02 Dec 2023 12:27) (67 - 92)  BP: 113/52 (02 Dec 2023 12:27) (101/75 - 140/68)  BP(mean): --  RR: 18 (02 Dec 2023 12:27) (17 - 18)  SpO2: 100% (02 Dec 2023 12:27) (100% - 100%)    Parameters below as of 02 Dec 2023 12:27  Patient On (Oxygen Delivery Method): room air      CONSTITUTIONAL: NAD, well-groomed  EYES: PERRLA; conjunctiva and sclera clear  ENMT: Moist oral mucosa, no pharyngeal injection or exudates; normal dentition  NECK: Supple, no palpable masses; no thyromegaly  RESPIRATORY: Normal respiratory effort; lungs are clear to auscultation bilaterally  CARDIOVASCULAR: Regular rate and rhythm, normal S1 and S2, no murmur/rub/gallop; No lower extremity edema; Peripheral pulses are 2+ bilaterally  ABDOMEN: Nontender to palpation, normoactive bowel sounds, no rebound/guarding; No hepatosplenomegaly  MUSCULOSKELETAL:  Normal gait; no clubbing or cyanosis of digits; no joint swelling or tenderness to palpation  PSYCH: A+O to person, place, and time; affect appropriate  NEUROLOGY: CN 2-12 are intact and symmetric; no gross sensory deficits   SKIN: No rashes; no palpable lesions    LABS:                        10.6   11.24 )-----------( 411      ( 02 Dec 2023 05:47 )             32.8     12-02    137  |  100  |  15  ----------------------------<  184<H>  3.9   |  27  |  0.73    Ca    9.3      02 Dec 2023 05:47  Phos  3.5     12-02  Mg     2.00     12-02    TPro  6.6  /  Alb  3.2<L>  /  TBili  0.4  /  DBili  x   /  AST  18  /  ALT  16  /  AlkPhos  56  12-01          Urinalysis Basic - ( 02 Dec 2023 05:47 )    Color: x / Appearance: x / SG: x / pH: x  Gluc: 184 mg/dL / Ketone: x  / Bili: x / Urobili: x   Blood: x / Protein: x / Nitrite: x   Leuk Esterase: x / RBC: x / WBC x   Sq Epi: x / Non Sq Epi: x / Bacteria: x        Culture - Acid Fast - Sputum w/Smear (collected 30 Nov 2023 18:08)  Source: .Sputum Sputum    Culture - Acid Fast - Sputum w/Smear (collected 30 Nov 2023 11:05)  Source: .Sputum Sputum    Culture - Acid Fast - Sputum w/Smear (collected 30 Nov 2023 09:07)  Source: .Sputum Sputum          RADIOLOGY & ADDITIONAL TESTS:  New Imaging Personally Reviewed Today: Yes  New Electrocardiogram Personally Reviewed Today: N/A  Other Results Reviewed Today: Yes  Prior or Outpatient Records Reviewed Today with Summary: Yes    COORDINATION OF CARE:  Consultant Communication and Details of Discussion (where applicable): Yes

## 2023-12-02 NOTE — PROGRESS NOTE ADULT - PROBLEM SELECTOR PLAN 2
CT chest with Intracardiac thrombus seen in the left atrial appendage measuring 1.5 cm.  f/u BUBBA, planned for monday (initially deferred since patient was on airborne precautions, now discontinued), keep NPO at midnight on Sunday  per cardiology defer TTE for now  Appreciate CTS eval  Patient developed intracardiac thrombus despite on eliquis for afib, transitioned to therapeutic lovenox

## 2023-12-02 NOTE — PROGRESS NOTE ADULT - SUBJECTIVE AND OBJECTIVE BOX
Chintan Owusu MD  Interventional Cardiology / Advance Heart Failure and Cardiac Transplant Specialist  [place] Office : [address] [place] [address] [place] [address]  Tel:   [place] Office : [address] [facility] [place] [address]  Tel: [phone]       Pt is lying in bed comfortable not in distress, no chest pains no SOB no palpitations  	  MEDICATIONS:  enoxaparin Injectable 60 milliGRAM(s) SubCutaneous every 12 hours  metoprolol tartrate 25 milliGRAM(s) Oral two times a day        mirtazapine 15 milliGRAM(s) Oral at bedtime    polyethylene glycol 3350 17 Gram(s) Oral daily    atorvastatin 80 milliGRAM(s) Oral at bedtime  dextrose 50% Injectable 25 Gram(s) IV Push once  dextrose 50% Injectable 25 Gram(s) IV Push once  dextrose 50% Injectable 12.5 Gram(s) IV Push once  dextrose Oral Gel 15 Gram(s) Oral once PRN  glucagon  Injectable 1 milliGRAM(s) IntraMuscular once  insulin lispro (ADMELOG) corrective regimen sliding scale   SubCutaneous at bedtime  insulin lispro (ADMELOG) corrective regimen sliding scale   SubCutaneous three times a day before meals    dextrose 5%. 1000 milliLiter(s) IV Continuous <Continuous>  dextrose 5%. 1000 milliLiter(s) IV Continuous <Continuous>      PAST MEDICAL/SURGICAL HISTORY  PAST MEDICAL & SURGICAL HISTORY:  Chronic atrial fibrillation      CVA (cerebrovascular accident)      HTN (hypertension)      DM (diabetes mellitus), type 2      H/O hernia repair          SOCIAL HISTORY: Substance Use (street drugs): ( x ) never used  (  ) other:    FAMILY HISTORY:  No pertinent family history in first degree relatives        REVIEW OF SYSTEMS:  CONSTITUTIONAL: No fever, weight loss, or fatigue  EYES: No eye pain, visual disturbances, or discharge  ENMT:  No difficulty hearing, tinnitus, vertigo; No sinus or throat pain  BREASTS: No pain, masses, or nipple discharge  GASTROINTESTINAL: No abdominal or epigastric pain. No nausea, vomiting, or hematemesis; No diarrhea or constipation. No melena or hematochezia.  GENITOURINARY: No dysuria, frequency, hematuria, or incontinence  NEUROLOGICAL: No headaches, memory loss, loss of strength, numbness, or tremors  ENDOCRINE: No heat or cold intolerance; No hair loss  MUSCULOSKELETAL: No joint pain or swelling; No muscle, back, or extremity pain  PSYCHIATRIC: No depression, anxiety, mood swings, or difficulty sleeping  HEME/LYMPH: No easy bruising, or bleeding gums       PHYSICAL EXAM:  T(C): 36.5 (12-02-23 @ 20:09), Max: 36.9 (12-02-23 @ 09:00)  HR: 68 (12-02-23 @ 20:09) (64 - 92)  BP: 150/68 (12-02-23 @ 20:09) (113/52 - 150/68)  RR: 18 (12-02-23 @ 20:09) (17 - 18)  SpO2: 100% (12-02-23 @ 20:09) (95% - 100%)  Wt(kg): --  I&O's Summary    01 Dec 2023 07:01  -  02 Dec 2023 07:00  --------------------------------------------------------  IN: 480 mL / OUT: 1500 mL / NET: -1020 mL    02 Dec 2023 07:01  -  02 Dec 2023 23:12  --------------------------------------------------------  IN: 240 mL / OUT: 1200 mL / NET: -960 mL          GENERAL: NAD  EYES:   PERRLA   ENMT:   Moist mucous membranes, Good dentition, No lesions  Cardiovascular: Normal S1 S2, No JVD, No murmurs, No edema  Respiratory: Lungs clear to auscultation	  Gastrointestinal:  Soft, Non-tender, + BS	  Extremities: no edema                                    10.6   11.24 )-----------( 411      ( 02 Dec 2023 05:47 )             32.8     12-02    137  |  100  |  15  ----------------------------<  184<H>  3.9   |  27  |  0.73    Ca    9.3      02 Dec 2023 05:47  Phos  3.5     12-02  Mg     2.00     12-02    TPro  6.6  /  Alb  3.2<L>  /  TBili  0.4  /  DBili  x   /  AST  18  /  ALT  16  /  AlkPhos  56  12-01    proBNP:   Lipid Profile:   HgA1c:   TSH:     Consultant(s) Notes Reviewed:  [x ] YES  [ ] NO    Care Discussed with Consultants/Other Providers [ x] YES  [ ] NO    Imaging Personally Reviewed independently:  [x] YES  [ ] NO    All labs, radiologic studies, vitals, orders and medications list reviewed. Patient is seen and examined at bedside. Case discussed with medical team.         Chintan Owusu MD  Interventional Cardiology / Advance Heart Failure and Cardiac Transplant Specialist  [place] Office : [address] [place] [address] [place] [address]  Tel:   [place] Office : [address] [facility] [place] [address]  Tel: [phone]       Pt is lying in bed comfortable not in distress, no chest pains no SOB no palpitations  	  MEDICATIONS:  enoxaparin Injectable 60 milliGRAM(s) SubCutaneous every 12 hours  metoprolol tartrate 25 milliGRAM(s) Oral two times a day        mirtazapine 15 milliGRAM(s) Oral at bedtime    polyethylene glycol 3350 17 Gram(s) Oral daily    atorvastatin 80 milliGRAM(s) Oral at bedtime  dextrose 50% Injectable 25 Gram(s) IV Push once  dextrose 50% Injectable 25 Gram(s) IV Push once  dextrose 50% Injectable 12.5 Gram(s) IV Push once  dextrose Oral Gel 15 Gram(s) Oral once PRN  glucagon  Injectable 1 milliGRAM(s) IntraMuscular once  insulin lispro (ADMELOG) corrective regimen sliding scale   SubCutaneous at bedtime  insulin lispro (ADMELOG) corrective regimen sliding scale   SubCutaneous three times a day before meals    dextrose 5%. 1000 milliLiter(s) IV Continuous <Continuous>  dextrose 5%. 1000 milliLiter(s) IV Continuous <Continuous>      PAST MEDICAL/SURGICAL HISTORY  PAST MEDICAL & SURGICAL HISTORY:  Chronic atrial fibrillation      CVA (cerebrovascular accident)      HTN (hypertension)      DM (diabetes mellitus), type 2      H/O hernia repair          SOCIAL HISTORY: Substance Use (street drugs): ( x ) never used  (  ) other:    FAMILY HISTORY:  No pertinent family history in first degree relatives        REVIEW OF SYSTEMS:  CONSTITUTIONAL: No fever, weight loss, or fatigue  EYES: No eye pain, visual disturbances, or discharge  ENMT:  No difficulty hearing, tinnitus, vertigo; No sinus or throat pain  BREASTS: No pain, masses, or nipple discharge  GASTROINTESTINAL: No abdominal or epigastric pain. No nausea, vomiting, or hematemesis; No diarrhea or constipation. No melena or hematochezia.  GENITOURINARY: No dysuria, frequency, hematuria, or incontinence  NEUROLOGICAL: No headaches, memory loss, loss of strength, numbness, or tremors  ENDOCRINE: No heat or cold intolerance; No hair loss  MUSCULOSKELETAL: No joint pain or swelling; No muscle, back, or extremity pain  PSYCHIATRIC: No depression, anxiety, mood swings, or difficulty sleeping  HEME/LYMPH: No easy bruising, or bleeding gums       PHYSICAL EXAM:  T(C): 36.5 (12-02-23 @ 20:09), Max: 36.9 (12-02-23 @ 09:00)  HR: 68 (12-02-23 @ 20:09) (64 - 92)  BP: 150/68 (12-02-23 @ 20:09) (113/52 - 150/68)  RR: 18 (12-02-23 @ 20:09) (17 - 18)  SpO2: 100% (12-02-23 @ 20:09) (95% - 100%)  Wt(kg): --  I&O's Summary    01 Dec 2023 07:01  -  02 Dec 2023 07:00  --------------------------------------------------------  IN: 480 mL / OUT: 1500 mL / NET: -1020 mL    02 Dec 2023 07:01  -  02 Dec 2023 23:12  --------------------------------------------------------  IN: 240 mL / OUT: 1200 mL / NET: -960 mL          GENERAL: NAD  EYES:   PERRLA   ENMT:   Moist mucous membranes, Good dentition, No lesions  Cardiovascular: Normal S1 S2, No JVD, No murmurs, No edema  Respiratory: Lungs clear to auscultation	  Gastrointestinal:  Soft, Non-tender, + BS	  Extremities: no edema                                    10.6   11.24 )-----------( 411      ( 02 Dec 2023 05:47 )             32.8     12-02    137  |  100  |  15  ----------------------------<  184<H>  3.9   |  27  |  0.73    Ca    9.3      02 Dec 2023 05:47  Phos  3.5     12-02  Mg     2.00     12-02    TPro  6.6  /  Alb  3.2<L>  /  TBili  0.4  /  DBili  x   /  AST  18  /  ALT  16  /  AlkPhos  56  12-01    proBNP:   Lipid Profile:   HgA1c:   TSH:     Consultant(s) Notes Reviewed:  [x ] YES  [ ] NO    Care Discussed with Consultants/Other Providers [ x] YES  [ ] NO    Imaging Personally Reviewed independently:  [x] YES  [ ] NO    All labs, radiologic studies, vitals, orders and medications list reviewed. Patient is seen and examined at bedside. Case discussed with medical team.         Chintan Owusu MD  Interventional Cardiology / Advance Heart Failure and Cardiac Transplant Specialist  [place] Office : [address] [place] [address] [place] [address]  Tel:   [place] Office : [address] [facility] [place] [address]  Tel: [phone]       Pt is lying in bed comfortable not in distress, no chest pains no SOB no palpitations  	  MEDICATIONS:  enoxaparin Injectable 60 milliGRAM(s) SubCutaneous every 12 hours  metoprolol tartrate 25 milliGRAM(s) Oral two times a day        mirtazapine 15 milliGRAM(s) Oral at bedtime    polyethylene glycol 3350 17 Gram(s) Oral daily    atorvastatin 80 milliGRAM(s) Oral at bedtime  dextrose 50% Injectable 25 Gram(s) IV Push once  dextrose 50% Injectable 25 Gram(s) IV Push once  dextrose 50% Injectable 12.5 Gram(s) IV Push once  dextrose Oral Gel 15 Gram(s) Oral once PRN  glucagon  Injectable 1 milliGRAM(s) IntraMuscular once  insulin lispro (ADMELOG) corrective regimen sliding scale   SubCutaneous at bedtime  insulin lispro (ADMELOG) corrective regimen sliding scale   SubCutaneous three times a day before meals    dextrose 5%. 1000 milliLiter(s) IV Continuous <Continuous>  dextrose 5%. 1000 milliLiter(s) IV Continuous <Continuous>      PAST MEDICAL/SURGICAL HISTORY  PAST MEDICAL & SURGICAL HISTORY:  Chronic atrial fibrillation      CVA (cerebrovascular accident)      HTN (hypertension)      DM (diabetes mellitus), type 2      H/O hernia repair          SOCIAL HISTORY: Substance Use (street drugs): ( x ) never used  (  ) other:    FAMILY HISTORY:  No pertinent family history in first degree relatives        REVIEW OF SYSTEMS:  CONSTITUTIONAL: No fever, weight loss, or fatigue  EYES: No eye pain, visual disturbances, or discharge  ENMT:  No difficulty hearing, tinnitus, vertigo; No sinus or throat pain  BREASTS: No pain, masses, or nipple discharge  GASTROINTESTINAL: No abdominal or epigastric pain. No nausea, vomiting, or hematemesis; No diarrhea or constipation. No melena or hematochezia.  GENITOURINARY: No dysuria, frequency, hematuria, or incontinence  NEUROLOGICAL: No headaches, memory loss, loss of strength, numbness, or tremors  ENDOCRINE: No heat or cold intolerance; No hair loss  MUSCULOSKELETAL: No joint pain or swelling; No muscle, back, or extremity pain  PSYCHIATRIC: No depression, anxiety, mood swings, or difficulty sleeping  HEME/LYMPH: No easy bruising, or bleeding gums       PHYSICAL EXAM:  T(C): 36.5 (12-02-23 @ 20:09), Max: 36.9 (12-02-23 @ 09:00)  HR: 68 (12-02-23 @ 20:09) (64 - 92)  BP: 150/68 (12-02-23 @ 20:09) (113/52 - 150/68)  RR: 18 (12-02-23 @ 20:09) (17 - 18)  SpO2: 100% (12-02-23 @ 20:09) (95% - 100%)  Wt(kg): --  I&O's Summary    01 Dec 2023 07:01  -  02 Dec 2023 07:00  --------------------------------------------------------  IN: 480 mL / OUT: 1500 mL / NET: -1020 mL    02 Dec 2023 07:01  -  02 Dec 2023 23:12  --------------------------------------------------------  IN: 240 mL / OUT: 1200 mL / NET: -960 mL          GENERAL: NAD  EYES:   PERRLA   ENMT:   Moist mucous membranes, Good dentition, No lesions  Cardiovascular: Normal S1 S2, No JVD, No murmurs, No edema  Respiratory: Lungs clear to auscultation	  Gastrointestinal:  Soft, Non-tender, + BS	  Extremities: no edema                                    10.6   11.24 )-----------( 411      ( 02 Dec 2023 05:47 )             32.8     12-02    137  |  100  |  15  ----------------------------<  184<H>  3.9   |  27  |  0.73    Ca    9.3      02 Dec 2023 05:47  Phos  3.5     12-02  Mg     2.00     12-02    TPro  6.6  /  Alb  3.2<L>  /  TBili  0.4  /  DBili  x   /  AST  18  /  ALT  16  /  AlkPhos  56  12-01    proBNP:   Lipid Profile:   HgA1c:   TSH:     Consultant(s) Notes Reviewed:  [x ] YES  [ ] NO    Care Discussed with Consultants/Other Providers [ x] YES  [ ] NO    Imaging Personally Reviewed independently:  [x] YES  [ ] NO    All labs, radiologic studies, vitals, orders and medications list reviewed. Patient is seen and examined at bedside. Case discussed with medical team.

## 2023-12-03 NOTE — DISCHARGE NOTE PROVIDER - NPI NUMBER (FOR SYS[employer] USE ONLY) :
[[id number]],[UNKNOWN],[[id number]] [[id number]],[[id number]],[UNKNOWN],[[id number]]

## 2023-12-03 NOTE — DISCHARGE NOTE PROVIDER - NSDCMRMEDTOKEN_GEN_ALL_CORE_FT
aspirin 81 mg oral delayed release capsule: orally once a day  Eliquis 5 mg oral tablet: 1 tab(s) orally 2 times a day  metFORMIN 1000 mg oral tablet: 1 tab(s) orally 2 times a day  Metoprolol Tartrate 25 mg oral tablet: 1 tab(s) orally 2 times a day  mirtazapine 15 mg oral tablet: 1 tab(s) orally once a day (at bedtime)   aspirin 81 mg oral delayed release capsule: orally once a day  atorvastatin 80 mg oral tablet: 1 tab(s) orally once a day (at bedtime)  Lovenox 60 mg/0.6 mL injectable solution: 60 milligram(s) subcutaneously 2 times a day  metFORMIN 1000 mg oral tablet: 1 tab(s) orally 2 times a day  Metoprolol Tartrate 25 mg oral tablet: 1 tab(s) orally 2 times a day  mirtazapine 15 mg oral tablet: 1 tab(s) orally once a day (at bedtime)  polyethylene glycol 3350 oral powder for reconstitution: 17 gram(s) orally once a day

## 2023-12-03 NOTE — DISCHARGE NOTE PROVIDER - HOSPITAL COURSE
68F PMH AFib on Eliquis, CVA 2019 w/ R residual deficits (including speed deficits), HTN, DM2, mitral stenosis brought in by family for rightward gaze and repetitive speech. 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 Neuro evaluated and thrombolytic therapy was deferred.       Problem/Plan - 1:  ·  Problem: CVA (cerebrovascular accident).   ·  Plan: CTA with occlusion of R PCA P2 segment. Not TPA candidate.  CTH with acute/subacute infarcts within the right paramedian occipital lobe as well as the right cerebellar hemisphere. No hemorrhagic transformation.  Upon discussion with neuro AC was resumed  f/u MRI brain as outpatient per neuro  EEG showed focal cerebral dysfunction of R and L temporal lobe, mild diffuse cerebral dysfunction, without epileptiform abnormalities. Neurology reviewed and discontinued EEG, recommended discontinuation of keppra  -A1c/lipid panel: HBA1C 7.9,LDL at goal -44  -Permissive HTN <220/120 for first 24h. Gradual reduction.  -Passed dysphagia screen - diet ordered.  -PT/OT evaluation.   -Cont telemetry. Neuro checks.     Problem/Plan - 2:  ·  Problem: Thrombus.   ·  Plan: CT chest with Intracardiac thrombus seen in the left atrial appendage measuring 1.5 cm.  f/u BUBBA, planned for monday (initially deferred since patient was on airborne precautions, now discontinued), keep NPO at midnight on Sunday  Appreciate CTS eval  Patient developed intracardiac thrombus despite on eliquis for afib, transitioned to therapeutic lovenox.     Problem/Plan - 3:  ·  Problem: Abnormal CT scan, lung.   ·  Plan: Incidental finding of RML collapsed and scattered micronodules measuring up to 8 mm in the right upper lobe.   Additional tiny cavitary micronodule in the right upper lobe.   -Risk factor for TB reviewed - born in [place] the [place] No personal hx of TB. No known exposure.  -Quantiferon negative  -AFB negative x 3, discontinued hypertonic saline nebs, airborne isolation discontinued  -CT Chest w/ contrast ordered to further characterize area of concern, - noted Segmental atelectasis of the lateral segment of the right middle lobe. Mild interlobular septal thickening, left greater than right, may be seen   with asymmetric edema. Increased cluster of mediastinal lymph nodes, the largest measuring 1.1 cm in the right paratracheal station.     Problem/Plan - 4:  ·  Problem: Chronic atrial fibrillation.   ·  Plan: EKG afib LAFB 72 bpm, rate controlled  Continue therapeutic lovenox, metoprolol per cardiology  Maintain tele monitoring  TSH wnl.     Problem/Plan - 5:  ·  Problem: DM (diabetes mellitus), type 2.   ·  Plan: - ->292, A1c 7.9  - ordered lantus 8u  - Maintain FSG monitoring, AALIYAH, hypoglycemia protocol.     Problem/Plan - 6:  ·  Problem: HTN (hypertension).   ·  Plan: /81  Continue metoprolol as above  Monitor BP.     Problem/Plan - 7:  ·  Problem: Anemia.   ·  Plan: Hgb 10.1  iron studies reviewed  monitor CBC, maintain active type and screen, transfuse for Hgb<7.0.     Problem/Plan - 8:  ·  Problem: Leukocytosis.   ·  Plan: WBC 11.2  patient is otherwise hemodynamically stable, afebrile  Monitor CBC.     Problem/Plan - 9:  ·  Problem: Need for prophylactic measure.   ·  Plan: DVT ppx- lovenox  Dispo: OT recommending CELSO which family refuses, family asking for assistance with HHA (which they had previously); contacted SW/CM. 68F PMH AFib on Eliquis, CVA 2019 w/ R residual deficits (including speed deficits), HTN, DM2, mitral stenosis brought in by family for rightward gaze and repetitive speech. 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 Neuro evaluated and thrombolytic therapy was deferred.      CVA (cerebrovascular accident).   ·  Plan: CTA with occlusion of R PCA P2 segment. Not TPA candidate.  CTH with acute/subacute infarcts within the right paramedian occipital lobe as well as the right cerebellar hemisphere. No hemorrhagic transformation.  Upon discussion with neuro AC was resumed  f/u MRI brain as outpatient per neuro  EEG showed focal cerebral dysfunction of R and L temporal lobe, mild diffuse cerebral dysfunction, without epileptiform abnormalities. Neurology reviewed and discontinued EEG, recommended discontinuation of keppra  -A1c/lipid panel: HBA1C 7.9,LDL at goal -44  -s/p Permissive HTN <220/120 for first 24h  -Passed dysphagia screen   -PT/OT evaluation: rec CELSO, family declined  -was monitored on telemetry.   -source is thromboembolic- c/w therapeutic lovenox    KRISTAN Thrombus.   CT chest with Intracardiac thrombus seen in the left atrial appendage measuring 1.5 cm.  BUBBA 12/4: KRISTAN thrombus  Appreciate CTS eval  Patient developed intracardiac thrombus despite on eliquis for afib, transitioned to therapeutic lovenox.     Abnormal CT scan, lung.    Incidental finding of RML collapsed and scattered micronodules measuring up to 8 mm in the right upper lobe.   Additional tiny cavitary micronodule in the right upper lobe.   -Risk factor for TB reviewed - [place] in [place] No personal hx of TB. No known exposure.  -Quantiferon negative  -AFB negative x 3  -CT Chest w/ contrast ordered to further characterize area of concern, - noted Segmental atelectasis of the lateral segment of the right middle lobe. Mild interlobular septal thickening, left greater than right, may be seen with asymmetric edema. Increased cluster of mediastinal lymph nodes, the largest measuring 1.1 cm in the right paratracheal station.     Chronic atrial fibrillation.   ·  Plan: EKG afib LAFB 72 bpm, rate controlled  Continue therapeutic lovenox, metoprolol per cardiology  Maintain tele monitoring  TSH wnl.     DM (diabetes mellitus), type 2.   on lantus 8u while in the hospital  - Maintain FSG monitoring, AALIYAH, hypoglycemia protocol.     Leukocytosis.   WBC 11.2  patient is otherwise hemodynamically stable, afebrile, no signs/symptoms of infection  Monitor CBC.      Dispo: OT recommending CELSO which family declines, family asking for assistance with HHA (which they had previously) 68F PMH AFib on Eliquis, CVA 2019 w/ R residual deficits (including speed deficits), HTN, DM2, mitral stenosis brought in by family for rightward gaze and repetitive speech. 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 Neuro evaluated and thrombolytic therapy was deferred.      CVA (cerebrovascular accident).   ·  Plan: CTA with occlusion of R PCA P2 segment. Not TPA candidate.  CTH with acute/subacute infarcts within the right paramedian occipital lobe as well as the right cerebellar hemisphere. No hemorrhagic transformation.  MRI brain 12/5: subacute infarctions in the RIGHT medial occipital cortex and lateral RIGHT thalamus.   EEG showed focal cerebral dysfunction of R and L temporal lobe, mild diffuse cerebral dysfunction, without epileptiform abnormalities. Neurology reviewed and discontinued EEG, recommended discontinuation of keppra  -A1c/lipid panel: HBA1C 7.9,LDL at goal -44  -s/p Permissive HTN <220/120 for first 24h  -Passed dysphagia screen   -PT/OT evaluation: rec CELSO, family declined  TTE and BUBBA with KRISTAN thrombus  -source is thromboembolic- c/w therapeutic lovenox    KRISTAN Thrombus.   CT chest with Intracardiac thrombus seen in the left atrial appendage measuring 1.5 cm.  BUBBA 12/4: KRISTAN thrombus  Appreciate CTS eval  Patient developed intracardiac thrombus despite on eliquis for afib, transitioned to therapeutic lovenox.     Abnormal CT scan, lung.    Incidental finding of RML collapsed and scattered micronodules measuring up to 8 mm in the right upper lobe.   Additional tiny cavitary micronodule in the right upper lobe.   -Risk factor for TB reviewed - [place] in [place] No personal hx of TB. No known exposure.  -Quantiferon negative  -AFB negative x 3  -CT Chest w/ contrast ordered to further characterize area of concern, - noted Segmental atelectasis of the lateral segment of the right middle lobe. Mild interlobular septal thickening, left greater than right, may be seen with asymmetric edema. Increased cluster of mediastinal lymph nodes, the largest measuring 1.1 cm in the right paratracheal station.     Chronic atrial fibrillation.   Continue therapeutic lovenox, metoprolol per cardiology  TSH wnl.     DM (diabetes mellitus), type 2.   on lantus 8u while in the hospital  - Maintain FSG monitoring, AALIYAH, hypoglycemia protocol.  -resume home metformin on discharge      Dispo: OT recommending CELSO which family declines, family asking for assistance with HHA (which they had previously) 68F PMH AFib on Eliquis, CVA 2019 w/ R residual deficits (including speed deficits), HTN, DM2, mitral stenosis brought in by family for rightward gaze and repetitive speech. 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 Neuro evaluated and thrombolytic therapy was deferred.      CVA (cerebrovascular accident).   ·  Plan: CTA with occlusion of R PCA P2 segment. Not TPA candidate.  CTH with acute/subacute infarcts within the right paramedian occipital lobe as well as the right cerebellar hemisphere. No hemorrhagic transformation.  MRI brain 12/5: subacute infarctions in the RIGHT medial occipital cortex and lateral RIGHT thalamus.   EEG showed focal cerebral dysfunction of R and L temporal lobe, mild diffuse cerebral dysfunction, without epileptiform abnormalities. Neurology reviewed and discontinued EEG, recommended discontinuation of keppra  -A1c/lipid panel: HBA1C 7.9,LDL at goal -44  -s/p Permissive HTN <220/120 for first 24h  -Passed dysphagia screen   -PT/OT evaluation: rec CELSO, family declined  TTE and BUBBA with KRISTAN thrombus  -source is thromboembolic- c/w therapeutic lovenox    KRISTAN Thrombus.   CT chest with Intracardiac thrombus seen in the left atrial appendage measuring 1.5 cm.  BUBBA 12/4: KRISTAN thrombus  Appreciate CTS eval  Patient developed intracardiac thrombus despite on eliquis for afib, transitioned to therapeutic lovenox.     Abnormal CT scan, lung.    Incidental finding of RML collapsed and scattered micronodules measuring up to 8 mm in the right upper lobe.   Additional tiny cavitary micronodule in the right upper lobe.   -Risk factor for TB reviewed - [place] in [place] No personal hx of TB. No known exposure.  -Quantiferon negative  -AFB negative x 3  -CT Chest w/ contrast ordered to further characterize area of concern, - noted Segmental atelectasis of the lateral segment of the right middle lobe. Mild interlobular septal thickening, left greater than right, may be seen with asymmetric edema. Increased cluster of mediastinal lymph nodes, the largest measuring 1.1 cm in the right paratracheal station.     Chronic atrial fibrillation.   Continue therapeutic lovenox, metoprolol per cardiology  TSH wnl.     DM (diabetes mellitus), type 2.   on lantus 8u while in the hospital  - Maintain FSG monitoring, AALIYAH, hypoglycemia protocol.  -resume home metformin on discharge      Dispo: OT recommending CELSO which family declines, family asking for assistance with HHA ch they had previously) 68F PMH AFib on Eliquis, CVA 2019 w/ R residual deficits (including speed deficits), HTN, DM2, mitral stenosis brought in by family for rightward gaze and repetitive speech. 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 Neuro evaluated and thrombolytic therapy was deferred.    CVA (cerebrovascular accident).   ·  Plan: CTA with occlusion of R PCA P2 segment. Not TPA candidate.  CTH with acute/subacute infarcts within the right paramedian occipital lobe as well as the right cerebellar hemisphere. No hemorrhagic transformation.  MRI brain 12/5: subacute infarctions in the RIGHT medial occipital cortex and lateral RIGHT thalamus.   EEG showed focal cerebral dysfunction of R and L temporal lobe, mild diffuse cerebral dysfunction, without epileptiform abnormalities. Neurology reviewed and discontinued EEG, recommended discontinuation of keppra  -A1c/lipid panel: HBA1C 7.9,LDL at goal -44  -s/p Permissive HTN <220/120 for first 24h  -Passed dysphagia screen   -PT/OT evaluation: rec CELSO, family declined  TTE and BUBBA with KRISTAN thrombus  -source is thromboembolic- c/w therapeutic lovenox    KRISTAN Thrombus.   CT chest with Intracardiac thrombus seen in the left atrial appendage measuring 1.5 cm.  BUBBA 12/4: KRISTAN thrombus  Appreciate CTS eval  Patient developed intracardiac thrombus despite on eliquis for afib, transitioned to therapeutic lovenox.     Abnormal CT scan, lung.    Incidental finding of RML collapsed and scattered micronodules measuring up to 8 mm in the right upper lobe.   Additional tiny cavitary micronodule in the right upper lobe.   -Risk factor for TB reviewed - [place] in [place] No personal hx of TB. No known exposure.  -Quantiferon negative  -AFB negative x 3  -CT Chest w/ contrast ordered to further characterize area of concern, - noted Segmental atelectasis of the lateral segment of the right middle lobe. Mild interlobular septal thickening, left greater than right, may be seen with asymmetric edema. Increased cluster of mediastinal lymph nodes, the largest measuring 1.1 cm in the right paratracheal station.     Chronic atrial fibrillation.   Continue therapeutic lovenox, metoprolol per cardiology  TSH wnl.     DM (diabetes mellitus), type 2.   on lantus 8u while in the hospital  - Maintain FSG monitoring, AALIYAH, hypoglycemia protocol.  -resume home metformin on discharge      Dispo: OT recommending CELSO which family declines, family asking for assistance with HHA ch they had previously) 68F PMH AFib on Eliquis, CVA 2019 w/ R residual deficits (including speed deficits), HTN, DM2, mitral stenosis brought in by family for rightward gaze and repetitive speech. 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 Neuro evaluated and thrombolytic therapy was deferred.    #CVA (cerebrovascular accident).   CTA with occlusion of R PCA P2 segment. Not TPA candidate.  CTH with acute/subacute infarcts within the right paramedian occipital lobe as well as the right cerebellar hemisphere. No hemorrhagic transformation.  MRI brain 12/5: subacute infarctions in the RIGHT medial occipital cortex and lateral RIGHT thalamus.   EEG showed focal cerebral dysfunction of R and L temporal lobe, mild diffuse cerebral dysfunction, without epileptiform abnormalities. Neurology reviewed and discontinued EEG, recommended discontinuation of keppra  -A1c/lipid panel: HBA1C 7.9,LDL at goal -44  -s/p Permissive HTN <220/120 for first 24h  -Passed dysphagia screen   -PT/OT evaluation: rec CELSO, family declined  TTE and BUBBA with KRISTAN thrombus  -source is thromboembolic- c/w therapeutic lovenox    #KRISTAN Thrombus.   CT chest with Intracardiac thrombus seen in the left atrial appendage measuring 1.5 cm.  BUBBA 12/4: KRISTAN thrombus  Appreciate CTS eval  Patient developed intracardiac thrombus despite on eliquis for afib, transitioned to therapeutic lovenox.    #Severe rheumatic mitral stenosis and KRISTAN thrombus. Curbsided Dr. Altman given acute stroke patient currently not candidate for acute intervention on MS. Discharge on lovenox and will need outpatient follow up for [facility] and CT surgery eval       #Abnormal CT scan, lung.    Incidental finding of RML collapsed and scattered micronodules measuring up to 8 mm in the right upper lobe.   Additional tiny cavitary micronodule in the right upper lobe.   -Risk factor for TB reviewed - [place] in [place] No personal hx of TB. No known exposure.  -Quantiferon negative  -AFB negative x 3  -CT Chest w/ contrast ordered to further characterize area of concern, - noted Segmental atelectasis of the lateral segment of the right middle lobe. Mild interlobular septal thickening, left greater than right, may be seen with asymmetric edema. Increased cluster of mediastinal lymph nodes, the largest measuring 1.1 cm in the right paratracheal station.    # Chronic atrial fibrillation.   Continue therapeutic lovenox, metoprolol   TSH wnl.    # DM (diabetes mellitus), type 2.   on lantus 8u while in the hospital  - Maintain FSG monitoring, AALIYAH, hypoglycemia protocol.  -resume home metformin on discharge      Dispo: PT recommending CELSO which family declined 68F PMH AFib on Eliquis, CVA 2019 w/ R residual deficits (including speed deficits), HTN, DM2, mitral stenosis brought in by family for rightward gaze and repetitive speech. 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 Neuro evaluated and thrombolytic therapy was deferred.    #CVA (cerebrovascular accident).   CTA with occlusion of R PCA P2 segment. Not TPA candidate.  CTH with acute/subacute infarcts within the right paramedian occipital lobe as well as the right cerebellar hemisphere. No hemorrhagic transformation.  MRI brain 12/5: subacute infarctions in the RIGHT medial occipital cortex and lateral RIGHT thalamus.   EEG showed focal cerebral dysfunction of R and L temporal lobe, mild diffuse cerebral dysfunction, without epileptiform abnormalities. Neurology reviewed and discontinued EEG, recommended discontinuation of keppra  -A1c/lipid panel: HBA1C 7.9,LDL at goal -44  -s/p Permissive HTN <220/120 for first 24h  -Passed dysphagia screen   -PT/OT evaluation: rec CELSO, family declined  TTE and BUBBA with KRISTAN thrombus  -source is thromboembolic- c/w therapeutic lovenox    #KRISTAN Thrombus.   CT chest with Intracardiac thrombus seen in the left atrial appendage measuring 1.5 cm.  BUBBA 12/4: KRISTAN thrombus  Appreciate CTS eval  Patient developed intracardiac thrombus despite on eliquis for afib, transitioned to therapeutic lovenox.    #Severe rheumatic mitral stenosis and KRISTAN thrombus. Curbsided Dr. Almtan given acute stroke patient currently not candidate for acute intervention on MS. Discharge on lovenox and will need outpatient follow up for [facility] and CT surgery eval       #Abnormal CT scan, lung.    Incidental finding of RML collapsed and scattered micronodules measuring up to 8 mm in the right upper lobe.   Additional tiny cavitary micronodule in the right upper lobe.   -Risk factor for TB reviewed - [place] in [place] No personal hx of TB. No known exposure.  -Quantiferon negative  -AFB negative x 3  -CT Chest w/ contrast ordered to further characterize area of concern, - noted Segmental atelectasis of the lateral segment of the right middle lobe. Mild interlobular septal thickening, left greater than right, may be seen with asymmetric edema. Increased cluster of mediastinal lymph nodes, the largest measuring 1.1 cm in the right paratracheal station.    # Chronic atrial fibrillation.   Continue therapeutic lovenox, metoprolol   TSH wnl.    # DM (diabetes mellitus), type 2.   on lantus 8u while in the hospital  - Maintain FSG monitoring, AALIYAH, hypoglycemia protocol.  -resume home metformin on discharge      Dispo: PT recommending CELSO which family declined

## 2023-12-03 NOTE — PROGRESS NOTE ADULT - ASSESSMENT
68y (1955) RH Nad[place] speaking woman with afib on eliquis, R MCA stroke 2013, L MCA Stroke 2019 with residual right hemiparesis and dysarthria, HTN, HLD, DM2, mitral stenosis/rheumatic valvular disease presenting as code stroke for right gaze preference and aphasia.      EKG Afib       1) CVA  -family reports labile INR on coumadin and strokes on Coumadin, now had a CVA and developed KRISTAN thrombus (seen on CT) on eliquis (eliquis failure)   -recommend Sub q lovenox moving forward  -BUBBA pending although will not happen while patient is on airborne precautions.  TTE shows normal LV function mod MS    2) Afib  - lovenox as above  -c/w metoprolol 25 mg po BID     3) RUL micronodules  -CT w/o cavitary lesion + mediastinal LNs  -on airborne isolation for r/o TB; pending AFB  -f.u thoracic recs      4) DVT PPX  - lovenox

## 2023-12-03 NOTE — PROGRESS NOTE ADULT - SUBJECTIVE AND OBJECTIVE BOX
Chintan Owusu MD  Interventional Cardiology / Advance Heart Failure and Cardiac Transplant Specialist  [place] Office : [address] [place] [address] [address] [address]  Tel: [phone]  [place] Office : [address] [facility] [place] [address]  Tel: [phone]       Pt is lying in bed comfortable not in distress, no chest pains no SOB no palpitations  	  MEDICATIONS:  enoxaparin Injectable 60 milliGRAM(s) SubCutaneous every 12 hours  metoprolol tartrate 25 milliGRAM(s) Oral two times a day  mirtazapine 15 milliGRAM(s) Oral at bedtime  polyethylene glycol 3350 17 Gram(s) Oral daily  atorvastatin 80 milliGRAM(s) Oral at bedtime  dextrose 50% Injectable 25 Gram(s) IV Push once  dextrose 50% Injectable 25 Gram(s) IV Push once  dextrose 50% Injectable 12.5 Gram(s) IV Push once  dextrose Oral Gel 15 Gram(s) Oral once PRN  glucagon  Injectable 1 milliGRAM(s) IntraMuscular once  insulin glargine Injectable (LANTUS) 8 Unit(s) SubCutaneous every morning  insulin lispro (ADMELOG) corrective regimen sliding scale   SubCutaneous at bedtime  insulin lispro (ADMELOG) corrective regimen sliding scale   SubCutaneous three times a day before meals  dextrose 5%. 1000 milliLiter(s) IV Continuous <Continuous>  dextrose 5%. 1000 milliLiter(s) IV Continuous <Continuous>      PAST MEDICAL/SURGICAL HISTORY  PAST MEDICAL & SURGICAL HISTORY:  Chronic atrial fibrillation      CVA (cerebrovascular accident)      HTN (hypertension)      DM (diabetes mellitus), type 2      H/O hernia repair          SOCIAL HISTORY: Substance Use (street drugs): ( x ) never used  (  ) other:    FAMILY HISTORY:  No pertinent family history in first degree relatives         PHYSICAL EXAM:  T(C): 36.4 (12-03-23 @ 12:45), Max: 36.6 (12-03-23 @ 04:30)  HR: 78 (12-03-23 @ 12:45) (56 - 83)  BP: 119/54 (12-03-23 @ 12:45) (119/54 - 150/68)  RR: 18 (12-03-23 @ 12:45) (18 - 18)  SpO2: 100% (12-03-23 @ 12:45) (95% - 100%)  Wt(kg): --  I&O's Summary    02 Dec 2023 07:01  -  03 Dec 2023 07:00  --------------------------------------------------------  IN: 480 mL / OUT: 1500 mL / NET: -1020 mL    03 Dec 2023 07:01  -  03 Dec 2023 14:27  --------------------------------------------------------  IN: 0 mL / OUT: 151 mL / NET: -151 mL           EYES:   PERRLA   ENMT:   Moist mucous membranes, Good dentition, No lesions  Cardiovascular: Normal S1 S2, No JVD, No murmurs, No edema  Respiratory: Lungs clear to auscultation	  Gastrointestinal:  Soft, Non-tender, + BS	  Extremities: no edema                                    10.1   11.21 )-----------( 390      ( 03 Dec 2023 05:15 )             31.5     12-03    136  |  99  |  24<H>  ----------------------------<  214<H>  4.0   |  24  |  0.80    Ca    9.3      03 Dec 2023 05:15  Phos  3.8     12-03  Mg     2.00     12-03      proBNP:   Lipid Profile:   HgA1c:   TSH:     Consultant(s) Notes Reviewed:  [x ] YES  [ ] NO    Care Discussed with Consultants/Other Providers [ x] YES  [ ] NO    Imaging Personally Reviewed independently:  [x] YES  [ ] NO    All labs, radiologic studies, vitals, orders and medications list reviewed. Patient is seen and examined at bedside. Case discussed with medical team.         Chintan Owusu MD  Interventional Cardiology / Advance Heart Failure and Cardiac Transplant Specialist  [place] Office : [address] [place] [address] [address] [address]  Tel: [phone]  [place] Office : [address] [facility] [place] [address]  Tel: [phone]       Pt is lying in bed comfortable not in distress, no chest pains no SOB no palpitations  	  MEDICATIONS:  enoxaparin Injectable 60 milliGRAM(s) SubCutaneous every 12 hours  metoprolol tartrate 25 milliGRAM(s) Oral two times a day  mirtazapine 15 milliGRAM(s) Oral at bedtime  polyethylene glycol 3350 17 Gram(s) Oral daily  atorvastatin 80 milliGRAM(s) Oral at bedtime  dextrose 50% Injectable 25 Gram(s) IV Push once  dextrose 50% Injectable 25 Gram(s) IV Push once  dextrose 50% Injectable 12.5 Gram(s) IV Push once  dextrose Oral Gel 15 Gram(s) Oral once PRN  glucagon  Injectable 1 milliGRAM(s) IntraMuscular once  insulin glargine Injectable (LANTUS) 8 Unit(s) SubCutaneous every morning  insulin lispro (ADMELOG) corrective regimen sliding scale   SubCutaneous at bedtime  insulin lispro (ADMELOG) corrective regimen sliding scale   SubCutaneous three times a day before meals  dextrose 5%. 1000 milliLiter(s) IV Continuous <Continuous>  dextrose 5%. 1000 milliLiter(s) IV Continuous <Continuous>      PAST MEDICAL/SURGICAL HISTORY  PAST MEDICAL & SURGICAL HISTORY:  Chronic atrial fibrillation      CVA (cerebrovascular accident)      HTN (hypertension)      DM (diabetes mellitus), type 2      H/O hernia repair          SOCIAL HISTORY: Substance Use (street drugs): ( x ) never used  (  ) other:    FAMILY HISTORY:  No pertinent family history in first degree relatives         PHYSICAL EXAM:  T(C): 36.4 (12-03-23 @ 12:45), Max: 36.6 (12-03-23 @ 04:30)  HR: 78 (12-03-23 @ 12:45) (56 - 83)  BP: 119/54 (12-03-23 @ 12:45) (119/54 - 150/68)  RR: 18 (12-03-23 @ 12:45) (18 - 18)  SpO2: 100% (12-03-23 @ 12:45) (95% - 100%)  Wt(kg): --  I&O's Summary    02 Dec 2023 07:01  -  03 Dec 2023 07:00  --------------------------------------------------------  IN: 480 mL / OUT: 1500 mL / NET: -1020 mL    03 Dec 2023 07:01  -  03 Dec 2023 14:27  --------------------------------------------------------  IN: 0 mL / OUT: 151 mL / NET: -151 mL           EYES:   PERRLA   ENMT:   Moist mucous membranes, Good dentition, No lesions  Cardiovascular: Normal S1 S2, No JVD, No murmurs, No edema  Respiratory: Lungs clear to auscultation	  Gastrointestinal:  Soft, Non-tender, + BS	  Extremities: no edema                                    10.1   11.21 )-----------( 390      ( 03 Dec 2023 05:15 )             31.5     12-03    136  |  99  |  24<H>  ----------------------------<  214<H>  4.0   |  24  |  0.80    Ca    9.3      03 Dec 2023 05:15  Phos  3.8     12-03  Mg     2.00     12-03      proBNP:   Lipid Profile:   HgA1c:   TSH:     Consultant(s) Notes Reviewed:  [x ] YES  [ ] NO    Care Discussed with Consultants/Other Providers [ x] YES  [ ] NO    Imaging Personally Reviewed independently:  [x] YES  [ ] NO    All labs, radiologic studies, vitals, orders and medications list reviewed. Patient is seen and examined at bedside. Case discussed with medical team.

## 2023-12-03 NOTE — PROGRESS NOTE ADULT - SUBJECTIVE AND OBJECTIVE BOX
[facility] Division of Hospital Medicine  Jean Carrillo DO  Available via MS Teams  Pager:[phone]    SUBJECTIVE / OVERNIGHT EVENTS: No acute events overnight. Patient has no complaints. Denies chest pain, shortness of breath, palpitations, dizziness. Spoke to son at bedside.    ADDITIONAL REVIEW OF SYSTEMS:  Review of Systems:   CONSTITUTIONAL: No fever, weight loss  EYES: No eye pain, visual disturbances, or discharge  ENMT:  No difficulty hearing, tinnitus, vertigo; No sinus or throat pain  RESPIRATORY: No SOB. No cough, wheezing, chills or hemoptysis  CARDIOVASCULAR: No chest pain, palpitations, dizziness, or leg swelling  GASTROINTESTINAL: No abdominal or epigastric pain. No nausea, vomiting, or hematemesis; No diarrhea or constipation. No melena or hematochezia.  GENITOURINARY: No dysuria, frequency, hematuria, or incontinence  NEUROLOGICAL: No headaches, memory loss, loss of strength, numbness, or tremors  SKIN: No itching, burning, rashes, or lesions   LYMPH NODES: No enlarged glands  ENDOCRINE: No heat or cold intolerance; No hair loss  MUSCULOSKELETAL: No joint pain or swelling; No muscle, back pain  PSYCHIATRIC: No depression, anxiety, mood swings, or difficulty sleeping  HEME/LYMPH: No easy bruising, or bleeding gums      MEDICATIONS  (STANDING):  atorvastatin 80 milliGRAM(s) Oral at bedtime  dextrose 5%. 1000 milliLiter(s) (50 mL/Hr) IV Continuous <Continuous>  dextrose 5%. 1000 milliLiter(s) (100 mL/Hr) IV Continuous <Continuous>  dextrose 50% Injectable 12.5 Gram(s) IV Push once  dextrose 50% Injectable 25 Gram(s) IV Push once  dextrose 50% Injectable 25 Gram(s) IV Push once  enoxaparin Injectable 60 milliGRAM(s) SubCutaneous every 12 hours  glucagon  Injectable 1 milliGRAM(s) IntraMuscular once  insulin glargine Injectable (LANTUS) 8 Unit(s) SubCutaneous every morning  insulin lispro (ADMELOG) corrective regimen sliding scale   SubCutaneous three times a day before meals  insulin lispro (ADMELOG) corrective regimen sliding scale   SubCutaneous at bedtime  metoprolol tartrate 25 milliGRAM(s) Oral two times a day  mirtazapine 15 milliGRAM(s) Oral at bedtime  polyethylene glycol 3350 17 Gram(s) Oral daily    MEDICATIONS  (PRN):  dextrose Oral Gel 15 Gram(s) Oral once PRN Blood Glucose LESS THAN 70 milliGRAM(s)/deciliter      I&O's Summary    02 Dec 2023 07:01  -  03 Dec 2023 07:00  --------------------------------------------------------  IN: 480 mL / OUT: 1500 mL / NET: -1020 mL    03 Dec 2023 07:01  -  03 Dec 2023 14:56  --------------------------------------------------------  IN: 0 mL / OUT: 151 mL / NET: -151 mL        PHYSICAL EXAM:  Vital Signs Last 24 Hrs  T(C): 36.4 (03 Dec 2023 12:45), Max: 36.6 (03 Dec 2023 04:30)  T(F): 97.5 (03 Dec 2023 12:45), Max: 97.8 (03 Dec 2023 04:30)  HR: 78 (03 Dec 2023 12:45) (56 - 83)  BP: 119/54 (03 Dec 2023 12:45) (119/54 - 150/68)  BP(mean): --  RR: 18 (03 Dec 2023 12:45) (18 - 18)  SpO2: 100% (03 Dec 2023 12:45) (95% - 100%)    Parameters below as of 03 Dec 2023 12:45  Patient On (Oxygen Delivery Method): room air      CONSTITUTIONAL: NAD, well-groomed  EYES: PERRLA; conjunctiva and sclera clear  ENMT: Moist oral mucosa, no pharyngeal injection or exudates; normal dentition  NECK: Supple, no palpable masses; no thyromegaly  RESPIRATORY: Normal respiratory effort; lungs are clear to auscultation bilaterally  CARDIOVASCULAR: Regular rate and rhythm, normal S1 and S2, no murmur/rub/gallop; No lower extremity edema; Peripheral pulses are 2+ bilaterally  ABDOMEN: Nontender to palpation, normoactive bowel sounds, no rebound/guarding; No hepatosplenomegaly  MUSCULOSKELETAL:  Normal gait; no clubbing or cyanosis of digits; no joint swelling or tenderness to palpation  PSYCH: A+O to person, place, and time; affect appropriate  NEUROLOGY: CN 2-12 are intact and symmetric; no gross sensory deficits   SKIN: No rashes; no palpable lesions    LABS:                        10.1   11.21 )-----------( 390      ( 03 Dec 2023 05:15 )             31.5     12-03    136  |  99  |  24<H>  ----------------------------<  214<H>  4.0   |  24  |  0.80    Ca    9.3      03 Dec 2023 05:15  Phos  3.8     12-03  Mg     2.00     12-03            Urinalysis Basic - ( 03 Dec 2023 05:15 )    Color: x / Appearance: x / SG: x / pH: x  Gluc: 214 mg/dL / Ketone: x  / Bili: x / Urobili: x   Blood: x / Protein: x / Nitrite: x   Leuk Esterase: x / RBC: x / WBC x   Sq Epi: x / Non Sq Epi: x / Bacteria: x        Culture - Acid Fast - Sputum w/Smear (collected 30 Nov 2023 18:08)  Source: .Sputum Sputum  Preliminary Report (02 Dec 2023 15:08):    Culture is being performed.          RADIOLOGY & ADDITIONAL TESTS:  New Imaging Personally Reviewed Today: Yes  New Electrocardiogram Personally Reviewed Today: N/A  Other Results Reviewed Today: Yes  Prior or Outpatient Records Reviewed Today with Summary: Yes    COORDINATION OF CARE:  Consultant Communication and Details of Discussion (where applicable): Yes     [facility] Division of Hospital Medicine  Jean Carrillo DO  Available via MS Teams  Pager:[phone]    SUBJECTIVE / OVERNIGHT EVENTS: No acute events overnight. Patient has no complaints. Denies chest pain, shortness of breath, palpitations, dizziness. Spoke to son at bedside.    ADDITIONAL REVIEW OF SYSTEMS:  Review of Systems:   CONSTITUTIONAL: No fever, weight loss  EYES: No eye pain, visual disturbances, or discharge  ENMT:  No difficulty hearing, tinnitus, vertigo; No sinus or throat pain  RESPIRATORY: No SOB. No cough, wheezing, chills or hemoptysis  CARDIOVASCULAR: No chest pain, palpitations, dizziness, or leg swelling  GASTROINTESTINAL: No abdominal or epigastric pain. No nausea, vomiting, or hematemesis; No diarrhea or constipation. No melena or hematochezia.  GENITOURINARY: No dysuria, frequency, hematuria, or incontinence  NEUROLOGICAL: No headaches, memory loss, loss of strength, numbness, or tremors  SKIN: No itching, burning, rashes, or lesions   LYMPH NODES: No enlarged glands  ENDOCRINE: No heat or cold intolerance; No hair loss  MUSCULOSKELETAL: No joint pain or swelling; No muscle, back pain  PSYCHIATRIC: No depression, anxiety, mood swings, or difficulty sleeping  HEME/LYMPH: No easy bruising, or bleeding gums      MEDICATIONS  (STANDING):  atorvastatin 80 milliGRAM(s) Oral at bedtime  dextrose 5%. 1000 milliLiter(s) (50 mL/Hr) IV Continuous <Continuous>  dextrose 5%. 1000 milliLiter(s) (100 mL/Hr) IV Continuous <Continuous>  dextrose 50% Injectable 12.5 Gram(s) IV Push once  dextrose 50% Injectable 25 Gram(s) IV Push once  dextrose 50% Injectable 25 Gram(s) IV Push once  enoxaparin Injectable 60 milliGRAM(s) SubCutaneous every 12 hours  glucagon  Injectable 1 milliGRAM(s) IntraMuscular once  insulin glargine Injectable (LANTUS) 8 Unit(s) SubCutaneous every morning  insulin lispro (ADMELOG) corrective regimen sliding scale   SubCutaneous three times a day before meals  insulin lispro (ADMELOG) corrective regimen sliding scale   SubCutaneous at bedtime  metoprolol tartrate 25 milliGRAM(s) Oral two times a day  mirtazapine 15 milliGRAM(s) Oral at bedtime  polyethylene glycol 3350 17 Gram(s) Oral daily    MEDICATIONS  (PRN):  dextrose Oral Gel 15 Gram(s) Oral once PRN Blood Glucose LESS THAN 70 milliGRAM(s)/deciliter      I&O's Summary    02 Dec 2023 07:01  -  03 Dec 2023 07:00  --------------------------------------------------------  IN: 480 mL / OUT: 1500 mL / NET: -1020 mL    03 Dec 2023 07:01  -  03 Dec 2023 14:56  --------------------------------------------------------  IN: 0 mL / OUT: 151 mL / NET: -151 mL        PHYSICAL EXAM:  Vital Signs Last 24 Hrs  T(C): 36.4 (03 Dec 2023 12:45), Max: 36.6 (03 Dec 2023 04:30)  T(F): 97.5 (03 Dec 2023 12:45), Max: 97.8 (03 Dec 2023 04:30)  HR: 78 (03 Dec 2023 12:45) (56 - 83)  BP: 119/54 (03 Dec 2023 12:45) (119/54 - 150/68)  BP(mean): --  RR: 18 (03 Dec 2023 12:45) (18 - 18)  SpO2: 100% (03 Dec 2023 12:45) (95% - 100%)    Parameters below as of 03 Dec 2023 12:45  Patient On (Oxygen Delivery Method): room air      CONSTITUTIONAL: NAD, well-groomed  EYES: PERRLA; conjunctiva and sclera clear  ENMT: Moist oral mucosa, no pharyngeal injection or exudates; normal dentition  NECK: Supple, no palpable masses; no thyromegaly  RESPIRATORY: Normal respiratory effort; lungs are clear to auscultation bilaterally  CARDIOVASCULAR: Regular rate and rhythm, normal S1 and S2, no murmur/rub/gallop; No lower extremity edema; Peripheral pulses are 2+ bilaterally  ABDOMEN: Nontender to palpation, normoactive bowel sounds, no rebound/guarding; No hepatosplenomegaly  MUSCULOSKELETAL:  Normal gait; no clubbing or cyanosis of digits; no joint swelling or tenderness to palpation  PSYCH: A+O to person, place, and time; affect appropriate  NEUROLOGY: CN 2-12 are intact and symmetric; no gross sensory deficits   SKIN: No rashes; no palpable lesions    LABS:                        10.1   11.21 )-----------( 390      ( 03 Dec 2023 05:15 )             31.5     12-03    136  |  99  |  24<H>  ----------------------------<  214<H>  4.0   |  24  |  0.80    Ca    9.3      03 Dec 2023 05:15  Phos  3.8     12-03  Mg     2.00     12-03            Urinalysis Basic - ( 03 Dec 2023 05:15 )    Color: x / Appearance: x / SG: x / pH: x  Gluc: 214 mg/dL / Ketone: x  / Bili: x / Urobili: x   Blood: x / Protein: x / Nitrite: x   Leuk Esterase: x / RBC: x / WBC x   Sq Epi: x / Non Sq Epi: x / Bacteria: x        Culture - Acid Fast - Sputum w/Smear (collected 30 Nov 2023 18:08)  Source: .Sputum Sputum  Preliminary Report (02 Dec 2023 15:08):    Culture is being performed.          RADIOLOGY & ADDITIONAL TESTS:  New Imaging Personally Reviewed Today: Yes  New Electrocardiogram Personally Reviewed Today: N/A  Other Results Reviewed Today: Yes  Prior or Outpatient Records Reviewed Today with Summary: Yes    COORDINATION OF CARE:  Consultant Communication and Details of Discussion (where applicable): Yes

## 2023-12-03 NOTE — DISCHARGE NOTE PROVIDER - PROVIDER TOKENS
PROVIDER:[TOKEN:[[id number]]],PROVIDER:[TOKEN:[[id number]],FOLLOWUP:[2 weeks]],PROVIDER:[TOKEN:[[id number]]],FREE:[LAST:[pcp],FIRST:[],PHONE:[(   )    -],FAX:[(   )    -]],PROVIDER:[TOKEN:[[id number]]] PROVIDER:[TOKEN:[[id number]]],PROVIDER:[TOKEN:[[id number]],FOLLOWUP:[2 weeks]],PROVIDER:[TOKEN:[[id number]]],FREE:[LAST:[pcp],FIRST:[],PHONE:[(   )    -],FAX:[(   )    -]],PROVIDER:[TOKEN:[[id number]]]

## 2023-12-03 NOTE — DISCHARGE NOTE PROVIDER - NSDCCPTREATMENT_GEN_ALL_CORE_FT
PRINCIPAL PROCEDURE  Procedure: Three dimensional transesophageal echocardiography (BUBBA)  Findings and Treatment: CONCLUSIONS:      1. Left ventricular systolic function is normal. There are no regional wall motion abnormalities seen.   2. Normal right ventricular cavity size and systolic function.   3. Mitral valve Marino score = 11 (3 points for leaflet mobility, 3 points for leaflet thickening, one point for subvalvular thickening, and 4 points for leaflet calcification). Doming and thickening of the tips of the mitral valve consistent with rheumatic mitral valve disease. There is severe mitral valve stenosis. The transmitral peak gradient is 17.6 mmHg and mean gradient is 10.00 mmHg. The mitral valve peak gradient is 17.6 mmHg and a mean gradient is 10.00 mmHg at a heart rate of 55 bpm. The calculated mitral valve area is 0.8 cm². There is mild mitral regurgitation.   4. The aortic valve appears trileaflet with normal systolic excursion. There is calcification of the aortic valve leaflets. There is trace aortic regurgitation.   5. No atheroma in the visualized portions of the proximal ascending aorta. Mild non-mobile atheroma in the visualized portions of the transverse aortic arch. Mild non-mobile atheroma in the visualized portions of the descending aorta.   6. Dilated left atrium. Dense spontaneous echo contrast and thrombus were seen in the left atrial appendage.   7. Agitated saline injection was negative for intracardiac shunt.

## 2023-12-03 NOTE — PROGRESS NOTE ADULT - PROBLEM SELECTOR PLAN 2
CT chest with Intracardiac thrombus seen in the left atrial appendage measuring 1.5 cm.  f/u BUBBA, planned for monday (initially deferred since patient was on airborne precautions, now discontinued), keep NPO at midnight on Sunday  Appreciate CTS eval  Patient developed intracardiac thrombus despite on eliquis for afib, transitioned to therapeutic lovenox

## 2023-12-03 NOTE — DISCHARGE NOTE PROVIDER - NSDCCPCAREPLAN_GEN_ALL_CORE_FT
PRINCIPAL DISCHARGE DIAGNOSIS  Diagnosis: CVA (cerebrovascular accident)  Assessment and Plan of Treatment: You were found to have a stroke. Your blood thiner was changed to a injection twice a day. Please continue your medications as prescribed. Please continue physical therapy. Follow up with the neurologist as an outpatient.      SECONDARY DISCHARGE DIAGNOSES  Diagnosis: LA thrombus  Assessment and Plan of Treatment: You were found to have a blood clot on your heart. Your blood thinners where changed, please continue with blood thinner (lovenox) as prescribed.     PRINCIPAL DISCHARGE DIAGNOSIS  Diagnosis: CVA (cerebrovascular accident)  Assessment and Plan of Treatment: You were found to have a stroke. Your blood thiner was changed to a injection twice a day. Please continue your medications as prescribed. Please continue physical therapy. Follow up with the neurologist as an outpatient.      SECONDARY DISCHARGE DIAGNOSES  Diagnosis: DM (diabetes mellitus), type 2  Assessment and Plan of Treatment: .Continue a consistent carbohydrate diet (Meaning eating the same amount of carbohydrates at the same time each day). Monitor blood glucose levels four times a day before meals and at bedtime. Record blood sugars and bring to outpatient providers appointment in order to be reviewed by your doctor for management modifications. If your sugars are more than 400 or less than 70 you should contact your Primary Care Physician immediately. Monitor for signs/symptoms of low blood glucose, such as, dizziness, altered mental status, or cool/clammy skin. In addition, monitor for signs/symptoms of high blood glucose, such as, feeling hot, dry, fatigued, or with increased thirst/urination.  Exercise daily for at least 30 minutes and weight loss.  Follow up with your primary care physician and endocrinologist for routine Hemoglobin A1C checks and management.  Follow up with your ophthalmologist for routine yearly vision exams. Make regular podiatry appointments in order to have feet checked for wounds.    Diagnosis: Abnormal CT scan, lung  Assessment and Plan of Treatment: You had small lung nodules on your lung that was recommded to follow up outpatient for repeat ct scan in 4 weeks to ensure resolution or improvement. Telehealth appointment is setup for pulmonary follow up and CT surgery/cardiology follow up is recommended.    Diagnosis: LA thrombus  Assessment and Plan of Treatment: You were found to have a blood clot on your heart. Your blood thinners where changed, please continue with blood thinner (lovenox) as prescribed.

## 2023-12-03 NOTE — DISCHARGE NOTE PROVIDER - CARE PROVIDER_API CALL
Karly Reid  Pulmonary Disease  [address] [place] [address]  Phone: [phone]  Fax: [phone]  Follow Up Time:     Chintan Owusu  Interventional Cardiology  [address] [place] [address]  Phone: [phone]  Fax: [phone]  Follow Up Time: 2 weeks    Saeed Kingsley  Thoracic Surgery  [address] [place] [address], [address] [place] [address]  Phone: [phone]  Fax: [phone]  Follow Up Time:     pcpdr.  Phone: (   )    -  Fax: (   )    -  Follow Up Time:     Terri Morrison  NP in [address], [address]  [place] [address]  Phone: [phone]  Fax: [phone]  Follow Up Time:    Karly Reid  Pulmonary Disease  [address] [place] [address]  Phone: [phone]  Fax: [phone]  Follow Up Time:     Chintan Owusu  Interventional Cardiology  [address] [place] [address]  Phone: [phone]  Fax: [phone]  Follow Up Time: 2 weeks    Saeed Kingsley  Thoracic Surgery  [address] [place] [address], [address] [place] [address]  Phone: [phone]  Fax: [phone]  Follow Up Time:     pcpdr.  Phone: (   )    -  Fax: (   )    -  Follow Up Time:     Terri Morrison  NP in [address], [address]  [place] [address]  Phone: [phone]  Fax: [phone]  Follow Up Time:

## 2023-12-03 NOTE — DISCHARGE NOTE PROVIDER - NSDCFUADDAPPT_GEN_ALL_CORE_FT
Pulmonary telehealth with dr. lara  repeat chest imaging in 4 weeks to ensure resolution (RUL lung micronodules)   Pulmonary telehealth with dr. lara  repeat chest imaging in 4 weeks to ensure resolution (RUL lung micronodules)    Please follow up with neurology, cardiology and Pulmonary

## 2023-12-03 NOTE — DISCHARGE NOTE PROVIDER - CARE PROVIDERS DIRECT ADDRESSES
,DirectAddress_Unknown,DirectAddress_Unknown,[email or web address],DirectAddress_Unknown,DirectAddress_Unknown ,DirectAddress_Unknown,DirectAddress_Unknown,[email or web address],DirectAddress_Unknown,DirectAddress_Unknown

## 2023-12-04 NOTE — DIETITIAN INITIAL EVALUATION ADULT - NAME AND PHONE
Sonia Gutierrez MS RD CDN #[id number], available on [employer] Teams.  Sonia Gutierrez MS RD CDN #[id number], available on [employer] Teams.

## 2023-12-04 NOTE — PROGRESS NOTE ADULT - SUBJECTIVE AND OBJECTIVE BOX
Chintan Owusu MD  Interventional Cardiology / Endovascular Specialist  [place] Office : [address] [place] [address] Tel:   [place] Office : [address] [facility] [address]  Tel: [phone]      Subjective/Overnight events: Patient lying in bed. No acute distress.  	  MEDICATIONS:  enoxaparin Injectable 60 milliGRAM(s) SubCutaneous every 12 hours  metoprolol tartrate 25 milliGRAM(s) Oral two times a day        mirtazapine 15 milliGRAM(s) Oral at bedtime    polyethylene glycol 3350 17 Gram(s) Oral daily    atorvastatin 80 milliGRAM(s) Oral at bedtime  dextrose 50% Injectable 25 Gram(s) IV Push once  dextrose 50% Injectable 25 Gram(s) IV Push once  dextrose 50% Injectable 12.5 Gram(s) IV Push once  dextrose Oral Gel 15 Gram(s) Oral once PRN  glucagon  Injectable 1 milliGRAM(s) IntraMuscular once  insulin glargine Injectable (LANTUS) 8 Unit(s) SubCutaneous every morning  insulin lispro (ADMELOG) corrective regimen sliding scale   SubCutaneous every 6 hours    dextrose 5%. 1000 milliLiter(s) IV Continuous <Continuous>  dextrose 5%. 1000 milliLiter(s) IV Continuous <Continuous>      PAST MEDICAL/SURGICAL HISTORY  PAST MEDICAL & SURGICAL HISTORY:  Chronic atrial fibrillation      CVA (cerebrovascular accident)      HTN (hypertension)      DM (diabetes mellitus), type 2      H/O hernia repair          SOCIAL HISTORY: Substance Use (street drugs): ( x ) never used  (  ) other:    FAMILY HISTORY:  No pertinent family history in first degree relatives            PHYSICAL EXAM:  T(C): 36.4 (12-04-23 @ 11:45), Max: 36.8 (12-03-23 @ 16:18)  HR: 74 (12-04-23 @ 11:45) (66 - 85)  BP: 124/76 (12-04-23 @ 11:45) (109/62 - 135/85)  RR: 16 (12-04-23 @ 11:45) (16 - 18)  SpO2: 98% (12-04-23 @ 11:45) (97% - 100%)  Wt(kg): --  I&O's Summary    03 Dec 2023 07:01  -  04 Dec 2023 07:00  --------------------------------------------------------  IN: 300 mL / OUT: 1202 mL / NET: -902 mL    04 Dec 2023 07:01  -  04 Dec 2023 15:32  --------------------------------------------------------  IN: 0 mL / OUT: 0 mL / NET: 0 mL          GENERAL: NAD  EYES: conjunctiva and sclera clear  ENMT: No tonsillar erythema, exudates, or enlargement  Cardiovascular: Normal S1 S2, No JVD, No murmurs, No edema  Respiratory: Lungs clear to auscultation	  Gastrointestinal:  Soft, Non-tender, + BS	  Extremities: No edema                                     10.1   12.69 )-----------( 357      ( 04 Dec 2023 05:43 )             32.3     12-04    138  |  103  |  19  ----------------------------<  172<H>  4.0   |  24  |  0.74    Ca    9.2      04 Dec 2023 05:43  Phos  3.3     12-04  Mg     1.80     12-04      proBNP:   Lipid Profile:   HgA1c:   TSH:     Consultant(s) Notes Reviewed:  [x ] YES  [ ] NO    Care Discussed with Consultants/Other Providers [ x] YES  [ ] NO    Imaging Personally Reviewed independently:  [x] YES  [ ] NO    All labs, radiologic studies, vitals, orders and medications list reviewed. Patient is seen and examined at bedside. Case discussed with medical team.                 Chintan Owusu MD  Interventional Cardiology / Endovascular Specialist  [place] Office : [address] [place] [address] Tel:   [place] Office : [address] [facility] [address]  Tel: [phone]      Subjective/Overnight events: Patient lying in bed. No acute distress.  	  MEDICATIONS:  enoxaparin Injectable 60 milliGRAM(s) SubCutaneous every 12 hours  metoprolol tartrate 25 milliGRAM(s) Oral two times a day        mirtazapine 15 milliGRAM(s) Oral at bedtime    polyethylene glycol 3350 17 Gram(s) Oral daily    atorvastatin 80 milliGRAM(s) Oral at bedtime  dextrose 50% Injectable 25 Gram(s) IV Push once  dextrose 50% Injectable 25 Gram(s) IV Push once  dextrose 50% Injectable 12.5 Gram(s) IV Push once  dextrose Oral Gel 15 Gram(s) Oral once PRN  glucagon  Injectable 1 milliGRAM(s) IntraMuscular once  insulin glargine Injectable (LANTUS) 8 Unit(s) SubCutaneous every morning  insulin lispro (ADMELOG) corrective regimen sliding scale   SubCutaneous every 6 hours    dextrose 5%. 1000 milliLiter(s) IV Continuous <Continuous>  dextrose 5%. 1000 milliLiter(s) IV Continuous <Continuous>      PAST MEDICAL/SURGICAL HISTORY  PAST MEDICAL & SURGICAL HISTORY:  Chronic atrial fibrillation      CVA (cerebrovascular accident)      HTN (hypertension)      DM (diabetes mellitus), type 2      H/O hernia repair          SOCIAL HISTORY: Substance Use (street drugs): ( x ) never used  (  ) other:    FAMILY HISTORY:  No pertinent family history in first degree relatives            PHYSICAL EXAM:  T(C): 36.4 (12-04-23 @ 11:45), Max: 36.8 (12-03-23 @ 16:18)  HR: 74 (12-04-23 @ 11:45) (66 - 85)  BP: 124/76 (12-04-23 @ 11:45) (109/62 - 135/85)  RR: 16 (12-04-23 @ 11:45) (16 - 18)  SpO2: 98% (12-04-23 @ 11:45) (97% - 100%)  Wt(kg): --  I&O's Summary    03 Dec 2023 07:01  -  04 Dec 2023 07:00  --------------------------------------------------------  IN: 300 mL / OUT: 1202 mL / NET: -902 mL    04 Dec 2023 07:01  -  04 Dec 2023 15:32  --------------------------------------------------------  IN: 0 mL / OUT: 0 mL / NET: 0 mL          GENERAL: NAD  EYES: conjunctiva and sclera clear  ENMT: No tonsillar erythema, exudates, or enlargement  Cardiovascular: Normal S1 S2, No JVD, No murmurs, No edema  Respiratory: Lungs clear to auscultation	  Gastrointestinal:  Soft, Non-tender, + BS	  Extremities: No edema                                     10.1   12.69 )-----------( 357      ( 04 Dec 2023 05:43 )             32.3     12-04    138  |  103  |  19  ----------------------------<  172<H>  4.0   |  24  |  0.74    Ca    9.2      04 Dec 2023 05:43  Phos  3.3     12-04  Mg     1.80     12-04      proBNP:   Lipid Profile:   HgA1c:   TSH:     Consultant(s) Notes Reviewed:  [x ] YES  [ ] NO    Care Discussed with Consultants/Other Providers [ x] YES  [ ] NO    Imaging Personally Reviewed independently:  [x] YES  [ ] NO    All labs, radiologic studies, vitals, orders and medications list reviewed. Patient is seen and examined at bedside. Case discussed with medical team.

## 2023-12-04 NOTE — PROGRESS NOTE ADULT - TIME BILLING
Time-based billing (NON-critical care).     55 minutes spent on total encounter; more than 50% of the visit was spent counseling and / or coordinating care by the attending physician.  The necessity of the time spent during the encounter on this date of service was due to:     documentation in [place], reviewing chart and coordinating care with patient/ACP and interdisciplinary staff (such as , social workers, etc) as well as reviewing vitals, laboratory data, radiology, medication list, consultants' recommendations and prior records. Interventions were performed as documented above. Time-based billing (NON-critical care).     55 minutes spent on total encounter; more than 50% of the visit was spent counseling and / or coordinating care by the attending physician.  The necessity of the time spent during the encounter on this date of service was due to:     documentation in [place], reviewing chart and coordinating care with patient/ACP and interdisciplinary staff (such as , social workers, etc) as well as reviewing vitals, laboratory data, radiology, medication list, consultants' recommendations and prior records. Interventions were performed as documented above.

## 2023-12-04 NOTE — DIETITIAN INITIAL EVALUATION ADULT - PERTINENT LABORATORY DATA
12-04    138  |  103  |  19  ----------------------------<  172<H>  4.0   |  24  |  0.74    Ca    9.2      04 Dec 2023 05:43  Phos  3.3     12-04  Mg     1.80     12-04    POCT Blood Glucose.: 178 mg/dL (12-04-23 @ 11:54)  A1C with Estimated Average Glucose Result: 7.9 % (11-26-23 @ 06:17)

## 2023-12-04 NOTE — DIETITIAN INITIAL EVALUATION ADULT - PERTINENT MEDS FT
MEDICATIONS  (STANDING):  atorvastatin 80 milliGRAM(s) Oral at bedtime  dextrose 5%. 1000 milliLiter(s) (50 mL/Hr) IV Continuous <Continuous>  dextrose 5%. 1000 milliLiter(s) (100 mL/Hr) IV Continuous <Continuous>  dextrose 50% Injectable 25 Gram(s) IV Push once  dextrose 50% Injectable 25 Gram(s) IV Push once  dextrose 50% Injectable 12.5 Gram(s) IV Push once  enoxaparin Injectable 60 milliGRAM(s) SubCutaneous every 12 hours  glucagon  Injectable 1 milliGRAM(s) IntraMuscular once  insulin glargine Injectable (LANTUS) 8 Unit(s) SubCutaneous every morning  insulin lispro (ADMELOG) corrective regimen sliding scale   SubCutaneous every 6 hours  metoprolol tartrate 25 milliGRAM(s) Oral two times a day  mirtazapine 15 milliGRAM(s) Oral at bedtime  polyethylene glycol 3350 17 Gram(s) Oral daily    MEDICATIONS  (PRN):  dextrose Oral Gel 15 Gram(s) Oral once PRN Blood Glucose LESS THAN 70 milliGRAM(s)/deciliter

## 2023-12-04 NOTE — DIETITIAN INITIAL EVALUATION ADULT - PROBLEM SELECTOR PLAN 5
Stable.  Rate controlled, slow AFib.  -Tulio down to the 30s-40s when asleep. On reassessment while awake - now in the 50s.   -Hold metoprolol for now to avoid hypotension and worsening tulio.  -Hold Eliquis as per neuro pending additional neuro imaging.  -Cont telemetry.

## 2023-12-04 NOTE — PROGRESS NOTE ADULT - PROBLEM SELECTOR PLAN 3
Incidental finding of RML collapsed and scattered micronodules measuring up to 8 mm in the right upper lobe.   Additional tiny cavitary micronodule in the right upper lobe.   -Risk factor for TB reviewed - [place] in [place]. No personal hx of TB. No known exposure.  -Quantiferon negative  -AFB negative x 3  -CT Chest w/ contrast ordered to further characterize area of concern, - noted Segmental atelectasis of the lateral segment of the right middle lobe. Mild interlobular septal thickening, left greater than right, may be seen   with asymmetric edema. Increased cluster of mediastinal lymph nodes, the largest measuring 1.1 cm in the right paratracheal station.

## 2023-12-04 NOTE — DIETITIAN INITIAL EVALUATION ADULT - NSFNSGIIOFT_GEN_A_CORE
12-03-23 @ 07:01  -  12-04-23 @ 07:00  --------------------------------------------------------  OUT:    Stool (mL): 2 mL  Total OUT: 2 mL    Total NET: -2 mL

## 2023-12-04 NOTE — DIETITIAN INITIAL EVALUATION ADULT - ORAL INTAKE PTA/DIET HISTORY
Daughter in law reported that Patient has been eating soft and mushy foods at home, on diabetic diet, eating sugar frees dessert.  Food preferences discussed, no beef, not typically drink milk however taking glucerna 1x/day at home.  Mostly eating lentils, mashed potatoes etc.

## 2023-12-04 NOTE — DIETITIAN INITIAL EVALUATION ADULT - OTHER INFO
Patient is a Nadine speaking female, A&Ox 2-3 at baseline.  Patient preferred daughter in law at bedside for translation.  Patient tolerating easy to chew diet well.  PO intake is poor for hospital foods, family also bring home foods.  No reported GI issues such as nausea/vomiting/diarrhea/constipation, on bowel regimen, last BM 12/4/23 per RN flowsheet with fecal incontinence.      Patient's daughter in law expressed that the whole family is aware of therapeutic diet and no need for education due to this admission is the second stroke for Patient.  Current weight 56.7kg, height 152.4 cm, BMI 24.4, normal weight status.  #, no recent weight per [facility] weight history.  Last weight 53.1kg (12-6-2022).  Skin intact of pressure injury without edema per RN flowsheets.  DM on CSTCHO diet, POCT 110-292mg/dl, A1c@ 7.9 (11-29-23).  Electrolyte WNL at this time.  Recommend Glucerna 1x/day (220 kcal, 10gm protein) to aid with sub-optimal intake.   Patient is a Nadine speaking female, A&Ox 2-3 at baseline.  Patient preferred daughter in law at bedside for translation.  Patient tolerating easy to chew diet well.  PO intake is poor for hospital foods, family also bring home foods.  No reported GI issues such as nausea/vomiting/diarrhea/constipation, on bowel regimen, last BM 12/4/23 per RN flowsheet with fecal incontinence.      Patient's daughter in law expressed that the whole family is aware of therapeutic diet and no need for education due to this admission is the second stroke for Patient.  Current weight 56.7kg, height 152.4 cm, BMI 24.4, normal weight status.  #, no recent weight per [facility] weight history.  Last weight 53.1kg (12-6-2022).  Skin intact of pressure injury without edema per RN flowsheets.  DM on CSTCHO diet, POCT 110-292mg/dl, A1c@ 7.9 (11-29-23).  Electrolyte WNL at this time.  Recommend Glucerna 1x/day (220 kcal, 10gm protein) to aid with sub-optimal intake.

## 2023-12-04 NOTE — DIETITIAN INITIAL EVALUATION ADULT - ADD RECOMMEND
1. Recommend diet add Glucerna 1x/day (220 kcal, 10gm protein) per preferences.  2. Nursing to document PO in RN flow sheets and monitor weekly weights.   3. Continue to monitor skin integrity, bowel regimen, and nutrition pertinent labs.

## 2023-12-04 NOTE — DIETITIAN INITIAL EVALUATION ADULT - NS FNS DIET ORDER
Easy to chew  [facility]  NPO for procedure 03-Dec-2023 Easy to chew  [facility]  NPO for procedure 03-Dec-2023

## 2023-12-04 NOTE — PROGRESS NOTE ADULT - SUBJECTIVE AND OBJECTIVE BOX
[facility] Division of Hospital Medicine  Italia Fong MD  Available via MS Teams  Pager: [phone]    SUBJECTIVE / OVERNIGHT EVENTS:    no events, no new complaints  spoke to daughter in law, family wants patient home with home PT, no CELSO.    MEDICATIONS  (STANDING):  atorvastatin 80 milliGRAM(s) Oral at bedtime  dextrose 5%. 1000 milliLiter(s) (100 mL/Hr) IV Continuous <Continuous>  dextrose 5%. 1000 milliLiter(s) (50 mL/Hr) IV Continuous <Continuous>  dextrose 50% Injectable 25 Gram(s) IV Push once  dextrose 50% Injectable 25 Gram(s) IV Push once  dextrose 50% Injectable 12.5 Gram(s) IV Push once  enoxaparin Injectable 60 milliGRAM(s) SubCutaneous every 12 hours  glucagon  Injectable 1 milliGRAM(s) IntraMuscular once  insulin glargine Injectable (LANTUS) 8 Unit(s) SubCutaneous every morning  insulin lispro (ADMELOG) corrective regimen sliding scale   SubCutaneous every 6 hours  metoprolol tartrate 25 milliGRAM(s) Oral two times a day  mirtazapine 15 milliGRAM(s) Oral at bedtime  polyethylene glycol 3350 17 Gram(s) Oral daily    MEDICATIONS  (PRN):  dextrose Oral Gel 15 Gram(s) Oral once PRN Blood Glucose LESS THAN 70 milliGRAM(s)/deciliter      I&O's Summary    03 Dec 2023 07:01  -  04 Dec 2023 07:00  --------------------------------------------------------  IN: 300 mL / OUT: 1202 mL / NET: -902 mL    04 Dec 2023 07:01  -  04 Dec 2023 13:11  --------------------------------------------------------  IN: 0 mL / OUT: 0 mL / NET: 0 mL        PHYSICAL EXAM:  Vital Signs Last 24 Hrs  T(C): 36.4 (04 Dec 2023 11:45), Max: 36.8 (03 Dec 2023 16:18)  T(F): 97.5 (04 Dec 2023 11:45), Max: 98.2 (03 Dec 2023 16:18)  HR: 74 (04 Dec 2023 11:45) (66 - 85)  BP: 124/76 (04 Dec 2023 11:45) (109/62 - 135/85)  BP(mean): --  RR: 16 (04 Dec 2023 11:45) (16 - 18)  SpO2: 98% (04 Dec 2023 11:45) (97% - 100%)    Parameters below as of 04 Dec 2023 11:45  Patient On (Oxygen Delivery Method): room air      CONSTITUTIONAL: NAD  EYES: PERRLA; conjunctiva and sclera clear  ENMT: Moist oral mucosa, no pharyngeal injection or exudates  NECK: Supple, no palpable masses  RESPIRATORY: Normal respiratory effort; lungs are clear to auscultation bilaterally  CARDIOVASCULAR: Regular rate and rhythm, normal S1 and S2, no murmur/rub/gallop; No lower extremity edema; Peripheral pulses are 2+ bilaterally  ABDOMEN: Nontender to palpation, normoactive bowel sounds, no rebound/guarding  MUSCULOSKELETAL:  no clubbing or cyanosis of digits; no joint swelling or tenderness to palpation  PSYCH: A+O to person, place, and time; affect appropriate  NEUROLOGY: CN 2-12 are intact and symmetric; no gross sensory deficits   SKIN: No rashes; no palpable lesions    LABS:                        10.1   12.69 )-----------( 357      ( 04 Dec 2023 05:43 )             32.3     12-04    138  |  103  |  19  ----------------------------<  172<H>  4.0   |  24  |  0.74    Ca    9.2      04 Dec 2023 05:43  Phos  3.3     12-04  Mg     1.80     12-04            Urinalysis Basic - ( 04 Dec 2023 05:43 )    Color: x / Appearance: x / SG: x / pH: x  Gluc: 172 mg/dL / Ketone: x  / Bili: x / Urobili: x   Blood: x / Protein: x / Nitrite: x   Leuk Esterase: x / RBC: x / WBC x   Sq Epi: x / Non Sq Epi: x / Bacteria: x            RADIOLOGY & ADDITIONAL TESTS:  New Results Reviewed Today:   < from: BUBBA W or WO Ultrasound Enhancing Agent (12.04.23 @ 09:26) >  CONCLUSIONS:      1. Left ventricular systolic function is normal. There are no regional wall motion abnormalities seen.   2. Normal right ventricular cavity size and systolic function.   3. Mitral valve Marino score = 11 (3 points for leaflet mobility, 3 points for leaflet thickening, one point for subvalvular thickening, and 4 points for leaflet calcification). Doming and thickening of the tips of the mitral valve consistent with rheumatic mitral valve disease. There is severe mitral valve stenosis. The transmitral peak gradient is 17.6 mmHg and mean gradient is 10.00 mmHg. The mitral valve peak gradient is 17.6 mmHg and a mean gradient is 10.00 mmHg at a heart rate of 55 bpm. The calculated mitral valve area is 0.8 cm². There is mild mitral regurgitation.   4. The aortic valve appears trileaflet with normal systolic excursion. There is calcification of the aortic valve leaflets. There is trace aortic regurgitation.   5. No atheroma in the visualized portions of the proximal ascending aorta. Mild non-mobile atheroma in the visualized portions of the transverse aortic arch. Mild non-mobile atheroma in the visualized portions of the descending aorta.   6. Dilated left atrium. Dense spontaneous echo contrast and thrombus were seen in the left atrial appendage.   7. Agitated saline injection was negative for intracardiac shunt.    < end of copied text >           [facility] of Hospital Medicine  Italia Fong MD  Available via MS Teams  Pager: [phone]    SUBJECTIVE / OVERNIGHT EVENTS:    no events, no new complaints  spoke to daughter in law, family wants patient home with home PT, no CELSO.    MEDICATIONS  (STANDING):  atorvastatin 80 milliGRAM(s) Oral at bedtime  dextrose 5%. 1000 milliLiter(s) (100 mL/Hr) IV Continuous <Continuous>  dextrose 5%. 1000 milliLiter(s) (50 mL/Hr) IV Continuous <Continuous>  dextrose 50% Injectable 25 Gram(s) IV Push once  dextrose 50% Injectable 25 Gram(s) IV Push once  dextrose 50% Injectable 12.5 Gram(s) IV Push once  enoxaparin Injectable 60 milliGRAM(s) SubCutaneous every 12 hours  glucagon  Injectable 1 milliGRAM(s) IntraMuscular once  insulin glargine Injectable (LANTUS) 8 Unit(s) SubCutaneous every morning  insulin lispro (ADMELOG) corrective regimen sliding scale   SubCutaneous every 6 hours  metoprolol tartrate 25 milliGRAM(s) Oral two times a day  mirtazapine 15 milliGRAM(s) Oral at bedtime  polyethylene glycol 3350 17 Gram(s) Oral daily    MEDICATIONS  (PRN):  dextrose Oral Gel 15 Gram(s) Oral once PRN Blood Glucose LESS THAN 70 milliGRAM(s)/deciliter      I&O's Summary    03 Dec 2023 07:01  -  04 Dec 2023 07:00  --------------------------------------------------------  IN: 300 mL / OUT: 1202 mL / NET: -902 mL    04 Dec 2023 07:01  -  04 Dec 2023 13:11  --------------------------------------------------------  IN: 0 mL / OUT: 0 mL / NET: 0 mL        PHYSICAL EXAM:  Vital Signs Last 24 Hrs  T(C): 36.4 (04 Dec 2023 11:45), Max: 36.8 (03 Dec 2023 16:18)  T(F): 97.5 (04 Dec 2023 11:45), Max: 98.2 (03 Dec 2023 16:18)  HR: 74 (04 Dec 2023 11:45) (66 - 85)  BP: 124/76 (04 Dec 2023 11:45) (109/62 - 135/85)  BP(mean): --  RR: 16 (04 Dec 2023 11:45) (16 - 18)  SpO2: 98% (04 Dec 2023 11:45) (97% - 100%)    Parameters below as of 04 Dec 2023 11:45  Patient On (Oxygen Delivery Method): room air      CONSTITUTIONAL: NAD  EYES: PERRLA; conjunctiva and sclera clear  ENMT: Moist oral mucosa, no pharyngeal injection or exudates  NECK: Supple, no palpable masses  RESPIRATORY: Normal respiratory effort; lungs are clear to auscultation bilaterally  CARDIOVASCULAR: Regular rate and rhythm, normal S1 and S2, no murmur/rub/gallop; No lower extremity edema; Peripheral pulses are 2+ bilaterally  ABDOMEN: Nontender to palpation, normoactive bowel sounds, no rebound/guarding  MUSCULOSKELETAL:  no clubbing or cyanosis of digits; no joint swelling or tenderness to palpation  PSYCH: A+O to person, place, and time; affect appropriate  NEUROLOGY: CN 2-12 are intact and symmetric; no gross sensory deficits   SKIN: No rashes; no palpable lesions    LABS:                        10.1   12.69 )-----------( 357      ( 04 Dec 2023 05:43 )             32.3     12-04    138  |  103  |  19  ----------------------------<  172<H>  4.0   |  24  |  0.74    Ca    9.2      04 Dec 2023 05:43  Phos  3.3     12-04  Mg     1.80     12-04            Urinalysis Basic - ( 04 Dec 2023 05:43 )    Color: x / Appearance: x / SG: x / pH: x  Gluc: 172 mg/dL / Ketone: x  / Bili: x / Urobili: x   Blood: x / Protein: x / Nitrite: x   Leuk Esterase: x / RBC: x / WBC x   Sq Epi: x / Non Sq Epi: x / Bacteria: x            RADIOLOGY & ADDITIONAL TESTS:  New Results Reviewed Today:   < from: BUBBA W or WO Ultrasound Enhancing Agent (12.04.23 @ 09:26) >  CONCLUSIONS:      1. Left ventricular systolic function is normal. There are no regional wall motion abnormalities seen.   2. Normal right ventricular cavity size and systolic function.   3. Mitral valve Marino score = 11 (3 points for leaflet mobility, 3 points for leaflet thickening, one point for subvalvular thickening, and 4 points for leaflet calcification). Doming and thickening of the tips of the mitral valve consistent with rheumatic mitral valve disease. There is severe mitral valve stenosis. The transmitral peak gradient is 17.6 mmHg and mean gradient is 10.00 mmHg. The mitral valve peak gradient is 17.6 mmHg and a mean gradient is 10.00 mmHg at a heart rate of 55 bpm. The calculated mitral valve area is 0.8 cm². There is mild mitral regurgitation.   4. The aortic valve appears trileaflet with normal systolic excursion. There is calcification of the aortic valve leaflets. There is trace aortic regurgitation.   5. No atheroma in the visualized portions of the proximal ascending aorta. Mild non-mobile atheroma in the visualized portions of the transverse aortic arch. Mild non-mobile atheroma in the visualized portions of the descending aorta.   6. Dilated left atrium. Dense spontaneous echo contrast and thrombus were seen in the left atrial appendage.   7. Agitated saline injection was negative for intracardiac shunt.    < end of copied text >

## 2023-12-04 NOTE — DIETITIAN INITIAL EVALUATION ADULT - FACTORS AFF FOOD INTAKE
difficulty with food procurement/preparation/Lutheran/ethnic/cultural/personal food preferences difficulty with food procurement/preparation/Gnosticism/ethnic/cultural/personal food preferences

## 2023-12-04 NOTE — DIETITIAN INITIAL EVALUATION ADULT - PROBLEM SELECTOR PLAN 8
DVT ppx- SCD    Dispo- Stroke w/u.     Comm- Spoke w/ dtr in [facility] at bedside. DVT ppx- SCD    Dispo- Stroke w/u.     Comm- Spoke w/ dtr in [facility] at bedside.

## 2023-12-04 NOTE — PROGRESS NOTE ADULT - ASSESSMENT
68y (1955) RH Nadine speaking woman with afib on eliquis, R MCA stroke 2013, L MCA Stroke 2019 with residual right hemiparesis and dysarthria, HTN, HLD, DM2, mitral stenosis/rheumatic valvular disease presenting as code stroke for right gaze preference and aphasia.      EKG Afib       1) CVA  -family reports labile INR on coumadin and strokes on Coumadin, now had a CVA and developed KRISTAN thrombus (seen on CT) on eliquis (eliquis failure)   -recommend Sub q lovenox moving forward  -TTE shows normal LV function mod MS  -BUBBA with severe rheumatic mitral stenosis and KRISTAN thrombus. will discuss options with structural team     2) Afib  -valvular afib given severe MS   - lovenox as above  -c/w metoprolol 25 mg po BID     3) RUL micronodules  -CT w/o cavitary lesion + mediastinal LNs  -on airborne isolation for r/o TB; pending AFB  -f.u thoracic recs      4) DVT PPX  - lovenox

## 2023-12-04 NOTE — PROGRESS NOTE ADULT - PROBLEM SELECTOR PLAN 2
CT chest with Intracardiac thrombus seen in the left atrial appendage measuring 1.5 cm.  BUBBA showed KRISTAN thrombus  Patient developed intracardiac thrombus despite on eliquis for afib, transitioned to therapeutic lovenox  -c/w lovenox, fRetau colette final recs

## 2023-12-04 NOTE — DIETITIAN INITIAL EVALUATION ADULT - PROBLEM SELECTOR PLAN 1
-CT shows occlusion of R PCA P2 segment. Not TPA candidate.  -Permissive HTN <220/120 for first 24h. Gradual reduction.  -Passed dysphagia screen - diet ordered.  -PT/OT evaluation.   -Cont telemetry. Neuro checks.  -Spoke with neuro -> will repeat CTH now to assess for interval changes. Hold off MRI for now.  -A1c/lipid panel.  -Holding Eliquis as per neuro.

## 2023-12-04 NOTE — DIETITIAN INITIAL EVALUATION ADULT - PROBLEM SELECTOR PLAN 4
Reportedly drowsy - likely 2' AED/BZD she received.  -On my assessment - mentation much improve - awake/alert. Passed dysphagia screen.  -Monitor for now.

## 2023-12-04 NOTE — DIETITIAN INITIAL EVALUATION ADULT - REASON FOR ADMISSION
Cerebral infarction.  Per chart review, Patient is a 68 year old Female with PMH AFib on Eliquis, CVA 2019 w/ R residual deficits (including speed deficits), HTN, DM2, mitral stenosis brought in by family for rightward gaze and repetitive speech. 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 Neuro evaluated and thrombolytic therapy was deferred.

## 2023-12-05 NOTE — PROGRESS NOTE ADULT - ASSESSMENT
68y (1955) RH Nadine speaking woman with afib on eliquis, R MCA stroke 2013, L MCA Stroke 2019 with residual right hemiparesis and dysarthria, HTN, HLD, DM2, mitral stenosis/rheumatic valvular disease presenting as code stroke for right gaze preference and aphasia.      EKG Afib       1) CVA  -family reports labile INR on coumadin and strokes on Coumadin, now had a CVA and developed KRISTAN thrombus (seen on CT) on eliquis (eliquis failure)   -recommend Sub q lovenox moving forward  -TTE shows normal LV function mod MS  -BUBBA with severe rheumatic mitral stenosis and KRISTAN thrombus. Dr. Francoise longided Dr. Altman given active thrombus patient currently not candidate for intervention on MS. discharge on lovenox and will need outpatient follow up     2) Afib  -valvular afib given severe MS rheumatic  - lovenox as above  -c/w metoprolol 25 mg po BID     3) RUL micronodules  -CT w/o cavitary lesion + mediastinal LNs  -TB ruled out  -f.u thoracic recs      4) DVT PPX  - lovenox

## 2023-12-05 NOTE — PROGRESS NOTE ADULT - SUBJECTIVE AND OBJECTIVE BOX
Chintan Owusu MD  Interventional Cardiology / Endovascular Specialist  [place] Office : [address] [place] [address] Tel:   [place] Office : [address] [facility] [address]  Tel: [phone]      Subjective/Overnight events: Patient lying in bed. No acute distress.   	  MEDICATIONS:  enoxaparin Injectable 60 milliGRAM(s) SubCutaneous every 12 hours  metoprolol tartrate 25 milliGRAM(s) Oral two times a day        mirtazapine 15 milliGRAM(s) Oral at bedtime    polyethylene glycol 3350 17 Gram(s) Oral daily    atorvastatin 80 milliGRAM(s) Oral at bedtime  dextrose 50% Injectable 12.5 Gram(s) IV Push once  dextrose 50% Injectable 25 Gram(s) IV Push once  dextrose 50% Injectable 25 Gram(s) IV Push once  dextrose Oral Gel 15 Gram(s) Oral once PRN  dextrose Oral Gel 15 Gram(s) Oral once PRN  glucagon  Injectable 1 milliGRAM(s) IntraMuscular once  insulin glargine Injectable (LANTUS) 8 Unit(s) SubCutaneous every morning  insulin lispro (ADMELOG) corrective regimen sliding scale   SubCutaneous three times a day before meals  insulin lispro (ADMELOG) corrective regimen sliding scale   SubCutaneous at bedtime    dextrose 5%. 1000 milliLiter(s) IV Continuous <Continuous>  dextrose 5%. 1000 milliLiter(s) IV Continuous <Continuous>      PAST MEDICAL/SURGICAL HISTORY  PAST MEDICAL & SURGICAL HISTORY:  Chronic atrial fibrillation      CVA (cerebrovascular accident)      HTN (hypertension)      DM (diabetes mellitus), type 2      H/O hernia repair          SOCIAL HISTORY: Substance Use (street drugs): ( x ) never used  (  ) other:    FAMILY HISTORY:  No pertinent family history in first degree relatives            PHYSICAL EXAM:  T(C): 36.5 (12-05-23 @ 08:20), Max: 37.3 (12-04-23 @ 19:08)  HR: 88 (12-05-23 @ 08:20) (65 - 97)  BP: 127/71 (12-05-23 @ 08:20) (127/71 - 148/71)  RR: 17 (12-05-23 @ 08:20) (17 - 18)  SpO2: 99% (12-05-23 @ 08:20) (97% - 100%)  Wt(kg): --  I&O's Summary    04 Dec 2023 07:01  -  05 Dec 2023 07:00  --------------------------------------------------------  IN: 940 mL / OUT: 1502 mL / NET: -562 mL    05 Dec 2023 07:01  -  05 Dec 2023 12:09  --------------------------------------------------------  IN: 0 mL / OUT: 400 mL / NET: -400 mL          GENERAL: NAD  EYES: conjunctiva and sclera clear  ENMT: No tonsillar erythema, exudates, or enlargement  Cardiovascular: Normal S1 S2, No JVD, No murmurs, No edema  Respiratory: Lungs clear to auscultation	  Gastrointestinal:  Soft, Non-tender, + BS	  Extremities: No edema                                     10.1   11.19 )-----------( 364      ( 05 Dec 2023 06:00 )             31.8     12-05    139  |  103  |  18  ----------------------------<  133<H>  4.3   |  26  |  0.70    Ca    9.4      05 Dec 2023 06:00  Phos  3.5     12-05  Mg     1.90     12-05      proBNP:   Lipid Profile:   HgA1c:   TSH:     Consultant(s) Notes Reviewed:  [x ] YES  [ ] NO    Care Discussed with Consultants/Other Providers [ x] YES  [ ] NO    Imaging Personally Reviewed independently:  [x] YES  [ ] NO    All labs, radiologic studies, vitals, orders and medications list reviewed. Patient is seen and examined at bedside. Case discussed with medical team.                 Chintan Owusu MD  Interventional Cardiology / Endovascular Specialist  [place] Office : [address] [place] [address] Tel:   [place] Office : [address] [facility] [address]  Tel: [phone]      Subjective/Overnight events: Patient lying in bed. No acute distress.   	  MEDICATIONS:  enoxaparin Injectable 60 milliGRAM(s) SubCutaneous every 12 hours  metoprolol tartrate 25 milliGRAM(s) Oral two times a day        mirtazapine 15 milliGRAM(s) Oral at bedtime    polyethylene glycol 3350 17 Gram(s) Oral daily    atorvastatin 80 milliGRAM(s) Oral at bedtime  dextrose 50% Injectable 12.5 Gram(s) IV Push once  dextrose 50% Injectable 25 Gram(s) IV Push once  dextrose 50% Injectable 25 Gram(s) IV Push once  dextrose Oral Gel 15 Gram(s) Oral once PRN  dextrose Oral Gel 15 Gram(s) Oral once PRN  glucagon  Injectable 1 milliGRAM(s) IntraMuscular once  insulin glargine Injectable (LANTUS) 8 Unit(s) SubCutaneous every morning  insulin lispro (ADMELOG) corrective regimen sliding scale   SubCutaneous three times a day before meals  insulin lispro (ADMELOG) corrective regimen sliding scale   SubCutaneous at bedtime    dextrose 5%. 1000 milliLiter(s) IV Continuous <Continuous>  dextrose 5%. 1000 milliLiter(s) IV Continuous <Continuous>      PAST MEDICAL/SURGICAL HISTORY  PAST MEDICAL & SURGICAL HISTORY:  Chronic atrial fibrillation      CVA (cerebrovascular accident)      HTN (hypertension)      DM (diabetes mellitus), type 2      H/O hernia repair          SOCIAL HISTORY: Substance Use (street drugs): ( x ) never used  (  ) other:    FAMILY HISTORY:  No pertinent family history in first degree relatives            PHYSICAL EXAM:  T(C): 36.5 (12-05-23 @ 08:20), Max: 37.3 (12-04-23 @ 19:08)  HR: 88 (12-05-23 @ 08:20) (65 - 97)  BP: 127/71 (12-05-23 @ 08:20) (127/71 - 148/71)  RR: 17 (12-05-23 @ 08:20) (17 - 18)  SpO2: 99% (12-05-23 @ 08:20) (97% - 100%)  Wt(kg): --  I&O's Summary    04 Dec 2023 07:01  -  05 Dec 2023 07:00  --------------------------------------------------------  IN: 940 mL / OUT: 1502 mL / NET: -562 mL    05 Dec 2023 07:01  -  05 Dec 2023 12:09  --------------------------------------------------------  IN: 0 mL / OUT: 400 mL / NET: -400 mL          GENERAL: NAD  EYES: conjunctiva and sclera clear  ENMT: No tonsillar erythema, exudates, or enlargement  Cardiovascular: Normal S1 S2, No JVD, No murmurs, No edema  Respiratory: Lungs clear to auscultation	  Gastrointestinal:  Soft, Non-tender, + BS	  Extremities: No edema                                     10.1   11.19 )-----------( 364      ( 05 Dec 2023 06:00 )             31.8     12-05    139  |  103  |  18  ----------------------------<  133<H>  4.3   |  26  |  0.70    Ca    9.4      05 Dec 2023 06:00  Phos  3.5     12-05  Mg     1.90     12-05      proBNP:   Lipid Profile:   HgA1c:   TSH:     Consultant(s) Notes Reviewed:  [x ] YES  [ ] NO    Care Discussed with Consultants/Other Providers [ x] YES  [ ] NO    Imaging Personally Reviewed independently:  [x] YES  [ ] NO    All labs, radiologic studies, vitals, orders and medications list reviewed. Patient is seen and examined at bedside. Case discussed with medical team.

## 2023-12-05 NOTE — PROGRESS NOTE ADULT - PROBLEM SELECTOR PLAN 10
DVT ppx- lovenox  Dispo: OT recommending CELSO which family declined- notified CM- patient medically optimized for discharge pending HHA

## 2023-12-05 NOTE — PROGRESS NOTE ADULT - SUBJECTIVE AND OBJECTIVE BOX
[facility] Division of Hospital Medicine  Italia Fong MD  Available via MS Teams  Pager: [phone]    SUBJECTIVE / OVERNIGHT EVENTS:  no events. pt denies any complaints.     MEDICATIONS  (STANDING):  atorvastatin 80 milliGRAM(s) Oral at bedtime  dextrose 5%. 1000 milliLiter(s) (50 mL/Hr) IV Continuous <Continuous>  dextrose 5%. 1000 milliLiter(s) (100 mL/Hr) IV Continuous <Continuous>  dextrose 50% Injectable 25 Gram(s) IV Push once  dextrose 50% Injectable 25 Gram(s) IV Push once  dextrose 50% Injectable 12.5 Gram(s) IV Push once  enoxaparin Injectable 60 milliGRAM(s) SubCutaneous every 12 hours  glucagon  Injectable 1 milliGRAM(s) IntraMuscular once  insulin glargine Injectable (LANTUS) 8 Unit(s) SubCutaneous every morning  insulin lispro (ADMELOG) corrective regimen sliding scale   SubCutaneous three times a day before meals  insulin lispro (ADMELOG) corrective regimen sliding scale   SubCutaneous at bedtime  metoprolol tartrate 25 milliGRAM(s) Oral two times a day  mirtazapine 15 milliGRAM(s) Oral at bedtime  polyethylene glycol 3350 17 Gram(s) Oral daily    MEDICATIONS  (PRN):  dextrose Oral Gel 15 Gram(s) Oral once PRN Blood Glucose LESS THAN 70 milliGRAM(s)/deciliter  dextrose Oral Gel 15 Gram(s) Oral once PRN Blood Glucose LESS THAN 70 milliGRAM(s)/deciliter      I&O's Summary    04 Dec 2023 07:01  -  05 Dec 2023 07:00  --------------------------------------------------------  IN: 940 mL / OUT: 1502 mL / NET: -562 mL    05 Dec 2023 07:01  -  05 Dec 2023 14:29  --------------------------------------------------------  IN: 0 mL / OUT: 400 mL / NET: -400 mL        PHYSICAL EXAM:  Vital Signs Last 24 Hrs  T(C): 36.4 (05 Dec 2023 12:23), Max: 37.3 (04 Dec 2023 19:08)  T(F): 97.5 (05 Dec 2023 12:23), Max: 99.2 (04 Dec 2023 19:08)  HR: 61 (05 Dec 2023 12:23) (61 - 97)  BP: 136/68 (05 Dec 2023 12:23) (127/71 - 148/71)  BP(mean): --  RR: 17 (05 Dec 2023 12:23) (17 - 18)  SpO2: 100% (05 Dec 2023 12:23) (97% - 100%)    Parameters below as of 05 Dec 2023 12:23  Patient On (Oxygen Delivery Method): room air      CONSTITUTIONAL: NAD  EYES: PERRLA; conjunctiva and sclera clear  ENMT: Moist oral mucosa, no pharyngeal injection or exudates  NECK: Supple, no palpable masses  RESPIRATORY: Normal respiratory effort; lungs are clear to auscultation bilaterally  CARDIOVASCULAR: Regular rate and rhythm, normal S1 and S2, no murmur/rub/gallop; No lower extremity edema; Peripheral pulses are 2+ bilaterally  ABDOMEN: Nontender to palpation, normoactive bowel sounds, no rebound/guarding  MUSCULOSKELETAL:  no clubbing or cyanosis of digits; no joint swelling or tenderness to palpation  PSYCH: A+O to person, place, and time; affect appropriate  NEUROLOGY: CN 2-12 are intact and symmetric; no gross sensory deficits   SKIN: No rashes; no palpable lesions    LABS:                        10.1   11.19 )-----------( 364      ( 05 Dec 2023 06:00 )             31.8     12-05    139  |  103  |  18  ----------------------------<  133<H>  4.3   |  26  |  0.70    Ca    9.4      05 Dec 2023 06:00  Phos  3.5     12-05  Mg     1.90     12-05            Urinalysis Basic - ( 05 Dec 2023 06:00 )    Color: x / Appearance: x / SG: x / pH: x  Gluc: 133 mg/dL / Ketone: x  / Bili: x / Urobili: x   Blood: x / Protein: x / Nitrite: x   Leuk Esterase: x / RBC: x / WBC x   Sq Epi: x / Non Sq Epi: x / Bacteria: x            RADIOLOGY & ADDITIONAL TESTS:  New Results Reviewed Today:   < from: MR Head No Cont (12.05.23 @ 13:12) >  IMPRESSION:   subacute infarctions in the RIGHT medial occipital cortex   and lateral RIGHT thalamus. Chronic cortical infarctions in the BILATERAL   frontal lobes and minimally in the LEFT parietal lobe. Tiny old lacunar   infarctions in the BILATERAL thalami and cerebellum. Mild periventricular   white matter ischemia is noted.  Mild to moderate atrophy.    < end of copied text >           [facility] of [facility] Medicine  Italia Fong MD  Available via MS Teams  Pager: [phone]    SUBJECTIVE / OVERNIGHT EVENTS:  no events. pt denies any complaints.     MEDICATIONS  (STANDING):  atorvastatin 80 milliGRAM(s) Oral at bedtime  dextrose 5%. 1000 milliLiter(s) (50 mL/Hr) IV Continuous <Continuous>  dextrose 5%. 1000 milliLiter(s) (100 mL/Hr) IV Continuous <Continuous>  dextrose 50% Injectable 25 Gram(s) IV Push once  dextrose 50% Injectable 25 Gram(s) IV Push once  dextrose 50% Injectable 12.5 Gram(s) IV Push once  enoxaparin Injectable 60 milliGRAM(s) SubCutaneous every 12 hours  glucagon  Injectable 1 milliGRAM(s) IntraMuscular once  insulin glargine Injectable (LANTUS) 8 Unit(s) SubCutaneous every morning  insulin lispro (ADMELOG) corrective regimen sliding scale   SubCutaneous three times a day before meals  insulin lispro (ADMELOG) corrective regimen sliding scale   SubCutaneous at bedtime  metoprolol tartrate 25 milliGRAM(s) Oral two times a day  mirtazapine 15 milliGRAM(s) Oral at bedtime  polyethylene glycol 3350 17 Gram(s) Oral daily    MEDICATIONS  (PRN):  dextrose Oral Gel 15 Gram(s) Oral once PRN Blood Glucose LESS THAN 70 milliGRAM(s)/deciliter  dextrose Oral Gel 15 Gram(s) Oral once PRN Blood Glucose LESS THAN 70 milliGRAM(s)/deciliter      I&O's Summary    04 Dec 2023 07:01  -  05 Dec 2023 07:00  --------------------------------------------------------  IN: 940 mL / OUT: 1502 mL / NET: -562 mL    05 Dec 2023 07:01  -  05 Dec 2023 14:29  --------------------------------------------------------  IN: 0 mL / OUT: 400 mL / NET: -400 mL        PHYSICAL EXAM:  Vital Signs Last 24 Hrs  T(C): 36.4 (05 Dec 2023 12:23), Max: 37.3 (04 Dec 2023 19:08)  T(F): 97.5 (05 Dec 2023 12:23), Max: 99.2 (04 Dec 2023 19:08)  HR: 61 (05 Dec 2023 12:23) (61 - 97)  BP: 136/68 (05 Dec 2023 12:23) (127/71 - 148/71)  BP(mean): --  RR: 17 (05 Dec 2023 12:23) (17 - 18)  SpO2: 100% (05 Dec 2023 12:23) (97% - 100%)    Parameters below as of 05 Dec 2023 12:23  Patient On (Oxygen Delivery Method): room air      CONSTITUTIONAL: NAD  EYES: PERRLA; conjunctiva and sclera clear  ENMT: Moist oral mucosa, no pharyngeal injection or exudates  NECK: Supple, no palpable masses  RESPIRATORY: Normal respiratory effort; lungs are clear to auscultation bilaterally  CARDIOVASCULAR: Regular rate and rhythm, normal S1 and S2, no murmur/rub/gallop; No lower extremity edema; Peripheral pulses are 2+ bilaterally  ABDOMEN: Nontender to palpation, normoactive bowel sounds, no rebound/guarding  MUSCULOSKELETAL:  no clubbing or cyanosis of digits; no joint swelling or tenderness to palpation  PSYCH: A+O to person, place, and time; affect appropriate  NEUROLOGY: CN 2-12 are intact and symmetric; no gross sensory deficits   SKIN: No rashes; no palpable lesions    LABS:                        10.1   11.19 )-----------( 364      ( 05 Dec 2023 06:00 )             31.8     12-05    139  |  103  |  18  ----------------------------<  133<H>  4.3   |  26  |  0.70    Ca    9.4      05 Dec 2023 06:00  Phos  3.5     12-05  Mg     1.90     12-05            Urinalysis Basic - ( 05 Dec 2023 06:00 )    Color: x / Appearance: x / SG: x / pH: x  Gluc: 133 mg/dL / Ketone: x  / Bili: x / Urobili: x   Blood: x / Protein: x / Nitrite: x   Leuk Esterase: x / RBC: x / WBC x   Sq Epi: x / Non Sq Epi: x / Bacteria: x            RADIOLOGY & ADDITIONAL TESTS:  New Results Reviewed Today:   < from: MR Head No Cont (12.05.23 @ 13:12) >  IMPRESSION:   subacute infarctions in the RIGHT medial occipital cortex   and lateral RIGHT thalamus. Chronic cortical infarctions in the BILATERAL   frontal lobes and minimally in the LEFT parietal lobe. Tiny old lacunar   infarctions in the BILATERAL thalami and cerebellum. Mild periventricular   white matter ischemia is noted.  Mild to moderate atrophy.    < end of copied text >

## 2023-12-05 NOTE — PROGRESS NOTE ADULT - PROBLEM SELECTOR PLAN 2
CT chest with Intracardiac thrombus seen in the left atrial appendage measuring 1.5 cm.  BUBBA showed KRISTAN thrombus  Patient developed intracardiac thrombus despite on eliquis for afib, transitioned to therapeutic lovenox  -c/w lovenox

## 2023-12-05 NOTE — PROGRESS NOTE ADULT - PROBLEM SELECTOR PLAN 5
EKG afib LAFB 72 bpm, rate controlled  Continue therapeutic lovenox, metoprolol per cardiology  can discontinue telemetry  TSH wnl

## 2023-12-05 NOTE — PROGRESS NOTE ADULT - PROBLEM SELECTOR PLAN 3
Severe mitral valve stenosis per BUBBA 12/4  - per cards given active thrombus patient currently not candidate for intervention, discharge on lovenox and will need outpatient follow up

## 2023-12-05 NOTE — PROGRESS NOTE ADULT - TIME BILLING
Time-based billing (NON-critical care).     50 minutes spent on total encounter; more than 50% of the visit was spent counseling and / or coordinating care by the attending physician.  The necessity of the time spent during the encounter on this date of service was due to:     documentation in [place], reviewing chart and coordinating care with patient/ACP and interdisciplinary staff (such as , social workers, etc) as well as reviewing vitals, laboratory data, radiology, medication list, consultants' recommendations and prior records. Interventions were performed as documented above. Time-based billing (NON-critical care).     50 minutes spent on total encounter; more than 50% of the visit was spent counseling and / or coordinating care by the attending physician.  The necessity of the time spent during the encounter on this date of service was due to:     documentation in [place], reviewing chart and coordinating care with patient/ACP and interdisciplinary staff (such as , social workers, etc) as well as reviewing vitals, laboratory data, radiology, medication list, consultants' recommendations and prior records. Interventions were performed as documented above.

## 2023-12-05 NOTE — PROGRESS NOTE ADULT - PROBLEM SELECTOR PLAN 1
CTA with occlusion of R PCA P2 segment. Not TPA candidate.  CTH with acute/subacute infarcts within the right paramedian occipital lobe as well as the right cerebellar hemisphere. No hemorrhagic transformation.  Upon discussion with neuro AC was resumed  MRI brain 12/5: subacute infarctions in the RIGHT medial occipital cortex and lateral RIGHT thalamus.   EEG showed focal cerebral dysfunction of R and L temporal lobe, mild diffuse cerebral dysfunction, without epileptiform abnormalities. Neurology reviewed and discontinued EEG, recommended discontinuation of keppra  -A1c/lipid panel: HBA1C 7.9,LDL at goal -44  -s/p Permissive HTN <220/120 for first 24h.  -Passed dysphagia screen - diet ordered.  -PT/OT evaluation.: rec CELSO but family declined  -source is thromboembolic - c/w lovenox  -OP follow up with neurology

## 2023-12-05 NOTE — PROGRESS NOTE ADULT - PROBLEM SELECTOR PLAN 7
/81  Continue metoprolol as above  Monitor BP Rifampin Pregnancy And Lactation Text: This medication is Pregnancy Category C and it isn't know if it is safe during pregnancy. It is also excreted in breast milk and should not be used if you are breast feeding.

## 2023-12-06 NOTE — PROGRESS NOTE ADULT - ASSESSMENT
68y (1955) RH Nadine speaking woman with afib on eliquis, R MCA stroke 2013, L MCA Stroke 2019 with residual right hemiparesis and dysarthria, HTN, HLD, DM2, mitral stenosis/rheumatic valvular disease presenting as code stroke for right gaze preference and aphasia.      EKG Afib       1) CVA  -family reports labile INR on coumadin and strokes on Coumadin, now had a CVA and developed KRISTAN thrombus (seen on CT) on eliquis (eliquis failure)   -recommend Sub q lovenox moving forward  -TTE shows normal LV function mod MS  -BUBBA with severe rheumatic mitral stenosis and KRISTAN thrombus. Dr. Francoise longided Dr. Altman given active thrombus patient currently not candidate for intervention on MS. discharge on lovenox and will need outpatient follow up     2) Afib  -valvular afib given severe MS rheumatic  - lovenox as above  -c/w metoprolol 25 mg po BID     3) RUL micronodules  -CT w/o cavitary lesion + mediastinal LNs  -TB ruled out  -f.u thoracic recs      4) DVT PPX  - lovenox          68y (1955) RH Nadine speaking woman with afib on eliquis, R MCA stroke 2013, L MCA Stroke 2019 with residual right hemiparesis and dysarthria, HTN, HLD, DM2, mitral stenosis/rheumatic valvular disease presenting as code stroke for right gaze preference and aphasia.      EKG Afib       1) CVA  -family reports labile INR on coumadin and strokes on Coumadin, now had a CVA and developed KRISTAN thrombus (seen on CT) on eliquis (eliquis failure)   -recommend Sub q lovenox moving forward  -TTE shows normal LV function mod MS  -BUBBA with severe rheumatic mitral stenosis and KRISTAN thrombus. Dr. Francoise longided Dr. Altman given acute stroke  patient currently not candidate for acute intervention on MS. discharge on lovenox and will need outpatient follow up for [facility] and CT surgery eval      2) Afib  -valvular afib given severe MS rheumatic  - lovenox as above  -c/w metoprolol 25 mg po BID     3) RUL micronodules  -CT w/o cavitary lesion + mediastinal LNs  -TB ruled out  -f.u thoracic recs      4) DVT PPX  - lovenox          68y (1955) RH Nadine speaking woman with afib on eliquis, R MCA stroke 2013, L MCA Stroke 2019 with residual right hemiparesis and dysarthria, HTN, HLD, DM2, mitral stenosis/rheumatic valvular disease presenting as code stroke for right gaze preference and aphasia.      EKG Afib       1) CVA  -family reports labile INR on coumadin and strokes on Coumadin, now had a CVA and developed KRISTAN thrombus (seen on CT) on eliquis (eliquis failure)   -recommend Sub q lovenox moving forward  -TTE shows normal LV function mod MS  -BUBBA with severe rheumatic mitral stenosis and KRISTAN thrombus. Dr. Francoise longided Dr. Altman given acute stroke  patient currently not candidate for acute intervention on MS. discharge on lovenox and will need outpatient follow up for [facility] and CT surgery eval      2) Afib  -valvular afib given severe MS rheumatic  - lovenox as above  -c/w metoprolol 25 mg po BID     3) RUL micronodules  -CT w/o cavitary lesion + mediastinal LNs  -TB ruled out  -f.u thoracic recs      4) DVT PPX  - lovenox

## 2023-12-06 NOTE — PROGRESS NOTE ADULT - PROBLEM SELECTOR PLAN 1
CTA with occlusion of R PCA P2 segment. Not TPA candidate.  CTH with acute/subacute infarcts within the right paramedian occipital lobe as well as the right cerebellar hemisphere. No hemorrhagic transformation.  Upon discussion with neuro AC was resumed  MRI brain 12/5: subacute infarctions in the RIGHT medial occipital cortex and lateral RIGHT thalamus.   EEG showed focal cerebral dysfunction of R and L temporal lobe, mild diffuse cerebral dysfunction, without epileptiform abnormalities. Neurology reviewed and discontinued EEG, recommended discontinuation of keppra  -A1c/lipid panel: HBA1C 7.9,LDL at goal -44  -s/p Permissive HTN <220/120 for first 24h.  -Passed dysphagia screen - regular diet  -PT/OT evaluation.: rec CELSO but family declined  -source is thromboembolic - c/w lovenox  -OP follow up with neurology

## 2023-12-06 NOTE — PROGRESS NOTE ADULT - SUBJECTIVE AND OBJECTIVE BOX
Chintan Owusu MD  Interventional Cardiology / Endovascular Specialist  [place] Office : [address] [place] [address] Tel:   [place] Office : [address] [facility] [address]  Tel: [phone]      Subjective/Overnight events: Patient lying in bed comfortably. No acute distress. Denies chest pain, SOB or palpitations  	  MEDICATIONS:  enoxaparin Injectable 60 milliGRAM(s) SubCutaneous every 12 hours  metoprolol tartrate 25 milliGRAM(s) Oral two times a day        mirtazapine 15 milliGRAM(s) Oral at bedtime    polyethylene glycol 3350 17 Gram(s) Oral daily    atorvastatin 80 milliGRAM(s) Oral at bedtime  dextrose 50% Injectable 12.5 Gram(s) IV Push once  dextrose 50% Injectable 25 Gram(s) IV Push once  dextrose 50% Injectable 25 Gram(s) IV Push once  dextrose Oral Gel 15 Gram(s) Oral once PRN  dextrose Oral Gel 15 Gram(s) Oral once PRN  glucagon  Injectable 1 milliGRAM(s) IntraMuscular once  insulin glargine Injectable (LANTUS) 8 Unit(s) SubCutaneous every morning  insulin lispro (ADMELOG) corrective regimen sliding scale   SubCutaneous three times a day before meals  insulin lispro (ADMELOG) corrective regimen sliding scale   SubCutaneous at bedtime    dextrose 5%. 1000 milliLiter(s) IV Continuous <Continuous>  dextrose 5%. 1000 milliLiter(s) IV Continuous <Continuous>      PAST MEDICAL/SURGICAL HISTORY  PAST MEDICAL & SURGICAL HISTORY:  Chronic atrial fibrillation      CVA (cerebrovascular accident)      HTN (hypertension)      DM (diabetes mellitus), type 2      H/O hernia repair          SOCIAL HISTORY: Substance Use (street drugs): ( x ) never used  (  ) other:    FAMILY HISTORY:  No pertinent family history in first degree relatives        PHYSICAL EXAM:  T(C): 36.5 (12-06-23 @ 09:38), Max: 36.8 (12-05-23 @ 15:39)  HR: 64 (12-06-23 @ 09:38) (60 - 82)  BP: 124/57 (12-06-23 @ 09:38) (115/69 - 149/77)  RR: 17 (12-06-23 @ 09:38) (17 - 18)  SpO2: 98% (12-06-23 @ 09:38) (98% - 100%)  Wt(kg): --  I&O's Summary    05 Dec 2023 07:01  -  06 Dec 2023 07:00  --------------------------------------------------------  IN: 400 mL / OUT: 1802 mL / NET: -1402 mL    06 Dec 2023 07:01  -  06 Dec 2023 11:36  --------------------------------------------------------  IN: 0 mL / OUT: 400 mL / NET: -400 mL          GENERAL: NAD  EYES: conjunctiva and sclera clear  ENMT: No tonsillar erythema, exudates, or enlargement  Cardiovascular: Normal S1 S2, No JVD, No murmurs, No edema  Respiratory: Lungs clear to auscultation	  Gastrointestinal:  Soft  Extremities: No edema                                     10.1   11.19 )-----------( 364      ( 05 Dec 2023 06:00 )             31.8     12-05    139  |  103  |  18  ----------------------------<  133<H>  4.3   |  26  |  0.70    Ca    9.4      05 Dec 2023 06:00  Phos  3.5     12-05  Mg     1.90     12-05      proBNP:   Lipid Profile:   HgA1c:   TSH:     Consultant(s) Notes Reviewed:  [x ] YES  [ ] NO    Care Discussed with Consultants/Other Providers [ x] YES  [ ] NO    Imaging Personally Reviewed independently:  [x] YES  [ ] NO    All labs, radiologic studies, vitals, orders and medications list reviewed. Patient is seen and examined at bedside. Case discussed with medical team.                 Chintan Owusu MD  Interventional Cardiology / Endovascular Specialist  [place] Office : [address] [place] [address] Tel:   [place] Office : [address] [facility] [address]  Tel: [phone]      Subjective/Overnight events: Patient lying in bed comfortably. No acute distress. Denies chest pain, SOB or palpitations  	  MEDICATIONS:  enoxaparin Injectable 60 milliGRAM(s) SubCutaneous every 12 hours  metoprolol tartrate 25 milliGRAM(s) Oral two times a day        mirtazapine 15 milliGRAM(s) Oral at bedtime    polyethylene glycol 3350 17 Gram(s) Oral daily    atorvastatin 80 milliGRAM(s) Oral at bedtime  dextrose 50% Injectable 12.5 Gram(s) IV Push once  dextrose 50% Injectable 25 Gram(s) IV Push once  dextrose 50% Injectable 25 Gram(s) IV Push once  dextrose Oral Gel 15 Gram(s) Oral once PRN  dextrose Oral Gel 15 Gram(s) Oral once PRN  glucagon  Injectable 1 milliGRAM(s) IntraMuscular once  insulin glargine Injectable (LANTUS) 8 Unit(s) SubCutaneous every morning  insulin lispro (ADMELOG) corrective regimen sliding scale   SubCutaneous three times a day before meals  insulin lispro (ADMELOG) corrective regimen sliding scale   SubCutaneous at bedtime    dextrose 5%. 1000 milliLiter(s) IV Continuous <Continuous>  dextrose 5%. 1000 milliLiter(s) IV Continuous <Continuous>      PAST MEDICAL/SURGICAL HISTORY  PAST MEDICAL & SURGICAL HISTORY:  Chronic atrial fibrillation      CVA (cerebrovascular accident)      HTN (hypertension)      DM (diabetes mellitus), type 2      H/O hernia repair          SOCIAL HISTORY: Substance Use (street drugs): ( x ) never used  (  ) other:    FAMILY HISTORY:  No pertinent family history in first degree relatives        PHYSICAL EXAM:  T(C): 36.5 (12-06-23 @ 09:38), Max: 36.8 (12-05-23 @ 15:39)  HR: 64 (12-06-23 @ 09:38) (60 - 82)  BP: 124/57 (12-06-23 @ 09:38) (115/69 - 149/77)  RR: 17 (12-06-23 @ 09:38) (17 - 18)  SpO2: 98% (12-06-23 @ 09:38) (98% - 100%)  Wt(kg): --  I&O's Summary    05 Dec 2023 07:01  -  06 Dec 2023 07:00  --------------------------------------------------------  IN: 400 mL / OUT: 1802 mL / NET: -1402 mL    06 Dec 2023 07:01  -  06 Dec 2023 11:36  --------------------------------------------------------  IN: 0 mL / OUT: 400 mL / NET: -400 mL          GENERAL: NAD  EYES: conjunctiva and sclera clear  ENMT: No tonsillar erythema, exudates, or enlargement  Cardiovascular: Normal S1 S2, No JVD, No murmurs, No edema  Respiratory: Lungs clear to auscultation	  Gastrointestinal:  Soft  Extremities: No edema                                     10.1   11.19 )-----------( 364      ( 05 Dec 2023 06:00 )             31.8     12-05    139  |  103  |  18  ----------------------------<  133<H>  4.3   |  26  |  0.70    Ca    9.4      05 Dec 2023 06:00  Phos  3.5     12-05  Mg     1.90     12-05      proBNP:   Lipid Profile:   HgA1c:   TSH:     Consultant(s) Notes Reviewed:  [x ] YES  [ ] NO    Care Discussed with Consultants/Other Providers [ x] YES  [ ] NO    Imaging Personally Reviewed independently:  [x] YES  [ ] NO    All labs, radiologic studies, vitals, orders and medications list reviewed. Patient is seen and examined at bedside. Case discussed with medical team.

## 2023-12-06 NOTE — PROGRESS NOTE ADULT - SUBJECTIVE AND OBJECTIVE BOX
[facility] Division of Hospital Medicine  Italia Fong MD  Available via MS Teams  Pager: [phone]    SUBJECTIVE / OVERNIGHT EVENTS:  [facility]  #[id number] patient denies complaints, wants to go home    MEDICATIONS  (STANDING):  atorvastatin 80 milliGRAM(s) Oral at bedtime  dextrose 5%. 1000 milliLiter(s) (50 mL/Hr) IV Continuous <Continuous>  dextrose 5%. 1000 milliLiter(s) (100 mL/Hr) IV Continuous <Continuous>  dextrose 50% Injectable 25 Gram(s) IV Push once  dextrose 50% Injectable 25 Gram(s) IV Push once  dextrose 50% Injectable 12.5 Gram(s) IV Push once  enoxaparin Injectable 60 milliGRAM(s) SubCutaneous every 12 hours  glucagon  Injectable 1 milliGRAM(s) IntraMuscular once  insulin glargine Injectable (LANTUS) 8 Unit(s) SubCutaneous every morning  insulin lispro (ADMELOG) corrective regimen sliding scale   SubCutaneous three times a day before meals  insulin lispro (ADMELOG) corrective regimen sliding scale   SubCutaneous at bedtime  metoprolol tartrate 25 milliGRAM(s) Oral two times a day  mirtazapine 15 milliGRAM(s) Oral at bedtime  polyethylene glycol 3350 17 Gram(s) Oral daily    MEDICATIONS  (PRN):  dextrose Oral Gel 15 Gram(s) Oral once PRN Blood Glucose LESS THAN 70 milliGRAM(s)/deciliter  dextrose Oral Gel 15 Gram(s) Oral once PRN Blood Glucose LESS THAN 70 milliGRAM(s)/deciliter      I&O's Summary    05 Dec 2023 07:01  -  06 Dec 2023 07:00  --------------------------------------------------------  IN: 400 mL / OUT: 1802 mL / NET: -1402 mL    06 Dec 2023 07:01  -  06 Dec 2023 15:14  --------------------------------------------------------  IN: 0 mL / OUT: 400 mL / NET: -400 mL        PHYSICAL EXAM:  Vital Signs Last 24 Hrs  T(C): 36.4 (06 Dec 2023 14:11), Max: 36.8 (05 Dec 2023 15:39)  T(F): 97.6 (06 Dec 2023 14:11), Max: 98.3 (05 Dec 2023 15:39)  HR: 71 (06 Dec 2023 14:11) (60 - 82)  BP: 137/71 (06 Dec 2023 14:11) (115/69 - 149/77)  BP(mean): --  RR: 17 (06 Dec 2023 14:11) (17 - 18)  SpO2: 99% (06 Dec 2023 14:11) (98% - 100%)    Parameters below as of 06 Dec 2023 14:11  Patient On (Oxygen Delivery Method): room air      CONSTITUTIONAL: NAD  EYES: PERRLA; conjunctiva and sclera clear  ENMT: Moist oral mucosa, no pharyngeal injection or exudates  NECK: Supple, no palpable masses  RESPIRATORY: Normal respiratory effort; lungs are clear to auscultation bilaterally  CARDIOVASCULAR: Regular rate and rhythm, normal S1 and S2, no murmur/rub/gallop; No lower extremity edema; Peripheral pulses are 2+ bilaterally  ABDOMEN: Nontender to palpation, normoactive bowel sounds, no rebound/guarding  MUSCULOSKELETAL:  no clubbing or cyanosis of digits; no joint swelling or tenderness to palpation  PSYCH: A+O to person, place, and time; affect appropriate  NEUROLOGY: CN 2-12 are intact and symmetric; no gross sensory deficits   SKIN: No rashes; no palpable lesions    LABS:                        10.1   11.19 )-----------( 364      ( 05 Dec 2023 06:00 )             31.8     12-05    139  |  103  |  18  ----------------------------<  133<H>  4.3   |  26  |  0.70    Ca    9.4      05 Dec 2023 06:00  Phos  3.5     12-05  Mg     1.90     12-05            Urinalysis Basic - ( 05 Dec 2023 06:00 )    Color: x / Appearance: x / SG: x / pH: x  Gluc: 133 mg/dL / Ketone: x  / Bili: x / Urobili: x   Blood: x / Protein: x / Nitrite: x   Leuk Esterase: x / RBC: x / WBC x   Sq Epi: x / Non Sq Epi: x / Bacteria: x                   [facility] Division of Hospital Medicine  Italia Fong MD  Available via MS Teams  Pager: [phone]    SUBJECTIVE / OVERNIGHT EVENTS:  [facility]  #[id number] patient denies complaints, wants to go home    MEDICATIONS  (STANDING):  atorvastatin 80 milliGRAM(s) Oral at bedtime  dextrose 5%. 1000 milliLiter(s) (50 mL/Hr) IV Continuous <Continuous>  dextrose 5%. 1000 milliLiter(s) (100 mL/Hr) IV Continuous <Continuous>  dextrose 50% Injectable 25 Gram(s) IV Push once  dextrose 50% Injectable 25 Gram(s) IV Push once  dextrose 50% Injectable 12.5 Gram(s) IV Push once  enoxaparin Injectable 60 milliGRAM(s) SubCutaneous every 12 hours  glucagon  Injectable 1 milliGRAM(s) IntraMuscular once  insulin glargine Injectable (LANTUS) 8 Unit(s) SubCutaneous every morning  insulin lispro (ADMELOG) corrective regimen sliding scale   SubCutaneous three times a day before meals  insulin lispro (ADMELOG) corrective regimen sliding scale   SubCutaneous at bedtime  metoprolol tartrate 25 milliGRAM(s) Oral two times a day  mirtazapine 15 milliGRAM(s) Oral at bedtime  polyethylene glycol 3350 17 Gram(s) Oral daily    MEDICATIONS  (PRN):  dextrose Oral Gel 15 Gram(s) Oral once PRN Blood Glucose LESS THAN 70 milliGRAM(s)/deciliter  dextrose Oral Gel 15 Gram(s) Oral once PRN Blood Glucose LESS THAN 70 milliGRAM(s)/deciliter      I&O's Summary    05 Dec 2023 07:01  -  06 Dec 2023 07:00  --------------------------------------------------------  IN: 400 mL / OUT: 1802 mL / NET: -1402 mL    06 Dec 2023 07:01  -  06 Dec 2023 15:14  --------------------------------------------------------  IN: 0 mL / OUT: 400 mL / NET: -400 mL        PHYSICAL EXAM:  Vital Signs Last 24 Hrs  T(C): 36.4 (06 Dec 2023 14:11), Max: 36.8 (05 Dec 2023 15:39)  T(F): 97.6 (06 Dec 2023 14:11), Max: 98.3 (05 Dec 2023 15:39)  HR: 71 (06 Dec 2023 14:11) (60 - 82)  BP: 137/71 (06 Dec 2023 14:11) (115/69 - 149/77)  BP(mean): --  RR: 17 (06 Dec 2023 14:11) (17 - 18)  SpO2: 99% (06 Dec 2023 14:11) (98% - 100%)    Parameters below as of 06 Dec 2023 14:11  Patient On (Oxygen Delivery Method): room air      CONSTITUTIONAL: NAD  EYES: PERRLA; conjunctiva and sclera clear  ENMT: Moist oral mucosa, no pharyngeal injection or exudates  NECK: Supple, no palpable masses  RESPIRATORY: Normal respiratory effort; lungs are clear to auscultation bilaterally  CARDIOVASCULAR: Regular rate and rhythm, normal S1 and S2, no murmur/rub/gallop; No lower extremity edema; Peripheral pulses are 2+ bilaterally  ABDOMEN: Nontender to palpation, normoactive bowel sounds, no rebound/guarding  MUSCULOSKELETAL:  no clubbing or cyanosis of digits; no joint swelling or tenderness to palpation  PSYCH: A+O to person, place, and time; affect appropriate  NEUROLOGY: CN 2-12 are intact and symmetric; no gross sensory deficits   SKIN: No rashes; no palpable lesions    LABS:                        10.1   11.19 )-----------( 364      ( 05 Dec 2023 06:00 )             31.8     12-05    139  |  103  |  18  ----------------------------<  133<H>  4.3   |  26  |  0.70    Ca    9.4      05 Dec 2023 06:00  Phos  3.5     12-05  Mg     1.90     12-05            Urinalysis Basic - ( 05 Dec 2023 06:00 )    Color: x / Appearance: x / SG: x / pH: x  Gluc: 133 mg/dL / Ketone: x  / Bili: x / Urobili: x   Blood: x / Protein: x / Nitrite: x   Leuk Esterase: x / RBC: x / WBC x   Sq Epi: x / Non Sq Epi: x / Bacteria: x

## 2023-12-06 NOTE — PROVIDER CONTACT NOTE (MEDICATION) - ASSESSMENT
Patient AOx3 with times of confusion. Patient and family member at bedside refusing bedtime insulin. Said blood sugar was high because patient ate dinner brought from home prior to finger stick.

## 2023-12-06 NOTE — PROGRESS NOTE ADULT - PROBLEM SELECTOR PLAN 5
EKG afib LAFB 72 bpm, rate controlled  Continue therapeutic lovenox, metoprolol per cardiology  TSH wnl

## 2023-12-07 NOTE — PROGRESS NOTE ADULT - ASSESSMENT
68y (1955) RH Nadine speaking woman with afib on eliquis, R MCA stroke 2013, L MCA Stroke 2019 with residual right hemiparesis and dysarthria, HTN, HLD, DM2, mitral stenosis/rheumatic valvular disease presenting as code stroke for right gaze preference and aphasia.      EKG Afib       1) CVA  -family reports labile INR on coumadin and strokes on Coumadin, now had a CVA and developed KRISTAN thrombus (seen on CT) on eliquis (eliquis failure)   -recommend Sub q lovenox moving forward  -TTE shows normal LV function mod MS  -BUBBA with severe rheumatic mitral stenosis and KRISTAN thrombus. curbsided Dr. Altman given acute stroke  patient currently not candidate for acute intervention on MS. discharge on lovenox and will need outpatient follow up for [facility] and CT surgery eval      2) Afib  -valvular afib given severe MS rheumatic  - lovenox as above  -c/w metoprolol 25 mg po BID     3) RUL micronodules  -CT w/o cavitary lesion + mediastinal LNs  -TB ruled out  -f.u thoracic recs      4) DVT PPX  - lovenox  68y (1955) RH Nadine speaking woman with afib on eliquis, R MCA stroke 2013, L MCA Stroke 2019 with residual right hemiparesis and dysarthria, HTN, HLD, DM2, mitral stenosis/rheumatic valvular disease presenting as code stroke for right gaze preference and aphasia.      EKG Afib       1) CVA  -family reports labile INR on coumadin and strokes on Coumadin, now had a CVA and developed KRISTAN thrombus (seen on CT) on eliquis (eliquis failure)   -recommend Sub q lovenox moving forward  -TTE shows normal LV function mod MS  -BUBBA with severe rheumatic mitral stenosis and KRISTAN thrombus. curbsided Dr. Altman given acute stroke  patient currently not candidate for acute intervention on MS. discharge on lovenox and will need outpatient follow up for [facility] and CT surgery eval      2) Afib  -valvular afib given severe MS rheumatic  - lovenox as above  -c/w metoprolol 25 mg po BID     3) RUL micronodules  -CT w/o cavitary lesion + mediastinal LNs  -TB ruled out  -f.u thoracic recs      4) DVT PPX  - lovenox

## 2023-12-07 NOTE — PROGRESS NOTE ADULT - SUBJECTIVE AND OBJECTIVE BOX
[facility] Division of Hospital Medicine  Italia Fong MD  Available via MS Teams  Pager: [phone]    SUBJECTIVE / OVERNIGHT EVENTS:  [facility]  [id number]. Pt denies any complaints, states understands has to continue lovenox injections at home    MEDICATIONS  (STANDING):  atorvastatin 80 milliGRAM(s) Oral at bedtime  dextrose 5%. 1000 milliLiter(s) (100 mL/Hr) IV Continuous <Continuous>  dextrose 5%. 1000 milliLiter(s) (50 mL/Hr) IV Continuous <Continuous>  dextrose 50% Injectable 25 Gram(s) IV Push once  dextrose 50% Injectable 25 Gram(s) IV Push once  dextrose 50% Injectable 12.5 Gram(s) IV Push once  enoxaparin Injectable 60 milliGRAM(s) SubCutaneous every 12 hours  glucagon  Injectable 1 milliGRAM(s) IntraMuscular once  insulin glargine Injectable (LANTUS) 8 Unit(s) SubCutaneous every morning  insulin lispro (ADMELOG) corrective regimen sliding scale   SubCutaneous three times a day before meals  insulin lispro (ADMELOG) corrective regimen sliding scale   SubCutaneous at bedtime  metoprolol tartrate 25 milliGRAM(s) Oral two times a day  mirtazapine 15 milliGRAM(s) Oral at bedtime  polyethylene glycol 3350 17 Gram(s) Oral daily    MEDICATIONS  (PRN):  dextrose Oral Gel 15 Gram(s) Oral once PRN Blood Glucose LESS THAN 70 milliGRAM(s)/deciliter  dextrose Oral Gel 15 Gram(s) Oral once PRN Blood Glucose LESS THAN 70 milliGRAM(s)/deciliter      I&O's Summary    06 Dec 2023 07:01  -  07 Dec 2023 07:00  --------------------------------------------------------  IN: 0 mL / OUT: 1600 mL / NET: -1600 mL        PHYSICAL EXAM:  Vital Signs Last 24 Hrs  T(C): 36.7 (07 Dec 2023 09:36), Max: 37 (06 Dec 2023 17:40)  T(F): 98.1 (07 Dec 2023 09:36), Max: 98.6 (06 Dec 2023 17:40)  HR: 63 (07 Dec 2023 09:36) (63 - 86)  BP: 125/73 (07 Dec 2023 09:36) (103/78 - 142/66)  BP(mean): --  RR: 18 (07 Dec 2023 09:36) (17 - 18)  SpO2: 99% (07 Dec 2023 09:36) (98% - 100%)    Parameters below as of 07 Dec 2023 09:36  Patient On (Oxygen Delivery Method): room air      CONSTITUTIONAL: NAD  EYES: PERRLA; conjunctiva and sclera clear  ENMT: Moist oral mucosa, no pharyngeal injection or exudates  NECK: Supple, no palpable masses  RESPIRATORY: Normal respiratory effort; lungs are clear to auscultation bilaterally  CARDIOVASCULAR: Regular rate and rhythm, normal S1 and S2, no murmur/rub/gallop; No lower extremity edema; Peripheral pulses are 2+ bilaterally  ABDOMEN: Nontender to palpation, normoactive bowel sounds, no rebound/guarding  MUSCULOSKELETAL:  no clubbing or cyanosis of digits; no joint swelling or tenderness to palpation  PSYCH: A+O to person, place, and time; affect appropriate  NEUROLOGY: CN 2-12 are intact and symmetric; no gross sensory deficits   SKIN: No rashes; no palpable lesions    LABS:                        10.3   9.88  )-----------( 294      ( 07 Dec 2023 05:50 )             32.5     12-07    137  |  102  |  19  ----------------------------<  164<H>  3.9   |  25  |  0.73    Ca    9.4      07 Dec 2023 05:50  Phos  4.1     12-07  Mg     1.90     12-07    TPro  6.9  /  Alb  3.5  /  TBili  0.4  /  DBili  x   /  AST  24  /  ALT  30  /  AlkPhos  56  12-07          Urinalysis Basic - ( 07 Dec 2023 05:50 )    Color: x / Appearance: x / SG: x / pH: x  Gluc: 164 mg/dL / Ketone: x  / Bili: x / Urobili: x   Blood: x / Protein: x / Nitrite: x   Leuk Esterase: x / RBC: x / WBC x   Sq Epi: x / Non Sq Epi: x / Bacteria: x                 [facility] Division of Hospital Medicine  Italia Fong MD  Available via MS Teams  Pager: [phone]    SUBJECTIVE / OVERNIGHT EVENTS:  [facility]  [id number]. Pt denies any complaints, states understands has to continue lovenox injections at home    MEDICATIONS  (STANDING):  atorvastatin 80 milliGRAM(s) Oral at bedtime  dextrose 5%. 1000 milliLiter(s) (100 mL/Hr) IV Continuous <Continuous>  dextrose 5%. 1000 milliLiter(s) (50 mL/Hr) IV Continuous <Continuous>  dextrose 50% Injectable 25 Gram(s) IV Push once  dextrose 50% Injectable 25 Gram(s) IV Push once  dextrose 50% Injectable 12.5 Gram(s) IV Push once  enoxaparin Injectable 60 milliGRAM(s) SubCutaneous every 12 hours  glucagon  Injectable 1 milliGRAM(s) IntraMuscular once  insulin glargine Injectable (LANTUS) 8 Unit(s) SubCutaneous every morning  insulin lispro (ADMELOG) corrective regimen sliding scale   SubCutaneous three times a day before meals  insulin lispro (ADMELOG) corrective regimen sliding scale   SubCutaneous at bedtime  metoprolol tartrate 25 milliGRAM(s) Oral two times a day  mirtazapine 15 milliGRAM(s) Oral at bedtime  polyethylene glycol 3350 17 Gram(s) Oral daily    MEDICATIONS  (PRN):  dextrose Oral Gel 15 Gram(s) Oral once PRN Blood Glucose LESS THAN 70 milliGRAM(s)/deciliter  dextrose Oral Gel 15 Gram(s) Oral once PRN Blood Glucose LESS THAN 70 milliGRAM(s)/deciliter      I&O's Summary    06 Dec 2023 07:01  -  07 Dec 2023 07:00  --------------------------------------------------------  IN: 0 mL / OUT: 1600 mL / NET: -1600 mL        PHYSICAL EXAM:  Vital Signs Last 24 Hrs  T(C): 36.7 (07 Dec 2023 09:36), Max: 37 (06 Dec 2023 17:40)  T(F): 98.1 (07 Dec 2023 09:36), Max: 98.6 (06 Dec 2023 17:40)  HR: 63 (07 Dec 2023 09:36) (63 - 86)  BP: 125/73 (07 Dec 2023 09:36) (103/78 - 142/66)  BP(mean): --  RR: 18 (07 Dec 2023 09:36) (17 - 18)  SpO2: 99% (07 Dec 2023 09:36) (98% - 100%)    Parameters below as of 07 Dec 2023 09:36  Patient On (Oxygen Delivery Method): room air      CONSTITUTIONAL: NAD  EYES: PERRLA; conjunctiva and sclera clear  ENMT: Moist oral mucosa, no pharyngeal injection or exudates  NECK: Supple, no palpable masses  RESPIRATORY: Normal respiratory effort; lungs are clear to auscultation bilaterally  CARDIOVASCULAR: Regular rate and rhythm, normal S1 and S2, no murmur/rub/gallop; No lower extremity edema; Peripheral pulses are 2+ bilaterally  ABDOMEN: Nontender to palpation, normoactive bowel sounds, no rebound/guarding  MUSCULOSKELETAL:  no clubbing or cyanosis of digits; no joint swelling or tenderness to palpation  PSYCH: A+O to person, place, and time; affect appropriate  NEUROLOGY: CN 2-12 are intact and symmetric; no gross sensory deficits   SKIN: No rashes; no palpable lesions    LABS:                        10.3   9.88  )-----------( 294      ( 07 Dec 2023 05:50 )             32.5     12-07    137  |  102  |  19  ----------------------------<  164<H>  3.9   |  25  |  0.73    Ca    9.4      07 Dec 2023 05:50  Phos  4.1     12-07  Mg     1.90     12-07    TPro  6.9  /  Alb  3.5  /  TBili  0.4  /  DBili  x   /  AST  24  /  ALT  30  /  AlkPhos  56  12-07          Urinalysis Basic - ( 07 Dec 2023 05:50 )    Color: x / Appearance: x / SG: x / pH: x  Gluc: 164 mg/dL / Ketone: x  / Bili: x / Urobili: x   Blood: x / Protein: x / Nitrite: x   Leuk Esterase: x / RBC: x / WBC x   Sq Epi: x / Non Sq Epi: x / Bacteria: x

## 2023-12-07 NOTE — PROGRESS NOTE ADULT - PROBLEM SELECTOR PLAN 1
CTA with occlusion of R PCA P2 segment. Not TPA candidate.  CTH with acute/subacute infarcts within the right paramedian occipital lobe as well as the right cerebellar hemisphere. No hemorrhagic transformation.  Upon discussion with neuro AC was resumed  MRI brain 12/5: subacute infarctions in the RIGHT medial occipital cortex and lateral RIGHT thalamus.   EEG showed focal cerebral dysfunction of R and L temporal lobe, mild diffuse cerebral dysfunction, without epileptiform abnormalities. Neurology reviewed and discontinued EEG, recommended discontinuation of keppra  -A1c/lipid panel: HBA1C 7.9,LDL at goal -44  -s/p Permissive HTN <220/120 for first 24h.  -Passed dysphagia screen - regular diet  -PT/OT evaluation.: rec CELSO but family declined, pending home health aid set up   -source is thromboembolic - c/w lovenox  -OP follow up with neurology

## 2023-12-07 NOTE — PROGRESS NOTE ADULT - SUBJECTIVE AND OBJECTIVE BOX
Chintan Owusu MD  Interventional Cardiology / Endovascular Specialist  [place] Office : [phone] [place] [address] [place] [address]  Tel:   [place] Office : [address] [facility] [place] [address]  Tel: [phone]    Subjective/Overnight events: Patient lying in bed comfortably. No acute distress. Denies chest pain, SOB or palpitations  	  MEDICATIONS:  enoxaparin Injectable 60 milliGRAM(s) SubCutaneous every 12 hours  metoprolol tartrate 25 milliGRAM(s) Oral two times a day        mirtazapine 15 milliGRAM(s) Oral at bedtime    polyethylene glycol 3350 17 Gram(s) Oral daily    atorvastatin 80 milliGRAM(s) Oral at bedtime  dextrose 50% Injectable 25 Gram(s) IV Push once  dextrose 50% Injectable 25 Gram(s) IV Push once  dextrose 50% Injectable 12.5 Gram(s) IV Push once  dextrose Oral Gel 15 Gram(s) Oral once PRN  dextrose Oral Gel 15 Gram(s) Oral once PRN  glucagon  Injectable 1 milliGRAM(s) IntraMuscular once  insulin glargine Injectable (LANTUS) 8 Unit(s) SubCutaneous every morning  insulin lispro (ADMELOG) corrective regimen sliding scale   SubCutaneous three times a day before meals  insulin lispro (ADMELOG) corrective regimen sliding scale   SubCutaneous at bedtime    dextrose 5%. 1000 milliLiter(s) IV Continuous <Continuous>  dextrose 5%. 1000 milliLiter(s) IV Continuous <Continuous>      PAST MEDICAL/SURGICAL HISTORY  PAST MEDICAL & SURGICAL HISTORY:  Chronic atrial fibrillation      CVA (cerebrovascular accident)      HTN (hypertension)      DM (diabetes mellitus), type 2      H/O hernia repair          SOCIAL HISTORY: Substance Use (street drugs): ( x ) never used  (  ) other:    FAMILY HISTORY:  No pertinent family history in first degree relatives        REVIEW OF SYSTEMS:  CONSTITUTIONAL: No fever, weight loss, or fatigue  EYES: No eye pain, visual disturbances, or discharge  ENMT:  No difficulty hearing, tinnitus, vertigo; No sinus or throat pain  BREASTS: No pain, masses, or nipple discharge  GASTROINTESTINAL: No abdominal or epigastric pain. No nausea, vomiting, or hematemesis; No diarrhea or constipation. No melena or hematochezia.  GENITOURINARY: No dysuria, frequency, hematuria, or incontinence  NEUROLOGICAL: No headaches, memory loss, loss of strength, numbness, or tremors  ENDOCRINE: No heat or cold intolerance; No hair loss  MUSCULOSKELETAL: No joint pain or swelling; No muscle, back, or extremity pain  PSYCHIATRIC: No depression, anxiety, mood swings, or difficulty sleeping  HEME/LYMPH: No easy bruising, or bleeding gums  All others negative    PHYSICAL EXAM:  T(C): 36.8 (12-07-23 @ 14:28), Max: 37 (12-06-23 @ 17:40)  HR: 78 (12-07-23 @ 14:28) (63 - 86)  BP: 132/68 (12-07-23 @ 14:28) (103/78 - 142/66)  RR: 17 (12-07-23 @ 14:28) (17 - 18)  SpO2: 99% (12-07-23 @ 14:28) (98% - 100%)  Wt(kg): --  I&O's Summary    06 Dec 2023 07:01  -  07 Dec 2023 07:00  --------------------------------------------------------  IN: 0 mL / OUT: 1600 mL / NET: -1600 mL        GENERAL: NAD  EYES: conjunctiva and sclera clear  ENMT: No tonsillar erythema, exudates, or enlargement  Cardiovascular: Normal S1 S2, No JVD, No murmurs, No edema  Respiratory: Lungs clear to auscultation	  Gastrointestinal:  Soft  Extremities: No edema                                 10.3   9.88  )-----------( 294      ( 07 Dec 2023 05:50 )             32.5     12-07    137  |  102  |  19  ----------------------------<  164<H>  3.9   |  25  |  0.73    Ca    9.4      07 Dec 2023 05:50  Phos  4.1     12-07  Mg     1.90     12-07    TPro  6.9  /  Alb  3.5  /  TBili  0.4  /  DBili  x   /  AST  24  /  ALT  30  /  AlkPhos  56  12-07    proBNP:   Lipid Profile:   HgA1c:   TSH:     Consultant(s) Notes Reviewed:  [x ] YES  [ ] NO    Care Discussed with Consultants/Other Providers [ x] YES  [ ] NO    Imaging Personally Reviewed independently:  [x] YES  [ ] NO    All labs, radiologic studies, vitals, orders and medications list reviewed. Patient is seen and examined at bedside. Case discussed with medical team.                 Chintan Owusu MD  Interventional Cardiology / Endovascular Specialist  [place] Office : [phone] [place] [address] [place] [address]  Tel:   [place] Office : [address] [facility] [place] [address]  Tel: [phone]    Subjective/Overnight events: Patient lying in bed comfortably. No acute distress. Denies chest pain, SOB or palpitations  	  MEDICATIONS:  enoxaparin Injectable 60 milliGRAM(s) SubCutaneous every 12 hours  metoprolol tartrate 25 milliGRAM(s) Oral two times a day        mirtazapine 15 milliGRAM(s) Oral at bedtime    polyethylene glycol 3350 17 Gram(s) Oral daily    atorvastatin 80 milliGRAM(s) Oral at bedtime  dextrose 50% Injectable 25 Gram(s) IV Push once  dextrose 50% Injectable 25 Gram(s) IV Push once  dextrose 50% Injectable 12.5 Gram(s) IV Push once  dextrose Oral Gel 15 Gram(s) Oral once PRN  dextrose Oral Gel 15 Gram(s) Oral once PRN  glucagon  Injectable 1 milliGRAM(s) IntraMuscular once  insulin glargine Injectable (LANTUS) 8 Unit(s) SubCutaneous every morning  insulin lispro (ADMELOG) corrective regimen sliding scale   SubCutaneous three times a day before meals  insulin lispro (ADMELOG) corrective regimen sliding scale   SubCutaneous at bedtime    dextrose 5%. 1000 milliLiter(s) IV Continuous <Continuous>  dextrose 5%. 1000 milliLiter(s) IV Continuous <Continuous>      PAST MEDICAL/SURGICAL HISTORY  PAST MEDICAL & SURGICAL HISTORY:  Chronic atrial fibrillation      CVA (cerebrovascular accident)      HTN (hypertension)      DM (diabetes mellitus), type 2      H/O hernia repair          SOCIAL HISTORY: Substance Use (street drugs): ( x ) never used  (  ) other:    FAMILY HISTORY:  No pertinent family history in first degree relatives        REVIEW OF SYSTEMS:  CONSTITUTIONAL: No fever, weight loss, or fatigue  EYES: No eye pain, visual disturbances, or discharge  ENMT:  No difficulty hearing, tinnitus, vertigo; No sinus or throat pain  BREASTS: No pain, masses, or nipple discharge  GASTROINTESTINAL: No abdominal or epigastric pain. No nausea, vomiting, or hematemesis; No diarrhea or constipation. No melena or hematochezia.  GENITOURINARY: No dysuria, frequency, hematuria, or incontinence  NEUROLOGICAL: No headaches, memory loss, loss of strength, numbness, or tremors  ENDOCRINE: No heat or cold intolerance; No hair loss  MUSCULOSKELETAL: No joint pain or swelling; No muscle, back, or extremity pain  PSYCHIATRIC: No depression, anxiety, mood swings, or difficulty sleeping  HEME/LYMPH: No easy bruising, or bleeding gums  All others negative    PHYSICAL EXAM:  T(C): 36.8 (12-07-23 @ 14:28), Max: 37 (12-06-23 @ 17:40)  HR: 78 (12-07-23 @ 14:28) (63 - 86)  BP: 132/68 (12-07-23 @ 14:28) (103/78 - 142/66)  RR: 17 (12-07-23 @ 14:28) (17 - 18)  SpO2: 99% (12-07-23 @ 14:28) (98% - 100%)  Wt(kg): --  I&O's Summary    06 Dec 2023 07:01  -  07 Dec 2023 07:00  --------------------------------------------------------  IN: 0 mL / OUT: 1600 mL / NET: -1600 mL        GENERAL: NAD  EYES: conjunctiva and sclera clear  ENMT: No tonsillar erythema, exudates, or enlargement  Cardiovascular: Normal S1 S2, No JVD, No murmurs, No edema  Respiratory: Lungs clear to auscultation	  Gastrointestinal:  Soft  Extremities: No edema                                 10.3   9.88  )-----------( 294      ( 07 Dec 2023 05:50 )             32.5     12-07    137  |  102  |  19  ----------------------------<  164<H>  3.9   |  25  |  0.73    Ca    9.4      07 Dec 2023 05:50  Phos  4.1     12-07  Mg     1.90     12-07    TPro  6.9  /  Alb  3.5  /  TBili  0.4  /  DBili  x   /  AST  24  /  ALT  30  /  AlkPhos  56  12-07    proBNP:   Lipid Profile:   HgA1c:   TSH:     Consultant(s) Notes Reviewed:  [x ] YES  [ ] NO    Care Discussed with Consultants/Other Providers [ x] YES  [ ] NO    Imaging Personally Reviewed independently:  [x] YES  [ ] NO    All labs, radiologic studies, vitals, orders and medications list reviewed. Patient is seen and examined at bedside. Case discussed with medical team.

## 2023-12-08 NOTE — DISCHARGE NOTE NURSING/CASE MANAGEMENT/SOCIAL WORK - NSDCPEPTSTRK_GEN_ALL_CORE
Brittany Gomes is calling to request a refill on the following medication(s):    Medication Request:  Requested Prescriptions     Pending Prescriptions Disp Refills    Semaglutide, 2 MG/DOSE, (OZEMPIC, 2 MG/DOSE,) 8 MG/3ML SOPN 3 mL 2     Sig: Inject 2 mg into the skin once a week       Last Visit Date (If Applicable):  10/13/3368    Next Visit Date:    1/24/2024
Call 911 for stroke/Need for follow up after discharge/Prescribed medications/Risk factors for stroke/Stroke education booklet/Stroke support groups for patients, families, and friends/Stroke warning signs and symptoms/Signs and symptoms of stroke

## 2023-12-08 NOTE — DISCHARGE NOTE NURSING/CASE MANAGEMENT/SOCIAL WORK - NSDCCRTYPESERV_GEN_ALL_CORE_FT
Your [facility] care manager is working on arranging PCA services start of care  Your [facility] care manager is working on arranging PCA services start of care

## 2023-12-08 NOTE — PROGRESS NOTE ADULT - NSPROGADDITIONALINFOA_GEN_ALL_CORE
I reviewed the overnight course of events on the unit, re-confirming the patient history. I discussed the care with the patient and their family. The plan of care was discussed with the ACP team and modifications were made to the notation where appropriate. Differential diagnosis and plan of care discussed with patient after the evaluation. Advanced care planning was discussed with patient and family.  Advanced care planning forms were reviewed and discussed.  Risks, benefits and alternatives of cardiac procedures were discussed in detail and all questions were answered. 35 minutes spent on total encounter of which more than fifty percent of the encounter was spent counseling and/or coordinating care by the attending physician.
dc planning discussed with daughter in law at bedside 12/8
daughter in law updated at bedside 12/5, MRI and BUBBA results discussed

## 2023-12-08 NOTE — PROGRESS NOTE ADULT - REASON FOR ADMISSION
R/o CVA

## 2023-12-08 NOTE — DISCHARGE NOTE NURSING/CASE MANAGEMENT/SOCIAL WORK - NSDCCRNAME_GEN_ALL_CORE_FT
[facility]

## 2023-12-08 NOTE — DISCHARGE NOTE NURSING/CASE MANAGEMENT/SOCIAL WORK - NSDCPEFALRISK_GEN_ALL_CORE
For information on Fall & Injury Prevention, visit: [email or web address] OR  [email or web address] OR  https://www.cdc.gov/steadi/patient.html For information on Fall & Injury Prevention, visit: [email or web address] OR  [email or web address]-[email or web address] OR  https://www.cdc.gov/steadi/patient.html

## 2023-12-08 NOTE — DISCHARGE NOTE NURSING/CASE MANAGEMENT/SOCIAL WORK - NSSCTYPOFSERV_GEN_ALL_CORE
Visiting Nurse anticipated to visit the day after hospital discharge; the above agency will contact you to arrange time of visit.

## 2023-12-08 NOTE — PROGRESS NOTE ADULT - PROBLEM SELECTOR PLAN 10
DVT ppx- lovenox  Dispo: OT recommending CELSO which family declined- dc home today DVT ppx- lovenox  Dispo: OT recommending ECLSO which family declined- dc home today

## 2023-12-08 NOTE — PROGRESS NOTE ADULT - PROBLEM SELECTOR PLAN 3
Severe mitral valve stenosis per BUBBA 12/4  - per cards given active thrombus patient currently not candidate for intervention, discharge on lovenox and will need outpatient follow up with CT surg

## 2023-12-08 NOTE — PROGRESS NOTE ADULT - PROVIDER SPECIALTY LIST ADULT
Cardiology
Cardiology
Internal Medicine
Cardiology
Internal Medicine
Cardiology
Cardiology
Neurology
Cardiology
Cardiology
Internal Medicine
Hospitalist
Internal Medicine

## 2023-12-08 NOTE — DISCHARGE NOTE NURSING/CASE MANAGEMENT/SOCIAL WORK - NSDCFUADDAPPT_GEN_ALL_CORE_FT
Pulmonary telehealth with dr. lara  repeat chest imaging in 4 weeks to ensure resolution (RUL lung micronodules)

## 2023-12-08 NOTE — PROGRESS NOTE ADULT - PROBLEM SELECTOR PLAN 9
DVT ppx- lovenox  Dispo: OT recommending CELSO which family refuses, f/u SW
WBC 11.2  patient is otherwise hemodynamically stable, afebrile  Monitor CBC
DVT ppx- lovenox  Dispo: OT recommending CELSO which family refuses, family asking for assistance with HHA (which they had previously); contacted SW/CM
DVT ppx- lovenox  Dispo: OT recommending CELSO which family refuses, family asking for assistance with HHA (which they had previously); notified CM
WBC 11.2  patient is otherwise hemodynamically stable, afebrile  Monitor CBC
DVT ppx- lovenox  Dispo: OT recommending CELSO which family refuses, family asking for assistance with HHA (which they had previously); contacted SW/CM today
DVT ppx- lovenox  Dispo: OT recommending CELSO which family refuses, family asking for assistance with HHA (which they had previously); contacted SW/CM
DVT ppx- lovenox  Dispo: OT recommending CELSO which family refuses, f/u SW
DVT ppx- lovenox  Dispo: OT recommending CELSO which family refuses, family asking for assistance with HHA (which they had previously); contacted SW/CM
DVT ppx- lovenox
DVT ppx- SCD    Dispo- Stroke w/u.   d/w pt and son at bedside.   case d/w ACP

## 2023-12-08 NOTE — DISCHARGE NOTE NURSING/CASE MANAGEMENT/SOCIAL WORK - NSDCPNINST_GEN_ALL_CORE
Notify Dr Velazquez if you experience any further episodes of signs or symptoms of a stroke.  Take your medication as prescribed.  Continue to follow consistent carbohydrate diet and follow up with your PMD for continued management of your diabetes.

## 2023-12-08 NOTE — PROGRESS NOTE ADULT - PROBLEM SELECTOR PROBLEM 9
Need for prophylactic measure
Need for prophylactic measure
Leukocytosis
Need for prophylactic measure
Leukocytosis
Need for prophylactic measure
Leukocytosis
Leukocytosis
Need for prophylactic measure

## 2023-12-08 NOTE — PROGRESS NOTE ADULT - PROBLEM SELECTOR PROBLEM 1
CVA (cerebrovascular accident)

## 2023-12-08 NOTE — PROGRESS NOTE ADULT - SUBJECTIVE AND OBJECTIVE BOX
[facility]  Italia Fong MD  Available via MS Teams  Pager: [phone]    SUBJECTIVE / OVERNIGHT EVENTS:  no events. pt denies any new complaints    MEDICATIONS  (STANDING):  atorvastatin 80 milliGRAM(s) Oral at bedtime  dextrose 5%. 1000 milliLiter(s) (100 mL/Hr) IV Continuous <Continuous>  dextrose 5%. 1000 milliLiter(s) (50 mL/Hr) IV Continuous <Continuous>  dextrose 50% Injectable 25 Gram(s) IV Push once  dextrose 50% Injectable 25 Gram(s) IV Push once  dextrose 50% Injectable 12.5 Gram(s) IV Push once  enoxaparin Injectable 60 milliGRAM(s) SubCutaneous every 12 hours  glucagon  Injectable 1 milliGRAM(s) IntraMuscular once  insulin glargine Injectable (LANTUS) 8 Unit(s) SubCutaneous every morning  insulin lispro (ADMELOG) corrective regimen sliding scale   SubCutaneous three times a day before meals  insulin lispro (ADMELOG) corrective regimen sliding scale   SubCutaneous at bedtime  metoprolol tartrate 25 milliGRAM(s) Oral two times a day  mirtazapine 15 milliGRAM(s) Oral at bedtime  polyethylene glycol 3350 17 Gram(s) Oral daily    MEDICATIONS  (PRN):  dextrose Oral Gel 15 Gram(s) Oral once PRN Blood Glucose LESS THAN 70 milliGRAM(s)/deciliter  dextrose Oral Gel 15 Gram(s) Oral once PRN Blood Glucose LESS THAN 70 milliGRAM(s)/deciliter      I&O's Summary    07 Dec 2023 07:01  -  08 Dec 2023 07:00  --------------------------------------------------------  IN: 0 mL / OUT: 1620 mL / NET: -1620 mL    08 Dec 2023 07:01  -  08 Dec 2023 15:29  --------------------------------------------------------  IN: 0 mL / OUT: 301 mL / NET: -301 mL        PHYSICAL EXAM:  Vital Signs Last 24 Hrs  T(C): 36.6 (08 Dec 2023 13:50), Max: 37.1 (08 Dec 2023 02:00)  T(F): 97.9 (08 Dec 2023 13:50), Max: 98.8 (08 Dec 2023 02:00)  HR: 83 (08 Dec 2023 13:50) (70 - 83)  BP: 113/66 (08 Dec 2023 13:50) (107/61 - 135/64)  BP(mean): --  RR: 18 (08 Dec 2023 13:50) (16 - 18)  SpO2: 99% (08 Dec 2023 13:50) (98% - 100%)    Parameters below as of 08 Dec 2023 13:50  Patient On (Oxygen Delivery Method): room air      CONSTITUTIONAL: NAD  EYES: PERRLA; conjunctiva and sclera clear  ENMT: Moist oral mucosa, no pharyngeal injection or exudates  NECK: Supple, no palpable masses  RESPIRATORY: Normal respiratory effort; lungs are clear to auscultation bilaterally  CARDIOVASCULAR: Regular rate and rhythm, normal S1 and S2, no murmur/rub/gallop; No lower extremity edema; Peripheral pulses are 2+ bilaterally  ABDOMEN: Nontender to palpation, normoactive bowel sounds, no rebound/guarding  MUSCULOSKELETAL:  no clubbing or cyanosis of digits; no joint swelling or tenderness to palpation, residual right sided weakness  PSYCH: A+O to person, place, and time; affect appropriate  NEUROLOGY: CN 2-12 are intact and symmetric; no gross sensory deficits   SKIN: No rashes; no palpable lesions    LABS:                        10.6   10.46 )-----------( 297      ( 08 Dec 2023 05:35 )             33.3     12-08    137  |  101  |  18  ----------------------------<  218<H>  4.1   |  27  |  0.83    Ca    9.5      08 Dec 2023 05:35  Phos  4.1     12-08  Mg     1.80     12-08    TPro  6.8  /  Alb  3.5  /  TBili  0.4  /  DBili  x   /  AST  29  /  ALT  30  /  AlkPhos  58  12-08          Urinalysis Basic - ( 08 Dec 2023 05:35 )    Color: x / Appearance: x / SG: x / pH: x  Gluc: 218 mg/dL / Ketone: x  / Bili: x / Urobili: x   Blood: x / Protein: x / Nitrite: x   Leuk Esterase: x / RBC: x / WBC x   Sq Epi: x / Non Sq Epi: x / Bacteria: x               [facility]  Italia Fong MD  Available via MS Teams  Pager: [phone]    SUBJECTIVE / OVERNIGHT EVENTS:  no events. pt denies any new complaints    MEDICATIONS  (STANDING):  atorvastatin 80 milliGRAM(s) Oral at bedtime  dextrose 5%. 1000 milliLiter(s) (100 mL/Hr) IV Continuous <Continuous>  dextrose 5%. 1000 milliLiter(s) (50 mL/Hr) IV Continuous <Continuous>  dextrose 50% Injectable 25 Gram(s) IV Push once  dextrose 50% Injectable 25 Gram(s) IV Push once  dextrose 50% Injectable 12.5 Gram(s) IV Push once  enoxaparin Injectable 60 milliGRAM(s) SubCutaneous every 12 hours  glucagon  Injectable 1 milliGRAM(s) IntraMuscular once  insulin glargine Injectable (LANTUS) 8 Unit(s) SubCutaneous every morning  insulin lispro (ADMELOG) corrective regimen sliding scale   SubCutaneous three times a day before meals  insulin lispro (ADMELOG) corrective regimen sliding scale   SubCutaneous at bedtime  metoprolol tartrate 25 milliGRAM(s) Oral two times a day  mirtazapine 15 milliGRAM(s) Oral at bedtime  polyethylene glycol 3350 17 Gram(s) Oral daily    MEDICATIONS  (PRN):  dextrose Oral Gel 15 Gram(s) Oral once PRN Blood Glucose LESS THAN 70 milliGRAM(s)/deciliter  dextrose Oral Gel 15 Gram(s) Oral once PRN Blood Glucose LESS THAN 70 milliGRAM(s)/deciliter      I&O's Summary    07 Dec 2023 07:01  -  08 Dec 2023 07:00  --------------------------------------------------------  IN: 0 mL / OUT: 1620 mL / NET: -1620 mL    08 Dec 2023 07:01  -  08 Dec 2023 15:29  --------------------------------------------------------  IN: 0 mL / OUT: 301 mL / NET: -301 mL        PHYSICAL EXAM:  Vital Signs Last 24 Hrs  T(C): 36.6 (08 Dec 2023 13:50), Max: 37.1 (08 Dec 2023 02:00)  T(F): 97.9 (08 Dec 2023 13:50), Max: 98.8 (08 Dec 2023 02:00)  HR: 83 (08 Dec 2023 13:50) (70 - 83)  BP: 113/66 (08 Dec 2023 13:50) (107/61 - 135/64)  BP(mean): --  RR: 18 (08 Dec 2023 13:50) (16 - 18)  SpO2: 99% (08 Dec 2023 13:50) (98% - 100%)    Parameters below as of 08 Dec 2023 13:50  Patient On (Oxygen Delivery Method): room air      CONSTITUTIONAL: NAD  EYES: PERRLA; conjunctiva and sclera clear  ENMT: Moist oral mucosa, no pharyngeal injection or exudates  NECK: Supple, no palpable masses  RESPIRATORY: Normal respiratory effort; lungs are clear to auscultation bilaterally  CARDIOVASCULAR: Regular rate and rhythm, normal S1 and S2, no murmur/rub/gallop; No lower extremity edema; Peripheral pulses are 2+ bilaterally  ABDOMEN: Nontender to palpation, normoactive bowel sounds, no rebound/guarding  MUSCULOSKELETAL:  no clubbing or cyanosis of digits; no joint swelling or tenderness to palpation, residual right sided weakness  PSYCH: A+O to person, place, and time; affect appropriate  NEUROLOGY: CN 2-12 are intact and symmetric; no gross sensory deficits   SKIN: No rashes; no palpable lesions    LABS:                        10.6   10.46 )-----------( 297      ( 08 Dec 2023 05:35 )             33.3     12-08    137  |  101  |  18  ----------------------------<  218<H>  4.1   |  27  |  0.83    Ca    9.5      08 Dec 2023 05:35  Phos  4.1     12-08  Mg     1.80     12-08    TPro  6.8  /  Alb  3.5  /  TBili  0.4  /  DBili  x   /  AST  29  /  ALT  30  /  AlkPhos  58  12-08          Urinalysis Basic - ( 08 Dec 2023 05:35 )    Color: x / Appearance: x / SG: x / pH: x  Gluc: 218 mg/dL / Ketone: x  / Bili: x / Urobili: x   Blood: x / Protein: x / Nitrite: x   Leuk Esterase: x / RBC: x / WBC x   Sq Epi: x / Non Sq Epi: x / Bacteria: x

## 2023-12-11 PROBLEM — Z00.00 ENCOUNTER FOR PREVENTIVE HEALTH EXAMINATION: Status: ACTIVE | Noted: 2023-01-01

## 2023-12-13 PROBLEM — I63.319: Status: ACTIVE | Noted: 2019-09-20

## 2023-12-13 PROBLEM — E11.9 TYPE 2 DIABETES MELLITUS WITHOUT COMPLICATIONS: Chronic | Status: ACTIVE | Noted: 2023-01-01

## 2023-12-13 PROBLEM — R59.1 LYMPHADENOPATHY: Status: ACTIVE | Noted: 2023-01-01

## 2023-12-13 PROBLEM — R91.8 MULTIPLE LUNG NODULES ON CT: Status: ACTIVE | Noted: 2023-01-01

## 2023-12-13 PROBLEM — I63.9 CEREBRAL INFARCTION, UNSPECIFIED: Chronic | Status: ACTIVE | Noted: 2023-01-01

## 2023-12-13 PROBLEM — J98.11 ATELECTASIS, RIGHT: Status: ACTIVE | Noted: 2023-01-01

## 2023-12-13 PROBLEM — I10 ESSENTIAL (PRIMARY) HYPERTENSION: Chronic | Status: ACTIVE | Noted: 2023-01-01

## 2023-12-13 PROBLEM — I48.20 CHRONIC ATRIAL FIBRILLATION, UNSPECIFIED: Chronic | Status: ACTIVE | Noted: 2023-01-01

## 2023-12-13 NOTE — HISTORY OF PRESENT ILLNESS
[Other: _____] : [unfilled] [Yes] : Yes [Admitted on: ___] : The patient was admitted on [unfilled] [Discharged on ___] : discharged on [unfilled] [Discharge Summary] : discharge summary [Pertinent Labs] : pertinent labs [Radiology Findings] : radiology findings [Discharge Med List] : discharge medication list [Med Reconciliation] : medication reconciliation has been completed [Other: ____] : [unfilled] [Patient Contacted By: ____] : and contacted by [unfilled] [FreeTextEntry2] : 67 yo F w/ PMH of Afib Eliquis, CVA 2019 w/ residual R sided weakness, HTN, DM, rheumatic mitral stenosis brought in with an acute stroke. CTH w/ occlusion of R PCA, acute/subacute infarct or R occipital and R cerebellar lobe. Not a tpa candidate. Imaging further significant for KRISTAN thrombus despite Eliquis for Afib, switched to lovenox. DUring hospitalization she had a CT chest which showed RML atelectasis, scattered micronodules up to 8mm, cavitary micronodule in RUL. Quantiferon and AFB sputum negative. Also with mediastinal lymphadenopathy up to 1 cm in R paratracheal region.   Since dc pt has not had any  shortness of breath ,no cough no fevers. Patient and entire family had viral infection - 3-4 weeks ago. Negative for covid and flu at the time.   no history of lung disease, homemaker. From [place], no known toxic exposures. No hemoptysis, no fevers. At baseline dependent of most ADLS, IADLs, only ambulates around the home w/ few steps, since this hospitalization mainly bedbound.

## 2023-12-13 NOTE — ASSESSMENT
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 CTH w/ occlusion of R PCA, acute/subacute infarct or R occipital and R cerebellar lobe. Not a tpa candidate. Imaging further significant for KRISTAN thrombus despite Eliquis for Afib, switched to lovenox. DUring hospitalization she had a CT chest which showed RML atelectasis, scattered micronodules up to 8mm, cavitary micronodule in RUL. Quantiferon and AFB sputum negative. Also with mediastinal lymphadenopathy up to 1 cm in R paratracheal region.   Pt is asymptomatic. CT chest findings may be due to mucous plugging and from infectious etiology, though malignancy is on differential given unexplained atelectasis.   - repeat CT chest in 8 weeks  - fu in person  - PFTs    I spent 50 minutes during this encounter. Total time excludes separate billing services.

## 2024-01-01 ENCOUNTER — TRANSCRIPTION ENCOUNTER (OUTPATIENT)
Age: 69
End: 2024-01-01

## 2024-01-01 ENCOUNTER — INPATIENT (INPATIENT)
Facility: HOSPITAL | Age: 69
LOS: 5 days | Discharge: HOSPICE | DRG: 64 | End: 2024-03-16
Attending: PSYCHIATRY & NEUROLOGY | Admitting: PSYCHIATRY & NEUROLOGY
Payer: MEDICARE

## 2024-01-01 ENCOUNTER — APPOINTMENT (OUTPATIENT)
Dept: PULMONOLOGY | Facility: CLINIC | Age: 69
End: 2024-01-01

## 2024-01-01 ENCOUNTER — RESULT REVIEW (OUTPATIENT)
Age: 69
End: 2024-01-01

## 2024-01-01 ENCOUNTER — NON-APPOINTMENT (OUTPATIENT)
Age: 69
End: 2024-01-01

## 2024-01-01 VITALS
HEART RATE: 65 BPM | DIASTOLIC BLOOD PRESSURE: 60 MMHG | RESPIRATION RATE: 19 BRPM | SYSTOLIC BLOOD PRESSURE: 124 MMHG | OXYGEN SATURATION: 99 %

## 2024-01-01 VITALS — DIASTOLIC BLOOD PRESSURE: 81 MMHG | HEART RATE: 76 BPM | SYSTOLIC BLOOD PRESSURE: 119 MMHG | TEMPERATURE: 98 F

## 2024-01-01 DIAGNOSIS — I48.21 PERMANENT ATRIAL FIBRILLATION: ICD-10-CM

## 2024-01-01 DIAGNOSIS — N17.9 ACUTE KIDNEY FAILURE, UNSPECIFIED: ICD-10-CM

## 2024-01-01 DIAGNOSIS — Z98.89 OTHER SPECIFIED POSTPROCEDURAL STATES: Chronic | ICD-10-CM

## 2024-01-01 DIAGNOSIS — E78.5 HYPERLIPIDEMIA, UNSPECIFIED: ICD-10-CM

## 2024-01-01 DIAGNOSIS — E11.9 TYPE 2 DIABETES MELLITUS WITHOUT COMPLICATIONS: ICD-10-CM

## 2024-01-01 DIAGNOSIS — Z71.89 OTHER SPECIFIED COUNSELING: ICD-10-CM

## 2024-01-01 DIAGNOSIS — G93.40 ENCEPHALOPATHY, UNSPECIFIED: ICD-10-CM

## 2024-01-01 DIAGNOSIS — R53.2 FUNCTIONAL QUADRIPLEGIA: ICD-10-CM

## 2024-01-01 DIAGNOSIS — N28.0 ISCHEMIA AND INFARCTION OF KIDNEY: ICD-10-CM

## 2024-01-01 DIAGNOSIS — I74.9 EMBOLISM AND THROMBOSIS OF UNSPECIFIED ARTERY: ICD-10-CM

## 2024-01-01 DIAGNOSIS — I61.9 NONTRAUMATIC INTRACEREBRAL HEMORRHAGE, UNSPECIFIED: ICD-10-CM

## 2024-01-01 DIAGNOSIS — Z98.890 OTHER SPECIFIED POSTPROCEDURAL STATES: Chronic | ICD-10-CM

## 2024-01-01 DIAGNOSIS — K43.9 VENTRAL HERNIA WITHOUT OBSTRUCTION OR GANGRENE: Chronic | ICD-10-CM

## 2024-01-01 DIAGNOSIS — Z29.9 ENCOUNTER FOR PROPHYLACTIC MEASURES, UNSPECIFIED: ICD-10-CM

## 2024-01-01 DIAGNOSIS — I10 ESSENTIAL (PRIMARY) HYPERTENSION: ICD-10-CM

## 2024-01-01 DIAGNOSIS — Z98.49 CATARACT EXTRACTION STATUS, UNSPECIFIED EYE: Chronic | ICD-10-CM

## 2024-01-01 LAB
-  AMOXICILLIN/CLAVULANIC ACID: SIGNIFICANT CHANGE UP
-  AMPICILLIN/SULBACTAM: SIGNIFICANT CHANGE UP
-  AMPICILLIN: SIGNIFICANT CHANGE UP
-  AZTREONAM: SIGNIFICANT CHANGE UP
-  CEFAZOLIN: SIGNIFICANT CHANGE UP
-  CEFEPIME: SIGNIFICANT CHANGE UP
-  CEFOXITIN: SIGNIFICANT CHANGE UP
-  CEFTRIAXONE: SIGNIFICANT CHANGE UP
-  CEFUROXIME: SIGNIFICANT CHANGE UP
-  CIPROFLOXACIN: SIGNIFICANT CHANGE UP
-  ERTAPENEM: SIGNIFICANT CHANGE UP
-  GENTAMICIN: SIGNIFICANT CHANGE UP
-  IMIPENEM: SIGNIFICANT CHANGE UP
-  LEVOFLOXACIN: SIGNIFICANT CHANGE UP
-  MEROPENEM: SIGNIFICANT CHANGE UP
-  NITROFURANTOIN: SIGNIFICANT CHANGE UP
-  PIPERACILLIN/TAZOBACTAM: SIGNIFICANT CHANGE UP
-  TETRACYCLINE: SIGNIFICANT CHANGE UP
-  TETRACYCLINE: SIGNIFICANT CHANGE UP
-  TOBRAMYCIN: SIGNIFICANT CHANGE UP
-  TRIMETHOPRIM/SULFAMETHOXAZOLE: SIGNIFICANT CHANGE UP
-  VANCOMYCIN: SIGNIFICANT CHANGE UP
-  VANCOMYCIN: SIGNIFICANT CHANGE UP
A1C WITH ESTIMATED AVERAGE GLUCOSE RESULT: 7 % — HIGH (ref 4–5.6)
ALBUMIN SERPL ELPH-MCNC: 4.3 G/DL — SIGNIFICANT CHANGE UP (ref 3.3–5)
ALP SERPL-CCNC: 51 U/L — SIGNIFICANT CHANGE UP (ref 40–120)
ALT FLD-CCNC: 13 U/L — SIGNIFICANT CHANGE UP (ref 10–45)
ANION GAP SERPL CALC-SCNC: 13 MMOL/L — SIGNIFICANT CHANGE UP (ref 5–17)
ANION GAP SERPL CALC-SCNC: 14 MMOL/L — SIGNIFICANT CHANGE UP (ref 5–17)
ANION GAP SERPL CALC-SCNC: 15 MMOL/L — SIGNIFICANT CHANGE UP (ref 5–17)
ANION GAP SERPL CALC-SCNC: 16 MMOL/L — SIGNIFICANT CHANGE UP (ref 5–17)
APPEARANCE UR: ABNORMAL
APPEARANCE UR: CLEAR — SIGNIFICANT CHANGE UP
APPEARANCE UR: CLEAR — SIGNIFICANT CHANGE UP
APTT BLD: 30 SEC — SIGNIFICANT CHANGE UP (ref 24.5–35.6)
APTT BLD: 39.9 SEC — HIGH (ref 24.5–35.6)
APTT BLD: 53.7 SEC — HIGH (ref 24.5–35.6)
APTT BLD: 77.6 SEC — HIGH (ref 24.5–35.6)
APTT BLD: 79.5 SEC — HIGH (ref 24.5–35.6)
APTT BLD: 88.2 SEC — HIGH (ref 24.5–35.6)
APTT BLD: 96.8 SEC — HIGH (ref 24.5–35.6)
AST SERPL-CCNC: 16 U/L — SIGNIFICANT CHANGE UP (ref 10–40)
B2 GLYCOPROT1 AB SER QL: NEGATIVE — SIGNIFICANT CHANGE UP
BACTERIA # UR AUTO: ABNORMAL /HPF
BASOPHILS # BLD AUTO: 0.03 K/UL — SIGNIFICANT CHANGE UP (ref 0–0.2)
BASOPHILS # BLD AUTO: 0.04 K/UL — SIGNIFICANT CHANGE UP (ref 0–0.2)
BASOPHILS # BLD AUTO: 0.04 K/UL — SIGNIFICANT CHANGE UP (ref 0–0.2)
BASOPHILS NFR BLD AUTO: 0.3 % — SIGNIFICANT CHANGE UP (ref 0–2)
BILIRUB SERPL-MCNC: 1.1 MG/DL — SIGNIFICANT CHANGE UP (ref 0.2–1.2)
BILIRUB UR-MCNC: NEGATIVE — SIGNIFICANT CHANGE UP
BUN SERPL-MCNC: 13 MG/DL — SIGNIFICANT CHANGE UP (ref 7–23)
BUN SERPL-MCNC: 14 MG/DL — SIGNIFICANT CHANGE UP (ref 7–23)
BUN SERPL-MCNC: 17 MG/DL — SIGNIFICANT CHANGE UP (ref 7–23)
BUN SERPL-MCNC: 30 MG/DL — HIGH (ref 7–23)
BUN SERPL-MCNC: 32 MG/DL — HIGH (ref 7–23)
BUN SERPL-MCNC: 33 MG/DL — HIGH (ref 7–23)
BUN SERPL-MCNC: 35 MG/DL — HIGH (ref 7–23)
BUN SERPL-MCNC: 36 MG/DL — HIGH (ref 7–23)
BUN SERPL-MCNC: 36 MG/DL — HIGH (ref 7–23)
BUN SERPL-MCNC: 37 MG/DL — HIGH (ref 7–23)
BUN SERPL-MCNC: 38 MG/DL — HIGH (ref 7–23)
CALCIUM SERPL-MCNC: 10.1 MG/DL — SIGNIFICANT CHANGE UP (ref 8.4–10.5)
CALCIUM SERPL-MCNC: 10.4 MG/DL — SIGNIFICANT CHANGE UP (ref 8.4–10.5)
CALCIUM SERPL-MCNC: 8.6 MG/DL — SIGNIFICANT CHANGE UP (ref 8.4–10.5)
CALCIUM SERPL-MCNC: 8.8 MG/DL — SIGNIFICANT CHANGE UP (ref 8.4–10.5)
CALCIUM SERPL-MCNC: 9 MG/DL — SIGNIFICANT CHANGE UP (ref 8.4–10.5)
CALCIUM SERPL-MCNC: 9 MG/DL — SIGNIFICANT CHANGE UP (ref 8.4–10.5)
CALCIUM SERPL-MCNC: 9.3 MG/DL — SIGNIFICANT CHANGE UP (ref 8.4–10.5)
CALCIUM SERPL-MCNC: 9.4 MG/DL — SIGNIFICANT CHANGE UP (ref 8.4–10.5)
CALCIUM SERPL-MCNC: 9.5 MG/DL — SIGNIFICANT CHANGE UP (ref 8.4–10.5)
CALCIUM SERPL-MCNC: 9.6 MG/DL — SIGNIFICANT CHANGE UP (ref 8.4–10.5)
CALCIUM SERPL-MCNC: 9.6 MG/DL — SIGNIFICANT CHANGE UP (ref 8.4–10.5)
CARDIOLIPIN AB SER-ACNC: NEGATIVE — SIGNIFICANT CHANGE UP
CAST: 0 /LPF — SIGNIFICANT CHANGE UP (ref 0–4)
CAST: 1 /LPF — SIGNIFICANT CHANGE UP (ref 0–4)
CAST: 24 /LPF — HIGH (ref 0–4)
CHLORIDE SERPL-SCNC: 100 MMOL/L — SIGNIFICANT CHANGE UP (ref 96–108)
CHLORIDE SERPL-SCNC: 101 MMOL/L — SIGNIFICANT CHANGE UP (ref 96–108)
CHLORIDE SERPL-SCNC: 105 MMOL/L — SIGNIFICANT CHANGE UP (ref 96–108)
CHLORIDE SERPL-SCNC: 107 MMOL/L — SIGNIFICANT CHANGE UP (ref 96–108)
CHLORIDE SERPL-SCNC: 107 MMOL/L — SIGNIFICANT CHANGE UP (ref 96–108)
CHLORIDE SERPL-SCNC: 96 MMOL/L — SIGNIFICANT CHANGE UP (ref 96–108)
CHLORIDE SERPL-SCNC: 98 MMOL/L — SIGNIFICANT CHANGE UP (ref 96–108)
CHLORIDE SERPL-SCNC: 98 MMOL/L — SIGNIFICANT CHANGE UP (ref 96–108)
CHLORIDE SERPL-SCNC: 99 MMOL/L — SIGNIFICANT CHANGE UP (ref 96–108)
CHOLEST SERPL-MCNC: 101 MG/DL — SIGNIFICANT CHANGE UP
CO2 SERPL-SCNC: 16 MMOL/L — LOW (ref 22–31)
CO2 SERPL-SCNC: 16 MMOL/L — LOW (ref 22–31)
CO2 SERPL-SCNC: 17 MMOL/L — LOW (ref 22–31)
CO2 SERPL-SCNC: 17 MMOL/L — LOW (ref 22–31)
CO2 SERPL-SCNC: 18 MMOL/L — LOW (ref 22–31)
CO2 SERPL-SCNC: 19 MMOL/L — LOW (ref 22–31)
CO2 SERPL-SCNC: 19 MMOL/L — LOW (ref 22–31)
CO2 SERPL-SCNC: 20 MMOL/L — LOW (ref 22–31)
CO2 SERPL-SCNC: 23 MMOL/L — SIGNIFICANT CHANGE UP (ref 22–31)
COLOR SPEC: YELLOW — SIGNIFICANT CHANGE UP
CREAT ?TM UR-MCNC: 88 MG/DL — SIGNIFICANT CHANGE UP
CREAT SERPL-MCNC: 0.75 MG/DL — SIGNIFICANT CHANGE UP (ref 0.5–1.3)
CREAT SERPL-MCNC: 0.77 MG/DL — SIGNIFICANT CHANGE UP (ref 0.5–1.3)
CREAT SERPL-MCNC: 0.89 MG/DL — SIGNIFICANT CHANGE UP (ref 0.5–1.3)
CREAT SERPL-MCNC: 1.62 MG/DL — HIGH (ref 0.5–1.3)
CREAT SERPL-MCNC: 1.76 MG/DL — HIGH (ref 0.5–1.3)
CREAT SERPL-MCNC: 1.98 MG/DL — HIGH (ref 0.5–1.3)
CREAT SERPL-MCNC: 2.04 MG/DL — HIGH (ref 0.5–1.3)
CREAT SERPL-MCNC: 2.07 MG/DL — HIGH (ref 0.5–1.3)
CREAT SERPL-MCNC: 2.25 MG/DL — HIGH (ref 0.5–1.3)
CREAT SERPL-MCNC: 2.26 MG/DL — HIGH (ref 0.5–1.3)
CREAT SERPL-MCNC: 2.32 MG/DL — HIGH (ref 0.5–1.3)
CULTURE RESULTS: ABNORMAL
CULTURE RESULTS: ABNORMAL
CULTURE RESULTS: SIGNIFICANT CHANGE UP
DIFF PNL FLD: ABNORMAL
DIFF PNL FLD: NEGATIVE — SIGNIFICANT CHANGE UP
DIFF PNL FLD: NEGATIVE — SIGNIFICANT CHANGE UP
DRVVT RATIO: 0.97 RATIO — SIGNIFICANT CHANGE UP (ref 0–1.21)
DRVVT SCREEN TO CONFIRM RATIO: SIGNIFICANT CHANGE UP
EGFR: 23 ML/MIN/1.73M2 — LOW
EGFR: 26 ML/MIN/1.73M2 — LOW
EGFR: 26 ML/MIN/1.73M2 — LOW
EGFR: 27 ML/MIN/1.73M2 — LOW
EGFR: 32 ML/MIN/1.73M2 — LOW
EGFR: 35 ML/MIN/1.73M2 — LOW
EGFR: 71 ML/MIN/1.73M2 — SIGNIFICANT CHANGE UP
EGFR: 85 ML/MIN/1.73M2 — SIGNIFICANT CHANGE UP
EGFR: 88 ML/MIN/1.73M2 — SIGNIFICANT CHANGE UP
EOSINOPHIL # BLD AUTO: 0.05 K/UL — SIGNIFICANT CHANGE UP (ref 0–0.5)
EOSINOPHIL # BLD AUTO: 0.06 K/UL — SIGNIFICANT CHANGE UP (ref 0–0.5)
EOSINOPHIL # BLD AUTO: 0.46 K/UL — SIGNIFICANT CHANGE UP (ref 0–0.5)
EOSINOPHIL NFR BLD AUTO: 0.4 % — SIGNIFICANT CHANGE UP (ref 0–6)
EOSINOPHIL NFR BLD AUTO: 0.5 % — SIGNIFICANT CHANGE UP (ref 0–6)
EOSINOPHIL NFR BLD AUTO: 3.7 % — SIGNIFICANT CHANGE UP (ref 0–6)
ESTIMATED AVERAGE GLUCOSE: 154 MG/DL — HIGH (ref 68–114)
FACT X ACT/NOR PPP: 82 % — SIGNIFICANT CHANGE UP (ref 70–170)
FINE GRAN CASTS #/AREA URNS AUTO: PRESENT
FLUAV AG NPH QL: SIGNIFICANT CHANGE UP
FLUBV AG NPH QL: SIGNIFICANT CHANGE UP
GLUCOSE BLDC GLUCOMTR-MCNC: 115 MG/DL — HIGH (ref 70–99)
GLUCOSE BLDC GLUCOMTR-MCNC: 136 MG/DL — HIGH (ref 70–99)
GLUCOSE BLDC GLUCOMTR-MCNC: 139 MG/DL — HIGH (ref 70–99)
GLUCOSE BLDC GLUCOMTR-MCNC: 140 MG/DL — HIGH (ref 70–99)
GLUCOSE BLDC GLUCOMTR-MCNC: 144 MG/DL — HIGH (ref 70–99)
GLUCOSE BLDC GLUCOMTR-MCNC: 146 MG/DL — HIGH (ref 70–99)
GLUCOSE BLDC GLUCOMTR-MCNC: 146 MG/DL — HIGH (ref 70–99)
GLUCOSE BLDC GLUCOMTR-MCNC: 157 MG/DL — HIGH (ref 70–99)
GLUCOSE BLDC GLUCOMTR-MCNC: 157 MG/DL — HIGH (ref 70–99)
GLUCOSE BLDC GLUCOMTR-MCNC: 161 MG/DL — HIGH (ref 70–99)
GLUCOSE BLDC GLUCOMTR-MCNC: 163 MG/DL — HIGH (ref 70–99)
GLUCOSE BLDC GLUCOMTR-MCNC: 165 MG/DL — HIGH (ref 70–99)
GLUCOSE BLDC GLUCOMTR-MCNC: 167 MG/DL — HIGH (ref 70–99)
GLUCOSE BLDC GLUCOMTR-MCNC: 171 MG/DL — HIGH (ref 70–99)
GLUCOSE BLDC GLUCOMTR-MCNC: 185 MG/DL — HIGH (ref 70–99)
GLUCOSE BLDC GLUCOMTR-MCNC: 185 MG/DL — HIGH (ref 70–99)
GLUCOSE BLDC GLUCOMTR-MCNC: 186 MG/DL — HIGH (ref 70–99)
GLUCOSE BLDC GLUCOMTR-MCNC: 189 MG/DL — HIGH (ref 70–99)
GLUCOSE BLDC GLUCOMTR-MCNC: 190 MG/DL — HIGH (ref 70–99)
GLUCOSE BLDC GLUCOMTR-MCNC: 203 MG/DL — HIGH (ref 70–99)
GLUCOSE SERPL-MCNC: 121 MG/DL — HIGH (ref 70–99)
GLUCOSE SERPL-MCNC: 132 MG/DL — HIGH (ref 70–99)
GLUCOSE SERPL-MCNC: 137 MG/DL — HIGH (ref 70–99)
GLUCOSE SERPL-MCNC: 137 MG/DL — HIGH (ref 70–99)
GLUCOSE SERPL-MCNC: 143 MG/DL — HIGH (ref 70–99)
GLUCOSE SERPL-MCNC: 146 MG/DL — HIGH (ref 70–99)
GLUCOSE SERPL-MCNC: 147 MG/DL — HIGH (ref 70–99)
GLUCOSE SERPL-MCNC: 183 MG/DL — HIGH (ref 70–99)
GLUCOSE SERPL-MCNC: 191 MG/DL — HIGH (ref 70–99)
GLUCOSE SERPL-MCNC: 199 MG/DL — HIGH (ref 70–99)
GLUCOSE SERPL-MCNC: 227 MG/DL — HIGH (ref 70–99)
GLUCOSE UR QL: NEGATIVE MG/DL — SIGNIFICANT CHANGE UP
HBV CORE AB SER-ACNC: REACTIVE
HBV CORE IGM SER-ACNC: SIGNIFICANT CHANGE UP
HBV SURFACE AB SER-ACNC: 5.9 MIU/ML — LOW
HBV SURFACE AG SER-ACNC: SIGNIFICANT CHANGE UP
HCT VFR BLD CALC: 25.9 % — LOW (ref 34.5–45)
HCT VFR BLD CALC: 26.9 % — LOW (ref 34.5–45)
HCT VFR BLD CALC: 27.4 % — LOW (ref 34.5–45)
HCT VFR BLD CALC: 28.8 % — LOW (ref 34.5–45)
HCT VFR BLD CALC: 29.4 % — LOW (ref 34.5–45)
HCT VFR BLD CALC: 29.6 % — LOW (ref 34.5–45)
HCT VFR BLD CALC: 29.7 % — LOW (ref 34.5–45)
HCT VFR BLD CALC: 32.6 % — LOW (ref 34.5–45)
HCT VFR BLD CALC: 35.4 % — SIGNIFICANT CHANGE UP (ref 34.5–45)
HCV AB S/CO SERPL IA: 0.14 S/CO — SIGNIFICANT CHANGE UP (ref 0–0.99)
HCV AB SERPL-IMP: SIGNIFICANT CHANGE UP
HDLC SERPL-MCNC: 50 MG/DL — LOW
HGB BLD-MCNC: 10 G/DL — LOW (ref 11.5–15.5)
HGB BLD-MCNC: 11.1 G/DL — LOW (ref 11.5–15.5)
HGB BLD-MCNC: 8.3 G/DL — LOW (ref 11.5–15.5)
HGB BLD-MCNC: 8.5 G/DL — LOW (ref 11.5–15.5)
HGB BLD-MCNC: 8.9 G/DL — LOW (ref 11.5–15.5)
HGB BLD-MCNC: 8.9 G/DL — LOW (ref 11.5–15.5)
HGB BLD-MCNC: 9 G/DL — LOW (ref 11.5–15.5)
HGB BLD-MCNC: 9.2 G/DL — LOW (ref 11.5–15.5)
HGB BLD-MCNC: 9.8 G/DL — LOW (ref 11.5–15.5)
IMM GRANULOCYTES NFR BLD AUTO: 0.2 % — SIGNIFICANT CHANGE UP (ref 0–0.9)
IMM GRANULOCYTES NFR BLD AUTO: 0.5 % — SIGNIFICANT CHANGE UP (ref 0–0.9)
IMM GRANULOCYTES NFR BLD AUTO: 0.6 % — SIGNIFICANT CHANGE UP (ref 0–0.9)
INR BLD: 1.06 RATIO — SIGNIFICANT CHANGE UP (ref 0.85–1.18)
KETONES UR-MCNC: NEGATIVE MG/DL — SIGNIFICANT CHANGE UP
LACTATE SERPL-SCNC: 1.6 MMOL/L — SIGNIFICANT CHANGE UP (ref 0.5–2)
LACTATE SERPL-SCNC: 2.9 MMOL/L — HIGH (ref 0.5–2)
LACTATE SERPL-SCNC: 3.4 MMOL/L — HIGH (ref 0.5–2)
LEUKOCYTE ESTERASE UR-ACNC: ABNORMAL
LIPID PNL WITH DIRECT LDL SERPL: 33 MG/DL — SIGNIFICANT CHANGE UP
LMWH PPP CHRO-ACNC: 0.84 IU/ML — SIGNIFICANT CHANGE UP (ref 0.5–1.1)
LYMPHOCYTES # BLD AUTO: 17.4 % — SIGNIFICANT CHANGE UP (ref 13–44)
LYMPHOCYTES # BLD AUTO: 2.06 K/UL — SIGNIFICANT CHANGE UP (ref 1–3.3)
LYMPHOCYTES # BLD AUTO: 2.69 K/UL — SIGNIFICANT CHANGE UP (ref 1–3.3)
LYMPHOCYTES # BLD AUTO: 20.9 % — SIGNIFICANT CHANGE UP (ref 13–44)
LYMPHOCYTES # BLD AUTO: 33.5 % — SIGNIFICANT CHANGE UP (ref 13–44)
LYMPHOCYTES # BLD AUTO: 4.19 K/UL — HIGH (ref 1–3.3)
MAGNESIUM SERPL-MCNC: 1.6 MG/DL — SIGNIFICANT CHANGE UP (ref 1.6–2.6)
MAGNESIUM SERPL-MCNC: 1.8 MG/DL — SIGNIFICANT CHANGE UP (ref 1.6–2.6)
MAGNESIUM SERPL-MCNC: 1.8 MG/DL — SIGNIFICANT CHANGE UP (ref 1.6–2.6)
MCHC RBC-ENTMCNC: 27.4 PG — SIGNIFICANT CHANGE UP (ref 27–34)
MCHC RBC-ENTMCNC: 27.6 PG — SIGNIFICANT CHANGE UP (ref 27–34)
MCHC RBC-ENTMCNC: 27.8 PG — SIGNIFICANT CHANGE UP (ref 27–34)
MCHC RBC-ENTMCNC: 27.8 PG — SIGNIFICANT CHANGE UP (ref 27–34)
MCHC RBC-ENTMCNC: 27.9 PG — SIGNIFICANT CHANGE UP (ref 27–34)
MCHC RBC-ENTMCNC: 27.9 PG — SIGNIFICANT CHANGE UP (ref 27–34)
MCHC RBC-ENTMCNC: 28.2 PG — SIGNIFICANT CHANGE UP (ref 27–34)
MCHC RBC-ENTMCNC: 28.3 PG — SIGNIFICANT CHANGE UP (ref 27–34)
MCHC RBC-ENTMCNC: 28.6 PG — SIGNIFICANT CHANGE UP (ref 27–34)
MCHC RBC-ENTMCNC: 30.3 GM/DL — LOW (ref 32–36)
MCHC RBC-ENTMCNC: 30.7 GM/DL — LOW (ref 32–36)
MCHC RBC-ENTMCNC: 30.9 GM/DL — LOW (ref 32–36)
MCHC RBC-ENTMCNC: 31.3 GM/DL — LOW (ref 32–36)
MCHC RBC-ENTMCNC: 31.4 GM/DL — LOW (ref 32–36)
MCHC RBC-ENTMCNC: 31.6 GM/DL — LOW (ref 32–36)
MCHC RBC-ENTMCNC: 32 GM/DL — SIGNIFICANT CHANGE UP (ref 32–36)
MCHC RBC-ENTMCNC: 32.5 GM/DL — SIGNIFICANT CHANGE UP (ref 32–36)
MCHC RBC-ENTMCNC: 33.1 GM/DL — SIGNIFICANT CHANGE UP (ref 32–36)
MCV RBC AUTO: 86.3 FL — SIGNIFICANT CHANGE UP (ref 80–100)
MCV RBC AUTO: 86.9 FL — SIGNIFICANT CHANGE UP (ref 80–100)
MCV RBC AUTO: 87 FL — SIGNIFICANT CHANGE UP (ref 80–100)
MCV RBC AUTO: 88.2 FL — SIGNIFICANT CHANGE UP (ref 80–100)
MCV RBC AUTO: 88.5 FL — SIGNIFICANT CHANGE UP (ref 80–100)
MCV RBC AUTO: 89.2 FL — SIGNIFICANT CHANGE UP (ref 80–100)
MCV RBC AUTO: 90.2 FL — SIGNIFICANT CHANGE UP (ref 80–100)
MCV RBC AUTO: 90.3 FL — SIGNIFICANT CHANGE UP (ref 80–100)
MCV RBC AUTO: 90.6 FL — SIGNIFICANT CHANGE UP (ref 80–100)
METHOD TYPE: SIGNIFICANT CHANGE UP
MONOCYTES # BLD AUTO: 0.61 K/UL — SIGNIFICANT CHANGE UP (ref 0–0.9)
MONOCYTES # BLD AUTO: 1.04 K/UL — HIGH (ref 0–0.9)
MONOCYTES # BLD AUTO: 1.4 K/UL — HIGH (ref 0–0.9)
MONOCYTES NFR BLD AUTO: 10.9 % — SIGNIFICANT CHANGE UP (ref 2–14)
MONOCYTES NFR BLD AUTO: 4.9 % — SIGNIFICANT CHANGE UP (ref 2–14)
MONOCYTES NFR BLD AUTO: 8.8 % — SIGNIFICANT CHANGE UP (ref 2–14)
NEUTROPHILS # BLD AUTO: 7.17 K/UL — SIGNIFICANT CHANGE UP (ref 1.8–7.4)
NEUTROPHILS # BLD AUTO: 8.59 K/UL — HIGH (ref 1.8–7.4)
NEUTROPHILS # BLD AUTO: 8.64 K/UL — HIGH (ref 1.8–7.4)
NEUTROPHILS NFR BLD AUTO: 57.4 % — SIGNIFICANT CHANGE UP (ref 43–77)
NEUTROPHILS NFR BLD AUTO: 66.9 % — SIGNIFICANT CHANGE UP (ref 43–77)
NEUTROPHILS NFR BLD AUTO: 72.5 % — SIGNIFICANT CHANGE UP (ref 43–77)
NIGHT BLUE STAIN TISS: ABNORMAL
NIGHT BLUE STAIN TISS: ABNORMAL
NIGHT BLUE STAIN TISS: SIGNIFICANT CHANGE UP
NITRITE UR-MCNC: NEGATIVE — SIGNIFICANT CHANGE UP
NON HDL CHOLESTEROL: 51 MG/DL — SIGNIFICANT CHANGE UP
NRBC # BLD: 0 /100 WBCS — SIGNIFICANT CHANGE UP (ref 0–0)
ORGANISM # SPEC MICROSCOPIC CNT: ABNORMAL
PH UR: 5.5 — SIGNIFICANT CHANGE UP (ref 5–8)
PH UR: 5.5 — SIGNIFICANT CHANGE UP (ref 5–8)
PH UR: 6.5 — SIGNIFICANT CHANGE UP (ref 5–8)
PHOSPHATE SERPL-MCNC: 4.1 MG/DL — SIGNIFICANT CHANGE UP (ref 2.5–4.5)
PHOSPHATE SERPL-MCNC: 4.6 MG/DL — HIGH (ref 2.5–4.5)
PHOSPHATE SERPL-MCNC: 5.3 MG/DL — HIGH (ref 2.5–4.5)
PLATELET # BLD AUTO: 157 K/UL — SIGNIFICANT CHANGE UP (ref 150–400)
PLATELET # BLD AUTO: 161 K/UL — SIGNIFICANT CHANGE UP (ref 150–400)
PLATELET # BLD AUTO: 171 K/UL — SIGNIFICANT CHANGE UP (ref 150–400)
PLATELET # BLD AUTO: 182 K/UL — SIGNIFICANT CHANGE UP (ref 150–400)
PLATELET # BLD AUTO: 184 K/UL — SIGNIFICANT CHANGE UP (ref 150–400)
PLATELET # BLD AUTO: 190 K/UL — SIGNIFICANT CHANGE UP (ref 150–400)
PLATELET # BLD AUTO: 204 K/UL — SIGNIFICANT CHANGE UP (ref 150–400)
PLATELET # BLD AUTO: 219 K/UL — SIGNIFICANT CHANGE UP (ref 150–400)
PLATELET # BLD AUTO: 234 K/UL — SIGNIFICANT CHANGE UP (ref 150–400)
POTASSIUM SERPL-MCNC: 4 MMOL/L — SIGNIFICANT CHANGE UP (ref 3.5–5.3)
POTASSIUM SERPL-MCNC: 4.1 MMOL/L — SIGNIFICANT CHANGE UP (ref 3.5–5.3)
POTASSIUM SERPL-MCNC: 4.2 MMOL/L — SIGNIFICANT CHANGE UP (ref 3.5–5.3)
POTASSIUM SERPL-MCNC: 4.2 MMOL/L — SIGNIFICANT CHANGE UP (ref 3.5–5.3)
POTASSIUM SERPL-MCNC: 4.4 MMOL/L — SIGNIFICANT CHANGE UP (ref 3.5–5.3)
POTASSIUM SERPL-MCNC: 4.5 MMOL/L — SIGNIFICANT CHANGE UP (ref 3.5–5.3)
POTASSIUM SERPL-MCNC: 4.6 MMOL/L — SIGNIFICANT CHANGE UP (ref 3.5–5.3)
POTASSIUM SERPL-MCNC: 4.6 MMOL/L — SIGNIFICANT CHANGE UP (ref 3.5–5.3)
POTASSIUM SERPL-MCNC: 5 MMOL/L — SIGNIFICANT CHANGE UP (ref 3.5–5.3)
POTASSIUM SERPL-SCNC: 4 MMOL/L — SIGNIFICANT CHANGE UP (ref 3.5–5.3)
POTASSIUM SERPL-SCNC: 4.1 MMOL/L — SIGNIFICANT CHANGE UP (ref 3.5–5.3)
POTASSIUM SERPL-SCNC: 4.2 MMOL/L — SIGNIFICANT CHANGE UP (ref 3.5–5.3)
POTASSIUM SERPL-SCNC: 4.2 MMOL/L — SIGNIFICANT CHANGE UP (ref 3.5–5.3)
POTASSIUM SERPL-SCNC: 4.4 MMOL/L — SIGNIFICANT CHANGE UP (ref 3.5–5.3)
POTASSIUM SERPL-SCNC: 4.5 MMOL/L — SIGNIFICANT CHANGE UP (ref 3.5–5.3)
POTASSIUM SERPL-SCNC: 4.6 MMOL/L — SIGNIFICANT CHANGE UP (ref 3.5–5.3)
POTASSIUM SERPL-SCNC: 4.6 MMOL/L — SIGNIFICANT CHANGE UP (ref 3.5–5.3)
POTASSIUM SERPL-SCNC: 5 MMOL/L — SIGNIFICANT CHANGE UP (ref 3.5–5.3)
PROCALCITONIN SERPL-MCNC: 0.05 NG/ML — SIGNIFICANT CHANGE UP (ref 0.02–0.1)
PROT C ACT/NOR PPP: 75 % — SIGNIFICANT CHANGE UP (ref 74–150)
PROT C ACT/NOR PPP: 86 % — SIGNIFICANT CHANGE UP (ref 74–150)
PROT S FREE AG PPP IA-ACNC: 59 % — LOW (ref 61–131)
PROT S FREE PPP-ACNC: 35 % — LOW (ref 63–140)
PROT S FREE PPP-ACNC: 47 % — LOW (ref 63–140)
PROT SERPL-MCNC: 7.6 G/DL — SIGNIFICANT CHANGE UP (ref 6–8.3)
PROT UR-MCNC: 100 MG/DL
PROT UR-MCNC: 100 MG/DL
PROT UR-MCNC: SIGNIFICANT CHANGE UP MG/DL
PROTHROM AB SERPL-ACNC: 11.6 SEC — SIGNIFICANT CHANGE UP (ref 9.5–13)
RBC # BLD: 2.98 M/UL — LOW (ref 3.8–5.2)
RBC # BLD: 3.05 M/UL — LOW (ref 3.8–5.2)
RBC # BLD: 3.15 M/UL — LOW (ref 3.8–5.2)
RBC # BLD: 3.23 M/UL — LOW (ref 3.8–5.2)
RBC # BLD: 3.26 M/UL — LOW (ref 3.8–5.2)
RBC # BLD: 3.29 M/UL — LOW (ref 3.8–5.2)
RBC # BLD: 3.43 M/UL — LOW (ref 3.8–5.2)
RBC # BLD: 3.6 M/UL — LOW (ref 3.8–5.2)
RBC # BLD: 4 M/UL — SIGNIFICANT CHANGE UP (ref 3.8–5.2)
RBC # FLD: 17 % — HIGH (ref 10.3–14.5)
RBC # FLD: 17 % — HIGH (ref 10.3–14.5)
RBC # FLD: 17.1 % — HIGH (ref 10.3–14.5)
RBC # FLD: 17.2 % — HIGH (ref 10.3–14.5)
RBC # FLD: 17.3 % — HIGH (ref 10.3–14.5)
RBC CASTS # UR COMP ASSIST: 1 /HPF — SIGNIFICANT CHANGE UP (ref 0–4)
RBC CASTS # UR COMP ASSIST: 2 /HPF — SIGNIFICANT CHANGE UP (ref 0–4)
RBC CASTS # UR COMP ASSIST: 41 /HPF — HIGH (ref 0–4)
REVIEW: SIGNIFICANT CHANGE UP
RSV RNA NPH QL NAA+NON-PROBE: SIGNIFICANT CHANGE UP
SARS-COV-2 RNA SPEC QL NAA+PROBE: SIGNIFICANT CHANGE UP
SODIUM SERPL-SCNC: 132 MMOL/L — LOW (ref 135–145)
SODIUM SERPL-SCNC: 132 MMOL/L — LOW (ref 135–145)
SODIUM SERPL-SCNC: 133 MMOL/L — LOW (ref 135–145)
SODIUM SERPL-SCNC: 133 MMOL/L — LOW (ref 135–145)
SODIUM SERPL-SCNC: 134 MMOL/L — LOW (ref 135–145)
SODIUM SERPL-SCNC: 136 MMOL/L — SIGNIFICANT CHANGE UP (ref 135–145)
SODIUM SERPL-SCNC: 137 MMOL/L — SIGNIFICANT CHANGE UP (ref 135–145)
SODIUM SERPL-SCNC: 138 MMOL/L — SIGNIFICANT CHANGE UP (ref 135–145)
SODIUM SERPL-SCNC: 139 MMOL/L — SIGNIFICANT CHANGE UP (ref 135–145)
SODIUM UR-SCNC: 65 MMOL/L — SIGNIFICANT CHANGE UP
SP GR SPEC: 1.03 — SIGNIFICANT CHANGE UP (ref 1–1.03)
SP GR SPEC: >1.03 — HIGH (ref 1–1.03)
SP GR SPEC: >1.03 — HIGH (ref 1–1.03)
SPECIMEN SOURCE: SIGNIFICANT CHANGE UP
SQUAMOUS # UR AUTO: 0 /HPF — SIGNIFICANT CHANGE UP (ref 0–5)
SQUAMOUS # UR AUTO: 2 /HPF — SIGNIFICANT CHANGE UP (ref 0–5)
SQUAMOUS # UR AUTO: 5 /HPF — SIGNIFICANT CHANGE UP (ref 0–5)
T3 SERPL-MCNC: 83 NG/DL — SIGNIFICANT CHANGE UP (ref 80–200)
T4 AB SER-ACNC: 9 UG/DL — SIGNIFICANT CHANGE UP (ref 4.6–12)
T4 FREE SERPL-MCNC: 1.7 NG/DL — SIGNIFICANT CHANGE UP (ref 0.9–1.8)
TRIGL SERPL-MCNC: 93 MG/DL — SIGNIFICANT CHANGE UP
TROPONIN T, HIGH SENSITIVITY RESULT: 25 NG/L — SIGNIFICANT CHANGE UP (ref 0–51)
TSH SERPL-MCNC: 1.3 UIU/ML — SIGNIFICANT CHANGE UP (ref 0.27–4.2)
UROBILINOGEN FLD QL: 0.2 MG/DL — SIGNIFICANT CHANGE UP (ref 0.2–1)
UROBILINOGEN FLD QL: 1 MG/DL — SIGNIFICANT CHANGE UP (ref 0.2–1)
UROBILINOGEN FLD QL: 1 MG/DL — SIGNIFICANT CHANGE UP (ref 0.2–1)
WBC # BLD: 10.34 K/UL — SIGNIFICANT CHANGE UP (ref 3.8–10.5)
WBC # BLD: 10.97 K/UL — HIGH (ref 3.8–10.5)
WBC # BLD: 11.74 K/UL — HIGH (ref 3.8–10.5)
WBC # BLD: 11.84 K/UL — HIGH (ref 3.8–10.5)
WBC # BLD: 11.9 K/UL — HIGH (ref 3.8–10.5)
WBC # BLD: 11.95 K/UL — HIGH (ref 3.8–10.5)
WBC # BLD: 12.5 K/UL — HIGH (ref 3.8–10.5)
WBC # BLD: 12.9 K/UL — HIGH (ref 3.8–10.5)
WBC # BLD: 9.21 K/UL — SIGNIFICANT CHANGE UP (ref 3.8–10.5)
WBC # FLD AUTO: 10.34 K/UL — SIGNIFICANT CHANGE UP (ref 3.8–10.5)
WBC # FLD AUTO: 10.97 K/UL — HIGH (ref 3.8–10.5)
WBC # FLD AUTO: 11.74 K/UL — HIGH (ref 3.8–10.5)
WBC # FLD AUTO: 11.84 K/UL — HIGH (ref 3.8–10.5)
WBC # FLD AUTO: 11.9 K/UL — HIGH (ref 3.8–10.5)
WBC # FLD AUTO: 11.95 K/UL — HIGH (ref 3.8–10.5)
WBC # FLD AUTO: 12.5 K/UL — HIGH (ref 3.8–10.5)
WBC # FLD AUTO: 12.9 K/UL — HIGH (ref 3.8–10.5)
WBC # FLD AUTO: 9.21 K/UL — SIGNIFICANT CHANGE UP (ref 3.8–10.5)
WBC UR QL: 2 /HPF — SIGNIFICANT CHANGE UP (ref 0–5)
WBC UR QL: 339 /HPF — HIGH (ref 0–5)
WBC UR QL: 39 /HPF — HIGH (ref 0–5)

## 2024-01-01 PROCEDURE — 99223 1ST HOSP IP/OBS HIGH 75: CPT

## 2024-01-01 PROCEDURE — 81001 URINALYSIS AUTO W/SCOPE: CPT

## 2024-01-01 PROCEDURE — 86146 BETA-2 GLYCOPROTEIN ANTIBODY: CPT

## 2024-01-01 PROCEDURE — 83605 ASSAY OF LACTIC ACID: CPT

## 2024-01-01 PROCEDURE — 93975 VASCULAR STUDY: CPT

## 2024-01-01 PROCEDURE — 85303 CLOT INHIBIT PROT C ACTIVITY: CPT

## 2024-01-01 PROCEDURE — 86803 HEPATITIS C AB TEST: CPT

## 2024-01-01 PROCEDURE — 97162 PT EVAL MOD COMPLEX 30 MIN: CPT

## 2024-01-01 PROCEDURE — 99291 CRITICAL CARE FIRST HOUR: CPT | Mod: GC

## 2024-01-01 PROCEDURE — 93925 LOWER EXTREMITY STUDY: CPT | Mod: 26

## 2024-01-01 PROCEDURE — 82570 ASSAY OF URINE CREATININE: CPT

## 2024-01-01 PROCEDURE — 99233 SBSQ HOSP IP/OBS HIGH 50: CPT

## 2024-01-01 PROCEDURE — 87637 SARSCOV2&INF A&B&RSV AMP PRB: CPT

## 2024-01-01 PROCEDURE — 84436 ASSAY OF TOTAL THYROXINE: CPT

## 2024-01-01 PROCEDURE — 86704 HEP B CORE ANTIBODY TOTAL: CPT

## 2024-01-01 PROCEDURE — 70553 MRI BRAIN STEM W/O & W/DYE: CPT | Mod: 26

## 2024-01-01 PROCEDURE — 87340 HEPATITIS B SURFACE AG IA: CPT

## 2024-01-01 PROCEDURE — 93970 EXTREMITY STUDY: CPT | Mod: 26

## 2024-01-01 PROCEDURE — 84443 ASSAY THYROID STIM HORMONE: CPT

## 2024-01-01 PROCEDURE — 99285 EMERGENCY DEPT VISIT HI MDM: CPT

## 2024-01-01 PROCEDURE — 92526 ORAL FUNCTION THERAPY: CPT

## 2024-01-01 PROCEDURE — 84480 ASSAY TRIIODOTHYRONINE (T3): CPT

## 2024-01-01 PROCEDURE — 84100 ASSAY OF PHOSPHORUS: CPT

## 2024-01-01 PROCEDURE — 74177 CT ABD & PELVIS W/CONTRAST: CPT | Mod: MC

## 2024-01-01 PROCEDURE — 97110 THERAPEUTIC EXERCISES: CPT

## 2024-01-01 PROCEDURE — 85610 PROTHROMBIN TIME: CPT

## 2024-01-01 PROCEDURE — 92610 EVALUATE SWALLOWING FUNCTION: CPT

## 2024-01-01 PROCEDURE — 97530 THERAPEUTIC ACTIVITIES: CPT

## 2024-01-01 PROCEDURE — 86147 CARDIOLIPIN ANTIBODY EA IG: CPT

## 2024-01-01 PROCEDURE — 82947 ASSAY GLUCOSE BLOOD QUANT: CPT

## 2024-01-01 PROCEDURE — 71045 X-RAY EXAM CHEST 1 VIEW: CPT

## 2024-01-01 PROCEDURE — 84439 ASSAY OF FREE THYROXINE: CPT

## 2024-01-01 PROCEDURE — 84145 PROCALCITONIN (PCT): CPT

## 2024-01-01 PROCEDURE — 87077 CULTURE AEROBIC IDENTIFY: CPT

## 2024-01-01 PROCEDURE — 87186 SC STD MICRODIL/AGAR DIL: CPT

## 2024-01-01 PROCEDURE — 82962 GLUCOSE BLOOD TEST: CPT

## 2024-01-01 PROCEDURE — 84132 ASSAY OF SERUM POTASSIUM: CPT

## 2024-01-01 PROCEDURE — 82435 ASSAY OF BLOOD CHLORIDE: CPT

## 2024-01-01 PROCEDURE — 80053 COMPREHEN METABOLIC PANEL: CPT

## 2024-01-01 PROCEDURE — C8929: CPT

## 2024-01-01 PROCEDURE — 84300 ASSAY OF URINE SODIUM: CPT

## 2024-01-01 PROCEDURE — 85027 COMPLETE CBC AUTOMATED: CPT

## 2024-01-01 PROCEDURE — 99233 SBSQ HOSP IP/OBS HIGH 50: CPT | Mod: FS

## 2024-01-01 PROCEDURE — 70450 CT HEAD/BRAIN W/O DYE: CPT | Mod: 26

## 2024-01-01 PROCEDURE — 93975 VASCULAR STUDY: CPT | Mod: 26

## 2024-01-01 PROCEDURE — 84295 ASSAY OF SERUM SODIUM: CPT

## 2024-01-01 PROCEDURE — 85018 HEMOGLOBIN: CPT

## 2024-01-01 PROCEDURE — 71260 CT THORAX DX C+: CPT | Mod: MC

## 2024-01-01 PROCEDURE — A9585: CPT

## 2024-01-01 PROCEDURE — 93925 LOWER EXTREMITY STUDY: CPT

## 2024-01-01 PROCEDURE — 70450 CT HEAD/BRAIN W/O DYE: CPT | Mod: 26,59

## 2024-01-01 PROCEDURE — 97166 OT EVAL MOD COMPLEX 45 MIN: CPT

## 2024-01-01 PROCEDURE — 70496 CT ANGIOGRAPHY HEAD: CPT | Mod: 26,MC

## 2024-01-01 PROCEDURE — 85260 CLOT FACTOR X STUART-POWER: CPT

## 2024-01-01 PROCEDURE — 84484 ASSAY OF TROPONIN QUANT: CPT

## 2024-01-01 PROCEDURE — 92523 SPEECH SOUND LANG COMPREHEN: CPT

## 2024-01-01 PROCEDURE — 0042T: CPT | Mod: MC

## 2024-01-01 PROCEDURE — 87086 URINE CULTURE/COLONY COUNT: CPT

## 2024-01-01 PROCEDURE — 70450 CT HEAD/BRAIN W/O DYE: CPT | Mod: 26,77,59,MC

## 2024-01-01 PROCEDURE — 36415 COLL VENOUS BLD VENIPUNCTURE: CPT

## 2024-01-01 PROCEDURE — 74177 CT ABD & PELVIS W/CONTRAST: CPT | Mod: 26

## 2024-01-01 PROCEDURE — 70496 CT ANGIOGRAPHY HEAD: CPT | Mod: MC

## 2024-01-01 PROCEDURE — 87040 BLOOD CULTURE FOR BACTERIA: CPT

## 2024-01-01 PROCEDURE — 85306 CLOT INHIBIT PROT S FREE: CPT

## 2024-01-01 PROCEDURE — 70450 CT HEAD/BRAIN W/O DYE: CPT | Mod: MC

## 2024-01-01 PROCEDURE — 80048 BASIC METABOLIC PNL TOTAL CA: CPT

## 2024-01-01 PROCEDURE — 86706 HEP B SURFACE ANTIBODY: CPT

## 2024-01-01 PROCEDURE — 80061 LIPID PANEL: CPT

## 2024-01-01 PROCEDURE — 83735 ASSAY OF MAGNESIUM: CPT

## 2024-01-01 PROCEDURE — 71260 CT THORAX DX C+: CPT | Mod: 26

## 2024-01-01 PROCEDURE — 70498 CT ANGIOGRAPHY NECK: CPT | Mod: 26,MC

## 2024-01-01 PROCEDURE — 71045 X-RAY EXAM CHEST 1 VIEW: CPT | Mod: 26

## 2024-01-01 PROCEDURE — 85014 HEMATOCRIT: CPT

## 2024-01-01 PROCEDURE — 82330 ASSAY OF CALCIUM: CPT

## 2024-01-01 PROCEDURE — 70498 CT ANGIOGRAPHY NECK: CPT | Mod: MC

## 2024-01-01 PROCEDURE — 85520 HEPARIN ASSAY: CPT

## 2024-01-01 PROCEDURE — 85613 RUSSELL VIPER VENOM DILUTED: CPT

## 2024-01-01 PROCEDURE — 92507 TX SP LANG VOICE COMM INDIV: CPT

## 2024-01-01 PROCEDURE — 70553 MRI BRAIN STEM W/O & W/DYE: CPT | Mod: MC

## 2024-01-01 PROCEDURE — 70450 CT HEAD/BRAIN W/O DYE: CPT | Mod: 26,77,59

## 2024-01-01 PROCEDURE — 82803 BLOOD GASES ANY COMBINATION: CPT

## 2024-01-01 PROCEDURE — 93306 TTE W/DOPPLER COMPLETE: CPT | Mod: 26

## 2024-01-01 PROCEDURE — 93970 EXTREMITY STUDY: CPT

## 2024-01-01 PROCEDURE — 85025 COMPLETE CBC W/AUTO DIFF WBC: CPT

## 2024-01-01 PROCEDURE — 86705 HEP B CORE ANTIBODY IGM: CPT

## 2024-01-01 PROCEDURE — 83036 HEMOGLOBIN GLYCOSYLATED A1C: CPT

## 2024-01-01 PROCEDURE — 85730 THROMBOPLASTIN TIME PARTIAL: CPT

## 2024-01-01 RX ORDER — INSULIN LISPRO 100/ML
VIAL (ML) SUBCUTANEOUS
Refills: 0 | Status: DISCONTINUED | OUTPATIENT
Start: 2024-01-01 | End: 2024-01-01

## 2024-01-01 RX ORDER — SODIUM CHLORIDE 9 MG/ML
500 INJECTION INTRAMUSCULAR; INTRAVENOUS; SUBCUTANEOUS ONCE
Refills: 0 | Status: COMPLETED | OUTPATIENT
Start: 2024-01-01 | End: 2024-01-01

## 2024-01-01 RX ORDER — HEPARIN SODIUM 5000 [USP'U]/ML
900 INJECTION INTRAVENOUS; SUBCUTANEOUS
Qty: 25000 | Refills: 0 | Status: DISCONTINUED | OUTPATIENT
Start: 2024-01-01 | End: 2024-01-01

## 2024-01-01 RX ORDER — GLUCAGON INJECTION, SOLUTION 0.5 MG/.1ML
1 INJECTION, SOLUTION SUBCUTANEOUS ONCE
Refills: 0 | Status: DISCONTINUED | OUTPATIENT
Start: 2024-01-01 | End: 2024-01-01

## 2024-01-01 RX ORDER — SODIUM CHLORIDE 9 MG/ML
1000 INJECTION INTRAMUSCULAR; INTRAVENOUS; SUBCUTANEOUS
Refills: 0 | Status: DISCONTINUED | OUTPATIENT
Start: 2024-01-01 | End: 2024-01-01

## 2024-01-01 RX ORDER — SODIUM CHLORIDE 5 G/100ML
1000 INJECTION, SOLUTION INTRAVENOUS
Refills: 0 | Status: DISCONTINUED | OUTPATIENT
Start: 2024-01-01 | End: 2024-01-01

## 2024-01-01 RX ORDER — METOPROLOL TARTRATE 50 MG
0.5 TABLET ORAL
Qty: 30 | Refills: 0
Start: 2024-01-01

## 2024-01-01 RX ORDER — INFLUENZA VIRUS VACCINE 15; 15; 15; 15 UG/.5ML; UG/.5ML; UG/.5ML; UG/.5ML
0.7 SUSPENSION INTRAMUSCULAR ONCE
Refills: 0 | Status: DISCONTINUED | OUTPATIENT
Start: 2024-01-01 | End: 2024-01-01

## 2024-01-01 RX ORDER — ACETAMINOPHEN 500 MG
1000 TABLET ORAL ONCE
Refills: 0 | Status: COMPLETED | OUTPATIENT
Start: 2024-01-01 | End: 2024-01-01

## 2024-01-01 RX ORDER — ENOXAPARIN SODIUM 100 MG/ML
60 INJECTION SUBCUTANEOUS EVERY 12 HOURS
Refills: 0 | Status: DISCONTINUED | OUTPATIENT
Start: 2024-01-01 | End: 2024-01-01

## 2024-01-01 RX ORDER — HYDROMORPHONE HYDROCHLORIDE 2 MG/ML
2 INJECTION INTRAMUSCULAR; INTRAVENOUS; SUBCUTANEOUS EVERY 4 HOURS
Refills: 0 | Status: DISCONTINUED | OUTPATIENT
Start: 2024-01-01 | End: 2024-01-01

## 2024-01-01 RX ORDER — SODIUM CHLORIDE 9 MG/ML
1000 INJECTION, SOLUTION INTRAVENOUS
Refills: 0 | Status: DISCONTINUED | OUTPATIENT
Start: 2024-01-01 | End: 2024-01-01

## 2024-01-01 RX ORDER — METOPROLOL TARTRATE 50 MG
25 TABLET ORAL DAILY
Refills: 0 | Status: DISCONTINUED | OUTPATIENT
Start: 2024-01-01 | End: 2024-01-01

## 2024-01-01 RX ORDER — ATORVASTATIN CALCIUM 80 MG/1
80 TABLET, FILM COATED ORAL AT BEDTIME
Refills: 0 | Status: DISCONTINUED | OUTPATIENT
Start: 2024-01-01 | End: 2024-01-01

## 2024-01-01 RX ORDER — GABAPENTIN 400 MG/1
1 CAPSULE ORAL
Qty: 0 | Refills: 0 | DISCHARGE

## 2024-01-01 RX ORDER — ATORVASTATIN CALCIUM 80 MG/1
40 TABLET, FILM COATED ORAL AT BEDTIME
Refills: 0 | Status: DISCONTINUED | OUTPATIENT
Start: 2024-01-01 | End: 2024-01-01

## 2024-01-01 RX ORDER — MORPHINE SULFATE 50 MG/1
1 CAPSULE, EXTENDED RELEASE ORAL ONCE
Refills: 0 | Status: DISCONTINUED | OUTPATIENT
Start: 2024-01-01 | End: 2024-01-01

## 2024-01-01 RX ORDER — HEPARIN SODIUM 5000 [USP'U]/ML
950 INJECTION INTRAVENOUS; SUBCUTANEOUS
Qty: 25000 | Refills: 0 | Status: DISCONTINUED | OUTPATIENT
Start: 2024-01-01 | End: 2024-01-01

## 2024-01-01 RX ORDER — DEXTROSE 50 % IN WATER 50 %
12.5 SYRINGE (ML) INTRAVENOUS ONCE
Refills: 0 | Status: DISCONTINUED | OUTPATIENT
Start: 2024-01-01 | End: 2024-01-01

## 2024-01-01 RX ORDER — INSULIN LISPRO 100/ML
VIAL (ML) SUBCUTANEOUS EVERY 6 HOURS
Refills: 0 | Status: DISCONTINUED | OUTPATIENT
Start: 2024-01-01 | End: 2024-01-01

## 2024-01-01 RX ORDER — HYDROMORPHONE HYDROCHLORIDE 2 MG/ML
1 INJECTION INTRAMUSCULAR; INTRAVENOUS; SUBCUTANEOUS
Qty: 42 | Refills: 0
Start: 2024-01-01 | End: 2024-01-01

## 2024-01-01 RX ORDER — METOPROLOL TARTRATE 50 MG
1 TABLET ORAL
Refills: 0 | DISCHARGE

## 2024-01-01 RX ORDER — METFORMIN HYDROCHLORIDE 850 MG/1
1 TABLET ORAL
Refills: 0 | DISCHARGE

## 2024-01-01 RX ORDER — DEXTROSE 50 % IN WATER 50 %
25 SYRINGE (ML) INTRAVENOUS ONCE
Refills: 0 | Status: DISCONTINUED | OUTPATIENT
Start: 2024-01-01 | End: 2024-01-01

## 2024-01-01 RX ORDER — ASPIRIN/CALCIUM CARB/MAGNESIUM 324 MG
0 TABLET ORAL
Refills: 0 | DISCHARGE

## 2024-01-01 RX ORDER — SODIUM BICARBONATE 1 MEQ/ML
650 SYRINGE (ML) INTRAVENOUS THREE TIMES A DAY
Refills: 0 | Status: DISCONTINUED | OUTPATIENT
Start: 2024-01-01 | End: 2024-01-01

## 2024-01-01 RX ORDER — DIGOXIN 250 MCG
1 TABLET ORAL
Qty: 0 | Refills: 0 | DISCHARGE

## 2024-01-01 RX ORDER — METOPROLOL TARTRATE 50 MG
12.5 TABLET ORAL EVERY 12 HOURS
Refills: 0 | Status: DISCONTINUED | OUTPATIENT
Start: 2024-01-01 | End: 2024-01-01

## 2024-01-01 RX ORDER — ENOXAPARIN SODIUM 100 MG/ML
40 INJECTION SUBCUTANEOUS EVERY 24 HOURS
Refills: 0 | Status: DISCONTINUED | OUTPATIENT
Start: 2024-01-01 | End: 2024-01-01

## 2024-01-01 RX ORDER — ENOXAPARIN SODIUM 100 MG/ML
55 INJECTION SUBCUTANEOUS EVERY 12 HOURS
Refills: 0 | Status: DISCONTINUED | OUTPATIENT
Start: 2024-01-01 | End: 2024-01-01

## 2024-01-01 RX ORDER — MIRTAZAPINE 45 MG/1
1 TABLET, ORALLY DISINTEGRATING ORAL
Qty: 0 | Refills: 0 | DISCHARGE

## 2024-01-01 RX ORDER — HYDROMORPHONE HYDROCHLORIDE 2 MG/ML
1 INJECTION INTRAMUSCULAR; INTRAVENOUS; SUBCUTANEOUS EVERY 4 HOURS
Refills: 0 | Status: DISCONTINUED | OUTPATIENT
Start: 2024-01-01 | End: 2024-01-01

## 2024-01-01 RX ORDER — ACETAMINOPHEN 500 MG
1000 TABLET ORAL ONCE
Refills: 0 | Status: DISCONTINUED | OUTPATIENT
Start: 2024-01-01 | End: 2024-01-01

## 2024-01-01 RX ORDER — DEXTROSE 50 % IN WATER 50 %
15 SYRINGE (ML) INTRAVENOUS ONCE
Refills: 0 | Status: DISCONTINUED | OUTPATIENT
Start: 2024-01-01 | End: 2024-01-01

## 2024-01-01 RX ORDER — CEFTRIAXONE 500 MG/1
1000 INJECTION, POWDER, FOR SOLUTION INTRAMUSCULAR; INTRAVENOUS EVERY 24 HOURS
Refills: 0 | Status: COMPLETED | OUTPATIENT
Start: 2024-01-01 | End: 2024-01-01

## 2024-01-01 RX ADMIN — Medication 1: at 07:47

## 2024-01-01 RX ADMIN — Medication 400 MILLIGRAM(S): at 14:37

## 2024-01-01 RX ADMIN — ENOXAPARIN SODIUM 40 MILLIGRAM(S): 100 INJECTION SUBCUTANEOUS at 18:00

## 2024-01-01 RX ADMIN — HYDROMORPHONE HYDROCHLORIDE 2 MILLIGRAM(S): 2 INJECTION INTRAMUSCULAR; INTRAVENOUS; SUBCUTANEOUS at 09:49

## 2024-01-01 RX ADMIN — Medication 1000 MILLIGRAM(S): at 15:07

## 2024-01-01 RX ADMIN — Medication 1: at 12:23

## 2024-01-01 RX ADMIN — Medication 1: at 11:30

## 2024-01-01 RX ADMIN — Medication 650 MILLIGRAM(S): at 13:37

## 2024-01-01 RX ADMIN — ENOXAPARIN SODIUM 60 MILLIGRAM(S): 100 INJECTION SUBCUTANEOUS at 11:00

## 2024-01-01 RX ADMIN — Medication 650 MILLIGRAM(S): at 22:36

## 2024-01-01 RX ADMIN — SODIUM CHLORIDE 50 MILLILITER(S): 5 INJECTION, SOLUTION INTRAVENOUS at 00:01

## 2024-01-01 RX ADMIN — HEPARIN SODIUM 9 UNIT(S)/HR: 5000 INJECTION INTRAVENOUS; SUBCUTANEOUS at 11:39

## 2024-01-01 RX ADMIN — Medication 650 MILLIGRAM(S): at 15:22

## 2024-01-01 RX ADMIN — Medication 400 MILLIGRAM(S): at 09:07

## 2024-01-01 RX ADMIN — ENOXAPARIN SODIUM 60 MILLIGRAM(S): 100 INJECTION SUBCUTANEOUS at 22:52

## 2024-01-01 RX ADMIN — Medication 25 MILLIGRAM(S): at 05:55

## 2024-01-01 RX ADMIN — HEPARIN SODIUM 9.5 UNIT(S)/HR: 5000 INJECTION INTRAVENOUS; SUBCUTANEOUS at 17:56

## 2024-01-01 RX ADMIN — HEPARIN SODIUM 9 UNIT(S)/HR: 5000 INJECTION INTRAVENOUS; SUBCUTANEOUS at 13:20

## 2024-01-01 RX ADMIN — ATORVASTATIN CALCIUM 40 MILLIGRAM(S): 80 TABLET, FILM COATED ORAL at 21:29

## 2024-01-01 RX ADMIN — SODIUM CHLORIDE 30 MILLILITER(S): 5 INJECTION, SOLUTION INTRAVENOUS at 22:35

## 2024-01-01 RX ADMIN — Medication 650 MILLIGRAM(S): at 21:29

## 2024-01-01 RX ADMIN — Medication 1000 MILLIGRAM(S): at 22:10

## 2024-01-01 RX ADMIN — Medication 1: at 22:00

## 2024-01-01 RX ADMIN — CEFTRIAXONE 100 MILLIGRAM(S): 500 INJECTION, POWDER, FOR SOLUTION INTRAMUSCULAR; INTRAVENOUS at 17:23

## 2024-01-01 RX ADMIN — Medication 650 MILLIGRAM(S): at 05:20

## 2024-01-01 RX ADMIN — Medication 1: at 11:15

## 2024-01-01 RX ADMIN — SODIUM CHLORIDE 75 MILLILITER(S): 9 INJECTION INTRAMUSCULAR; INTRAVENOUS; SUBCUTANEOUS at 14:47

## 2024-01-01 RX ADMIN — MORPHINE SULFATE 1 MILLIGRAM(S): 50 CAPSULE, EXTENDED RELEASE ORAL at 16:48

## 2024-01-01 RX ADMIN — SODIUM CHLORIDE 75 MILLILITER(S): 9 INJECTION, SOLUTION INTRAVENOUS at 06:18

## 2024-01-01 RX ADMIN — HEPARIN SODIUM 9 UNIT(S)/HR: 5000 INJECTION INTRAVENOUS; SUBCUTANEOUS at 03:15

## 2024-01-01 RX ADMIN — ATORVASTATIN CALCIUM 40 MILLIGRAM(S): 80 TABLET, FILM COATED ORAL at 22:37

## 2024-01-01 RX ADMIN — Medication 1: at 07:44

## 2024-01-01 RX ADMIN — Medication 650 MILLIGRAM(S): at 05:55

## 2024-01-01 RX ADMIN — Medication 1: at 16:23

## 2024-01-01 RX ADMIN — Medication 650 MILLIGRAM(S): at 13:35

## 2024-01-01 RX ADMIN — Medication 1: at 21:54

## 2024-01-01 RX ADMIN — Medication 25 MILLIGRAM(S): at 05:19

## 2024-01-01 RX ADMIN — Medication 25 MILLIGRAM(S): at 05:49

## 2024-01-01 RX ADMIN — SODIUM CHLORIDE 75 MILLILITER(S): 9 INJECTION INTRAMUSCULAR; INTRAVENOUS; SUBCUTANEOUS at 14:37

## 2024-01-01 RX ADMIN — ATORVASTATIN CALCIUM 80 MILLIGRAM(S): 80 TABLET, FILM COATED ORAL at 21:44

## 2024-01-01 RX ADMIN — Medication 25 MILLIGRAM(S): at 08:28

## 2024-01-01 RX ADMIN — Medication 25 MILLIGRAM(S): at 05:53

## 2024-01-01 RX ADMIN — Medication 1: at 08:28

## 2024-01-01 RX ADMIN — HEPARIN SODIUM 9 UNIT(S)/HR: 5000 INJECTION INTRAVENOUS; SUBCUTANEOUS at 20:45

## 2024-01-01 RX ADMIN — MORPHINE SULFATE 1 MILLIGRAM(S): 50 CAPSULE, EXTENDED RELEASE ORAL at 16:18

## 2024-01-01 RX ADMIN — SODIUM CHLORIDE 333.33 MILLILITER(S): 9 INJECTION INTRAMUSCULAR; INTRAVENOUS; SUBCUTANEOUS at 16:35

## 2024-01-01 RX ADMIN — SODIUM CHLORIDE 75 MILLILITER(S): 9 INJECTION INTRAMUSCULAR; INTRAVENOUS; SUBCUTANEOUS at 22:53

## 2024-01-01 RX ADMIN — SODIUM CHLORIDE 75 MILLILITER(S): 9 INJECTION, SOLUTION INTRAVENOUS at 11:37

## 2024-01-01 RX ADMIN — Medication 1: at 18:02

## 2024-01-01 RX ADMIN — Medication 1: at 11:38

## 2024-01-01 RX ADMIN — CEFTRIAXONE 100 MILLIGRAM(S): 500 INJECTION, POWDER, FOR SOLUTION INTRAMUSCULAR; INTRAVENOUS at 18:05

## 2024-01-01 RX ADMIN — Medication 1000 MILLIGRAM(S): at 09:37

## 2024-01-01 RX ADMIN — ATORVASTATIN CALCIUM 80 MILLIGRAM(S): 80 TABLET, FILM COATED ORAL at 21:15

## 2024-01-01 RX ADMIN — Medication 2: at 11:24

## 2024-01-01 RX ADMIN — CEFTRIAXONE 100 MILLIGRAM(S): 500 INJECTION, POWDER, FOR SOLUTION INTRAMUSCULAR; INTRAVENOUS at 16:22

## 2024-01-01 RX ADMIN — Medication 400 MILLIGRAM(S): at 21:44

## 2024-01-01 RX ADMIN — ATORVASTATIN CALCIUM 80 MILLIGRAM(S): 80 TABLET, FILM COATED ORAL at 21:53

## 2024-03-10 NOTE — SPEECH LANGUAGE PATHOLOGY EVALUATION - SPECIFY REASON(S)
to assess speech production, expressive and receptive language skills, and cognitive-communication skills.

## 2024-03-10 NOTE — ED PROVIDER NOTE - PHYSICAL EXAMINATION
GENERAL:  no acute distress  HEAD: normocephalic, atraumatic  CARDIAC: +bradycardic, normal S1S2  PULM: normal breath sounds, clear to ascultation bilaterally, no rales, rhonchi, wheezing  NEURO: +LUE/LLE motor/sensory deficits, +RLE motor deficits, +aphasia, +L facial droop  MSK:  no peripheral edema, no calf tenderness b/l

## 2024-03-10 NOTE — SWALLOW BEDSIDE ASSESSMENT ADULT - ORAL PHASE
slowed A-P transport across solid trials; trace lingual stasis clears across subsequent trials and liquid washes/Decreased anterior-posterior movement of the bolus/Delayed oral transit time Within functional limits Decreased anterior-posterior movement of the bolus/Delayed oral transit time

## 2024-03-10 NOTE — CHART NOTE - NSCHARTNOTEFT_GEN_A_CORE
Chart and imaging reviewed. 8hr rpt CTH w/ improved contrast staining, heme still not very appreciable with contrast obstructing. MRI also today shows there is heme in chronic/subacute Lt cerebellar ischemia w/ contralateral Rt cerebellar acute stroke.   -Rpt CTH AM

## 2024-03-10 NOTE — PROVIDER CONTACT NOTE (OTHER) - ASSESSMENT
Patient remains neurologically stable, VSS. Rectal temp still 96.6, B/L LE cool to touch, patient c/o pain in R thigh.

## 2024-03-10 NOTE — SPEECH LANGUAGE PATHOLOGY EVALUATION - SLP DIAGNOSIS
67 y/o F w/ PMH of 5 prior ischemic strokes iso valvular atrial fibrillation, p/w new onset aphasia and L sided weakness, found to have L cerebellar IPH. Pt well known to this service during prior admissions for prior stroke workup. Today, pt p/w mixed expressive-receptive aphasia (expressive>receptive) and mild dysarthria iso acute neurological event. Comprehension notable for reduced multistep command following and reduced ability to answer complex yes/no questions. Verbal expression notable for automatic speech requiring verbal cueing to complete sequences appropriately, w/ reduced utterance length noted during communication exchanges. Motor speech characterized as mildly imprecise in articulation w/ low volume and rate of speech. Assessment was abridged as transport arrived to take pt to CT scan; therefore, ongoing assessment of speech, language, and cognition indicated while pt is in house, though skilled ST services indicated at next level of care as a result of this assessment.

## 2024-03-10 NOTE — SWALLOW BEDSIDE ASSESSMENT ADULT - MODE OF PRESENTATION
cup/self fed cup/straw/self fed/fed by clinician cup/spoon/fed by clinician self fed/fed by clinician

## 2024-03-10 NOTE — CONSULT NOTE ADULT - SUBJECTIVE AND OBJECTIVE BOX
Chintan Owusu MD  Interventional Cardiology / Advance Heart Failure and Cardiac Transplant Specialist  [place] Office : [phone] [place] [address] [place] [address]  Tel:   [place] Office : [phone] [facility] [place] [address]  Tel: [phone]       HISTORY OF PRESENTING ILLNESS:  69 y/o woman with PMH of chronic valvular atrial fibrillation on Lovenox, DM, HTN, HLD presented with new onset aphasia and L sided weakness. Per family she was fully conversant at around 10 pm on 3/9/2024. She has had 5 prior ischemic strokes in the setting of valvular atrial fibrillation. Son noticed aphasia and L sided weakness, has baseline R sided weakness. Is wheelchair bound at baseline due to deficits from prior strokes. Brought to the ED for further evaluation and work up,  Per family her last dose of Lovenox was last night on 3/9/2024.  Upon stroke assessment her NIHSS was 17 on arrival.     	  MEDICATIONS:  metoprolol succinate ER 25 milliGRAM(s) Oral daily            atorvastatin 80 milliGRAM(s) Oral at bedtime  dextrose 50% Injectable 25 Gram(s) IV Push once  dextrose 50% Injectable 12.5 Gram(s) IV Push once  dextrose 50% Injectable 25 Gram(s) IV Push once  dextrose Oral Gel 15 Gram(s) Oral once PRN  glucagon  Injectable 1 milliGRAM(s) IntraMuscular once  insulin lispro (ADMELOG) corrective regimen sliding scale   SubCutaneous every 6 hours    dextrose 5%. 1000 milliLiter(s) IV Continuous <Continuous>  dextrose 5%. 1000 milliLiter(s) IV Continuous <Continuous>      PAST MEDICAL/SURGICAL HISTORY  PAST MEDICAL & SURGICAL HISTORY:  HTN (hypertension)      HLD (hyperlipidemia)      DM (diabetes mellitus)  Type II      Atrial fibrillation  h/o Cardioversion in 2013 and on Coumadin      Mitral stenosis      TIA (transient ischemic attack)  2013- Rt facial droop which resolved after 2 hours      Chronic atrial fibrillation      CVA (cerebrovascular accident)      HTN (hypertension)      DM (diabetes mellitus), type 2      Ventral hernia      S/P knee surgery      S/P laser cataract surgery, unspecified laterality  ???      H/O hernia repair          SOCIAL HISTORY: Substance Use (street drugs): ( x ) never used  (  ) other:    FAMILY HISTORY:  No pertinent family history in first degree relatives    No pertinent family history in first degree relatives        REVIEW OF SYSTEMS:  CONSTITUTIONAL: No fever, weight loss, or fatigue  EYES: No eye pain, visual disturbances, or discharge  ENMT:  No difficulty hearing, tinnitus, vertigo; No sinus or throat pain  BREASTS: No pain, masses, or nipple discharge  GASTROINTESTINAL: No abdominal or epigastric pain. No nausea, vomiting, or hematemesis; No diarrhea or constipation. No melena or hematochezia.  GENITOURINARY: No dysuria, frequency, hematuria, or incontinence  NEUROLOGICAL: No headaches, memory loss, loss of strength, numbness, or tremors  ENDOCRINE: No heat or cold intolerance; No hair loss  MUSCULOSKELETAL: No joint pain or swelling; No muscle, back, or extremity pain  PSYCHIATRIC: No depression, anxiety, mood swings, or difficulty sleeping  HEME/LYMPH: No easy bruising, or bleeding gums       PHYSICAL EXAM:  T(C): 35.9 (03-10-24 @ 09:10), Max: 35.9 (03-10-24 @ 09:10)  HR: 82 (03-10-24 @ 10:00) (65 - 82)  BP: 119/70 (03-10-24 @ 10:00) (119/70 - 160/74)  RR: 19 (03-10-24 @ 10:00) (19 - 21)  SpO2: 98% (03-10-24 @ 10:00) (98% - 99%)  Wt(kg): --  I&O's Summary        GENERAL: NAD  EYES:   PERRLA   ENMT:   Moist mucous membranes, Good dentition, No lesions  Cardiovascular: Normal S1 S2, No JVD, No murmurs, No edema  Respiratory: Lungs clear to auscultation	  Gastrointestinal:  Soft, Non-tender, + BS	  Extremities: no edema                                    9.2    11.90 )-----------( 219      ( 10 Mar 2024 08:59 )             29.4     03-10    132<L>  |  96  |  14  ----------------------------<  191<H>  4.6   |  23  |  0.77    Ca    9.6      10 Mar 2024 08:59  Phos  4.1     03-10  Mg     1.6     03-10    TPro  7.6  /  Alb  4.3  /  TBili  1.1  /  DBili  x   /  AST  16  /  ALT  13  /  AlkPhos  51  03-10    proBNP:   Lipid Profile:   HgA1c:   TSH:     Consultant(s) Notes Reviewed:  [x ] YES  [ ] NO    Care Discussed with Consultants/Other Providers [ x] YES  [ ] NO    Imaging Personally Reviewed independently:  [x] YES  [ ] NO    All labs, radiologic studies, vitals, orders and medications list reviewed. Patient is seen and examined at bedside. Case discussed with medical team.

## 2024-03-10 NOTE — SPEECH LANGUAGE PATHOLOGY EVALUATION - SLP PERTINENT HISTORY OF CURRENT PROBLEM
67 y/o woman with PMH of chronic valvular atrial fibrillation on Lovenox, DM, HTN, HLD presented with new onset aphasia and L sided weakness. Per family she was fully conversant at around 10 pm on 3/9/2024. She has had 5 prior ischemic strokes in the setting of valvular atrial fibrillation. Son noticed aphasia and L sided weakness, has baseline R sided weakness. Is wheelchair bound at baseline due to deficits from prior strokes. Brought to the ED for further evaluation and work up,  Per family her last dose of Lovenox was last night on 3/9/2024. Upon stroke assessment her NIHSS was 17 on arrival. MRS 5.

## 2024-03-10 NOTE — ED PROVIDER NOTE - ATTENDING CONTRIBUTION TO CARE
This is a 68-year-old lady who is coming in with facial droop and aphasia.  Last normal was overnight.  Notably the patient is on Lovenox.  Code stroke was called by  nursing at the desk.  History is minimal due to the timeliness.  Awake aphasia moving arms and legs minimally.  Please see the code stroke note for NIH stroke scale  CT CTA head and neck.  CBC CMP  coags.  EKG.

## 2024-03-10 NOTE — ED PROVIDER NOTE - NSICDXPASTSURGICALHX_GEN_ALL_CORE_FT
PAST SURGICAL HISTORY:  H/O hernia repair     S/P knee surgery     S/P laser cataract surgery, unspecified laterality ???    Ventral hernia

## 2024-03-10 NOTE — CONSULT NOTE ADULT - SUBJECTIVE AND OBJECTIVE BOX
p (1480)     HPI:  68F Hx Afib on SQL twice daily, ASA, many multiple CVA w/ residual RLE deficits per pt, T2DM. P/w new onset Lt facial droop/Lt side deficits, LKN 10pm. CTH/CTP/CTA no clear acute ischemic territory. Shows chronic large Lt frontal infarct ?subacute heme in Lt cerebellar stroke bed not seen on prior imaging Nov/2023. PLTs/coags wnl. Exam: Wide awake, diff FC but AOx1-2 to choices. Rt facial. No Lt droop noted. minimally verbal but asking to go to bathroom. RUE 2/5, LUE 4+/5. RLE 0/5. LLE 4/5. SILT. Diff w/ commands but possible dysmetria on LUE FTN.      NIHSS 17  MRS 5   (10 Mar 2024 07:27)      Imaging:    Exam:    --Anticoagulation:    =====================  PAST MEDICAL HISTORY   HTN (hypertension)    HLD (hyperlipidemia)    DM (diabetes mellitus)    Atrial fibrillation    Mitral stenosis    TIA (transient ischemic attack)    Chronic atrial fibrillation    CVA (cerebrovascular accident)    HTN (hypertension)    DM (diabetes mellitus), type 2      PAST SURGICAL HISTORY   NO SIGNIFICANT PAST SURGICAL HISTORY    Ventral hernia    S/P knee surgery    S/P laser cataract surgery, unspecified laterality    H/O hernia repair      No Known Allergies      MEDICATIONS:  Antibiotics:    Neuro:    Other:  atorvastatin 80 milliGRAM(s) Oral at bedtime  dextrose 5%. 1000 milliLiter(s) IV Continuous <Continuous>  dextrose 5%. 1000 milliLiter(s) IV Continuous <Continuous>  dextrose 50% Injectable 25 Gram(s) IV Push once  dextrose 50% Injectable 12.5 Gram(s) IV Push once  dextrose 50% Injectable 25 Gram(s) IV Push once  dextrose Oral Gel 15 Gram(s) Oral once PRN  glucagon  Injectable 1 milliGRAM(s) IntraMuscular once  insulin lispro (ADMELOG) corrective regimen sliding scale   SubCutaneous every 6 hours  metoprolol succinate ER 25 milliGRAM(s) Oral daily      SOCIAL HISTORY:   Occupation:   Marital Status:     FAMILY HISTORY:  No pertinent family history in first degree relatives    No pertinent family history in first degree relatives    No pertinent family history in first degree relatives        ROS: Negative except per HPI    LABS:  PT/INR - ( 10 Mar 2024 06:30 )   PT: 11.6 sec;   INR: 1.06 ratio         PTT - ( 10 Mar 2024 06:30 )  PTT:39.9 sec                        10.0   12.50 )-----------( 234      ( 10 Mar 2024 06:30 )             32.6     03-10    134<L>  |  98  |  13  ----------------------------<  227<H>  4.2   |  20<L>  |  0.75    Ca    10.1      10 Mar 2024 06:30    TPro  7.6  /  Alb  4.3  /  TBili  1.1  /  DBili  x   /  AST  16  /  ALT  13  /  AlkPhos  51  03-10

## 2024-03-10 NOTE — ED PROVIDER NOTE - PROGRESS NOTE DETAILS
dw Neurosurgery res - protamine not recommended based on size and concern that risk of causing clot. they reviewed imaging and it appears to be hemorrhagic transformation of a stroke. will see pt - anticibate admit to stroke unit. -Justice Mccoy MD-

## 2024-03-10 NOTE — H&P ADULT - NSHPLABSRESULTS_GEN_ALL_CORE
CTH  IMPRESSION:  Small foci of left cerebellar intraparenchymal hemorrhage with surrounding vasogenic edema.  Multifocal chronic infarcts, as outlined above  Mild cerebral atrophy.      CTA Head and neck  IMPRESSION:    CTA neck and brain:  No large vessel occlusion or significant stenosis.    CT Perfusion:  Nondiagnostic study secondary to motion restriction.

## 2024-03-10 NOTE — SWALLOW BEDSIDE ASSESSMENT ADULT - SWALLOW EVAL: THERAPY FREQUENCY
Will followup at the bedside to determine readiness for instrumental assessment and to complete ongoing assessment of speech and language skills.

## 2024-03-10 NOTE — ED ADULT NURSE REASSESSMENT NOTE - NS ED NURSE REASSESS COMMENT FT1
Pt Nadine speaking- pt's son at bedside requesting to translate. Pt's son states "she lives with me, wheelchair bound due to previous strokes with rt sided deficit to RLE ( immobile). Normally verbal but now nolonger, she is following instructions"

## 2024-03-10 NOTE — PHYSICAL THERAPY INITIAL EVALUATION ADULT - ACTIVE RANGE OF MOTION EXAMINATION, REHAB EVAL
no AROM RLE; RLE PROM WNL/bilateral upper extremity Active ROM was WFL (within functional limits)/Left LE Active ROM was WFL (within functional limits)

## 2024-03-10 NOTE — SPEECH LANGUAGE PATHOLOGY EVALUATION - COMMENTS
Neurological (>12): MS: Awake, alert. Follows simple commands. Language: Aphasic and mute. Makes a moan sound during the encounter. CNs: Anisocoria (R 2mm, L 3mm). VFF. EOMI. V1-3 intact LT, R facial droop. L hemianopia (does not BTT), full. Hearing grossly normal (to voice) b/l.   Impression: Acute onset aphasia and L sided weakness, found to have L cerebellar hemorrhage. Will admit for close monitoring and stroke management.     Imagin/10: CTH: Small foci of left cerebellar intraparenchymal hemorrhage with surrounding vasogenic edema. Multifocal chronic infarcts, as outlined above. Mild cerebral atrophy.    Failed dysphagia screen 03/10 @ 0654 d/t "Unable to position upright, Unable to control saliva."     Speech and Swallow Hx: Neurological (>12): MS: Awake, alert. Follows simple commands. Language: Aphasic and mute. Makes a moan sound during the encounter. CNs: Anisocoria (R 2mm, L 3mm). VFF. EOMI. V1-3 intact LT, R facial droop. L hemianopia (does not BTT), full. Hearing grossly normal (to voice) b/l.   Impression: Acute onset aphasia and L sided weakness, found to have L cerebellar hemorrhage. Will admit for close monitoring and stroke management.     Imagin/10: CTH: Small foci of left cerebellar intraparenchymal hemorrhage with surrounding vasogenic edema. Multifocal chronic infarcts, as outlined above. Mild cerebral atrophy.    Failed dysphagia screen 03/10 @ 0654 d/t "Unable to position upright, Unable to control saliva."     Speech and Swallow Hx: Pt well known to this service during admissions in , , , and . Bedside swallow evaluations (BSE) in  and  w/ Rx for regular/thin liquid diet. Rx for soft/thin liquid diet in . Speech language evaluation (SLE) in 2019 revealed nonfluent aphasia with accompanying oral/verbal apraxia of speech. During dmission to [facility] in 2023, BSE rx continuation of easy to chew/thin liquid diet. SLE revealed functional and adequate receptive and expressive language skills. Did not assess. Maintain good oral hygiene.    GOALS: Pt will improve receptive/expressive language for functional communication.  Pt will improve speech intelligibility for functional communication. Did not assess on this encounter. Did not finish complete assessment of verbal expression 2/2 time constraints (pt being picked up by transport for CT scan). Results of this evaluation paired with reports from pt's son revealed pt w/ reduced verbal output, w/ single word responses and difficulty putting thoughts to words as per son. Will complete ongoing assessment while pt is in house. Did not finish complete assessment of verbal expression 2/2 time constraints (pt being picked up by transport for CT scan). Will complete ongoing assessment during skilled ST sessions.

## 2024-03-10 NOTE — SWALLOW BEDSIDE ASSESSMENT ADULT - ASPIRATION PRECAUTIONS
Monitor for s/s aspiration/laryngeal penetration. If noted:  D/C p.o. intake, provide non-oral nutrition/hydration/meds, and contact this service @ [id number]/yes

## 2024-03-10 NOTE — H&P ADULT - NSHPPHYSICALEXAM_GEN_ALL_CORE
General:  Constitutional: Female, appears stated age, in mild distress  Head: Normocephalic; Eyes: clear sclera;   Extremities: No cyanosis; Skin: No yola edema of LE  Resp: breathing comfortably     Neurological (>12):  MS: Awake, alert.  Follows simple commands.   Language: Aphasic and mute. Makes a moan sound during the encounter.   CNs:  Anisocoria (R 2mm, L 3mm). VFF. EOMI. V1-3 intact LT, R facial droop. L hemianopia (does not BTT), full. Hearing grossly normal (to voice) b/l.      Motor: Normal muscle bulk & tone except R leg flaccid paralysis, maintains b/l UE and LLE antigravity. No noted tremor.               Deltoid	Biceps	Triceps	   R	4+	4+	4+	4+		 	  L	5	5	5	5			  	H-Flex	K-Ext	D-Flex	P-Flex  R	0	0	0	0			 	   L	5	5	5	5		     Sensation: Intact to LT b/l except RLE. Cortical: Extinction on DSS (neglect): none Grimaces to noxious stimulus in LUE, LLE and LUE but not RLE.   Coordination: Ataxia could not be demonstrated  Gait: Not tested

## 2024-03-10 NOTE — ED ADULT NURSE NOTE - OBJECTIVE STATEMENT
PT is a 68y7o F bibems from home c/o facial droop. As per EMS, pt woke up at 3am muttering speech and then was unable to speak with L sided facial droop and LUE and LLE deficits. PT son states that pt has had approx 5 strokes in past and has residual R sided deficits. PT now unable to verbalize anything, LKW 3am. PMH of DM, A-fib on Lovenox. PERRL, [id number] LUE and LLE strength noted, LLE and LUE sensation intact. PT ambulatory at baseline. Respirations even and unlabored, abd soft, nondistended and nontender, skin warm, dry and intact. PT Nadine speaking, son at bedside translating. PT is a 68y7o F bibems from home c/o facial droop. As per EMS, pt woke up at 3am muttering speech and then was unable to speak with L sided facial droop and LUE and LLE deficits. PT son states that pt has had approx 5 strokes in past and has residual R sided deficits. PT now unable to verbalize anything, LKW 3am. PMH of DM, A-fib on Lovenox. Decreased LUE and LLE strength noted, LLE and LUE sensation intact. PT ambulatory at baseline. Respirations even and unlabored, abd soft, nondistended and nontender, skin warm, dry and intact. PT Nadine speaking, son at bedside translating. PT is a 68y7o F bibems from home c/o facial droop. As per EMS, pt woke up at 3am muttering speech and then was unable to speak with L sided facial droop and LUE and LLE deficits. PT son states that pt has had approx 5 strokes in past and has residual R sided deficits. PT now unable to verbalize anything, LKW 3am. PMH of DM, A-fib on Lovenox. Decreased LUE and LLE strength noted, LLE and LUE sensation not intact. PT ambulatory at baseline. Respirations even and unlabored, abd soft, nondistended and nontender, skin warm, dry and intact. PT Nadine speaking, son at bedside translating.

## 2024-03-10 NOTE — ED PROVIDER NOTE - CLINICAL SUMMARY MEDICAL DECISION MAKING FREE TEXT BOX
68-year-old female with a history of CVA x 5 with residual right lower extremity deficits, HTN, HLD, A-fib on Lovenox twice daily, on ASA, DM, presents as a code stroke with aphasia, left-sided facial droop and left-sided deficits.  Last known normal when patient went to bed at 10 PM, woke this morning, son heard patient grunting, normally able to speak fluently and in ANO, could not say words and noticed a left-sided facial droop.  Last dose of Lovenox last night.  No signs of trauma or fall, no recent infectious symptoms, glucose 200 in the field.  Review of systems otherwise negative.  Exam as above, patient bradycardic otherwise all vitals here are stable.  Concern for stroke, appears to be ischemic with hemorrhagic transformation in the left cerebellar region on CT, will follow with neuro and neurosurgery, likely TBA stroke unit.

## 2024-03-10 NOTE — PROVIDER CONTACT NOTE (OTHER) - SITUATION
Stroke team at bedside rounding on patient, primary RN addressed concern for B/L LE cool to touch and pulses unobtainable by bedside dopplers.

## 2024-03-10 NOTE — SPEECH LANGUAGE PATHOLOGY EVALUATION - SLP AUTOMATIC SPEECH
requires cueing for days of the week and months of the year tasks intermittently; spoken with slowed rate of speech/counting/days of the week/months of the year

## 2024-03-10 NOTE — H&P ADULT - ASSESSMENT
Assessment: 67 y/o woman with PMH of chronic valvular atrial fibrillation on Lovenox, DM, HTN, HLD presented with new onset aphasia and L sided weakness. Per family she was fully conversant at around 10 pm on 3/9/2024. She has had 5 prior ischemic strokes in the setting of valvular atrial fibrillation. Son noticed aphasia and L sided weakness, has baseline R sided weakness. Is wheelchair bound at baseline due to deficits from prior strokes. Brought to the ED for further evaluation and work up,  Per family her last dose of Lovenox was last night on 3/9/2024.     NIHSS 17  LKW 10 pm 3/9/2024  MRS 5    Impression: Acute onset aphasia and L sided weakness, found to have L cerebellar hemorrhage. Will admit for close monitoring and stroke management.       Plan:  [] Admit to stroke unit  [] neuro checks and vital s q2h  [] Hold Lovenox and aspirin  [] Follow up CT head at 6 hours and 24 hours  [] MR brain w w/o contrast  [] TTE  [] Telemetry  [] Check cbc, A1c, bmp, lipid profile, Mag, phosphorus  [] Sliding scale insulin and fingerstick q6h  [] NPO for now  [] DVT prophylaxis - SCD  [] continue home lipitor and metoprolol  [] Goal BP <160/90      Case discussed with stroke fellow Cornell Gutierrez

## 2024-03-10 NOTE — SWALLOW BEDSIDE ASSESSMENT ADULT - COMMENTS
Neurological (>12): MS: Awake, alert. Follows simple commands. Language: Aphasic and mute. Makes a moan sound during the encounter. CNs: Anisocoria (R 2mm, L 3mm). VFF. EOMI. V1-3 intact LT, R facial droop. L hemianopia (does not BTT), full. Hearing grossly normal (to voice) b/l.   Impression: Acute onset aphasia and L sided weakness, found to have L cerebellar hemorrhage. Will admit for close monitoring and stroke management.     Imagin/10: CTH: Small foci of left cerebellar intraparenchymal hemorrhage with surrounding vasogenic edema. Multifocal chronic infarcts, as outlined above. Mild cerebral atrophy.    Failed dysphagia screen 03/10 @ 0654 d/t "Unable to position upright, Unable to control saliva."     Speech and Swallow Hx: Pt well known to this service during admissions in , , , and . Bedside swallow evaluations (BSE) in  and  w/ Rx for regular/thin liquid diet. Rx for soft/thin liquid diet in . Speech language evaluation (SLE) in 2019 revealed nonfluent aphasia with accompanying oral/verbal apraxia of speech. During admission to [facility] in 2023, BSE rx continuation of easy to chew/thin liquid diet. SLE revealed functional and adequate receptive and expressive language skills. Limited assessment; pt declining additional trials.

## 2024-03-10 NOTE — SWALLOW BEDSIDE ASSESSMENT ADULT - ASR SWALLOW RECOMMEND DIAG
Will followup at the bedside to determine readiness for further instrumental testing (MBS) to visualize swallow mechanism and to determine potential for PO upgrade to least restrictive diet.

## 2024-03-10 NOTE — ED ADULT NURSE NOTE - NSFALLHARMRISKINTERV_ED_ALL_ED
Assistance OOB with selected safe patient handling equipment if applicable/Assistance with ambulation/Communicate risk of Fall with Harm to all staff, patient, and family/Monitor gait and stability/Provide visual cue: red socks, yellow wristband, yellow gown, etc/Reinforce activity limits and safety measures with patient and family/Bed in lowest position, wheels locked, appropriate side rails in place/Call bell, personal items and telephone in reach/Instruct patient to call for assistance before getting out of bed/chair/stretcher/Non-slip footwear applied when patient is off stretcher/[place] to call system/Physically safe environment - no spills, clutter or unnecessary equipment/Purposeful Proactive Rounding/Room/bathroom lighting operational, light cord in reach

## 2024-03-10 NOTE — SWALLOW BEDSIDE ASSESSMENT ADULT - PHARYNGEAL PHASE
Decreased laryngeal elevation Subtle throat clear mid swallow, not duplicated across trials./Decreased laryngeal elevation/Multiple swallows Audible swallows, suspected incoordination; RR appeared to increase on tele; No overt s/s of aspiration across trials; however, pt at risk of aspiration iso acute neurological event./Delayed pharyngeal swallow/Decreased laryngeal elevation/Multiple swallows

## 2024-03-10 NOTE — CHART NOTE - NSCHARTNOTEFT_GEN_A_CORE
Chart and imaging reviewed. 4hr rpt done, with contrast staining in ischemic territory.     -Repeat CTH 4hrs (3pm)  -Please draw Factor X assay.

## 2024-03-10 NOTE — ED PROVIDER NOTE - NSICDXPASTMEDICALHX_GEN_ALL_CORE_FT
PAST MEDICAL HISTORY:  Atrial fibrillation h/o Cardioversion in 2013 and on Coumadin    Chronic atrial fibrillation     CVA (cerebrovascular accident)     DM (diabetes mellitus) Type II    DM (diabetes mellitus), type 2     HLD (hyperlipidemia)     HTN (hypertension)     HTN (hypertension)     Mitral stenosis     TIA (transient ischemic attack) 2013- Rt facial droop which resolved after 2 hours

## 2024-03-10 NOTE — SPEECH LANGUAGE PATHOLOGY EVALUATION - SLP GENERAL OBSERVATIONS
Pt encountered laying supine in bed, awake, alert, on room air, VSS, oriented to name, place with cues, oriented to year but not month. Pt's son at bedside, who offered to provide  services, as pt is Nadine speaking. Son reports pt w/ changes in communication status, more so with expressing herself. Son endorses changes in pt's speech quality. +mild dysarthria noted, +aphasia.

## 2024-03-10 NOTE — PHYSICAL THERAPY INITIAL EVALUATION ADULT - ADDITIONAL COMMENTS
lives with son in private home, 2 steps to enter where family uses wheelchair to enter/exit home. Pt non ambulatory at baseline, son assists patient with pivot transfers from bed <-> chair.

## 2024-03-10 NOTE — SWALLOW BEDSIDE ASSESSMENT ADULT - SWALLOW EVAL: DIAGNOSIS
67 y/o F w/ PMH of 5 prior ischemic strokes iso valvular atrial fibrillation, p/w new onset aphasia and L sided weakness, found to have L cerebellar IPH. Pt p/w evidence of an oropharyngeal dysphagia iso neurological event. Oral phase notable for decreased A-P movement of the bolus, delayed oral transit time, and slowed mastication/bolus management for minced and moist and solid trials. Pharyngeal phase notable for suspected decreased hyolaryngeal elevation upon palpation and audible swallows w/ suspected incoordination within swallow sequence most notably for thin liquid trials, w/ suspected rise in RR after straw sip trials, which may be indicative of laryngeal penetration/aspiration. Given acuity of illness, recommend conservative oral management in interim, w/ plan for MBS when pt is more stable to determine potential for PO upgrade.

## 2024-03-10 NOTE — PHYSICAL THERAPY INITIAL EVALUATION ADULT - PERTINENT HX OF CURRENT PROBLEM, REHAB EVAL
Pt is a 69 y/o woman admitted to [facility] on 3/10/24 with PMH of chronic valvular atrial fibrillation on Lovenox, DM, HTN, HLD presented with new onset aphasia and L sided weakness. Per family she was fully conversant at around 10 pm on 3/9/2024. She has had 5 prior ischemic strokes in the setting of valvular atrial fibrillation. Son noticed aphasia and L sided weakness, has baseline R sided weakness. Is wheelchair bound at baseline due to deficits from prior strokes. Hospital course: Brought to the ED for further evaluation and work up,  Per family her last dose of Lovenox was last night on 3/9/2024. Upon stroke assessment her NIHSS was 17 on arrival. NIHSS 17, MRS 5. Acute onset aphasia and L sided weakness, found to have L cerebellar hemorrhage. Will admit for close monitoring and stroke management. Goal BP <160/90, CTA neck and brain: No large vessel occlusion or significant stenosis. CT Perfusion: Nondiagnostic study secondary to motion restriction. CT brain: Small foci of left cerebellar intraparenchymal hemorrhage with surrounding vasogenic edema. Multifocal chronic infarcts, as outlined above Mild cerebral atrophy. Per neurosx, no acute neurosurgical intervention Pt is a 69 y/o woman admitted to [facility] on 3/10/24 with PMH of chronic valvular atrial fibrillation on Lovenox, DM, HTN, HLD presented with new onset aphasia and L sided weakness. Per family she was fully conversant at around 10 pm on 3/9/2024. She has had 5 prior ischemic strokes in the setting of valvular atrial fibrillation. Son noticed aphasia and L sided weakness, has baseline R sided weakness. Is wheelchair bound at baseline due to deficits from prior strokes. Hospital course: Brought to the ED for further evaluation and work up,  Per family her last dose of Lovenox was last night on 3/9/2024. Upon stroke assessment her NIHSS was 17 on arrival. NIHSS 17, MRS 5. Acute onset aphasia and L sided weakness, found to have L cerebellar hemorrhage. Will admit for close monitoring and stroke management. Goal BP <160/90, CTA neck and brain: No large vessel occlusion or significant stenosis. CT Perfusion: Nondiagnostic study secondary to motion restriction. CT brain: Small foci of left cerebellar intraparenchymal hemorrhage with surrounding vasogenic edema. Multifocal chronic infarcts, as outlined above Mild cerebral atrophy. Per neurosx, no acute neurosurgical intervention; rpt CTH stable; CT C/A/P:

## 2024-03-10 NOTE — SWALLOW BEDSIDE ASSESSMENT ADULT - SWALLOW EVAL: RECOMMENDED FEEDING/EATING TECHNIQUES
allow for swallow between intakes/crush medication (when feasible)/maintain upright posture during/after eating for 30 mins/position upright (90 degrees)

## 2024-03-10 NOTE — PROVIDER CONTACT NOTE (OTHER) - SITUATION
Patient admitted to stroke unit from ED. Upon assessment, patient cool to touch, rectal temp 96.6. Patient also c/o RLE pain in her thigh. R pedal pulse unobtainable with dopplers.

## 2024-03-10 NOTE — CONSULT NOTE ADULT - ASSESSMENT
67 y/o woman with PMH of chronic valvular atrial fibrillation on Lovenox, DM, HTN, HLD presented with new onset aphasia and L sided weakness      1) CVA  -Acute onset aphasia and L sided weakness, found to have L cerebellar hemorrhage.   -lovenox and asa held  -f/u neuro recs    2) Afib  -valvular afib given severe MS rheumatic  -hx of eliquis failure, was on lovenox--currently held for hemorrhage   -c/w metoprolol 25 mg po BID       3) Severe mitral stenosis   -[facility] 12/2023 with severe rheumatic mitral stenosis and KRISTAN thrombus was discharged for outpatient follow up for CTS referral however patient declined surgery.

## 2024-03-10 NOTE — SWALLOW BEDSIDE ASSESSMENT ADULT - SLP PERTINENT HISTORY OF CURRENT PROBLEM
69 y/o woman with PMH of chronic valvular atrial fibrillation on Lovenox, DM, HTN, HLD presented with new onset aphasia and L sided weakness. Per family she was fully conversant at around 10 pm on 3/9/2024. She has had 5 prior ischemic strokes in the setting of valvular atrial fibrillation. Son noticed aphasia and L sided weakness, has baseline R sided weakness. Is wheelchair bound at baseline due to deficits from prior strokes. Brought to the ED for further evaluation and work up,  Per family her last dose of Lovenox was last night on 3/9/2024. Upon stroke assessment her NIHSS was 17 on arrival. MRS 5.

## 2024-03-10 NOTE — CONSULT NOTE ADULT - SUBJECTIVE AND OBJECTIVE BOX
HPI:  69 y/o woman with PMH of chronic valvular atrial fibrillation on Lovenox, DM, HTN, HLD presented with new onset aphasia and L sided weakness. Per family she was fully conversant at around 10 pm on 3/9/2024. She has had 5 prior ischemic strokes in the setting of valvular atrial fibrillation. Son noticed aphasia and L sided weakness, has baseline R sided weakness. Is wheelchair bound at baseline due to deficits from prior strokes. Brought to the ED for further evaluation and work up,  Per family her last dose of Lovenox was last night on 3/9/2024.  Upon stroke assessment her NIHSS was 17 on arrival.  CTh with Small foci of left cerebellar intraparenchymal hemorrhage with   surrounding vasogenic edema. Patient ultimately admitted under neurology service.  Medicine consulted for management of chronic medical illness.       PAST MEDICAL & SURGICAL HISTORY:  HTN (hypertension)      HLD (hyperlipidemia)      DM (diabetes mellitus)  Type II      Atrial fibrillation  h/o Cardioversion in 2013 and on Coumadin      Mitral stenosis      TIA (transient ischemic attack)  2013- Rt facial droop which resolved after 2 hours      Chronic atrial fibrillation      CVA (cerebrovascular accident)      HTN (hypertension)      DM (diabetes mellitus), type 2      Ventral hernia      S/P knee surgery      S/P laser cataract surgery, unspecified laterality  ???      H/O hernia repair    Review of Systems:   CONSTITUTIONAL: No fever, weight loss, or fatigue  EYES: No eye pain, visual disturbances, or discharge  ENMT:  No difficulty hearing, tinnitus, vertigo; No sinus or throat pain  NECK: No pain or stiffness  BREASTS: No pain, masses, or nipple discharge  RESPIRATORY: No cough, wheezing, chills or hemoptysis; No shortness of breath  CARDIOVASCULAR: No chest pain, palpitations, dizziness, or leg swelling  GASTROINTESTINAL: No abdominal or epigastric pain. No nausea, vomiting, or hematemesis; No diarrhea or constipation. No melena or hematochezia.  GENITOURINARY: No dysuria, frequency, hematuria, or incontinence  NEUROLOGICAL: No headaches, memory loss, loss of strength, numbness, or tremors  SKIN: No itching, burning, rashes, or lesions   LYMPH NODES: No enlarged glands  ENDOCRINE: No heat or cold intolerance; No hair loss  MUSCULOSKELETAL: No joint pain or swelling; No muscle, back, or extremity pain  PSYCHIATRIC: No depression, anxiety, mood swings, or difficulty sleeping  HEME/LYMPH: No easy bruising, or bleeding gums  ALLERY AND IMMUNOLOGIC: No hives or eczema    Allergies    No Known Allergies    Intolerances    Social History:     FAMILY HISTORY:  No pertinent family history in first degree relatives    No pertinent family history in first degree relatives        MEDICATIONS  (STANDING):  atorvastatin 80 milliGRAM(s) Oral at bedtime  dextrose 5%. 1000 milliLiter(s) (50 mL/Hr) IV Continuous <Continuous>  dextrose 5%. 1000 milliLiter(s) (100 mL/Hr) IV Continuous <Continuous>  dextrose 50% Injectable 25 Gram(s) IV Push once  dextrose 50% Injectable 12.5 Gram(s) IV Push once  dextrose 50% Injectable 25 Gram(s) IV Push once  glucagon  Injectable 1 milliGRAM(s) IntraMuscular once  insulin lispro (ADMELOG) corrective regimen sliding scale   SubCutaneous every 6 hours  metoprolol succinate ER 25 milliGRAM(s) Oral daily    MEDICATIONS  (PRN):  dextrose Oral Gel 15 Gram(s) Oral once PRN Blood Glucose LESS THAN 70 milliGRAM(s)/deciliter        CAPILLARY BLOOD GLUCOSE  215 (10 Mar 2024 07:09)      POCT Blood Glucose.: 215 mg/dL (10 Mar 2024 06:26)    I&O's Summary      PHYSICAL EXAM:  GENERAL: NAD, well-developed  HEAD:  Atraumatic, Normocephalic  EYES: EOMI, PERRLA, conjunctiva and sclera clear  NECK: Supple, No JVD  CHEST/LUNG: Clear to auscultation bilaterally; No wheeze  HEART: Regular rate and rhythm; No murmurs, rubs, or gallops  ABDOMEN: Soft, Nontender, Nondistended; Bowel sounds present  EXTREMITIES:  2+ Peripheral Pulses, No clubbing, cyanosis, or edema  PSYCH: AAOx3  NEUROLOGY: non-focal  SKIN: No rashes or lesions    LABS:                        9.2    11.90 )-----------( 219      ( 10 Mar 2024 08:59 )             29.4     03-10    132<L>  |  96  |  14  ----------------------------<  191<H>  4.6   |  23  |  0.77    Ca    9.6      10 Mar 2024 08:59  Phos  4.1     03-10  Mg     1.6     03-10    TPro  7.6  /  Alb  4.3  /  TBili  1.1  /  DBili  x   /  AST  16  /  ALT  13  /  AlkPhos  51  03-10    PT/INR - ( 10 Mar 2024 06:30 )   PT: 11.6 sec;   INR: 1.06 ratio       PTT - ( 10 Mar 2024 06:30 )  PTT:39.9 sec    Urinalysis Basic - ( 10 Mar 2024 08:59 )    Color: x / Appearance: x / SG: x / pH: x  Gluc: 191 mg/dL / Ketone: x  / Bili: x / Urobili: x   Blood: x / Protein: x / Nitrite: x   Leuk Esterase: x / RBC: x / WBC x   Sq Epi: x / Non Sq Epi: x / Bacteria: x    RADIOLOGY & ADDITIONAL TESTS:    Imaging Personally Reviewed:    Consultant(s) Notes Reviewed:      Care Discussed with Consultants/Other Providers:

## 2024-03-10 NOTE — H&P ADULT - NS ATTEND AMEND GEN_ALL_CORE FT
I reviewed available diagnostic studies, and reviewed images personally. I agree with resident's history, exam, orders placed, and plan of care. Medical issues needing to be addressed include: Nontraumatic intracerebral hemorrhage w edema  in L cerebellum, multiple prior cerebral infarction, hx of rheumatic mitral stenosis, prior hx of KRISTAN thrombus developing while on Eliquis (valvular Afib), and labile INR on coumdin, DM, HTN, HLD. Anticoag/antiplt held. elevated lactate, AMS, speech disturbance also. infectioni work up including UA/Bcx, if Bcx positive, plan BUBBA in addition to TTE. Family did not want mitral valve surgery in the past per record. Cardiology involved, appreciate assistance. Suspect possible infarction w/ hemorrhagic conversion, but will f up on MRI. I reviewed available diagnostic studies, and reviewed images personally. I agree with resident's history, exam, orders placed, and plan of care. Medical issues needing to be addressed include: Nontraumatic intracerebral hemorrhage w edema  in L cerebellum, multiple prior cerebral infarction, hx of rheumatic mitral stenosis, prior hx of KRISTAN thrombus developing while on Eliquis (valvular Afib), and labile INR on coumdin, DM, HTN, HLD. Anticoag/antiplt held. elevated lactate, AMS, speech disturbance also. infectioni work up including UA/Bcx, if Bcx positive, plan BUBBA in addition to TTE. Family did not want mitral valve surgery in the past per record. Cardiology involved, appreciate assistance. Suspect possible infarction w/ hemorrhagic conversion vs primary cerebellar hemorrhage in the setting of anticoag use, but will f up on MRI.

## 2024-03-10 NOTE — SWALLOW BEDSIDE ASSESSMENT ADULT - SLP GENERAL OBSERVATIONS
Pt encountered laying supine in bed, awake, alert, on room air, VSS. Pt's son at bedside, who offered to provider  services, as pt is Nadine speaking. Son reports pt w/ changes in communication status, more so with expressing herself. Son endorses changes in pt's speech quality. He reports encouraging pt to swallow her saliva earlier this admission. Pt's son reports pt was consuming regular/thin liquid diet PTA. Pt encountered laying supine in bed, awake, alert, on room air, VSS, oriented to name, place with cues, oriented to year but not month. Pt's son at bedside, who offered to provider  services, as pt is Nadine speaking. Son reports pt w/ changes in communication status, more so with expressing herself. Son endorses changes in pt's speech quality. He reports encouraging pt to swallow her saliva earlier this admission. Pt's son reports pt was consuming regular/thin liquid diet PTA. +mild dysarthria noted, +aphasia.

## 2024-03-10 NOTE — CONSULT NOTE ADULT - ASSESSMENT
68F Hx Afib on SQL twice daily, ASA, many multiple CVA w/ residual RLE deficits per pt, T2DM. P/w new onset Lt facial droop/Lt side deficits, LKN 10pm. CTH/CTP/CTA no clear acute ischemic territory. Shows chronic large Lt frontal infarct ?subacute heme in Lt cerebellar stroke bed not seen on prior imaging Nov/2023. PLTs/coags wnl. Exam: Wide awake, diff FC but AOx1-2 to choices. Rt facial. No Lt droop noted. minimally verbal but asking to go to bathroom. RUE 2/5, LUE 4+/5. RLE 0/5. LLE 4/5. SILT. Diff w/ commands but possible dysmetria on LUE FTN.  -no acute neurosurgical intervention  -SQL and ASA held, Factor Xa sent  -no indication for reversal at this time  -Rpt CTH 1030am and in 24hrs  -Admitted to stroke, recc also vEEG given possibility of seizure vs TIA.  -If rpt CTH stable, ok for q2h checks  -MRI wwo per stroke

## 2024-03-10 NOTE — CONSULT NOTE ADULT - ASSESSMENT
69 y/o woman with PMH of chronic valvular atrial fibrillation on Lovenox, DM, HTN, HLD presented with new onset aphasia and L sided weakness.  Patient admitted for management of acute ischemic stroke.  Medicine consulted for medical optimization and management.

## 2024-03-11 NOTE — OCCUPATIONAL THERAPY INITIAL EVALUATION ADULT - ADDITIONAL COMMENTS
Social history obtained from sister-in-law at bedside as pt with impaired cognition at this time. As per family member the patient required assist with all ADLs prior to admit and was w/c bound due to previous stroke. Pt has a HHA for 7-8hrs/day.

## 2024-03-11 NOTE — OCCUPATIONAL THERAPY INITIAL EVALUATION ADULT - PLANNED THERAPY INTERVENTIONS, OT EVAL
ADL retraining/transfer training ADL retraining/bed mobility training/cognitive, visual perceptual/transfer training

## 2024-03-11 NOTE — CHART NOTE - NSCHARTNOTEFT_GEN_A_CORE
*****NEUROLOGY ACP EVENT NOTE*****  HPI:    67 y/o woman with PMH of chronic valvular atrial fibrillation on Lovenox, DM, HTN, HLD presented with new onset aphasia and L sided weakness. Per family she was fully conversant at around 10 pm on 3/9/2024. She has had 5 prior ischemic strokes in the setting of valvular atrial fibrillation. Son noticed aphasia and L sided weakness, has baseline R sided weakness. Is wheelchair bound at baseline due to deficits from prior strokes. Brought to the ED for further evaluation and work up,  Per family her last dose of Lovenox was last night on 3/9/2024. Found to have acute right cerebellar ischemic infarct and right corona radiata along with subacute/acute left cerebellar ischemic infarct with hemorrhagic transformation and right frontal and left frontal ischemic infarcts etiology cardioembolic in the setting of a-fib and atrial appendage thrombus unchanged since 11/2023 with missed dose of Lovenox. 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      EVENT: Called to patient's bedside by RN for change in neurological assessment. Patient noted to have fluctuation in NIHSS from 9 to 14 (worsening b/l lower extremity motor function, facial palsy, answers neither LOC question correct when previously answered 1 correct).  Pt also c/o b/l leg pain (R > L).     PHYSICAL EXAM:    Vital Signs Last 24 Hrs  T(C): 36.8 (11 Mar 2024 18:00), Max: 36.8 (11 Mar 2024 18:00)  T(F): 98.2 (11 Mar 2024 18:00), Max: 98.2 (11 Mar 2024 18:00)  HR: 92 (11 Mar 2024 20:00) (87 - 104)  BP: 129/69 (11 Mar 2024 20:00) (115/66 - 158/64)  BP(mean): 88 (11 Mar 2024 20:00) (85 - 103)  RR: 23 (11 Mar 2024 20:00) (14 - 27)  SpO2: 98% (11 Mar 2024 20:00) (96% - 100%)    Parameters below as of 11 Mar 2024 12:35  Patient On (Oxygen Delivery Method): room air      General: Looks older than stated age   HEENT: EOM intact, visual fields full  Abdomen: Soft, nontender, nondistended   Extremities: No edema, b/l LE cool to palpation    NEUROLOGICAL EXAM: translation provided by son at bedside   Mental status: Awake, alert, oriented to person. Can follow simple commands.   Cranial Nerves: mild R facial droop, no nystagmus, moderate to severe dysarthria,  tongue midline  Motor exam: Normal tone, 4/5 RUE with drift and LUE with no drift. RLE with no movement and LLE some effort against gravity   Sensation: Intact to light touch   Coordination/ Gait: No dysmetria, gait deferred       IMAGING: CT Brain Stroke Protocol (03.10.24): Small foci of left cerebellar intraparenchymal hemorrhage with surrounding vasogenic edema. Multifocal chronic infarcts, as outlined above. Mild cerebral atrophy.    CT Brain Perfusion Maps Stroke (03.10.24)/CTA neck and brain: No large vessel occlusion or significant stenosis.  CT Perfusion: Nondiagnostic study secondary to motion restriction.    CT Head No Cont (03.10.24): Interval increased yola hemorrhagic transformation of left inferior cerebellar hemisphere acute infarction. Parenchymal hemorrhage now measures 3.7 by the 2.8 cm in the axial plane. No significant mass effect on the fourth ventricle. No inferior displacement of the cerebellar tonsils. Nohydrocephalus.    CT Head No Cont (03.10.24): Mildly improved hyperdensity involving the left inferior cerebellar hemisphere suggesting improved contrast staining versus hemorrhage. Subacute/chronic right cerebellar hemisphere infarctions. Consider MRI for further evaluation.    CT Abdomen and Pelvis w/ IV Cont (03.11.24)    CHEST: Filling defect within the left atrial appendage, concerning for thrombus, also present on 11/25/2023. Small pericardial effusion, increased since 11/25/2023. Unchanged prominent nonspecific mediastinal lymph nodes.    ABDOMEN AND PELVIS: Filling defects within the distal celiac artery extending into the proximal common hepatic artery, distal right common femoral artery extending into the proximal superficial and deep femoral arteries, right internal iliac artery, and left internal iliac artery, concerning for thrombus and may be related to thromboembolic event. Thrombus within the right femoral system appears to be occlusive or nearly occlusive with reconstitution of the imaged superficial and deep femoral arteries. Thrombus within the right internal iliac artery also appears occlusive with reconstitution of distal branches. Patchy wedge-shaped areas of hypoenhancement within the spleen, concerning for infarcts, with artifact from phase of contrast timing thought to be less likely. Patchy wedge-shaped regions of hypoenhancement within both kidneys, suspicious for infarcts. Pyelonephritis can have a similar appearance. Correlate with urinalysis. Nonspecific pericholecystic edema. Consider right upper quadrant ultrasound further evaluation. Mild bilateral adrenal gland thickening with hyperenhancement, which can be seen with hypoperfusion complex.    MR Head w/wo IV Cont (03.10.24): Subacute infarct with subacute hemorrhagic transformation in the left inferior cerebellum. Numerous acute/subacute infarcts in the bilateral cerebrum and cerebellum as described above, suggestive of embolic etiology. Numerous chronic infarcts as described above.    Non contrast CTH 3/11/2023: Moderate atrophy. Large acute right cerebellar infarct with a small amount of petechial hemorrhage. Subacute to chronic left medial cerebellar infarct with hemosiderin staining    Impression: Acute onset aphasia and L sided weakness due to numerous acute/subacute infarcts in the bilateral cerebrum and cerebellum with subacute hemorrhagic transformation in the right inferior cerebellum likely due to ESUS. Patient on full dose lovenox, but reported occasional missed doses of medication.       Plan:  -Continue to monitor for worsening neurological changes; f/u CT ordered in AM; can be done sooner if worsening neurological changes occur; discussed with stroke fellow.   -vascular surgery consult requested due to imaging findings.   -DVT ppx d/c'ed by NSGY; recommended one more stable CTH finding before resuming. Advised to begin 2% 30 ml/hr (repeat BMP q 6 hrs).   -Nursing Interventions: Continue Telemetry Monitor, Continuous Pulse Oximeter, Neurochecks q 2 hours, Continue frequent oral care and reorientation    Time spent with patient:  30 minutes.

## 2024-03-11 NOTE — CONSULT NOTE ADULT - SUBJECTIVE AND OBJECTIVE BOX
VASCULAR SURGERY CONSULT NOTE  --------------------------------------------------------------------------------------------    Patient is a 68y old  Female who presents with a chief complaint of ICH (11 Mar 2024 12:08)      HPI: HPI:  69 y/o woman with PMH of chronic valvular atrial fibrillation on Lovenox, DM, HTN, HLD presented with new onset aphasia and L sided weakness. Per family she was fully conversant at around 10 pm on 3/9/2024. She has had 5 prior ischemic strokes in the setting of valvular atrial fibrillation. Son noticed aphasia and L sided weakness, has baseline R sided weakness. Is wheelchair bound at baseline due to deficits from prior strokes. Brought to the ED for further evaluation and work up,  Per family her last dose of Lovenox was last night on 3/9/2024.    Worsening RLE pain throughout the day. AC stopped due to hemorraghic conversion.         ROS: 10-system review is otherwise negative except HPI above.      PAST MEDICAL & SURGICAL HISTORY:  HTN (hypertension)      HLD (hyperlipidemia)      DM (diabetes mellitus)  Type II      Atrial fibrillation  h/o Cardioversion in 2013 and on Coumadin      Mitral stenosis      TIA (transient ischemic attack)  2013- Rt facial droop which resolved after 2 hours      Chronic atrial fibrillation      CVA (cerebrovascular accident)      HTN (hypertension)      DM (diabetes mellitus), type 2      Ventral hernia      S/P knee surgery      S/P laser cataract surgery, unspecified laterality  ???      H/O hernia repair        FAMILY HISTORY:  No pertinent family history in first degree relatives    No pertinent family history in first degree relatives        SOCIAL HISTORY:      ALLERGIES: No Known Allergies      HOME MEDICATIONS:     CURRENT MEDICATIONS  MEDICATIONS (STANDING): atorvastatin 80 milliGRAM(s) Oral at bedtime  dextrose 5%. 1000 milliLiter(s) IV Continuous <Continuous>  dextrose 5%. 1000 milliLiter(s) IV Continuous <Continuous>  dextrose 50% Injectable 25 Gram(s) IV Push once  dextrose 50% Injectable 12.5 Gram(s) IV Push once  dextrose 50% Injectable 25 Gram(s) IV Push once  glucagon  Injectable 1 milliGRAM(s) IntraMuscular once  insulin lispro (ADMELOG) corrective regimen sliding scale   SubCutaneous Before meals and at bedtime  metoprolol succinate ER 25 milliGRAM(s) Oral daily  sodium chloride 2% . 1000 milliLiter(s) IV Continuous <Continuous>    MEDICATIONS (PRN):dextrose Oral Gel 15 Gram(s) Oral once PRN Blood Glucose LESS THAN 70 milliGRAM(s)/deciliter    --------------------------------------------------------------------------------------------    Vitals:   T(C): 36.8 (03-11-24 @ 18:00), Max: 36.8 (03-11-24 @ 18:00)  HR: 92 (03-11-24 @ 20:00) (87 - 106)  BP: 129/69 (03-11-24 @ 20:00) (105/80 - 158/64)  RR: 23 (03-11-24 @ 20:00) (14 - 27)  SpO2: 98% (03-11-24 @ 20:00) (96% - 100%)  CAPILLARY BLOOD GLUCOSE      POCT Blood Glucose.: 185 mg/dL (11 Mar 2024 21:45)  POCT Blood Glucose.: 157 mg/dL (11 Mar 2024 17:56)  POCT Blood Glucose.: 171 mg/dL (11 Mar 2024 11:31)  POCT Blood Glucose.: 163 mg/dL (11 Mar 2024 07:39)    CAPILLARY BLOOD GLUCOSE      POCT Blood Glucose.: 185 mg/dL (11 Mar 2024 21:45)  POCT Blood Glucose.: 157 mg/dL (11 Mar 2024 17:56)  POCT Blood Glucose.: 171 mg/dL (11 Mar 2024 11:31)  POCT Blood Glucose.: 163 mg/dL (11 Mar 2024 07:39)      03-10 @ 07:01  -  03-11 @ 07:00  --------------------------------------------------------  IN:  Total IN: 0 mL    OUT:    Voided (mL): 600 mL  Total OUT: 600 mL    Total NET: -600 mL      03-11 @ 07:01  -  03-11 @ 23:27  --------------------------------------------------------  IN:  Total IN: 0 mL    OUT:    Intermittent Catheterization - Urethral (mL): 500 mL  Total OUT: 500 mL    Total NET: -500 mL          Weight (kg): 55 (03-11 @ 21:04)    PHYSICAL EXAM:     Neuro: A+Ox1  HEENT: NC/AT, EOMI  Neck: Soft, supple  Cardio: Afib, tachy  Resp: Good effort, CTA b/l  GI/Abd: Soft, NT/ND, no rebound/guarding, no masses palpated  Vascular: Palp fems bilaterally. No DP/PT signals RLE. 0/5 strength RLE. 5/5 LLE  --------------------------------------------------------------------------------------------    LABS  CBC (03-11 @ 10:08)                              11.1<L>                         11.95<H>  )----------------(  204        --    % Neutrophils, --    % Lymphocytes, ANC: --                                  35.4      BMP (03-11 @ 07:43)             132<L>  |  98      |  17    		Ca++ --      Ca 10.4               ---------------------------------( 147<H>		Mg 1.8                5.0     |  19<L>   |  0.89  			Ph 4.6<H>          ABG (03-10 @ 20:18)      /  /  /  /  / %     Lactate:  1.6  ABG (03-10 @ 13:28)      /  /  /  /  / %     Lactate:  2.9<H>      --------------------------------------------------------------------------------------------    MICROBIOLOGY  Urinalysis (03-11 @ 14:34):     Color: Yellow / Appearance: Clear / SG: >1.030<H> / pH: 5.5 / Gluc: Negative / Ketones: Negative / Bili: Negative / Urobili: 1.0 / Protein :100<!> / Nitrites: Negative / Leuk.Est: Moderate<!> / RBC: 2 / WBC: 39<H> / Sq Epi:  / Non Sq Epi: 2 / Bacteria Many<!>       -> .Blood Blood-Peripheral Culture (03-10 @ 12:35)     NG    NG    No growth at 24 hours    -> .Blood Blood-Peripheral Culture (03-10 @ 12:20)     NG    NG    No growth at 24 hours      --------------------------------------------------------------------------------------------    IMAGING     VASCULAR SURGERY CONSULT NOTE  --------------------------------------------------------------------------------------------    Patient is a 68y old  Female who presents with a chief complaint of ICH (11 Mar 2024 12:08)      HPI: HPI:  69 y/o woman with PMH of chronic valvular atrial fibrillation on Lovenox, DM, HTN, HLD presented with new onset aphasia and L sided weakness. Per family she was fully conversant at around 10 pm on 3/9/2024. She has had 5 prior ischemic strokes in the setting of valvular atrial fibrillation. Son noticed aphasia and L sided weakness, has baseline R sided weakness. Is wheelchair bound at baseline due to deficits from prior strokes. Brought to the ED for further evaluation and work up,  Per family her last dose of Lovenox was last night on 3/9/2024.    Worsening RLE pain throughout the day. AC stopped due to hemorraghic conversion.         ROS: 10-system review is otherwise negative except HPI above.      PAST MEDICAL & SURGICAL HISTORY:  HTN (hypertension)      HLD (hyperlipidemia)      DM (diabetes mellitus)  Type II      Atrial fibrillation  h/o Cardioversion in 2013 and on Coumadin      Mitral stenosis      TIA (transient ischemic attack)  2013- Rt facial droop which resolved after 2 hours      Chronic atrial fibrillation      CVA (cerebrovascular accident)      HTN (hypertension)      DM (diabetes mellitus), type 2      Ventral hernia      S/P knee surgery      S/P laser cataract surgery, unspecified laterality  ???      H/O hernia repair        FAMILY HISTORY:  No pertinent family history in first degree relatives    No pertinent family history in first degree relatives        SOCIAL HISTORY:      ALLERGIES: No Known Allergies      HOME MEDICATIONS:     CURRENT MEDICATIONS  MEDICATIONS (STANDING): atorvastatin 80 milliGRAM(s) Oral at bedtime  dextrose 5%. 1000 milliLiter(s) IV Continuous <Continuous>  dextrose 5%. 1000 milliLiter(s) IV Continuous <Continuous>  dextrose 50% Injectable 25 Gram(s) IV Push once  dextrose 50% Injectable 12.5 Gram(s) IV Push once  dextrose 50% Injectable 25 Gram(s) IV Push once  glucagon  Injectable 1 milliGRAM(s) IntraMuscular once  insulin lispro (ADMELOG) corrective regimen sliding scale   SubCutaneous Before meals and at bedtime  metoprolol succinate ER 25 milliGRAM(s) Oral daily  sodium chloride 2% . 1000 milliLiter(s) IV Continuous <Continuous>    MEDICATIONS (PRN):dextrose Oral Gel 15 Gram(s) Oral once PRN Blood Glucose LESS THAN 70 milliGRAM(s)/deciliter    --------------------------------------------------------------------------------------------    Vitals:   T(C): 36.8 (03-11-24 @ 18:00), Max: 36.8 (03-11-24 @ 18:00)  HR: 92 (03-11-24 @ 20:00) (87 - 106)  BP: 129/69 (03-11-24 @ 20:00) (105/80 - 158/64)  RR: 23 (03-11-24 @ 20:00) (14 - 27)  SpO2: 98% (03-11-24 @ 20:00) (96% - 100%)  CAPILLARY BLOOD GLUCOSE      POCT Blood Glucose.: 185 mg/dL (11 Mar 2024 21:45)  POCT Blood Glucose.: 157 mg/dL (11 Mar 2024 17:56)  POCT Blood Glucose.: 171 mg/dL (11 Mar 2024 11:31)  POCT Blood Glucose.: 163 mg/dL (11 Mar 2024 07:39)    CAPILLARY BLOOD GLUCOSE      POCT Blood Glucose.: 185 mg/dL (11 Mar 2024 21:45)  POCT Blood Glucose.: 157 mg/dL (11 Mar 2024 17:56)  POCT Blood Glucose.: 171 mg/dL (11 Mar 2024 11:31)  POCT Blood Glucose.: 163 mg/dL (11 Mar 2024 07:39)      03-10 @ 07:01  -  03-11 @ 07:00  --------------------------------------------------------  IN:  Total IN: 0 mL    OUT:    Voided (mL): 600 mL  Total OUT: 600 mL    Total NET: -600 mL      03-11 @ 07:01  -  03-11 @ 23:27  --------------------------------------------------------  IN:  Total IN: 0 mL    OUT:    Intermittent Catheterization - Urethral (mL): 500 mL  Total OUT: 500 mL    Total NET: -500 mL          Weight (kg): 55 (03-11 @ 21:04)    PHYSICAL EXAM:     Neuro: A+Ox1  HEENT: NC/AT, EOMI  Neck: Soft, supple  Cardio: Afib, tachy  Resp: Good effort, CTA b/l  GI/Abd: Soft, NT/ND, no rebound/guarding, no masses palpated  Vascular: Palp fems bilaterally. No DP/PT signals RLE. 0/5 strength RLE. 5/5 LLE  --------------------------------------------------------------------------------------------    LABS  CBC (03-11 @ 10:08)                              11.1<L>                         11.95<H>  )----------------(  204        --    % Neutrophils, --    % Lymphocytes, ANC: --                                  35.4      BMP (03-11 @ 07:43)             132<L>  |  98      |  17    		Ca++ --      Ca 10.4               ---------------------------------( 147<H>		Mg 1.8                5.0     |  19<L>   |  0.89  			Ph 4.6<H>          ABG (03-10 @ 20:18)      /  /  /  /  / %     Lactate:  1.6  ABG (03-10 @ 13:28)      /  /  /  /  / %     Lactate:  2.9<H>      --------------------------------------------------------------------------------------------    MICROBIOLOGY  Urinalysis (03-11 @ 14:34):     Color: Yellow / Appearance: Clear / SG: >1.030<H> / pH: 5.5 / Gluc: Negative / Ketones: Negative / Bili: Negative / Urobili: 1.0 / Protein :100<!> / Nitrites: Negative / Leuk.Est: Moderate<!> / RBC: 2 / WBC: 39<H> / Sq Epi:  / Non Sq Epi: 2 / Bacteria Many<!>       -> .Blood Blood-Peripheral Culture (03-10 @ 12:35)     NG    NG    No growth at 24 hours    -> .Blood Blood-Peripheral Culture (03-10 @ 12:20)     NG    NG    No growth at 24 hours

## 2024-03-11 NOTE — PROGRESS NOTE ADULT - ASSESSMENT
ASSESSMENT:     INCOMPLETE   NEURO: Neurologically with ___. 8 hour repeat CTH w/ improved contrast staining. Pending repeat CTH this AM. Continue close monitoring for neurologic deterioration, goal for SBP < 160. A1C- 7.0, LDL - 33. Will c/w home statin dose. MRI Brain w/o and w/ pending read. Physical therapy/OT reccs pending     ANTITHROMBOTIC THERAPY: on hold due to hemorrhage     PULMONARY: CXR clear, protecting airway, saturating well     CARDIOVASCULAR: pending TTE, c/w cardiac monitoring. 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  Hx valvular afib given severe MS rheumatic. Has hx of eliquis failure, was on lovenox. now held due to hemorrhage.  Will c/w metoprolol 25 mg po BID for rate control                           SBP goal: < 160    GASTROINTESTINAL:  dysphagia screen - failed. Seen by speech, now on pureed diet. tolerating well     Diet:  pureed     RENAL: BUN/Cr within normal limits, good urine output      Na Goal: Greater than 135     Brunner: no     HEMATOLOGY: H/H without change, Platelets normal. PEnding LE arterial and venous dopplers due to dec pulses. Pending CT CAP to rule out malgiancny.      DVT ppx: none due to hemm     ID: afebrile, no leukocytosis     OTHER: Plan discussed with patient and family at bedside, all questions and concerns addressed.     DISPOSITION: Rehab or home depending on PT eval once stable and workup is complete    CORE MEASURES:        Admission NIHSS: 17     Tenecteplase: [] YES [x] NO      LDL/HDL: 33/50      Depression Screen: pending     Statin Therapy: yes     Dysphagia Screen: [] PASS [x] FAIL     Smoking [] YES [x] NO [] Smoking cessation and education provided to patient [] Nicotine patch ordered      Afib [x] YES [] NO     Stroke Education [x] YES [] NO    Obtain screening lower extremity venous ultrasound in patients who meet 1 or more of the following criteria as patient is high risk for DVT/PE on admission:   [] History of DVT/PE  []Hypercoagulable states (Factor V Leiden, Cancer, OCP, etc. )  []Prolonged immobility (hemiplegia/hemiparesis/post operative or any other extended immobilization)  [] Transferred from outside facility (Rehab or Long term care)  [] Age </= to 50 ASSESSMENT: 69 y/o woman with PMH of chronic valvular atrial fibrillation on Lovenox, DM, HTN, HLD presented with new onset aphasia and L sided weakness. Per family she was fully conversant at around 10 pm on 3/9/2024. She has had 5 prior ischemic strokes in the setting of valvular atrial fibrillation. Son noticed aphasia and L sided weakness, has baseline R sided weakness. Is wheelchair bound at baseline due to deficits from prior strokes. Brought to the ED for further evaluation and work up,  Per family her last dose of Lovenox was last night on 3/9/2024.     NIHSS 17  LKW 10 pm 3/9/2024  MRS 5    Impression: Acute onset aphasia and L sided weakness due to numerous acute/subacute infarcts in the bilateral cerebrum and cerebellum with subacute hemorrhagic transformation in the right inferior cerebellum likely due to ESUS. Patient on full dose lovenox, but reported occasional missed doses of medication.     NEURO: Neurologically with stable exam since admission. Pending repeat CTH this AM. Continue close monitoring for neurologic deterioration, goal for SBP < 160. A1C- 7.0, LDL - 33. Will c/w home statin dose. MRI Brain w/o and w/ with results as above. Physical therapy/OT reccs CELSO.     ANTITHROMBOTIC THERAPY: on hold due to hemorrhage     PULMONARY: CXR clear, protecting airway, saturating well on room air     CARDIOVASCULAR: pending TTE, c/w cardiac monitoring. 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 Hx valvular afib given severe MS rheumatic. Has hx of eliquis failure, was on full dose lovenox. Now held due to hemorrhage. Will c/w metoprolol 25 mg BID for rate control                           SBP goal: < 160    GASTROINTESTINAL:  dysphagia screen - failed. Seen by speech, now on pureed diet. Tolerating well     Diet:  pureed     RENAL: BUN/Cr within normal limits, good urine output      Na Goal: Greater than 135     Brunner: no     HEMATOLOGY: H/H without change, Platelets normal. Pending LE arterial and venous dopplers due to dec pulses. CT CAP shows multiple thromboembolic events in the abdominal vasculature. Hypoenhancement within the spleen, both kidneys suspicious for infarcts. Mild bilateral adrenal gland thickening with hyper enhancement, which can be seen with hypoperfusion complex. Unclear at this time why patient is hypercoagulable, hematology consulted for recommendations. Will hold off starting AC at this time due to hemorrhagic conversion.       DVT ppx: lovenox subq     ID: afebrile, no leukocytosis     OTHER: Plan discussed with patient and family at bedside, all questions and concerns addressed.     DISPOSITION: [facility] once stable and workup is complete    CORE MEASURES:        Admission NIHSS: 17     Tenecteplase: [] YES [x] NO      LDL/HDL: 33/50      Depression Screen: pending     Statin Therapy: yes     Dysphagia Screen: [] PASS [x] FAIL     Smoking [] YES [x] NO [] Smoking cessation and education provided to patient [] Nicotine patch ordered      Afib [x] YES [] NO     Stroke Education [x] YES [] NO    Obtain screening lower extremity venous ultrasound in patients who meet 1 or more of the following criteria as patient is high risk for DVT/PE on admission:   [] History of DVT/PE  []Hypercoagulable states (Factor V Leiden, Cancer, OCP, etc. )  []Prolonged immobility (hemiplegia/hemiparesis/post operative or any other extended immobilization)  [] Transferred from outside facility (Rehab or Long term care)  [] Age </= to 50

## 2024-03-11 NOTE — PROGRESS NOTE ADULT - ASSESSMENT
69 y/o woman with PMH of chronic valvular atrial fibrillation on Lovenox, DM, HTN, HLD presented with new onset aphasia and L sided weakness      1) CVA  -Acute onset aphasia and L sided weakness, found to have L cerebellar hemorrhage.   -lovenox and asa  -f/u neuro recs    2) Afib  -valvular afib given severe MS rheumatic  -hx of eliquis failure, was on lovenox--now restarted   -c/w metoprolol 25 mg po BID       3) Severe mitral stenosis   -BUBBA 12/2023 with severe rheumatic mitral stenosis and KRISTAN thrombus was discharged for outpatient follow up for CTS referral however patient declined surgery.   - Given extensive thrombus burden in arterial system recommend Vascular eval , monitor end organ function

## 2024-03-11 NOTE — CONSULT NOTE ADULT - ASSESSMENT
68F Hx Afib on SQL twice daily, ASA, many multiple CVA w/ residual RLE deficits per pt, T2DM presented with hemorragic concervsion of old strokes. T lovenox stopped. CTA CAP showing large atrial thrombus with emboli in celiac axis and celiac branches, splee, kidney, R internal iliac, and R SFA. RLE dupplex with R femoropopliteal occlusion without tibial flow.     Discussed patient the covering neurology fellow. Patient cannot be anticoagulated at this time due to ICH. Patient in difficult clinical situation in the presence of multiple emboli and lack of AC options. Grim prognosis for limb salvage. Embolic events will continue. She is at high risk for mesenteric ischemia and worsening lower extremity ischemia.     Plan:   -AC when able from ICH standpoint  -Monitor lower extremity exam   -Discussed with family at bedside.     D/W Dr. Vigil  68F Hx Afib on SQL twice daily, ASA, many multiple CVA w/ residual RLE deficits per pt, T2DM presented with hemorragic concervsion of old strokes. T lovenox stopped. CTA CAP showing large atrial thrombus with emboli in celiac axis and celiac branches, splee, kidney, R internal iliac, and R SFA. RLE dupplex with R femoropopliteal occlusion without tibial flow.     Discussed patient the covering neurology fellow. Patient cannot be anticoagulated at this time due to ICH. Patient in difficult clinical situation in the presence of multiple emboli and lack of AC options. Grim prognosis for limb salvage. Embolic events will continue. She is at high risk for mesenteric ischemia and worsening lower extremity ischemia.     Plan:   -AC when able from ICH standpoint  if anticoag rx cannot me  implemented in the near future  d/w pt's son   comfort measures   -Monitor lower extremity exam   -Discussed with family at bedside.   reconsult prn if anticoag rx if permitted from a clinical standpoint and is cleared by neurology      D/W Dr. Vigil

## 2024-03-11 NOTE — PROGRESS NOTE ADULT - ASSESSMENT
68F Hx Afib on SQL twice daily, ASA, many multiple CVA w/ residual RLE deficits per pt, T2DM. P/w new onset Lt facial droop/Lt side deficits, LKN 10pm. CTH/CTP/CTA no clear acute ischemic territory. Shows chronic large Lt frontal infarct ?subacute heme in Lt cerebellar stroke bed not seen on prior imaging Nov/2023. PLTs/coags wnl. Exam: Wide awake, FC, AOx3, Rt facial, minimally verbal, no Lt droop noted. RUE incr tone and 4/5, LUE 4+/5. RLE trace wd. LLE 4/5. SILT  -no acute neurosurgical intervention. Admitted Stroke w/ q2h neuro checks   -SQL and ASA held, Factor Xa sent  -no indication for reversal at this time  -Rpt CTH 24hrs  -MRI wwo per stroke done

## 2024-03-11 NOTE — OCCUPATIONAL THERAPY INITIAL EVALUATION ADULT - PERTINENT HX OF CURRENT PROBLEM, REHAB EVAL
68F Hx Afib on SQL twice daily, ASA, many multiple CVA w/ residual RLE deficits per pt, T2DM. P/w new onset Lt facial droop/Lt side deficits, LKN 10pm. CTH/CTP/CTA no clear acute ischemic territory. Shows chronic large Lt frontal infarct ?subacute heme in Lt cerebellar stroke bed not seen on prior imaging Nov/2023. PLTs/coags wnl. Exam: Wide awake, FC, AOx3, Rt facial, minimally verbal, no Lt droop noted. RUE incr tone and 4/5, LUE 4+/5. RLE trace wd. LLE 4/5. SILT  -no acute neurosurgical intervention. Admitted Stroke w/ q2h neuro checks

## 2024-03-11 NOTE — PROGRESS NOTE ADULT - SUBJECTIVE AND OBJECTIVE BOX
Chintan Owusu MD  Interventional Cardiology / Endovascular Specialist  [place] Office : [phone] [place] [address] [place] [address]  Tel:   [place] Office : [phone] [facility] [place] [address]  Tel: [phone]    Subjective/Overnight events: Patient lying in bed comfortably. No acute distress. Denies chest pain, SOB or palpitations  	  MEDICATIONS:  enoxaparin Injectable 40 milliGRAM(s) SubCutaneous every 24 hours  metoprolol succinate ER 25 milliGRAM(s) Oral daily  atorvastatin 80 milliGRAM(s) Oral at bedtime  dextrose 50% Injectable 25 Gram(s) IV Push once  dextrose 50% Injectable 12.5 Gram(s) IV Push once  dextrose 50% Injectable 25 Gram(s) IV Push once  dextrose Oral Gel 15 Gram(s) Oral once PRN  glucagon  Injectable 1 milliGRAM(s) IntraMuscular once  insulin lispro (ADMELOG) corrective regimen sliding scale   SubCutaneous Before meals and at bedtime  dextrose 5%. 1000 milliLiter(s) IV Continuous <Continuous>  dextrose 5%. 1000 milliLiter(s) IV Continuous <Continuous>      PAST MEDICAL/SURGICAL HISTORY  PAST MEDICAL & SURGICAL HISTORY:  HTN (hypertension)      HLD (hyperlipidemia)      DM (diabetes mellitus)  Type II      Atrial fibrillation  h/o Cardioversion in 2013 and on Coumadin      Mitral stenosis      TIA (transient ischemic attack)  2013- Rt facial droop which resolved after 2 hours      Chronic atrial fibrillation      CVA (cerebrovascular accident)      HTN (hypertension)      DM (diabetes mellitus), type 2      Ventral hernia      S/P knee surgery      S/P laser cataract surgery, unspecified laterality  ???      H/O hernia repair          SOCIAL HISTORY: Substance Use (street drugs): ( x ) never used  (  ) other:    FAMILY HISTORY:  No pertinent family history in first degree relatives    No pertinent family history in first degree relatives        REVIEW OF SYSTEMS:  CONSTITUTIONAL: No fever, weight loss, or fatigue  EYES: No eye pain, visual disturbances, or discharge  ENMT:  No difficulty hearing, tinnitus, vertigo; No sinus or throat pain  BREASTS: No pain, masses, or nipple discharge  GASTROINTESTINAL: No abdominal or epigastric pain. No nausea, vomiting, or hematemesis; No diarrhea or constipation. No melena or hematochezia.  GENITOURINARY: No dysuria, frequency, hematuria, or incontinence  NEUROLOGICAL: No headaches, memory loss, loss of strength, numbness, or tremors  ENDOCRINE: No heat or cold intolerance; No hair loss  MUSCULOSKELETAL: No joint pain or swelling; No muscle, back, or extremity pain  PSYCHIATRIC: No depression, anxiety, mood swings, or difficulty sleeping  HEME/LYMPH: No easy bruising, or bleeding gums  All others negative    PHYSICAL EXAM:  T(C): 36.7 (03-11-24 @ 12:00), Max: 36.7 (03-11-24 @ 12:00)  HR: 88 (03-11-24 @ 14:00) (87 - 106)  BP: 136/76 (03-11-24 @ 14:00) (105/80 - 158/64)  RR: 18 (03-11-24 @ 14:00) (14 - 27)  SpO2: 97% (03-11-24 @ 14:00) (96% - 100%)  Wt(kg): --  I&O's Summary    10 Mar 2024 07:01  -  11 Mar 2024 07:00  --------------------------------------------------------  IN: 0 mL / OUT: 600 mL / NET: -600 mL    11 Mar 2024 07:01  -  11 Mar 2024 16:09  --------------------------------------------------------  IN: 0 mL / OUT: 500 mL / NET: -500 mL    GENERAL: NAD  EYES: conjunctiva and sclera clear  ENMT: No tonsillar erythema, exudates, or enlargement  Cardiovascular: Normal S1 S2, No JVD, No murmurs, No edema  Respiratory: Lungs clear to auscultation	  Gastrointestinal:  Soft  Extremities: No edema, warm                          11.1   11.95 )-----------( 204      ( 11 Mar 2024 10:08 )             35.4     03-11    132<L>  |  98  |  17  ----------------------------<  147<H>  5.0   |  19<L>  |  0.89    Ca    10.4      11 Mar 2024 07:43  Phos  4.6     03-11  Mg     1.8     03-11    TPro  7.6  /  Alb  4.3  /  TBili  1.1  /  DBili  x   /  AST  16  /  ALT  13  /  AlkPhos  51  03-10    proBNP:   Lipid Profile:   HgA1c:   TSH:     Consultant(s) Notes Reviewed:  [x ] YES  [ ] NO    Care Discussed with Consultants/Other Providers [ x] YES  [ ] NO    Imaging Personally Reviewed independently:  [x] YES  [ ] NO    All labs, radiologic studies, vitals, orders and medications list reviewed. Patient is seen and examined at bedside. Case discussed with medical team.

## 2024-03-11 NOTE — OCCUPATIONAL THERAPY INITIAL EVALUATION ADULT - ADL RETRAINING, OT EVAL
Pt will perform UB dressing with supervision within 4 weeks. Pt will perform grooming with supervision within 4 weeks.

## 2024-03-11 NOTE — PROGRESS NOTE ADULT - SUBJECTIVE AND OBJECTIVE BOX
Patient seen and examined at bedside.    --Anticoagulation--    T(C): 36.4 (03-10-24 @ 20:01), Max: 36.4 (03-10-24 @ 20:01)  HR: 102 (03-11-24 @ 06:00) (71 - 106)  BP: 119/85 (03-11-24 @ 06:00) (105/80 - 160/74)  RR: 27 (03-11-24 @ 06:00) (14 - 27)  SpO2: 96% (03-11-24 @ 06:00) (96% - 100%)  Wt(kg): --    Exam: Wide awake, FC, AOx3, Rt facial, minimally verbal, no Lt droop noted. RUE incr tone and 4/5, LUE 4+/5. RLE trace wd. LLE 4/5. SILT

## 2024-03-11 NOTE — PROGRESS NOTE ADULT - SUBJECTIVE AND OBJECTIVE BOX
THE PATIENT WAS SEEN AND EXAMINED BY ME WITH THE HOUSESTAFF AND STROKE TEAM DURING MORNING ROUNDS.   HPI:  69 y/o woman with PMH of chronic valvular atrial fibrillation on Lovenox, DM, HTN, HLD presented with new onset aphasia and L sided weakness. Per family she was fully conversant at around 10 pm on 3/9/2024. She has had 5 prior ischemic strokes in the setting of valvular atrial fibrillation. Son noticed aphasia and L sided weakness, has baseline R sided weakness. Is wheelchair bound at baseline due to deficits from prior strokes. Brought to the ED for further evaluation and work up,  Per family her last dose of Lovenox was last night on 3/9/2024.  Upon stroke assessment her NIHSS was 17 on arrival.     NIHSS 17  MRS 5    SUBJECTIVE: No events overnight.  No new neurologic complaints.  ROS reported negative unless otherwise noted.    atorvastatin 80 milliGRAM(s) Oral at bedtime  glucagon  Injectable 1 milliGRAM(s) IntraMuscular once  insulin lispro (ADMELOG) corrective regimen sliding scale   SubCutaneous Before meals and at bedtime  metoprolol succinate ER 25 milliGRAM(s) Oral daily      PHYSICAL EXAM:   Vital Signs Last 24 Hrs  T(C): 36.4 (10 Mar 2024 20:01), Max: 36.4 (10 Mar 2024 20:01)  T(F): 97.6 (10 Mar 2024 20:01), Max: 97.6 (10 Mar 2024 20:01)  HR: 102 (11 Mar 2024 06:00) (71 - 106)  BP: 119/85 (11 Mar 2024 06:00) (105/80 - 160/74)  BP(mean): 96 (11 Mar 2024 06:00) (85 - 107)  RR: 27 (11 Mar 2024 06:00) (14 - 27)  SpO2: 96% (11 Mar 2024 06:00) (96% - 100%)    Parameters below as of 10 Mar 2024 20:01  Patient On (Oxygen Delivery Method): room air        General: No acute distress  HEENT: EOM intact, visual fields full  Abdomen: Soft, nontender, nondistended   Extremities: No edema    NEUROLOGICAL EXAM: INCOMPLETE   Mental status: Awake, alert, oriented x1 to person, no neglect, normal memory. Speech is  Cranial Nerves: No facial droop, no nystagmus, moderate to severe dysarthria,  tongue midline  Motor exam: Normal tone, 4/5 RUE with drift, 5/5 RLE, 5/5 LUE, 4/5 LLE with drift   Sensation: Intact to light touch   Coordination/ Gait: No dysmetria, gait deferred     LABS:                        9.2    11.90 )-----------( 219      ( 10 Mar 2024 08:59 )             29.4    03-10    132<L>  |  96  |  14  ----------------------------<  191<H>  4.6   |  23  |  0.77    Ca    9.6      10 Mar 2024 08:59  Phos  4.1     03-10  Mg     1.6     03-10    TPro  7.6  /  Alb  4.3  /  TBili  1.1  /  DBili  x   /  AST  16  /  ALT  13  /  AlkPhos  51  03-10  PT/INR - ( 10 Mar 2024 06:30 )   PT: 11.6 sec;   INR: 1.06 ratio         PTT - ( 10 Mar 2024 06:30 )  PTT:39.9 sec      IMAGING: Reviewed by me.     CT Brain Stroke Protocol (03.10.24  Small foci of left cerebellar intraparenchymal hemorrhage with   surrounding vasogenic edema.  Multifocal chronic infarcts, as outlined above  Mild cerebral atrophy.    CT Brain Perfusion Maps Stroke (03.10.24   CTA neck and brain:  No large vessel occlusion or significant stenosis.  CT Perfusion:  Nondiagnostic study secondary to motion restriction.     CT Head No Cont (03.10.24  Interval increased yola hemorrhagic transformation of left inferior   cerebellar hemisphere acute infarction. Parenchymal hemorrhage now   measures 3.7 by the 2.8 cm in the axial plane.  No significant mass effect on the fourth ventricle.  No inferior displacement of the cerebellar tonsils.  Nohydrocephalus.    CT Head No Cont (03.10.24   Mildly improved hyperdensity involving the left inferior   cerebellar hemisphere suggesting improved contrast staining versus   hemorrhage. Subacute/chronic right cerebellar hemisphere infarctions.   Consider MRI for further evaluation.     THE PATIENT WAS SEEN AND EXAMINED BY ME WITH THE HOUSESTAFF AND STROKE TEAM DURING MORNING ROUNDS.   HPI:  67 y/o woman with PMH of chronic valvular atrial fibrillation on Lovenox, DM, HTN, HLD presented with new onset aphasia and L sided weakness. Per family she was fully conversant at around 10 pm on 3/9/2024. She has had 5 prior ischemic strokes in the setting of valvular atrial fibrillation. Son noticed aphasia and L sided weakness, has baseline R sided weakness. Is wheelchair bound at baseline due to deficits from prior strokes. Brought to the ED for further evaluation and work up,  Per family her last dose of Lovenox was last night on 3/9/2024.  Upon stroke assessment her NIHSS was 17 on arrival.     NIHSS 17  MRS 5    SUBJECTIVE: No events overnight.  No new neurologic complaints.  ROS reported negative unless otherwise noted.    atorvastatin 80 milliGRAM(s) Oral at bedtime  glucagon  Injectable 1 milliGRAM(s) IntraMuscular once  insulin lispro (ADMELOG) corrective regimen sliding scale   SubCutaneous Before meals and at bedtime  metoprolol succinate ER 25 milliGRAM(s) Oral daily      PHYSICAL EXAM:   Vital Signs Last 24 Hrs  T(C): 36.4 (10 Mar 2024 20:01), Max: 36.4 (10 Mar 2024 20:01)  T(F): 97.6 (10 Mar 2024 20:01), Max: 97.6 (10 Mar 2024 20:01)  HR: 102 (11 Mar 2024 06:00) (71 - 106)  BP: 119/85 (11 Mar 2024 06:00) (105/80 - 160/74)  BP(mean): 96 (11 Mar 2024 06:00) (85 - 107)  RR: 27 (11 Mar 2024 06:00) (14 - 27)  SpO2: 96% (11 Mar 2024 06:00) (96% - 100%)    Parameters below as of 10 Mar 2024 20:01  Patient On (Oxygen Delivery Method): room air        General: Looks older than stated age   HEENT: EOM intact, visual fields full  Abdomen: Soft, nontender, nondistended   Extremities: No edema    NEUROLOGICAL EXAM: translation provided by son at bedside   Mental status: Awake, alert, oriented to person, year, and age. Not oriented to month. Speech is fluent. Can follow simple and complex commands.   Cranial Nerves: No facial droop, no nystagmus, moderate to severe dysarthria,  tongue midline  Motor exam: Normal tone, 4/5 RUE with drift and LUE with drift. RLE with no effort against gravity and LLE some effort against gravity   Sensation: Intact to light touch   Coordination/ Gait: No dysmetria, gait deferred     LABS:                        9.2    11.90 )-----------( 219      ( 10 Mar 2024 08:59 )             29.4    03-10    132<L>  |  96  |  14  ----------------------------<  191<H>  4.6   |  23  |  0.77    Ca    9.6      10 Mar 2024 08:59  Phos  4.1     03-10  Mg     1.6     03-10    TPro  7.6  /  Alb  4.3  /  TBili  1.1  /  DBili  x   /  AST  16  /  ALT  13  /  AlkPhos  51  03-10  PT/INR - ( 10 Mar 2024 06:30 )   PT: 11.6 sec;   INR: 1.06 ratio         PTT - ( 10 Mar 2024 06:30 )  PTT:39.9 sec      IMAGING: Reviewed by me.     CT Brain Stroke Protocol (03.10.24  Small foci of left cerebellar intraparenchymal hemorrhage with   surrounding vasogenic edema.  Multifocal chronic infarcts, as outlined above  Mild cerebral atrophy.    CT Brain Perfusion Maps Stroke (03.10.24   CTA neck and brain:  No large vessel occlusion or significant stenosis.  CT Perfusion:  Nondiagnostic study secondary to motion restriction.     CT Head No Cont (03.10.24  Interval increased yola hemorrhagic transformation of left inferior   cerebellar hemisphere acute infarction. Parenchymal hemorrhage now   measures 3.7 by the 2.8 cm in the axial plane.  No significant mass effect on the fourth ventricle.  No inferior displacement of the cerebellar tonsils.  Nohydrocephalus.    CT Head No Cont (03.10.24   Mildly improved hyperdensity involving the left inferior   cerebellar hemisphere suggesting improved contrast staining versus   hemorrhage. Subacute/chronic right cerebellar hemisphere infarctions.   Consider MRI for further evaluation.    CT Abdomen and Pelvis w/ IV Cont (03.11.24   CHEST:  *  Filling defect within the left atrial appendage, concerning for   thrombus, also present on 11/25/2023.  *  Small pericardial effusion, increased since 11/25/2023.  *  Unchanged prominent nonspecific mediastinal lymph nodes.    ABDOMEN AND PELVIS:  *  Filling defects within the distal celiac artery extending into the   proximal common hepatic artery, distal right common femoral artery   extending into the proximal superficial and deep femoral arteries, right   internal iliac artery, and left internal iliac artery, concerning for   thrombus and may be related to thromboembolic event. Thrombus within the   right femoralsystem appears to be occlusive or nearly occlusive with   reconstitution of the imaged superficial and deep femoral arteries.   Thrombus within the right internal iliac artery also appears occlusive   with reconstitution of distal branches.  *  Patchy wedge-shaped areas of hypoenhancement within the spleen,   concerning for infarcts, with artifact from phase of contrast timing   thought to be less likely.  *  Patchy wedge-shaped regions of hypoenhancement within both kidneys,   suspicious for infarcts. Pyelonephritis can have a similar appearance.   Correlate with urinalysis.  *  Nonspecific pericholecystic edema. Consider right upper quadrant   ultrasound further evaluation.  *  Mild bilateral adrenal gland thickening with hyperenhancement, which   can be seen with hypoperfusion complex.    MR Head w/wo IV Cont (03.10.24   -Subacute infarct with subacute hemorrhagic transformation in the left   inferior cerebellum.   -Numerous acute/subacute infarcts in the bilateral cerebrum and  cerebellum as described above, suggestive of embolic etiology.  -Numerous chronic infarcts as described above.    NOn contrast CTH 3/11/2023  Moderate atrophy. Large acute right cerebellar infarct with a   small amount of petechial hemorrhage. Subacute to chronic left medial   cerebellar infarct with hemosiderin staining

## 2024-03-11 NOTE — OCCUPATIONAL THERAPY INITIAL EVALUATION ADULT - NSOTDISCHREC_GEN_A_CORE
However if patient returns home she would need assist for ALL functional tasks and pt lift device/Sub-acute Rehab However if patient returns home she would need assist for ALL functional tasks and would benefit from Home OT/Sub-acute Rehab

## 2024-03-11 NOTE — CONSULT NOTE ADULT - ATTENDING COMMENTS
LEO Vigil MD have participated in the daily care of this patient and have performed a history and physical exam of the patient and discussed  the findings and plan with the house officer. I reviewed the resident note and agree with the findings and plan   I Cal Vigil MD have personally seen and examined the patient at bedside today 3/11/24 at  10  pm

## 2024-03-11 NOTE — OCCUPATIONAL THERAPY INITIAL EVALUATION ADULT - PHYSICAL ASSIST/NONPHYSICAL ASSIST, REHAB EVAL
verbal cues/1 person assist Doxycycline Pregnancy And Lactation Text: This medication is Pregnancy Category D and not consider safe during pregnancy. It is also excreted in breast milk but is considered safe for shorter treatment courses.

## 2024-03-11 NOTE — OCCUPATIONAL THERAPY INITIAL EVALUATION ADULT - TRANSFER TRAINING, PT EVAL
Pt will perform stand pivot transfers to chair with minimal assist within 4 weeks. Pt will perform stand pivot transfers to chair with moderate assist within 4 weeks.

## 2024-03-11 NOTE — CONSULT NOTE ADULT - HEMATOLOGY/LYMPHATICS
Vss. A&O to person place somewhat time and situation. Forgetful. Impulsive. Edu given on calling for assist oob. Pt does not always call. Very unsteady up with A1 gaitbelt and walker. Alarms on. Denies abd pain. Abd distended 3 stools from nights to this am. Per MD hold second dose of lactulose today. No nausea no vomiting. Poor appetite. Small bites of fruit. Voiding without dif. Paracentesis site CDI. Vs remain stable. BLE edema. LS asia.r   not applicable

## 2024-03-12 NOTE — CONSULT NOTE ADULT - SUBJECTIVE AND OBJECTIVE BOX
Reason for consult: hypercoagulable w/up    HPI:  67 y/o woman with PMH of chronic valvular atrial fibrillation on Lovenox, DM, HTN, HLD presented with new onset aphasia and L sided weakness. Per family she was fully conversant at around 10 pm on 3/9/2024. She has had 5 prior ischemic strokes in the setting of valvular atrial fibrillation. Son noticed aphasia and L sided weakness, has baseline R sided weakness. Is wheelchair bound at baseline due to deficits from prior strokes. Brought to the ED for further evaluation and work up,  Per family her last dose of Lovenox was last night on 3/9/2024.  Upon stroke assessment her NIHSS was 17 on arrival.     NIHSS 17  MRS 5   (10 Mar 2024 07:27)    Hematology/Oncology called to see patient d/t stroke while on Ac w/ Lovenox    PAST MEDICAL & SURGICAL HISTORY:  HTN (hypertension)      HLD (hyperlipidemia)      DM (diabetes mellitus)  Type II      Atrial fibrillation  h/o Cardioversion in 2013 and on Coumadin      Mitral stenosis      TIA (transient ischemic attack)  2013- Rt facial droop which resolved after 2 hours      Chronic atrial fibrillation      CVA (cerebrovascular accident)      HTN (hypertension)      DM (diabetes mellitus), type 2      Ventral hernia      S/P knee surgery      S/P laser cataract surgery, unspecified laterality  ???      H/O hernia repair          FAMILY HISTORY:  No pertinent family history in first degree relatives    No pertinent family history in first degree relatives        Alochol: Denied  Smoking: Nonsmoker  Drug Use: Denied  Marital Status:         Allergies    No Known Allergies    Intolerances        MEDICATIONS  (STANDING):  atorvastatin 80 milliGRAM(s) Oral at bedtime  dextrose 5%. 1000 milliLiter(s) (50 mL/Hr) IV Continuous <Continuous>  dextrose 5%. 1000 milliLiter(s) (100 mL/Hr) IV Continuous <Continuous>  dextrose 50% Injectable 25 Gram(s) IV Push once  dextrose 50% Injectable 12.5 Gram(s) IV Push once  dextrose 50% Injectable 25 Gram(s) IV Push once  glucagon  Injectable 1 milliGRAM(s) IntraMuscular once  heparin  Infusion 900 Unit(s)/Hr (9 mL/Hr) IV Continuous <Continuous>  insulin lispro (ADMELOG) corrective regimen sliding scale   SubCutaneous Before meals and at bedtime  metoprolol succinate ER 25 milliGRAM(s) Oral daily  sodium chloride 2% . 1000 milliLiter(s) (30 mL/Hr) IV Continuous <Continuous>    MEDICATIONS  (PRN):  dextrose Oral Gel 15 Gram(s) Oral once PRN Blood Glucose LESS THAN 70 milliGRAM(s)/deciliter      T(C): 36.5 (03-12-24 @ 08:00), Max: 36.8 (03-11-24 @ 18:00)  HR: 88 (03-12-24 @ 08:00) (83 - 104)  BP: 137/63 (03-12-24 @ 08:00) (113/56 - 137/63)  RR: 17 (03-12-24 @ 08:00) (15 - 23)  SpO2: 98% (03-12-24 @ 08:00) (94% - 100%)  Wt(kg): --    PE  Awake  Anicteric, MMM  RRR  CTAB  Abd soft, NT, ND  No c/c                        9.8    12.90 )-----------( 161      ( 12 Mar 2024 05:05 )             29.6       03-12    133<L>  |  99  |  30<H>  ----------------------------<  137<H>  4.4   |  20<L>  |  1.62<H>    Ca    9.6      12 Mar 2024 05:04  Phos  4.6     03-11  Mg     1.8     03-11

## 2024-03-12 NOTE — PROGRESS NOTE ADULT - ASSESSMENT
69 y/o woman with PMH of chronic valvular atrial fibrillation on Lovenox, DM, HTN, HLD presented with new onset aphasia and L sided weakness      1) CVA  -Acute onset aphasia and L sided weakness, found to have L cerebellar hemorrhage.   -f/u neuro recs    2) Afib  -valvular afib given severe MS rheumatic  -hx of eliquis failure, was on lovenox--now on hep gtt  -c/w metoprolol 25 mg po BID       3) Severe mitral stenosis   -BUBBA 12/2023 with severe rheumatic mitral stenosis and KRISTAN thrombus was discharged for outpatient follow up for CTS referral however patient declined surgery.   - extensive thrombus burden in arterial system, Vascular eval appreciated , monitor end organ function

## 2024-03-12 NOTE — PROGRESS NOTE ADULT - PROBLEM SELECTOR PLAN 1
-D/w Dr. Disla; appears to be some YADIRA. ?some prerenal.   -Agree with IVF per Dr. Disla. -F/u renal recs.   -Would be prudent to monitor strict I's/O's and bladder scans q8h to r/o any retention.   -Na goals per Neuro/NSGY.

## 2024-03-12 NOTE — CONSULT NOTE ADULT - ASSESSMENT
67 y/o woman with PMH of chronic valvular atrial fibrillation on Lovenox, DM, HTN, HLD presented with new onset aphasia and L sided weakness.    ICH  --pt w/ hx of multiple CVAs now w/ hemorrhagic conversion  --CTA CAP showing large atrial thrombus with emboli in celiac axis and celiac branches, splee, kidney, R internal iliac, and R SFA.  --neurosurgery following, no plan for acute intervention at this time.   --AC on hold d/t hemorrhagic conversion  --CTH pending  --will check Protein S and Protein C to r/o catastrophic APLS,    --Thrombocytosis likely 2/2 patients hx valvular afib and AC non-complaisance. Pt has missed Lovenox doses as per family   --Awaiting Factor Xa for therapeutic Lovenox dosing    will follow,    Jailyn Husain NP  Hematology/ Oncology  [facility]  [phone] (office)  [phone] (alt office)  Evenings and weekends please call MD on call or office     67 y/o woman with PMH of chronic valvular atrial fibrillation on Lovenox, DM, HTN, HLD presented with new onset aphasia and L sided weakness.    ICH  --pt w/ hx of multiple CVAs now w/ hemorrhagic conversion  --CTA CAP showing large atrial thrombus with emboli in celiac axis and celiac branches, splee, kidney, R internal iliac, and R SFA.  --neurosurgery following, no plan for acute intervention at this time.   --AC on hold d/t hemorrhagic conversion  --CTH pending  --will check Protein S and Protein C, as well as APLS labs to r/o catastrophic APLS,  though likely findings are related to Afib  --Thrombocytosis likely 2/2 patients hx valvular afib and AC non-complaisance. Pt has missed Lovenox doses as per family   --Awaiting Factor Xa for therapeutic Lovenox dosing    will follow,    Jailyn Husain NP  Hematology/ Oncology  [facility]  [phone] (office)  [phone] (alt office)  Evenings and weekends please call MD on call or office

## 2024-03-12 NOTE — PROGRESS NOTE ADULT - SUBJECTIVE AND OBJECTIVE BOX
Chintan Owusu MD  Interventional Cardiology / Endovascular Specialist  [place] Office : [phone] [place] [id number] Tel:   [place] Office : [phone] [facility] [address] [address]  Tel: [phone]      Subjective/Overnight events: Patient lying in bed. No acute distress.   	  MEDICATIONS:  heparin  Infusion. 900 Unit(s)/Hr IV Continuous <Continuous>  metoprolol succinate ER 25 milliGRAM(s) Oral daily            atorvastatin 80 milliGRAM(s) Oral at bedtime  dextrose 50% Injectable 25 Gram(s) IV Push once  dextrose 50% Injectable 12.5 Gram(s) IV Push once  dextrose 50% Injectable 25 Gram(s) IV Push once  dextrose Oral Gel 15 Gram(s) Oral once PRN  glucagon  Injectable 1 milliGRAM(s) IntraMuscular once  insulin lispro (ADMELOG) corrective regimen sliding scale   SubCutaneous Before meals and at bedtime    dextrose 5%. 1000 milliLiter(s) IV Continuous <Continuous>  dextrose 5%. 1000 milliLiter(s) IV Continuous <Continuous>  sodium chloride 2% . 1000 milliLiter(s) IV Continuous <Continuous>      PAST MEDICAL/SURGICAL HISTORY  PAST MEDICAL & SURGICAL HISTORY:  HTN (hypertension)      HLD (hyperlipidemia)      DM (diabetes mellitus)  Type II      Atrial fibrillation  h/o Cardioversion in 2013 and on Coumadin      Mitral stenosis      TIA (transient ischemic attack)  2013- Rt facial droop which resolved after 2 hours      Chronic atrial fibrillation      CVA (cerebrovascular accident)      HTN (hypertension)      DM (diabetes mellitus), type 2      Ventral hernia      S/P knee surgery      S/P laser cataract surgery, unspecified laterality  ???      H/O hernia repair          SOCIAL HISTORY: Substance Use (street drugs): ( x ) never used  (  ) other:    FAMILY HISTORY:  No pertinent family history in first degree relatives    No pertinent family history in first degree relatives          PHYSICAL EXAM:  T(C): 36.5 (03-12-24 @ 08:00), Max: 36.8 (03-11-24 @ 18:00)  HR: 88 (03-12-24 @ 08:00) (83 - 104)  BP: 137/63 (03-12-24 @ 08:00) (113/56 - 137/63)  RR: 17 (03-12-24 @ 08:00) (15 - 23)  SpO2: 98% (03-12-24 @ 08:00) (94% - 100%)  Wt(kg): --  I&O's Summary    11 Mar 2024 07:01  -  12 Mar 2024 07:00  --------------------------------------------------------  IN: 0 mL / OUT: 850 mL / NET: -850 mL    12 Mar 2024 07:01  -  12 Mar 2024 10:40  --------------------------------------------------------  IN: 0 mL / OUT: 100 mL / NET: -100 mL        Weight (kg): 55 (03-11 @ 21:04)    GENERAL: NAD  EYES:  conjunctiva and sclera clear  ENMT: No tonsillar erythema, exudates, or enlargement  Cardiovascular: Normal S1 S2, No JVD, No murmurs, No edema  Respiratory: Lungs clear to auscultation	  Gastrointestinal:  Soft,   Extremities: No edema                                     9.8    12.90 )-----------( 161      ( 12 Mar 2024 05:05 )             29.6     03-12    133<L>  |  99  |  30<H>  ----------------------------<  137<H>  4.4   |  20<L>  |  1.62<H>    Ca    9.6      12 Mar 2024 05:04  Phos  4.6     03-11  Mg     1.8     03-11      proBNP:   Lipid Profile:   HgA1c:   TSH:     Consultant(s) Notes Reviewed:  [x ] YES  [ ] NO    Care Discussed with Consultants/Other Providers [ x] YES  [ ] NO    Imaging Personally Reviewed independently:  [x] YES  [ ] NO    All labs, radiologic studies, vitals, orders and medications list reviewed. Patient is seen and examined at bedside. Case discussed with medical team.

## 2024-03-12 NOTE — PROGRESS NOTE ADULT - SUBJECTIVE AND OBJECTIVE BOX
Patient seen and examined at bedside.    --Anticoagulation--    T(C): 36.5 (03-12-24 @ 08:00), Max: 36.8 (03-11-24 @ 18:00)  HR: 88 (03-12-24 @ 08:00) (83 - 104)  BP: 137/63 (03-12-24 @ 08:00) (113/56 - 158/64)  RR: 17 (03-12-24 @ 08:00) (15 - 23)  SpO2: 98% (03-12-24 @ 08:00) (94% - 100%)  Wt(kg): --    Exam: Wide awake, FC, AOx3, Rt facial, minimally verbal, dysarthric.. RUE incr tone and 4/5, LUE 4+/5. RLE trace wd. LLE 4/5. SILT

## 2024-03-12 NOTE — PROGRESS NOTE ADULT - ASSESSMENT
67 y/o woman with PMH of chronic valvular atrial fibrillation on Lovenox, DM, HTN, HLD presented with new onset aphasia and L sided weakness.  Patient admitted for management of acute ischemic stroke.  Medicine consulted for medical optimization and management.

## 2024-03-12 NOTE — PROGRESS NOTE ADULT - PROBLEM SELECTOR PLAN 7
-Now on AC which covers DVT ppx.   -PT/OT/OOB to chair as tolerated.     8. Medicine consult will sign off. Please call for questions or reconsult PRN.   #[id number].

## 2024-03-12 NOTE — CONSULT NOTE ADULT - SUBJECTIVE AND OBJECTIVE BOX
Dr. Disla  Office [phone] (9 am to 5 pm)  Service : [phone] ( 5pm to 9 am)    Brittany OSBORN      RENAL INITIAL CONSULT NOTE: DATE OF SERVICE 03-12-24 @ 12:35    HPI:  69 y/o woman with PMH of chronic valvular atrial fibrillation on Lovenox, DM, HTN, HLD presented with new onset aphasia and L sided weakness. Per family she was fully conversant at around 10 pm on 3/9/2024. She has had 5 prior ischemic strokes in the setting of valvular atrial fibrillation. Son noticed aphasia and L sided weakness, has baseline R sided weakness. Is wheelchair bound at baseline due to deficits from prior strokes. Brought to the ED for further evaluation and work up. Admitted with CVA. Has developed MAYRA    NIHSS 17  MRS 5   (10 Mar 2024 07:27)      Allergies:  No Known Allergies      PAST MEDICAL & SURGICAL HISTORY:  HTN (hypertension)      HLD (hyperlipidemia)      DM (diabetes mellitus)  Type II      Atrial fibrillation  h/o Cardioversion in 2013 and on Coumadin      Mitral stenosis      TIA (transient ischemic attack)  2013- Rt facial droop which resolved after 2 hours      Chronic atrial fibrillation      CVA (cerebrovascular accident)      HTN (hypertension)      DM (diabetes mellitus), type 2      Ventral hernia      S/P knee surgery      S/P laser cataract surgery, unspecified laterality  ???      H/O hernia repair          Home Medications Reviewed    Hospital Medications:   MEDICATIONS  (STANDING):  atorvastatin 80 milliGRAM(s) Oral at bedtime  dextrose 5%. 1000 milliLiter(s) (50 mL/Hr) IV Continuous <Continuous>  dextrose 5%. 1000 milliLiter(s) (100 mL/Hr) IV Continuous <Continuous>  dextrose 50% Injectable 25 Gram(s) IV Push once  dextrose 50% Injectable 12.5 Gram(s) IV Push once  dextrose 50% Injectable 25 Gram(s) IV Push once  glucagon  Injectable 1 milliGRAM(s) IntraMuscular once  heparin  Infusion 900 Unit(s)/Hr (9 mL/Hr) IV Continuous <Continuous>  insulin lispro (ADMELOG) corrective regimen sliding scale   SubCutaneous Before meals and at bedtime  metoprolol succinate ER 25 milliGRAM(s) Oral daily  sodium chloride 2% . 1000 milliLiter(s) (30 mL/Hr) IV Continuous <Continuous>      SOCIAL HISTORY:  Denies ETOh, Smoking,     FAMILY HISTORY:  No pertinent family history in first degree relatives    No pertinent family history in first degree relatives        REVIEW OF SYSTEMS:  CONSTITUTIONAL: No weakness, fevers or chills  EYES/ENT: No visual changes;  No vertigo or throat pain   NECK: No pain or stiffness  RESPIRATORY: No cough, wheezing, hemoptysis; No shortness of breath  CARDIOVASCULAR: No chest pain or palpitations.  GASTROINTESTINAL: No abdominal or epigastric pain. No nausea, vomiting, or hematemesis; No diarrhea or constipation. No melena or hematochezia.  GENITOURINARY: No dysuria, frequency, foamy urine, urinary urgency, incontinence or hematuria  NEUROLOGICAL: No numbness or weakness  SKIN: No itching, burning, rashes, or lesions   VASCULAR: No bilateral lower extremity edema.   All other review of systems is negative unless indicated above.    VITALS:  T(F): 97.7 (03-12-24 @ 08:00), Max: 98.2 (03-11-24 @ 18:00)  HR: 83 (03-12-24 @ 10:00)  BP: 132/56 (03-12-24 @ 10:00)  RR: 17 (03-12-24 @ 10:00)  SpO2: 98% (03-12-24 @ 10:00)  Wt(kg): --    03-11 @ 07:01  -  03-12 @ 07:00  --------------------------------------------------------  IN: 0 mL / OUT: 850 mL / NET: -850 mL    03-12 @ 07:01  -  03-12 @ 12:35  --------------------------------------------------------  IN: 0 mL / OUT: 100 mL / NET: -100 mL        Weight (kg): 55 (03-11 @ 21:04)    PHYSICAL EXAM:  Constitutional: NAD  HEENT: anicteric sclera, oropharynx clear, MMM  Neck: No JVD  Respiratory: CTAB, no wheezes, rales or rhonchi  Cardiovascular: S1, S2, RRR  Gastrointestinal: BS+, soft, NT/ND  Extremities: No cyanosis or clubbing. No peripheral edema  Neurological: A/O x 3, no focal deficits  Psychiatric: Normal mood, normal affect  : No CVA tenderness. No perez.   Skin: No rashes  Vascular Access:    LABS:  03-12    134<L>  |  100  |  32<H>  ----------------------------<  183<H>  4.6   |  20<L>  |  1.76<H>    Ca    9.4      12 Mar 2024 11:14  Phos  4.6     03-11  Mg     1.8     03-11      Creatinine Trend: 1.76 <--, 1.62 <--, 0.89 <--, 0.77 <--, 0.75 <--                        9.8    12.90 )-----------( 161      ( 12 Mar 2024 05:05 )             29.6     Urine Studies:  Urinalysis Basic - ( 12 Mar 2024 11:14 )    Color:  / Appearance:  / SG:  / pH:   Gluc: 183 mg/dL / Ketone:   / Bili:  / Urobili:    Blood:  / Protein:  / Nitrite:    Leuk Esterase:  / RBC:  / WBC    Sq Epi:  / Non Sq Epi:  / Bacteria:           RADIOLOGY & ADDITIONAL STUDIES:                 Dr. Disla  Office [phone] (9 am to 5 pm)  Service : [phone] ( 5pm to 9 am)    Brittany OSBORN      RENAL INITIAL CONSULT NOTE: DATE OF SERVICE 03-12-24 @ 12:35    HPI:  69 y/o woman with PMH of chronic valvular atrial fibrillation on Lovenox, DM, HTN, HLD presented with new onset aphasia and L sided weakness. Per family she was fully conversant at around 10 pm on 3/9/2024. She has had 5 prior ischemic strokes in the setting of valvular atrial fibrillation. Son noticed aphasia and L sided weakness, has baseline R sided weakness. Is wheelchair bound at baseline due to deficits from prior strokes. Brought to the ED for further evaluation and work up. Admitted with CVA. Has developed MAYRA    NIHSS 17  MRS 5   (10 Mar 2024 07:27)      Allergies:  No Known Allergies      PAST MEDICAL & SURGICAL HISTORY:  HTN (hypertension)      HLD (hyperlipidemia)      DM (diabetes mellitus)  Type II      Atrial fibrillation  h/o Cardioversion in 2013 and on Coumadin      Mitral stenosis      TIA (transient ischemic attack)  2013- Rt facial droop which resolved after 2 hours      Chronic atrial fibrillation      CVA (cerebrovascular accident)      HTN (hypertension)      DM (diabetes mellitus), type 2      Ventral hernia      S/P knee surgery      S/P laser cataract surgery, unspecified laterality  ???      H/O hernia repair          Home Medications Reviewed    Hospital Medications:   MEDICATIONS  (STANDING):  atorvastatin 80 milliGRAM(s) Oral at bedtime  dextrose 5%. 1000 milliLiter(s) (50 mL/Hr) IV Continuous <Continuous>  dextrose 5%. 1000 milliLiter(s) (100 mL/Hr) IV Continuous <Continuous>  dextrose 50% Injectable 25 Gram(s) IV Push once  dextrose 50% Injectable 12.5 Gram(s) IV Push once  dextrose 50% Injectable 25 Gram(s) IV Push once  glucagon  Injectable 1 milliGRAM(s) IntraMuscular once  heparin  Infusion 900 Unit(s)/Hr (9 mL/Hr) IV Continuous <Continuous>  insulin lispro (ADMELOG) corrective regimen sliding scale   SubCutaneous Before meals and at bedtime  metoprolol succinate ER 25 milliGRAM(s) Oral daily  sodium chloride 2% . 1000 milliLiter(s) (30 mL/Hr) IV Continuous <Continuous>      SOCIAL HISTORY:  Denies ETOh, Smoking,     FAMILY HISTORY:  No pertinent family history in first degree relatives    No pertinent family history in first degree relatives        REVIEW OF SYSTEMS:  CONSTITUTIONAL: has weakness, no fevers or chills  EYES/ENT: No visual changes;  No vertigo or throat pain   NECK: No pain or stiffness  RESPIRATORY: No cough, wheezing, hemoptysis; No shortness of breath  CARDIOVASCULAR: No chest pain or palpitations.  GASTROINTESTINAL: No abdominal or epigastric pain. No nausea, vomiting, or hematemesis; No diarrhea or constipation. No melena or hematochezia.  GENITOURINARY: No dysuria, frequency, foamy urine, urinary urgency, incontinence or hematuria  NEUROLOGICAL: No numbness, has left sided weakness  SKIN: No itching, burning, rashes, or lesions   VASCULAR: No bilateral lower extremity edema.   All other review of systems is negative unless indicated above.    VITALS:  T(F): 97.7 (03-12-24 @ 08:00), Max: 98.2 (03-11-24 @ 18:00)  HR: 83 (03-12-24 @ 10:00)  BP: 132/56 (03-12-24 @ 10:00)  RR: 17 (03-12-24 @ 10:00)  SpO2: 98% (03-12-24 @ 10:00)  Wt(kg): --    03-11 @ 07:01  -  03-12 @ 07:00  --------------------------------------------------------  IN: 0 mL / OUT: 850 mL / NET: -850 mL    03-12 @ 07:01  -  03-12 @ 12:35  --------------------------------------------------------  IN: 0 mL / OUT: 100 mL / NET: -100 mL        Weight (kg): 55 (03-11 @ 21:04)    PHYSICAL EXAM:  Constitutional: NAD  HEENT: anicteric sclera, oropharynx clear, MMM  Neck: No JVD  Respiratory: CTAB, no wheezes, rales or rhonchi  Cardiovascular: S1, S2, RRR  Gastrointestinal: BS+, soft, NT/ND  Extremities: No cyanosis or clubbing. No peripheral edema  Neurological: Alert, responding to verbal stimuli  Psychiatric: Normal mood, normal affect  : No CVA tenderness. No perez.   Skin: No rashes    LABS:  03-12    134<L>  |  100  |  32<H>  ----------------------------<  183<H>  4.6   |  20<L>  |  1.76<H>    Ca    9.4      12 Mar 2024 11:14  Phos  4.6     03-11  Mg     1.8     03-11      Creatinine Trend: 1.76 <--, 1.62 <--, 0.89 <--, 0.77 <--, 0.75 <--                        9.8    12.90 )-----------( 161      ( 12 Mar 2024 05:05 )             29.6     Urine Studies:  Urinalysis Basic - ( 12 Mar 2024 11:14 )    Color:  / Appearance:  / SG:  / pH:   Gluc: 183 mg/dL / Ketone:   / Bili:  / Urobili:    Blood:  / Protein:  / Nitrite:    Leuk Esterase:  / RBC:  / WBC    Sq Epi:  / Non Sq Epi:  / Bacteria:           RADIOLOGY & ADDITIONAL STUDIES:

## 2024-03-12 NOTE — PROVIDER CONTACT NOTE (OTHER) - ASSESSMENT
NIHSS 16  neither orientation question correct  r side facial droop (slight)  r arm drift  r leg no movement  l leg some effort  severe aphasia ( repetitive speech)  severe dysarthria ( garbled/ slurred) as per family

## 2024-03-12 NOTE — PROGRESS NOTE ADULT - ASSESSMENT
-no acute neurosurgical intervention. Admitted Stroke w/ q2h neuro checks   -SQL and ASA held, Factor Xa sent  -no indication for reversal at this time  -CTH in AM  -Na goals, normo-natremia   -MRI wwo per stroke done

## 2024-03-12 NOTE — PROGRESS NOTE ADULT - SUBJECTIVE AND OBJECTIVE BOX
GENERAL SURGERY PROGRESS NOTE    SUBJECTIVE  No acute issues overnight. Seen and examined on morning rounds. Patient speaking in Nad[place], repeating only, "pain."    10-point review of systems completed and negative except as noted above.      OBJECTIVE    MEDICATIONS  atorvastatin 80 milliGRAM(s) Oral at bedtime  dextrose 5%. 1000 milliLiter(s) IV Continuous <Continuous>  dextrose 5%. 1000 milliLiter(s) IV Continuous <Continuous>  dextrose 50% Injectable 25 Gram(s) IV Push once  dextrose 50% Injectable 12.5 Gram(s) IV Push once  dextrose 50% Injectable 25 Gram(s) IV Push once  dextrose Oral Gel 15 Gram(s) Oral once PRN  glucagon  Injectable 1 milliGRAM(s) IntraMuscular once  insulin lispro (ADMELOG) corrective regimen sliding scale   SubCutaneous Before meals and at bedtime  metoprolol succinate ER 25 milliGRAM(s) Oral daily  sodium chloride 2% . 1000 milliLiter(s) IV Continuous <Continuous>      PHYSICAL EXAM  T(C): 36.5 (03-12-24 @ 08:00), Max: 36.8 (03-11-24 @ 18:00)  HR: 88 (03-12-24 @ 08:00) (83 - 104)  BP: 137/63 (03-12-24 @ 08:00) (113/56 - 158/64)  RR: 17 (03-12-24 @ 08:00) (15 - 23)  SpO2: 98% (03-12-24 @ 08:00) (94% - 100%)    03-11-24 @ 07:01  -  03-12-24 @ 07:00  --------------------------------------------------------  IN: 0 mL / OUT: 850 mL / NET: -850 mL        General: confused  Neuro: AO x0-1  Respiratory: nonlabored respirations  CV: pulse present   Abdomen: soft, nontender, nondistended, no rebound or guarding, no palpable mass  Extremities: left foot cooler than right  Vascular: Bl lower extremities without DP/PT signals, popliteal signal present bilaterally, bl femoral pulses palpable    LABS                        9.8    12.90 )-----------( 161      ( 12 Mar 2024 05:05 )             29.6     03-12    133<L>  |  99  |  30<H>  ----------------------------<  137<H>  4.4   |  20<L>  |  1.62<H>    Ca    9.6      12 Mar 2024 05:04  Phos  4.6     03-11  Mg     1.8     03-11        Urinalysis Basic - ( 12 Mar 2024 05:04 )    Color: x / Appearance: x / SG: x / pH: x  Gluc: 137 mg/dL / Ketone: x  / Bili: x / Urobili: x   Blood: x / Protein: x / Nitrite: x   Leuk Esterase: x / RBC: x / WBC x   Sq Epi: x / Non Sq Epi: x / Bacteria: x        RADIOLOGY & ADDITIONAL STUDIES

## 2024-03-12 NOTE — PROGRESS NOTE ADULT - PROBLEM SELECTOR PLAN 3
Management per primary neuro team.  -Neuro checks and vitals per neuro recs.  -F/u repeat CT head per neuro recs.  -LE arterial and venous dopplers due to dec pulses showing multiple arterial occlusions and no DVTs present. -F/u vasc sx and heme recs.   -Would add Tylenol 975mg TID standing if no contraindications given patient reports pain in her LEs.

## 2024-03-12 NOTE — PROGRESS NOTE ADULT - SUBJECTIVE AND OBJECTIVE BOX
Patient is a 68y old  Female who presents with a chief complaint of ICH (12 Mar 2024 14:10)        SUBJECTIVE / OVERNIGHT EVENTS: Patient's family at bedside translates. She denies pain and SOB to me, but family says she does report pain in legs.       MEDICATIONS  (STANDING):  atorvastatin 80 milliGRAM(s) Oral at bedtime  dextrose 5%. 1000 milliLiter(s) (50 mL/Hr) IV Continuous <Continuous>  dextrose 5%. 1000 milliLiter(s) (100 mL/Hr) IV Continuous <Continuous>  dextrose 50% Injectable 25 Gram(s) IV Push once  dextrose 50% Injectable 12.5 Gram(s) IV Push once  dextrose 50% Injectable 25 Gram(s) IV Push once  glucagon  Injectable 1 milliGRAM(s) IntraMuscular once  heparin  Infusion 900 Unit(s)/Hr (9 mL/Hr) IV Continuous <Continuous>  insulin lispro (ADMELOG) corrective regimen sliding scale   SubCutaneous Before meals and at bedtime  metoprolol succinate ER 25 milliGRAM(s) Oral daily  sodium chloride 0.9%. 1000 milliLiter(s) (75 mL/Hr) IV Continuous <Continuous>  sodium chloride 2% . 1000 milliLiter(s) (30 mL/Hr) IV Continuous <Continuous>    MEDICATIONS  (PRN):  dextrose Oral Gel 15 Gram(s) Oral once PRN Blood Glucose LESS THAN 70 milliGRAM(s)/deciliter      Vital Signs Last 24 Hrs  T(C): 36.5 (12 Mar 2024 08:00), Max: 36.8 (11 Mar 2024 18:00)  T(F): 97.7 (12 Mar 2024 08:00), Max: 98.2 (11 Mar 2024 18:00)  HR: 86 (12 Mar 2024 12:00) (83 - 95)  BP: 141/81 (12 Mar 2024 12:00) (113/56 - 141/81)  BP(mean): 103 (12 Mar 2024 12:00) (76 - 103)  RR: 18 (12 Mar 2024 12:00) (15 - 23)  SpO2: 97% (12 Mar 2024 12:00) (94% - 100%)    Parameters below as of 12 Mar 2024 12:00  Patient On (Oxygen Delivery Method): room air      CAPILLARY BLOOD GLUCOSE      POCT Blood Glucose.: 203 mg/dL (12 Mar 2024 11:23)  POCT Blood Glucose.: 140 mg/dL (12 Mar 2024 07:57)  POCT Blood Glucose.: 185 mg/dL (11 Mar 2024 21:45)  POCT Blood Glucose.: 157 mg/dL (11 Mar 2024 17:56)    I&O's Summary    11 Mar 2024 07:01  -  12 Mar 2024 07:00  --------------------------------------------------------  IN: 0 mL / OUT: 850 mL / NET: -850 mL    12 Mar 2024 07:01  -  12 Mar 2024 14:40  --------------------------------------------------------  IN: 0 mL / OUT: 100 mL / NET: -100 mL          PHYSICAL EXAM:   GENERAL: NAD   HEAD:  Atraumatic, Normocephalic  EYES:  conjunctiva and sclera clear  NECK: Supple,   CHEST/LUNG: Clear to auscultation bilaterally; No wheeze  HEART: S1S2 normal. irregular rate and rhythm; No murmurs, rubs, or gallops  ABDOMEN: Soft, Nontender, Nondistended; Bowel sounds present  EXTREMITIES:  trace distal to no LE edema.   PSYCH/Neuro: Awake and answers basic questions and follows commands.       LABS:                        9.8    12.90 )-----------( 161      ( 12 Mar 2024 05:05 )             29.6     03-12    134<L>  |  100  |  32<H>  ----------------------------<  183<H>  4.6   |  20<L>  |  1.76<H>    Ca    9.4      12 Mar 2024 11:14  Phos  4.6     03-11  Mg     1.8     03-11      PTT - ( 12 Mar 2024 11:14 )  PTT:30.0 sec      Urinalysis Basic - ( 12 Mar 2024 11:14 )    Color: x / Appearance: x / SG: x / pH: x  Gluc: 183 mg/dL / Ketone: x  / Bili: x / Urobili: x   Blood: x / Protein: x / Nitrite: x   Leuk Esterase: x / RBC: x / WBC x   Sq Epi: x / Non Sq Epi: x / Bacteria: x      < from: CT Chest w/ IV Cont (03.11.24 @ 09:46) >  IMPRESSION:    CHEST:  *  Filling defect within the left atrial appendage, concerning for   thrombus, also present on 11/25/2023.  *  Small pericardial effusion, increased since 11/25/2023.  *  Unchanged prominent nonspecific mediastinal lymph nodes.    ABDOMEN AND PELVIS:  *  Filling defects within the distal celiac artery extending into the   proximal common hepatic artery, distal right common femoral artery   extending into the proximal superficial and deep femoral arteries, right   internal iliac artery, and left internal iliac artery, concerning for   thrombus and may be related to thromboembolic event. Thrombus within the   right femoralsystem appears to be occlusive or nearly occlusive with   reconstitution of the imaged superficial and deep femoral arteries.   Thrombus within the right internal iliac artery also appears occlusive   with reconstitution of distal branches.  *  Patchy wedge-shaped areas of hypoenhancement within the spleen,   concerning for infarcts, with artifact from phase of contrast timing   thought to be less likely.  *  Patchy wedge-shaped regions of hypoenhancement within both kidneys,   suspicious for infarcts. Pyelonephritis can have a similar appearance.   Correlate with urinalysis.  *  Nonspecific pericholecystic edema. Consider right upper quadrant   ultrasound further evaluation.  *  Mild bilateral adrenal gland thickening with hyperenhancement, which   can be seen with hypoperfusion complex.    < end of copied text >      < from: VA Duplex Lower Ext Vein Scan, Bilat (03.11.24 @ 17:43) >  IMPRESSION:    No acute DVT of the lower extremities.    Edema of the superficial soft tissues of the lower extremities. Correlate   clinically.    --- End of Report ---    < end of copied text >  < from: CT Head No Cont (03.11.24 @ 09:46) >  IMPRESSION: Moderate atrophy. Large acute right cerebellar infarct with a   small amount of petechial hemorrhage. Subacute to chronic left medial   cerebellar infarct with hemosiderin staining. No change since 3/10/2024.    --- End of Report ---    < end of copied text >      RADIOLOGY & ADDITIONAL TESTS:    Imaging Personally Reviewed: va duplex and CT chest and head  Consultant(s) Notes Reviewed:  Renal, heme, cards, vasc, neuro, NSGY  Care Discussed with Consultants/Other Providers: Dr. Disla

## 2024-03-12 NOTE — PROGRESS NOTE ADULT - ASSESSMENT
67 y/o woman with PMH of chronic valvular atrial fibrillation on Lovenox, DM, HTN, HLD presented with new onset aphasia and L sided weakness. Per family she was fully conversant at around 10 pm on 3/9/2024. She has had 5 prior ischemic strokes in the setting of valvular atrial fibrillation. Son noticed aphasia and L sided weakness, has baseline R sided weakness. Is wheelchair bound at baseline due to deficits from prior strokes. Brought to the ED for further evaluation and work up,  Per family her last dose of Lovenox was last night on 3/9/2024.     NIHSS 17  LKW 10 pm 3/9/2024  MRS 5    Impression: Acute onset aphasia and L sided weakness due to numerous acute/subacute infarcts in the bilateral cerebrum and cerebellum with subacute hemorrhagic transformation in the right inferior cerebellum likely due to ESUS. Patient on full dose lovenox, but reported occasional missed doses of medication.     NEURO: Neurologically with stable exam since admission. Pending repeat CTH this AM. Continue close monitoring for neurologic deterioration, goal for SBP < 160. A1C- 7.0, LDL - 33. Will c/w home statin dose. MRI Brain w/o and w/ with results as above. Physical therapy/OT reccs [facility].     ANTITHROMBOTIC THERAPY: on hold due to hemorrhage     PULMONARY: CXR clear, protecting airway, saturating well on room air     CARDIOVASCULAR: pending TTE, c/w cardiac monitoring. 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 Hx valvular afib given severe MS rheumatic. Has hx of eliquis failure, was on full dose lovenox. Now held due to hemorrhage. Will c/w metoprolol 25 mg BID for rate control                           SBP goal: < 160    GASTROINTESTINAL:  dysphagia screen - failed. Seen by speech, now on pureed diet. Tolerating well     Diet:  pureed     RENAL: BUN/Cr within normal limits, good urine output      Na Goal: Greater than 135     Brunner: no     HEMATOLOGY: H/H without change, Platelets normal. Pending LE arterial and venous dopplers due to dec pulses. CT CAP shows multiple thromboembolic events in the abdominal vasculature. Hypoenhancement within the spleen, both kidneys suspicious for infarcts. Mild bilateral adrenal gland thickening with hyper enhancement, which can be seen with hypoperfusion complex. Unclear at this time why patient is hypercoagulable, hematology consulted for recommendations. Will hold off starting AC at this time due to hemorrhagic conversion.       DVT ppx: lovenox subq     ID: afebrile, no leukocytosis     OTHER: Plan discussed with patient and family at bedside, all questions and concerns addressed.     DISPOSITION: [facility] once stable and workup is complete    CORE MEASURES:        Admission NIHSS: 17     Tenecteplase: [] YES [x] NO      LDL/HDL: 33/50      Depression Screen: pending     Statin Therapy: yes     Dysphagia Screen: [] PASS [x] FAIL     Smoking [] YES [x] NO [] Smoking cessation and education provided to patient [] Nicotine patch ordered      Afib [x] YES [] NO     Stroke Education [x] YES [] NO    Obtain screening lower extremity venous ultrasound in patients who meet 1 or more of the following criteria as patient is high risk for DVT/PE on admission:   [] History of DVT/PE  []Hypercoagulable states (Factor V Leiden, Cancer, OCP, etc. )  []Prolonged immobility (hemiplegia/hemiparesis/post operative or any other extended immobilization)  [] Transferred from outside facility (Rehab or Long term care)  [] Age </= to 50    69 y/o woman with PMH of chronic valvular atrial fibrillation on Lovenox, DM, HTN, HLD presented with new onset aphasia and L sided weakness. Per family she was fully conversant at around 10 pm on 3/9/2024. She has had 5 prior ischemic strokes in the setting of valvular atrial fibrillation. Son noticed aphasia and L sided weakness, has baseline R sided weakness. Is wheelchair bound at baseline due to deficits from prior strokes. Brought to the ED for further evaluation and work up,  Per family her last dose of Lovenox was last night on 3/9/2024.     NIHSS 17  LKW 10 pm 3/9/2024  MRS 5    Impression: Acute onset aphasia and L sided weakness due to numerous acute/subacute infarcts in the bilateral cerebrum and cerebellum with subacute hemorrhagic transformation in the right inferior cerebellum likely due to ESUS. Patient on full dose lovenox, but reported occasional missed doses of medication.     NEURO: Neurologically with stable exam since admission. Pending repeat CTH in AM. Continue close monitoring for neurologic deterioration, goal for SBP < 160. A1C- 7.0, LDL - 33. Will c/w home statin dose. MRI Brain w/o and w/ with results as above. Physical therapy/OT reccs [facility].     ANTITHROMBOTIC THERAPY: heparin gtt with PTT goal of 58-99 for multiple emboli throughout the body.     PULMONARY: CXR clear, protecting airway, saturating well on room air     CARDIOVASCULAR: pending TTE, c/w cardiac monitoring. 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 Hx valvular afib given severe MS rheumatic. Has hx of eliquis failure, was on full dose lovenox. Now held due to hemorrhage. Will c/w metoprolol 25 mg BID for rate control                           SBP goal: < 160    GASTROINTESTINAL:  dysphagia screen - failed. Seen by speech, now on pureed diet. Tolerating well     Diet:  pureed     RENAL: BUN/Cr with elevation, likely due to contrast. Nephro following, reccs appreciated. Good urine outpu. Will provide IV hydration with NS and c/w 2% NS with q6 hour BMPs       Na Goal: Greater than 135. normal      Brunner: no     HEMATOLOGY: H/H without change, Platelets normal. LE arterial and venous dopplers due to dec pulses showing multiple arterial occlusions and no DVTs present. CT CAP shows multiple thromboembolic events in the abdominal vasculature. Hypoenhancement within the spleen, both kidneys suspicious for infarcts. Mild bilateral adrenal gland thickening with hyper enhancement, which can be seen with hypoperfusion complex. Unclear at this time why patient is hypercoagulable, hematology consulted for recommendations. Will start heparin gtt with PTT goal of 58-99.      DVT ppx: heparin gtt     ID: afebrile, mild leukocytosis. Will continue to monitor daily. no signs/ symptoms of infection.      OTHER: Plan discussed with patient and family at bedside, all questions and concerns addressed. Prognosis discussed in depth with daughter in law at bedside today and son yesterday. Risk of decline discussed at length, family has made patient full code at this time     DISPOSITION: [facility] once stable and workup is complete    CORE MEASURES:        Admission NIHSS: 17     Tenecteplase: [] YES [x] NO      LDL/HDL: 33/50      Depression Screen: pending     Statin Therapy: yes     Dysphagia Screen: [] PASS [x] FAIL     Smoking [] YES [x] NO [] Smoking cessation and education provided to patient [] Nicotine patch ordered      Afib [x] YES [] NO     Stroke Education [x] YES [] NO    Obtain screening lower extremity venous ultrasound in patients who meet 1 or more of the following criteria as patient is high risk for DVT/PE on admission:   [] History of DVT/PE  []Hypercoagulable states (Factor V Leiden, Cancer, OCP, etc. )  []Prolonged immobility (hemiplegia/hemiparesis/post operative or any other extended immobilization)  [] Transferred from outside facility (Rehab or Long term care)  [] Age </= to 50    69 y/o woman with PMH of chronic valvular atrial fibrillation on Lovenox, DM, HTN, HLD presented with new onset aphasia and L sided weakness. Per family she was fully conversant at around 10 pm on 3/9/2024. She has had 5 prior ischemic strokes in the setting of valvular atrial fibrillation. Son noticed aphasia and L sided weakness, has baseline R sided weakness. Is wheelchair bound at baseline due to deficits from prior strokes. Brought to the ED for further evaluation and work up,  Per family her last dose of Lovenox was last night on 3/9/2024.     NIHSS 17  LKW 10 pm 3/9/2024  MRS 5    Impression: Acute onset aphasia and L sided weakness due to numerous acute/subacute infarcts in the bilateral cerebrum and cerebellum with subacute hemorrhagic transformation in the right inferior cerebellum likely due to cardioembolism in the setting of known left atrial appendage thrombus and missed dose of Lovenox. . Patient on full dose lovenox, but reported occasional missed doses of medication.     NEURO: Neurologically with stable exam since admission. Pending repeat CTH in AM. Continue close monitoring for neurologic deterioration, goal for SBP < 160. A1C- 7.0, LDL - 33. Will c/w home statin dose. MRI Brain w/o and w/ with results as above. Physical therapy/OT reccs [facility].     ANTITHROMBOTIC THERAPY: heparin gtt with PTT goal of 58-99 for multiple emboli throughout the body in the setting of a fib     PULMONARY: CXR clear, protecting airway, saturating well on room air     CARDIOVASCULAR: pending TTE, c/w cardiac monitoring. 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 Hx valvular afib given severe MS rheumatic. Has hx of eliquis failure, was on full dose lovenox. Now held due to hemorrhage. Will c/w metoprolol 25 mg BID for rate control                           SBP goal: < 160    GASTROINTESTINAL:  dysphagia screen - failed. Seen by speech, now on pureed diet. Tolerating well     Diet:  pureed     RENAL: BUN/Cr with elevation, likely due to contrast. Nephro following, reccs appreciated. Good urine outpu. Will provide IV hydration with NS and c/w 2% NS with q6 hour BMPs       Na Goal: Greater than 135. normal      Brunner: no     HEMATOLOGY: H/H without change, Platelets normal. LE arterial and venous dopplers due to dec pulses showing multiple arterial occlusions and no DVTs present. CT CAP shows multiple thromboembolic events in the abdominal vasculature. Hypoenhancement within the spleen, both kidneys suspicious for infarcts. Mild bilateral adrenal gland thickening with hyper enhancement, which can be seen with hypoperfusion complex. Unclear at this time why patient is hypercoagulable, hematology consulted for recommendations. Will start heparin gtt with PTT goal of 58-99.      DVT ppx: heparin gtt     ID: afebrile, mild leukocytosis. Will continue to monitor daily. no signs/ symptoms of infection.      OTHER: Plan discussed with patient and family at bedside, all questions and concerns addressed. Prognosis discussed in depth with daughter in law at bedside today and son yesterday. Risk of decline discussed at length, family has made patient full code at this time     DISPOSITION: [facility] once stable and workup is complete    CORE MEASURES:        Admission NIHSS: 17     Tenecteplase: [] YES [x] NO      LDL/HDL: 33/50      Depression Screen: pending     Statin Therapy: yes     Dysphagia Screen: [] PASS [x] FAIL     Smoking [] YES [x] NO [] Smoking cessation and education provided to patient [] Nicotine patch ordered      Afib [x] YES [] NO     Stroke Education [x] YES [] NO    Obtain screening lower extremity venous ultrasound in patients who meet 1 or more of the following criteria as patient is high risk for DVT/PE on admission:   [] History of DVT/PE  []Hypercoagulable states (Factor V Leiden, Cancer, OCP, etc. )  []Prolonged immobility (hemiplegia/hemiparesis/post operative or any other extended immobilization)  [] Transferred from outside facility (Rehab or Long term care)  [] Age </= to 50

## 2024-03-12 NOTE — PROGRESS NOTE ADULT - SUBJECTIVE AND OBJECTIVE BOX
THE PATIENT WAS SEEN AND EXAMINED BY ME WITH THE HOUSESTAFF AND STROKE TEAM DURING MORNING ROUNDS.   HPI:  69 y/o woman with PMH of chronic valvular atrial fibrillation on Lovenox, DM, HTN, HLD presented with new onset aphasia and L sided weakness. Per family she was fully conversant at around 10 pm on 3/9/2024. She has had 5 prior ischemic strokes in the setting of valvular atrial fibrillation. Son noticed aphasia and L sided weakness, has baseline R sided weakness. Is wheelchair bound at baseline due to deficits from prior strokes. Brought to the ED for further evaluation and work up,  Per family her last dose of Lovenox was last night on 3/9/2024.  Upon stroke assessment her NIHSS was 17 on arrival.     NIHSS 17  MRS 5      SUBJECTIVE: No events overnight.  No new neurologic complaints.  ROS reported negative unless otherwise noted.    atorvastatin 80 milliGRAM(s) Oral at bedtime  glucagon  Injectable 1 milliGRAM(s) IntraMuscular once  insulin lispro (ADMELOG) corrective regimen sliding scale   SubCutaneous Before meals and at bedtime  metoprolol succinate ER 25 milliGRAM(s) Oral daily  sodium chloride 2% . 1000 milliLiter(s) IV Continuous <Continuous>      PHYSICAL EXAM:   Vital Signs Last 24 Hrs  T(C): 36.8 (11 Mar 2024 18:00), Max: 36.8 (11 Mar 2024 18:00)  T(F): 98.2 (11 Mar 2024 18:00), Max: 98.2 (11 Mar 2024 18:00)  HR: 85 (12 Mar 2024 06:00) (83 - 104)  BP: 131/64 (12 Mar 2024 06:00) (113/56 - 158/64)  BP(mean): 86 (12 Mar 2024 06:00) (76 - 103)  RR: 17 (12 Mar 2024 06:00) (15 - 23)  SpO2: 100% (12 Mar 2024 06:00) (94% - 100%)    Parameters below as of 11 Mar 2024 12:35  Patient On (Oxygen Delivery Method): room air        General: No acute distress  HEENT: EOM intact, visual fields full  Abdomen: Soft, nontender, nondistended   Extremities: No edema      NEUROLOGICAL EXAM: translation provided by son at bedside   Mental status: Awake, alert, oriented to person, year, and age. Not oriented to month. Speech is fluent. Can follow simple and complex commands.   Cranial Nerves: No facial droop, no nystagmus, moderate to severe dysarthria,  tongue midline  Motor exam: Normal tone, 4/5 RUE with drift and LUE with drift. RLE with no effort against gravity and LLE some effort against gravity   Sensation: Intact to light touch   Coordination/ Gait: No dysmetria, gait deferred     LABS:                        9.8    12.90 )-----------( 161      ( 12 Mar 2024 05:05 )             29.6    03-12    133<L>  |  99  |  30<H>  ----------------------------<  137<H>  4.4   |  20<L>  |  1.62<H>    Ca    9.6      12 Mar 2024 05:04  Phos  4.6     03-11  Mg     1.8     03-11      IMAGING: Reviewed by me.       CT Brain Stroke Protocol (03.10.24  Small foci of left cerebellar intraparenchymal hemorrhage with   surrounding vasogenic edema.  Multifocal chronic infarcts, as outlined above  Mild cerebral atrophy.    CT Brain Perfusion Maps Stroke (03.10.24   CTA neck and brain:  No large vessel occlusion or significant stenosis.  CT Perfusion:  Nondiagnostic study secondary to motion restriction.     CT Head No Cont (03.10.24  Interval increased yola hemorrhagic transformation of left inferior   cerebellar hemisphere acute infarction. Parenchymal hemorrhage now   measures 3.7 by the 2.8 cm in the axial plane.  No significant mass effect on the fourth ventricle.  No inferior displacement of the cerebellar tonsils.  Nohydrocephalus.    CT Head No Cont (03.10.24   Mildly improved hyperdensity involving the left inferior   cerebellar hemisphere suggesting improved contrast staining versus   hemorrhage. Subacute/chronic right cerebellar hemisphere infarctions.   Consider MRI for further evaluation.    CT Abdomen and Pelvis w/ IV Cont (03.11.24   CHEST:  *  Filling defect within the left atrial appendage, concerning for   thrombus, also present on 11/25/2023.  *  Small pericardial effusion, increased since 11/25/2023.  *  Unchanged prominent nonspecific mediastinal lymph nodes.    ABDOMEN AND PELVIS:  *  Filling defects within the distal celiac artery extending into the   proximal common hepatic artery, distal right common femoral artery   extending into the proximal superficial and deep femoral arteries, right   internal iliac artery, and left internal iliac artery, concerning for   thrombus and may be related to thromboembolic event. Thrombus within the   right femoralsystem appears to be occlusive or nearly occlusive with   reconstitution of the imaged superficial and deep femoral arteries.   Thrombus within the right internal iliac artery also appears occlusive   with reconstitution of distal branches.  *  Patchy wedge-shaped areas of hypoenhancement within the spleen,   concerning for infarcts, with artifact from phase of contrast timing   thought to be less likely.  *  Patchy wedge-shaped regions of hypoenhancement within both kidneys,   suspicious for infarcts. Pyelonephritis can have a similar appearance.   Correlate with urinalysis.  *  Nonspecific pericholecystic edema. Consider right upper quadrant   ultrasound further evaluation.  *  Mild bilateral adrenal gland thickening with hyperenhancement, which   can be seen with hypoperfusion complex.    MR Head w/wo IV Cont (03.10.24   -Subacute infarct with subacute hemorrhagic transformation in the left   inferior cerebellum.   -Numerous acute/subacute infarcts in the bilateral cerebrum and  cerebellum as described above, suggestive of embolic etiology.  -Numerous chronic infarcts as described above.    NOn contrast CTH 3/11/2023  Moderate atrophy. Large acute right cerebellar infarct with a   small amount of petechial hemorrhage. Subacute to chronic left medial   cerebellar infarct with hemosiderin staining

## 2024-03-12 NOTE — CONSULT NOTE ADULT - ASSESSMENT
69 y/o woman with PMH of chronic valvular atrial fibrillation on Lovenox, DM, HTN, HLD presented with new onset aphasia and L sided weakness. Per family she was fully conversant at around 10 pm on 3/9/2024. She has had 5 prior ischemic strokes in the setting of valvular atrial fibrillation. Son noticed aphasia and L sided weakness, has baseline R sided weakness. Is wheelchair bound at baseline due to deficits from prior strokes. Brought to the ED for further evaluation and work up. Admitted with CVA. Has developed MAYRA    A/P:  MAYRA:  Timing of MAYRA suggest contrast nephropathy  But this patient has extensive thrombus burden with renal infarct-MAYRA sec to ischemia can not be ruled out  on heparin and being evaluated by heme and vascular  no blood in UA favors YADIRA  Monitor renal function  Consider NS 75cc/hr for 2L

## 2024-03-12 NOTE — CONSULT NOTE ADULT - NS ATTEND AMEND GEN_ALL_CORE FT
patient with Afib on lovenox, after she failed DOAC in the past  now admitted for diffuse embolic disease including CNS, kidneys, LV and spleen despite being on Lovenox (unclear compliance but son believes she missed few doses)  Now lovenox on hold due to concern for CNS bleeding  Would check APLS labs to r/o catastrophic APLS though presentation could be from Afib   Please check LA, Cardiolipin and [id number] antibodies, protein C and S  Imaging with no concern for malignancy, recommend mammogram and colonoscopy as outpatient  once cleared to start anticoagulation, would restart on lovenox and check anti-factor X levels  Will follow

## 2024-03-12 NOTE — PROGRESS NOTE ADULT - ASSESSMENT
68F Hx Afib on SQL twice daily, ASA, many multiple CVA w/ residual RLE deficits per pt, T2DM presented with hemorragic concervsion of old strokes. T lovenox stopped. CTA CAP showing large atrial thrombus with emboli in celiac axis and celiac branches, splee, kidney, R internal iliac, and R SFA. RLE dupplex with R femoropopliteal occlusion without tibial flow.     Discussed patient the covering neurology fellow. Patient cannot be anticoagulated at this time due to ICH. Patient in difficult clinical situation in the presence of multiple emboli and lack of AC options. Grim prognosis for limb salvage. Embolic events will continue. She is at high risk for mesenteric ischemia and worsening lower extremity ischemia.     Plan:   -AC when able from ICH standpoint.  -Discussed with family at bedside.   -Reconsult prn if anticoag rx is permitted from a clinical standpoint and is cleared by neurology    Discussed with vascular surgery fellow on behalf of Dr. Vigil.    Vascular Surgery  Please page [phone] for all questions.   68F Hx Afib on SQL twice daily, ASA, many multiple CVA w/ residual RLE deficits per pt, T2DM presented with hemorragic concervsion of old strokes. T lovenox stopped. CTA CAP showing large atrial thrombus with emboli in celiac axis and celiac branches, splee, kidney, R internal iliac, and R SFA. RLE dupplex with R femoropopliteal occlusion without tibial flow.     Discussed patient the covering neurology fellow. Patient cannot be anticoagulated at this time due to ICH. Patient in difficult clinical situation in the presence of multiple emboli and lack of AC options. Grim prognosis for limb salvage. Embolic events will continue. She is at high risk for mesenteric ischemia and worsening lower extremity ischemia.     Plan:   -AC when able from ICH standpoint.  -Discussed with family at bedside.   -Reconsult prn if anticoag rx is permitted from a clinical standpoint and is cleared by neurology.    Discussed with vascular surgery fellow on behalf of Dr. Vigil.    Vascular Surgery  Please page [phone] for all questions.

## 2024-03-13 NOTE — PROGRESS NOTE ADULT - SUBJECTIVE AND OBJECTIVE BOX
THE PATIENT WAS SEEN AND EXAMINED BY ME WITH THE HOUSESTAFF AND STROKE TEAM DURING MORNING ROUNDS.   HPI:  69 y/o woman with PMH of chronic valvular atrial fibrillation on Lovenox, DM, HTN, HLD presented with new onset aphasia and L sided weakness. Per family she was fully conversant at around 10 pm on 3/9/2024. She has had 5 prior ischemic strokes in the setting of valvular atrial fibrillation. Son noticed aphasia and L sided weakness, has baseline R sided weakness. Is wheelchair bound at baseline due to deficits from prior strokes. Brought to the ED for further evaluation and work up,  Per family her last dose of Lovenox was last night on 3/9/2024.  Upon stroke assessment her NIHSS was 17 on arrival.     NIHSS 17  MRS 5      SUBJECTIVE: No events overnight.  No new neurologic complaints.  ROS reported negative unless otherwise noted.    atorvastatin 80 milliGRAM(s) Oral at bedtime  glucagon  Injectable 1 milliGRAM(s) IntraMuscular once  heparin  Infusion 950 Unit(s)/Hr IV Continuous <Continuous>  insulin lispro (ADMELOG) corrective regimen sliding scale   SubCutaneous Before meals and at bedtime  metoprolol succinate ER 25 milliGRAM(s) Oral daily  sodium chloride 0.9%. 1000 milliLiter(s) IV Continuous <Continuous>  sodium chloride 2% . 1000 milliLiter(s) IV Continuous <Continuous>      PHYSICAL EXAM:   Vital Signs Last 24 Hrs  T(C): 37.1 (12 Mar 2024 22:00), Max: 37.1 (12 Mar 2024 22:00)  T(F): 98.8 (12 Mar 2024 22:00), Max: 98.8 (12 Mar 2024 22:00)  HR: 100 (13 Mar 2024 06:59) (83 - 109)  BP: 122/82 (13 Mar 2024 06:59) (101/61 - 143/72)  BP(mean): 98 (13 Mar 2024 06:59) (77 - 103)  RR: 17 (13 Mar 2024 06:59) (17 - 21)  SpO2: 96% (13 Mar 2024 06:59) (95% - 99%)    Parameters below as of 12 Mar 2024 18:00  Patient On (Oxygen Delivery Method): room air        General: No acute distress  HEENT: EOM intact, visual fields full  Abdomen: Soft, nontender, nondistended   Extremities: b/l LE cold to touch       NEUROLOGICAL EXAM: translation provided by son at bedside   Mental status: Awake, alert, oriented to person, year, and age. Not oriented to month. Speech is fluent. Can follow simple and complex commands.   Cranial Nerves: No facial droop, no nystagmus, moderate to severe dysarthria,  tongue midline  Motor exam: Normal tone, 4/5 RUE with drift and LUE with drift. RLE with no effort against gravity and LLE some effort against gravity   Sensation: Intact to light touch   Coordination/ Gait: No dysmetria, gait deferred     LABS:                        8.5    11.84 )-----------( 171      ( 13 Mar 2024 05:08 )             26.9    03-13    139  |  107  |  35<H>  ----------------------------<  143<H>  4.1   |  17<L>  |  2.26<H>    Ca    8.6      13 Mar 2024 05:08    PTT - ( 13 Mar 2024 05:08 )  PTT:96.8 sec      IMAGING: Reviewed by me.       CT Brain Stroke Protocol (03.10.24  Small foci of left cerebellar intraparenchymal hemorrhage with   surrounding vasogenic edema.  Multifocal chronic infarcts, as outlined above  Mild cerebral atrophy.    CT Brain Perfusion Maps Stroke (03.10.24   CTA neck and brain:  No large vessel occlusion or significant stenosis.  CT Perfusion:  Nondiagnostic study secondary to motion restriction.     CT Head No Cont (03.10.24  Interval increased yola hemorrhagic transformation of left inferior   cerebellar hemisphere acute infarction. Parenchymal hemorrhage now   measures 3.7 by the 2.8 cm in the axial plane.  No significant mass effect on the fourth ventricle.  No inferior displacement of the cerebellar tonsils.  Nohydrocephalus.    CT Head No Cont (03.10.24   Mildly improved hyperdensity involving the left inferior   cerebellar hemisphere suggesting improved contrast staining versus   hemorrhage. Subacute/chronic right cerebellar hemisphere infarctions.   Consider MRI for further evaluation.    CT Abdomen and Pelvis w/ IV Cont (03.11.24   CHEST:  *  Filling defect within the left atrial appendage, concerning for   thrombus, also present on 11/25/2023.  *  Small pericardial effusion, increased since 11/25/2023.  *  Unchanged prominent nonspecific mediastinal lymph nodes.    ABDOMEN AND PELVIS:  *  Filling defects within the distal celiac artery extending into the   proximal common hepatic artery, distal right common femoral artery   extending into the proximal superficial and deep femoral arteries, right   internal iliac artery, and left internal iliac artery, concerning for   thrombus and may be related to thromboembolic event. Thrombus within the   right femoralsystem appears to be occlusive or nearly occlusive with   reconstitution of the imaged superficial and deep femoral arteries.   Thrombus within the right internal iliac artery also appears occlusive   with reconstitution of distal branches.  *  Patchy wedge-shaped areas of hypoenhancement within the spleen,   concerning for infarcts, with artifact from phase of contrast timing   thought to be less likely.  *  Patchy wedge-shaped regions of hypoenhancement within both kidneys,   suspicious for infarcts. Pyelonephritis can have a similar appearance.   Correlate with urinalysis.  *  Nonspecific pericholecystic edema. Consider right upper quadrant   ultrasound further evaluation.  *  Mild bilateral adrenal gland thickening with hyperenhancement, which   can be seen with hypoperfusion complex.    MR Head w/wo IV Cont (03.10.24   -Subacute infarct with subacute hemorrhagic transformation in the left   inferior cerebellum.   -Numerous acute/subacute infarcts in the bilateral cerebrum and  cerebellum as described above, suggestive of embolic etiology.  -Numerous chronic infarcts as described above.    NOn contrast CTH 3/11/2023  Moderate atrophy. Large acute right cerebellar infarct with a   small amount of petechial hemorrhage. Subacute to chronic left medial   cerebellar infarct with hemosiderin staining

## 2024-03-13 NOTE — CHART NOTE - NSCHARTNOTEFT_GEN_A_CORE
Patient's CTH from yesterday, a second long interval scan, was reviewed and shows a decrease in petechial hemorrhage in left cerebellum. Patient's exam has been stable. No neurosurgical intervention is indicated. Further mgmt of AC/AP, rpt CTHs, sodium goals per stroke team.

## 2024-03-13 NOTE — PROGRESS NOTE ADULT - SUBJECTIVE AND OBJECTIVE BOX
Chintan Owusu MD  Interventional Cardiology / Endovascular Specialist  [place] Office : [address] [place] [id number] Tel:   [place] Office : [address] [facility] [address] [address]  Tel: [phone]      Subjective/Overnight events: Patient lying in bed. No acute distress.   	  MEDICATIONS:  enoxaparin Injectable 60 milliGRAM(s) SubCutaneous every 12 hours  metoprolol succinate ER 25 milliGRAM(s) Oral daily            atorvastatin 40 milliGRAM(s) Oral at bedtime  dextrose 50% Injectable 25 Gram(s) IV Push once  dextrose 50% Injectable 12.5 Gram(s) IV Push once  dextrose 50% Injectable 25 Gram(s) IV Push once  dextrose Oral Gel 15 Gram(s) Oral once PRN  glucagon  Injectable 1 milliGRAM(s) IntraMuscular once  insulin lispro (ADMELOG) corrective regimen sliding scale   SubCutaneous Before meals and at bedtime    dextrose 5%. 1000 milliLiter(s) IV Continuous <Continuous>  dextrose 5%. 1000 milliLiter(s) IV Continuous <Continuous>  sodium chloride 0.9%. 1000 milliLiter(s) IV Continuous <Continuous>      PAST MEDICAL/SURGICAL HISTORY  PAST MEDICAL & SURGICAL HISTORY:  HTN (hypertension)      HLD (hyperlipidemia)      DM (diabetes mellitus)  Type II      Atrial fibrillation  h/o Cardioversion in 2013 and on Coumadin      Mitral stenosis      TIA (transient ischemic attack)  2013- Rt facial droop which resolved after 2 hours      Chronic atrial fibrillation      CVA (cerebrovascular accident)      HTN (hypertension)      DM (diabetes mellitus), type 2      Ventral hernia      S/P knee surgery      S/P laser cataract surgery, unspecified laterality  ???      H/O hernia repair          SOCIAL HISTORY: Substance Use (street drugs): ( x ) never used  (  ) other:    FAMILY HISTORY:  No pertinent family history in first degree relatives    No pertinent family history in first degree relatives        PHYSICAL EXAM:  T(C): 36.4 (03-13-24 @ 08:00), Max: 37.1 (03-12-24 @ 22:00)  HR: 99 (03-13-24 @ 10:00) (86 - 109)  BP: 132/68 (03-13-24 @ 10:00) (101/61 - 156/95)  RR: 22 (03-13-24 @ 10:00) (17 - 22)  SpO2: 98% (03-13-24 @ 10:00) (95% - 99%)  Wt(kg): --  I&O's Summary    12 Mar 2024 07:01  -  13 Mar 2024 07:00  --------------------------------------------------------  IN: 1218 mL / OUT: 300 mL / NET: 918 mL    13 Mar 2024 07:01  -  13 Mar 2024 11:41  --------------------------------------------------------  IN: 574 mL / OUT: 0 mL / NET: 574 mL          GENERAL: NAD  EYES:  conjunctiva and sclera clear  ENMT: No tonsillar erythema, exudates, or enlargement  Cardiovascular: Normal S1 S2, No JVD, No murmurs, No edema  Respiratory: Lungs clear to auscultation	  Gastrointestinal:  Soft,   Extremities: No edema                                     8.5    11.84 )-----------( 171      ( 13 Mar 2024 05:08 )             26.9     03-13    139  |  107  |  35<H>  ----------------------------<  143<H>  4.1   |  17<L>  |  2.26<H>    Ca    8.6      13 Mar 2024 05:08      proBNP:   Lipid Profile:   HgA1c:   TSH:     Consultant(s) Notes Reviewed:  [x ] YES  [ ] NO    Care Discussed with Consultants/Other Providers [ x] YES  [ ] NO    Imaging Personally Reviewed independently:  [x] YES  [ ] NO    All labs, radiologic studies, vitals, orders and medications list reviewed. Patient is seen and examined at bedside. Case discussed with medical team.

## 2024-03-13 NOTE — PROVIDER CONTACT NOTE (OTHER) - REASON
pt is still  retenting urine, straight cath performed, output was 100ml. pt is still retaining urine, straight cath performed, output was 100ml after 8hours

## 2024-03-13 NOTE — PROGRESS NOTE ADULT - ASSESSMENT
67 y/o woman with PMH of chronic valvular atrial fibrillation on Lovenox, DM, HTN, HLD presented with new onset aphasia and L sided weakness. Per family she was fully conversant at around 10 pm on 3/9/2024. She has had 5 prior ischemic strokes in the setting of valvular atrial fibrillation. Son noticed aphasia and L sided weakness, has baseline R sided weakness. Is wheelchair bound at baseline due to deficits from prior strokes. Brought to the ED for further evaluation and work up. Admitted with CVA. Has developed MAYRA    A/P:  MAYRA:  Timing of MAYRA suggest contrast nephropathy  But this patient has extensive thrombus burden with renal infarct-MAYRA sec to ischemia can not be ruled out  heme and vascular on board  no blood in UA favors YADIRA  Monitor renal function closely      Proteinuria   in setting of + LE  repeat ua    acidosis   non ag  start oral bicarb   monitor serum co2    discussed with family at bedside 67 y/o woman with PMH of chronic valvular atrial fibrillation on Lovenox, DM, HTN, HLD presented with new onset aphasia and L sided weakness. Per family she was fully conversant at around 10 pm on 3/9/2024. She has had 5 prior ischemic strokes in the setting of valvular atrial fibrillation. Son noticed aphasia and L sided weakness, has baseline R sided weakness. Is wheelchair bound at baseline due to deficits from prior strokes. Brought to the ED for further evaluation and work up. Admitted with CVA. Has developed MAYRA    A/P:  MAYRA:  Timing of MAYRA suggest contrast nephropathy  But this patient has extensive thrombus burden with renal infarct-MAYRA sec to ischemia can not be ruled out  heme and vascular on board  no blood in UA favors YADIRA  Monitor renal function closely      Proteinuria   in setting of + LE  repeat ua    acidosis   non ag  start oral bicarb  650mg TID  monitor serum co2    discussed with family at bedside 69 y/o woman with PMH of chronic valvular atrial fibrillation on Lovenox, DM, HTN, HLD presented with new onset aphasia and L sided weakness. Per family she was fully conversant at around 10 pm on 3/9/2024. She has had 5 prior ischemic strokes in the setting of valvular atrial fibrillation. Son noticed aphasia and L sided weakness, has baseline R sided weakness. Is wheelchair bound at baseline due to deficits from prior strokes. Brought to the ED for further evaluation and work up. Admitted with CVA. Has developed MAYRA    A/P:  MAYRA:  Timing of MAYRA suggest contrast nephropathy  s/p contrast on 3/11  But this patient has extensive thrombus burden with renal infarct-MAYRA sec to ischemia can not be ruled out  heme and vascular on board  no blood in UA favors YADIRA  Check Urine NA Urine Cr  Check Nuclear scan to check blood  flow / function   CT A/P suggestive of no hydro  PT now oliguric--discussed with family regarding worsening renal function and decreasing urine outpt. Family consented for RRT if needed.   Monitor renal function closely      Proteinuria   in setting of + LE  repeat ua    acidosis   non ag  start oral bicarb  650mg TID  monitor serum co2    discussed with family at bedside 69 y/o woman with PMH of chronic valvular atrial fibrillation on Lovenox, DM, HTN, HLD presented with new onset aphasia and L sided weakness. Per family she was fully conversant at around 10 pm on 3/9/2024. She has had 5 prior ischemic strokes in the setting of valvular atrial fibrillation. Son noticed aphasia and L sided weakness, has baseline R sided weakness. Is wheelchair bound at baseline due to deficits from prior strokes. Brought to the ED for further evaluation and work up. Admitted with CVA. Has developed MAYRA    A/P:  MAYRA:  Timing of MAYRA suggest contrast nephropathy  s/p contrast on 3/11  But this patient has extensive thrombus burden with renal infarct-MAYRA sec to ischemia can not be ruled out  heme and vascular on board  no blood in UA favors YADIRA  Check Urine NA Urine Cr  Check Nuclear scan to check blood  flow / function   CT A/P suggestive of no hydro  Continue IVF NS at 75cc/hr  PT now oliguric--discussed with family regarding worsening renal function and decreasing urine outpt. Family consented for RRT if needed.   Monitor renal function closely      Proteinuria   in setting of + LE  repeat ua    acidosis   non ag  start oral bicarb  650mg TID  monitor serum co2    discussed with family at bedside/ team

## 2024-03-13 NOTE — PROVIDER CONTACT NOTE (OTHER) - RECOMMENDATIONS
notify provider of jump in NIHSS  NIHSS matches the original assessment upon arrival to ED with improvement in vision/ hemianopia but other findings the same
stroke team assessed patient, patient's son present at bedside stated this is patient's baseline and was not concerned. stroke team continued to assess for B/L LE pulses which were unobtainable.
notify provider

## 2024-03-13 NOTE — PROGRESS NOTE ADULT - ASSESSMENT
67 y/o woman with PMH of chronic valvular atrial fibrillation on Lovenox, DM, HTN, HLD presented with new onset aphasia and L sided weakness. Per family she was fully conversant at around 10 pm on 3/9/2024. She has had 5 prior ischemic strokes in the setting of valvular atrial fibrillation. Son noticed aphasia and L sided weakness, has baseline R sided weakness. Is wheelchair bound at baseline due to deficits from prior strokes. Brought to the ED for further evaluation and work up,  Per family her last dose of Lovenox was last night on 3/9/2024.     NIHSS 17  LKW 10 pm 3/9/2024  MRS 5    Impression: Acute onset aphasia and L sided weakness due to numerous acute/subacute infarcts in the bilateral cerebrum and cerebellum with subacute hemorrhagic transformation in the right inferior cerebellum likely due to cardioembolism in the setting of known left atrial appendage thrombus and missed dose of Lovenox. . Patient was on full dose lovenox, but reported occasional missed doses of medication.     NEURO: Neurologically with stable exam since admission. Repeat CTH on 3/12 is stable. Started on full dose heparin gtt for multiple thrombo emboli in body. Continue close monitoring for neurologic deterioration, goal for SBP < 160. A1C- 7.0, LDL - 33. Will c/w home statin dose. MRI Brain w/o and w/ with results as above. Physical therapy/OT reccs [facility].     ANTITHROMBOTIC THERAPY: heparin gtt with PTT goal of 58-99 for multiple emboli throughout the body in the setting of a fib and impaired mitral valve.    PULMONARY: CXR clear, protecting airway, saturating well on room air     CARDIOVASCULAR: pending TTE, c/w cardiac monitoring. 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 Hx valvular afib given severe MS rheumatic. Has hx of eliquis failure, was on full dose lovenox. Now held due to hemorrhage. Will c/w metoprolol 25 mg BID for rate control. Heparin gtt started on 3/12                        SBP goal: < 160    GASTROINTESTINAL:  dysphagia screen - failed. Seen by speech, now on pureed diet. Tolerating well     Diet:  pureed     RENAL: BUN/Cr with elevation, likely due to contrast. Now worsening on 3/13 with creatine 2.26. Nephro following, reccs appreciated. Low urine output. Will c/w IV hydration with NS and c/w 2% NS with q6 hour BMPs       Na Goal: Greater than 135. normal      Brunner: no     HEMATOLOGY: H/H with anemia, will monitor closely as patient on heparin gtt. Platelets normal. LE arterial and venous dopplers due to dec pulses showing multiple arterial occlusions and no DVTs present. CT CAP shows multiple thromboembolic events in the abdominal vasculature. Hypoenhancement within the spleen, both kidneys suspicious for infarcts. Mild bilateral adrenal gland thickening with hyper enhancement, which can be seen with hypoperfusion complex. Unclear at this time why patient is hypercoagulable, hematology consulted for recommendations. Will start heparin gtt with PTT goal of 58-99.      DVT ppx: heparin gtt     ID: afebrile, mild leukocytosis. Will continue to monitor daily. no signs/ symptoms of infection.      OTHER: Plan discussed with patient and family at bedside, all questions and concerns addressed. Prognosis discussed in depth with daughter in law at bedside today and son yesterday. Risk of decline discussed at length, family has made patient full code at this time     DISPOSITION: [facility] once stable and workup is complete    CORE MEASURES:        Admission NIHSS: 17     Tenecteplase: [] YES [x] NO      LDL/HDL: 33/50      Depression Screen: pending     Statin Therapy: yes     Dysphagia Screen: [] PASS [x] FAIL     Smoking [] YES [x] NO [] Smoking cessation and education provided to patient [] Nicotine patch ordered      Afib [x] YES [] NO     Stroke Education [x] YES [] NO    Obtain screening lower extremity venous ultrasound in patients who meet 1 or more of the following criteria as patient is high risk for DVT/PE on admission:   [] History of DVT/PE  []Hypercoagulable states (Factor V Leiden, Cancer, OCP, etc. )  []Prolonged immobility (hemiplegia/hemiparesis/post operative or any other extended immobilization)  [] Transferred from outside facility (Rehab or Long term care)  [] Age </= to 50  69 y/o woman with PMH of chronic valvular atrial fibrillation on Lovenox, DM, HTN, HLD presented with new onset aphasia and L sided weakness. Per family she was fully conversant at around 10 pm on 3/9/2024. She has had 5 prior ischemic strokes in the setting of valvular atrial fibrillation. Son noticed aphasia and L sided weakness, has baseline R sided weakness. Is wheelchair bound at baseline due to deficits from prior strokes. Brought to the ED for further evaluation and work up,  Per family her last dose of Lovenox was last night on 3/9/2024.     NIHSS 17  LKW 10 pm 3/9/2024  MRS 5    Impression: Acute onset aphasia and L sided weakness due to numerous acute/subacute infarcts in the bilateral cerebrum and cerebellum with subacute hemorrhagic transformation in the right inferior cerebellum likely due to cardioembolism in the setting of known left atrial appendage thrombus and missed dose of Lovenox. Patient was on full dose lovenox, but reported occasional missed doses of medication.     NEURO: Neurologically with stable exam since admission. Repeat CTH on 3/12 is stable. Started on full dose heparin gtt for multiple thrombo emboli in body. Continue close monitoring for neurologic deterioration, goal for SBP < 160. A1C- 7.0, LDL - 33. Will c/w home statin dose. MRI Brain w/o and w/ with results as above. Physical therapy/OT reccs [facility].     ANTITHROMBOTIC THERAPY: heparin gtt with PTT goal of 58-99 for multiple emboli throughout the body in the setting of a fib and impaired mitral valve.    PULMONARY: CXR clear, protecting airway, saturating well on room air     CARDIOVASCULAR: pending TTE, c/w cardiac monitoring. 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 Hx valvular afib given severe MS rheumatic. Has hx of eliquis failure, was on full dose lovenox. Now held due to hemorrhage. Will c/w metoprolol 25 mg BID for rate control. Heparin gtt started on 3/12                        SBP goal: < 160    GASTROINTESTINAL:  dysphagia screen - failed. Seen by speech, now on pureed diet. Tolerating well     Diet:  pureed     RENAL: BUN/Cr with elevation, likely due to contrast. Now worsening on 3/13 with creatine 2.26. Nephro following, reccs appreciated. Low urine output. Will c/w IV hydration with NS and c/w 2% NS with q6 hour BMPs. Started on sodium bicarb 650mg tid      Na Goal: Greater than 135     Brunner: no     HEMATOLOGY: H/H with anemia, will monitor closely as patient on heparin gtt. Platelets normal. LE arterial and venous dopplers due to dec pulses showing multiple arterial occlusions and no DVTs present. CT CAP shows multiple thromboembolic events in the abdominal vasculature. Hypoenhancement within the spleen, both kidneys suspicious for infarcts. Mild bilateral adrenal gland thickening with hyper enhancement, which can be seen with hypoperfusion complex. Unclear at this time why patient is hypercoagulable, hematology consulted for recommendations. Will start heparin gtt with PTT goal of 58-99.      DVT ppx: heparin gtt     ID: afebrile, mild leukocytosis. Will continue to monitor daily. no signs/ symptoms of infection.      OTHER: Plan discussed with patient and family at bedside, all questions and concerns addressed. Prognosis discussed in depth with daughters and daughter in law. Discussed with plan of care with son over the phone on 3/12. Prognosis discussed at length, family wants patient to be full code at this time     DISPOSITION: [facility] once stable and workup is complete    CORE MEASURES:        Admission NIHSS: 17     Tenecteplase: [] YES [x] NO      LDL/HDL: 33/50      Depression Screen: pending     Statin Therapy: yes     Dysphagia Screen: [] PASS [x] FAIL     Smoking [] YES [x] NO [] Smoking cessation and education provided to patient [] Nicotine patch ordered      Afib [x] YES [] NO     Stroke Education [x] YES [] NO    Obtain screening lower extremity venous ultrasound in patients who meet 1 or more of the following criteria as patient is high risk for DVT/PE on admission:   [] History of DVT/PE  []Hypercoagulable states (Factor V Leiden, Cancer, OCP, etc. )  []Prolonged immobility (hemiplegia/hemiparesis/post operative or any other extended immobilization)  [] Transferred from outside facility (Rehab or Long term care)  [] Age </= to 50  67 y/o woman with PMH of chronic valvular atrial fibrillation on Lovenox, DM, HTN, HLD presented with new onset aphasia and L sided weakness. Per family she was fully conversant at around 10 pm on 3/9/2024. She has had 5 prior ischemic strokes in the setting of valvular atrial fibrillation. Son noticed aphasia and L sided weakness, has baseline R sided weakness. Is wheelchair bound at baseline due to deficits from prior strokes. Brought to the ED for further evaluation and work up,  Per family her last dose of Lovenox was last night on 3/9/2024.     NIHSS 17  LKW 10 pm 3/9/2024  MRS 5    Impression: Acute onset aphasia and L sided weakness due to numerous acute/subacute infarcts in the bilateral cerebrum and cerebellum with subacute hemorrhagic transformation in the right inferior cerebellum likely due to cardioembolism in the setting of known left atrial appendage thrombus and missed dose of Lovenox. Patient was on full dose lovenox, but reported occasional missed doses of medication.     NEURO: Neurologically with stable exam since admission. Repeat CTH on 3/12 is stable. Started on full dose heparin gtt for multiple thrombo emboli in body switch to Full dose Lovenox after stable CT 3/13. Continue close monitoring for neurologic deterioration, goal for SBP < 160. A1C- 7.0, LDL - 33. Will c/w home statin dose. MRI Brain w/o and w/ with results as above. Physical therapy/OT reccs [facility].     ANTITHROMBOTIC THERAPY: Lovenox for multiple emboli throughout the body in the setting of a fib and impaired mitral valve.    PULMONARY: CXR clear, protecting airway, saturating well on room air     CARDIOVASCULAR: pending TTE, c/w cardiac monitoring. 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 Hx valvular afib given severe MS rheumatic. Has hx of eliquis failure, was on full dose lovenox. Now held due to hemorrhage. Will c/w metoprolol 25 mg BID for rate control. Heparin gtt started on 3/12                        SBP goal: < 160    GASTROINTESTINAL:  dysphagia screen - failed. Seen by speech, now on pureed diet. Tolerating well     Diet:  pureed     RENAL: BUN/Cr with elevation, likely due to contrast. Now worsening on 3/13 with creatine 2.26. Nephro following, reccs appreciated. Low urine output. Will c/w IV hydration with NS and c/w 2% NS with q6 hour BMPs. Started on sodium bicarb 650mg tid      Na Goal: Greater than 135     Brunner: no     HEMATOLOGY: H/H with anemia, will monitor closely as patient on heparin gtt transition to Lovenox full dose today. Platelets normal. LE arterial and venous dopplers due to dec pulses showing multiple arterial occlusions and no DVTs present. CT CAP shows multiple thromboembolic events in the abdominal vasculature. Hypoenhancement within the spleen, both kidneys suspicious for infarcts. Mild bilateral adrenal gland thickening with hyper enhancement, which can be seen with hypoperfusion complex. Unclear at this time why patient is hypercoagulable, hematology consulted for recommendations.      DVT ppx: Lovenox     ID: afebrile, mild leukocytosis. Will continue to monitor daily. no signs/ symptoms of infection.      OTHER: Plan discussed with patient and family at bedside, all questions and concerns addressed. Prognosis discussed in depth with daughters and daughter in law. Discussed with plan of care with son over the phone on 3/12. Prognosis discussed at length, family wants patient to be full code at this time     DISPOSITION: [facility] once stable and workup is complete    CORE MEASURES:        Admission NIHSS: 17     Tenecteplase: [] YES [x] NO      LDL/HDL: 33/50      Depression Screen: pending     Statin Therapy: yes     Dysphagia Screen: [] PASS [x] FAIL     Smoking [] YES [x] NO [] Smoking cessation and education provided to patient [] Nicotine patch ordered      Afib [x] YES [] NO     Stroke Education [x] YES [] NO    Obtain screening lower extremity venous ultrasound in patients who meet 1 or more of the following criteria as patient is high risk for DVT/PE on admission:   [] History of DVT/PE  []Hypercoagulable states (Factor V Leiden, Cancer, OCP, etc. )  []Prolonged immobility (hemiplegia/hemiparesis/post operative or any other extended immobilization)  [] Transferred from outside facility (Rehab or Long term care)  [] Age </= to 50

## 2024-03-13 NOTE — PROVIDER CONTACT NOTE (OTHER) - BACKGROUND
Pt admitted for multiple infarcts and hemorrhage  prior hx of cva in 2019 residual right side weakness ( right leg paralysis)  Pt family states right leg has not had movement since previous cva 2019
elevated creatanine
pt admitted with Right cerebellar infarct
Patient admitted with L cerebellar hemorrhage.
patient admitted for L cerebellar hemorrhage

## 2024-03-13 NOTE — PROVIDER CONTACT NOTE (OTHER) - ACTION/TREATMENT ORDERED:
dopplers ordered, US contacted.
provider notified   will continue to assess NIHSS and report any changes
Urine specimen collected and send to lab as ordered, NS @75ml/hr order renewed
provider will check labs
LE dopplers ordered. Team to bedside to assess patient.

## 2024-03-13 NOTE — PROVIDER CONTACT NOTE (OTHER) - ASSESSMENT
pt is still  retenting urine, straight cath performed, output was 100ml. pt is still  retaining urine, straight cath performed, output was 100ml.

## 2024-03-13 NOTE — PROGRESS NOTE ADULT - TIME BILLING
After reviewing treatment will ask nephrology if perhaps in the setting of renal failure will switch Lovenox to full dose heparin. Agree with detail plan as stated above.
Agree with detailed treatment plan as stated above. A total of 50 minutes were dedicated to the comprehensive evaluation of the patient, encompassing the review of neuroimaging, laboratory tests, and physical examination. Additionally, time was allocated to educate about secondary stroke prevention measures and to address all inquiries to the best of my ability. Brief discussion was held with daughter in law about DNR/DNI and please note patient is full code.
A total of 50 minutes were dedicated to the comprehensive evaluation of the patient, encompassing the review of neuroimaging, laboratory tests, and physical examination. Additionally, time was allocated to educate about secondary stroke prevention measures and to address all inquiries to the best of my ability.    Acute right cerebellar ischemic infarct and right corona radiata along with subacute/acute left cerebellar ischemic infarct with hemorrhagic transformation and right frontal and left frontal ischemic infarcts etiology cardioembolic in the setting of a-fib and atrial appendage thrombus unchaged since 11/2023 with missed dose of Lovenox. 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 Also found with occlusive arterial disease lower extremities, will consult vascular service. Will repeat a 24hr CT if stable will initiate full dose anticoagulation.

## 2024-03-13 NOTE — PATIENT PROFILE ADULT - FALL HARM RISK - HARM RISK INTERVENTIONS

## 2024-03-13 NOTE — PROGRESS NOTE ADULT - SUBJECTIVE AND OBJECTIVE BOX
[facility]   MD BETSY VANEGAS MD RUORU WONG, PA    TEL:  FROM 9 AM to 5 PM ---OFFICE: [phone]  AVAILABLE ON TEAMS    FROM 5 PM - 9 AM PLEASE CALL ANSWERING SERVICE: [phone]    RENAL FOLLOW UP NOTE--Date of Service 03-13-24 @ 11:23  --------------------------------------------------------------------------------  HPI:      Pt seen and examined at bedside.     PAST HISTORY  --------------------------------------------------------------------------------  No significant changes to PMH, PSH, FHx, SHx, unless otherwise noted    ALLERGIES & MEDICATIONS  --------------------------------------------------------------------------------  Allergies    No Known Allergies    Intolerances      Standing Inpatient Medications  atorvastatin 40 milliGRAM(s) Oral at bedtime  dextrose 5%. 1000 milliLiter(s) IV Continuous <Continuous>  dextrose 5%. 1000 milliLiter(s) IV Continuous <Continuous>  dextrose 50% Injectable 25 Gram(s) IV Push once  dextrose 50% Injectable 12.5 Gram(s) IV Push once  dextrose 50% Injectable 25 Gram(s) IV Push once  enoxaparin Injectable 60 milliGRAM(s) SubCutaneous every 12 hours  glucagon  Injectable 1 milliGRAM(s) IntraMuscular once  insulin lispro (ADMELOG) corrective regimen sliding scale   SubCutaneous Before meals and at bedtime  metoprolol succinate ER 25 milliGRAM(s) Oral daily  sodium chloride 0.9%. 1000 milliLiter(s) IV Continuous <Continuous>    PRN Inpatient Medications  dextrose Oral Gel 15 Gram(s) Oral once PRN      REVIEW OF SYSTEMS  --------------------------------------------------------------------------------  General: no fever    MSK: no edema     VITALS/PHYSICAL EXAM  --------------------------------------------------------------------------------  T(C): 36.4 (03-13-24 @ 08:00), Max: 37.1 (03-12-24 @ 22:00)  HR: 99 (03-13-24 @ 10:00) (86 - 109)  BP: 132/68 (03-13-24 @ 10:00) (101/61 - 156/95)  RR: 22 (03-13-24 @ 10:00) (17 - 22)  SpO2: 98% (03-13-24 @ 10:00) (95% - 99%)  Wt(kg): --    Weight (kg): 55 (03-11-24 @ 21:04)      03-12-24 @ 07:01  -  03-13-24 @ 07:00  --------------------------------------------------------  IN: 1218 mL / OUT: 300 mL / NET: 918 mL    03-13-24 @ 07:01  -  03-13-24 @ 11:23  --------------------------------------------------------  IN: 424 mL / OUT: 0 mL / NET: 424 mL      Physical Exam:  	Gen: NAD  	HEENT: MMM  	Pulm: CTA B/L  	CV: S1S2  	Abd: Soft, +BS  	Ext: No LE edema B/L                      Neuro: Awake   	Skin: Warm and Dry   	Vascular access: NO HD catheter            no  perez  LABS/STUDIES  --------------------------------------------------------------------------------              8.5    11.84 >-----------<  171      [03-13-24 @ 05:08]              26.9     139  |  107  |  35  ----------------------------<  143      [03-13-24 @ 05:08]  4.1   |  17  |  2.26        Ca     8.6     [03-13-24 @ 05:08]        PTT: 96.8       [03-13-24 @ 05:08]      Creatinine Trend:  SCr 2.26 [03-13 @ 05:08]  SCr 2.07 [03-12 @ 23:04]  SCr 2.04 [03-12 @ 17:31]  SCr 1.76 [03-12 @ 11:14]  SCr 1.62 [03-12 @ 05:04]    Urinalysis - [03-13-24 @ 05:08]      Color  / Appearance  / SG  / pH       Gluc 143 / Ketone   / Bili  / Urobili        Blood  / Protein  / Leuk Est  / Nitrite       RBC  / WBC  / Hyaline  / Gran  / Sq Epi  / Non Sq Epi  / Bacteria       Iron 34, TIBC 261, %sat 13      [11-28-23 @ 06:43]  Ferritin 85      [11-28-23 @ 06:43]  HbA1c 6.7      [09-23-19 @ 08:45]  TSH 1.30      [03-10-24 @ 13:28]  Lipid: chol 101, TG 93, HDL 50, LDL --      [03-10-24 @ 08:59]

## 2024-03-14 NOTE — PROGRESS NOTE ADULT - ASSESSMENT
67 y/o woman with PMH of chronic valvular atrial fibrillation on Lovenox, DM, HTN, HLD presented with new onset aphasia and L sided weakness. Per family she was fully conversant at around 10 pm on 3/9/2024. She has had 5 prior ischemic strokes in the setting of valvular atrial fibrillation. Son noticed aphasia and L sided weakness, has baseline R sided weakness. Is wheelchair bound at baseline due to deficits from prior strokes. Brought to the ED for further evaluation and work up. Admitted with CVA. Has developed MAYRA    A/P:  MAYRA:  Timing of MAYRA suggest contrast nephropathy  s/p contrast on 3/11  But this patient has extensive thrombus burden with renal infarct-MAYRA sec to ischemia can not be ruled out  heme and vascular on board  no blood in UA favors YADIRA  Check Urine NA Urine Cr  Check Nuclear scan to check blood  flow / function   CT A/P suggestive of no hydro  Change IVF To 1/2 NS with 75mEQ of bicarb at 75cc./hr   PT now oliguric--discussed with family regarding worsening renal function and decreasing urine outpt. Family consented for RRT if needed.   Monitor renal function closely      Proteinuria   in setting of + LE  repeat ua    acidosis   non ag  continue oral bicarb  650mg TID  IVF with bicarb as above  monitor serum co2    discussed with team

## 2024-03-14 NOTE — PROGRESS NOTE ADULT - ASSESSMENT
69 y/o woman with PMH of chronic valvular atrial fibrillation on Lovenox, DM, HTN, HLD presented with new onset aphasia and L sided weakness      1) CVA  -Acute onset aphasia and L sided weakness, found to have L cerebellar hemorrhage.   -f/u neuro recs    2) Afib  -valvular afib given severe MS rheumatic  -hx of eliquis failure, was on lovenox--now on hep gtt  -c/w metoprolol 25 mg po BID       3) Severe mitral stenosis   - BUBBA 12/2023 with severe rheumatic mitral stenosis and KRISTAN thrombus was discharged for outpatient follow up for CTS referral however patient declined surgery.   - extensive thrombus burden in arterial system, Vascular eval appreciated , monitor end organ function   - Echo with worsening BiV function and Sever MS with valve area 0.2cm2   - Currently not a candidate for Ischemic Eval given recent stroke and thrombus burden for b/l LE

## 2024-03-14 NOTE — PROGRESS NOTE ADULT - SUBJECTIVE AND OBJECTIVE BOX
THE PATIENT WAS SEEN AND EXAMINED BY ME WITH THE HOUSESTAFF AND STROKE TEAM DURING MORNING ROUNDS.     HPI:  69 y/o woman with PMH of chronic valvular atrial fibrillation on Lovenox, DM, HTN, HLD presented with new onset aphasia and L sided weakness. Per family she was fully conversant at around 10 pm on 3/9/2024. She has had 5 prior ischemic strokes in the setting of valvular atrial fibrillation. Son noticed aphasia and L sided weakness, has baseline R sided weakness. Is wheelchair bound at baseline due to deficits from prior strokes. Brought to the ED for further evaluation and work up,  Per family her last dose of Lovenox was last night on 3/9/2024.  Upon stroke assessment her NIHSS was 17 on arrival.     SUBJECTIVE: No events overnight.  No new neurologic complaints.    atorvastatin 40 milliGRAM(s) Oral at bedtime  cefTRIAXone   IVPB 1000 milliGRAM(s) IV Intermittent every 24 hours  dextrose 5%. 1000 milliLiter(s) IV Continuous <Continuous>  dextrose 5%. 1000 milliLiter(s) IV Continuous <Continuous>  dextrose 50% Injectable 25 Gram(s) IV Push once  dextrose 50% Injectable 12.5 Gram(s) IV Push once  dextrose 50% Injectable 25 Gram(s) IV Push once  dextrose Oral Gel 15 Gram(s) Oral once PRN  enoxaparin Injectable 60 milliGRAM(s) SubCutaneous every 12 hours  glucagon  Injectable 1 milliGRAM(s) IntraMuscular once  influenza  Vaccine (HIGH DOSE) 0.7 milliLiter(s) IntraMuscular once  insulin lispro (ADMELOG) corrective regimen sliding scale   SubCutaneous Before meals and at bedtime  metoprolol succinate ER 25 milliGRAM(s) Oral daily  sodium bicarbonate 650 milliGRAM(s) Oral three times a day  sodium chloride 0.9%. 1000 milliLiter(s) IV Continuous <Continuous>    PHYSICAL EXAM:   Vital Signs Last 24 Hrs  T(C): 36.4 (13 Mar 2024 20:00), Max: 36.6 (13 Mar 2024 14:40)  T(F): 97.6 (13 Mar 2024 20:00), Max: 97.8 (13 Mar 2024 14:40)  HR: 96 (14 Mar 2024 06:00) (89 - 100)  BP: 126/74 (14 Mar 2024 06:00) (104/64 - 156/95)  BP(mean): 95 (14 Mar 2024 06:00) (80 - 119)  RR: 20 (14 Mar 2024 06:00) (16 - 22)  SpO2: 98% (14 Mar 2024 06:00) (97% - 100%)    Parameters below as of 14 Mar 2024 06:00  Patient On (Oxygen Delivery Method): room air    General: No acute distress  HEENT: EOM intact, visual fields full  Abdomen: Soft, nontender, nondistended   Extremities: b/l LE cold to touch       NEUROLOGICAL EXAM: translation provided by son at bedside   Mental status: Awake, alert, oriented to person, year, and age. Not oriented to month. Speech is fluent. Can follow simple and complex commands.   Cranial Nerves: No facial droop, no nystagmus, moderate to severe dysarthria,  tongue midline  Motor exam: Normal tone, 4/5 RUE with drift and LUE with drift. RLE with no effort against gravity and LLE some effort against gravity   Sensation: Intact to light touch   Coordination/ Gait: No dysmetria, gait deferred     LABS:                        8.9    10.97 )-----------( 184      ( 14 Mar 2024 06:31 )             28.8    03-14    137  |  105  |  37<H>  ----------------------------<  121<H>  4.5   |  16<L>  |  2.32<H>    Ca    9.3      14 Mar 2024 06:32  Phos  5.3     03-14  Mg     1.8     03-14    PTT - ( 13 Mar 2024 05:08 )  PTT:96.8 sec    IMAGING: Reviewed by me.     CT Brain Stroke Protocol (03.10.24)  Small foci of left cerebellar intraparenchymal hemorrhage with   surrounding vasogenic edema.  Multifocal chronic infarcts, as outlined above  Mild cerebral atrophy.    CT Brain Perfusion Maps Stroke (03.10.24)  CTA neck and brain:  No large vessel occlusion or significant stenosis.  CT Perfusion:  Nondiagnostic study secondary to motion restriction.     CT Head No Cont (03.10.24)  Interval increased yola hemorrhagic transformation of left inferior   cerebellar hemisphere acute infarction. Parenchymal hemorrhage now   measures 3.7 by the 2.8 cm in the axial plane.  No significant mass effect on the fourth ventricle.  No inferior displacement of the cerebellar tonsils.  Nohydrocephalus.    CT Head No Cont (03.10.24)   Mildly improved hyperdensity involving the left inferior   cerebellar hemisphere suggesting improved contrast staining versus   hemorrhage. Subacute/chronic right cerebellar hemisphere infarctions.   Consider MRI for further evaluation.    CT Abdomen and Pelvis w/ IV Cont (03.11.24)  CHEST:  *  Filling defect within the left atrial appendage, concerning for   thrombus, also present on 11/25/2023.  *  Small pericardial effusion, increased since 11/25/2023.  *  Unchanged prominent nonspecific mediastinal lymph nodes.    ABDOMEN AND PELVIS:  *  Filling defects within the distal celiac artery extending into the   proximal common hepatic artery, distal right common femoral artery   extending into the proximal superficial and deep femoral arteries, right   internal iliac artery, and left internal iliac artery, concerning for   thrombus and may be related to thromboembolic event. Thrombus within the   right femoralsystem appears to be occlusive or nearly occlusive with   reconstitution of the imaged superficial and deep femoral arteries.   Thrombus within the right internal iliac artery also appears occlusive   with reconstitution of distal branches.  *  Patchy wedge-shaped areas of hypoenhancement within the spleen,   concerning for infarcts, with artifact from phase of contrast timing   thought to be less likely.  *  Patchy wedge-shaped regions of hypoenhancement within both kidneys,   suspicious for infarcts. Pyelonephritis can have a similar appearance.   Correlate with urinalysis.  *  Nonspecific pericholecystic edema. Consider right upper quadrant   ultrasound further evaluation.  *  Mild bilateral adrenal gland thickening with hyperenhancement, which   can be seen with hypoperfusion complex.    MR Head w/wo IV Cont (03.10.24)  -Subacute infarct with subacute hemorrhagic transformation in the left   inferior cerebellum.   -Numerous acute/subacute infarcts in the bilateral cerebrum and  cerebellum as described above, suggestive of embolic etiology.  -Numerous chronic infarcts as described above.    NOn contrast CTH (3/11/2023)  Moderate atrophy. Large acute right cerebellar infarct with a   small amount of petechial hemorrhage. Subacute to chronic left medial   cerebellar infarct with hemosiderin staining THE PATIENT WAS SEEN AND EXAMINED BY ME WITH THE HOUSESTAFF AND STROKE TEAM DURING MORNING ROUNDS.     HPI:  69 y/o woman with PMH of chronic valvular atrial fibrillation on Lovenox, DM, HTN, HLD presented with new onset aphasia and L sided weakness. Per family she was fully conversant at around 10 pm on 3/9/2024. She has had 5 prior ischemic strokes in the setting of valvular atrial fibrillation. Son noticed aphasia and L sided weakness, has baseline R sided weakness. Is wheelchair bound at baseline due to deficits from prior strokes. Brought to the ED for further evaluation and work up,  Per family her last dose of Lovenox was last night on 3/9/2024.  Upon stroke assessment her NIHSS was 17 on arrival.     SUBJECTIVE: No events overnight.  No new neurologic complaints.    atorvastatin 40 milliGRAM(s) Oral at bedtime  cefTRIAXone   IVPB 1000 milliGRAM(s) IV Intermittent every 24 hours  dextrose 5%. 1000 milliLiter(s) IV Continuous <Continuous>  dextrose 5%. 1000 milliLiter(s) IV Continuous <Continuous>  dextrose 50% Injectable 25 Gram(s) IV Push once  dextrose 50% Injectable 12.5 Gram(s) IV Push once  dextrose 50% Injectable 25 Gram(s) IV Push once  dextrose Oral Gel 15 Gram(s) Oral once PRN  enoxaparin Injectable 60 milliGRAM(s) SubCutaneous every 12 hours  glucagon  Injectable 1 milliGRAM(s) IntraMuscular once  influenza  Vaccine (HIGH DOSE) 0.7 milliLiter(s) IntraMuscular once  insulin lispro (ADMELOG) corrective regimen sliding scale   SubCutaneous Before meals and at bedtime  metoprolol succinate ER 25 milliGRAM(s) Oral daily  sodium bicarbonate 650 milliGRAM(s) Oral three times a day  sodium chloride 0.9%. 1000 milliLiter(s) IV Continuous <Continuous>    PHYSICAL EXAM:   Vital Signs Last 24 Hrs  T(C): 36.4 (13 Mar 2024 20:00), Max: 36.6 (13 Mar 2024 14:40)  T(F): 97.6 (13 Mar 2024 20:00), Max: 97.8 (13 Mar 2024 14:40)  HR: 96 (14 Mar 2024 06:00) (89 - 100)  BP: 126/74 (14 Mar 2024 06:00) (104/64 - 156/95)  BP(mean): 95 (14 Mar 2024 06:00) (80 - 119)  RR: 20 (14 Mar 2024 06:00) (16 - 22)  SpO2: 98% (14 Mar 2024 06:00) (97% - 100%)    Parameters below as of 14 Mar 2024 06:00  Patient On (Oxygen Delivery Method): room air    General: No acute distress  HEENT: EOM intact, visual fields full  Abdomen: Soft, nontender, nondistended   Extremities: b/l LE cold to touch       NEUROLOGICAL EXAM:   Mental status: Awake, alert, oriented to person, year, and age. Not oriented to month. Speech is fluent. Can follow simple and complex commands.   Cranial Nerves: No facial droop, no nystagmus, moderate to severe dysarthria  Motor exam: Normal tone, 4/5 RUE with drift and LUE with drift. RLE with no effort against gravity and LLE some effort against gravity   Sensation: Intact to light touch   Coordination/ Gait: Deferred    LABS:                        8.9    10.97 )-----------( 184      ( 14 Mar 2024 06:31 )             28.8    03-14    137  |  105  |  37<H>  ----------------------------<  121<H>  4.5   |  16<L>  |  2.32<H>    Ca    9.3      14 Mar 2024 06:32  Phos  5.3     03-14  Mg     1.8     03-14    PTT - ( 13 Mar 2024 05:08 )  PTT:96.8 sec    IMAGING: Reviewed by me.     CT Brain Stroke Protocol (03.10.24)  Small foci of left cerebellar intraparenchymal hemorrhage with   surrounding vasogenic edema.  Multifocal chronic infarcts, as outlined above  Mild cerebral atrophy.    CT Brain Perfusion Maps Stroke (03.10.24)  CTA neck and brain:  No large vessel occlusion or significant stenosis.  CT Perfusion:  Nondiagnostic study secondary to motion restriction.     CT Head No Cont (03.10.24)  Interval increased yola hemorrhagic transformation of left inferior   cerebellar hemisphere acute infarction. Parenchymal hemorrhage now   measures 3.7 by the 2.8 cm in the axial plane.  No significant mass effect on the fourth ventricle.  No inferior displacement of the cerebellar tonsils.  Nohydrocephalus.    CT Head No Cont (03.10.24)   Mildly improved hyperdensity involving the left inferior   cerebellar hemisphere suggesting improved contrast staining versus   hemorrhage. Subacute/chronic right cerebellar hemisphere infarctions.   Consider MRI for further evaluation.    CT Abdomen and Pelvis w/ IV Cont (03.11.24)  CHEST:  *  Filling defect within the left atrial appendage, concerning for   thrombus, also present on 11/25/2023.  *  Small pericardial effusion, increased since 11/25/2023.  *  Unchanged prominent nonspecific mediastinal lymph nodes.    ABDOMEN AND PELVIS:  *  Filling defects within the distal celiac artery extending into the   proximal common hepatic artery, distal right common femoral artery   extending into the proximal superficial and deep femoral arteries, right   internal iliac artery, and left internal iliac artery, concerning for   thrombus and may be related to thromboembolic event. Thrombus within the   right femoralsystem appears to be occlusive or nearly occlusive with   reconstitution of the imaged superficial and deep femoral arteries.   Thrombus within the right internal iliac artery also appears occlusive   with reconstitution of distal branches.  *  Patchy wedge-shaped areas of hypoenhancement within the spleen,   concerning for infarcts, with artifact from phase of contrast timing   thought to be less likely.  *  Patchy wedge-shaped regions of hypoenhancement within both kidneys,   suspicious for infarcts. Pyelonephritis can have a similar appearance.   Correlate with urinalysis.  *  Nonspecific pericholecystic edema. Consider right upper quadrant   ultrasound further evaluation.  *  Mild bilateral adrenal gland thickening with hyperenhancement, which   can be seen with hypoperfusion complex.    MR Head w/wo IV Cont (03.10.24)  -Subacute infarct with subacute hemorrhagic transformation in the left   inferior cerebellum.   -Numerous acute/subacute infarcts in the bilateral cerebrum and  cerebellum as described above, suggestive of embolic etiology.  -Numerous chronic infarcts as described above.    NOn contrast CTH (3/11/2023)  Moderate atrophy. Large acute right cerebellar infarct with a   small amount of petechial hemorrhage. Subacute to chronic left medial   cerebellar infarct with hemosiderin staining

## 2024-03-14 NOTE — PROGRESS NOTE ADULT - ASSESSMENT
67yo woman with PMH of chronic valvular atrial fibrillation on Lovenox, DM, HTN, HLD presented with new onset aphasia and L sided weakness. Per family she was fully conversant at around 10 pm on 3/9/2024. She has had 5 prior ischemic strokes in the setting of valvular atrial fibrillation. Son noticed aphasia and L sided weakness, has baseline R sided weakness. Is wheelchair bound at baseline due to deficits from prior strokes. Brought to the ED for further evaluation and work up,  Per family her last dose of Lovenox was last night on 3/9/2024.     Impression: Acute onset aphasia and L sided weakness due to numerous acute/subacute infarcts in the bilateral cerebrum and cerebellum with subacute hemorrhagic transformation in the right inferior cerebellum likely due to cardioembolism in the setting of known left atrial appendage thrombus and missed dose of Lovenox. Patient was on full dose lovenox, but reported occasional missed doses of medication.     NEURO: Neurologically with stable exam since admission. Repeat CTH on 3/12 is stable. Started on full dose heparin gtt for multiple thrombo emboli in body switch to Full dose Lovenox after stable CT 3/13. Continue close monitoring for neurologic deterioration, goal for SBP < 160. A1C- 7.0, LDL - 33. Will c/w home statin dose. MRI Brain w/o and w/ with results as above. Physical therapy/OT reccs [facility].     ANTITHROMBOTIC THERAPY: Lovenox for multiple emboli throughout the body in the setting of a fib and impaired mitral valve.    PULMONARY: CXR clear, protecting airway, saturating well on room air     CARDIOVASCULAR: pending TTE, c/w cardiac monitoring. 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 Hx valvular afib given severe MS rheumatic. Has hx of eliquis failure, was on full dose lovenox at home. Will c/w metoprolol 25 mg BID for rate control.                        SBP goal: < 160    GASTROINTESTINAL:  dysphagia screen - failed. Seen by speech, now on pureed diet. Tolerating well     Diet: pureed w/ mildly thick liquids    RENAL: BUN/Cr with elevation, likely due to contrast. Now worsening on 3/14 with creatine 2.32. Nephro following, reccs appreciated. Low urine output. Will c/w IV hydration with NS. Started on sodium bicarb 650mg tid. Pending renal US.      Na Goal: Greater than 135     Brunner: no     HEMATOLOGY: H/H with anemia, will monitor closely as patient on therapeutic Lovenox. Platelets 184. LE arterial and venous dopplers due to dec pulses showing multiple arterial occlusions and no DVTs present. CT CAP shows multiple thromboembolic events in the abdominal vasculature. Hypoenhancement within the spleen, both kidneys suspicious for infarcts. Mild bilateral adrenal gland thickening with hyper enhancement, which can be seen with hypoperfusion complex. Unclear at this time why patient is hypercoagulable, hematology consulted for recommendations.      DVT ppx: Lovenox     ID: afebrile, mild leukocytosis. Ceftriaxone for + UA (3/13 - )    OTHER: Plan discussed with patient and family at bedside, all questions and concerns addressed. Prognosis discussed in depth with daughters and daughter in law. Discussed with plan of care with son over the phone on 3/12. Prognosis discussed at length, family wants patient to be full code at this time     DISPOSITION: [facility] once stable and workup is complete    CORE MEASURES:        Admission NIHSS: 17     Tenecteplase: [] YES [x] NO      LDL/HDL: 33/50      Depression Screen: pending     Statin Therapy: yes     Dysphagia Screen: [] PASS [x] FAIL     Smoking [] YES [x] NO [] Smoking cessation and education provided to patient [] Nicotine patch ordered      Afib [x] YES [] NO     Stroke Education [x] YES [] NO    Obtain screening lower extremity venous ultrasound in patients who meet 1 or more of the following criteria as patient is high risk for DVT/PE on admission:   [] History of DVT/PE  [] Hypercoagulable states (Factor V Leiden, Cancer, OCP, etc. )  [] Prolonged immobility (hemiplegia/hemiparesis/post operative or any other extended immobilization)  [] Transferred from outside facility (Rehab or Long term care)  [] Age </= to 50 67yo woman with PMH of chronic valvular atrial fibrillation on Lovenox, DM, HTN, HLD presented with new onset aphasia and L sided weakness. Per family she was fully conversant at around 10 pm on 3/9/2024. She has had 5 prior ischemic strokes in the setting of valvular atrial fibrillation. Son noticed aphasia and L sided weakness, has baseline R sided weakness. Is wheelchair bound at baseline due to deficits from prior strokes. Brought to the ED for further evaluation and work up,  Per family her last dose of Lovenox was last night on 3/9/2024.     Impression: Acute onset aphasia and L sided weakness due to numerous acute/subacute infarcts in the bilateral cerebrum and cerebellum with subacute hemorrhagic transformation in the right inferior cerebellum likely due to cardioembolism in the setting of known left atrial appendage thrombus and missed dose of Lovenox. Patient was on full dose Lovenox but reported occasional missed doses of medication.     NEURO: Neurologically with stable exam since admission. Repeat CTH on 3/12 is stable. Started on full dose heparin gtt for multiple thrombo emboli in body. Switched to Full dose Lovenox after stable CT 3/13, however now back on heparin drip due to worsening renal function. Continue close monitoring for neurologic deterioration, goal for SBP < 160. A1C- 7.0, LDL - 33. Will c/w home statin dose. MRI Brain w/wo with results as above. Physical therapy/OT recs [facility].     ANTITHROMBOTIC THERAPY: Heparin drip    PULMONARY: CXR clear, protecting airway, saturating well on room air     CARDIOVASCULAR: TTE read pending, c/w cardiac monitoring. 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 Hx valvular afib given severe rheumatic MS. Has hx of Eliquis failure, was on full dose lovenox at home. Will c/w metoprolol 25 mg BID for rate control.                        SBP goal: < 160    GASTROINTESTINAL:  dysphagia screen - failed. Seen by speech, now on pureed diet. Tolerating well     Diet: pureed w/ mildly thick liquids    RENAL: BUN/Cr with elevation, likely due to contrast. Now worsening on 3/14 with creatine 2.32. Nephro following, recs appreciated. Low urine output. Will c/w IV hydration with 1/2NS. Started on sodium bicarb 650mg tid. Renal US completed and shows increased echogenicity consistent with renal parenchymal disease. Renal considering RRT due to oliguria, will continue to monitor.      Na Goal: Greater than 135     Brunner: no     HEMATOLOGY: H/H with anemia, will monitor closely as patient on therapeutic Lovenox. Platelets 184. LE arterial and venous dopplers due to dec pulses showing multiple arterial occlusions and no DVTs present. CT CAP shows multiple thromboembolic events in the abdominal vasculature. Hypoenhancement within the spleen, both kidneys suspicious for infarcts. Mild bilateral adrenal gland thickening with hyper enhancement, which can be seen with hypoperfusion complex. Unclear at this time why patient is hypercoagulable, hematology consulted for recommendations.     DVT ppx: Lovenox     ID: afebrile, mild leukocytosis. Ceftriaxone for + UA (3/13 - )    OTHER: Plan discussed with patient and family at bedside, all questions and concerns addressed. Prognosis discussed in depth with daughters and daughter in law. Discussed with plan of care with son over the phone on 3/12. Prognosis discussed at length, family wants patient to be full code at this time. Palliative care consulted per family request.     DISPOSITION: [facility] once stable and workup is complete.     CORE MEASURES:        Admission NIHSS: 17     Tenecteplase: [] YES [x] NO      LDL/HDL: 33/50      Depression Screen: pending     Statin Therapy: yes     Dysphagia Screen: [] PASS [x] FAIL     Smoking [] YES [x] NO [] Smoking cessation and education provided to patient [] Nicotine patch ordered      Afib [x] YES [] NO     Stroke Education [x] YES [] NO    Obtain screening lower extremity venous ultrasound in patients who meet 1 or more of the following criteria as patient is high risk for DVT/PE on admission:   [] History of DVT/PE  [] Hypercoagulable states (Factor V Leiden, Cancer, OCP, etc. )  [] Prolonged immobility (hemiplegia/hemiparesis/post operative or any other extended immobilization)  [] Transferred from outside facility (Rehab or Long term care)  [] Age </= to 50

## 2024-03-14 NOTE — PROGRESS NOTE ADULT - SUBJECTIVE AND OBJECTIVE BOX
Chintan Owusu MD  Interventional Cardiology / Endovascular Specialist  [place] Office : [phone] [place] [address] [place] [address]  Tel:   [place] Office : [address] [facility] [place] [address]  Tel: [phone]    Subjective/Overnight events: Patient lying in bed. No acute distress.   	  MEDICATIONS:  heparin  Infusion 900 Unit(s)/Hr IV Continuous <Continuous>  metoprolol succinate ER 25 milliGRAM(s) Oral daily    cefTRIAXone   IVPB 1000 milliGRAM(s) IV Intermittent every 24 hours      acetaminophen   IVPB .. 1000 milliGRAM(s) IV Intermittent once      atorvastatin 40 milliGRAM(s) Oral at bedtime  dextrose 50% Injectable 25 Gram(s) IV Push once  dextrose 50% Injectable 12.5 Gram(s) IV Push once  dextrose 50% Injectable 25 Gram(s) IV Push once  dextrose Oral Gel 15 Gram(s) Oral once PRN  glucagon  Injectable 1 milliGRAM(s) IntraMuscular once  insulin lispro (ADMELOG) corrective regimen sliding scale   SubCutaneous Before meals and at bedtime    dextrose 5%. 1000 milliLiter(s) IV Continuous <Continuous>  dextrose 5%. 1000 milliLiter(s) IV Continuous <Continuous>  influenza  Vaccine (HIGH DOSE) 0.7 milliLiter(s) IntraMuscular once  sodium bicarbonate 650 milliGRAM(s) Oral three times a day  sodium chloride 0.45% 1000 milliLiter(s) IV Continuous <Continuous>      PAST MEDICAL/SURGICAL HISTORY  PAST MEDICAL & SURGICAL HISTORY:  HTN (hypertension)      HLD (hyperlipidemia)      DM (diabetes mellitus)  Type II      Atrial fibrillation  h/o Cardioversion in 2013 and on Coumadin      Mitral stenosis      TIA (transient ischemic attack)  2013- Rt facial droop which resolved after 2 hours      Chronic atrial fibrillation      CVA (cerebrovascular accident)      HTN (hypertension)      DM (diabetes mellitus), type 2      Ventral hernia      S/P knee surgery      S/P laser cataract surgery, unspecified laterality  ???      H/O hernia repair          SOCIAL HISTORY: Substance Use (street drugs): ( x ) never used  (  ) other:    FAMILY HISTORY:  No pertinent family history in first degree relatives    No pertinent family history in first degree relatives        REVIEW OF SYSTEMS:  CONSTITUTIONAL: No fever, weight loss, or fatigue  EYES: No eye pain, visual disturbances, or discharge  ENMT:  No difficulty hearing, tinnitus, vertigo; No sinus or throat pain  BREASTS: No pain, masses, or nipple discharge  GASTROINTESTINAL: No abdominal or epigastric pain. No nausea, vomiting, or hematemesis; No diarrhea or constipation. No melena or hematochezia.  GENITOURINARY: No dysuria, frequency, hematuria, or incontinence  NEUROLOGICAL: No headaches, memory loss, loss of strength, numbness, or tremors  ENDOCRINE: No heat or cold intolerance; No hair loss  MUSCULOSKELETAL: No joint pain or swelling; No muscle, back, or extremity pain  PSYCHIATRIC: No depression, anxiety, mood swings, or difficulty sleeping  HEME/LYMPH: No easy bruising, or bleeding gums  All others negative    PHYSICAL EXAM:  T(C): 36.8 (03-14-24 @ 20:00), Max: 36.8 (03-14-24 @ 20:00)  HR: 88 (03-15-24 @ 00:00) (88 - 100)  BP: 137/88 (03-15-24 @ 00:00) (118/77 - 137/88)  RR: 16 (03-15-24 @ 00:00) (16 - 20)  SpO2: 98% (03-15-24 @ 00:00) (95% - 100%)  Wt(kg): --  I&O's Summary    13 Mar 2024 07:01  -  14 Mar 2024 07:00  --------------------------------------------------------  IN: 1774 mL / OUT: 100 mL / NET: 1674 mL    14 Mar 2024 07:01  -  15 Mar 2024 00:22  --------------------------------------------------------  IN: 1070 mL / OUT: 200 mL / NET: 870 mL      GENERAL: NAD  EYES:  conjunctiva and sclera clear  ENMT: No tonsillar erythema, exudates, or enlargement  Cardiovascular: Normal S1 S2, No JVD, No murmurs, No edema  Respiratory: Lungs clear to auscultation	  Gastrointestinal:  Soft,   Extremities: No edema                             9.0    10.34 )-----------( 182      ( 14 Mar 2024 20:22 )             29.7     03-14    137  |  105  |  37<H>  ----------------------------<  121<H>  4.5   |  16<L>  |  2.32<H>    Ca    9.3      14 Mar 2024 06:32  Phos  5.3     03-14  Mg     1.8     03-14      proBNP:   Lipid Profile:   HgA1c:   TSH:     Consultant(s) Notes Reviewed:  [x ] YES  [ ] NO    Care Discussed with Consultants/Other Providers [ x] YES  [ ] NO    Imaging Personally Reviewed independently:  [x] YES  [ ] NO    All labs, radiologic studies, vitals, orders and medications list reviewed. Patient is seen and examined at bedside. Case discussed with medical team.

## 2024-03-14 NOTE — PROGRESS NOTE ADULT - SUBJECTIVE AND OBJECTIVE BOX
[facility]   MD BETSY VANEGAS MD RUORU WONG, PA    TEL:  FROM 9 AM to 5 PM ---OFFICE: [phone]  AVAILABLE ON TEAMS    FROM 5 PM - 9 AM PLEASE CALL ANSWERING SERVICE: [phone]    RENAL FOLLOW UP NOTE--Date of Service 03-14-24 @ 09:51  --------------------------------------------------------------------------------  HPI:      Pt seen and examined at bedside.       PAST HISTORY  --------------------------------------------------------------------------------  No significant changes to PMH, PSH, FHx, SHx, unless otherwise noted    ALLERGIES & MEDICATIONS  --------------------------------------------------------------------------------  Allergies    No Known Allergies    Intolerances      Standing Inpatient Medications  atorvastatin 40 milliGRAM(s) Oral at bedtime  cefTRIAXone   IVPB 1000 milliGRAM(s) IV Intermittent every 24 hours  dextrose 5%. 1000 milliLiter(s) IV Continuous <Continuous>  dextrose 5%. 1000 milliLiter(s) IV Continuous <Continuous>  dextrose 50% Injectable 25 Gram(s) IV Push once  dextrose 50% Injectable 12.5 Gram(s) IV Push once  dextrose 50% Injectable 25 Gram(s) IV Push once  glucagon  Injectable 1 milliGRAM(s) IntraMuscular once  influenza  Vaccine (HIGH DOSE) 0.7 milliLiter(s) IntraMuscular once  insulin lispro (ADMELOG) corrective regimen sliding scale   SubCutaneous Before meals and at bedtime  metoprolol succinate ER 25 milliGRAM(s) Oral daily  sodium bicarbonate 650 milliGRAM(s) Oral three times a day  sodium chloride 0.9%. 1000 milliLiter(s) IV Continuous <Continuous>    PRN Inpatient Medications  dextrose Oral Gel 15 Gram(s) Oral once PRN      REVIEW OF SYSTEMS  --------------------------------------------------------------------------------  General: no fever  MSK: no edema     VITALS/PHYSICAL EXAM  --------------------------------------------------------------------------------  T(C): 36.6 (03-14-24 @ 08:00), Max: 36.6 (03-13-24 @ 14:40)  HR: 97 (03-14-24 @ 08:00) (89 - 100)  BP: 129/82 (03-14-24 @ 08:00) (104/64 - 135/89)  RR: 19 (03-14-24 @ 08:00) (16 - 22)  SpO2: 98% (03-14-24 @ 08:00) (97% - 100%)  Wt(kg): --        03-13-24 @ 07:01  -  03-14-24 @ 07:00  --------------------------------------------------------  IN: 1774 mL / OUT: 100 mL / NET: 1674 mL    03-14-24 @ 07:01  -  03-14-24 @ 09:51  --------------------------------------------------------  IN: 75 mL / OUT: 0 mL / NET: 75 mL      Physical Exam:  	Gen: NAD  	HEENT: MMM  	Pulm: CTA B/L  	CV: S1S2  	Abd: Soft, +BS  	Ext: No LE edema B/L                      Neuro: Awake   	Skin: Warm and Dry   	Vascular access: NO HD catheter      LABS/STUDIES  --------------------------------------------------------------------------------              8.9    10.97 >-----------<  184      [03-14-24 @ 06:31]              28.8     137  |  105  |  37  ----------------------------<  121      [03-14-24 @ 06:32]  4.5   |  16  |  2.32        Ca     9.3     [03-14-24 @ 06:32]      Mg     1.8     [03-14-24 @ 06:32]      Phos  5.3     [03-14-24 @ 06:32]        PTT: 96.8       [03-13-24 @ 05:08]      Creatinine Trend:  SCr 2.32 [03-14 @ 06:32]  SCr 2.25 [03-13 @ 12:24]  SCr 2.26 [03-13 @ 05:08]  SCr 2.07 [03-12 @ 23:04]  SCr 2.04 [03-12 @ 17:31]    Urinalysis - [03-14-24 @ 06:32]      Color  / Appearance  / SG  / pH       Gluc 121 / Ketone   / Bili  / Urobili        Blood  / Protein  / Leuk Est  / Nitrite       RBC  / WBC  / Hyaline  / Gran  / Sq Epi  / Non Sq Epi  / Bacteria     Urine Creatinine 88      [03-13-24 @ 14:23]  Urine Sodium 65      [03-13-24 @ 14:23]    Iron 34, TIBC 261, %sat 13      [11-28-23 @ 06:43]  Ferritin 85      [11-28-23 @ 06:43]  HbA1c 6.7      [09-23-19 @ 08:45]  TSH 1.30      [03-10-24 @ 13:28]  Lipid: chol 101, TG 93, HDL 50, LDL --      [03-10-24 @ 08:59]    HBsAg Nonreact      [03-14-24 @ 06:31]

## 2024-03-15 NOTE — CONSULT NOTE ADULT - CONSULT REQUESTED DATE/TIME
10-Mar-2024 08:53
10-Mar-2024 12:44
12-Mar-2024 11:21
12-Mar-2024 12:35
11-Mar-2024 23:27
15-Mar-2024 14:00
10-Mar-2024 11:49

## 2024-03-15 NOTE — CONSULT NOTE ADULT - PROBLEM SELECTOR RECOMMENDATION 9
PPS20% needs assistance with all basic needs   LIves home with son and daughter in law .  Patient is bedbound at baseline and requires assistance with  all ADLs.
Management per primary team.  -Hold Lovenox and aspirin for now  -Echo, telemetry, A1c, lipid panel, electrolytes  -Frequent neurochecks
LEO Vigil MD have participated in the daily care of this patient and have performed a history and physical exam of the patient and discussed  the findings and plan with the house officer. I reviewed the resident note and agree with the findings and plan   I Cal Vigil MD have personally seen and examined the patient at bedside today 3/11/24 at  10  pm

## 2024-03-15 NOTE — PROGRESS NOTE ADULT - SUBJECTIVE AND OBJECTIVE BOX
Patient is a 68y old  Female who presents with a chief complaint of ICH (15 Mar 2024 11:42)    Patient seen and examined at bedside, spoke with family member at bedside.    MEDICATIONS  (STANDING):  atorvastatin 40 milliGRAM(s) Oral at bedtime  cefTRIAXone   IVPB 1000 milliGRAM(s) IV Intermittent every 24 hours  dextrose 5%. 1000 milliLiter(s) (100 mL/Hr) IV Continuous <Continuous>  dextrose 5%. 1000 milliLiter(s) (50 mL/Hr) IV Continuous <Continuous>  dextrose 50% Injectable 25 Gram(s) IV Push once  dextrose 50% Injectable 12.5 Gram(s) IV Push once  dextrose 50% Injectable 25 Gram(s) IV Push once  glucagon  Injectable 1 milliGRAM(s) IntraMuscular once  heparin  Infusion 900 Unit(s)/Hr (9 mL/Hr) IV Continuous <Continuous>  influenza  Vaccine (HIGH DOSE) 0.7 milliLiter(s) IntraMuscular once  insulin lispro (ADMELOG) corrective regimen sliding scale   SubCutaneous Before meals and at bedtime  metoprolol succinate ER 25 milliGRAM(s) Oral daily  sodium bicarbonate 650 milliGRAM(s) Oral three times a day  sodium chloride 0.45% 1000 milliLiter(s) (75 mL/Hr) IV Continuous <Continuous>    MEDICATIONS  (PRN):  dextrose Oral Gel 15 Gram(s) Oral once PRN Blood Glucose LESS THAN 70 milliGRAM(s)/deciliter    Vital Signs Last 24 Hrs  T(C): 36.8 (15 Mar 2024 12:00), Max: 36.8 (14 Mar 2024 20:00)  T(F): 98.2 (15 Mar 2024 12:00), Max: 98.2 (14 Mar 2024 20:00)  HR: 87 (15 Mar 2024 12:00) (86 - 99)  BP: 112/84 (15 Mar 2024 12:00) (112/84 - 139/85)  BP(mean): 94 (15 Mar 2024 12:00) (83 - 107)  RR: 18 (15 Mar 2024 12:00) (16 - 20)  SpO2: 99% (15 Mar 2024 12:00) (96% - 99%)    Parameters below as of 15 Mar 2024 10:00  Patient On (Oxygen Delivery Method): room air    PE  NAD  Awake  Anicteric, MMM  RRR  CTAB  Abd soft, NT, ND  No c/c/e  No rash grossly  RLE weakness                        8.3    9.21  )-----------( 190      ( 15 Mar 2024 07:57 )             25.9       03-15    134<L>  |  101  |  38<H>  ----------------------------<  146<H>  4.4   |  18<L>  |  1.98<H>    Ca    8.8      15 Mar 2024 07:57  Phos  5.3     03-14  Mg     1.8     03-14

## 2024-03-15 NOTE — PROGRESS NOTE ADULT - ASSESSMENT
67 y/o woman with PMH of chronic valvular atrial fibrillation on Lovenox, DM, HTN, HLD presented with new onset aphasia and L sided weakness. Per family she was fully conversant at around 10 pm on 3/9/2024. She has had 5 prior ischemic strokes in the setting of valvular atrial fibrillation. Son noticed aphasia and L sided weakness, has baseline R sided weakness. Is wheelchair bound at baseline due to deficits from prior strokes. Brought to the ED for further evaluation and work up. Admitted with CVA. Has developed MAYRA    A/P:  MAYRA:  Timing of MAYRA suggest contrast nephropathy  s/p contrast on 3/11  But this patient has extensive thrombus burden with renal infarct-MAYRA sec to ischemia can not be ruled out  heme and vascular on board  no blood in UA favors YADIRA  Check Urine NA Urine Cr  CT A/P suggestive of no hydro  Continue  1/2 NS with 75mEQ of bicarb at 75cc./hr   PT was oliguric--discussed with family regarding worsening renal function and decreasing urine outpt. Family consented for RRT if needed.  Today Renal function improving and non oliguric   Monitor renal function closely      Proteinuria   in setting of + LE  repeat ua    acidosis   non ag  continue oral bicarb  650mg TID  monitor serum co2

## 2024-03-15 NOTE — CONSULT NOTE ADULT - SUBJECTIVE AND OBJECTIVE BOX
HPI:  67 y/o woman with PMH of chronic valvular atrial fibrillation on Lovenox, DM, HTN, HLD presented with new onset aphasia and L sided weakness. Per family she was fully conversant at around 10 pm on 3/9/2024. She has had 5 prior ischemic strokes in the setting of valvular atrial fibrillation. Son noticed aphasia and L sided weakness, has baseline R sided weakness. Is wheelchair bound at baseline due to deficits from prior strokes. Brought to the ED for further evaluation and work up,  Per family her last dose of Lovenox was last night on 3/9/2024.  Upon stroke assessment her NIHSS was 17 on arrival.     NIHSS 17  MRS 5   (10 Mar 2024 07:27)    PERTINENT PM/SXH:   Personal history of diabetes mellitus    Mitral stenosis    HTN (hypertension)    HLD (hyperlipidemia)    DM (diabetes mellitus)    Atrial fibrillation    Mitral stenosis    TIA (transient ischemic attack)    Chronic atrial fibrillation    CVA (cerebrovascular accident)    HTN (hypertension)    DM (diabetes mellitus), type 2    Lesion of lung    Need for prophylactic measure    Seizure      NO SIGNIFICANT PAST SURGICAL HISTORY    Ventral hernia    S/P knee surgery    S/P laser cataract surgery, unspecified laterality    H/O hernia repair      FAMILY HISTORY:  No pertinent family history in first degree relatives    No pertinent family history in first degree relatives      Family Hx substance abuse [ ]yes [ ]no  ITEMS NOT CHECKED ARE NOT PRESENT    SOCIAL HISTORY:   Significant other/partner[ ]  Children[ x]  Faith/Spirituality:  Substance hx:  [ ]   Tobacco hx:  [ ]   Alcohol hx: [ ]   Home Opioid hx:  [ ] I-Stop Reference No:  Living Situation: x[ ]Home  [ ]Long term care  [ ]Rehab [ ]Other    ADVANCE DIRECTIVES:    DNR/MOLST  [ x]  Living Will  [ ]   DECISION MAKER(s):  [ x] Health Care Proxy(s)  [ ] Surrogate(s)  [ ] Guardian           Name(s): Phone Number(s):    BASELINE (I)ADL(s) (prior to admission):  [place]: [ ]Total  [ ] Moderate [x ]Dependent    Allergies    No Known Allergies    Intolerances    MEDICATIONS  (STANDING):  atorvastatin 40 milliGRAM(s) Oral at bedtime  cefTRIAXone   IVPB 1000 milliGRAM(s) IV Intermittent every 24 hours  dextrose 5%. 1000 milliLiter(s) (50 mL/Hr) IV Continuous <Continuous>  dextrose 5%. 1000 milliLiter(s) (100 mL/Hr) IV Continuous <Continuous>  dextrose 50% Injectable 25 Gram(s) IV Push once  dextrose 50% Injectable 12.5 Gram(s) IV Push once  dextrose 50% Injectable 25 Gram(s) IV Push once  glucagon  Injectable 1 milliGRAM(s) IntraMuscular once  heparin  Infusion 900 Unit(s)/Hr (9 mL/Hr) IV Continuous <Continuous>  influenza  Vaccine (HIGH DOSE) 0.7 milliLiter(s) IntraMuscular once  insulin lispro (ADMELOG) corrective regimen sliding scale   SubCutaneous Before meals and at bedtime  metoprolol succinate ER 25 milliGRAM(s) Oral daily  sodium bicarbonate 650 milliGRAM(s) Oral three times a day  sodium chloride 0.45% 1000 milliLiter(s) (75 mL/Hr) IV Continuous <Continuous>    MEDICATIONS  (PRN):  dextrose Oral Gel 15 Gram(s) Oral once PRN Blood Glucose LESS THAN 70 milliGRAM(s)/deciliter    PRESENT SYMPTOMS: [ ]Unable to self-report  [ ] CPOT [ ] PAINADs [ ] RDOS  Source if other than patient:  [ ]Family   [ ]Team     Pain: [ ]yes [x ]no  QOL impact -   Location -                    Aggravating factors -  Quality -  Radiation -  Timing-  Severity (0-10 scale):  Minimal acceptable level (0-10 scale):     CPOT:    https://www.[email or web address]/getattachment/[email or web address]/Critical-Care-Pain-Observation-Tool-(CPOT)    PAIN AD Score:   http://geriatrictoolkit.[place].[facility]/cog/painad.pdf (press ctrl +  left click to view)    Dyspnea:                           [ x]Mild [ ]Moderate [ ]Severe      RDOS:  0 to 2  minimal or no respiratory distress   3  mild distress  4 to 6 moderate distress  >7 severe distress  https://homecareinformation.net/handouts/hen/Respiratory_Distress_Observation_Scale.pdf (Ctrl +  left click to view)     Anxiety:                             [ ]Mild [x ]Moderate [ ]Severe  Fatigue:                             [ ]Mild [x ]Moderate [x ]Severe  Nausea:                             [ ]Mild [ ]Moderate [ ]Severe  Loss of appetite:              [ ]Mild [ x]Moderate [ ]Severe  Constipation:                    [ ]Mild [ ]Moderate [ ]Severe    PCSSQ [Palliative Care Spiritual Screening Question]   Severity (0-10):   5  Score of 4 or > indicate consideration of Chaplaincy referral.  Chaplaincy Referral: [x ] yes [ ] refused [ ] following    Caregiver [place]? : [ x] yes [ ] no  Social work referral [ ] Patient & Family Centered Care Referral [ ]     Anticipatory Grief Present?: [ x] yes [ ] no  Social work referral [ ]  Patient & Family Centered Care Referral [ ]       Other Symptoms:  [x ]All other review of systems negative     Palliative Performance Status Version 2:  30       %    http://[facility].org/files/news/palliative_performance_scale_ppsv2.pdf  PHYSICAL EXAM:  Vital Signs Last 24 Hrs  T(C): 36.8 (15 Mar 2024 12:00), Max: 36.8 (14 Mar 2024 20:00)  T(F): 98.2 (15 Mar 2024 12:00), Max: 98.2 (14 Mar 2024 20:00)  HR: 85 (15 Mar 2024 14:00) (85 - 99)  BP: 120/78 (15 Mar 2024 14:00) (112/84 - 139/85)  BP(mean): 95 (15 Mar 2024 14:00) (83 - 107)  RR: 15 (15 Mar 2024 14:00) (15 - 20)  SpO2: 97% (15 Mar 2024 14:00) (96% - 99%)    Parameters below as of 15 Mar 2024 10:00  Patient On (Oxygen Delivery Method): room air     I&O's Summary    14 Mar 2024 07:01  -  15 Mar 2024 07:00  --------------------------------------------------------  IN: 1787 mL / OUT: 500 mL / NET: 1287 mL    15 Mar 2024 07:01  -  15 Mar 2024 14:53  --------------------------------------------------------  IN: 504 mL / OUT: 0 mL / NET: 504 mL      GENERAL: [ ]Cachexia    [ x]Alert  x[ ]Oriented x  1 [ ]Lethargic  [ ]Unarousable  [ ]Verbal  [ ]Non-Verbal  Behavioral:   [ ] Anxiety  [x ] Delirium [ ] Agitation [ ] Other  HEENT:  [ ]Normal   [ x]Dry mouth   [ ]ET Tube/Trach  [ ]Oral lesions  PULMONARY:   [ ]Clear [ ]Tachypnea  [ x]Audible excessive secretions   [ ]Rhonchi        [ ]Right [ ]Left [ ]Bilateral  [ ]Crackles        [ ]Right [ ]Left [ ]Bilateral  [ ]Wheezing     [ ]Right [ ]Left [ ]Bilateral  [ ]Diminished breath sounds [ ]right [ ]left [ ]bilateral  CARDIOVASCULAR:    [ ]Regular [ ]xIrregular [ ]Tachy  [ ]Virgil [ ]Murmur [ ]Other  GASTROINTESTINAL:  [x ]Soft  [ ]Distended   [x ]+BS  [ ]Non tender [ ]Tender  [ ]Other [ ]PEG [ ]OGT/ NGT  Last BM:  GENITOURINARY:  [ ]Normal [x ] Incontinent   [ ]Oliguria/Anuria   [ ][place]  MUSCULOSKELETAL:   [ ]Normal   [ ]Weakness  [ x]Bed/Wheelchair bound [ ]Edema  NEUROLOGIC:   [ ]No focal deficits  [ x]Cognitive impairment  [ ]Dysphagia [ ]Dysarthria [ ]Paresis [ ]Other   SKIN:   [x ]Normal  [ ]Rash  [ ]Other  [ ]Pressure ulcer(s)       Present on admission [ ]y [ ]n    CRITICAL CARE:  [ ] Shock Present  [ ]Septic [ ]Cardiogenic [x ]Neurologic [ ]Hypovolemic  [ ]  Vasopressors [ ]  Inotropes   [ ]Respiratory failure present [ ]Mechanical ventilation [ ]Non-invasive ventilatory support [ ]High flow    [ ]Acute  [ ]Chronic [ ]Hypoxic  [ ]Hypercarbic [ ]Other  [ ]Other organ failure     LABS:                        8.3    9.21  )-----------( 190      ( 15 Mar 2024 07:57 )             25.9   03-15    134<L>  |  101  |  38<H>  ----------------------------<  146<H>  4.4   |  18<L>  |  1.98<H>    Ca    8.8      15 Mar 2024 07:57  Phos  5.3     03-14  Mg     1.8     03-14    PTT - ( 15 Mar 2024 03:37 )  PTT:88.2 sec    Urinalysis Basic - ( 15 Mar 2024 07:57 )    Color: x / Appearance: x / SG: x / pH: x  Gluc: 146 mg/dL / Ketone: x  / Bili: x / Urobili: x   Blood: x / Protein: x / Nitrite: x   Leuk Esterase: x / RBC: x / WBC x   Sq Epi: x / Non Sq Epi: x / Bacteria: x      RADIOLOGY & ADDITIONAL STUDIES:    < from: CT Head No Cont (03.11.24 @ 09:46) >  ACC: [id number] EXAM:  CT BRAIN   ORDERED BY: HEDY TENORIO     PROCEDURE DATE:  03/11/2024          INTERPRETATION:  CLINICAL INDICATION: CVA    5mm axial sections of the brain were obtained from base to vertex,   without the intravenous administration of contrast material.coronal and   sagittal computer generated reconstructed views are available.    Comparison is made with the prior CT and MRI of 3/10/2024.    Moderate atrophy is identified with ventricular and sulcal prominence.   Old bilateral frontal and right medial frontal cortical infarcts are   identified. There is a large right cerebellar infarct which is acute with   a small amount of petechial hemorrhage. A large subacute to chronic left   medial cerebellar infarct is identified with hemosiderin staining and   there are scattered acute left cerebellar infarcts.    There has been previous bilateral lens replacement surgery.    IMPRESSION: Moderate atrophy. Large acute right cerebellar infarct with a   small amount of petechial hemorrhage. Subacute to chronic left medial   cerebellar infarct with hemosiderin staining. No change since 3/10/2024.    --- End of Report ---        < end of copied text >      PROTEIN CALORIE MALNUTRITION PRESENT: [ ]mild [ x]moderate [ ]severe [ ]underweight [ ]morbid obesity  https://www.andeal.org/vault/2440/web/files/ONC/Table_Clinical%20Characteristics%20to%20Document%20Malnutrition-White%20JV%20et%20al%202012.pdf    Height (cm): 152.4 (11-25-23 @ 16:35)  Weight (kg): 55 (03-11-24 @ 21:04), 56.7 (11-25-23 @ 16:35)  BMI (kg/m2): 23.7 (03-11-24 @ 21:04), 24.4 (11-25-23 @ 16:35)    [ ]PPSV2 < or = to 30% [ ]significant weight loss  [ ]poor nutritional intake  [ ]anasarca[ ]Artificial Nutrition      Other REFERRALS:  [ ]Hospice  [ ]Child Life  [ x]Social Work  [ ]Case management [ ]Holistic Therapy

## 2024-03-15 NOTE — PROGRESS NOTE ADULT - ASSESSMENT
69 y/o woman with PMH of chronic valvular atrial fibrillation on Lovenox, DM, HTN, HLD presented with new onset aphasia and L sided weakness.    ICH  --pt w/ hx of multiple CVAs now w/ hemorrhagic conversion. 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  --Imaging with no concern for malignancy, recommend mammogram and colonoscopy as outpatient  --now back on heparin drip due to worsening renal function  --neurosurgery following, no plan for acute intervention at this time.   --Hypercoag work up mostly unremarkable. Low protein S not clinically significant. However, will plan to repeat testing along w/ additional work up as outpatient. Findings likely related to Afib    will follow,    Nils Thomas PA-C  Hematology/Oncology  [facility]  [phone] (office)

## 2024-03-15 NOTE — CONSULT NOTE ADULT - CONSULT REASON
ANDRZEJ, PAULYE
subacute heme in stroke bed
MAYRA
OP f/u
hypercoagulable w/up
Goals of care
Medical optimization

## 2024-03-15 NOTE — CONSULT NOTE ADULT - PROBLEM SELECTOR RECOMMENDATION 3
Multifactoral:  chronic CVA now with  acute CVA with hemorrhagic conversion.  Family reports that patient at baseline is bedbound and needs 24/7 care
Continue metoprolol for rate control  -sBP<160
LEO Vigil MD have participated in the daily care of this patient and have performed a history and physical exam of the patient and discussed  the findings and plan with the house officer. I reviewed the resident note and agree with the findings and plan   I Cal Vigil MD have personally seen and examined the patient at bedside today 3/11/24 at  10  pm

## 2024-03-15 NOTE — CONSULT NOTE ADULT - CONVERSATION DETAILS
Met with patients sister in  and son Cayden at bedside for greater than 55 minutes.   Family is well versed regarding patients acute medical issues, sharing that they have been taking care of patient for a very long time.  We discussed quality of life vs quantity of life and although appropriately tearful, agree with moving forward with a symptom directed approach through home hospice services.   Discussed hospice philosophy and appropriate referral was completed  through .    MOLST discussed at length and although in agreement with protecting patient from invasive medical interventions they wanted some time to think about this option and to further discuss with extended family.   At time of this note,  ANURAG Lawler from Stroke Unit advises that family have agreed with DNR/DNI and MOLST was completed by stroke team.  Goals are clearly delineated, no further role for palliative care.  Please reconsult with any acute issues.

## 2024-03-15 NOTE — CONSULT NOTE ADULT - ASSESSMENT
69 y/o woman with PMH of chronic valvular atrial fibrillation on Lovenox, DM, HTN, HLD presented with new onset aphasia and L sided weakness.  Palliative care called for goals of care and advance care planning

## 2024-03-15 NOTE — PROGRESS NOTE ADULT - SUBJECTIVE AND OBJECTIVE BOX
[facility]   MD BETSY VANEGAS MD RUORU WONG, PA    TEL:  FROM 9 AM to 5 PM ---OFFICE: [phone]  AVAILABLE ON TEAMS    FROM 5 PM - 9 AM PLEASE CALL ANSWERING SERVICE: [phone]    RENAL FOLLOW UP NOTE--Date of Service 03-15-24 @ 11:42  --------------------------------------------------------------------------------  HPI:      Pt seen and examined at bedside.       PAST HISTORY  --------------------------------------------------------------------------------  No significant changes to PMH, PSH, FHx, SHx, unless otherwise noted    ALLERGIES & MEDICATIONS  --------------------------------------------------------------------------------  Allergies    No Known Allergies    Intolerances      Standing Inpatient Medications  atorvastatin 40 milliGRAM(s) Oral at bedtime  cefTRIAXone   IVPB 1000 milliGRAM(s) IV Intermittent every 24 hours  dextrose 5%. 1000 milliLiter(s) IV Continuous <Continuous>  dextrose 5%. 1000 milliLiter(s) IV Continuous <Continuous>  dextrose 50% Injectable 25 Gram(s) IV Push once  dextrose 50% Injectable 12.5 Gram(s) IV Push once  dextrose 50% Injectable 25 Gram(s) IV Push once  glucagon  Injectable 1 milliGRAM(s) IntraMuscular once  heparin  Infusion 900 Unit(s)/Hr IV Continuous <Continuous>  influenza  Vaccine (HIGH DOSE) 0.7 milliLiter(s) IntraMuscular once  insulin lispro (ADMELOG) corrective regimen sliding scale   SubCutaneous Before meals and at bedtime  metoprolol succinate ER 25 milliGRAM(s) Oral daily  sodium bicarbonate 650 milliGRAM(s) Oral three times a day  sodium chloride 0.45% 1000 milliLiter(s) IV Continuous <Continuous>    PRN Inpatient Medications  dextrose Oral Gel 15 Gram(s) Oral once PRN      REVIEW OF SYSTEMS  --------------------------------------------------------------------------------  General: no fever  MSK: no edema     VITALS/PHYSICAL EXAM  --------------------------------------------------------------------------------  T(C): 36.8 (03-14-24 @ 20:00), Max: 36.8 (03-14-24 @ 20:00)  HR: 87 (03-15-24 @ 10:00) (86 - 99)  BP: 115/75 (03-15-24 @ 10:00) (114/64 - 139/85)  RR: 19 (03-15-24 @ 10:00) (16 - 20)  SpO2: 96% (03-15-24 @ 10:00) (95% - 100%)  Wt(kg): --        03-14-24 @ 07:01  -  03-15-24 @ 07:00  --------------------------------------------------------  IN: 1787 mL / OUT: 500 mL / NET: 1287 mL    03-15-24 @ 07:01  -  03-15-24 @ 11:42  --------------------------------------------------------  IN: 420 mL / OUT: 0 mL / NET: 420 mL      Physical Exam:  	Gen: NAD  	HEENT: MMM  	Pulm: CTA B/L  	CV: S1S2  	Abd: Soft, +BS  	Ext: No LE edema B/L                      Neuro: Awake   	Skin: Warm and Dry   	Vascular access: NO HD catheter           CRYSTAL perez  LABS/STUDIES  --------------------------------------------------------------------------------              8.3    9.21  >-----------<  190      [03-15-24 @ 07:57]              25.9     134  |  101  |  38  ----------------------------<  146      [03-15-24 @ 07:57]  4.4   |  18  |  1.98        Ca     8.8     [03-15-24 @ 07:57]      Mg     1.8     [03-14-24 @ 06:32]      Phos  5.3     [03-14-24 @ 06:32]        PTT: 88.2       [03-15-24 @ 03:37]      Creatinine Trend:  SCr 1.98 [03-15 @ 07:57]  SCr 2.32 [03-14 @ 06:32]  SCr 2.25 [03-13 @ 12:24]  SCr 2.26 [03-13 @ 05:08]  SCr 2.07 [03-12 @ 23:04]    Urinalysis - [03-15-24 @ 07:57]      Color  / Appearance  / SG  / pH       Gluc 146 / Ketone   / Bili  / Urobili        Blood  / Protein  / Leuk Est  / Nitrite       RBC  / WBC  / Hyaline  / Gran  / Sq Epi  / Non Sq Epi  / Bacteria     Urine Creatinine 88      [03-13-24 @ 14:23]  Urine Sodium 65      [03-13-24 @ 14:23]    Iron 34, TIBC 261, %sat 13      [11-28-23 @ 06:43]  Ferritin 85      [11-28-23 @ 06:43]  HbA1c 6.7      [09-23-19 @ 08:45]  TSH 1.30      [03-10-24 @ 13:28]  Lipid: chol 101, TG 93, HDL 50, LDL --      [03-10-24 @ 08:59]    HBsAb 5.9      [03-14-24 @ 06:32]  HBsAg Nonreact      [03-14-24 @ 06:31]  HBcAb Reactive      [03-14-24 @ 06:32]  HCV 0.14, Nonreact      [03-14-24 @ 06:32]

## 2024-03-15 NOTE — CONSULT NOTE ADULT - PROBLEM SELECTOR RECOMMENDATION 2
[id number] 6.  Patient is at high annual stroke risk however anticoagulation contraindicated at this time.  -Patient may benefit from an LACD/watchman however this may require initial anticoagulation.  -Instructed patient to follow-up with her cardiologist outpatient regarding this procedure  -Continue metoprolol for rate control
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
LEO Vigil MD have participated in the daily care of this patient and have performed a history and physical exam of the patient and discussed  the findings and plan with the house officer. I reviewed the resident note and agree with the findings and plan   I Cal Vigil MD have personally seen and examined the patient at bedside today 3/11/24 at  10  pm
(0) age 40 years or less

## 2024-03-15 NOTE — PROGRESS NOTE ADULT - SUBJECTIVE AND OBJECTIVE BOX
THE PATIENT WAS SEEN AND EXAMINED BY ME WITH THE HOUSESTAFF AND STROKE TEAM DURING MORNING ROUNDS.     HPI:  67 y/o woman with PMH of chronic valvular atrial fibrillation on Lovenox, DM, HTN, HLD presented with new onset aphasia and L sided weakness. Per family she was fully conversant at around 10 pm on 3/9/2024. She has had 5 prior ischemic strokes in the setting of valvular atrial fibrillation. Son noticed aphasia and L sided weakness, has baseline R sided weakness. Is wheelchair bound at baseline due to deficits from prior strokes. Brought to the ED for further evaluation and work up,  Per family her last dose of Lovenox was last night on 3/9/2024.  Upon stroke assessment her NIHSS was 17 on arrival.     SUBJECTIVE: No events overnight.  No new neurologic complaints.    acetaminophen   IVPB .. 1000 milliGRAM(s) IV Intermittent once  atorvastatin 40 milliGRAM(s) Oral at bedtime  cefTRIAXone   IVPB 1000 milliGRAM(s) IV Intermittent every 24 hours  dextrose 5%. 1000 milliLiter(s) IV Continuous <Continuous>  dextrose 5%. 1000 milliLiter(s) IV Continuous <Continuous>  dextrose 50% Injectable 25 Gram(s) IV Push once  dextrose 50% Injectable 12.5 Gram(s) IV Push once  dextrose 50% Injectable 25 Gram(s) IV Push once  dextrose Oral Gel 15 Gram(s) Oral once PRN  glucagon  Injectable 1 milliGRAM(s) IntraMuscular once  heparin  Infusion 900 Unit(s)/Hr IV Continuous <Continuous>  influenza  Vaccine (HIGH DOSE) 0.7 milliLiter(s) IntraMuscular once  insulin lispro (ADMELOG) corrective regimen sliding scale   SubCutaneous Before meals and at bedtime  metoprolol succinate ER 25 milliGRAM(s) Oral daily  sodium bicarbonate 650 milliGRAM(s) Oral three times a day  sodium chloride 0.45% 1000 milliLiter(s) IV Continuous <Continuous>    PHYSICAL EXAM:   Vital Signs Last 24 Hrs  T(C): 36.8 (14 Mar 2024 20:00), Max: 36.8 (14 Mar 2024 20:00)  T(F): 98.2 (14 Mar 2024 20:00), Max: 98.2 (14 Mar 2024 20:00)  HR: 87 (15 Mar 2024 06:00) (86 - 100)  BP: 133/82 (15 Mar 2024 06:00) (114/64 - 137/88)  BP(mean): 101 (15 Mar 2024 06:00) (83 - 107)  RR: 18 (15 Mar 2024 06:00) (16 - 20)  SpO2: 98% (15 Mar 2024 06:00) (95% - 100%)    Parameters below as of 15 Mar 2024 06:00  Patient On (Oxygen Delivery Method): room air    General: No acute distress  HEENT: EOM intact, visual fields full  Abdomen: Soft, nontender, nondistended   Extremities: No edema    NEUROLOGICAL EXAM:  Mental status: Awake, alert, oriented x3, no aphasia, no neglect, normal memory   Cranial Nerves: No facial asymmetry, no nystagmus, no dysarthria,  tongue midline  Motor exam: Normal tone, no drift, 5/5 RUE, 5/5 RLE, 5/5 LUE, 5/5 LLE, normal fine finger movements.  Sensation: Intact to light touch   Coordination/ Gait: No dysmetria, THIEN intact and symmetric bilaterally    LABS:                        9.0    10.34 )-----------( 182      ( 14 Mar 2024 20:22 )             29.7    03-14    137  |  105  |  37<H>  ----------------------------<  121<H>  4.5   |  16<L>  |  2.32<H>    Ca    9.3      14 Mar 2024 06:32  Phos  5.3     03-14  Mg     1.8     03-14    PTT - ( 15 Mar 2024 03:37 )  PTT:88.2 sec    IMAGING: Reviewed by me.     CT Brain Stroke Protocol (03.10.24)  Small foci of left cerebellar intraparenchymal hemorrhage with   surrounding vasogenic edema.  Multifocal chronic infarcts, as outlined above  Mild cerebral atrophy.    CT Brain Perfusion Maps Stroke (03.10.24)  CTA neck and brain:  No large vessel occlusion or significant stenosis.  CT Perfusion:  Nondiagnostic study secondary to motion restriction.     CT Head No Cont (03.10.24)  Interval increased yola hemorrhagic transformation of left inferior   cerebellar hemisphere acute infarction. Parenchymal hemorrhage now   measures 3.7 by the 2.8 cm in the axial plane.  No significant mass effect on the fourth ventricle.  No inferior displacement of the cerebellar tonsils.  Nohydrocephalus.    CT Head No Cont (03.10.24)   Mildly improved hyperdensity involving the left inferior   cerebellar hemisphere suggesting improved contrast staining versus   hemorrhage. Subacute/chronic right cerebellar hemisphere infarctions.   Consider MRI for further evaluation.    CT Abdomen and Pelvis w/ IV Cont (03.11.24)  CHEST:  *  Filling defect within the left atrial appendage, concerning for   thrombus, also present on 11/25/2023.  *  Small pericardial effusion, increased since 11/25/2023.  *  Unchanged prominent nonspecific mediastinal lymph nodes.    ABDOMEN AND PELVIS:  *  Filling defects within the distal celiac artery extending into the   proximal common hepatic artery, distal right common femoral artery   extending into the proximal superficial and deep femoral arteries, right   internal iliac artery, and left internal iliac artery, concerning for   thrombus and may be related to thromboembolic event. Thrombus within the   right femoralsystem appears to be occlusive or nearly occlusive with   reconstitution of the imaged superficial and deep femoral arteries.   Thrombus within the right internal iliac artery also appears occlusive   with reconstitution of distal branches.  *  Patchy wedge-shaped areas of hypoenhancement within the spleen,   concerning for infarcts, with artifact from phase of contrast timing   thought to be less likely.  *  Patchy wedge-shaped regions of hypoenhancement within both kidneys,   suspicious for infarcts. Pyelonephritis can have a similar appearance.   Correlate with urinalysis.  *  Nonspecific pericholecystic edema. Consider right upper quadrant   ultrasound further evaluation.  *  Mild bilateral adrenal gland thickening with hyperenhancement, which   can be seen with hypoperfusion complex.    MR Head w/wo IV Cont (03.10.24)  -Subacute infarct with subacute hemorrhagic transformation in the left   inferior cerebellum.   -Numerous acute/subacute infarcts in the bilateral cerebrum and  cerebellum as described above, suggestive of embolic etiology.  -Numerous chronic infarcts as described above.    NOn contrast CTH (3/11/2023)  Moderate atrophy. Large acute right cerebellar infarct with a   small amount of petechial hemorrhage. Subacute to chronic left medial   cerebellar infarct with hemosiderin staining THE PATIENT WAS SEEN AND EXAMINED BY ME WITH THE HOUSESTAFF AND STROKE TEAM DURING MORNING ROUNDS.     HPI:  69 y/o woman with PMH of chronic valvular atrial fibrillation on Lovenox, DM, HTN, HLD presented with new onset aphasia and L sided weakness. Per family she was fully conversant at around 10 pm on 3/9/2024. She has had 5 prior ischemic strokes in the setting of valvular atrial fibrillation. Son noticed aphasia and L sided weakness, has baseline R sided weakness. Is wheelchair bound at baseline due to deficits from prior strokes. Brought to the ED for further evaluation and work up,  Per family her last dose of Lovenox was last night on 3/9/2024.  Upon stroke assessment her NIHSS was 17 on arrival.     SUBJECTIVE: No events overnight.  No new neurologic complaints.    acetaminophen   IVPB .. 1000 milliGRAM(s) IV Intermittent once  atorvastatin 40 milliGRAM(s) Oral at bedtime  cefTRIAXone   IVPB 1000 milliGRAM(s) IV Intermittent every 24 hours  dextrose 5%. 1000 milliLiter(s) IV Continuous <Continuous>  dextrose 5%. 1000 milliLiter(s) IV Continuous <Continuous>  dextrose 50% Injectable 25 Gram(s) IV Push once  dextrose 50% Injectable 12.5 Gram(s) IV Push once  dextrose 50% Injectable 25 Gram(s) IV Push once  dextrose Oral Gel 15 Gram(s) Oral once PRN  glucagon  Injectable 1 milliGRAM(s) IntraMuscular once  heparin  Infusion 900 Unit(s)/Hr IV Continuous <Continuous>  influenza  Vaccine (HIGH DOSE) 0.7 milliLiter(s) IntraMuscular once  insulin lispro (ADMELOG) corrective regimen sliding scale   SubCutaneous Before meals and at bedtime  metoprolol succinate ER 25 milliGRAM(s) Oral daily  sodium bicarbonate 650 milliGRAM(s) Oral three times a day  sodium chloride 0.45% 1000 milliLiter(s) IV Continuous <Continuous>    PHYSICAL EXAM:   Vital Signs Last 24 Hrs  T(C): 36.8 (14 Mar 2024 20:00), Max: 36.8 (14 Mar 2024 20:00)  T(F): 98.2 (14 Mar 2024 20:00), Max: 98.2 (14 Mar 2024 20:00)  HR: 87 (15 Mar 2024 06:00) (86 - 100)  BP: 133/82 (15 Mar 2024 06:00) (114/64 - 137/88)  BP(mean): 101 (15 Mar 2024 06:00) (83 - 107)  RR: 18 (15 Mar 2024 06:00) (16 - 20)  SpO2: 98% (15 Mar 2024 06:00) (95% - 100%)    Parameters below as of 15 Mar 2024 06:00  Patient On (Oxygen Delivery Method): room air    General: No acute distress  HEENT: EOM intact, visual fields full  Abdomen: Soft, nontender, nondistended   Extremities: b/l LE cold to touch     NEUROLOGICAL EXAM:   Mental status: Awake, alert, oriented to person, year, and age. Not oriented to month. Speech is fluent. Can follow simple and complex commands.   Cranial Nerves: No facial droop, no nystagmus, moderate to severe dysarthria  Motor exam: Normal tone, 4/5 RUE with drift and LUE with drift. RLE with no effort against gravity and LLE some effort against gravity   Sensation: Intact to light touch   Coordination/ Gait: Deferred    LABS:                        9.0    10.34 )-----------( 182      ( 14 Mar 2024 20:22 )             29.7    03-14    137  |  105  |  37<H>  ----------------------------<  121<H>  4.5   |  16<L>  |  2.32<H>    Ca    9.3      14 Mar 2024 06:32  Phos  5.3     03-14  Mg     1.8     03-14    PTT - ( 15 Mar 2024 03:37 )  PTT:88.2 sec    IMAGING: Reviewed by me.     CT Brain Stroke Protocol (03.10.24)  Small foci of left cerebellar intraparenchymal hemorrhage with   surrounding vasogenic edema.  Multifocal chronic infarcts, as outlined above  Mild cerebral atrophy.    CT Brain Perfusion Maps Stroke (03.10.24)  CTA neck and brain:  No large vessel occlusion or significant stenosis.  CT Perfusion:  Nondiagnostic study secondary to motion restriction.     CT Head No Cont (03.10.24)  Interval increased yola hemorrhagic transformation of left inferior   cerebellar hemisphere acute infarction. Parenchymal hemorrhage now   measures 3.7 by the 2.8 cm in the axial plane.  No significant mass effect on the fourth ventricle.  No inferior displacement of the cerebellar tonsils.  Nohydrocephalus.    CT Head No Cont (03.10.24)   Mildly improved hyperdensity involving the left inferior   cerebellar hemisphere suggesting improved contrast staining versus   hemorrhage. Subacute/chronic right cerebellar hemisphere infarctions.   Consider MRI for further evaluation.    CT Abdomen and Pelvis w/ IV Cont (03.11.24)  CHEST:  *  Filling defect within the left atrial appendage, concerning for   thrombus, also present on 11/25/2023.  *  Small pericardial effusion, increased since 11/25/2023.  *  Unchanged prominent nonspecific mediastinal lymph nodes.    ABDOMEN AND PELVIS:  *  Filling defects within the distal celiac artery extending into the   proximal common hepatic artery, distal right common femoral artery   extending into the proximal superficial and deep femoral arteries, right   internal iliac artery, and left internal iliac artery, concerning for   thrombus and may be related to thromboembolic event. Thrombus within the   right femoralsystem appears to be occlusive or nearly occlusive with   reconstitution of the imaged superficial and deep femoral arteries.   Thrombus within the right internal iliac artery also appears occlusive   with reconstitution of distal branches.  *  Patchy wedge-shaped areas of hypoenhancement within the spleen,   concerning for infarcts, with artifact from phase of contrast timing   thought to be less likely.  *  Patchy wedge-shaped regions of hypoenhancement within both kidneys,   suspicious for infarcts. Pyelonephritis can have a similar appearance.   Correlate with urinalysis.  *  Nonspecific pericholecystic edema. Consider right upper quadrant   ultrasound further evaluation.  *  Mild bilateral adrenal gland thickening with hyperenhancement, which   can be seen with hypoperfusion complex.    MR Head w/wo IV Cont (03.10.24)  -Subacute infarct with subacute hemorrhagic transformation in the left   inferior cerebellum.   -Numerous acute/subacute infarcts in the bilateral cerebrum and  cerebellum as described above, suggestive of embolic etiology.  -Numerous chronic infarcts as described above.    NOn contrast CTH (3/11/2023)  Moderate atrophy. Large acute right cerebellar infarct with a   small amount of petechial hemorrhage. Subacute to chronic left medial   cerebellar infarct with hemosiderin staining

## 2024-03-15 NOTE — PROGRESS NOTE ADULT - ASSESSMENT
69yo woman with PMH of chronic valvular atrial fibrillation on Lovenox, DM, HTN, HLD presented with new onset aphasia and L sided weakness. Per family she was fully conversant at around 10 pm on 3/9/2024. She has had 5 prior ischemic strokes in the setting of valvular atrial fibrillation. Son noticed aphasia and L sided weakness, has baseline R sided weakness. Is wheelchair bound at baseline due to deficits from prior strokes. Brought to the ED for further evaluation and work up,  Per family her last dose of Lovenox was last night on 3/9/2024.     Impression: Acute onset aphasia and L sided weakness due to numerous acute/subacute infarcts in the bilateral cerebrum and cerebellum with subacute hemorrhagic transformation in the right inferior cerebellum likely due to cardioembolism in the setting of known left atrial appendage thrombus and missed dose of Lovenox. Patient was on full dose Lovenox but reported occasional missed doses of medication.     NEURO: Neurologically with stable exam since admission. Repeat CTH on 3/12 is stable. Started on full dose heparin gtt for multiple thrombo emboli in body. Switched to Full dose Lovenox after stable CT 3/13, however now back on heparin drip due to worsening renal function. Continue close monitoring for neurologic deterioration, goal for SBP < 160. A1C- 7.0, LDL - 33. Will c/w home statin dose. MRI Brain w/wo with results as above. Physical therapy/OT recs [facility].     ANTITHROMBOTIC THERAPY: Heparin drip    PULMONARY: CXR clear, protecting airway, saturating well on room air     CARDIOVASCULAR: TTE read pending, c/w cardiac monitoring. 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 Hx valvular afib given severe rheumatic MS. Has hx of Eliquis failure, was on full dose lovenox at home. Will c/w metoprolol 25 mg BID for rate control.                        SBP goal: < 160    GASTROINTESTINAL:  dysphagia screen - failed. Seen by speech, now on pureed diet. Tolerating well     Diet: pureed w/ mildly thick liquids    RENAL: BUN/Cr with elevation, likely due to contrast. Now worsening on 3/14 with creatine 2.32. Nephro following, recs appreciated. Low urine output. Will c/w IV hydration with 1/2NS. Started on sodium bicarb 650mg tid. Renal US completed and shows increased echogenicity consistent with renal parenchymal disease. Renal considering RRT due to oliguria, will continue to monitor.      Na Goal: Greater than 135     Brunner: no     HEMATOLOGY: H/H with anemia, will monitor closely as patient on therapeutic Lovenox. Platelets 184. LE arterial and venous dopplers due to dec pulses showing multiple arterial occlusions and no DVTs present. CT CAP shows multiple thromboembolic events in the abdominal vasculature. Hypoenhancement within the spleen, both kidneys suspicious for infarcts. Mild bilateral adrenal gland thickening with hyper enhancement, which can be seen with hypoperfusion complex. Unclear at this time why patient is hypercoagulable, hematology consulted for recommendations.     DVT ppx: Lovenox     ID: afebrile, mild leukocytosis. Ceftriaxone for + UA (3/13 - )    OTHER: Plan discussed with patient and family at bedside, all questions and concerns addressed. Prognosis discussed in depth with daughters and daughter in law. Discussed with plan of care with son over the phone on 3/12. Prognosis discussed at length, family wants patient to be full code at this time. Palliative care consulted per family request.     DISPOSITION: [facility] once stable and workup is complete.     CORE MEASURES:        Admission NIHSS: 17     Tenecteplase: [] YES [x] NO      LDL/HDL: 33/50      Depression Screen: pending     Statin Therapy: yes     Dysphagia Screen: [] PASS [x] FAIL     Smoking [] YES [x] NO [] Smoking cessation and education provided to patient [] Nicotine patch ordered      Afib [x] YES [] NO     Stroke Education [x] YES [] NO    Obtain screening lower extremity venous ultrasound in patients who meet 1 or more of the following criteria as patient is high risk for DVT/PE on admission:   [] History of DVT/PE  [] Hypercoagulable states (Factor V Leiden, Cancer, OCP, etc. )  [] Prolonged immobility (hemiplegia/hemiparesis/post operative or any other extended immobilization)  [] Transferred from outside facility (Rehab or Long term care)  [] Age </= to 50 67yo woman with PMH of chronic valvular atrial fibrillation on Lovenox, DM, HTN, HLD presented with new onset aphasia and L sided weakness. Per family she was fully conversant at around 10 pm on 3/9/2024. She has had 5 prior ischemic strokes in the setting of valvular atrial fibrillation. Son noticed aphasia and L sided weakness, has baseline R sided weakness. Is wheelchair bound at baseline due to deficits from prior strokes. Brought to the ED for further evaluation and work up,  Per family her last dose of Lovenox was last night on 3/9/2024.     Impression: Acute onset aphasia and L sided weakness due to numerous acute/subacute infarcts in the bilateral cerebrum and cerebellum with subacute hemorrhagic transformation in the right inferior cerebellum likely due to cardioembolism in the setting of known left atrial appendage thrombus and missed dose of Lovenox. Patient was on full dose Lovenox but reported occasional missed doses of medication.     NEURO: Neurologically with stable exam since admission. Repeat CTH on 3/12 is stable. Started on full dose heparin gtt for multiple thrombo emboli in body. Switched to Full dose Lovenox after stable CT 3/13, however now back on heparin drip due to worsening renal function. Continue close monitoring for neurologic deterioration, goal for SBP < 160. A1C- 7.0, LDL - 33. Will c/w home statin dose. MRI Brain w/wo with results as above. Physical therapy/OT recs [facility].     ANTITHROMBOTIC THERAPY: Heparin drip    PULMONARY: CXR clear, protecting airway, saturating well on room air     CARDIOVASCULAR: TTE EF 35%, left and right severe atrial dilation, severe mitral stenosis. 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 Hx valvular afib given severe rheumatic MS. Has hx of Eliquis failure, was on full dose Lovenox at home. Will c/w metoprolol 25 mg BID for rate control.         SBP goal: < 160    GASTROINTESTINAL: Dysphagia screen - failed. Seen by speech, now on pureed diet. Tolerating well     Diet: pureed w/ mildly thick liquids    RENAL: BUN/Cr with elevation, likely due to contrast. Worsening on 3/14 with creatine 2.32. Nephro following, recs appreciated. Low urine output. Will c/w IV hydration with 1/2NS. Started on sodium bicarb 650mg tid. Renal US completed and shows increased echogenicity consistent with renal parenchymal disease. Renal considering RRT due to oliguria, will continue to monitor.      Na Goal: Greater than 135     Brunner: no     HEMATOLOGY: H/H with anemia, will monitor closely as patient on therapeutic Lovenox. Platelets 184. LE arterial and venous dopplers due to dec pulses showing multiple arterial occlusions and no DVTs present. CT CAP shows multiple thromboembolic events in the abdominal vasculature. Hypoenhancement within the spleen, both kidneys suspicious for infarcts. Mild bilateral adrenal gland thickening with hyper enhancement, which can be seen with hypoperfusion complex. Unclear at this time why patient is hypercoagulable, hematology consulted for recommendations.     DVT ppx: Lovenox     ID: afebrile, mild leukocytosis. Ceftriaxone for + UA (3/13 - )    OTHER: Plan discussed with patient and family at bedside, all questions and concerns addressed. Prognosis discussed in depth with daughters and daughter in law. Discussed with plan of care with son over the phone on 3/12. Prognosis discussed at length, family wants patient to be full code at this time. Palliative care consulted per family request.     DISPOSITION: [facility] once stable and workup is complete.     CORE MEASURES:        Admission NIHSS: 17     Tenecteplase: [] YES [x] NO      LDL/HDL: 33/50      Depression Screen: pending     Statin Therapy: yes     Dysphagia Screen: [] PASS [x] FAIL     Smoking [] YES [x] NO [] Smoking cessation and education provided to patient [] Nicotine patch ordered      Afib [x] YES [] NO     Stroke Education [x] YES [] NO    Obtain screening lower extremity venous ultrasound in patients who meet 1 or more of the following criteria as patient is high risk for DVT/PE on admission:   [] History of DVT/PE  [] Hypercoagulable states (Factor V Leiden, Cancer, OCP, etc. )  [] Prolonged immobility (hemiplegia/hemiparesis/post operative or any other extended immobilization)  [] Transferred from outside facility (Rehab or Long term care)  [] Age </= to 50 67yo woman with PMH of chronic valvular atrial fibrillation on Lovenox, DM, HTN, HLD presented with new onset aphasia and L sided weakness. Per family she was fully conversant at around 10 pm on 3/9/2024. She has had 5 prior ischemic strokes in the setting of valvular atrial fibrillation. Son noticed aphasia and L sided weakness, has baseline R sided weakness. Is wheelchair bound at baseline due to deficits from prior strokes. Brought to the ED for further evaluation and work up,  Per family her last dose of Lovenox was last night on 3/9/2024.     Impression: Acute onset aphasia and L sided weakness due to numerous acute/subacute infarcts in the bilateral cerebrum and cerebellum with subacute hemorrhagic transformation in the right inferior cerebellum likely due to cardioembolism in the setting of known left atrial appendage thrombus and missed dose of Lovenox. Patient was on full dose Lovenox but reported occasional missed doses of medication.     NEURO: Neurologically with stable exam since admission. Repeat CTH on 3/12 is stable. Started on full dose heparin gtt for multiple thrombo emboli in body. Switched to Full dose Lovenox after stable CT 3/13, however now back on heparin drip due to worsening renal function. Continue close monitoring for neurologic deterioration, goal for SBP < 160. A1C- 7.0, LDL - 33. Will c/w home statin dose. MRI Brain w/wo with results as above. Physical therapy/OT recs [facility].     ANTITHROMBOTIC THERAPY: Heparin drip    PULMONARY: CXR clear, protecting airway, saturating well on room air     CARDIOVASCULAR: TTE EF 35%, left and right severe atrial dilation, severe mitral stenosis. 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 Hx valvular afib given severe rheumatic MS. Has hx of Eliquis failure, was on full dose Lovenox at home. Will c/w metoprolol 25 mg BID for rate control.         SBP goal: < 160    GASTROINTESTINAL: Dysphagia screen - failed. Seen by speech, now on pureed diet. Tolerating well     Diet: pureed w/ mildly thick liquids    RENAL: BUN/Cr with elevation, likely due to contrast and possible renal infarcts. Now improving, 1.9 this AM. Nephro following, recs appreciated. Urine output improving. Will c/w IV hydration with 1/2NS. Started on sodium bicarb 650mg tid. Renal US completed and shows increased echogenicity consistent with renal parenchymal disease. Renal considering RRT due to oliguria, will continue to monitor.      Na Goal: Greater than 135     Brunner: no     HEMATOLOGY: H/H with anemia, will monitor closely as patient on therapeutic Lovenox. Platelets 184. LE arterial and venous dopplers due to dec pulses showing multiple arterial occlusions and no DVTs present. CT CAP shows multiple thromboembolic events in the abdominal vasculature. Hypoenhancement within the spleen, both kidneys suspicious for infarcts. Mild bilateral adrenal gland thickening with hyper enhancement, which can be seen with hypoperfusion complex. Unclear at this time why patient is hypercoagulable, Hematology consulted for recommendations.     DVT ppx: Lovenox     ID: afebrile, mild leukocytosis. Ceftriaxone for + UA (3/13 - )    OTHER: Plan discussed with patient and family at bedside, all questions and concerns addressed. Prognosis discussed in depth with daughters and daughter in law. Discussed with plan of care with son over the phone on 3/12. Prognosis discussed at length, family wants patient to be full code at this time. Palliative care consulted per family request.     DISPOSITION: [facility] once stable and workup is complete.     CORE MEASURES:        Admission NIHSS: 17     Tenecteplase: [] YES [x] NO      LDL/HDL: 33/50      Depression Screen: pending     Statin Therapy: yes     Dysphagia Screen: [] PASS [x] FAIL     Smoking [] YES [x] NO [] Smoking cessation and education provided to patient [] Nicotine patch ordered      Afib [x] YES [] NO     Stroke Education [x] YES [] NO    Obtain screening lower extremity venous ultrasound in patients who meet 1 or more of the following criteria as patient is high risk for DVT/PE on admission:   [] History of DVT/PE  [] Hypercoagulable states (Factor V Leiden, Cancer, OCP, etc. )  [] Prolonged immobility (hemiplegia/hemiparesis/post operative or any other extended immobilization)  [] Transferred from outside facility (Rehab or Long term care)  [] Age </= to 50

## 2024-03-15 NOTE — PROGRESS NOTE ADULT - NS ATTEND AMEND GEN_ALL_CORE FT
I reviewed the overnight course of events on the unit, re-confirming the patient history. I discussed the care with the patient and their family. The plan of care was discussed with the ACP team and modifications were made to the notation where appropriate. Differential diagnosis and plan of care discussed with patient after the evaluation. Advanced care planning was discussed with patient and family.  Advanced care planning forms were reviewed and discussed.  Risks, benefits and alternatives of cardiac procedures were discussed in detail and all questions were answered. 35 minutes spent on total encounter of which more than fifty percent of the encounter was spent counseling and/or coordinating care by the attending physician.
I reviewed the overnight course of events on the unit, re-confirming the patient history. I discussed the care with the patient and their family. The plan of care was discussed with the ACP team and modifications were made to the notation where appropriate. Differential diagnosis and plan of care discussed with patient after the evaluation. Advanced care planning was discussed with patient and family.  Advanced care planning forms were reviewed and discussed.  Risks, benefits and alternatives of cardiac procedures were discussed in detail and all questions were answered. 35 minutes spent on total encounter of which more than fifty percent of the encounter was spent counseling and/or coordinating care by the attending physician.
Agree with above assessment and plan. Agree with AC. Likely CVAs related to atrial fibrillation. Will need to repeat protein S outpatient. Follow up in clinic after discharge.
I reviewed the overnight course of events on the unit, re-confirming the patient history. I discussed the care with the patient and their family. The plan of care was discussed with the ACP team and modifications were made to the notation where appropriate. Differential diagnosis and plan of care discussed with patient after the evaluation. Advanced care planning was discussed with patient and family.  Advanced care planning forms were reviewed and discussed.  Risks, benefits and alternatives of cardiac procedures were discussed in detail and all questions were answered. 35 minutes spent on total encounter of which more than fifty percent of the encounter was spent counseling and/or coordinating care by the attending physician.

## 2024-03-16 NOTE — DISCHARGE NOTE PROVIDER - HOSPITAL COURSE
69 y/o woman with PMH of chronic valvular atrial fibrillation on Lovenox, DM, HTN, HLD presented with new onset aphasia and L sided weakness. Per family she was fully conversant at around 10 pm on 3/9/2024. She has had 5 prior ischemic strokes in the setting of valvular atrial fibrillation. Son noticed aphasia and L sided weakness, has baseline R sided weakness. Is wheelchair bound at baseline due to deficits from prior strokes. Brought to the ED for further evaluation and work up,  Per family her last dose of Lovenox was last night on 3/9/2024. Upon stroke assessment her NIHSS was 17 on arrival.     Imaging:    CT Brain Stroke Protocol (03.10.24)  Small foci of left cerebellar intraparenchymal hemorrhage with surrounding vasogenic edema.  Multifocal chronic infarcts. Mild cerebral atrophy.    CT Brain Perfusion Maps Stroke (03.10.24)    CTA neck and brain:  No large vessel occlusion or significant stenosis.    CT Perfusion:  Nondiagnostic study secondary to motion restriction.    CT Head No Cont (03.10.24)  Interval increased yola hemorrhagic transformation of left inferior cerebellar hemisphere acute infarction. Parenchymal hemorrhage now measures 3.7 by the 2.8 cm in the axial plane. No significant mass effect on the fourth ventricle. No inferior displacement of the cerebellar tonsils.No hydrocephalus.    CT Head No Cont (03.10.24)   Mildly improved hyperdensity involving the left inferior cerebellar hemisphere suggesting improved contrast staining versus hemorrhage. Subacute/chronic right cerebellar hemisphere infarctions.     CT Abdomen and Pelvis w/ IV Cont (03.11.24)    CHEST:  *  Filling defect within the left atrial appendage, concerning for thrombus, also present on 11/25/2023.  *  Small pericardial effusion, increased since 11/25/2023.  *  Unchanged prominent nonspecific mediastinal lymph nodes.    ABDOMEN AND PELVIS:  *  Filling defects within the distal celiac artery extending into the proximal common hepatic artery, distal right common femoral artery extending into the proximal superficial and deep femoral arteries, right internal iliac artery, and left internal iliac artery, concerning for thrombus and may be related to thromboembolic event. Thrombus within the right femoral system appears to be occlusive or nearly occlusive with reconstitution of the imaged superficial and deep femoral arteries. Thrombus within the right internal iliac artery also appears occlusive with reconstitution of distal branches.  *  Patchy wedge-shaped areas of hypoenhancement within the spleen, concerning for infarcts, with artifact from phase of contrast timing thought to be less likely.  *  Patchy wedge-shaped regions of hypoenhancement within both kidneys, suspicious for infarcts. Pyelonephritis can have a similar appearance. Correlate with urinalysis.  *  Nonspecific pericholecystic edema. Consider right upper quadrant ultrasound further evaluation.  *  Mild bilateral adrenal gland thickening with hyperenhancement, which can be seen with hypoperfusion complex.    MR Head w/wo IV Cont (03.10.24)  -Subacute infarct with subacute hemorrhagic transformation in the left inferior cerebellum.   -Numerous acute/subacute infarcts in the bilateral cerebrum and cerebellum as described above, suggestive of embolic etiology.  -Numerous chronic infarcts as described above.    CT Head (3/11/2023)  Moderate atrophy. Large acute right cerebellar infarct with a small amount of petechial hemorrhage. Subacute to chronic left medial cerebellar infarct with hemosiderin staining    Impression: Acute onset aphasia and L sided weakness due to numerous acute/subacute infarcts in the bilateral cerebrum and cerebellum with subacute hemorrhagic transformation in the right inferior cerebellum likely due to cardioembolism in the setting of known left atrial appendage thrombus and missed dose of Lovenox. Patient was on full dose Lovenox but reported occasional missed doses of medication.     Plan:    NEURO: Neurologically with stable exam since admission. Started full dose heparin gtt for multiple thrombo emboli in body. Switched to Full dose Lovenox after stable CT 3/13, however then placed back on heparin drip due to worsening renal function. Heparin drip d/c'd 3/15 as patient planned for discharge to home hospice.     ANTITHROMBOTIC THERAPY: None, transitioned to home hospice    CARDIOVASCULAR: TTE EF 35%, left and right severe atrial dilation, severe mitral stenosis. 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 Hx valvular afib given severe rheumatic MS. Has hx of Eliquis failure, was previously on full dose Lovenox at home. Will c/w metoprolol 25 mg BID for rate control.        GASTROINTESTINAL: Dysphagia screen - failed. Seen by speech, now on pureed diet. Tolerating well     Diet: pureed w/ mildly thick liquids    RENAL: BUN/Cr with elevation, likely due to contrast and possible renal infarcts. Nephro following, recs appreciated. Urine output improved. Renal US completed and shows increased echogenicity consistent with renal parenchymal disease. Renal previously considering RRT due to oliguria.    HEMATOLOGY: LE arterial and venous dopplers due to decreased pulses showing multiple arterial occlusions and no DVTs present. CT CAP shows multiple thromboembolic events in the abdominal vasculature. Hypoenhancement within the spleen, both kidneys suspicious for infarcts. Mild bilateral adrenal gland thickening with hyper enhancement, which can be seen with hypoperfusion complex. Hematology consulted for hypercoagulable state, recs appreciated.     ID: s/p Ceftriaxone for + UA (3/13-3/15)    Plan discussed with patient and family at bedside, all questions and concerns addressed. Prognosis discussed in depth with son and daughter in law. Palliative care consulted per family request, patient made DNR/DNI on 3/15 and is awaiting transfer to home hospice. 67 y/o woman with PMH of chronic valvular atrial fibrillation on Lovenox, DM, HTN, HLD presented with new onset aphasia and L sided weakness. Per family she was fully conversant at around 10 pm on 3/9/2024. She has had 5 prior ischemic strokes in the setting of valvular atrial fibrillation. Son noticed aphasia and L sided weakness, has baseline R sided weakness. Is wheelchair bound at baseline due to deficits from prior strokes. Brought to the ED for further evaluation and work up,  Per family her last dose of Lovenox was last night on 3/9/2024. Upon stroke assessment her NIHSS was 17 on arrival.     Imaging:    CT Brain Stroke Protocol (03.10.24)  Small foci of left cerebellar intraparenchymal hemorrhage with surrounding vasogenic edema.  Multifocal chronic infarcts. Mild cerebral atrophy.    CT Brain Perfusion Maps Stroke (03.10.24)    CTA neck and brain:  No large vessel occlusion or significant stenosis.    CT Perfusion:  Nondiagnostic study secondary to motion restriction.    CT Head No Cont (03.10.24)  Interval increased yola hemorrhagic transformation of left inferior cerebellar hemisphere acute infarction. Parenchymal hemorrhage now measures 3.7 by the 2.8 cm in the axial plane. No significant mass effect on the fourth ventricle. No inferior displacement of the cerebellar tonsils.No hydrocephalus.    CT Head No Cont (03.10.24)   Mildly improved hyperdensity involving the left inferior cerebellar hemisphere suggesting improved contrast staining versus hemorrhage. Subacute/chronic right cerebellar hemisphere infarctions.     CT Abdomen and Pelvis w/ IV Cont (03.11.24)    CHEST:  *  Filling defect within the left atrial appendage, concerning for thrombus, also present on 11/25/2023.  *  Small pericardial effusion, increased since 11/25/2023.  *  Unchanged prominent nonspecific mediastinal lymph nodes.    ABDOMEN AND PELVIS:  *  Filling defects within the distal celiac artery extending into the proximal common hepatic artery, distal right common femoral artery extending into the proximal superficial and deep femoral arteries, right internal iliac artery, and left internal iliac artery, concerning for thrombus and may be related to thromboembolic event. Thrombus within the right femoral system appears to be occlusive or nearly occlusive with reconstitution of the imaged superficial and deep femoral arteries. Thrombus within the right internal iliac artery also appears occlusive with reconstitution of distal branches.  *  Patchy wedge-shaped areas of hypoenhancement within the spleen, concerning for infarcts, with artifact from phase of contrast timing thought to be less likely.  *  Patchy wedge-shaped regions of hypoenhancement within both kidneys, suspicious for infarcts. Pyelonephritis can have a similar appearance. Correlate with urinalysis.  *  Nonspecific pericholecystic edema. Consider right upper quadrant ultrasound further evaluation.  *  Mild bilateral adrenal gland thickening with hyperenhancement, which can be seen with hypoperfusion complex.    MR Head w/wo IV Cont (03.10.24)  -Subacute infarct with subacute hemorrhagic transformation in the left inferior cerebellum.   -Numerous acute/subacute infarcts in the bilateral cerebrum and cerebellum as described above, suggestive of embolic etiology.  -Numerous chronic infarcts as described above.    CT Head (3/11/2023)  Moderate atrophy. Large acute right cerebellar infarct with a small amount of petechial hemorrhage. Subacute to chronic left medial cerebellar infarct with hemosiderin staining    Impression: Acute onset aphasia and L sided weakness due to numerous acute/subacute infarcts in the bilateral cerebrum and cerebellum with subacute hemorrhagic transformation in the right inferior cerebellum likely due to cardioembolism in the setting of known left atrial appendage thrombus and missed dose of Lovenox. Patient was on full dose Lovenox but reported occasional missed doses of medication.     Plan:    NEURO: Neurologically with stable exam since admission. Started full dose heparin gtt for multiple thrombo emboli in body. Switched to Full dose Lovenox after stable CT 3/13, however then placed back on heparin drip due to worsening renal function. Heparin drip d/c'd 3/15 as patient planned for discharge to home hospice.     ANTITHROMBOTIC THERAPY: None, transitioned to home hospice    CARDIOVASCULAR: TTE EF 35%, left and right severe atrial dilation, severe mitral stenosis. 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 Hx valvular afib given severe rheumatic MS. Has hx of Eliquis failure, was previously on full dose Lovenox at home. Will c/w metoprolol 25 mg BID for rate control.        GASTROINTESTINAL: Dysphagia screen - failed. Seen by speech, now on pureed diet. Tolerating well     Diet: pureed w/ mildly thick liquids    RENAL: BUN/Cr with elevation, likely due to contrast and possible renal infarcts. Nephro following, recs appreciated. Urine output improved. Renal US completed and shows increased echogenicity consistent with renal parenchymal disease. Renal previously considering RRT due to oliguria.    HEMATOLOGY: LE arterial and venous dopplers due to decreased pulses showing multiple arterial occlusions and no DVTs present. CT CAP shows multiple thromboembolic events in the abdominal vasculature. Hypoenhancement within the spleen, both kidneys suspicious for infarcts. Mild bilateral adrenal gland thickening with hyper enhancement, which can be seen with hypoperfusion complex. Hematology consulted for hypercoagulable state, recs appreciated.     ID: s/p Ceftriaxone for + UA (3/13-3/15)    Plan discussed with patient and family at bedside, all questions and concerns addressed. Prognosis discussed in depth with son and daughter in law. Palliative care consulted per family request, patient made DNR/DNI on 3/15 and she will be discharged home with hospice.

## 2024-03-16 NOTE — DISCHARGE NOTE NURSING/CASE MANAGEMENT/SOCIAL WORK - PATIENT PORTAL LINK FT
You can access the FollowMyHealth Patient Portal offered by [facility] by registering at the following website: [email or web address]. By joining [employer]’s FollowMyHealth portal, you will also be able to view your health information using other applications (apps) compatible with our system.

## 2024-03-16 NOTE — DISCHARGE NOTE PROVIDER - NSDCMRMEDTOKEN_GEN_ALL_CORE_FT
aspirin 81 mg oral delayed release capsule: orally once a day  atorvastatin 80 mg oral tablet: 1 tab(s) orally once a day (at bedtime)  Lovenox 60 mg/0.6 mL injectable solution: 60 milligram(s) subcutaneously 2 times a day  metFORMIN 1000 mg oral tablet: 1 tab(s) orally 2 times a day  metoprolol: 25 milligram(s) orally once a day   HYDROmorphone 1 mg/mL oral liquid: 1 milliliter(s) orally every 4 to 6 hours as needed for  pain Give 1mL for moderate pain or 2mL for severe pain MDD: 8mL  metoprolol tartrate 25 mg oral tablet: 0.5 tab(s) orally every 12 hours

## 2024-03-16 NOTE — PROGRESS NOTE ADULT - SUBJECTIVE AND OBJECTIVE BOX
Chintan Owusu MD  Interventional Cardiology / Endovascular Specialist  [place] Office : [phone] [place] [address] [place] [address]  Tel:   [place] Office : [address] [facility] [place] [address]  Tel: [phone]    Pt lying in bed in NAD denies LE pain , CP SOB   	  MEDICATIONS:  metoprolol tartrate 12.5 milliGRAM(s) Oral every 12 hours        HYDROmorphone   Solution 1 milliGRAM(s) Oral every 4 hours PRN  HYDROmorphone   Solution 2 milliGRAM(s) Oral every 4 hours PRN            PAST MEDICAL/SURGICAL HISTORY  PAST MEDICAL & SURGICAL HISTORY:  HTN (hypertension)      HLD (hyperlipidemia)      DM (diabetes mellitus)  Type II      Atrial fibrillation  h/o Cardioversion in 2013 and on Coumadin      Mitral stenosis      TIA (transient ischemic attack)  2013- Rt facial droop which resolved after 2 hours      Chronic atrial fibrillation      CVA (cerebrovascular accident)      HTN (hypertension)      DM (diabetes mellitus), type 2      Ventral hernia      S/P knee surgery      S/P laser cataract surgery, unspecified laterality  ???      H/O hernia repair          SOCIAL HISTORY: Substance Use (street drugs): ( x ) never used  (  ) other:    FAMILY HISTORY:  No pertinent family history in first degree relatives    No pertinent family history in first degree relatives        REVIEW OF SYSTEMS:  CONSTITUTIONAL: No fever, weight loss, or fatigue  EYES: No eye pain, visual disturbances, or discharge  ENMT:  No difficulty hearing, tinnitus, vertigo; No sinus or throat pain  BREASTS: No pain, masses, or nipple discharge  GASTROINTESTINAL: No abdominal or epigastric pain. No nausea, vomiting, or hematemesis; No diarrhea or constipation. No melena or hematochezia.  GENITOURINARY: No dysuria, frequency, hematuria, or incontinence  NEUROLOGICAL: No headaches, memory loss, loss of strength, numbness, or tremors  ENDOCRINE: No heat or cold intolerance; No hair loss  MUSCULOSKELETAL: No joint pain or swelling; No muscle, back, or extremity pain  PSYCHIATRIC: No depression, anxiety, mood swings, or difficulty sleeping  HEME/LYMPH: No easy bruising, or bleeding gums  All others negative    PHYSICAL EXAM:  T(C): 36.6 (03-15-24 @ 20:00), Max: 36.8 (03-15-24 @ 12:00)  HR: 87 (03-15-24 @ 20:00) (85 - 92)  BP: 151/86 (03-15-24 @ 20:00) (112/84 - 151/86)  RR: 18 (03-15-24 @ 20:00) (15 - 19)  SpO2: 97% (03-15-24 @ 20:00) (96% - 99%)  Wt(kg): --  I&O's Summary    15 Mar 2024 07:01  -  16 Mar 2024 07:00  --------------------------------------------------------  IN: 876 mL / OUT: 800 mL / NET: 76 mL      GENERAL: NAD  EYES:  conjunctiva and sclera clear  ENMT: No tonsillar erythema, exudates, or enlargement  Cardiovascular: Normal S1 S2, No JVD, No murmurs, No edema  Respiratory: Lungs clear to auscultation	  Gastrointestinal:  Soft,   Extremities: No edema                             8.3    9.21  )-----------( 190      ( 15 Mar 2024 07:57 )             25.9     03-15    134<L>  |  101  |  38<H>  ----------------------------<  146<H>  4.4   |  18<L>  |  1.98<H>    Ca    8.8      15 Mar 2024 07:57      proBNP:   Lipid Profile:   HgA1c:   TSH:     Consultant(s) Notes Reviewed:  [x ] YES  [ ] NO    Care Discussed with Consultants/Other Providers [ x] YES  [ ] NO    Imaging Personally Reviewed independently:  [x] YES  [ ] NO    All labs, radiologic studies, vitals, orders and medications list reviewed. Patient is seen and examined at bedside. Case discussed with medical team.

## 2024-03-16 NOTE — DISCHARGE NOTE PROVIDER - CARE PROVIDER_API CALL
Jaimie Kevin  Neurology  [address], [address]  [place] [address]  Phone: [phone]  Fax: [phone]  Follow Up Time: Routine

## 2024-03-16 NOTE — PROGRESS NOTE ADULT - SUBJECTIVE AND OBJECTIVE BOX
Dr. Disla  Office [phone] (9 am to 5 pm)  Service: [phone] (5pm to 9am)  Brittany OSBORN      RENAL PROGRESS NOTE: DATE OF SERVICE 03-16-24 @ 14:40    Patient is a 68y old  Female who presents with a chief complaint of ICH (16 Mar 2024 09:01)      Patient seen and examined at bedside. No chest pain/sob    VITALS:  T(F): 98.1 (03-16-24 @ 08:00), Max: 98.1 (03-15-24 @ 16:00)  HR: 76 (03-16-24 @ 08:00)  BP: 119/81 (03-16-24 @ 08:00)  RR: 18 (03-15-24 @ 20:00)  SpO2: 97% (03-15-24 @ 20:00)  Wt(kg): --    03-15 @ 07:01  -  03-16 @ 07:00  --------------------------------------------------------  IN: 876 mL / OUT: 800 mL / NET: 76 mL          PHYSICAL EXAM:  Constitutional: NAD  Neck: No JVD  Respiratory: CTAB, no wheezes, rales or rhonchi  Cardiovascular: S1, S2, RRR  Gastrointestinal: BS+, soft, NT/ND  Extremities: No peripheral edema    Hospital Medications:   MEDICATIONS  (STANDING):  metoprolol tartrate 12.5 milliGRAM(s) Oral every 12 hours      LABS:  03-15    134<L>  |  101  |  38<H>  ----------------------------<  146<H>  4.4   |  18<L>  |  1.98<H>    Ca    8.8      15 Mar 2024 07:57      Creatinine Trend: 1.98 <--, 2.32 <--, 2.25 <--, 2.26 <--, 2.07 <--, 2.04 <--, 1.76 <--, 1.62 <--, 0.89 <--, 0.77 <--, 0.75 <--                                8.3    9.21  )-----------( 190      ( 15 Mar 2024 07:57 )             25.9     Urine Studies:  Urinalysis - [03-15-24 @ 07:57]      Color  / Appearance  / SG  / pH       Gluc 146 / Ketone   / Bili  / Urobili        Blood  / Protein  / Leuk Est  / Nitrite       RBC  / WBC  / Hyaline  / Gran  / Sq Epi  / Non Sq Epi  / Bacteria     Urine Creatinine 88      [03-13-24 @ 14:23]  Urine Sodium 65      [03-13-24 @ 14:23]    Iron 34, TIBC 261, %sat 13      [11-28-23 @ 06:43]  Ferritin 85      [11-28-23 @ 06:43]  HbA1c 6.7      [09-23-19 @ 08:45]  TSH 1.30      [03-10-24 @ 13:28]  Lipid: chol 101, TG 93, HDL 50, LDL --      [03-10-24 @ 08:59]    HBsAb 5.9      [03-14-24 @ 06:32]  HBsAg Nonreact      [03-14-24 @ 06:31]  HBcAb Reactive      [03-14-24 @ 06:32]  HCV 0.14, Nonreact      [03-14-24 @ 06:32]      RADIOLOGY & ADDITIONAL STUDIES:

## 2024-03-16 NOTE — PROGRESS NOTE ADULT - ASSESSMENT
67 y/o woman with PMH of chronic valvular atrial fibrillation on Lovenox, DM, HTN, HLD presented with new onset aphasia and L sided weakness. Per family she was fully conversant at around 10 pm on 3/9/2024. She has had 5 prior ischemic strokes in the setting of valvular atrial fibrillation. Son noticed aphasia and L sided weakness, has baseline R sided weakness. Is wheelchair bound at baseline due to deficits from prior strokes. Brought to the ED for further evaluation and work up. Admitted with CVA. Has developed MAYRA    A/P:  MAYRA:  Timing of MAYRA suggest contrast nephropathy  s/p contrast on 3/11  But this patient has extensive thrombus burden with renal infarct-MAYRA sec to ischemia can not be ruled out  heme and vascular on board  no blood in UA favors YADIRA  Check Urine NA Urine Cr  CT A/P suggestive of no hydro  PT was oliguric--discussed with family regarding worsening renal function and decreasing urine outpt. Family consented for RRT if needed.  Today Renal function improving and non oliguric   Monitor renal function closely      Proteinuria   in setting of + LE  repeat ua    acidosis   non ag  continue oral bicarb  650mg TID  monitor serum co2

## 2024-03-16 NOTE — PROGRESS NOTE ADULT - ASSESSMENT
67yo woman with PMH of chronic valvular atrial fibrillation on Lovenox, DM, HTN, HLD presented with new onset aphasia and L sided weakness. Per family she was fully conversant at around 10 pm on 3/9/2024. She has had 5 prior ischemic strokes in the setting of valvular atrial fibrillation. Son noticed aphasia and L sided weakness, has baseline R sided weakness. Is wheelchair bound at baseline due to deficits from prior strokes. Brought to the ED for further evaluation and work up,  Per family her last dose of Lovenox was last night on 3/9/2024.     Impression: Acute onset aphasia and L sided weakness due to numerous acute/subacute infarcts in the bilateral cerebrum and cerebellum with subacute hemorrhagic transformation in the right inferior cerebellum likely due to cardioembolism in the setting of known left atrial appendage thrombus and missed dose of Lovenox. Patient was on full dose Lovenox but reported occasional missed doses of medication.     NEURO: Neurologically with stable exam since admission. Repeat CTH on 3/12 is stable. Started on full dose heparin gtt for multiple thrombo emboli in body. Switched to Full dose Lovenox after stable CT 3/13, however then placed back on heparin drip due to worsening renal function. Heparin drip d/c'd 3/15, patient awaiting home hospice. A1C- 7.0, LDL - 33. MRI Brain w/wo with results as above.     ANTITHROMBOTIC THERAPY: None, awaiting home hospice    PULMONARY: CXR clear, protecting airway, saturating well on room air     CARDIOVASCULAR: TTE EF 35%, left and right severe atrial dilation, severe mitral stenosis. 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 Hx valvular afib given severe rheumatic MS. Has hx of Eliquis failure, was on full dose Lovenox at home. Will c/w metoprolol 25 mg BID for rate control.         SBP goal: < 160    GASTROINTESTINAL: Dysphagia screen - failed. Seen by speech, now on pureed diet. Tolerating well     Diet: pureed w/ mildly thick liquids    RENAL: BUN/Cr with elevation, likely due to contrast and possible renal infarcts. Now improving, 1.9 this AM. Nephro following, recs appreciated. Urine output improving. Renal US completed and shows increased echogenicity consistent with renal parenchymal disease. Renal previously considering RRT due to oliguria.     Na Goal: Greater than 135     Brunner: no     HEMATOLOGY: LE arterial and venous dopplers due to dec pulses showing multiple arterial occlusions and no DVTs present. CT CAP shows multiple thromboembolic events in the abdominal vasculature. Hypoenhancement within the spleen, both kidneys suspicious for infarcts. Mild bilateral adrenal gland thickening with hyper enhancement, which can be seen with hypoperfusion complex. Unclear at this time why patient is hypercoagulable, Hematology consulted for recommendations.     DVT ppx: none, awaiting home hospice    ID: afebrile. s/p Ceftriaxone for + UA (3/13-3/15)    OTHER: Plan discussed with patient and family at bedside, all questions and concerns addressed. Prognosis discussed in depth with daughters and daughter in law. Palliative care consulted per family request, patient made DNR/DNI on 3/15 and is awaiting transfer to home hospice.     DISPOSITION: Home hospice once arranged    CORE MEASURES:        Admission NIHSS: 17     Tenecteplase: [] YES [x] NO      LDL/HDL: 33/50      Depression Screen: pending     Statin Therapy: yes     Dysphagia Screen: [] PASS [x] FAIL     Smoking [] YES [x] NO [] Smoking cessation and education provided to patient [] Nicotine patch ordered      Afib [x] YES [] NO     Stroke Education [x] YES [] NO    Obtain screening lower extremity venous ultrasound in patients who meet 1 or more of the following criteria as patient is high risk for DVT/PE on admission:   [] History of DVT/PE  [] Hypercoagulable states (Factor V Leiden, Cancer, OCP, etc. )  [] Prolonged immobility (hemiplegia/hemiparesis/post operative or any other extended immobilization)  [] Transferred from outside facility (Rehab or Long term care)  [] Age </= to 50 67yo woman with PMH of chronic valvular atrial fibrillation on Lovenox, DM, HTN, HLD presented with new onset aphasia and L sided weakness. Per family she was fully conversant at around 10 pm on 3/9/2024. She has had 5 prior ischemic strokes in the setting of valvular atrial fibrillation. Son noticed aphasia and L sided weakness, has baseline R sided weakness. Is wheelchair bound at baseline due to deficits from prior strokes. Brought to the ED for further evaluation and work up,  Per family her last dose of Lovenox was last night on 3/9/2024.     Impression: Acute onset aphasia and L sided weakness due to numerous acute/subacute infarcts in the bilateral cerebrum and cerebellum with subacute hemorrhagic transformation in the right inferior cerebellum likely due to cardioembolism in the setting of known left atrial appendage thrombus and missed dose of Lovenox. Patient was on full dose Lovenox but reported occasional missed doses of medication.     NEURO: Neurologically with stable exam since admission. Repeat CTH on 3/12 is stable. Started on full dose heparin gtt for multiple thrombo emboli in body. Switched to Full dose Lovenox after stable CT 3/13, however then placed back on heparin drip due to worsening renal function. Heparin drip d/c'd 3/15, patient awaiting home hospice. A1C- 7.0, LDL - 33. MRI Brain w/wo with results as above.     ANTITHROMBOTIC THERAPY: None, awaiting home hospice    PULMONARY: CXR clear, protecting airway, saturating well on room air     CARDIOVASCULAR: TTE EF 35%, left and right severe atrial dilation, severe mitral stenosis. 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 Hx valvular afib given severe rheumatic MS. Has hx of Eliquis failure, was on full dose Lovenox at home. Will c/w metoprolol 25 mg BID for rate control.         SBP goal: < 160    GASTROINTESTINAL: Dysphagia screen - failed. Seen by speech, now on pureed diet. Tolerating well     Diet: pureed w/ mildly thick liquids    RENAL: BUN/Cr with elevation, likely due to contrast and possible renal infarcts. Now improving, 1.9 this AM. Nephro following, recs appreciated. Urine output improving. Renal US completed and shows increased echogenicity consistent with renal parenchymal disease. Renal previously considering RRT due to oliguria.     Na Goal: Greater than 135     Brunner: no     HEMATOLOGY: LE arterial and venous dopplers due to dec pulses showing multiple arterial occlusions and no DVTs present. CT CAP shows multiple thromboembolic events in the abdominal vasculature. Hypoenhancement within the spleen, both kidneys suspicious for infarcts. Mild bilateral adrenal gland thickening with hyper enhancement, which can be seen with hypoperfusion complex. Unclear at this time why patient is hypercoagulable, Hematology consulted for recommendations.     DVT ppx: none, awaiting home hospice    ID: afebrile. s/p Ceftriaxone for + UA (3/13-3/15)    OTHER: Plan discussed with patient and family at bedside, all questions and concerns addressed. Prognosis discussed in depth with daughters and daughter in law. Palliative care consulted per family request, patient made DNR/DNI on 3/15 and is awaiting transfer to home hospice.     DISPOSITION: Home today with home hospice    CORE MEASURES:        Admission NIHSS: 17     Tenecteplase: [] YES [x] NO      LDL/HDL: 33/50      Depression Screen: pending     Statin Therapy: yes     Dysphagia Screen: [] PASS [x] FAIL     Smoking [] YES [x] NO [] Smoking cessation and education provided to patient [] Nicotine patch ordered      Afib [x] YES [] NO     Stroke Education [x] YES [] NO    Obtain screening lower extremity venous ultrasound in patients who meet 1 or more of the following criteria as patient is high risk for DVT/PE on admission:   [] History of DVT/PE  [] Hypercoagulable states (Factor V Leiden, Cancer, OCP, etc. )  [] Prolonged immobility (hemiplegia/hemiparesis/post operative or any other extended immobilization)  [] Transferred from outside facility (Rehab or Long term care)  [] Age </= to 50

## 2024-03-16 NOTE — PROGRESS NOTE ADULT - ASSESSMENT
67 y/o woman with PMH of chronic valvular atrial fibrillation on Lovenox, DM, HTN, HLD presented with new onset aphasia and L sided weakness      1) CVA  -Acute onset aphasia and L sided weakness, found to have L cerebellar hemorrhage.   -f/u neuro recs    2) Afib  -valvular afib given severe MS rheumatic  -hx of eliquis failure, was on lovenox--now off heparin ggt , planned for home Hospice   -c/w metoprolol 25 mg po BID       3) Severe mitral stenosis   - BUBBA 12/2023 with severe rheumatic mitral stenosis and KRISTAN thrombus was discharged for outpatient follow up for CTS referral however patient declined surgery.   - extensive thrombus burden in arterial system, Vascular eval appreciated , monitor end organ function   - Echo with worsening BiV function and Sever MS with valve area 0.2cm2   - Currently not a candidate for Ischemic Eval given recent stroke and thrombus burden for b/l LE , planned for home hospice

## 2024-03-16 NOTE — DISCHARGE NOTE NURSING/CASE MANAGEMENT/SOCIAL WORK - NSDCPEFALRISK_GEN_ALL_CORE
For information on Fall & Injury Prevention, visit: [email or web address] OR  [email or web address] OR  https://www.cdc.gov/steadi/patient.html

## 2024-03-16 NOTE — DISCHARGE NOTE PROVIDER - NSDCCPCAREPLAN_GEN_ALL_CORE_FT
PRINCIPAL DISCHARGE DIAGNOSIS  Diagnosis: Stroke  Assessment and Plan of Treatment:      PRINCIPAL DISCHARGE DIAGNOSIS  Diagnosis: Stroke  Assessment and Plan of Treatment: Continue care per home hospice

## 2024-03-16 NOTE — PROGRESS NOTE ADULT - PROVIDER SPECIALTY LIST ADULT
Cardiology
Nephrology
Neurology
Neurology
Vascular Surgery
Cardiology
Internal Medicine
Neurology
Neurology
Neurosurgery
Cardiology
Cardiology
Heme/Onc
Nephrology
Neurology
Cardiology
Neurology
Neurosurgery

## 2024-03-16 NOTE — PROGRESS NOTE ADULT - SUBJECTIVE AND OBJECTIVE BOX
THE PATIENT WAS SEEN AND EXAMINED BY ME WITH THE HOUSESTAFF AND STROKE TEAM DURING MORNING ROUNDS.     HPI:  69 y/o woman with PMH of chronic valvular atrial fibrillation on Lovenox, DM, HTN, HLD presented with new onset aphasia and L sided weakness. Per family she was fully conversant at around 10 pm on 3/9/2024. She has had 5 prior ischemic strokes in the setting of valvular atrial fibrillation. Son noticed aphasia and L sided weakness, has baseline R sided weakness. Is wheelchair bound at baseline due to deficits from prior strokes. Brought to the ED for further evaluation and work up,  Per family her last dose of Lovenox was last night on 3/9/2024.  Upon stroke assessment her NIHSS was 17 on arrival.     SUBJECTIVE: No events overnight.  No new neurologic complaints.    HYDROmorphone   Solution 2 milliGRAM(s) Oral every 4 hours PRN  HYDROmorphone   Solution 1 milliGRAM(s) Oral every 4 hours PRN  metoprolol succinate ER 25 milliGRAM(s) Oral daily    PHYSICAL EXAM:   Vital Signs Last 24 Hrs  T(C): 36.6 (15 Mar 2024 20:00), Max: 36.8 (15 Mar 2024 12:00)  T(F): 97.9 (15 Mar 2024 20:00), Max: 98.2 (15 Mar 2024 12:00)  HR: 87 (15 Mar 2024 20:00) (85 - 92)  BP: 151/86 (15 Mar 2024 20:00) (112/84 - 151/86)  BP(mean): 100 (15 Mar 2024 16:00) (91 - 100)  RR: 18 (15 Mar 2024 20:00) (15 - 19)  SpO2: 97% (15 Mar 2024 20:00) (96% - 99%)    Parameters below as of 15 Mar 2024 20:00  Patient On (Oxygen Delivery Method): room air    General: No acute distress  HEENT: EOM intact, visual fields full  Abdomen: Soft, nontender, nondistended   Extremities: b/l LE cold to touch     NEUROLOGICAL EXAM:   Mental status: Awake, alert, oriented to person, year, and age. Not oriented to month. Speech is fluent. Can follow simple and complex commands.   Cranial Nerves: No facial droop, no nystagmus, moderate to severe dysarthria  Motor exam: Normal tone, 4/5 RUE with drift and LUE with drift. RLE with no effort against gravity and LLE some effort against gravity   Sensation: Intact to light touch   Coordination/ Gait: Deferred    LABS:                        8.3    9.21  )-----------( 190      ( 15 Mar 2024 07:57 )             25.9    03-15    134<L>  |  101  |  38<H>  ----------------------------<  146<H>  4.4   |  18<L>  |  1.98<H>    Ca    8.8      15 Mar 2024 07:57    PTT - ( 15 Mar 2024 03:37 )  PTT:88.2 sec    IMAGING: Reviewed by me.     CT Brain Stroke Protocol (03.10.24)  Small foci of left cerebellar intraparenchymal hemorrhage with   surrounding vasogenic edema.  Multifocal chronic infarcts, as outlined above  Mild cerebral atrophy.    CT Brain Perfusion Maps Stroke (03.10.24)  CTA neck and brain:  No large vessel occlusion or significant stenosis.  CT Perfusion:  Nondiagnostic study secondary to motion restriction.     CT Head No Cont (03.10.24)  Interval increased yola hemorrhagic transformation of left inferior   cerebellar hemisphere acute infarction. Parenchymal hemorrhage now   measures 3.7 by the 2.8 cm in the axial plane.  No significant mass effect on the fourth ventricle.  No inferior displacement of the cerebellar tonsils.  Nohydrocephalus.    CT Head No Cont (03.10.24)   Mildly improved hyperdensity involving the left inferior   cerebellar hemisphere suggesting improved contrast staining versus   hemorrhage. Subacute/chronic right cerebellar hemisphere infarctions.   Consider MRI for further evaluation.    CT Abdomen and Pelvis w/ IV Cont (03.11.24)  CHEST:  *  Filling defect within the left atrial appendage, concerning for   thrombus, also present on 11/25/2023.  *  Small pericardial effusion, increased since 11/25/2023.  *  Unchanged prominent nonspecific mediastinal lymph nodes.    ABDOMEN AND PELVIS:  *  Filling defects within the distal celiac artery extending into the   proximal common hepatic artery, distal right common femoral artery   extending into the proximal superficial and deep femoral arteries, right   internal iliac artery, and left internal iliac artery, concerning for   thrombus and may be related to thromboembolic event. Thrombus within the   right femoralsystem appears to be occlusive or nearly occlusive with   reconstitution of the imaged superficial and deep femoral arteries.   Thrombus within the right internal iliac artery also appears occlusive   with reconstitution of distal branches.  *  Patchy wedge-shaped areas of hypoenhancement within the spleen,   concerning for infarcts, with artifact from phase of contrast timing   thought to be less likely.  *  Patchy wedge-shaped regions of hypoenhancement within both kidneys,   suspicious for infarcts. Pyelonephritis can have a similar appearance.   Correlate with urinalysis.  *  Nonspecific pericholecystic edema. Consider right upper quadrant   ultrasound further evaluation.  *  Mild bilateral adrenal gland thickening with hyperenhancement, which   can be seen with hypoperfusion complex.    MR Head w/wo IV Cont (03.10.24)  -Subacute infarct with subacute hemorrhagic transformation in the left   inferior cerebellum.   -Numerous acute/subacute infarcts in the bilateral cerebrum and  cerebellum as described above, suggestive of embolic etiology.  -Numerous chronic infarcts as described above.    NOn contrast CTH (3/11/2023)  Moderate atrophy. Large acute right cerebellar infarct with a   small amount of petechial hemorrhage. Subacute to chronic left medial   cerebellar infarct with hemosiderin staining

## 2024-03-22 NOTE — PHYSICAL EXAM
Writer spoke to pt's therapist Lei Rodriguez. Lei reports he is waiting to hear back from mom to schedule appts and that he provided her with his availability. Lei is prepared to see pt twice weekly.    [Sclera] : the sclera and conjunctiva were normal [PERRL With Normal Accommodation] : pupils were equal in size, round, and reactive to light [Extraocular Movements] : extraocular movements were intact [Neck Appearance] : the appearance of the neck was normal [Neck Cervical Mass (___cm)] : no neck mass was observed [Jugular Venous Distention Increased] : there was no jugular-venous distention [Thyroid Diffuse Enlargement] : the thyroid was not enlarged [Thyroid Nodule] : there were no palpable thyroid nodules [] : no respiratory distress [Auscultation Breath Sounds / Voice Sounds] : lungs were clear to auscultation bilaterally [Heart Rate And Rhythm] : heart rate was normal and rhythm regular [Examination Of The Chest] : the chest was normal in appearance [Breast Appearance] : normal in appearance [Bowel Sounds] : normal bowel sounds [Abdomen Soft] : soft [Cervical Lymph Nodes Enlarged Posterior Bilaterally] : posterior cervical [Cervical Lymph Nodes Enlarged Anterior Bilaterally] : anterior cervical [No CVA Tenderness] : no ~M costovertebral angle tenderness [Shuffling] : shuffling [Limping On The Right] : limping on the right [Skin Color & Pigmentation] : normal skin color and pigmentation [Oriented To Time, Place, And Person] : oriented to person, place, and time [Impaired Insight] : insight and judgment were intact [Motor Strength Hips Right Weakness] : hip weakness was present [Motor Strength Knee Right Weakness] : knee weakness was present [0] : knee extension 0/5 [Motor Strength Ankle Right Weakness] : ankle weakness was present [Motor Strength Toes Right Foot Weakness] : toe weakness present [Motor Strength First Toe Right Weakness] : great toe weakness was present [Right Carotid Bruit] : no bruit heard over the right carotid [Left Carotid Bruit] : no bruit heard over the left carotid [FreeTextEntry1] : Right upper and lower extremity weakness  LE weakness > UE

## 2024-07-22 NOTE — PROGRESS NOTE ADULT - PROBLEM SELECTOR PROBLEM 3
Population Health Chart Review & Patient Outreach Details      Additional Pop Health Notes:      DUE FOR PHYSICAL AFTER 10/6/2024.   LM AND SENT PORTAL MESSAGE          Updates Requested / Reviewed:      Updated Care Coordination Note and Care Everywhere         Health Maintenance Topics Overdue:      VB Score: 1     Hemoglobin A1c                       Health Maintenance Topic(s) Outreach Outcomes & Actions Taken:    Provider Pt Reattribution - Outreach Outcomes & Actions Taken  : LM AND SENT PORTAL MESSAGE   Rheumatic mitral valve disease

## 2024-11-10 NOTE — ED PROVIDER NOTE - NS ED ATTENDING STATEMENT MOD
Left Voicemail (1st Attempt) for the patient to call back and schedule the following:    Appointment type: RETURN DIABETES  Provider: Shena Iniguez PA-C  Return date: Oct 2025  Specialty phone number: [phone]  Additional appointment(s) needed:   Additonal Notes: Pt answered and hung up    
I have personally seen and examined this patient.  I have fully participated in the care of this patient. I have reviewed all pertinent clinical information, including history, physical exam, plan and the Resident’s note and agree except as noted.

## 2024-12-12 NOTE — ED PROVIDER NOTE - HIV OFFER
Vineet Vora)  Neurology  [address]  [place] [address]  Phone: [phone]  Fax: [phone]  Follow Up Time: 7-10 Days  
Previously Declined (within the last year)

## 2025-01-15 NOTE — PROGRESS NOTE ADULT - ASSESSMENT
When I called to speak to Feng about his test results Janki had gotten on the phone to let me know that Cristobal is experiencing flu like symptoms with a high fever. She did take Cristobal to the urgent care where they did swab him for Flu/Covid and they told her there is really nothing they can do just to give him tylenol. She is wondering what you recommend since he is having same symptoms as Feng. Please advise.    64 female with afib on coumadin, prior right mca stroke in 2013 and 2016 with no residual deficits, htn, hld, dm2, mitral stenosis/rheumatic valvular disease admitted to [facility] s/p left mca infarct now with decreased functional mobility, dysphagia, aphasia, hemiplegia, gait instability and adl impairments.

## 2025-02-17 NOTE — ED PROVIDER NOTE - NS ED MD DISPO ISOLATION TYPES
Pt c/o RT arm pain x 4 days and RT two digit finger numbness/tingling x 3 hours. Stroke code called in triage and cancelled.  None

## 2025-02-25 NOTE — PATIENT PROFILE ADULT - FALL HARM RISK - DEVICES
[facility]      Bogdan Cortez         [id number]  2/25/2025   Chief Complaint   Patient presents with    Follow-up     1 year DEANDRA f/u with [facility]        Pt of Dr. Rio CHO Download:   Original or initial AHI: 12.9     Date of initial study: 2/11/2020      Compliant  100%     Noncompliant 0 %     PAP Type autoset Level  min 6cmH20 max  11 cmH20   Avg Hrs/Day 6 hrs 53 min  AHI: 0.5   Leaks : 95 th percentile: 10.8   Recorded compliance dates , 1/25/25  to 2/23/25   Machine/Mfg:   [x] ResMed    [] Respironics/Dreamstation   Interface:   [] Nasal    [x] Nasal pillows   [] FFM      Provider:      [x] [facility]     []Chantelle     [] Kanwal    [] Lisaare    [] [facility]               [] [facility]      [] Adaptive    [] [place]:      [] Other    Neck Size: 16 inches  Mallampati 1  ESS:  7  SAQLI: 79    Here is a scan of the most recent download:              Presentation:   [facility]  Since the last visit, Bogdan is using his CPAP with good compliance and benefit   C/o increased leaks       Progress History:   Since last visit any new medical issues? Yes : AFIB, now on Eliquis, cardizem  Any trouble with Machine No  Any new sleep medicines? no      Equipment issues:  The pressure is  acceptable, the mask is acceptable     Review of Systems -   Review of Systems   Constitutional: Negative.    HENT: Negative.     Respiratory: Negative.     Cardiovascular: Negative.    Musculoskeletal: Negative.    Skin: Negative.    Allergic/Immunologic: Negative.    Neurological: Negative.    Psychiatric/Behavioral: Negative.          Physical Exam:    BMI:  Body mass index is 27.49 kg/m².    Wt Readings from Last 3 Encounters:   02/25/25 86.9 kg (191 lb 9.6 oz)   01/28/25 84.8 kg (187 lb)   10/15/24 83.7 kg (184 lb 9.6 oz)     Weight stable / unchanged  Vitals: /70 (Site: Left Upper Arm, Position: Sitting, Cuff  Wheelchair
